# Patient Record
Sex: MALE | Race: WHITE | NOT HISPANIC OR LATINO | Employment: UNEMPLOYED | URBAN - METROPOLITAN AREA
[De-identification: names, ages, dates, MRNs, and addresses within clinical notes are randomized per-mention and may not be internally consistent; named-entity substitution may affect disease eponyms.]

---

## 2017-01-09 RX ORDER — METOPROLOL TARTRATE 50 MG/1
TABLET, FILM COATED ORAL
Qty: 60 TAB | Refills: 11 | Status: SHIPPED | OUTPATIENT
Start: 2017-01-09 | End: 2018-01-10 | Stop reason: SDUPTHER

## 2017-01-09 RX ORDER — LISINOPRIL 20 MG/1
TABLET ORAL
Qty: 30 TAB | Refills: 11 | Status: SHIPPED | OUTPATIENT
Start: 2017-01-09 | End: 2018-01-10 | Stop reason: SDUPTHER

## 2017-01-09 NOTE — TELEPHONE ENCOUNTER
Was the patient seen in the last year in this department? Yes     Does patient have an active prescription for medications requested? No     Received Request Via: Pharmacy   Future Appointments       Provider Department [place]    1/23/2017 8:10 AM SUDH[facility]

## 2017-02-13 RX ORDER — ALLOPURINOL 100 MG/1
TABLET ORAL
Qty: 30 TAB | Refills: 5 | Status: SHIPPED | OUTPATIENT
Start: 2017-02-13 | End: 2017-08-11 | Stop reason: SDUPTHER

## 2017-04-28 ENCOUNTER — APPOINTMENT (OUTPATIENT)
Dept: RADIOLOGY | Facility: MEDICAL CENTER | Age: 67
End: 2017-04-28
Attending: EMERGENCY MEDICINE
Payer: MEDICARE

## 2017-04-28 ENCOUNTER — HOSPITAL ENCOUNTER (OUTPATIENT)
Facility: MEDICAL CENTER | Age: 67
End: 2017-04-28
Attending: EMERGENCY MEDICINE | Admitting: HOSPITALIST
Payer: MEDICARE

## 2017-04-28 VITALS
OXYGEN SATURATION: 96 % | BODY MASS INDEX: 27.4 KG/M2 | WEIGHT: 185 LBS | DIASTOLIC BLOOD PRESSURE: 112 MMHG | TEMPERATURE: 97.5 F | HEIGHT: 69 IN | HEART RATE: 78 BPM | RESPIRATION RATE: 20 BRPM | SYSTOLIC BLOOD PRESSURE: 207 MMHG

## 2017-04-28 DIAGNOSIS — S09.90XA CLOSED HEAD INJURY, INITIAL ENCOUNTER: ICD-10-CM

## 2017-04-28 DIAGNOSIS — S01.01XA SCALP LACERATION, INITIAL ENCOUNTER: ICD-10-CM

## 2017-04-28 DIAGNOSIS — R55 SYNCOPE, UNSPECIFIED SYNCOPE TYPE: ICD-10-CM

## 2017-04-28 LAB
ALBUMIN SERPL BCP-MCNC: 4.1 G/DL (ref 3.2–4.9)
ALBUMIN/GLOB SERPL: 1.6 G/DL
ALP SERPL-CCNC: 63 U/L (ref 30–99)
ALT SERPL-CCNC: 20 U/L (ref 2–50)
ANION GAP SERPL CALC-SCNC: 10 MMOL/L (ref 0–11.9)
APTT PPP: 25.3 SEC (ref 24.7–36)
AST SERPL-CCNC: 21 U/L (ref 12–45)
BASOPHILS # BLD AUTO: 0.6 % (ref 0–1.8)
BASOPHILS # BLD: 0.05 K/UL (ref 0–0.12)
BILIRUB SERPL-MCNC: 0.4 MG/DL (ref 0.1–1.5)
BNP SERPL-MCNC: 119 PG/ML (ref 0–100)
BUN SERPL-MCNC: 14 MG/DL (ref 8–22)
CALCIUM SERPL-MCNC: 9 MG/DL (ref 8.5–10.5)
CHLORIDE SERPL-SCNC: 103 MMOL/L (ref 96–112)
CO2 SERPL-SCNC: 22 MMOL/L (ref 20–33)
CREAT SERPL-MCNC: 0.93 MG/DL (ref 0.5–1.4)
EOSINOPHIL # BLD AUTO: 0.34 K/UL (ref 0–0.51)
EOSINOPHIL NFR BLD: 3.8 % (ref 0–6.9)
ERYTHROCYTE [DISTWIDTH] IN BLOOD BY AUTOMATED COUNT: 39.5 FL (ref 35.9–50)
GFR SERPL CREATININE-BSD FRML MDRD: >60 ML/MIN/1.73 M 2
GLOBULIN SER CALC-MCNC: 2.6 G/DL (ref 1.9–3.5)
GLUCOSE SERPL-MCNC: 126 MG/DL (ref 65–99)
HCT VFR BLD AUTO: 42.9 % (ref 42–52)
HGB BLD-MCNC: 15.3 G/DL (ref 14–18)
IMM GRANULOCYTES # BLD AUTO: 0.06 K/UL (ref 0–0.11)
IMM GRANULOCYTES NFR BLD AUTO: 0.7 % (ref 0–0.9)
INR PPP: 0.9 (ref 0.87–1.13)
LIPASE SERPL-CCNC: 566 U/L (ref 11–82)
LYMPHOCYTES # BLD AUTO: 2.01 K/UL (ref 1–4.8)
LYMPHOCYTES NFR BLD: 22.5 % (ref 22–41)
MCH RBC QN AUTO: 30.9 PG (ref 27–33)
MCHC RBC AUTO-ENTMCNC: 35.7 G/DL (ref 33.7–35.3)
MCV RBC AUTO: 86.7 FL (ref 81.4–97.8)
MONOCYTES # BLD AUTO: 0.67 K/UL (ref 0–0.85)
MONOCYTES NFR BLD AUTO: 7.5 % (ref 0–13.4)
NEUTROPHILS # BLD AUTO: 5.82 K/UL (ref 1.82–7.42)
NEUTROPHILS NFR BLD: 64.9 % (ref 44–72)
NRBC # BLD AUTO: 0 K/UL
NRBC BLD AUTO-RTO: 0 /100 WBC
PLATELET # BLD AUTO: 199 K/UL (ref 164–446)
PMV BLD AUTO: 10.7 FL (ref 9–12.9)
POTASSIUM SERPL-SCNC: 3.7 MMOL/L (ref 3.6–5.5)
PROT SERPL-MCNC: 6.7 G/DL (ref 6–8.2)
PROTHROMBIN TIME: 12.4 SEC (ref 12–14.6)
RBC # BLD AUTO: 4.95 M/UL (ref 4.7–6.1)
SODIUM SERPL-SCNC: 135 MMOL/L (ref 135–145)
TROPONIN I SERPL-MCNC: <0.01 NG/ML (ref 0–0.04)
WBC # BLD AUTO: 9 K/UL (ref 4.8–10.8)

## 2017-04-28 PROCEDURE — 74177 CT ABD & PELVIS W/CONTRAST: CPT

## 2017-04-28 PROCEDURE — G0378 HOSPITAL OBSERVATION PER HR: HCPCS

## 2017-04-28 PROCEDURE — 700105 HCHG RX REV CODE 258: Performed by: EMERGENCY MEDICINE

## 2017-04-28 PROCEDURE — 90715 TDAP VACCINE 7 YRS/> IM: CPT | Performed by: EMERGENCY MEDICINE

## 2017-04-28 PROCEDURE — 85610 PROTHROMBIN TIME: CPT

## 2017-04-28 PROCEDURE — 700117 HCHG RX CONTRAST REV CODE 255: Performed by: EMERGENCY MEDICINE

## 2017-04-28 PROCEDURE — 85730 THROMBOPLASTIN TIME PARTIAL: CPT

## 2017-04-28 PROCEDURE — 304999 HCHG REPAIR-SIMPLE/INTERMED LEVEL 1

## 2017-04-28 PROCEDURE — 71010 DX-CHEST-PORTABLE (1 VIEW): CPT

## 2017-04-28 PROCEDURE — 83690 ASSAY OF LIPASE: CPT

## 2017-04-28 PROCEDURE — 84484 ASSAY OF TROPONIN QUANT: CPT

## 2017-04-28 PROCEDURE — 70450 CT HEAD/BRAIN W/O DYE: CPT

## 2017-04-28 PROCEDURE — 72125 CT NECK SPINE W/O DYE: CPT

## 2017-04-28 PROCEDURE — 90471 IMMUNIZATION ADMIN: CPT

## 2017-04-28 PROCEDURE — 83880 ASSAY OF NATRIURETIC PEPTIDE: CPT

## 2017-04-28 PROCEDURE — 93005 ELECTROCARDIOGRAM TRACING: CPT | Performed by: EMERGENCY MEDICINE

## 2017-04-28 PROCEDURE — 76705 ECHO EXAM OF ABDOMEN: CPT

## 2017-04-28 PROCEDURE — 36415 COLL VENOUS BLD VENIPUNCTURE: CPT

## 2017-04-28 PROCEDURE — 85025 COMPLETE CBC W/AUTO DIFF WBC: CPT

## 2017-04-28 PROCEDURE — 99285 EMERGENCY DEPT VISIT HI MDM: CPT

## 2017-04-28 PROCEDURE — 80053 COMPREHEN METABOLIC PANEL: CPT

## 2017-04-28 PROCEDURE — 305308 HCHG STAPLER,SKIN,DISP.

## 2017-04-28 PROCEDURE — 700111 HCHG RX REV CODE 636 W/ 250 OVERRIDE (IP): Performed by: EMERGENCY MEDICINE

## 2017-04-28 PROCEDURE — 304217 HCHG IRRIGATION SYSTEM

## 2017-04-28 PROCEDURE — 700101 HCHG RX REV CODE 250: Performed by: EMERGENCY MEDICINE

## 2017-04-28 RX ORDER — LIDOCAINE HYDROCHLORIDE AND EPINEPHRINE 10; 10 MG/ML; UG/ML
20 INJECTION, SOLUTION INFILTRATION; PERINEURAL ONCE
Status: COMPLETED | OUTPATIENT
Start: 2017-04-28 | End: 2017-04-28

## 2017-04-28 RX ORDER — SODIUM CHLORIDE 9 MG/ML
INJECTION, SOLUTION INTRAVENOUS CONTINUOUS
Status: DISCONTINUED | OUTPATIENT
Start: 2017-04-28 | End: 2017-04-28 | Stop reason: HOSPADM

## 2017-04-28 RX ORDER — IBUPROFEN 200 MG
400 TABLET ORAL EVERY 6 HOURS PRN
Status: SHIPPED | COMMUNITY
End: 2023-11-11

## 2017-04-28 RX ADMIN — SODIUM CHLORIDE: 9 INJECTION, SOLUTION INTRAVENOUS at 09:43

## 2017-04-28 RX ADMIN — LIDOCAINE HYDROCHLORIDE,EPINEPHRINE BITARTRATE 20 ML: 10; .01 INJECTION, SOLUTION INFILTRATION; PERINEURAL at 10:00

## 2017-04-28 RX ADMIN — IOHEXOL 100 ML: 350 INJECTION, SOLUTION INTRAVENOUS at 13:49

## 2017-04-28 RX ADMIN — CLOSTRIDIUM TETANI TOXOID ANTIGEN (FORMALDEHYDE INACTIVATED), CORYNEBACTERIUM DIPHTHERIAE TOXOID ANTIGEN (FORMALDEHYDE INACTIVATED), BORDETELLA PERTUSSIS TOXOID ANTIGEN (GLUTARALDEHYDE INACTIVATED), BORDETELLA PERTUSSIS FILAMENTOUS HEMAGGLUTININ ANTIGEN (FORMALDEHYDE INACTIVATED), BORDETELLA PERTUSSIS PERTACTIN ANTIGEN, AND BORDETELLA PERTUSSIS FIMBRIAE 2/3 ANTIGEN 0.5 ML: 5; 2; 2.5; 5; 3; 5 INJECTION, SUSPENSION INTRAMUSCULAR at 09:42

## 2017-04-28 ASSESSMENT — LIFESTYLE VARIABLES: DO YOU DRINK ALCOHOL: NO

## 2017-04-28 ASSESSMENT — PAIN SCALES - GENERAL
PAINLEVEL_OUTOF10: 4
PAINLEVEL_OUTOF10: 9

## 2017-04-28 NOTE — ED AVS SNAPSHOT
4/28/2017    Joe Templeton  [address] [address]    Dear Joe:    [facility] wants to ensure your discharge home is safe and you or your loved ones have had all of your questions answered regarding your care after you leave the hospital.    Below is a list of resources and contact information should you have any questions regarding your hospital stay, follow-up instructions, or active medical symptoms.    Questions or Concerns Regarding… Contact   Medical Questions Related to Your Discharge  (7 days a week, 8am-5pm) Contact a Nurse Care Coordinator   [phone]   Medical Questions Not Related to Your Discharge  (24 hours a day / 7 days a week)  Contact the Nurse Health Line   [phone]    Medications or Discharge Instructions Refer to your discharge packet   or contact your [facility] Primary Care Provider   [phone]   Follow-up Appointment(s) Schedule your appointment via [employer]   or contact Scheduling [phone]   Billing Review your statement via [employer]  or contact Billing [phone]   Medical Records Review your records via [employer]   or contact Medical Records [phone]     You may receive a telephone call within two days of discharge. This call is to make certain you understand your discharge instructions and have the opportunity to have any questions answered. You can also easily access your medical information, test results and upcoming appointments via the [employer] free online health management tool. You can learn more and sign up at [employer]. For assistance setting up your [employer] account, please call [phone].    Once again, we want to ensure your discharge home is safe and that you have a clear understanding of any next steps in your care. If you have any questions or concerns, please do not hesitate to contact us, we are here for you. Thank you for choosing [facility] for your healthcare needs.    Sincerely,    Your [facility] Healthcare Team

## 2017-04-28 NOTE — ED NOTES
The Medication Reconciliation process has been completed by interviewing the patient who reports that he quit cholesterol med and clopidogrel about 2 months ago.     Allergies have been reviewed  Antibiotic use in 30 days - NONE    Home Pharmacy:  [facility]

## 2017-04-28 NOTE — ED PROVIDER NOTES
ED Provider Note    CHIEF COMPLAINT  Chief Complaint   Patient presents with   • Syncope       HPI  Joe Templeton is a 66 y.o. male who presents with history of walking into the kitchen this morning to get something to eat. The next E knew he was on the floor with his head bleeding. Evidently his sister-in-law noticed him laying on the floor with his head bleeding. Length of unconsciousness is unknown.. He now complains of a scalp laceration,. He had no warning about this syncopal episode. No chest pain no nausea no vomiting no diarrhea no lightheadedness. He does not know that he didn't slip and fall and hit his head. No similar episodes in the past.    REVIEW OF SYSTEMS  See HPI for further details. History of myocardial infarction, one year ago with stents placed History of arthritis Hypertension Denies other G.I., G.U.. endrocine, cardiovascular, respriatory or neurological problems.  All other systems are negative.     PAST MEDICAL HISTORY  Past Medical History   Diagnosis Date   • Arthritis    • Gout    • Hypertension        FAMILY HISTORY  Family History   Problem Relation Age of Onset   • Cancer Mother    • Heart Disease Father    • Hyperlipidemia Father    • Hyperlipidemia Brother    • Lung Disease Brother        SOCIAL HISTORY  Social History     Social History   • Marital Status: Single     Spouse Name: N/A   • Number of Children: N/A   • Years of Education: N/A     Social History Main Topics   • Smoking status: Former Smoker -- 0.25 packs/day for .5 years     Types: Cigarettes     Quit date: 06/27/2016   • Smokeless tobacco: Never Used   • Alcohol Use: No   • Drug Use: No      Comment: Hx of drug abuse-pot,meth, cocaine (None for 15 yrs) never injected drugs   • Sexual Activity: Not Currently     Other Topics Concern   • None     Social History Narrative       SURGICAL HISTORY  Past Surgical History   Procedure Laterality Date   • Multiple coronary artery bypass endo vein harvest N/A 6/28/2016      "Procedure: MULTIPLE CORONARY ARTERY BYPASS ENDO VEIN HARVEST- With DELMI ;  Surgeon: Deejay Duong M.D.;  Location: SURGERY [facility];  Service:        CURRENT MEDICATIONS  Home Medications     Reviewed by Penny Wong R.N. (Registered Nurse) on 04/28/17 at 0925  Med List Status: Partial    Medication Last Dose Status    allopurinol (ZYLOPRIM) 100 MG Tab  Active    aspirin 81 MG EC tablet 10/18/2016 Active    atorvastatin (LIPITOR) 20 MG Tab 10/18/2016 Active    Cholecalciferol 2000 UNIT Cap  Active    clopidogrel (PLAVIX) 75 MG Tab 10/18/2016 Active    docusate sodium (COLACE) 100 MG Cap  Active    hydrocortisone rectal (PROCTOZONE HC) 2.5 % Cream  Active    lisinopril (PRINIVIL) 20 MG Tab  Active    metoprolol (LOPRESSOR) 50 MG Tab  Active                ALLERGIES  No Known Allergies    PHYSICAL EXAM  VITAL SIGNS: /112 mmHg  Pulse 68  Temp(Src) 36.4 °C (97.5 °F)  Resp 16  Ht 1.753 m (5' 9\")  Wt 83.915 kg (185 lb)  BMI 27.31 kg/m2  Constitutional: Well developed, Well nourished, No acute distress, Non-toxic appearance.   HENT: Normocephalic, scalp laceration, posterior, Bilateral external ears normal, Oropharynx moist, No oral exudates, Nose normal.   Eyes: PERRL, EOMI, Conjunctiva normal, No discharge.   Neck: Normal range of motion, No tenderness, Supple, No stridor.   Lymphatic: No lymphadenopathy noted.   Cardiovascular: Normal heart rate, Normal rhythm, No murmurs, No rubs, No gallops.   Thorax & Lungs: Normal breath sounds, No respiratory distress, No wheezing, No chest tenderness.   Abdomen:  No tenderness, no guarding no rigidity and the abdomen is soft.  No masses, No pulsatile masses.  Skin: Warm, Dry, No erythema, No rash.   Back: No tenderness, No CVA tenderness.   Extremities: Intact distal pulses, No edema, No tenderness, No cyanosis, No clubbing.   Musculoskeletal: Good range of motion in all major joints. No tenderness to palpation or major deformities noted.   Neurologic: " Alert & oriented x 3, Normal motor function, Normal sensory function, No focal deficits noted.   Psychiatric: Affect normal, Judgment normal, Mood normal.   EKG Interpretation    Interpreted by me    Rhythm: normal sinus   Rate: normal  Axis: normal  Ectopy: none  Conduction: normal  ST Segments: no acute change  T Waves: no acute change  Q Waves: Q waves in the inferior lead     Clinical Impression: I do have an old EKG to compare to and I do not see significant change  RADIOLOGY/PROCEDURES  CT-ABDOMEN-PELVIS WITH   Final Result      1.  No evidence of bowel obstruction or inflammatory change.      2.  No evidence of pancreatitis.      3.  Bilateral renal stones which measure up to 6 mm in size.      4.  Colonic diverticulosis.      5.  Fatty liver.      6.  Atherosclerotic vascular calcification.      US-GALLBLADDER   Final Result      1.  Gallstones within the gallbladder. No evidence of biliary ductal dilatation.      2.  Multiple small right renal stones.      3.  The liver is echogenic consistent with fatty change versus hepatocellular dysfunction.      CT-HEAD W/O   Final Result      No acute intracranial abnormality is identified.      Chronic cerebellar infarctions      Patchy white matter hypodensity is present.  This is a nonspecific finding which usually is found to represent chronic microvascular disease in patient's of this demographic.  Demyelination, age indeterminant ischemia and gliosis are also common    possibilities.      CT-CSPINE WITHOUT PLUS RECONS   Final Result      1.  No evidence of cervical spine fracture.      2.  Multilevel degenerative disc disease and facet degeneration.      DX-CHEST-PORTABLE (1 VIEW)   Final Result      New increased reticular opacity is compatible with mild interstitial edema      Resolved costophrenic sulcus blunting indicating resolving atelectasis or pleural fluid            COURSE & MEDICAL DECISION MAKING  Pertinent Labs & Imaging studies reviewed. (See chart  for details)   troponin normal electrolytes normal renal function normal liver function normal lipase elevated at 566. Clotting studies are unremarkable.      . He denies any alcohol use at all. No history of any gallbladder disease. His abdomen is nontender. However he does have a lipase markedly elevated. Also had a syncopal episodes, striking his head, lacerationstapled.. I do not have etiology of the syncopal episode or elevated lipase. We'll talk with the hospitalist about admission  FINAL IMPRESSION  1.   1. Syncope, unspecified syncope type    2. Closed head injury, initial encounter    3. Scalp laceration, initial encounter        2. Elevated lipase 3.     Disposition  Procedure note: There is a 4 cm jagged laceration,. Posterior scalp.This laceration was preped and then anesthetized with 1% Xylocaine. The laceration was thoroughly irrigated with normal saline. I was unable to find ayn foreign bodies int the wound. Any hair around the wound was shaved. The laceration was then able with stainless steel staples. Neosporin was then placed around the wound and a dressing was applied.  I have made arrangements for this patient to be admitted to the hospital however he now insist on leaving. I explained to him the dangers of his leaving before workup. This patient has chosen to sign against medical advise. He understands that there may be a severe medical problem that may cause disability or death. The patient understands that they may return to the emergency room any time if they change their mind. I have strongly recommended that this patient stay in the emergency room for completion of the workup and to be admitted, but the patient refuses to stay and refuses to be admitted. I have given them alternative followup with the [facility] clinic where they will not require an appointment. Prior to leaving I have once again strongly recommended that the patient stay for admission. The patient does understand that the  consequences of these actions may result in disability or death. I have offered to talk with family in addition.Discharge instructions are understood. This patient is to return if fever vomiting or no better in 12 hours. Follow up with the [facility] clinic or private physician. Information sheets on scalp laceration closed head injury secondary to one episode elevated lipase. Electronically signed by: Kyle Cade, 4/28/2017 9:33 AM

## 2017-04-28 NOTE — ED NOTES
Pt BIB EMS for syncopal event. Pt was standing in the kitchen and then had syncopal event. Pt denies any symptoms prior to event. Pt now with lac to back of head, no active bleeding. (+) Right sided neck pain. AAO X 4.

## 2017-04-28 NOTE — ED AVS SNAPSHOT
DecisionView Access Code: [email or web address]  Expires: 5/28/2017  2:36 PM    Your email address is not on file at [facility].  Email Addresses are required for you to sign up for DecisionView, please contact [phone] to verify your personal information and to provide your email address prior to attempting to register for DecisionView.    Joe Boatengreta Templeton  [address] [place] [address]    DecisionView  A secure, online tool to manage your health information     [facility]’s DecisionView® is a secure, online tool that connects you to your personalized health information from the privacy of your home -- day or night - making it very easy for you to manage your healthcare. Once the activation process is completed, you can even access your medical information using the DecisionView emir, which is available for free in the Apple Emir store or Google Play store.     To learn more about DecisionView, visit [email or web address]    There are two levels of access available (as shown below):   My Chart Features  [facility] Primary Care Doctor [facility]  Specialists [facility]  Urgent  Care Non-[facility] Primary Care Doctor   Email your healthcare team securely and privately 24/7 X X X    Manage appointments: schedule your next appointment; view details of past/upcoming appointments X      Request prescription refills. X      View recent personal medical records, including lab and immunizations X X X X   View health record, including health history, allergies, medications X X X X   Read reports about your outpatient visits, procedures, consult and ER notes X X X X   See your discharge summary, which is a recap of your hospital and/or ER visit that includes your diagnosis, lab results, and care plan X X  X     How to register for [employer]:  Once your e-mail address has been verified, follow the following steps to sign up for DecisionView.     1. Go to  [email or web address]  2. Click on the Sign Up Now box, which takes you to the New Member Sign Up page.  You will need to provide the following information:  a. Enter your [employer] Access Code exactly as it appears at the top of this page. (You will not need to use this code after you’ve completed the sign-up process. If you do not sign up before the expiration date, you must request a new code.)   b. Enter your date of birth.   c. Enter your home email address.   d. Click Submit, and follow the next screen’s instructions.  3. Create a dot[employer]t ID. This will be your [employer] login ID and cannot be changed, so think of one that is secure and easy to remember.  4. Create a [employer] password. You can change your password at any time.  5. Enter your Password Reset Question and Answer. This can be used at a later time if you forget your password.   6. Enter your e-mail address. This allows you to receive e-mail notifications when new information is available in [employer].  7. Click Sign Up. You can now view your health information.    For assistance activating your [employer] account, call [phone]

## 2017-04-28 NOTE — ED AVS SNAPSHOT
Home Care Instructions                                                                                                                Joe Templeton   MRN: [id number]    Department:  [facility], Emergency Dept   Date of Visit:  4/28/2017            [facility], Emergency Dept    [address] [facility] [address]    Phone:  [phone]      You were seen by     Kyle Cade M.D.      Your Diagnosis Was     Syncope, unspecified syncope type     R55       These are the medications you received during your hospitalization from 04/28/2017 0920 to 04/28/2017 1436     Date/Time Order Dose Route Action    04/28/2017 0943 NS infusion   Intravenous New Bag    04/28/2017 1000 lidocaine-epinephrine 1 %-1:136315 injection 20 mL 20 mL Other Given    04/28/2017 0942 tetanus-dipth-acell pertussis (ADACEL) inj 0.5 mL 0.5 mL Intramuscular Given    04/28/2017 1349 iohexol (OMNIPAQUE) 350 mg/mL 100 mL Intravenous Given      Follow-up Information     1. Follow up with SUDHIR Humphreys. Schedule an appointment as soon as possible for a visit today.    Specialty:  Family Medicine    Contact information    [address] [address] [phone]        Medication Information     Review all of your home medications and newly ordered medications with your primary doctor and/or pharmacist as soon as possible. Follow medication instructions as directed by your doctor and/or pharmacist.     Please keep your complete medication list with you and share with your physician. Update the information when medications are discontinued, doses are changed, or new medications (including over-the-counter products) are added; and carry medication information at all times in the event of emergency situations.               Medication List      ASK your doctor about these medications        Instructions    Morning Afternoon Evening Bedtime    allopurinol 100 MG Tabs   Commonly known as:   ZYLOPRIM        TAKE ONE TABLET BY MOUTH ONCE DAILY                        aspirin 81 MG EC tablet        Take 1 Tab by mouth every day.   Dose:  81 mg                        ibuprofen 200 MG Tabs   Commonly known as:  MOTRIN        Take 400 mg by mouth every 6 hours as needed.   Dose:  400 mg                        lisinopril 20 MG Tabs   Commonly known as:  PRINIVIL        TAKE ONE TABLET BY MOUTH ONCE DAILY                        metoprolol 50 MG Tabs   Commonly known as:  LOPRESSOR        TAKE ONE TABLET BY MOUTH TWICE DAILY                                Procedures and tests performed during your visit     ADMISSION ORDER (OBSERVATION-OUTPATIENT)    APTT    Btype Natriuretic Peptide (BNP)    CBC with Differential    CONSENT FOR CONTRAST INJECTION    CT-ABDOMEN-PELVIS WITH    CT-CSPINE WITHOUT PLUS RECONS    CT-HEAD W/O    Complete Metabolic Panel (CMP)    DISPOSABLE SUTURE SET    DX-CHEST-PORTABLE (1 VIEW)    EKG (ER)    EKG (NOW)    ESTIMATED GFR    Lipase    Prothrombin Time (PT/INR)    Troponin STAT    US-GALLBLADDER        Discharge Instructions       Staple Wound Closure  Staples are used to help a wound heal faster by holding the edges of the wound together.  HOME CARE  · Keep the area around the staples clean and dry.  · Rest and raise (elevate) the injured part above the level of your heart.  · See your doctor for a follow-up check of the wound.  · See your doctor to have the staples removed.  · Clean the wound daily with water.  · Do not soak the wound in water for long periods of time.  · Let air reach the wound as it heals.  GET HELP RIGHT AWAY IF:   · You have redness or puffiness around the wound.  · You have a red line going away from the wound.  · You have more pain or tenderness.  · You have yellowish-white fluid (pus) coming from the wound.  · Your wound does not stay together after the staples have been taken out.  · You see something coming out of the wound, such as wood or glass.  · You have  problems moving the injured area.  · You have a fever or lasting symptoms for more than 2-3 days.  · You have a fever and your symptoms suddenly get worse.  MAKE SURE YOU:   · Understand these instructions.  · Will watch this condition.  · Will get help right away if you are not doing well or get worse.  Document Released: 09/26/2009 Document Revised: 09/11/2013 Document Reviewed: 06/30/2013  ExitCare® Patient Information ©2014 [employer].    Sutured Wound Care  Sutures are stitches that can be used to close wounds. Taking care of your wound properly can help to prevent pain and infection. It can also help your wound to heal more quickly.  HOW TO CARE FOR YOUR SUTURED WOUND  Wound Care  · Keep the wound clean and dry.  · If you were given a bandage (dressing), you should change it at least once per day or as directed by your health care provider. You should also change it if it becomes wet or dirty.  · Keep the wound completely dry for the first 24 hours or as directed by your health care provider. After that time, you may shower or bathe. However, make sure that the wound is not soaked in water until the sutures have been removed.  · Clean the wound one time each day or as directed by your health care provider.  · Wash the wound with soap and water.  · Rinse the wound with water to remove all soap.  · Pat the wound dry with a clean towel. Do not rub the wound.  · After cleaning the wound, apply a thin layer of antibiotic ointment as directed by your health care provider. This will help to prevent infection and keep the dressing from sticking to the wound.  · Have the sutures removed as directed by your health care provider.  General Instructions  · Take or apply medicines only as directed by your health care provider.  · To help prevent scarring, make sure to cover your wound with sunscreen whenever you are outside after the sutures are removed and the wound is healed. Make sure to wear a sunscreen of at least  30 SPF.  · If you were prescribed an antibiotic medicine or ointment, finish all of it even if you start to feel better.  · Do not scratch or pick at the wound.  · Keep all follow-up visits as directed by your health care provider. This is important.  · Check your wound every day for signs of infection. Watch for:    · Redness, swelling, or pain.  · Fluid, blood, or pus.  · Raise (elevate) the injured area above the level of your heart while you are sitting or lying down, if possible.  · Avoid stretching your wound.  · Drink enough fluids to keep your urine clear or pale yellow.  SEEK MEDICAL CARE IF:  · You received a tetanus shot and you have swelling, severe pain, redness, or bleeding at the injection site.  · You have a fever.  · A wound that was closed breaks open.  · You notice a bad smell coming from the wound.  · You notice something coming out of the wound, such as wood or glass.  · Your pain is not controlled with medicine.  · You have increased redness, swelling, or pain at the site of your wound.  · You have fluid, blood, or pus coming from your wound.  · You notice a change in the color of your skin near your wound.  · You need to change the dressing frequently due to fluid, blood, or pus draining from the wound.  · You develop a new rash.  · You develop numbness around the wound.  SEEK IMMEDIATE MEDICAL CARE IF:  · You develop severe swelling around the injury site.  · Your pain suddenly increases and is severe.  · You develop painful lumps near the wound or on skin that is anywhere on your body.  · You have a red streak going away from your wound.  · The wound is on your hand or foot and you cannot properly move a finger or toe.  · The wound is on your hand or foot and you notice that your fingers or toes look pale or bluish.     This information is not intended to replace advice given to you by your health care provider. Make sure you discuss any questions you have with your health care provider.        Document Released: 01/25/2006 Document Revised: 05/03/2016 Document Reviewed: 12/14/2015  [employer] Interactive Patient Education ©2016 [employer].    Wound Check  If you have a wound, it may take some time to heal. Eventually, a scar will form. The scar will also fade with time. It is important to take care of your wound while it is healing. This helps to protect your wound from infection.   HOW SHOULD I TAKE CARE OF MY WOUND AT HOME?  · Some wounds are allowed to close on their own or are repaired at a later date. There are many different ways to close and cover a wound, including stitches (sutures), skin glue, and adhesive strips. Follow your health care provider's instructions about:  · Wound care.  · Bandage (dressing) changes and removal.  · Wound closure removal.  · Take medicines only as directed by your health care provider.  · Keep all follow-up visits as directed by your health care provider. This is important.  · Do not take baths, swim, or use a hot tub until your health care provider approves. You may shower as directed by your health care provider.  · Keep your wound clean and dry.  WHAT AFFECTS SCAR FORMATION?  Scars affect each person differently. How your body scars depends on:  · The location and size of your wound.  · Traits that you inherited from your parents (genetic predisposition).  · How you take care of your wound. Irritation and inflammation increase the amount of scar formation.  · Sun exposure. This can darken a scar.  WHEN SHOULD I CALL OR SEE MY HEALTH CARE PROVIDER?  Call or see your health care provider if:  · You have redness, swelling, or pain at your wound site.  · You have fluid, blood, or pus coming from your wound.  · You have muscle aches, chills, or a general ill feeling.  · You notice a bad smell coming from the wound.  · Your wound separates after the sutures, staples, or skin adhesive strips have been removed.  · You have persistent nausea or vomiting.  · You have a  fever.  · You are dizzy.  WHEN SHOULD I CALL 911 OR GO TO THE EMERGENCY ROOM?  Call 911 or go to the emergency room if:  · You faint.  · You have difficulty breathing.     This information is not intended to replace advice given to you by your health care provider. Make sure you discuss any questions you have with your health care provider.     Document Released: 09/23/2005 Document Revised: 01/08/2016 Document Reviewed: 09/29/2015  [employer] Interactive Patient Education ©2016 [employer] Inc.    Syncope  Syncope is a medical term for fainting or passing out. This means you lose consciousness and drop to the ground. People are generally unconscious for less than 5 minutes. You may have some muscle twitches for up to 15 seconds before waking up and returning to normal. Syncope occurs more often in older adults, but it can happen to anyone. While most causes of syncope are not dangerous, syncope can be a sign of a serious medical problem. It is important to seek medical care.   CAUSES   Syncope is caused by a sudden drop in blood flow to the brain. The specific cause is often not determined. Factors that can bring on syncope include:  · Taking medicines that lower blood pressure.  · Sudden changes in posture, such as standing up quickly.  · Taking more medicine than prescribed.  · Standing in one place for too long.  · Seizure disorders.  · Dehydration and excessive exposure to heat.  · Low blood sugar (hypoglycemia).  · Straining to have a bowel movement.  · Heart disease, irregular heartbeat, or other circulatory problems.  · Fear, emotional distress, seeing blood, or severe pain.  SYMPTOMS   Right before fainting, you may:  · Feel dizzy or light-headed.  · Feel nauseous.  · See all white or all black in your field of vision.  · Have cold, clammy skin.  DIAGNOSIS   Your health care provider will ask about your symptoms, perform a physical exam, and perform an electrocardiogram (ECG) to record the electrical activity of  your heart. Your health care provider may also perform other heart or blood tests to determine the cause of your syncope which may include:  · Transthoracic echocardiogram (TTE). During echocardiography, sound waves are used to evaluate how blood flows through your heart.  · Transesophageal echocardiogram (DELMI).  · Cardiac monitoring. This allows your health care provider to monitor your heart rate and rhythm in real time.  · Holter monitor. This is a portable device that records your heartbeat and can help diagnose heart arrhythmias. It allows your health care provider to track your heart activity for several days, if needed.  · Stress tests by exercise or by giving medicine that makes the heart beat faster.  TREATMENT   In most cases, no treatment is needed. Depending on the cause of your syncope, your health care provider may recommend changing or stopping some of your medicines.  HOME CARE INSTRUCTIONS  · Have someone stay with you until you feel stable.  · Do not drive, use machinery, or play sports until your health care provider says it is okay.  · Keep all follow-up appointments as directed by your health care provider.  · Lie down right away if you start feeling like you might faint. Breathe deeply and steadily. Wait until all the symptoms have passed.  · Drink enough fluids to keep your urine clear or pale yellow.  · If you are taking blood pressure or heart medicine, get up slowly and take several minutes to sit and then stand. This can reduce dizziness.  SEEK IMMEDIATE MEDICAL CARE IF:   · You have a severe headache.  · You have unusual pain in the chest, abdomen, or back.  · You are bleeding from your mouth or rectum, or you have black or tarry stool.  · You have an irregular or very fast heartbeat.  · You have pain with breathing.  · You have repeated fainting or seizure-like jerking during an episode.  · You faint when sitting or lying down.  · You have confusion.  · You have trouble walking.  · You  have severe weakness.  · You have vision problems.  If you fainted, call your local emergency services (911 in [place] Do not drive yourself to the hospital.      This information is not intended to replace advice given to you by your health care provider. Make sure you discuss any questions you have with your health care provider.     Document Released: 12/18/2006 Document Revised: 05/03/2016 Document Reviewed: 02/15/2013  [employer] Interactive Patient Education ©2016 [employer] Inc.  Head Injury, Adult  You have received a head injury. It does not appear serious at this time. Headaches and vomiting are common following head injury. It should be easy to awaken from sleeping. Sometimes it is necessary for you to stay in the emergency department for a while for observation. Sometimes admission to the hospital may be needed. After injuries such as yours, most problems occur within the first 24 hours, but side effects may occur up to 7-10 days after the injury. It is important for you to carefully monitor your condition and contact your health care provider or seek immediate medical care if there is a change in your condition.  WHAT ARE THE TYPES OF HEAD INJURIES?  Head injuries can be as minor as a bump. Some head injuries can be more severe. More severe head injuries include:  · A jarring injury to the brain (concussion).  · A bruise of the brain (contusion). This mean there is bleeding in the brain that can cause swelling.  · A cracked skull (skull fracture).  · Bleeding in the brain that collects, clots, and forms a bump (hematoma).  WHAT CAUSES A HEAD INJURY?  A serious head injury is most likely to happen to someone who is in a car wreck and is not wearing a seat belt. Other causes of major head injuries include bicycle or motorcycle accidents, sports injuries, and falls.  HOW ARE HEAD INJURIES DIAGNOSED?  A complete history of the event leading to the injury and your current symptoms will be helpful in diagnosing  head injuries. Many times, pictures of the brain, such as CT or MRI are needed to see the extent of the injury. Often, an overnight hospital stay is necessary for observation.   WHEN SHOULD I SEEK IMMEDIATE MEDICAL CARE?   You should get help right away if:  · You have confusion or drowsiness.  · You feel sick to your stomach (nauseous) or have continued, forceful vomiting.  · You have dizziness or unsteadiness that is getting worse.  · You have severe, continued headaches not relieved by medicine. Only take over-the-counter or prescription medicines for pain, fever, or discomfort as directed by your health care provider.  · You do not have normal function of the arms or legs or are unable to walk.  · You notice changes in the black spots in the center of the colored part of your eye (pupil).  · You have a clear or bloody fluid coming from your nose or ears.  · You have a loss of vision.  During the next 24 hours after the injury, you must stay with someone who can watch you for the warning signs. This person should contact local emergency services (1 in the [place] if you have seizures, you become unconscious, or you are unable to wake up.  HOW CAN I PREVENT A HEAD INJURY IN THE FUTURE?  The most important factor for preventing major head injuries is avoiding motor vehicle accidents.  To minimize the potential for damage to your head, it is crucial to wear seat belts while riding in motor vehicles. Wearing helmets while bike riding and playing collision sports (like football) is also helpful. Also, avoiding dangerous activities around the house will further help reduce your risk of head injury.   WHEN CAN I RETURN TO NORMAL ACTIVITIES AND ATHLETICS?  You should be reevaluated by your health care provider before returning to these activities. If you have any of the following symptoms, you should not return to activities or contact sports until 1 week after the symptoms have stopped:  · Persistent  headache.  · Dizziness or vertigo.  · Poor attention and concentration.  · Confusion.  · Memory problems.  · Nausea or vomiting.  · Fatigue or tire easily.  · Irritability.  · Intolerant of bright lights or loud noises.  · Anxiety or depression.  · Disturbed sleep.  MAKE SURE YOU:   · Understand these instructions.  · Will watch your condition.  · Will get help right away if you are not doing well or get worse.     This information is not intended to replace advice given to you by your health care provider. Make sure you discuss any questions you have with your health care provider.     Document Released: 12/18/2006 Document Revised: 01/08/2016 Document Reviewed: 08/25/2014  [employer] Interactive Patient Education ©2016 [employer]            Patient Information     Patient Information    Following emergency treatment: all patient requiring follow-up care must return either to a private physician or a clinic if your condition worsens before you are able to obtain further medical attention, please return to the emergency room.     Billing Information    At [facility], we work to make the billing process streamlined for our patients.  Our Representatives are here to answer any questions you may have regarding your hospital bill.  If you have insurance coverage and have supplied your insurance information to us, we will submit a claim to your insurer on your behalf.  Should you have any questions regarding your bill, we can be reached online or by phone as follows:  Online: You are able pay your bills online or live chat with our representatives about any billing questions you may have. We are here to help Monday - Friday from 8:00am to 7:30pm and 9:00am - 12:00pm on Saturdays.  Please visit [email or web address]  for more information.   Phone:  [phone] or [phone]    Please note that your emergency physician, surgeon, pathologist, radiologist, anesthesiologist, and other  specialists are not employed by [facility] and will therefore bill separately for their services.  Please contact them directly for any questions concerning their bills at the numbers below:     Emergency Physician Services:  [phone]  [facility]:  [phone]  [facility]:  [phone]  [facility]:  [phone]    1. Your final bill may vary from the amount quoted upon discharge if all procedures are not complete at that time, or if your doctor has additional procedures of which we are not aware. You will receive an additional bill if you return to the Emergency Department at [facility] for suture removal regardless of the facility of which the sutures were placed.     2. Please arrange for settlement of this account at the emergency registration.    3. All self-pay accounts are due in full at the time of treatment.  If you are unable to meet this obligation then payment is expected within 4-5 days.     4. If you have had radiology studies (CT, X-ray, Ultrasound, MRI), you have received a preliminary result during your emergency department visit. Please contact the radiology department [phone] to receive a copy of your final result. Please discuss the Final result with your primary physician or with the follow up physician provided.     Crisis Hotline:  [place] Crisis Hotline:  7-913-NXBIEYV or 1-739.503.5138  [place] Crisis Hotline:    1-909.713.9149 or 963-886-0182         ED Discharge Follow Up Questions    1. In order to provide you with very good care, we would like to follow up with a phone call in the next few days.  May we have your permission to contact you?     YES /  NO    2. What is the best phone number to call you? (       )_____-__________    3. What is the best time to call you?      Morning  /  Afternoon  /  Evening                   Patient Signature:  ____________________________________________________________    Date:   ____________________________________________________________

## 2017-04-30 LAB — EKG IMPRESSION: NORMAL

## 2017-04-30 NOTE — DISCHARGE PLANNING
CM received call from pt stating he was informed during his ER visit that there is something concerning with his pancreas.  CM confirmed pt is established with PCP Zoltan Boykin.  CM recommended pt to make an appointment with PCP for follow up on medical conditions; pt reports he will contact his provider to also remove the staples from his head

## 2017-05-08 ENCOUNTER — OFFICE VISIT (OUTPATIENT)
Dept: MEDICAL GROUP | Facility: MEDICAL CENTER | Age: 67
End: 2017-05-08
Attending: NURSE PRACTITIONER
Payer: MEDICARE

## 2017-05-08 VITALS
SYSTOLIC BLOOD PRESSURE: 140 MMHG | HEIGHT: 69 IN | BODY MASS INDEX: 28.73 KG/M2 | OXYGEN SATURATION: 96 % | WEIGHT: 194 LBS | RESPIRATION RATE: 16 BRPM | TEMPERATURE: 97.3 F | DIASTOLIC BLOOD PRESSURE: 90 MMHG | HEART RATE: 80 BPM

## 2017-05-08 DIAGNOSIS — S01.01XA LACERATION OF SCALP WITHOUT FOREIGN BODY, INITIAL ENCOUNTER: ICD-10-CM

## 2017-05-08 DIAGNOSIS — K64.0 FIRST DEGREE HEMORRHOIDS: ICD-10-CM

## 2017-05-08 DIAGNOSIS — R55 SYNCOPE, UNSPECIFIED SYNCOPE TYPE: ICD-10-CM

## 2017-05-08 DIAGNOSIS — I10 ESSENTIAL HYPERTENSION: ICD-10-CM

## 2017-05-08 DIAGNOSIS — M10.00 ACUTE IDIOPATHIC GOUT, UNSPECIFIED SITE: ICD-10-CM

## 2017-05-08 DIAGNOSIS — K59.09 OTHER CONSTIPATION: ICD-10-CM

## 2017-05-08 PROBLEM — S01.00XA SCALP WOUND: Status: ACTIVE | Noted: 2017-05-08

## 2017-05-08 PROCEDURE — 99214 OFFICE O/P EST MOD 30 MIN: CPT | Performed by: NURSE PRACTITIONER

## 2017-05-08 PROCEDURE — G8598 ASA/ANTIPLAT THER USED: HCPCS | Performed by: NURSE PRACTITIONER

## 2017-05-08 PROCEDURE — 1101F PT FALLS ASSESS-DOCD LE1/YR: CPT | Mod: 8P | Performed by: NURSE PRACTITIONER

## 2017-05-08 PROCEDURE — G8419 CALC BMI OUT NRM PARAM NOF/U: HCPCS | Performed by: NURSE PRACTITIONER

## 2017-05-08 PROCEDURE — 1036F TOBACCO NON-USER: CPT | Performed by: NURSE PRACTITIONER

## 2017-05-08 PROCEDURE — 99212 OFFICE O/P EST SF 10 MIN: CPT | Performed by: NURSE PRACTITIONER

## 2017-05-08 PROCEDURE — G8432 DEP SCR NOT DOC, RNG: HCPCS | Performed by: NURSE PRACTITIONER

## 2017-05-08 PROCEDURE — 4040F PNEUMOC VAC/ADMIN/RCVD: CPT | Performed by: NURSE PRACTITIONER

## 2017-05-08 RX ORDER — IBUPROFEN 800 MG/1
400 TABLET ORAL EVERY 12 HOURS PRN
Qty: 30 TAB | Refills: 5 | Status: SHIPPED | OUTPATIENT
Start: 2017-05-08 | End: 2023-11-11

## 2017-05-08 RX ORDER — DOCUSATE SODIUM 100 MG/1
100 CAPSULE, LIQUID FILLED ORAL 2 TIMES DAILY
Qty: 60 CAP | Refills: 5 | Status: SHIPPED | OUTPATIENT
Start: 2017-05-08 | End: 2023-11-11

## 2017-05-08 ASSESSMENT — PAIN SCALES - GENERAL: PAINLEVEL: NO PAIN

## 2017-05-08 NOTE — MR AVS SNAPSHOT
"        Joe Templeton   2017 7:30 AM   Office Visit   MRN: [id number]    [facility]   Dept Phone:  [phone]    Description:  Male : 1950   Provider:  SUDHIR Humphreys           Reason for Visit     Head Injury           Allergies as of 2017     No Known Allergies      You were diagnosed with     Essential hypertension   [4360081]       Laceration of scalp without foreign body, initial encounter   [717762]       Syncope, unspecified syncope type   [3842740]       Acute idiopathic gout, unspecified site   [5931053]       First degree hemorrhoids   [566976]       Other constipation   [564.09.ICD-9-CM]         Vital Signs     Blood Pressure Pulse Temperature Respirations Height Weight    140/90 mmHg 80 36.3 °C (97.3 °F) 16 1.753 m (5' 9.02\") 87.998 kg (194 lb)    Body Mass Index Oxygen Saturation Smoking Status             28.64 kg/m2 96% Former Smoker         Basic Information     Date Of Birth Sex Race Ethnicity Preferred Language    1950 Male White Non- English      Problem List              ICD-10-CM Priority Class Noted - Resolved    Hypertension I10   2014 - Present    Hemorrhoids K64.9   2014 - Present    Gout M10.9   2014 - Present    Vitamin D deficiency E55.9   2015 - Present    Right knee pain M25.561   2015 - Present    Fatigue R53.83   2015 - Present    Light headed R42   2015 - Present    Low back pain M54.5   2015 - Present    Ingrown right big toenail L60.0   2015 - Present    NSTEMI (non-ST elevated myocardial infarction) (CMS-HCC) I21.4   2016 - Present    3-vessel CAD I25.10   2016 - Present    Chest pain R07.9   2016 - Present    Ileus (CMS-[facility]) K56.7   2016 - Present    Hyperlipidemia E78.5   2016 - Present    Syncope R55   2017 - Present    Scalp wound S01.00XA   2017 - Present      Health Maintenance        Date Due Completion Dates    IMM ZOSTER VACCINE " 8/7/2011 ---    COLON CANCER SCREENING ANNUAL FIT 12/1/2015 12/1/2014    IMM PNEUMOCOCCAL 65+ (ADULT) LOW/MEDIUM RISK SERIES (2 of 2 - PPSV23) 10/18/2017 10/18/2016    IMM DTaP/Tdap/Td Vaccine (2 - Td) 4/28/2027 4/28/2017            Current Immunizations     13-VALENT PCV PREVNAR 10/18/2016  8:57 AM    Influenza Vaccine Quad Inj (Preserved) 10/18/2016    Tdap Vaccine 4/28/2017  9:42 AM      Below and/or attached are the medications your provider expects you to take. Review all of your home medications and newly ordered medications with your provider and/or pharmacist. Follow medication instructions as directed by your provider and/or pharmacist. Please keep your medication list with you and share with your provider. Update the information when medications are discontinued, doses are changed, or new medications (including over-the-counter products) are added; and carry medication information at all times in the event of emergency situations     Allergies:  No Known Allergies          Medications  Valid as of: May 08, 2017 -  7:57 AM    Generic Name Brand Name Tablet Size Instructions for use    Allopurinol (Tab) ZYLOPRIM 100 MG TAKE ONE TABLET BY MOUTH ONCE DAILY        Aspirin (Tablet Delayed Response) aspirin 81 MG Take 1 Tab by mouth every day.        Docusate Sodium (Cap) COLACE 100 MG Take 1 Cap by mouth 2 times a day.        Hydrocortisone (Cream) PROCTOZONE HC 2.5 % Substitution permitted. Apply to hemorrhoids 2-3 x per day as needed        Ibuprofen (Tab) MOTRIN 200 MG Take 400 mg by mouth every 6 hours as needed.        Ibuprofen (Tab) MOTRIN 800 MG Take 0.5 Tabs by mouth every 12 hours as needed.        Lisinopril (Tab) PRINIVIL 20 MG TAKE ONE TABLET BY MOUTH ONCE DAILY        Metoprolol Tartrate (Tab) LOPRESSOR 50 MG TAKE ONE TABLET BY MOUTH TWICE DAILY        .                 Medicines prescribed today were sent to:     [facility] [address] [place] [address] [address]    [address]  [facility] [address]    Phone: [phone] Fax: [phone]    Open 24 Hours?: No      Medication refill instructions:       If your prescription bottle indicates you have medication refills left, it is not necessary to call your provider’s office. Please contact your pharmacy and they will refill your medication.    If your prescription bottle indicates you do not have any refills left, you may request refills at any time through one of the following ways: The online [employer] [place] system (except Urgent Care), by calling your provider’s office, or by asking your pharmacy to contact your provider’s office with a refill request. Medication refills are processed only during regular business hours and may not be available until the next business day. Your provider may request additional information or to have a follow-up visit with you prior to refilling your medication.   *Please Note: Medication refills are assigned a new Rx number when refilled electronically. Your pharmacy may indicate that no refills were authorized even though a new prescription for the same medication is available at the pharmacy. Please request the medicine by name with the pharmacy before contacting your provider for a refill.        Other Notes About Your Plan     FALL RISK ASSESSMENT COMPLETED 6/30/15             [employer] [place] Status: Patient Declined

## 2017-05-08 NOTE — PROGRESS NOTES
"  Chief Complaint: ER follow up for Staple Removal, s/p syncope    HPI:  Joe presents to the clinic for ER follow up r/t Syncopal Episode, Scalp Laceration, CHI, and STAPLE REMOVAL.    His PMH includes:    Fatigue  Hypertension  Hyperlipidemia  CAD  CABG x 3 (6/28/16)  Right Knee Pain  GOUT  Vitamin D Deficiency  Low Back Pain  Ingrown Toe Nail  Hemorrhoids  Tobacco use- smoking  Meth Abuse  Marijuana use    Established with   Cardiology-Dr Kandi Strange      Review of Records show  6/30/15 Last Clinic Visit-Last Visit    7/1/15  ER visit for Ingrown Toenail  6/28---> 7/8/16 Hospital for S/P asystole/CPR, CAD, Underwent CABG 3- vessel ,HTN, Hyperlipidemia, Meth abuse  7/18/16 Clinic Visit for S/P CABG f/u.---> Referred to Cardiology, RX refills.    10/11/16 Cardiology Appt with Dr Kandi Strange, continue rx on Plavix, BB and ACE-I, increase Atorvastatin dose, F/u 6 months.  10/18/16 Clinic Visit for Hemorrhoids, Pneumonia Vaccine    4/28/17 ER Visit for Syncope, Scalp Laceration, CHI, CT head negative, C-spine Xray normal except DDD.     [place]  Report shows  7/8/16 [place] 10/325 # 120 by Debora Lopez  ---------------------------------------------------------      Hypertension  /90       in clinic today. Known hx of HTN.  Pt stating that they have been taking their medication as prescribed without adverse effect. Pt denies HA, vision changes, neurologic deficits, CP, SOB.     Scalp wound  Pt reports he was seen in ER for fainting and had scalp wound.  Asking for Staples to be removed.  Denies headache or feeling light headed.  7 staples in place, wound edges approximated and healing well.    Syncope  Pt reports he had passed out and fell at home.  He was seen in ER.   He reports he thinks he \"turned too fast\" and got dizzy.  Denies headache or feeling dizzy today.  I recommended he should see his Cardiologist for f/u.  Denies hx of irregular heart beat.     Gout  Pt is taking Allopurinol and denies any gout " attacks.  Denies joint pains.    Hemorrhoids  Pt reports his insurance won't pay for Suppositories for Hemorrhoids.  Is asking for cream. Denies bleeding from hemorrhoids except on rare ocassions.  Denies dark or bloody stools. Is taking a stool softener otc.      Patient Active Problem List    Diagnosis Date Noted   • Scalp wound 05/08/2017   • Syncope 04/28/2017   • Hyperlipidemia 07/18/2016   • Ileus (CMS-HCC) 07/01/2016   • NSTEMI (non-ST elevated myocardial infarction) (CMS-HCC) 06/28/2016   • 3-vessel CAD 06/28/2016   • Chest pain 06/28/2016   • Low back pain 05/20/2015   • Ingrown right big toenail 05/20/2015   • Light headed 04/22/2015   • Right knee pain 01/26/2015   • Fatigue 01/26/2015   • Vitamin D deficiency 01/06/2015   • Hypertension 11/25/2014   • Hemorrhoids 11/25/2014   • Gout 11/25/2014       Allergies:Review of patient's allergies indicates no known allergies.    Current Outpatient Prescriptions   Medication Sig Dispense Refill   • docusate sodium (COLACE) 100 MG Cap Take 1 Cap by mouth 2 times a day. 60 Cap 5   • ibuprofen (MOTRIN) 800 MG Tab Take 0.5 Tabs by mouth every 12 hours as needed. 30 Tab 5   • hydrocortisone rectal (PROCTOZONE HC) 2.5 % Cream Substitution permitted. Apply to hemorrhoids 2-3 x per day as needed 30 g 5   • ibuprofen (MOTRIN) 200 MG Tab Take 400 mg by mouth every 6 hours as needed.     • allopurinol (ZYLOPRIM) 100 MG Tab TAKE ONE TABLET BY MOUTH ONCE DAILY 30 Tab 5   • lisinopril (PRINIVIL) 20 MG Tab TAKE ONE TABLET BY MOUTH ONCE DAILY 30 Tab 11   • metoprolol (LOPRESSOR) 50 MG Tab TAKE ONE TABLET BY MOUTH TWICE DAILY 60 Tab 11   • aspirin 81 MG EC tablet Take 1 Tab by mouth every day. 30 Tab 5     No current facility-administered medications for this visit.       Social History   Substance Use Topics   • Smoking status: Former Smoker -- 0.25 packs/day for .5 years     Types: Cigarettes     Quit date: 06/27/2016   • Smokeless tobacco: Never Used   • Alcohol Use: No  "      Family History   Problem Relation Age of Onset   • Cancer Mother    • Heart Disease Father    • Hyperlipidemia Father    • Hyperlipidemia Brother    • Lung Disease Brother        ROS:  Review of Systems   See HPI Above        Exam:  Blood pressure 140/90, pulse 80, temperature 36.3 °C (97.3 °F), resp. rate 16, height 1.753 m (5' 9.02\"), weight 87.998 kg (194 lb), SpO2 96 %.  General:  Well nourished, well developed male in NAD  HENT:Head is grossly normal. PERRL. Posterior occiput scalp wound with 7 staples in place, wound edges approximated and healing,  No redness, no drainage, no swelling, no tenderness.  Neck: Supple. Trachea is midline. GOOD ROM  Pulmonary: Clear to ausculation .  Normal effort. No rales, ronchi, or wheezing.   Cardiovascular: Regular rate and rhythm.  Abdomen-Abdomen is soft, No tenderness.  Upper extremities- Strong = . Good ROM  Lower extremities- neg for edema, redness, tenderness.  Neuro- A & O x 4. Speech clear and appropriate.     Current medications, allergies, and problem list reviewed with patient and updated in  [facility] today.    Assessment/Plan:  1. Essential hypertension  Continue medications.    2. Laceration of scalp without foreign body, initial encounter  7 staples removed, patient tolerated well.  Wound care reviewed.   3. Syncope, unspecified syncope type  Pt encouraged to call his Cardiologist for appt.   4. Acute idiopathic gout, unspecified site  ibuprofen (MOTRIN) 800 MG Tab w food   5. First degree hemorrhoids  docusate sodium (COLACE) 100 MG Cap    hydrocortisone rectal (PROCTOZONE HC) 2.5 % Cream   6. Other constipation  docusate sodium (COLACE) 100 MG Cap   Follow up in 3-4 months. Call or return if questions, concerns, or worsening condition.  "

## 2017-05-08 NOTE — ASSESSMENT & PLAN NOTE
"Pt reports he had passed out and fell at home.  He was seen in ER.   He reports he thinks he \"turned too fast\" and got dizzy.  Denies headache or feeling dizzy today.  I recommended he should see his Cardiologist for f/u.  Denies hx of irregular heart beat.   "

## 2017-05-08 NOTE — ASSESSMENT & PLAN NOTE
/90       in clinic today. Known hx of HTN.  Pt stating that they have been taking their medication as prescribed without adverse effect. Pt denies HA, vision changes, neurologic deficits, CP, SOB.

## 2017-05-08 NOTE — ASSESSMENT & PLAN NOTE
Pt reports he was seen in ER for fainting and had scalp wound.  Asking for Staples to be removed.  Denies headache or feeling light headed.

## 2017-05-08 NOTE — ASSESSMENT & PLAN NOTE
Pt reports his insurance won't pay for Suppositories for Hemorrhoids.  Is asking for cream. Denies bleeding from hemorrhoids except on rare ocassions.  Denies dark or bloody stools. Is taking a stool softener otc.

## 2017-05-15 LAB — EKG IMPRESSION: NORMAL

## 2017-08-14 RX ORDER — ALLOPURINOL 100 MG/1
TABLET ORAL
Qty: 30 TAB | Refills: 1 | Status: SHIPPED | OUTPATIENT
Start: 2017-08-14 | End: 2023-11-11

## 2018-01-10 RX ORDER — METOPROLOL TARTRATE 50 MG/1
TABLET, FILM COATED ORAL
Qty: 180 TAB | Refills: 0 | Status: SHIPPED | OUTPATIENT
Start: 2018-01-10 | End: 2023-11-11

## 2018-01-10 RX ORDER — LISINOPRIL 20 MG/1
TABLET ORAL
Qty: 90 TAB | Refills: 0 | Status: SHIPPED | OUTPATIENT
Start: 2018-01-10 | End: 2018-04-14 | Stop reason: SDUPTHER

## 2018-04-16 RX ORDER — LISINOPRIL 20 MG/1
TABLET ORAL
Qty: 90 TAB | Refills: 0 | Status: SHIPPED | OUTPATIENT
Start: 2018-04-16 | End: 2023-11-11

## 2018-04-17 ENCOUNTER — TELEPHONE (OUTPATIENT)
Dept: MEDICAL GROUP | Facility: MEDICAL CENTER | Age: 68
End: 2018-04-17

## 2018-04-17 NOTE — TELEPHONE ENCOUNTER
1. Caller Name: Joe                                          Call Back Number: [phone] (home)         Patient approves a detailed voicemail message: yes    Patient called and asked why we have not yet sent his refills to his pharmacy.

## 2018-04-17 NOTE — TELEPHONE ENCOUNTER
Phone Number Called: [phone] (home) [phone] (work)      Message: Called patient and left message stating that we need to know what medications he is requesting. The last refill was done for Lisinopril on 4/16 and was sent electronically. We have not received any other refill requests.     Left Message for patient to call back: yes

## 2021-03-03 DIAGNOSIS — Z23 NEED FOR VACCINATION: ICD-10-CM

## 2021-04-06 ENCOUNTER — IMMUNIZATION (OUTPATIENT)
Dept: FAMILY PLANNING/WOMEN'S HEALTH CLINIC | Facility: IMMUNIZATION CENTER | Age: 71
End: 2021-04-06
Payer: MEDICARE

## 2021-04-06 DIAGNOSIS — Z23 ENCOUNTER FOR VACCINATION: Primary | ICD-10-CM

## 2021-04-06 PROCEDURE — 0001A PFIZER SARS-COV-2 VACCINE: CPT | Performed by: INTERNAL MEDICINE

## 2021-04-06 PROCEDURE — 91300 PFIZER SARS-COV-2 VACCINE: CPT | Performed by: INTERNAL MEDICINE

## 2021-04-30 ENCOUNTER — IMMUNIZATION (OUTPATIENT)
Dept: FAMILY PLANNING/WOMEN'S HEALTH CLINIC | Facility: IMMUNIZATION CENTER | Age: 71
End: 2021-04-30
Attending: INTERNAL MEDICINE
Payer: MEDICARE

## 2021-04-30 DIAGNOSIS — Z23 ENCOUNTER FOR VACCINATION: Primary | ICD-10-CM

## 2021-04-30 PROCEDURE — 91300 PFIZER SARS-COV-2 VACCINE: CPT

## 2021-04-30 PROCEDURE — 0002A PFIZER SARS-COV-2 VACCINE: CPT

## 2023-11-09 ENCOUNTER — OFFICE VISIT (OUTPATIENT)
Dept: URGENT CARE | Facility: PHYSICIAN GROUP | Age: 73
End: 2023-11-09
Payer: MEDICARE

## 2023-11-09 ENCOUNTER — APPOINTMENT (OUTPATIENT)
Dept: URGENT CARE | Facility: PHYSICIAN GROUP | Age: 73
End: 2023-11-09

## 2023-11-09 VITALS
RESPIRATION RATE: 16 BRPM | SYSTOLIC BLOOD PRESSURE: 128 MMHG | BODY MASS INDEX: 26.22 KG/M2 | TEMPERATURE: 98.6 F | OXYGEN SATURATION: 97 % | HEART RATE: 94 BPM | DIASTOLIC BLOOD PRESSURE: 78 MMHG | WEIGHT: 177 LBS | HEIGHT: 69 IN

## 2023-11-09 DIAGNOSIS — M25.522 LEFT ELBOW PAIN: ICD-10-CM

## 2023-11-09 DIAGNOSIS — Z87.39 HX OF GOUT: ICD-10-CM

## 2023-11-09 DIAGNOSIS — M25.561 ACUTE PAIN OF RIGHT KNEE: ICD-10-CM

## 2023-11-09 PROCEDURE — 99203 OFFICE O/P NEW LOW 30 MIN: CPT | Performed by: PHYSICIAN ASSISTANT

## 2023-11-09 PROCEDURE — 3078F DIAST BP <80 MM HG: CPT | Performed by: PHYSICIAN ASSISTANT

## 2023-11-09 PROCEDURE — 3074F SYST BP LT 130 MM HG: CPT | Performed by: PHYSICIAN ASSISTANT

## 2023-11-09 RX ORDER — PREDNISONE 20 MG/1
TABLET ORAL
Qty: 10 TABLET | Refills: 0 | Status: SHIPPED | OUTPATIENT
Start: 2023-11-09 | End: 2024-01-08

## 2023-11-11 ASSESSMENT — ENCOUNTER SYMPTOMS
FOCAL WEAKNESS: 0
TINGLING: 0
CHILLS: 0
VOMITING: 0
FEVER: 0
NAUSEA: 0
SENSORY CHANGE: 0

## 2024-01-08 ENCOUNTER — OFFICE VISIT (OUTPATIENT)
Dept: INTERNAL MEDICINE | Facility: OTHER | Age: 74
End: 2024-01-08
Payer: MEDICARE

## 2024-01-08 VITALS
WEIGHT: 184 LBS | HEIGHT: 69 IN | OXYGEN SATURATION: 94 % | BODY MASS INDEX: 27.25 KG/M2 | DIASTOLIC BLOOD PRESSURE: 118 MMHG | SYSTOLIC BLOOD PRESSURE: 204 MMHG | TEMPERATURE: 97 F | RESPIRATION RATE: 14 BRPM | HEART RATE: 90 BPM

## 2024-01-08 DIAGNOSIS — M10.00 ACUTE IDIOPATHIC GOUT, UNSPECIFIED SITE: ICD-10-CM

## 2024-01-08 DIAGNOSIS — Z95.1 S/P CABG X 3: ICD-10-CM

## 2024-01-08 DIAGNOSIS — R73.03 PRE-DIABETES: ICD-10-CM

## 2024-01-08 DIAGNOSIS — G89.29 CHRONIC PAIN OF RIGHT KNEE: ICD-10-CM

## 2024-01-08 DIAGNOSIS — I10 ESSENTIAL (PRIMARY) HYPERTENSION: ICD-10-CM

## 2024-01-08 DIAGNOSIS — E78.5 HYPERLIPIDEMIA, UNSPECIFIED HYPERLIPIDEMIA TYPE: ICD-10-CM

## 2024-01-08 DIAGNOSIS — M25.561 CHRONIC PAIN OF RIGHT KNEE: ICD-10-CM

## 2024-01-08 PROBLEM — N40.1 BENIGN PROSTATIC HYPERPLASIA (BPH) WITH STRAINING ON URINATION: Status: ACTIVE | Noted: 2024-01-08

## 2024-01-08 PROBLEM — S01.00XA OPEN WOUND OF SCALP: Status: RESOLVED | Noted: 2017-05-08 | Resolved: 2024-01-08

## 2024-01-08 PROBLEM — R55 SYNCOPE: Status: RESOLVED | Noted: 2017-04-28 | Resolved: 2024-01-08

## 2024-01-08 PROBLEM — R39.16 BENIGN PROSTATIC HYPERPLASIA (BPH) WITH STRAINING ON URINATION: Status: ACTIVE | Noted: 2024-01-08

## 2024-01-08 PROCEDURE — 3077F SYST BP >= 140 MM HG: CPT

## 2024-01-08 PROCEDURE — 99204 OFFICE O/P NEW MOD 45 MIN: CPT | Mod: GC

## 2024-01-08 PROCEDURE — 3080F DIAST BP >= 90 MM HG: CPT

## 2024-01-08 RX ORDER — ASPIRIN 81 MG/1
81 TABLET, CHEWABLE ORAL DAILY
Qty: 100 TABLET | Refills: 1 | Status: SHIPPED | OUTPATIENT
Start: 2024-01-08

## 2024-01-08 RX ORDER — ATORVASTATIN CALCIUM 40 MG/1
20 TABLET, FILM COATED ORAL NIGHTLY
Qty: 30 TABLET | Refills: 2 | Status: SHIPPED | OUTPATIENT
Start: 2024-01-08

## 2024-01-08 RX ORDER — LISINOPRIL 20 MG/1
20 TABLET ORAL DAILY
Qty: 30 TABLET | Refills: 2 | Status: SHIPPED | OUTPATIENT
Start: 2024-01-08

## 2024-01-08 RX ORDER — METOPROLOL SUCCINATE 25 MG/1
25 TABLET, EXTENDED RELEASE ORAL DAILY
Qty: 30 TABLET | Refills: 1 | Status: SHIPPED | OUTPATIENT
Start: 2024-01-08

## 2024-01-08 ASSESSMENT — ENCOUNTER SYMPTOMS
TINGLING: 0
PSYCHIATRIC NEGATIVE: 1
PALPITATIONS: 0
HEADACHES: 0
CHILLS: 0
DIZZINESS: 0
SHORTNESS OF BREATH: 0
FEVER: 0
FOCAL WEAKNESS: 0
DOUBLE VISION: 0

## 2024-01-08 ASSESSMENT — PATIENT HEALTH QUESTIONNAIRE - PHQ9: CLINICAL INTERPRETATION OF PHQ2 SCORE: 0

## 2024-01-08 NOTE — PROGRESS NOTES
"    Chief Complaint   Patient presents with    [facility]     HPI: Joe Templeton is a 73 y.o. male with pertinent past medical history of hypertension, coronary artery disease status post CABG x 3, gout, hyperlipidemia and chronic right knee pain, who presents today complaining of 5-day history of worsening right knee stiffness and to reestablish care.    3 months prior to this visit, patient noticed mild pain in left foot that moved to the left knee in the following days and eventually jumped to right knee.  No history of trauma.  Was seen in urgent care where prednisone was prescribed with no resolution of symptoms. Of note, during that visit, apparently all house medications were stopped.  Blood pressure at that time was within normal limits. 5 days ago, noticed worsening of discomfort, pain had somewhat resolved but felt rigidness and functional limitation.  Denies any additional symptoms.  No predisposing/aggravating factors.    Of note, patient mentions increased urinary frequency at night and difficulty to initiate a stream.  Denies burning sensation while urinating or any signs of retention.  No history of straight cath and/or Silva placement.  No past UTIs.    Lives with brother and sister-in-law.  No wife, no children.  Denies any substance use disorder, but endorses recurrent use of alcohol, cocaine and methamphetamines 25 years ago.  Quit 20 years ago.    Vitals:    01/08/24 1341 01/08/24 1349   BP: (!) 249/116 (!) 204/118   Pulse: 90    Resp: 14    Temp: 36.1 °C (97 °F)    TempSrc: Temporal    SpO2: 94%    Weight: 83.5 kg (184 lb)    Height: 1.753 m (5' 9\")    Body mass index is 27.17 kg/m².    Review of Systems   Constitutional:  Negative for chills, fever and malaise/fatigue.   Eyes:  Negative for double vision.   Respiratory:  Negative for shortness of breath.    Cardiovascular:  Negative for chest pain and palpitations.   Genitourinary:  Positive for frequency. Negative for dysuria. "   Musculoskeletal:  Positive for joint pain.   Neurological:  Negative for dizziness, tingling, focal weakness and headaches.   Psychiatric/Behavioral: Negative.     All other systems reviewed and are negative.     Physical Exam  Vitals reviewed.   HENT:      Right Ear: Decreased hearing noted.      Left Ear: Decreased hearing noted.   Cardiovascular:      Rate and Rhythm: Normal rate and regular rhythm.      Pulses: Normal pulses.      Heart sounds: Normal heart sounds.   Pulmonary:      Effort: Pulmonary effort is normal.   Abdominal:      General: Bowel sounds are normal.      Hernia: A hernia is present.   Musculoskeletal:         General: Swelling present. No tenderness. Normal range of motion.      Cervical back: Normal range of motion.      Comments: Significant swelling in right knee, stiffness, unable to flex.  Nontender.  No redness, no signs of acute infection, bleeding.   Skin:     General: Skin is warm.      Capillary Refill: Capillary refill takes less than 2 seconds.   Neurological:      Mental Status: He is alert. Mental status is at baseline.   Psychiatric:         Mood and Affect: Mood normal.         Behavior: Behavior normal.         Thought Content: Thought content normal.         Judgment: Judgment normal.       MEDICAL DECISION MAKIN-year-old male with pertinent past medical history of multiple cardiovascular conditions including hypertension, hyperlipidemia and coronary artery disease status post CABG x 3, as well as gout and chronic right knee pain, who presents today for persistence of this pain/rigidity, having failed treatment with systemic steroids.  Pain has resolved but rigidity with noticeable swelling causes significant functional limitation.  No redness, nonsymmetric, first toe not affected, MCP not affected.  Unclear etiology, most likely inflammatory cause but gout flare cannot be ruled out.  Will get imaging, autoimmune panel, uric acid levels and manage pain with topical  NSAIDs in the meantime.  During this visit, found elevated blood pressures with no signs of target organ damage or any other symptoms.  Will restart lisinopril as well as other home meds previously discontinue including aspirin and metoprolol.  Also, will start high intensity statins for cardiovascular protection.  Counseled on lifestyle modifications and asked for BP log.  On chart review, no lab work for the past 7 years, will need to update CBC, CMP, lipid profile as well as A1c since had previous value indicating glucose intolerance.    Next visit in a week from now, to review lab work, BP log, response to medication as well as address urinary symptoms to consider initiating alpha 1 antagonist.    ASSESSMENT AND PLAN:   1. Essential (primary) hypertension  - lisinopril (PRINIVIL) 20 MG Tab; Take 1 Tablet by mouth every day.  Dispense: 30 Tablet; Refill: 2    2. Chronic pain of right knee  - CA X-RAY KNEE 1 OR 2 VIEW  - diclofenac sodium (VOLTAREN) 1 % Gel; Apply 2 g topically 4 times a day as needed (Pain right knee) for up to 10 days.  Dispense: 1 g; Refill: 1  - EAGLE W/REFLEX IF POSITIVE  - RHEUMATOID ARTHRITIS FACTOR; Future  - Sed Rate; Future  - CRP QUANTITIVE (NON-CARDIAC); Future    3. Acute idiopathic gout, unspecified site  - URIC ACID, SERUM  - diclofenac sodium (VOLTAREN) 1 % Gel; Apply 2 g topically 4 times a day as needed (Pain right knee) for up to 10 days.  Dispense: 1 g; Refill: 1    4. S/P CABG x 3  - aspirin (ASA) 81 MG Chew Tab chewable tablet; Chew 1 Tablet every day.  Dispense: 100 Tablet; Refill: 1  - metoprolol SR (TOPROL XL) 25 MG TABLET SR 24 HR; Take 1 Tablet by mouth every day.  Dispense: 30 Tablet; Refill: 1  - atorvastatin (LIPITOR) 40 MG Tab; Take 0.5 Tablets by mouth every evening.  Dispense: 30 Tablet; Refill: 2    5. Hyperlipidemia, unspecified hyperlipidemia type  - CBC WITH DIFFERENTIAL; Future  - Comp Metabolic Panel; Future  - atorvastatin (LIPITOR) 40 MG Tab; Take 0.5 Tablets  by mouth every evening.  Dispense: 30 Tablet; Refill: 2  - Lipid Profile; Future    6. Pre-diabetes  - Hemoglobin A1c; Future    Armando R. Reyes Yparraguirre, M.D.  Internal Medicine Resident   [facility]      This note was generated using voice recognition software which has a small chance of producing errors of grammar and possibly content. I have made every reasonable attempt to find and correct any obvious errors but expect that some may not be found prior to finalization of this note.

## 2024-02-12 ENCOUNTER — APPOINTMENT (OUTPATIENT)
Dept: INTERNAL MEDICINE | Facility: OTHER | Age: 74
End: 2024-02-12
Payer: MEDICARE

## 2024-09-13 ENCOUNTER — APPOINTMENT (OUTPATIENT)
Dept: RADIOLOGY | Facility: MEDICAL CENTER | Age: 74
DRG: 853 | End: 2024-09-13
Attending: STUDENT IN AN ORGANIZED HEALTH CARE EDUCATION/TRAINING PROGRAM
Payer: MEDICARE

## 2024-09-13 ENCOUNTER — HOSPITAL ENCOUNTER (INPATIENT)
Facility: MEDICAL CENTER | Age: 74
LOS: 7 days | DRG: 853 | End: 2024-09-20
Attending: STUDENT IN AN ORGANIZED HEALTH CARE EDUCATION/TRAINING PROGRAM | Admitting: HOSPITALIST
Payer: MEDICARE

## 2024-09-13 DIAGNOSIS — M25.472 ANKLE SWELLING, LEFT: ICD-10-CM

## 2024-09-13 DIAGNOSIS — I10 ESSENTIAL (PRIMARY) HYPERTENSION: ICD-10-CM

## 2024-09-13 DIAGNOSIS — R65.10 SIRS (SYSTEMIC INFLAMMATORY RESPONSE SYNDROME) (HCC): ICD-10-CM

## 2024-09-13 DIAGNOSIS — I16.0 HYPERTENSIVE URGENCY: ICD-10-CM

## 2024-09-13 DIAGNOSIS — R29.898 LEFT LEG WEAKNESS: ICD-10-CM

## 2024-09-13 DIAGNOSIS — R09.89 SUSPECTED CEREBROVASCULAR ACCIDENT (CVA): ICD-10-CM

## 2024-09-13 DIAGNOSIS — M00.9 SEPTIC ARTHRITIS OF LEFT ANKLE, DUE TO UNSPECIFIED ORGANISM (HCC): ICD-10-CM

## 2024-09-13 DIAGNOSIS — I63.9 CEREBROVASCULAR ACCIDENT (CVA), UNSPECIFIED MECHANISM (HCC): ICD-10-CM

## 2024-09-13 DIAGNOSIS — R47.1 DYSARTHRIA: ICD-10-CM

## 2024-09-13 PROBLEM — G93.40 ACUTE ENCEPHALOPATHY: Status: ACTIVE | Noted: 2024-09-13

## 2024-09-13 PROBLEM — I50.20 SYSTOLIC HEART FAILURE (HCC): Status: ACTIVE | Noted: 2024-09-13

## 2024-09-13 PROBLEM — Z95.1 HX OF CABG: Status: ACTIVE | Noted: 2024-09-13

## 2024-09-13 PROBLEM — R01.1 MURMUR: Status: ACTIVE | Noted: 2024-09-13

## 2024-09-13 PROBLEM — R53.1 LEFT-SIDED WEAKNESS: Status: ACTIVE | Noted: 2024-09-13

## 2024-09-13 PROBLEM — A41.9 SEPSIS (HCC): Status: ACTIVE | Noted: 2024-09-13

## 2024-09-13 LAB
ABO GROUP BLD: NORMAL
ALBUMIN SERPL BCP-MCNC: 3.9 G/DL (ref 3.2–4.9)
ALBUMIN/GLOB SERPL: 1.2 G/DL
ALP SERPL-CCNC: 69 U/L (ref 30–99)
ALT SERPL-CCNC: 6 U/L (ref 2–50)
AMMONIA PLAS-SCNC: 17 UMOL/L (ref 11–45)
AMPHET UR QL SCN: NEGATIVE
ANION GAP SERPL CALC-SCNC: 14 MMOL/L (ref 7–16)
APPEARANCE UR: CLEAR
APTT PPP: 31.1 SEC (ref 24.7–36)
AST SERPL-CCNC: 14 U/L (ref 12–45)
BACTERIA #/AREA URNS HPF: NEGATIVE /HPF
BARBITURATES UR QL SCN: NEGATIVE
BASOPHILS # BLD AUTO: 0.2 % (ref 0–1.8)
BASOPHILS # BLD: 0.03 K/UL (ref 0–0.12)
BENZODIAZ UR QL SCN: NEGATIVE
BILIRUB SERPL-MCNC: 1.3 MG/DL (ref 0.1–1.5)
BILIRUB UR QL STRIP.AUTO: NEGATIVE
BLD GP AB SCN SERPL QL: NORMAL
BUN SERPL-MCNC: 12 MG/DL (ref 8–22)
BZE UR QL SCN: NEGATIVE
CALCIUM ALBUM COR SERPL-MCNC: 9.1 MG/DL (ref 8.5–10.5)
CALCIUM SERPL-MCNC: 9 MG/DL (ref 8.4–10.2)
CANNABINOIDS UR QL SCN: NEGATIVE
CHLORIDE SERPL-SCNC: 105 MMOL/L (ref 96–112)
CO2 SERPL-SCNC: 20 MMOL/L (ref 20–33)
COLOR UR: YELLOW
CREAT SERPL-MCNC: 0.82 MG/DL (ref 0.5–1.4)
CRP SERPL HS-MCNC: 18 MG/DL (ref 0–0.75)
EKG IMPRESSION: NORMAL
EOSINOPHIL # BLD AUTO: 0.02 K/UL (ref 0–0.51)
EOSINOPHIL NFR BLD: 0.1 % (ref 0–6.9)
EPI CELLS #/AREA URNS HPF: NEGATIVE /HPF
ERYTHROCYTE [DISTWIDTH] IN BLOOD BY AUTOMATED COUNT: 39.6 FL (ref 35.9–50)
ERYTHROCYTE [SEDIMENTATION RATE] IN BLOOD BY WESTERGREN METHOD: 25 MM/HOUR (ref 0–20)
EST. AVERAGE GLUCOSE BLD GHB EST-MCNC: 137 MG/DL
ETHANOL BLD-MCNC: <10.1 MG/DL
FENTANYL UR QL: NEGATIVE
FLUAV RNA SPEC QL NAA+PROBE: NEGATIVE
FLUBV RNA SPEC QL NAA+PROBE: NEGATIVE
GFR SERPLBLD CREATININE-BSD FMLA CKD-EPI: 93 ML/MIN/1.73 M 2
GLOBULIN SER CALC-MCNC: 3.3 G/DL (ref 1.9–3.5)
GLUCOSE SERPL-MCNC: 150 MG/DL (ref 65–99)
GLUCOSE UR STRIP.AUTO-MCNC: NEGATIVE MG/DL
HBA1C MFR BLD: 6.4 % (ref 4–5.6)
HCT VFR BLD AUTO: 46 % (ref 42–52)
HGB BLD-MCNC: 16.1 G/DL (ref 14–18)
HOLDING TUBE BB 8507: NORMAL
HYALINE CASTS #/AREA URNS LPF: ABNORMAL /LPF
IMM GRANULOCYTES # BLD AUTO: 0.08 K/UL (ref 0–0.11)
IMM GRANULOCYTES NFR BLD AUTO: 0.5 % (ref 0–0.9)
INR PPP: 1.14 (ref 0.87–1.13)
KETONES UR STRIP.AUTO-MCNC: ABNORMAL MG/DL
LACTATE SERPL-SCNC: 1.6 MMOL/L (ref 0.5–2)
LEUKOCYTE ESTERASE UR QL STRIP.AUTO: NEGATIVE
LIPASE SERPL-CCNC: 128 U/L (ref 11–82)
LYMPHOCYTES # BLD AUTO: 1.62 K/UL (ref 1–4.8)
LYMPHOCYTES NFR BLD: 10.7 % (ref 22–41)
MCH RBC QN AUTO: 31.4 PG (ref 27–33)
MCHC RBC AUTO-ENTMCNC: 35 G/DL (ref 32.3–36.5)
MCV RBC AUTO: 89.7 FL (ref 81.4–97.8)
METHADONE UR QL SCN: NEGATIVE
MICRO URNS: ABNORMAL
MONOCYTES # BLD AUTO: 1.57 K/UL (ref 0–0.85)
MONOCYTES NFR BLD AUTO: 10.4 % (ref 0–13.4)
NEUTROPHILS # BLD AUTO: 11.78 K/UL (ref 1.82–7.42)
NEUTROPHILS NFR BLD: 78.1 % (ref 44–72)
NITRITE UR QL STRIP.AUTO: NEGATIVE
NRBC # BLD AUTO: 0 K/UL
NRBC BLD-RTO: 0 /100 WBC (ref 0–0.2)
NT-PROBNP SERPL IA-MCNC: 445 PG/ML (ref 0–125)
OPIATES UR QL SCN: POSITIVE
OXYCODONE UR QL SCN: NEGATIVE
PCP UR QL SCN: NEGATIVE
PH UR STRIP.AUTO: 7 [PH] (ref 5–8)
PLATELET # BLD AUTO: 231 K/UL (ref 164–446)
PMV BLD AUTO: 11.6 FL (ref 9–12.9)
POTASSIUM SERPL-SCNC: 4.1 MMOL/L (ref 3.6–5.5)
PROCALCITONIN SERPL-MCNC: 0.06 NG/ML
PROPOXYPH UR QL SCN: NEGATIVE
PROT SERPL-MCNC: 7.2 G/DL (ref 6–8.2)
PROT UR QL STRIP: 30 MG/DL
PROTHROMBIN TIME: 14.8 SEC (ref 12–14.6)
RBC # BLD AUTO: 5.13 M/UL (ref 4.7–6.1)
RBC # URNS HPF: ABNORMAL /HPF
RBC UR QL AUTO: ABNORMAL
RH BLD: NORMAL
RSV RNA SPEC QL NAA+PROBE: NEGATIVE
SARS-COV-2 RNA RESP QL NAA+PROBE: NOTDETECTED
SODIUM SERPL-SCNC: 139 MMOL/L (ref 135–145)
SP GR UR STRIP.AUTO: >=1.045
TROPONIN T SERPL-MCNC: 26 NG/L (ref 6–19)
UROBILINOGEN UR STRIP.AUTO-MCNC: 1 MG/DL
VIT B12 SERPL-MCNC: 341 PG/ML (ref 211–911)
WBC # BLD AUTO: 15.1 K/UL (ref 4.8–10.8)
WBC #/AREA URNS HPF: ABNORMAL /HPF

## 2024-09-13 PROCEDURE — 73600 X-RAY EXAM OF ANKLE: CPT | Mod: LT

## 2024-09-13 PROCEDURE — 700111 HCHG RX REV CODE 636 W/ 250 OVERRIDE (IP): Mod: JZ,UD | Performed by: STUDENT IN AN ORGANIZED HEALTH CARE EDUCATION/TRAINING PROGRAM

## 2024-09-13 PROCEDURE — 83605 ASSAY OF LACTIC ACID: CPT

## 2024-09-13 PROCEDURE — 0042T CT-CEREBRAL PERFUSION ANALYSIS: CPT

## 2024-09-13 PROCEDURE — 0S9G3ZX DRAINAGE OF LEFT ANKLE JOINT, PERCUTANEOUS APPROACH, DIAGNOSTIC: ICD-10-PCS | Performed by: STUDENT IN AN ORGANIZED HEALTH CARE EDUCATION/TRAINING PROGRAM

## 2024-09-13 PROCEDURE — 83690 ASSAY OF LIPASE: CPT

## 2024-09-13 PROCEDURE — 83880 ASSAY OF NATRIURETIC PEPTIDE: CPT

## 2024-09-13 PROCEDURE — 0241U HCHG SARS-COV-2 COVID-19 NFCT DS RESP RNA 4 TRGT ED POC: CPT

## 2024-09-13 PROCEDURE — 86140 C-REACTIVE PROTEIN: CPT

## 2024-09-13 PROCEDURE — 36415 COLL VENOUS BLD VENIPUNCTURE: CPT

## 2024-09-13 PROCEDURE — 87040 BLOOD CULTURE FOR BACTERIA: CPT

## 2024-09-13 PROCEDURE — 86901 BLOOD TYPING SEROLOGIC RH(D): CPT

## 2024-09-13 PROCEDURE — 99223 1ST HOSP IP/OBS HIGH 75: CPT | Performed by: HOSPITALIST

## 2024-09-13 PROCEDURE — 84484 ASSAY OF TROPONIN QUANT: CPT

## 2024-09-13 PROCEDURE — 82607 VITAMIN B-12: CPT

## 2024-09-13 PROCEDURE — 85730 THROMBOPLASTIN TIME PARTIAL: CPT

## 2024-09-13 PROCEDURE — 85610 PROTHROMBIN TIME: CPT

## 2024-09-13 PROCEDURE — 700117 HCHG RX CONTRAST REV CODE 255: Performed by: STUDENT IN AN ORGANIZED HEALTH CARE EDUCATION/TRAINING PROGRAM

## 2024-09-13 PROCEDURE — 81001 URINALYSIS AUTO W/SCOPE: CPT

## 2024-09-13 PROCEDURE — 82140 ASSAY OF AMMONIA: CPT

## 2024-09-13 PROCEDURE — 87070 CULTURE OTHR SPECIMN AEROBIC: CPT

## 2024-09-13 PROCEDURE — 84157 ASSAY OF PROTEIN OTHER: CPT

## 2024-09-13 PROCEDURE — 82945 GLUCOSE OTHER FLUID: CPT

## 2024-09-13 PROCEDURE — 85652 RBC SED RATE AUTOMATED: CPT

## 2024-09-13 PROCEDURE — 85025 COMPLETE CBC W/AUTO DIFF WBC: CPT

## 2024-09-13 PROCEDURE — 80053 COMPREHEN METABOLIC PANEL: CPT

## 2024-09-13 PROCEDURE — 89051 BODY FLUID CELL COUNT: CPT

## 2024-09-13 PROCEDURE — 71045 X-RAY EXAM CHEST 1 VIEW: CPT

## 2024-09-13 PROCEDURE — 96374 THER/PROPH/DIAG INJ IV PUSH: CPT

## 2024-09-13 PROCEDURE — 770020 HCHG ROOM/CARE - TELE (206)

## 2024-09-13 PROCEDURE — 83036 HEMOGLOBIN GLYCOSYLATED A1C: CPT

## 2024-09-13 PROCEDURE — 94760 N-INVAS EAR/PLS OXIMETRY 1: CPT

## 2024-09-13 PROCEDURE — 80307 DRUG TEST PRSMV CHEM ANLYZR: CPT

## 2024-09-13 PROCEDURE — 70498 CT ANGIOGRAPHY NECK: CPT

## 2024-09-13 PROCEDURE — 84145 PROCALCITONIN (PCT): CPT

## 2024-09-13 PROCEDURE — 86850 RBC ANTIBODY SCREEN: CPT

## 2024-09-13 PROCEDURE — 82077 ASSAY SPEC XCP UR&BREATH IA: CPT

## 2024-09-13 PROCEDURE — 86900 BLOOD TYPING SEROLOGIC ABO: CPT

## 2024-09-13 PROCEDURE — 87205 SMEAR GRAM STAIN: CPT

## 2024-09-13 PROCEDURE — 70496 CT ANGIOGRAPHY HEAD: CPT

## 2024-09-13 PROCEDURE — 96375 TX/PRO/DX INJ NEW DRUG ADDON: CPT

## 2024-09-13 PROCEDURE — 93005 ELECTROCARDIOGRAM TRACING: CPT | Performed by: STUDENT IN AN ORGANIZED HEALTH CARE EDUCATION/TRAINING PROGRAM

## 2024-09-13 PROCEDURE — 99285 EMERGENCY DEPT VISIT HI MDM: CPT

## 2024-09-13 PROCEDURE — 87641 MR-STAPH DNA AMP PROBE: CPT

## 2024-09-13 RX ORDER — MORPHINE SULFATE 4 MG/ML
4 INJECTION INTRAVENOUS ONCE
Status: COMPLETED | OUTPATIENT
Start: 2024-09-13 | End: 2024-09-13

## 2024-09-13 RX ORDER — ONDANSETRON 2 MG/ML
4 INJECTION INTRAMUSCULAR; INTRAVENOUS EVERY 4 HOURS PRN
Status: DISCONTINUED | OUTPATIENT
Start: 2024-09-13 | End: 2024-09-20 | Stop reason: HOSPADM

## 2024-09-13 RX ORDER — ONDANSETRON 4 MG/1
4 TABLET, ORALLY DISINTEGRATING ORAL EVERY 4 HOURS PRN
Status: DISCONTINUED | OUTPATIENT
Start: 2024-09-13 | End: 2024-09-20 | Stop reason: HOSPADM

## 2024-09-13 RX ORDER — METOPROLOL SUCCINATE 25 MG/1
25 TABLET, EXTENDED RELEASE ORAL DAILY
Status: DISCONTINUED | OUTPATIENT
Start: 2024-09-14 | End: 2024-09-20 | Stop reason: HOSPADM

## 2024-09-13 RX ORDER — ENOXAPARIN SODIUM 100 MG/ML
40 INJECTION SUBCUTANEOUS DAILY
Status: DISCONTINUED | OUTPATIENT
Start: 2024-09-13 | End: 2024-09-13

## 2024-09-13 RX ORDER — ASPIRIN 300 MG/1
300 SUPPOSITORY RECTAL DAILY
Status: DISCONTINUED | OUTPATIENT
Start: 2024-09-14 | End: 2024-09-20 | Stop reason: HOSPADM

## 2024-09-13 RX ORDER — ACETAMINOPHEN 325 MG/1
650 TABLET ORAL EVERY 6 HOURS PRN
Status: DISCONTINUED | OUTPATIENT
Start: 2024-09-13 | End: 2024-09-20 | Stop reason: HOSPADM

## 2024-09-13 RX ORDER — LABETALOL HYDROCHLORIDE 5 MG/ML
10 INJECTION, SOLUTION INTRAVENOUS ONCE
Status: COMPLETED | OUTPATIENT
Start: 2024-09-13 | End: 2024-09-13

## 2024-09-13 RX ORDER — SODIUM CHLORIDE 9 MG/ML
INJECTION, SOLUTION INTRAVENOUS CONTINUOUS
Status: DISCONTINUED | OUTPATIENT
Start: 2024-09-13 | End: 2024-09-16

## 2024-09-13 RX ORDER — LABETALOL HYDROCHLORIDE 5 MG/ML
10 INJECTION, SOLUTION INTRAVENOUS EVERY 4 HOURS PRN
Status: DISCONTINUED | OUTPATIENT
Start: 2024-09-13 | End: 2024-09-17

## 2024-09-13 RX ORDER — LISINOPRIL 20 MG/1
20 TABLET ORAL DAILY
Status: DISCONTINUED | OUTPATIENT
Start: 2024-09-14 | End: 2024-09-16

## 2024-09-13 RX ORDER — BUPIVACAINE HYDROCHLORIDE 5 MG/ML
10 INJECTION, SOLUTION EPIDURAL; INTRACAUDAL ONCE
Status: COMPLETED | OUTPATIENT
Start: 2024-09-13 | End: 2024-09-13

## 2024-09-13 RX ORDER — ATORVASTATIN CALCIUM 40 MG/1
40 TABLET, FILM COATED ORAL EVERY EVENING
Status: DISCONTINUED | OUTPATIENT
Start: 2024-09-13 | End: 2024-09-15

## 2024-09-13 RX ORDER — ASPIRIN 81 MG/1
81 TABLET, CHEWABLE ORAL DAILY
Status: DISCONTINUED | OUTPATIENT
Start: 2024-09-14 | End: 2024-09-20 | Stop reason: HOSPADM

## 2024-09-13 RX ADMIN — IOHEXOL 80 ML: 350 INJECTION, SOLUTION INTRAVENOUS at 20:02

## 2024-09-13 RX ADMIN — MORPHINE SULFATE 4 MG: 4 INJECTION, SOLUTION INTRAMUSCULAR; INTRAVENOUS at 19:24

## 2024-09-13 RX ADMIN — LABETALOL HYDROCHLORIDE 10 MG: 5 INJECTION, SOLUTION INTRAVENOUS at 19:25

## 2024-09-13 RX ADMIN — BUPIVACAINE HYDROCHLORIDE 10 ML: 5 INJECTION, SOLUTION EPIDURAL; INTRACAUDAL at 22:30

## 2024-09-13 RX ADMIN — IOHEXOL 40 ML: 350 INJECTION, SOLUTION INTRAVENOUS at 19:59

## 2024-09-13 ASSESSMENT — ENCOUNTER SYMPTOMS
PND: 0
DOUBLE VISION: 0
WHEEZING: 0
TREMORS: 0
BRUISES/BLEEDS EASILY: 0
BLOOD IN STOOL: 0
HEMOPTYSIS: 0
SPUTUM PRODUCTION: 0
FLANK PAIN: 0
WEAKNESS: 1
TINGLING: 0
ORTHOPNEA: 0
DIARRHEA: 0
DIZZINESS: 0
SORE THROAT: 0
CHILLS: 1
VOMITING: 0
ABDOMINAL PAIN: 0
NECK PAIN: 0
HEADACHES: 0
HALLUCINATIONS: 0
CLAUDICATION: 0
PHOTOPHOBIA: 0
STRIDOR: 0
FEVER: 1
SHORTNESS OF BREATH: 0
COUGH: 0
FALLS: 0
MYALGIAS: 0
BACK PAIN: 0
NAUSEA: 0
BLURRED VISION: 0
DEPRESSION: 0
CONSTIPATION: 0
HEARTBURN: 0
PALPITATIONS: 0
DIAPHORESIS: 0
SPEECH CHANGE: 1
POLYDIPSIA: 0
SENSORY CHANGE: 1
EYE PAIN: 0
SINUS PAIN: 0

## 2024-09-13 ASSESSMENT — LIFESTYLE VARIABLES: SUBSTANCE_ABUSE: 0

## 2024-09-14 ENCOUNTER — ANESTHESIA (OUTPATIENT)
Dept: SURGERY | Facility: MEDICAL CENTER | Age: 74
DRG: 853 | End: 2024-09-14
Payer: MEDICARE

## 2024-09-14 ENCOUNTER — ANESTHESIA EVENT (OUTPATIENT)
Dept: SURGERY | Facility: MEDICAL CENTER | Age: 74
DRG: 853 | End: 2024-09-14
Payer: MEDICARE

## 2024-09-14 LAB
ALBUMIN SERPL BCP-MCNC: 3.9 G/DL (ref 3.2–4.9)
ALBUMIN/GLOB SERPL: 1.2 G/DL
ALP SERPL-CCNC: 63 U/L (ref 30–99)
ALT SERPL-CCNC: 9 U/L (ref 2–50)
ANION GAP SERPL CALC-SCNC: 17 MMOL/L (ref 7–16)
APPEARANCE FLD: NORMAL
AST SERPL-CCNC: 15 U/L (ref 12–45)
BASOPHILS # BLD AUTO: 0.3 % (ref 0–1.8)
BASOPHILS # BLD: 0.04 K/UL (ref 0–0.12)
BILIRUB SERPL-MCNC: 1.4 MG/DL (ref 0.1–1.5)
BODY FLD TYPE: NORMAL
BODY FLD TYPE: NORMAL
BUN SERPL-MCNC: 13 MG/DL (ref 8–22)
CALCIUM ALBUM COR SERPL-MCNC: 9.3 MG/DL (ref 8.5–10.5)
CALCIUM SERPL-MCNC: 9.2 MG/DL (ref 8.5–10.5)
CHLORIDE SERPL-SCNC: 106 MMOL/L (ref 96–112)
CHOLEST SERPL-MCNC: 169 MG/DL (ref 100–199)
CO2 SERPL-SCNC: 22 MMOL/L (ref 20–33)
COLOR FLD: YELLOW
CREAT SERPL-MCNC: 0.81 MG/DL (ref 0.5–1.4)
EOSINOPHIL # BLD AUTO: 0.03 K/UL (ref 0–0.51)
EOSINOPHIL NFR BLD: 0.2 % (ref 0–6.9)
ERYTHROCYTE [DISTWIDTH] IN BLOOD BY AUTOMATED COUNT: 42 FL (ref 35.9–50)
FUNGUS SPEC FUNGUS STN: NORMAL
GFR SERPLBLD CREATININE-BSD FMLA CKD-EPI: 93 ML/MIN/1.73 M 2
GLOBULIN SER CALC-MCNC: 3.3 G/DL (ref 1.9–3.5)
GLUCOSE FLD-MCNC: 114 MG/DL
GLUCOSE SERPL-MCNC: 156 MG/DL (ref 65–99)
GRAM STN SPEC: NORMAL
GRAM STN SPEC: NORMAL
HCT VFR BLD AUTO: 45.8 % (ref 42–52)
HDLC SERPL-MCNC: 55 MG/DL
HGB BLD-MCNC: 15.9 G/DL (ref 14–18)
IMM GRANULOCYTES # BLD AUTO: 0.08 K/UL (ref 0–0.11)
IMM GRANULOCYTES NFR BLD AUTO: 0.5 % (ref 0–0.9)
LACTATE SERPL-SCNC: 1.2 MMOL/L (ref 0.5–2)
LDLC SERPL CALC-MCNC: 99 MG/DL
LYMPHOCYTES # BLD AUTO: 2.03 K/UL (ref 1–4.8)
LYMPHOCYTES NFR BLD: 13.9 % (ref 22–41)
LYMPHOCYTES NFR FLD: 2 %
MCH RBC QN AUTO: 31.9 PG (ref 27–33)
MCHC RBC AUTO-ENTMCNC: 34.7 G/DL (ref 32.3–36.5)
MCV RBC AUTO: 91.8 FL (ref 81.4–97.8)
MONOCYTES # BLD AUTO: 1.85 K/UL (ref 0–0.85)
MONOCYTES NFR BLD AUTO: 12.7 % (ref 0–13.4)
MONOS+MACROS NFR FLD MANUAL: 5 %
NEUTROPHILS # BLD AUTO: 10.53 K/UL (ref 1.82–7.42)
NEUTROPHILS NFR BLD: 72.4 % (ref 44–72)
NEUTROPHILS NFR FLD: 93 %
NRBC # BLD AUTO: 0 K/UL
NRBC BLD-RTO: 0 /100 WBC (ref 0–0.2)
NUC CELL # FLD: NORMAL CELLS/UL
PLATELET # BLD AUTO: 224 K/UL (ref 164–446)
PMV BLD AUTO: 11.3 FL (ref 9–12.9)
POTASSIUM SERPL-SCNC: 3.9 MMOL/L (ref 3.6–5.5)
PROT FLD-MCNC: 4.7 G/DL
PROT SERPL-MCNC: 7.2 G/DL (ref 6–8.2)
RBC # BLD AUTO: 4.99 M/UL (ref 4.7–6.1)
RBC # FLD: NORMAL CELLS/UL
SCCMEC + MECA PNL NOSE NAA+PROBE: NEGATIVE
SIGNIFICANT IND 70042: NORMAL
SITE SITE: NORMAL
SODIUM SERPL-SCNC: 145 MMOL/L (ref 135–145)
SOURCE SOURCE: NORMAL
TRIGL SERPL-MCNC: 73 MG/DL (ref 0–149)
TROPONIN T SERPL-MCNC: 32 NG/L (ref 6–19)
WBC # BLD AUTO: 14.6 K/UL (ref 4.8–10.8)

## 2024-09-14 PROCEDURE — 160002 HCHG RECOVERY MINUTES (STAT): Performed by: STUDENT IN AN ORGANIZED HEALTH CARE EDUCATION/TRAINING PROGRAM

## 2024-09-14 PROCEDURE — A9270 NON-COVERED ITEM OR SERVICE: HCPCS

## 2024-09-14 PROCEDURE — 87801 DETECT AGNT MULT DNA AMPLI: CPT | Mod: 91

## 2024-09-14 PROCEDURE — 160048 HCHG OR STATISTICAL LEVEL 1-5: Performed by: STUDENT IN AN ORGANIZED HEALTH CARE EDUCATION/TRAINING PROGRAM

## 2024-09-14 PROCEDURE — 27620 EXPLORE/TREAT ANKLE JOINT: CPT | Mod: LT | Performed by: STUDENT IN AN ORGANIZED HEALTH CARE EDUCATION/TRAINING PROGRAM

## 2024-09-14 PROCEDURE — 0S9G0ZZ DRAINAGE OF LEFT ANKLE JOINT, OPEN APPROACH: ICD-10-PCS | Performed by: STUDENT IN AN ORGANIZED HEALTH CARE EDUCATION/TRAINING PROGRAM

## 2024-09-14 PROCEDURE — 80053 COMPREHEN METABOLIC PANEL: CPT

## 2024-09-14 PROCEDURE — 99233 SBSQ HOSP IP/OBS HIGH 50: CPT | Performed by: HOSPITALIST

## 2024-09-14 PROCEDURE — 160027 HCHG SURGERY MINUTES - 1ST 30 MINS LEVEL 2: Performed by: STUDENT IN AN ORGANIZED HEALTH CARE EDUCATION/TRAINING PROGRAM

## 2024-09-14 PROCEDURE — 87070 CULTURE OTHR SPECIMN AEROBIC: CPT

## 2024-09-14 PROCEDURE — 85025 COMPLETE CBC W/AUTO DIFF WBC: CPT

## 2024-09-14 PROCEDURE — 700102 HCHG RX REV CODE 250 W/ 637 OVERRIDE(OP): Performed by: HOSPITALIST

## 2024-09-14 PROCEDURE — 84484 ASSAY OF TROPONIN QUANT: CPT

## 2024-09-14 PROCEDURE — 700105 HCHG RX REV CODE 258: Performed by: STUDENT IN AN ORGANIZED HEALTH CARE EDUCATION/TRAINING PROGRAM

## 2024-09-14 PROCEDURE — 700105 HCHG RX REV CODE 258: Performed by: HOSPITALIST

## 2024-09-14 PROCEDURE — 36415 COLL VENOUS BLD VENIPUNCTURE: CPT

## 2024-09-14 PROCEDURE — 700102 HCHG RX REV CODE 250 W/ 637 OVERRIDE(OP)

## 2024-09-14 PROCEDURE — 160009 HCHG ANES TIME/MIN: Performed by: STUDENT IN AN ORGANIZED HEALTH CARE EDUCATION/TRAINING PROGRAM

## 2024-09-14 PROCEDURE — A9270 NON-COVERED ITEM OR SERVICE: HCPCS | Performed by: HOSPITALIST

## 2024-09-14 PROCEDURE — 80061 LIPID PANEL: CPT

## 2024-09-14 PROCEDURE — 700101 HCHG RX REV CODE 250: Performed by: STUDENT IN AN ORGANIZED HEALTH CARE EDUCATION/TRAINING PROGRAM

## 2024-09-14 PROCEDURE — 770020 HCHG ROOM/CARE - TELE (206)

## 2024-09-14 PROCEDURE — 83605 ASSAY OF LACTIC ACID: CPT

## 2024-09-14 PROCEDURE — 87075 CULTR BACTERIA EXCEPT BLOOD: CPT

## 2024-09-14 PROCEDURE — 160035 HCHG PACU - 1ST 60 MINS PHASE I: Performed by: STUDENT IN AN ORGANIZED HEALTH CARE EDUCATION/TRAINING PROGRAM

## 2024-09-14 PROCEDURE — 87205 SMEAR GRAM STAIN: CPT

## 2024-09-14 PROCEDURE — 87551 MYCOBACTERIA DNA AMP PROBE: CPT

## 2024-09-14 PROCEDURE — 87556 M.TUBERCULO DNA AMP PROBE: CPT

## 2024-09-14 PROCEDURE — 99221 1ST HOSP IP/OBS SF/LOW 40: CPT | Mod: 57 | Performed by: STUDENT IN AN ORGANIZED HEALTH CARE EDUCATION/TRAINING PROGRAM

## 2024-09-14 PROCEDURE — 700111 HCHG RX REV CODE 636 W/ 250 OVERRIDE (IP): Mod: JZ | Performed by: HOSPITALIST

## 2024-09-14 PROCEDURE — 700111 HCHG RX REV CODE 636 W/ 250 OVERRIDE (IP): Performed by: STUDENT IN AN ORGANIZED HEALTH CARE EDUCATION/TRAINING PROGRAM

## 2024-09-14 PROCEDURE — 160036 HCHG PACU - EA ADDL 30 MINS PHASE I: Performed by: STUDENT IN AN ORGANIZED HEALTH CARE EDUCATION/TRAINING PROGRAM

## 2024-09-14 PROCEDURE — 87102 FUNGUS ISOLATION CULTURE: CPT

## 2024-09-14 PROCEDURE — 700111 HCHG RX REV CODE 636 W/ 250 OVERRIDE (IP): Mod: JZ | Performed by: STUDENT IN AN ORGANIZED HEALTH CARE EDUCATION/TRAINING PROGRAM

## 2024-09-14 RX ORDER — HALOPERIDOL 5 MG/ML
1 INJECTION INTRAMUSCULAR
Status: DISCONTINUED | OUTPATIENT
Start: 2024-09-14 | End: 2024-09-14 | Stop reason: HOSPADM

## 2024-09-14 RX ORDER — CEFAZOLIN SODIUM 1 G/3ML
INJECTION, POWDER, FOR SOLUTION INTRAMUSCULAR; INTRAVENOUS PRN
Status: DISCONTINUED | OUTPATIENT
Start: 2024-09-14 | End: 2024-09-14 | Stop reason: SURG

## 2024-09-14 RX ORDER — OXYCODONE HCL 5 MG/5 ML
10 SOLUTION, ORAL ORAL
Status: DISCONTINUED | OUTPATIENT
Start: 2024-09-14 | End: 2024-09-14 | Stop reason: HOSPADM

## 2024-09-14 RX ORDER — LABETALOL HYDROCHLORIDE 5 MG/ML
5 INJECTION, SOLUTION INTRAVENOUS
Status: DISCONTINUED | OUTPATIENT
Start: 2024-09-14 | End: 2024-09-14 | Stop reason: HOSPADM

## 2024-09-14 RX ORDER — ONDANSETRON 2 MG/ML
4 INJECTION INTRAMUSCULAR; INTRAVENOUS
Status: DISCONTINUED | OUTPATIENT
Start: 2024-09-14 | End: 2024-09-14 | Stop reason: HOSPADM

## 2024-09-14 RX ORDER — ONDANSETRON 2 MG/ML
INJECTION INTRAMUSCULAR; INTRAVENOUS PRN
Status: DISCONTINUED | OUTPATIENT
Start: 2024-09-14 | End: 2024-09-14 | Stop reason: SURG

## 2024-09-14 RX ORDER — SODIUM CHLORIDE, SODIUM LACTATE, POTASSIUM CHLORIDE, CALCIUM CHLORIDE 600; 310; 30; 20 MG/100ML; MG/100ML; MG/100ML; MG/100ML
INJECTION, SOLUTION INTRAVENOUS
Status: DISCONTINUED | OUTPATIENT
Start: 2024-09-14 | End: 2024-09-14 | Stop reason: SURG

## 2024-09-14 RX ORDER — LIDOCAINE HYDROCHLORIDE 20 MG/ML
INJECTION, SOLUTION EPIDURAL; INFILTRATION; INTRACAUDAL; PERINEURAL PRN
Status: DISCONTINUED | OUTPATIENT
Start: 2024-09-14 | End: 2024-09-14 | Stop reason: SURG

## 2024-09-14 RX ORDER — IPRATROPIUM BROMIDE AND ALBUTEROL SULFATE 2.5; .5 MG/3ML; MG/3ML
3 SOLUTION RESPIRATORY (INHALATION)
Status: DISCONTINUED | OUTPATIENT
Start: 2024-09-14 | End: 2024-09-14 | Stop reason: HOSPADM

## 2024-09-14 RX ORDER — HYDRALAZINE HYDROCHLORIDE 20 MG/ML
5 INJECTION INTRAMUSCULAR; INTRAVENOUS
Status: DISCONTINUED | OUTPATIENT
Start: 2024-09-14 | End: 2024-09-14 | Stop reason: HOSPADM

## 2024-09-14 RX ORDER — EPHEDRINE SULFATE 50 MG/ML
5 INJECTION, SOLUTION INTRAVENOUS
Status: DISCONTINUED | OUTPATIENT
Start: 2024-09-14 | End: 2024-09-14 | Stop reason: HOSPADM

## 2024-09-14 RX ORDER — OXYCODONE HCL 5 MG/5 ML
5 SOLUTION, ORAL ORAL
Status: DISCONTINUED | OUTPATIENT
Start: 2024-09-14 | End: 2024-09-14 | Stop reason: HOSPADM

## 2024-09-14 RX ORDER — DEXAMETHASONE SODIUM PHOSPHATE 4 MG/ML
INJECTION, SOLUTION INTRA-ARTICULAR; INTRALESIONAL; INTRAMUSCULAR; INTRAVENOUS; SOFT TISSUE PRN
Status: DISCONTINUED | OUTPATIENT
Start: 2024-09-14 | End: 2024-09-14 | Stop reason: SURG

## 2024-09-14 RX ADMIN — LABETALOL HYDROCHLORIDE 5 MG: 5 INJECTION, SOLUTION INTRAVENOUS at 12:44

## 2024-09-14 RX ADMIN — ASPIRIN 81 MG: 81 TABLET, CHEWABLE ORAL at 05:15

## 2024-09-14 RX ADMIN — PIPERACILLIN AND TAZOBACTAM 4.5 G: 4; .5 INJECTION, POWDER, FOR SOLUTION INTRAVENOUS at 02:42

## 2024-09-14 RX ADMIN — ONDANSETRON 4 MG: 2 INJECTION INTRAMUSCULAR; INTRAVENOUS at 10:54

## 2024-09-14 RX ADMIN — LISINOPRIL 20 MG: 20 TABLET ORAL at 05:15

## 2024-09-14 RX ADMIN — SODIUM CHLORIDE: 9 INJECTION, SOLUTION INTRAVENOUS at 00:10

## 2024-09-14 RX ADMIN — ATORVASTATIN CALCIUM 40 MG: 40 TABLET, FILM COATED ORAL at 16:39

## 2024-09-14 RX ADMIN — PROPOFOL 50 MG: 10 INJECTION, EMULSION INTRAVENOUS at 10:53

## 2024-09-14 RX ADMIN — FENTANYL CITRATE 75 MCG: 50 INJECTION, SOLUTION INTRAMUSCULAR; INTRAVENOUS at 10:47

## 2024-09-14 RX ADMIN — HYDRALAZINE HYDROCHLORIDE 5 MG: 20 INJECTION, SOLUTION INTRAMUSCULAR; INTRAVENOUS at 12:25

## 2024-09-14 RX ADMIN — FENTANYL CITRATE 25 MCG: 50 INJECTION, SOLUTION INTRAMUSCULAR; INTRAVENOUS at 10:56

## 2024-09-14 RX ADMIN — LIDOCAINE HYDROCHLORIDE 100 MG: 20 INJECTION, SOLUTION EPIDURAL; INFILTRATION; INTRACAUDAL at 10:47

## 2024-09-14 RX ADMIN — VANCOMYCIN HYDROCHLORIDE 2000 MG: 5 INJECTION, POWDER, LYOPHILIZED, FOR SOLUTION INTRAVENOUS at 01:05

## 2024-09-14 RX ADMIN — PIPERACILLIN AND TAZOBACTAM 4.5 G: 4; .5 INJECTION, POWDER, FOR SOLUTION INTRAVENOUS at 00:57

## 2024-09-14 RX ADMIN — METOPROLOL SUCCINATE 25 MG: 25 TABLET, EXTENDED RELEASE ORAL at 05:15

## 2024-09-14 RX ADMIN — CEFAZOLIN 2 G: 1 INJECTION, POWDER, FOR SOLUTION INTRAMUSCULAR; INTRAVENOUS at 10:54

## 2024-09-14 RX ADMIN — VANCOMYCIN HYDROCHLORIDE 750 MG: 5 INJECTION, POWDER, LYOPHILIZED, FOR SOLUTION INTRAVENOUS at 16:38

## 2024-09-14 RX ADMIN — HYDRALAZINE HYDROCHLORIDE 5 MG: 20 INJECTION, SOLUTION INTRAMUSCULAR; INTRAVENOUS at 12:20

## 2024-09-14 RX ADMIN — DEXAMETHASONE SODIUM PHOSPHATE 4 MG: 4 INJECTION INTRA-ARTICULAR; INTRALESIONAL; INTRAMUSCULAR; INTRAVENOUS; SOFT TISSUE at 10:54

## 2024-09-14 RX ADMIN — PROPOFOL 150 MG: 10 INJECTION, EMULSION INTRAVENOUS at 10:47

## 2024-09-14 RX ADMIN — SODIUM CHLORIDE, POTASSIUM CHLORIDE, SODIUM LACTATE AND CALCIUM CHLORIDE: 600; 310; 30; 20 INJECTION, SOLUTION INTRAVENOUS at 10:43

## 2024-09-14 RX ADMIN — ATORVASTATIN CALCIUM 40 MG: 40 TABLET, FILM COATED ORAL at 00:51

## 2024-09-14 RX ADMIN — Medication 5 MG: at 21:20

## 2024-09-14 RX ADMIN — LABETALOL HYDROCHLORIDE 10 MG: 5 INJECTION, SOLUTION INTRAVENOUS at 00:51

## 2024-09-14 SDOH — ECONOMIC STABILITY: TRANSPORTATION INSECURITY
IN THE PAST 12 MONTHS, HAS THE LACK OF TRANSPORTATION KEPT YOU FROM MEDICAL APPOINTMENTS OR FROM GETTING MEDICATIONS?: NO

## 2024-09-14 SDOH — ECONOMIC STABILITY: TRANSPORTATION INSECURITY
IN THE PAST 12 MONTHS, HAS LACK OF RELIABLE TRANSPORTATION KEPT YOU FROM MEDICAL APPOINTMENTS, MEETINGS, WORK OR FROM GETTING THINGS NEEDED FOR DAILY LIVING?: NO

## 2024-09-14 ASSESSMENT — COGNITIVE AND FUNCTIONAL STATUS - GENERAL
TURNING FROM BACK TO SIDE WHILE IN FLAT BAD: A LITTLE
PERSONAL GROOMING: A LITTLE
MOBILITY SCORE: 15
DRESSING REGULAR LOWER BODY CLOTHING: A LOT
MOVING TO AND FROM BED TO CHAIR: A LITTLE
DRESSING REGULAR UPPER BODY CLOTHING: A LOT
EATING MEALS: A LITTLE
STANDING UP FROM CHAIR USING ARMS: A LOT
SUGGESTED CMS G CODE MODIFIER DAILY ACTIVITY: CK
MOVING FROM LYING ON BACK TO SITTING ON SIDE OF FLAT BED: A LITTLE
SUGGESTED CMS G CODE MODIFIER MOBILITY: CK
TOILETING: A LOT
WALKING IN HOSPITAL ROOM: A LOT
CLIMB 3 TO 5 STEPS WITH RAILING: A LOT
HELP NEEDED FOR BATHING: A LOT
DAILY ACTIVITIY SCORE: 14

## 2024-09-14 ASSESSMENT — FIBROSIS 4 INDEX
FIB4 SCORE: 1.63
FIB4 SCORE: 1.81

## 2024-09-14 ASSESSMENT — LIFESTYLE VARIABLES
CONSUMPTION TOTAL: NEGATIVE
ON A TYPICAL DAY WHEN YOU DRINK ALCOHOL HOW MANY DRINKS DO YOU HAVE: 0
ALCOHOL_USE: NO
DOES PATIENT WANT TO STOP DRINKING: CANNOT ASSESS
HAVE PEOPLE ANNOYED YOU BY CRITICIZING YOUR DRINKING: NO
EVER HAD A DRINK FIRST THING IN THE MORNING TO STEADY YOUR NERVES TO GET RID OF A HANGOVER: NO
HOW MANY TIMES IN THE PAST YEAR HAVE YOU HAD 5 OR MORE DRINKS IN A DAY: 0
HAVE YOU EVER FELT YOU SHOULD CUT DOWN ON YOUR DRINKING: NO
TOTAL SCORE: 0
TOTAL SCORE: 0
EVER FELT BAD OR GUILTY ABOUT YOUR DRINKING: NO
TOTAL SCORE: 0
AVERAGE NUMBER OF DAYS PER WEEK YOU HAVE A DRINK CONTAINING ALCOHOL: 0

## 2024-09-14 ASSESSMENT — SOCIAL DETERMINANTS OF HEALTH (SDOH)
WITHIN THE PAST 12 MONTHS, THE FOOD YOU BOUGHT JUST DIDN'T LAST AND YOU DIDN'T HAVE MONEY TO GET MORE: NEVER TRUE
WITHIN THE LAST YEAR, HAVE YOU BEEN HUMILIATED OR EMOTIONALLY ABUSED IN OTHER WAYS BY YOUR PARTNER OR EX-PARTNER?: NO
WITHIN THE PAST 12 MONTHS, YOU WORRIED THAT YOUR FOOD WOULD RUN OUT BEFORE YOU GOT THE MONEY TO BUY MORE: NEVER TRUE
WITHIN THE LAST YEAR, HAVE YOU BEEN HUMILIATED OR EMOTIONALLY ABUSED IN OTHER WAYS BY YOUR PARTNER OR EX-PARTNER?: NO
WITHIN THE LAST YEAR, HAVE YOU BEEN AFRAID OF YOUR PARTNER OR EX-PARTNER?: NO
WITHIN THE LAST YEAR, HAVE YOU BEEN AFRAID OF YOUR PARTNER OR EX-PARTNER?: NO
IN THE PAST 12 MONTHS, HAS THE ELECTRIC, GAS, OIL, OR WATER COMPANY THREATENED TO SHUT OFF SERVICE IN YOUR HOME?: NO
WITHIN THE LAST YEAR, HAVE YOU BEEN KICKED, HIT, SLAPPED, OR OTHERWISE PHYSICALLY HURT BY YOUR PARTNER OR EX-PARTNER?: NO
WITHIN THE LAST YEAR, HAVE TO BEEN RAPED OR FORCED TO HAVE ANY KIND OF SEXUAL ACTIVITY BY YOUR PARTNER OR EX-PARTNER?: NO
WITHIN THE LAST YEAR, HAVE YOU BEEN KICKED, HIT, SLAPPED, OR OTHERWISE PHYSICALLY HURT BY YOUR PARTNER OR EX-PARTNER?: NO
WITHIN THE LAST YEAR, HAVE TO BEEN RAPED OR FORCED TO HAVE ANY KIND OF SEXUAL ACTIVITY BY YOUR PARTNER OR EX-PARTNER?: NO

## 2024-09-14 ASSESSMENT — PATIENT HEALTH QUESTIONNAIRE - PHQ9
4. FEELING TIRED OR HAVING LITTLE ENERGY: SEVERAL DAYS
5. POOR APPETITE OR OVEREATING: NOT AT ALL
SUM OF ALL RESPONSES TO PHQ QUESTIONS 1-9: 6
9. THOUGHTS THAT YOU WOULD BE BETTER OFF DEAD, OR OF HURTING YOURSELF: NOT AT ALL
6. FEELING BAD ABOUT YOURSELF - OR THAT YOU ARE A FAILURE OR HAVE LET YOURSELF OR YOUR FAMILY DOWN: NOT AL ALL
1. LITTLE INTEREST OR PLEASURE IN DOING THINGS: SEVERAL DAYS
7. TROUBLE CONCENTRATING ON THINGS, SUCH AS READING THE NEWSPAPER OR WATCHING TELEVISION: NOT AT ALL
8. MOVING OR SPEAKING SO SLOWLY THAT OTHER PEOPLE COULD HAVE NOTICED. OR THE OPPOSITE, BEING SO FIGETY OR RESTLESS THAT YOU HAVE BEEN MOVING AROUND A LOT MORE THAN USUAL: SEVERAL DAYS
2. FEELING DOWN, DEPRESSED, IRRITABLE, OR HOPELESS: MORE THAN HALF THE DAYS
3. TROUBLE FALLING OR STAYING ASLEEP OR SLEEPING TOO MUCH: SEVERAL DAYS
SUM OF ALL RESPONSES TO PHQ9 QUESTIONS 1 AND 2: 3

## 2024-09-14 ASSESSMENT — PAIN DESCRIPTION - PAIN TYPE
TYPE: ACUTE PAIN
TYPE: ACUTE PAIN

## 2024-09-14 ASSESSMENT — PAIN SCALES - GENERAL: PAIN_LEVEL: 0

## 2024-09-14 NOTE — ED TRIAGE NOTES
Chief Complaint   Patient presents with    Weakness     Brought in by EMS from home for unilateral LE weakness x3 days.     Per EMS family stated pt has been having unilateral weakness x3 days. Patient states having multiple falls neg head strike. . Patient hypertensive upon EMS arrival. Patient noncompliant with HTN medication. Patient A&Ox3 disoriented to year.

## 2024-09-14 NOTE — ANESTHESIA POSTPROCEDURE EVALUATION
Patient: Joe Templeton    Procedure Summary       Date: 09/14/24 Room / Location: [place] [address] / SURGERY [facility]    Anesthesia Start: 1043 Anesthesia Stop: 1115    Procedure: IRRIGATION AND DEBRIDEMENT, ANKLE (Left: Foot) Diagnosis: (left ankle septic arthritis)    Surgeons: Alex Gautam M.D. Responsible Provider: Paul Disla M.D.    Anesthesia Type: general ASA Status: 3            Final Anesthesia Type: general  Last vitals  BP   Blood Pressure : (!) 150/80    Temp   36.3 °C (97.3 °F)    Pulse   66   Resp   16    SpO2   99 %      Anesthesia Post Evaluation    Patient location during evaluation: PACU  Patient participation: complete - patient participated  Level of consciousness: awake and alert  Pain score: 0    Airway patency: patent  Anesthetic complications: no  Cardiovascular status: hemodynamically stable  Respiratory status: acceptable  Hydration status: euvolemic    PONV: none          No notable events documented.     Nurse Pain Score: 0 (NPRS)

## 2024-09-14 NOTE — PROGRESS NOTES
Monitor summary: SR, AL 0.14, QRS 0.11, QT 0.43, with occasional PVC per strip from monitor room.

## 2024-09-14 NOTE — ASSESSMENT & PLAN NOTE
KIDNEY TRANSPLANT EVALUATION     Evaluation Started 3/15/2022     Name: Sepideh Rodriguez   : 1968   Age: 54 years old    Primary Diagnosis: Diabetes Mellitus Type I-age 13    Primary Nephrologist:  Kenny Haas MD    Dialysis:   Not on dialysis   Modality:    Start Date:   Location:    Vaccine status: up to date      Barriers to listing - BMI requirements, smoking cessation    Once on dialysis or has donor, will need L/R heart cath and peripheral angiogram       ABO (2019 & 3/15/2022 ):  Lab Results   Component Value Date    ABORHDABR O Rh Positive 03/15/2022    AS Negative 03/15/2022    ABR O POSITIVE 2015    ABSCT NEGATIVE 2015       HLA/Immunology (3/15/22):    Lab Results   Component Value Date    PRA1 0% 03/15/2022    PRA2 0% 03/15/2022      cPRA: 0%      Endocrinology: (3/15/22 - 2022):  Hemoglobin A1C (%)   Date Value   2022 7.4 (H)     TSH (mcUnits/mL)   Date Value   2023 3.808       Serologies (2015 - 2022):  Hepatitis A Antibody, IgM (no units)   Date Value   03/15/2022 Negative     Hepatitis B Core Antibody, IgG And IgM (no units)   Date Value   03/15/2022 Negative     Hepatitis B Surface Antigen (no units)   Date Value   03/15/2022 Negative     Hepatitis B Surface Antibody, Quantitative (mUnits/mL)   Date Value   2022 <3.10     HEP B Surface AG (no units)   Date Value   2015 NEGATIVE     HEPATITIS C ANTIBODY (no units)   Date Value   2015 NEGATIVE     Hepatitis C Antibody (no units)   Date Value   03/15/2022 Negative     HIV Antigen/ Antibody Combo Screen (no units)   Date Value   03/15/2022 Nonreactive     Cytomegalovirus Antibody, IgG (ISR)   Date Value   03/15/2022 0.10     Kiesha-Barr Virus, Antibody To Viral Capsid Antigen, IgG (AI)   Date Value   03/15/2022 >8.0 (H)     Toxoplasma Antibody, IgG (Units/mL)   Date Value   03/15/2022 <0.50     Varicella Zoster Antibody, IgG (no units)   Date Value   03/15/2022 Immune     Varicella  Uncontrolled  Continue current home medications  IV as needed medications have been ordered    Patient still requiring IV hydralazine and labetalol to keep her blood pressure under control  Have added scheduled hydralazine    Still requiring IV blood pressure medication, monitoring for side effects include but not limited to hypotension bradycardia.   Zoster, IgM (ISR)   Date Value   03/15/2022 0.65     Glucose 6 Phosphatase Dehydrogenase, Quantitative (U/g Hb)   Date Value   03/15/2022 11.9         Hepatitis B - Vaccinated, labs drawn today      [facility] TB Testing (3/15/2022):  Lab Results   Component Value Date    QUANPI Negative 03/15/2022         +++++++++++++++++++++++++++++++++++++++++++++++  CT ABDOMEN PELVIS WO CONTRAST (3/23/2022)  FINDINGS:  LUNG BASES: Some curvilinear scarring in the lateral left lingula is again  seen.  Lower sternal wires and some pericardial clips from a previous CABG are  again seen.    LIVER: Normal.    PANCREAS: Normal.    GALLBLADDER: Normal.    SPLEEN: Normal.    ADRENAL GLANDS: Normal.    KIDNEYS: No right nor left kidney hydronephrosis is seen. Some bilateral  intrarenal arterial calcifications are seen. No ureteral calculi are seen.  The right kidney has an exophytic inferior medial mass measuring 2.9 x 3.3  cm x 3.0 cm in size with a Hounsfield unit measurement of 25. No left  kidney masses are suggested.    BOWEL/MESENTERY: No bowel obstruction nor intraperitoneal free air is seen.  The pericolonic fat is unremarkable. A normal-sized appendix is seen.  A 2 cm fat-containing umbilical hernia is noted.    ABDOMINAL AORTA: Normal caliber. Prominent abdominal aortic wall and iliac  artery calcifications are seen.    LYMPH NODES: No evidence for adenopathy in the abdomen.    CT PELVIS:    PELVIC ORGANS: The urinary bladder is unremarkable. Surgical absence of the  uterus is noted. No adnexal masses are seen.    FREE FLUID: None.    ADENOPATHY: None.    BONES: No bony destructive lesions are seen. Prominent degenerative changes  are seen at the T8-9 and even greater at the T9-10 disc level. A minimal  lumbar spine levoscoliosis is seen.    Impression  1. Noncontrast CT scan of the abdomen and pelvis showing an indeterminate  right kidney inferomedial exophytic mass up to 3.3 cm in size.  2. Further evaluation of the right  kidney with a renal ultrasound to  evaluate for a cystic or solid nature the right renal mass is suggested    CT Abd/pelvis 3/23/22 reviewed by Dr. Pearson   Abdominal aorta: moderate calcification   BILLIE: moderate calcification   IIA: moderate calcification   EIA: moderate calcification     Comments: small caliber vessles. Vessles calcification is ok for renal transplant.         CT Chest WO Contrast (11/30/2022):  FINDINGS:   Neck Base: No concerning incidental findings.  Mediastinum/Axillae: No enlarged lymph nodes or masses.  Esophagus: Unremarkable.  Vessels: Aorta and pulmonary artery are not significantly dilated. Mild  calcific atherosclerosis.  Heart/Coronaries: Prior coronary bypass.       Lungs/Pleura: Scattered areas of linear bandlike atelectasis and/or  scarring predominantly in the lingula and right middle lobe. No focal  consolidation. 7.5 mm right basilar pulmonary nodule (6/261) is unchanged  from abdominal CT 3/23/2022. 4 mm right intrafissural lymph node (6/159). 3  mm left lower lobe pulmonary nodule (6/231) is unchanged from abdominal CT  3/23/2022.     One or more small pulmonary nodules are present, all less than 4 mm in mean  axial dimension, none suspicious.     Central Airways: Clear.     Upper Abdomen: No concerning incidental findings. Calcification of the abdominal aorta.  Bones/Soft Tissues: Prior median sternotomy. Severe degenerative changes at T8-T9 and T9-T10 with numerous Schmorl's node formation and endplate sclerosis.  : No additional findings.    IMPRESSION:  *  Scattered areas of linear bandlike atelectasis and/or scarring within the lungs.     *  Pulmonary nodules, with the largest nodule at the right base measuring up to 9 x 6 mm. This dominant nodule is not significantly changed compared to 3/23/2022. Another follow-up to establish 18-24 month stability is recommended per consensus guidelines. The other nodules all measure less than or equal to 4 mm, attention on  follow-up. Some of these are outside the field on prior abdominal imaging.     *  Severe degenerative changes at T8-T9 and T9-T10.            CHEST PA AND LATERAL (3/21/22)  FINDINGS:    PA and lateral chest. There is mild cardiomegaly. There has been  prior bypass surgery. There are atelectatic changes present in both lung bases. There is mild prominence of the central and basilar bronchovascular markings. This raises the question of very mild vascular overload perhaps  associated with the patient's known chronic kidney disease.    Impression  Possible mild vascular overload. Cardiomegaly. Prior bypass surgery. Atelectatic and fibrotic changes in both bases.        Pulmonary Function Testing: (3/29/22):   Impression  1. Spirometry is consistent with a mild a restrictive defect. There is very mild increase in FEV1 and significant increase in mid-flow  after inhaled bronchodilator suggesting borderline reversible airflow obstruction predominantly in  small airways. MVV is normal without significant change after inhaled albuterol. Flow/volumr loop is consistent with expiratory airflow in small airways.   2. Lung Volumes by Plethysigmograpy are consistent with borderline restriction. Consider chest wall, pleural, interstitial disease and neuromuscular/diaphragmatic dysfunction.   3. Diffusion is severely decreased but normalizes after correction by alveolar volume. Consider early interstitial and/or vascular disease. Cannot exclude maldistribution of ventilation secondary to obstruction.   4. Normal SpO2 of 100% on room air at rest.     Conclusion:   1. Abnormal results as noted above.     Recommendation:   1. Consider 6 minute walk if clinically indicated for transplant evaluation.        EKG (2/18/2023):    Normal sinus rhythm   Rightward axis   Low voltage QRS   Incomplete right bundle branch block   Cannot rule out anterior infarct (cited on or before 18-MAR-2022)   Abnormal ECG when compared with ECG of  18-MAR-2022 09:49,   Questionable change in QRS axis   QT has shortened         ECHO (3/18/2022):  Final Impressions  Normal left ventricular systolic function.  Left ventricular ejection fraction; 70 %.  Mildly increased left ventricular wall thickness.  LV Global longitudinal strain -24.0 %.  Grade I diastolic dysfunction of the left ventricle   (impaired relaxation pattern).  Normal right ventricular size and systolic function.  Normal left atrial chamber size.  Normal right atrial chamber size.  Trivial mitral valve regurgitation.  Trivial tricuspid valve regurgitation.  RVSP could not be calculated due to incomplete tricuspid regurgitation velocity profile.  Mildly dilated ascending aorta 3.6 cm.        US Lower iliac Duplex Bilateral (1/20/2023):  FINDINGS: The distal abdominal aorta is not visualized due to overlying  bowel gas.     RIGHT: There are monophasic waveforms within the right common iliac artery  with spectral broadening and peak systolic velocity of 273 cm/s. There are  monophasic waveforms with spectral broadening and peak systolic velocity of  191 cm/s within the mid right common femoral artery. Within the distal  right common femoral artery, there are monophasic waveforms with spectral  broadening and peak systolic velocity of 171 cm/s.     Within the proximal right external iliac artery, there are monophasic  waveforms with peak systolic velocity 156 cm/s. Within the mid right  external iliac artery, there are monophasic waveforms with peak systolic  velocity of 194 cm/s. Within the distal right external iliac artery, there  are monophasic waveforms with peak systolic velocity 173 cm/s.     LEFT: There are monophasic waveforms within the proximal left common iliac  artery with spectral broadening and peak systolic velocity of 183 cm/s.  Within the mid left common iliac artery, there are monophasic waveforms  with peak systolic velocity of 179 cm/s. Within the distal left common  iliac artery,  there are monophasic waveforms with peak systolic velocity  173 cm/s.     Within the proximal left external iliac artery, there are monophasic waveforms with peak systolic velocity 184 cm/s. Within the mid left external iliac artery, there are monophasic waveforms with peak systolic velocity of 215 cm/s. Within the distal left external iliac artery, there are monophasic waveforms with peak systolic velocity of 243 cm/s.    IMPRESSION:  Monophasic waveforms throughout the common and external iliac arteries bilaterally is consistent with distal aortic/proximal common iliac occlusive disease. The extent and precise locations of  disease is not well determined on this ultrasound examination in which overlying bowel gas  inhibits visualization. MRA could be useful to further evaluate the abdominal aorta and iliac arteries.        US Lower Extremity Arteries Duplex Bilateral (2/18/2023):  COMPARISON: US LOWER EXTREMITY ARTERIES DUPLEX BILATERAL on 1/20/2023.     FINDINGS: Color Doppler and duplex imaging demonstrate patent common  femoral, deep femoral, superficial femoral, popliteal, dorsalis pedis,  anterior and posterior tibial and peroneal arteries, with no significant  atheromatous plaque seen. Duplex Doppler waveforms are monophasic  throughout the lower extremity arteries, as before.      Right peak systolic velocities (cm/sec):  CFA: 164  DFA: 281  Proximal SFA: 130  Mid SFA: 217  Distal SFA: 160  Popliteal: 143  AT: 84  Peroneal: 27  PT: 72  DP: 34     Left peak systolic velocities (cm/sec):  CFA: 302  DFA: 132  Proximal SFA: 177  Mid SFA: 364  Distal SFA: 150  Popliteal: 92  AT: 71  Peroneal: 69  PT: 81  DP: 55     IMPRESSION:  1.  Monophasic waveforms throughout the lower extremity arteries,  compatible with bilateral inflow stenosis.  2.  Doubling of peak systolic velocity in the mid left superficial femoral  artery, compatible with a greater than 50% stenosis.        Right/Left Heart Catheterization ( ):     Will need before transplant        Nuclear Stress Test (12/8/2021):    IMPRESSION:  1. Normal  myocardial perfusion images at rest and following a regadenoson injection. The TID ratio is abnormal which could indicate balanced ischemia; however, blood flow data did not support that.    2. Left ventricular function is normal at rest and following a regadenoson njection LVEF is 71% at rest and 72% post-regadenoson.  3. Cardiac Risk Assessment:Low, possibly intermediate due to the TID.  4. Compared to previous Rubidium PET study dated 3/29/2018, perfusion images again look normal. A TID ratio was increased on the previous study as well although not as much as on the current study.         US Carotid Duplex Bilateral (3/21/2022)  FINDINGS:  Right: There is no significant intimal medial thickening. Longitudinal color-flow images reveal all vessels to be patent, with a normal flow direction. There is no significant plaquing near the carotid bifurcation, involving the proximal internal carotid artery. The vertebral artery is patent and has a normal flow direction.    Left: There is no significant intimal medial thickening. Longitudinal color-flow images reveal all vessels to be patent, with a normal flow direction. There is no significant plaquing near the carotid bifurcation, involving the proximal internal carotid artery. The vertebral artery is patent and has a normal flow direction.    The following peak systolic and end-diastolic velocities were measured (units given in cm/s):    Right:  Common carotid: 73/0  Internal carotid:   77/20  External carotid:  156  Vertebral artery: 55  Systolic ratio:      1.1    Left:  Common carotid: 89/10  Internal carotid:   117/21  External carotid:  244  Vertebral artery: 65  Systolic ratio:      1.3    Impression  1. No hemodynamically significant right carotid stenosis using NASCET criteria adapted to duplex ultrasound.    2. No hemodynamically significant left carotid stenosis  using NASCET criteria adapted to duplex ultrasound.    3. Vertebral artery flow is antegrade bilaterally.       US Abdomen Complete (5/26/2022)    FINDINGS:   Hepatic parenchyma is homogeneous without evidence for focal mass.     The gallbladder contains sludge but is otherwise normal. There is no wall thickening, pericholecystic fluid, sonographic Yancey's sign or cholelithiasis.     No intrahepatic or extra hepatic biliary ductal dilatation. Common bile duct measures 4 mm in diameter.     Visualized portions of the head and body of the pancreas are unremarkable.     Both kidneys are present without collecting system dilatation. The right measures 10.2 cm in length and left measures 10.5 cm. 3.2 cm simple cyst at the lower pole of the right kidney     The spleen is unremarkable measuring 10.9 cm in length.     The abdominal aorta is normal in caliber throughout. IVC is patent.      No free fluid.        IMPRESSION:   1. Simple cyst at the lower pole of the right kidney measuring 3.2 cm which corresponds to the abnormality on recent CT. This was also present and nonenhancing on prior MRA from 2016 and measured 2.5 cm at that time.     2. Gallbladder sludge but no definite stones.     3. Otherwise normal abdominal ultrasound.        Mammogram (1/4/2022):  [facility] [place] [facility] [place]  FINDINGS:   No suspicious masses, architectural distortion or calcifications identified.  No significant interval change is noted.     IMPRESSION:  No mammographic evidence for malignancy. If there are no clinical findings, then routine screening mammography is recommended.   BIRADS  Category 1: Negative          Pap Smear (5/17/2017):   DIAGNOSIS:    NEGATIVE FOR INTRAEPITHELIAL LESIONS OR MALIGNANCY.   Hysterectomy- Due for a pelvic exam after transplant        Colonoscopy (10/30/2019): Julio Cesar Lott MD  [facility] [place] [facility] [place]    Final Impressions:  No polyps or masses   Tortuous and redundant  colon    Biopsy:   None     Recommendations  Repeat colonoscopy in 5 years          ADDITIONAL STUDIES    Nephrology Consult (8/15/2022): Josef Conte MD  Pre kidney transplant evaluation:  - There are no medical contra indications to continue the evaluation process.  - Will need to loose weight first to BMI below 35 before being listed for transplant , for now will start work up as long as she continues to loose weight , I discussed with her options for Bariatric surgery she is seeing Bariatrics in 10/2022  - She will need a LHC ( once on HD or has a donor), C, discuss with   - Reviewed CT abdomen and pelvis to evaluate for vessel calcification in the setting of long standing diabetes  And surgery cleared her , but she has moderate calcifications   - Also will need to loose weight to BMI of less than 30 to be considered for a kidney and pancreas transplant   - Has severe PAD , follow up with Dr.Al Calderon, she will need an peripheral angiogram once on dialysis or if she has a potential donor   - PFT done as she used to smoke cigarettes for and quit in , showed severely decreased diffusion capacity , will need a 6 minute walk test   - Will get Hypercoag work up due to hx of miscarriage   - received vaccination for hepatitis B so will check levels   - Right kidney simple cyst      Psychosocial evaluation:  - Compliant with appointments and medications  - Has good social support   - Social work had some psychiatric concerns and will send her to see       Living donors:  - Has no potential living donors  - Discussed with patient superiority of living kidney transplants over  donor transplants in regards to longevity of the allograft and the immediate allograft function      Frailty assessment:  - Patient is able to perform activities of daily living without difficulty, does ( not) exercise regularly   - Hand  to be followed by dietician  - ELIF score 3/10          Cardiology  Consult (4/6/2022): Jevon Porras MD  ASSESSMENT/PLAN:  Pre-kidney transplant evaluation: Patient has stage 4 kidney disease secondary to DM type 1, not currently on dialysis. She does have history of CAD, will need to have LHC and RHC prior to transplant. She will also need peripheral angiogram due to her PVD. Once she establishes a living donor or starts dialysis, patient will need to undergo this testing at that time. Our office should be contacted to arrange this testing if either of these should occur prior to her follow up.      Coronary Artery Disease: s/p CABG x4 2015. Patient does note stable BOOTH at this time. She will need to undergo right and left cardiac cath once she starts dialysis or has a living donor available. Her primary cardiologist is Dr. Rodriguez, encouraged patient to continue to follow with him. Will update his team on recommendations during appointment today.      PVD: Aorta Bifemoral angiogram 03/01/2016 showed diffusely small vessels, right common iliac artery stenosis, 60-80%, bilateral superficial femoral artery disease, diffuse and small vessels with three-vessel runoff. US Lower extremity ultrasound 11/11/2021 showed right KATHRIN of 0.73 and left KATHRIN of 0.86 with no significant stenosis. Follows with Dr. Sadler.      DM, type 1: Insulin dependent. Last A1C 03/15/2022 7.6.      I will see the patient back in 1 Year or sooner if problems arise.    Cardiology Addendum (1/25/2023) Jevon Porras MD  Re: Recent Ultrasounds completed 1/20/2023  Discussed with Dr. Porras - she would need peripheral angiogram. However, she is currently not on dialysis and with elevated creatinine would need this once she starts dialysis or a living donor is found        Pulmonary Consult (1/24/2023): Caesar Palma MD  [facility]  SUBJECTIVE / INTERVAL HX:  Mrs. Rodriguez is a 55 y/o female who presents today in interval follow up with respects to her history of mixed sleep apnea maintained on ASV  therapy delivered via [facility]. She returns today after having undergone titration PSG in order to discuss the results after concerns pertaining to residual nocturnal hypoxemia despite the use of her ASV.      During her previous encounter, she did today relay complaints of non-restorative sleep spanning the past 6 months due in part to life stressors as well as her ongoing medical issues. She relayed a history of going to bed around 10 PM and arising around 6 AM feeling poorly rested. On this basis, she would undergo a nocturnal oximetry study per her PCP which did allude to residual low-grade hypoxemia and we therefore did opt to proceed with a in lab titration and she returns today in order to review the results.      DIAGNOSTIC TEST RESULTS:  Imaging, PFTs and sleep data were reviewed by myself and I concur with the formal interpretation:      PSG Titration - 1/14/2023  1) With the noted limitation of minimal/no REM sleep observed during this study, titration PSG demonstrated ASV PS 3-13cwp, EPAP 15cwp to be effective in resolving the patient's sleep disordered breathing and associated oxyhemoglobin desaturation.      Data Download on ASV EPAP 12; PS 3-13cwp - 9/1/2022-11/29/2022  95th percentile leak: 11 LPM  Residual AHI:5  Days used > 4 hours: 84%     PSG - 1/2020  1) This good quality PSG titration study demonstrated ASV with EPAP 12cwp and PS 3-13cwp to be effective in resolving the patient's sleep disordered breathing and associated desaturations.      ASSESSMENT / PLAN:     1) Hx of RANGEL on ASV PS 3-13cwp, EPAP 12cwp --> 15cwp   - in lab PSG undertaken which did not demonstrate much in the way of significant desaturation, though admittedly REM sleep was not observed.   - with that said, will increase in EPAP from 12 to 15cwp to offset additional obstructive events. Change made in [facility] and patient may request it be changed back if need be      Thank you for the opportunity to participate in this patient's  care. Please do not hesitate to contact our office with any questions or clarifications.          Transplant Surgery Consult (4/6/2022) Kari Stevens PA-C  ASSESSMENT AND PLAN:   This is a very pleasant 53 year-old female who has a history of CKD Stage IV with GFR 22 who is seeking renal transplant. I cannot find any GFR < 20 in the past. She has h/o CABG with normal functioning heart, obesity BMI 42, and peripheral vacsular disease with claudication. She has prior 2017 bilteral oophorectomy for which she required conversion to open surgery resulting in RLQ curvilinear incision.      The kidney transplant procedure including its associated risks of disease transmission (including malignancy and infection) from donor to recipient, bleeding, infection, damage to surrounding structures, vascular thrombosis, delayed graft function, rejection, need for reoperation or retransplantation, possibility of urine leak, ureteral stricture or cardiac event/death were all discussed with the patient who reported understanding of the information presented and all questions were answered. The need for compliance and post-operative follow-up were also explained. The various types of organ donors that the patient may be offered a kidney from were also discussed with the patient including living kidney donation, donation after brain death (DBD), donation after cardiac death (DCD), expanded criteria donors (ECD), high KDPI donors, donors with risk criteria as defined by PHS 2020 guidelines, hepatitis B positive and donors with a history of malignancy. The patient stated willingness to receive offers of kidneys from all of the above donor types. The patient stated understanding of their choice to either accept or decline an organ at the time of offer. The patient again stated understanding of all information and all questions were again answered.     Will need to discuss listing status as she does not appear to have GFR < 20 in the  past. Also recommend weight loss, BMI 42. PVD is a concern with claudication; however, CT reveals adequate vasculature for kidney transplant. Note has existing RLQ curvilinear incision.      This is Kari Stevens PA-C personally performing the history & exam and jointly creating medical decision making with Sourav Figueroa MD.      Over 60 minutes were spent face-to-face by the physician with the patient on this clinic visit interviewing and examining the patient, as well as providing education and counseling. Over 45 minutes were spent educating and counseling the patient in regard to kidney transplantation as well as the various types of possible organ donors.          Social Service Consult (3/15/22):  ADELAIDA Liu  SUPPORT WITH AFTERCARE NEEDS:    The confirmed 24-hour care plan for the first few weeks following surgery and the need for assistance with activities of daily life was discussed with the patient. Social work did discuss the need for help with cooking, cleaning, laundry, shopping and medications. The patient identifies her mother, Ale, age 74, who is a retired nurse and drives, as likely her primary support. The patient states her  works full time roughly about 8:00 a.m. to 6:00 p.m., but would be available in the evenings. The patient states she is comfortable asking for help.  The patient will plan to recuperate in [place] with her mom. The patient states her spouse would bring her to the hospital at the time of transplant and her mother would bring her to her followup appointments. The patient understands she will need to stay within 1 hour of [facility] for the first month following transplant. The patient understands she cannot be left alone and cannot drive until cleared by the physician. The patient had no questions or concerns about these requirements.     RECOMMENDATIONS:    The patient overall appears to be a moderate risk and social work would recommend psychological  followup with this patient. Social work ultimately would like to see the patient reestablish with a psychiatrist and counselor. All immediate social work related needs have been addressed. Social work also recommends that she abstain from cigarette smoking and marijuana use. Social work will follow up with the patient in the transplant clinic as additional needs arise. This evaluation will be updated per the transplant policy or as needed.          Pharmacy Consult (3/15/22): Galina Yoon, PharmD  Compliance strategies:  Patient was able to tell me the names and doses of most of her medications by memory. She does take a significant amount of medications and utilizes monthly bubble packing. Admits to missing a dose of her bubble packed medications about once a month. Discussed the inability to utilize bubble packing immediately post-transplant due to the frequent medication changes. She is agreeable to utilize a weekly pill box post-transplant that she says she feels confident independently managing.      Notes regarding medications:  History of rheumatoid arthritis currently taking tofacitinib and hydroxychloroquine. Also has a history of fibromyalgia and follows with a pain specialist. Her psychiatrist recently left the practice and she is looking for a new one. Taking aripiprazole, milnacipran, and escitalopram for anxiety/depression. Also taking citicoline herbal supplement to prevent \"brain fogginess.\" Does complain of constantly feeling tired and states takes caffeine as needed to keep her awake especially before she drives. Diabetes well controlled on an insulin pump. Has been on an insulin pump since 2009. States her blood glucose typically run in low 100s. A1c from today in process.      Concerns:  There are no pharmacologic contraindications for transplantation.          Financial Consult (3/15/2022):  Antonio Santiago   met with the patient in the clinic as a part of her evaluation. She stated that she  has Medicare because she is considered disabled, not because of ESRD. I updated the insurance to reflect [facility] as the primary        Nutrition Consult (3/15/2022):  Lois Moreno RD  Score # yes response: 6 frail   Scorin indicates no frailty    1-3 indicates frailty risk   4 or greater indicates frailty   Hand  Strength Measurement (kg)  Left hand       22       (Mean 26, Alert level 16.2)  Right Hand    23.2    (Mean  29.8, Alert Level 19.2)   WNL (Y/N):  yes     Monitoring and Evaluation:  Knowledge/Beliefs/Attitudes (4):  Food and nutrition knowledge -  Area(s) and level of knowledge (FH-4.1.1):   Patient verbalized a basic level of understanding and willingness to make modifications.  Weight:   Weight loss to 184lbs/83.6 or less for BMI<35 and 157 lbs/71 kg or less for BMI<30. Contact information provided for further questions/concerns.    Nutrition transplant evaluation complete  [x]? Nutrition related barriers to transplant include BMI=42 with limited mobility and frail per frailty index though she is participating in cardiac rehab and willing to lose weight.        Dermatology Consult (2022): Kenneth Beal MD   HISTORY OF PRESENT ILLNESS:  The patient is a new 54 year old female.      The patient presents for a Full Body Skin Exam.  Patient has not noticed any new, changing, symptomatic, or concerning skin lesions anywhere.     The patient has a history of immunosuppression (transplant, HIV, leukemia, lymphoma, immunosuppressant medications): No  The patient has a history of chemotherapy and/or radiation: No    ASSESSMENT AND PLAN:     1. Screening for skin cancer     2. Cherry angiomas: - arms  -  Reassurance that these are a common, benign, vascular tumor that requires no treatment unless symptomatic.       3. Lentigines: - arms  -  Reassurance of the benign nature of these lesions  -  Counseled that these lesions occur in response to excessive sun exposure over time  -  RTC if any lesions have  any new or concerning changes  -  Counseled on daily sun precautions including the use of sunscreen SPF 30+ daily and reapplying at least every 2 hours.  Also discussed sun protective clothing including long sleeves and wide brimmed hats when anticipating sun exposure.  Avoid peak sun hours and seek shade from 10a-4p when possible.     4. Compound Nevus: - back, arms  -  Reassurance of the benign nature of these lesions.  -  Monitor at home for any concerning changes.  -  RTC (return to clinic) if any concerning changes.     5. Folliculitis: - face  -counseled on the nature of this condition.  -begin Clindamycin 1% gel, Apply pea size amount to entire face once daily as needed for flares.     6. Post-Inflammatory Hyperpigmentation:  -  Counseled on this condition.  The pigment left behind after an injury or inflammation to the skin will take time to fade.  It will likely fade significantly over the next several months.  -  Recommend to avoid trauma to the areas affected which may contribute to more inflammation/pigment  -  Stressed the importance of sun precautions in the affected areas  -  If not sufficiently faded with time, will consider prescription topical medications that may improve the appearance of this condition.  -recommend compression stockings daily     RTC (return to clinic) for FBSE once she receives kidney transplant.

## 2024-09-14 NOTE — THERAPY
Physical Therapy Contact Note    Patient Name: Joe Templeton  Age:  74 y.o., Sex:  male  Medical Record #: [id number]  Today's Date: 9/14/2024    PT consult received I and D today of ankle; will follow up post when able for accurate assesmsent of PT needs;     Corrie VU, PT,  [phone]

## 2024-09-14 NOTE — CARE PLAN
The patient is Watcher - Medium risk of patient condition declining or worsening    Shift Goals  Clinical Goals: Safety  Patient Goals: Remain updated on POC      Progress made toward(s) clinical / shift goals:     Problem: Skin Integrity  Goal: Skin integrity is maintained or improved  Outcome: Progressing     Problem: Fall Risk  Goal: Patient will remain free from falls  Outcome: Progressing

## 2024-09-14 NOTE — PROGRESS NOTES
Pt arrived to unit. Oriented to person and time, playful in conversation, difficult to fully assess orientation status. LLE with dressing in place CDI. Cap refill less than 3 seconds, foot is warm to the touch, pink in color, sensation intact. Ice pack resting to L ankle region.

## 2024-09-14 NOTE — PROGRESS NOTES
4 Eyes Skin Assessment Completed by Pearl Oliver, RN and Kristie Clark, PATEL.    Head WDL  Ears WDL  Nose Scab  Mouth Scab on bottom lip  Neck WDL  Breast/Chest WDL  Shoulder Blades WDL  Spine WDL  (R) Arm/Elbow/Hand Red but blanches  (L) Arm/Elbow/Hand Scab; Red but blanches  Abdomen WDL  Groin WDL  Scrotum/Coccyx/Buttocks WDL  (R) Leg WDL  (L) Leg Scab  (R) Heel/Foot/Toe WDL  (L) Heel/Foot/Toe Bruising and Swelling in left ankle           Devices In Places ECG, Tele Box, Blood Pressure Cuff, Pulse Ox, SCD's, Condom Cath, and Nasal Cannula      Interventions In Place Gray Ear Foams and Pillows    Possible Skin Injury No    Pictures Uploaded Into Epic N/A  Wound Consult Placed N/A  RN Wound Prevention Protocol Ordered No

## 2024-09-14 NOTE — ASSESSMENT & PLAN NOTE
"Unclear baseline.  He is oriented x 4 though certainly a bit \"off\"  Way family members to give further updates on his baseline.  Oriented x 4    "

## 2024-09-14 NOTE — ANESTHESIA PREPROCEDURE EVALUATION
Case: [id number] Date/Time: 09/14/24 0950    Procedure: IRRIGATION AND DEBRIDEMENT, ANKLE (Left)    Location: [facility] OR 15 / SURGERY [facility]    Surgeons: Alex Gautam M.D.            Relevant Problems   CARDIAC   (positive) 3-vessel CAD   (positive) Essential (primary) hypertension   (positive) Murmur       Physical Exam    Airway   Mallampati: II  TM distance: >3 FB  Neck ROM: full       Cardiovascular - normal exam  Rhythm: regular  Rate: normal  (-) murmur     Dental       Very poor dentition     Pulmonary - normal exam  Breath sounds clear to auscultation     Abdominal    Neurological - normal exam                   Anesthesia Plan    ASA 3   ASA physical status 3 criteria: CAD/stents (> 3 months)    Plan - general       Airway plan will be LMA          Induction: intravenous    Postoperative Plan: Postoperative administration of opioids is intended.    Pertinent diagnostic labs and testing reviewed    Informed Consent:    Anesthetic plan and risks discussed with patient.    Use of blood products discussed with: patient whom consented to blood products.

## 2024-09-14 NOTE — OP REPORT
DATE OF OPERATION: 9/14/2024     PREOPERATIVE DIAGNOSIS: Left ankle septic arthritis    POSTOPERATIVE DIAGNOSIS: Same    PROCEDURE PERFORMED: Left ankle arthrotomy incision drainage    SURGEON: Alex Gautam M.D.     ASSISTANT: None    ANESTHESIA: General    SPECIMEN: None    ESTIMATED BLOOD LOSS: 5 mL    IMPLANTS: None      INDICATIONS: The patient is a 74 y.o. male who presented with above.  I discussed the risks and benefits of the procedure which include but are not limited to risks of infection, wound healing complication, neurovascular injury, pain, malunion, non-union, malrotation, and the medical risks of anesthesia including MI, stroke, and death.  Alternatives to surgery were also discussed, including non-operative management, which I did not recommend.  The patient was in agreement with the plan to proceed, and the informed consent was signed and documented.  I met with the patient pre-operatively and marked the operative extremity with their agreement.  We proceeded to the operating room.     DESCRIPTION OF PROCEDURE:  Patient was seen in the preoperative holding area on the day of surgery. The operative site was marked with my initials.  he was taken to the operating room and placed supine on the operative table.  Anesthesia was induced.  The operative extremity was prepped and draped in the normal sterile fashion.  Operative pause was conducted and the correct patient, site, side, procedure, and surgeon's initials on extremity were identified.  3 cm anterior lateral incision was performed of the ankle joint.  Blunt dissection carried down to the extensor retinaculum.  This was incised.  Extensor tendons were retracted medially.  Blunt dissection was then used to enter the joint capsule.  Is immediate outflow of purulent cloudy fluid.  This was swabbed and sent for culture.  The joint was then thoroughly irrigated with sterile saline.  It was then closed in layered fashion.  Sterile dressings were  applied.  Patient was awoken taken to PACU stable condition.    POSTOPERATIVE PLAN: Weightbearing as tolerated left lower extremity.  PT/OT.  Antibiotics per ID.  The patient will follow up in clinic in 2 weeks to check wounds and remove sutures/staples.      ____________________________________   Alex Gautam M.D.   DD: 9/14/2024  11:15 AM

## 2024-09-14 NOTE — ED PROVIDER NOTES
"ED Provider Note    CHIEF COMPLAINT  Chief Complaint   Patient presents with    Weakness     Brought in by EMS from home for unilateral LE weakness x3 days.       EXTERNAL RECORDS REVIEWED  Outpatient Notes primary care visit 2024, hypertension history of CABG, gout, dyslipidemia    HPI/ROS  LIMITATION TO HISTORY   Select: Altered mental status / Confusion      Joe Templeton is a 74 y.o. male who presents with reportedly left leg weakness, started 3 days ago at home.  He notes he was hoping for it to get better, but now is concerned he sustained a stroke, he has a history of recurrent gout, as well as history of CAD, CABG many years prior, reports pain in the left leg as well, \"different than my gout pain\".  Denies fever, chills, denies any weakness prior to arrival.  Also notes he had slurred speech.  No fever chills nausea vomiting.  Denies headache.  Denies any back pain.    PAST MEDICAL HISTORY   has a past medical history of Arthritis, Gout, Hemorrhoids (2014), Hypertension, Ingrown right big toenail (2015), Low back pain (2015), NSTEMI (non-ST elevated myocardial infarction) (HCC) (2016), and Vitamin D deficiency (2015).    SURGICAL HISTORY   has a past surgical history that includes multiple coronary artery bypass endo vein harvest (N/A, 2016).    FAMILY HISTORY  Family History   Problem Relation Age of Onset    Cancer Mother     Heart Disease Father     Hyperlipidemia Father     Hyperlipidemia Brother     Lung Disease Brother        SOCIAL HISTORY  Social History     Tobacco Use    Smoking status: Former     Current packs/day: 0.00     Average packs/day: 0.3 packs/day for 0.5 years (0.1 ttl pk-yrs)     Types: Cigarettes     Start date: 2015     Quit date: 2016     Years since quittin.2    Smokeless tobacco: Never   Substance and Sexual Activity    Alcohol use: No    Drug use: No     Comment: Hx of drug abuse-pot,meth, cocaine (None for 15 yrs) never " "injected drugs    Sexual activity: Not Currently       CURRENT MEDICATIONS  Home Medications       Reviewed by Gabriel Davis (Pharmacy Tech) on 09/13/24 at 2213  Med List Status: Complete     Medication Last Dose Status   aspirin (ASA) 81 MG Chew Tab chewable tablet Not Taking Active   atorvastatin (LIPITOR) 40 MG Tab Not Taking Active   lisinopril (PRINIVIL) 20 MG Tab Not Taking Active   metoprolol SR (TOPROL XL) 25 MG TABLET SR 24 HR Not Taking Active                  Audit from Redirected Encounters    **Home medications have not yet been reviewed for this encounter**         ALLERGIES  No Known Allergies    PHYSICAL EXAM  VITAL SIGNS: BP (!) 190/100   Pulse 83   Temp 36.4 °C (97.5 °F) (Temporal)   Resp 20   Ht 1.753 m (5' 9\")   Wt 74.8 kg (164 lb 14.5 oz)   SpO2 95%   BMI 24.35 kg/m²    General: Pleasant male, slightly confused, oriented to location, not to month, year.  Head: Normocephalic atraumatic  Eyes: Extraocular motion intact, midrange.  Reactive.  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  There is warmth and tenderness to the left ankle and less of the left knee.  There is no overlying cellulitis.  1+ DP pulses bilaterally    NEURO: Left facial droop, slurred speech.  Cranial nerves otherwise grossly intact as best assessed given patient's confusion and ability to adhere to exam.  5 out of 5 strength with elbow flexion/extension, wrist flexion/extension,  bilaterally.  Left lower extremity notable for weakness with hip flexion/extension, 4 out of 5, unable to significantly assess ankle dorsiflexion/plantarflexion given significant pain with movement.  Able to move the toes unable to assess strength.    Sensation intact to light touch x4.    2+ DTRs " bilaterally.   No pronator drift.  Integumentary: No wounds or rashes      EKG/LABS  Labs Reviewed   CBC WITH DIFFERENTIAL - Abnormal; Notable for the following components:       Result Value    WBC 15.1 (*)     Neutrophils-Polys 78.10 (*)     Lymphocytes 10.70 (*)     Neutrophils (Absolute) 11.78 (*)     Monos (Absolute) 1.57 (*)     All other components within normal limits   COMP METABOLIC PANEL - Abnormal; Notable for the following components:    Glucose 150 (*)     All other components within normal limits   URINALYSIS - Abnormal; Notable for the following components:    Specific Gravity >=1.045 (*)     Ketones Trace (*)     Protein 30 (*)     Occult Blood Small (*)     All other components within normal limits   URINE DRUG SCREEN - Abnormal; Notable for the following components:    Opiates Positive (*)     All other components within normal limits   PROTHROMBIN TIME - Abnormal; Notable for the following components:    PT 14.8 (*)     INR 1.14 (*)     All other components within normal limits   LIPASE - Abnormal; Notable for the following components:    Lipase 128 (*)     All other components within normal limits   PROBRAIN NATRIURETIC PEPTIDE, NT - Abnormal; Notable for the following components:    NT-proBNP 445 (*)     All other components within normal limits   TROPONIN - Abnormal; Notable for the following components:    Troponin T 26 (*)     All other components within normal limits   CRP QUANTITIVE (NON-CARDIAC) - Abnormal; Notable for the following components:    Stat C-Reactive Protein 18.00 (*)     All other components within normal limits   HEMOGLOBIN A1C - Abnormal; Notable for the following components:    Glycohemoglobin 6.4 (*)     All other components within normal limits   SED RATE - Abnormal; Notable for the following components:    Sed Rate Westergren 25 (*)     All other components within normal limits   URINE MICROSCOPIC (W/UA) - Abnormal; Notable for the following components:    WBC 0-2 (*)      RBC 2-5 (*)     All other components within normal limits   DIAGNOSTIC ALCOHOL   HOLD BLOOD BANK SPECIMEN (NOT TESTED)   APTT   COD (ADULT)   ESTIMATED GFR   AMMONIA   VITAMIN B12   COV-2, FLU A/B, AND RSV BY PCR (CEPHEID)   BLOOD CULTURE   PROCALCITONIN   LACTIC ACID   BLOOD CULTURE   LACTIC ACID   FLUID CELL COUNT   FLUID CULTURE W/GRAM STAIN   FLUID CRYSTALS   MRSA BY PCR (AMP)   TROPONIN   TROPONIN   CBC WITH DIFFERENTIAL   COMP METABOLIC PANEL   LIPID PROFILE   FLUID GLUCOSE   FLUID TOTAL PROTEIN   POCT GLUCOSE   POCT COV-2, FLU A/B, RSV BY PCR   POC COV-2, FLU A/B, RSV BY PCR       I have independently interpreted this EKG    RADIOLOGY/PROCEDURES     Radiologist interpretation:  CT-CTA NECK WITH & W/O-POST PROCESSING   Final Result      1.  Mild to moderate atherosclerotic plaquing of the mid and distal common carotid arteries and especially the common carotid artery bifurcations. Bilateral internal carotid stenoses of less than 50%.      2.  Patent vertebrobasilar system with moderate atherosclerotic plaquing of the V4 segments bilaterally      CT-CTA HEAD WITH & W/O-POST PROCESS   Final Result      CT angiogram of the [place] within normal limits.      CT-CEREBRAL PERFUSION ANALYSIS   Final Result      1. Cerebral blood flow less than 30% possibly representing completed infarct = 0 mL.      2. T Max more than 6 seconds possibly representing combination of completed infarct and ischemia = 0 mL.      3. Mismatched volume possibly representing ischemic brain/penumbra= 0 mL      4.  Please note that this cerebral perfusion study and report is Quantitative and targets supratentorial (cerebral) perfusion for evaluation of large vessel territory acute ischemia/infarction. For example, lacunar infarcts, and brainstem/posterior fossa    ischemia/infarction are not evaluated on this study.  Data acquisition is subject to artifacts which can yield non-anatomically plausible perfusion maps which may be due  "to motion, bolus timing, signal to noise ratio, or other technical factors.    Perfusion map abnormalities which show non-anatomic distributions are likely artifact.   This study is not \"stand-alone\" and should only be utilized for diagnosis, management/treatment in correlation with CT, CTA, and/or MRI and clinical factors.         DX-ANKLE 2- VIEWS LEFT   Final Result      1. Clinical soft tissue swelling.   2. No acute fracture or subluxation.   3. Polyarticular osteoarthritis.   4. Atherosclerosis.      DX-CHEST-PORTABLE (1 VIEW)   Final Result      No acute cardiac or pulmonary abnormalities are identified.      EC-ECHOCARDIOGRAM COMPLETE W/O CONT    (Results Pending)   MR-BRAIN-W/O    (Results Pending)     Point of Care Ultrasound    ED  ARTHROCENTESIS    Indication: Joint pain and joint swelling    Consent: The patient provided verbal consent for this procedure.  Bedside Ultrasound guidance utilized.   Static image obtained with skin marked  Static Image of: ankle effusion  Other Findings: n/a      Procedure: The left ankle was positioned appropriately.  Prepped with chlorhexidine and procedure was performed using sterile technique. local anesthesia was obtained by infiltration using 0.5% Bupivacaine without epinephrine.  The area was then prepped and draped in the usual sterile fashion.  A needle was then introduced via the medial approach just medial to the medial malleolus into the joint space at which point 4 cc of slightly yellow, turbid fluid was aspirated.  A joint injection was not performed.  The aspirated fluid was sent to the lab for appropriate testing.  A sterile dressing was then applied to the site.    The patient tolerated the procedure well.    Complications: None         COURSE & MEDICAL DECISION MAKING    ASSESSMENT, COURSE AND PLAN  Care Narrative: 74-year-old male presenting with reportedly acute weakness of the left lower extremity, outside of any window for potential intervention, " greater than 72 hours.  He also notes a gout flare, with significant pain in the left ankle.  He denies being on any medications for this.  Initially strong suspicion for CVA versus gout flare, and weakness secondary to painful movement, given patient was outside the clinical window for intervention, stroke code was not called however workup was obtained, which was notable for moderate carotid artery s plaque without significant stenosis.  CT head was otherwise reassuring.  His labs were notable for a leukocytosis, minimally elevated troponin less than patient's baseline.  Urinalysis not consistent with infection.    At this point, workup was stopped, I discussed with the hospitalist for admission for potential stroke, and gout flare, we decided further inspection infectious workup was warranted.  Hospitalist ordered antibiotics, and after discussion with Dr. Gautam, who is aware this patient and potential for septic arthritis aware of plan for arthrocentesis in the ED, and will follow-up studies, arthrocentesis was performed of the left ankle without incident.  4 cc were obtained and sent to the lab.    Patient was verbally consented for procedure, he is slightly confused however there are no phone numbers that are functioning on the patient's I did attempt to call the listed contacts, without response.  Given the potential emergence of the procedure, and patient's agreement to it to the best of his understanding, it was decided to proceed.    Arthrocentesis studies are pending at time of admission.  Differential remains broad, includes endocarditis, sepsis without organ dysfunction, gout flare, septic arthritis        STROKE:   Time seen 18:06 (Time)     National Institutes of Health (NIH) Stroke Scale   NIH Stroke Scale    Level of Consciousness: Alert, Keenly Responsive  Ask Month and Age: Both Questions Right  Blink Eyes and Squeeze Hands: Performs Both Tasks  Best Gaze: Normal  Visual: No Visual Loss  Facial  Palsy: Normal Symmetrical Movements  Motor, Left Arm: No Drift  Motor, Right Arm: No Drift  Motor, Left Leg: Some Effort Against [place]  Motor, Right Leg: No Drift  Limb Ataxia: Absent  Sensory Loss: Normal  Best Language: Mild-to-Moderate Aphasia  Dysarthria: Normal  Extinction and Inattention: No Abnormality    NIHSS Score: 3    No, this patient is not a candidate for thrombolytic therapy because symptoms began 72 hours prior      Sepsis: Infection was suspected 10:16 PM (Time). Sepsis pathway was initiated. Fluids not needed (no hypotension or lactate greater than or equal to 4). Antibiotics were given per protocol.      DISPOSITION AND DISCUSSIONS  I have discussed management of the patient with the following physicians and LAXMI's:  Dr. Gautam, orthopedics, Dr. Gautam, hospitalist      Decision tools and prescription drugs considered including, but not limited to: Antibiotics broad-spectrum antibiotics ordered by hospitalist .    FINAL DIAGNOSIS  1. Hypertensive urgency    2. Left leg weakness    3. Ankle swelling, left    4. Dysarthria    5. Suspected cerebrovascular accident (CVA)    6. SIRS (systemic inflammatory response syndrome) (HCC)    7. Arthrocentesis L ankle       Electronically signed by: Rush Grajeda M.D., 9/13/2024 10:19 PM

## 2024-09-14 NOTE — PROGRESS NOTES
Hospital Medicine Daily Progress Note    Date of Service  9/14/2024    Chief Complaint  Joe Templeton is a 74 y.o. male admitted 9/13/2024 with left sided weakness    Hospital Course  Mr. Templeton is a 74 y.o. male who presented 9/13/2024 with past medical history of coronary disease status post CABG, gout who presents to the hospital for left-sided weakness, dysarthria, fevers and left ankle swelling.  Apparently the patient's symptoms started 3 days ago and his family called 911.  The patient's been refusing to come to the hospital.  Overall patient was confused on my examination and unable to answer my questions.  He did not remember that he has family in [place].  He denies any cough, shortness of breath, dysuria, diarrhea, and vomiting.     Chest x-ray interpreted by me found no acute pulmonary process  EKG interpreted by me found normal sinus rhythm, LVH     CTA of the head and neck found mild to moderate atherosclerotic plaquing of the mid to distal common carotid arteries especially the common carotid artery bifurcation.  Moderate atherosclerotic plaquing of V4 segment bilaterally.    In the ER had had left ankle pain and swelling for which 4 mL of fluid was aspirated and he was initiated on IV Vancomycin and Zosyn.    Interval Problem Update  9/14: Mr. Templeton was evaluated on the neuro floor. He remains on broad-spectrum IV antibiotics. MRI brain is pending. He is oriented to month and year. Left facial droop. He is going to the OR this morning with ortho.  IV Zosyn will be stopped and continue IV vancomycin.    I have discussed this patient's plan of care and discharge plan at IDT rounds today with Case Management, Nursing, Nursing leadership, and other members of the IDT team.    Consultants/Specialty  orthopedics    Code Status  Full Code    Disposition  <MEDICALLYCLEARED>  I have placed the appropriate orders for post-discharge needs.    Review of Systems  Review of Systems   Unable to perform ROS:  Mental acuity        Physical Exam  Temp:  [36.4 °C (97.5 °F)-37.9 °C (100.2 °F)] 36.6 °C (97.9 °F)  Pulse:  [] 80  Resp:  [18-30] 20  BP: (174-233)/() 176/100  SpO2:  [89 %-96 %] 95 %    Physical Exam  Vitals and nursing note reviewed.   HENT:      Mouth/Throat:      Comments: Very poor dentition  Cardiovascular:      Rate and Rhythm: Normal rate and regular rhythm.      Heart sounds: Murmur heard.   Abdominal:      General: There is no distension.      Tenderness: There is no abdominal tenderness.   Musculoskeletal:      Right lower leg: No edema.      Left lower leg: No edema.      Comments: Left ankle medial malleolus swollen, red, tender   Neurological:      Mental Status: He is alert.      Comments: Strength approx equal bilat arms and legs  Mild left facial droop   Psychiatric:      Comments: Odd affect   Generally oriented but an overall poor historian.         Fluids  No intake or output data in the 24 hours ending 09/14/24 0722     Laboratory  Recent Labs     09/13/24  1728 09/14/24  0216   WBC 15.1* 14.6*   RBC 5.13 4.99   HEMOGLOBIN 16.1 15.9   HEMATOCRIT 46.0 45.8   MCV 89.7 91.8   MCH 31.4 31.9   MCHC 35.0 34.7   RDW 39.6 42.0   PLATELETCT 231 224   MPV 11.6 11.3     Recent Labs     09/13/24  1728 09/14/24  0216   SODIUM 139 145   POTASSIUM 4.1 3.9   CHLORIDE 105 106   CO2 20 22   GLUCOSE 150* 156*   BUN 12 13   CREATININE 0.82 0.81   CALCIUM 9.0 9.2     Recent Labs     09/13/24  2240   APTT 31.1   INR 1.14*         Recent Labs     09/14/24  0216   TRIGLYCERIDE 73   HDL 55   LDL 99       Imaging  CT-CTA NECK WITH & W/O-POST PROCESSING   Final Result      1.  Mild to moderate atherosclerotic plaquing of the mid and distal common carotid arteries and especially the common carotid artery bifurcations. Bilateral internal carotid stenoses of less than 50%.      2.  Patent vertebrobasilar system with moderate atherosclerotic plaquing of the V4 segments bilaterally      CT-CTA HEAD WITH & W/O-POST  "PROCESS   Final Result      CT angiogram of the [place] within normal limits.      CT-CEREBRAL PERFUSION ANALYSIS   Final Result      1. Cerebral blood flow less than 30% possibly representing completed infarct = 0 mL.      2. T Max more than 6 seconds possibly representing combination of completed infarct and ischemia = 0 mL.      3. Mismatched volume possibly representing ischemic brain/penumbra= 0 mL      4.  Please note that this cerebral perfusion study and report is Quantitative and targets supratentorial (cerebral) perfusion for evaluation of large vessel territory acute ischemia/infarction. For example, lacunar infarcts, and brainstem/posterior fossa    ischemia/infarction are not evaluated on this study.  Data acquisition is subject to artifacts which can yield non-anatomically plausible perfusion maps which may be due to motion, bolus timing, signal to noise ratio, or other technical factors.    Perfusion map abnormalities which show non-anatomic distributions are likely artifact.   This study is not \"stand-alone\" and should only be utilized for diagnosis, management/treatment in correlation with CT, CTA, and/or MRI and clinical factors.         DX-ANKLE 2- VIEWS LEFT   Final Result      1. Clinical soft tissue swelling.   2. No acute fracture or subluxation.   3. Polyarticular osteoarthritis.   4. Atherosclerosis.      DX-CHEST-PORTABLE (1 VIEW)   Final Result      No acute cardiac or pulmonary abnormalities are identified.      EC-ECHOCARDIOGRAM COMPLETE W/O CONT    (Results Pending)   MR-BRAIN-W/O    (Results Pending)        Assessment/Plan  * Left-sided weakness- (present on admission)  Assessment & Plan  Highly concerned that the patient may have endocarditis since he has a new murmur, encephalopathy and left-sided weakness.  Currently he is not weak and left side though does  have slight facial droop  Patient is past the tPA window  Patient will be placed on continuous cardiac monitoring to " "evaluate for any arrhythmias  MRI of the brain is pending  Check 2-D echo  Patient will be started on full dose aspirin and high intensity statin  Check TSH, lipid panel and hemoglobin A1c  PTOT and ST evaluation once he is stabilized      Sepsis (HCC)  Assessment & Plan  This is Sepsis Present on admission  SIRS criteria identified on my evaluation include: Fever, with temperature greater than 100.9 deg F, Tachycardia, with heart rate greater than 90 BPM, and Leukocytosis, with WBC greater than 12,000  Clinical indicators of end organ dysfunction include Lactic Acid greater than 2  Source is left ankle septic arthritis  Sepsis protocol initiated  Crystalloid Fluid Administration: Fluid resuscitation ordered per standard protocol - 30 mL/kg per current or ideal body weight  IV antibiotics as appropriate for source of sepsis  Reassessment: I have reassessed the patient's hemodynamic status    Acute encephalopathy- (present on admission)  Assessment & Plan  Unclear baseline.  He is oriented x 4 though certainly a bit \"off\"  Way family members to give further updates on his baseline.      Murmur  Assessment & Plan  Cardiac echo is pending to rule out endocarditis  Follow-up blood cultures    Hx of CABG  Assessment & Plan  Continue aspirin and statins    Left ankle swelling- (present on admission)  Assessment & Plan  Arthrocentesis will be completed in ER and will follow-up on cell count and cultures  He was started on IV Zosyn and vancomycin.  Vancomycin will be continued Zosyn be held and orthopedics will take him to the OR today.      Essential (primary) hypertension- (present on admission)  Assessment & Plan  Uncontrolled  Continue current home medications  IV as needed medications have been ordered      Systolic heart failure (HCC)  Assessment & Plan  History of  Last EF was 40 to 55%  Without exacerbation           VTE prophylaxis:   SCDs/TEDs      I have performed a physical exam and reviewed and updated ROS and " Plan today (9/14/2024). In review of yesterday's note (9/13/2024), there are no changes except as documented above.

## 2024-09-14 NOTE — ANESTHESIA TIME REPORT
Anesthesia Start and Stop Event Times       Date Time Event    9/14/2024 1040 Ready for Procedure     1043 Anesthesia Start     1115 Anesthesia Stop          Responsible Staff  09/14/24      Name Role Begin End    Paul Disla M.D. Anesth 1043 [id number]          Overtime Reason:  no overtime (within assigned shift)    Comments:

## 2024-09-14 NOTE — H&P
Hospital Medicine History & Physical Note    Date of Service  9/13/2024    Primary Care Physician  Pcp Pt States None    Consultants  Orthopedics    Specialist Names:   Dr. Gautam    Code Status  Full Code    Chief Complaint  Chief Complaint   Patient presents with    Weakness     Brought in by EMS from home for unilateral LE weakness x3 days.       History of Presenting Illness  Joe Templeton is a 74 y.o. male who presented 9/13/2024 with past medical history of coronary disease status post CABG, gout who presents to the hospital for left-sided weakness, dysarthria, fevers and left ankle swelling.  Apparently the patient's symptoms started 3 days ago and his family called 911.  The patient's been refusing to come to the hospital.  Overall patient was confused on my examination and unable to answer my questions.  He did not remember that he has family in [place].  He denies any cough, shortness of breath, dysuria, diarrhea, and vomiting.    Chest x-ray interpreted by me found no acute pulmonary process  EKG interpreted by me found normal sinus rhythm, LVH    CTA of the head and neck found mild to moderate atherosclerotic plaquing of the mid to distal common carotid arteries especially the common carotid artery bifurcation.  Moderate atherosclerotic plaquing of V4 segment bilaterally.    I discussed the plan of care with patient.    Review of Systems  Review of Systems   Constitutional:  Positive for chills, fever and malaise/fatigue. Negative for diaphoresis.   HENT:  Negative for congestion, ear discharge, ear pain, hearing loss, nosebleeds, sinus pain, sore throat and tinnitus.    Eyes:  Negative for blurred vision, double vision, photophobia and pain.   Respiratory:  Negative for cough, hemoptysis, sputum production, shortness of breath, wheezing and stridor.    Cardiovascular:  Negative for chest pain, palpitations, orthopnea, claudication, leg swelling and PND.   Gastrointestinal:  Negative for abdominal  pain, blood in stool, constipation, diarrhea, heartburn, melena, nausea and vomiting.   Genitourinary:  Negative for dysuria, flank pain, frequency, hematuria and urgency.   Musculoskeletal:  Negative for back pain, falls, joint pain, myalgias and neck pain.   Skin:  Negative for itching and rash.   Neurological:  Positive for sensory change, speech change and weakness. Negative for dizziness, tingling, tremors and headaches.   Endo/Heme/Allergies:  Negative for environmental allergies and polydipsia. Does not bruise/bleed easily.   Psychiatric/Behavioral:  Negative for depression, hallucinations, substance abuse and suicidal ideas.        Past Medical History   has a past medical history of Arthritis, Gout, Hemorrhoids (11/25/2014), Hypertension, Ingrown right big toenail (05/20/2015), Low back pain (05/20/2015), NSTEMI (non-ST elevated myocardial infarction) (HCC) (06/28/2016), and Vitamin D deficiency (01/06/2015).    Surgical History   has a past surgical history that includes multiple coronary artery bypass endo vein harvest (N/A, 6/28/2016).     Family History  family history includes Cancer in his mother; Heart Disease in his father; Hyperlipidemia in his brother and father; Lung Disease in his brother.   Family history reviewed with patient. There is no family history that is pertinent to the chief complaint.     Social History   reports that he quit smoking about 8 years ago. His smoking use included cigarettes. He started smoking about 8 years ago. He has a 0.1 pack-year smoking history. He has never used smokeless tobacco. He reports that he does not drink alcohol and does not use drugs.    Allergies  No Known Allergies    Medications  Prior to Admission Medications   Prescriptions Last Dose Informant Patient Reported? Taking?   aspirin (ASA) 81 MG Chew Tab chewable tablet Not Taking  No No   Sig: Chew 1 Tablet every day.   Patient not taking: Reported on 9/13/2024   atorvastatin (LIPITOR) 40 MG Tab Not  Taking  No No   Sig: Take 0.5 Tablets by mouth every evening.   Patient not taking: Reported on 9/13/2024   lisinopril (PRINIVIL) 20 MG Tab Not Taking  No No   Sig: Take 1 Tablet by mouth every day.   Patient not taking: Reported on 9/13/2024   metoprolol SR (TOPROL XL) 25 MG TABLET SR 24 HR Not Taking  No No   Sig: Take 1 Tablet by mouth every day.   Patient not taking: Reported on 9/13/2024      Facility-Administered Medications: None       Physical Exam  Temp:  [37.9 °C (100.2 °F)] 37.9 °C (100.2 °F)  Pulse:  [] 89  Resp:  [18-30] 20  BP: (178-233)/() 214/102  SpO2:  [89 %-96 %] 95 %  Blood Pressure : (!) 224/109   Temperature: 37.9 °C (100.2 °F)   Pulse: 86   Respiration: (!) 30   Pulse Oximetry: 89 %       Physical Exam  Vitals and nursing note reviewed.   Constitutional:       General: He is not in acute distress.     Appearance: Normal appearance. He is ill-appearing and diaphoretic. He is not toxic-appearing.   HENT:      Head: Normocephalic and atraumatic.      Nose: No congestion or rhinorrhea.      Mouth/Throat:      Pharynx: No posterior oropharyngeal erythema.   Eyes:      General: No scleral icterus.        Right eye: No discharge.   Cardiovascular:      Rate and Rhythm: Regular rhythm. Tachycardia present.      Pulses: Normal pulses.      Heart sounds: Murmur heard.      No friction rub. No gallop.   Pulmonary:      Effort: Pulmonary effort is normal. No respiratory distress.      Breath sounds: Normal breath sounds. No stridor. No wheezing, rhonchi or rales.   Abdominal:      General: There is distension.      Tenderness: There is abdominal tenderness.   Musculoskeletal:         General: No swelling, tenderness, deformity or signs of injury.      Cervical back: Normal range of motion.      Right lower leg: No edema.      Left lower leg: No edema.   Skin:     Capillary Refill: Capillary refill takes more than 3 seconds.      Coloration: Skin is not jaundiced or pale.      Findings: No  "bruising, erythema, lesion or rash.   Neurological:      General: No focal deficit present.      Mental Status: He is alert and oriented to person, place, and time.      Motor: Weakness present.      Comments: Left-sided weakness         Laboratory:  Recent Labs     09/13/24  1728   WBC 15.1*   RBC 5.13   HEMOGLOBIN 16.1   HEMATOCRIT 46.0   MCV 89.7   MCH 31.4   MCHC 35.0   RDW 39.6   PLATELETCT 231   MPV 11.6     Recent Labs     09/13/24  1728   SODIUM 139   POTASSIUM 4.1   CHLORIDE 105   CO2 20   GLUCOSE 150*   BUN 12   CREATININE 0.82   CALCIUM 9.0     Recent Labs     09/13/24  1728   ALTSGPT 6   ASTSGOT 14   ALKPHOSPHAT 69   TBILIRUBIN 1.3   GLUCOSE 150*         No results for input(s): \"NTPROBNP\" in the last 72 hours.      No results for input(s): \"TROPONINT\" in the last 72 hours.    Imaging:  CT-CTA NECK WITH & W/O-POST PROCESSING   Final Result      1.  Mild to moderate atherosclerotic plaquing of the mid and distal common carotid arteries and especially the common carotid artery bifurcations. Bilateral internal carotid stenoses of less than 50%.      2.  Patent vertebrobasilar system with moderate atherosclerotic plaquing of the V4 segments bilaterally      CT-CTA HEAD WITH & W/O-POST PROCESS   Final Result      CT angiogram of the [place] within normal limits.      CT-CEREBRAL PERFUSION ANALYSIS   Final Result      1. Cerebral blood flow less than 30% possibly representing completed infarct = 0 mL.      2. T Max more than 6 seconds possibly representing combination of completed infarct and ischemia = 0 mL.      3. Mismatched volume possibly representing ischemic brain/penumbra= 0 mL      4.  Please note that this cerebral perfusion study and report is Quantitative and targets supratentorial (cerebral) perfusion for evaluation of large vessel territory acute ischemia/infarction. For example, lacunar infarcts, and brainstem/posterior fossa    ischemia/infarction are not evaluated on this study.  Data " "acquisition is subject to artifacts which can yield non-anatomically plausible perfusion maps which may be due to motion, bolus timing, signal to noise ratio, or other technical factors.    Perfusion map abnormalities which show non-anatomic distributions are likely artifact.   This study is not \"stand-alone\" and should only be utilized for diagnosis, management/treatment in correlation with CT, CTA, and/or MRI and clinical factors.         DX-ANKLE 2- VIEWS LEFT   Final Result      1. Clinical soft tissue swelling.   2. No acute fracture or subluxation.   3. Polyarticular osteoarthritis.   4. Atherosclerosis.      DX-CHEST-PORTABLE (1 VIEW)   Final Result      No acute cardiac or pulmonary abnormalities are identified.      EC-ECHOCARDIOGRAM COMPLETE W/O CONT    (Results Pending)   MR-BRAIN-W/O    (Results Pending)       X-Ray:  I have personally reviewed the images and compared with prior images.  EKG:  I have personally reviewed the images and compared with prior images.    Assessment/Plan:  Justification for Admission Status  I anticipate this patient will require at least two midnights for appropriate medical management, necessitating inpatient admission because dysarthria    Patient will need a Telemetry bed on NEUROLOGY service .  The need is secondary to dysarthria.    * Left ankle swelling- (present on admission)  Assessment & Plan  Arthrocentesis will be completed in ER and will follow-up on cell count and cultures  Pain control  ESR and CRP are pending  Start IV Zosyn and vancomycin, follow trough levels and adjust levels accordingly      Systolic heart failure (HCC)  Assessment & Plan  Repeat cardiac echo is pending  Monitor volume status  Monitor on telemetry  Last EF was 40 to 55%    Sepsis (HCC)  Assessment & Plan  This is Sepsis Present on admission  SIRS criteria identified on my evaluation include: Fever, with temperature greater than 100.9 deg F, Tachycardia, with heart rate greater than 90 BPM, " and Leukocytosis, with WBC greater than 12,000  Clinical indicators of end organ dysfunction include Lactic Acid greater than 2  Source is left ankle septic arthritis  Sepsis protocol initiated  Crystalloid Fluid Administration: Fluid resuscitation ordered per standard protocol - 30 mL/kg per current or ideal body weight  IV antibiotics as appropriate for source of sepsis  Reassessment: I have reassessed the patient's hemodynamic status    Acute encephalopathy  Assessment & Plan  Secondary to infectious etiology      Murmur  Assessment & Plan  Cardiac echo is pending to rule out endocarditis  Follow-up blood cultures    Left-sided weakness  Assessment & Plan  Highly concerned that the patient may have endocarditis since he has a new murmur, encephalopathy and left-sided weakness.  Patient is past the tPA window  Patient will be placed on continuous cardiac monitoring to evaluate for any arrhythmias  Q4 hours neuro checks  I will order MRI brain  Check 2-D echo  Patient will be started on full dose aspirin and high intensity statin  Check TSH, lipid panel and hemoglobin A1c  PTOT and ST evaluation      Hx of CABG  Assessment & Plan  Continue aspirin and statins    Essential (primary) hypertension- (present on admission)  Assessment & Plan  Uncontrolled  Continue current home medications  IV as needed medications have been ordered          VTE prophylaxis: SCDs/TEDs

## 2024-09-14 NOTE — ASSESSMENT & PLAN NOTE
S/p OR 09/14 - Left ankle arthrotomy I&D  Continue IV vanc  Gram stain and wound culture negative  Vancomycin requiring frequent monitoring of trough levels and renal function for vanc toxicity        Discussed with infectious disease

## 2024-09-14 NOTE — ED NOTES
Med Rec complete per patient  Allergies reviewed with patient  Patient denies taking any RX or OTC meds in the last 30 days    Claritza Acevedo CPhT

## 2024-09-14 NOTE — PROGRESS NOTES
Patient is 74-year-old male history of CAD status post CABG, gout who presented with left-sided weakness fever and left ankle pain and swelling.  After initial workup concern for left ankle septic arthritis led to aspiration results which confirmed the diagnosis.  Plan for irrigation debridement left ankle joint.  Risk and benefits discussed at length.        Alex Gautam MD  Orthopedic Trauma Surgery

## 2024-09-14 NOTE — DISCHARGE PLANNING
[facility] Transitional Care Coordination     Referral from: [facility]  Insurance Provider on Facesheet: JUAN DAVID  Potential Rehab Diagnosis: TBD     Chart review indicates patient may have on going medical management and may have therapy needs to possibly meet inpatient rehab facility criteria with the goal of returning to community.     D/C support: Brother     Physiatry consultation pended per protocol.   W/U and Tx pending  Would appreciate therapy evaluations as appropriate  TCC will follow      Thank you for the referral.

## 2024-09-14 NOTE — ASSESSMENT & PLAN NOTE
Highly concerned that the patient may have endocarditis since he has a new murmur, encephalopathy and left-sided weakness.  Currently he is not weak and left side though does  have slight facial droop  Patient is past the tPA window  Patient will be placed on continuous cardiac monitoring to evaluate for any arrhythmias  MRI of the brain is pending  Check 2-D echo  Patient will be started on full dose aspirin and high intensity statin  Check TSH, lipid panel and hemoglobin A1c  PTOT and ST evaluation once he is stabilized    Follow-up echo still pending

## 2024-09-14 NOTE — PROGRESS NOTES
"Pharmacy Vancomycin Kinetics Note for 9/14/2024     74 y.o. male on Vancomycin day # 1     Vancomycin Indication (AUC Dosing): Skin/skin structure infection    Provider specified end date: 09/20/24    Active Antibiotics (From admission, onward)      Ordered     Ordering Provider       Sat Sep 14, 2024 12:35 AM    09/14/24 0035  vancomycin (Vancocin) 750 mg in  mL IVPB  (vancomycin (VANCOCIN) IV (LD + Maintenance))  EVERY 12 HOURS        Note to Pharmacy: Dose per Pharmacokinetic Protocol    Ced Gautam M.D.       Fri Sep 13, 2024 11:13 PM    09/13/24 2313  vancomycin (Vancocin) 2,000 mg in  mL IVPB  (vancomycin (VANCOCIN) IV (LD + Maintenance))  ONCE        Note to Pharmacy: Dose per Pharmacokinetic Protocol    Ced Gautam M.D.       Fri Sep 13, 2024 10:19 PM    09/13/24 2219  MD Alert...Vancomycin per Pharmacy  (MD Alert...Vancomycin per Pharmacy)  PHARMACY TO DOSE        Question:  Indication(s) for vancomycin?  Answer:  Skin and soft tissue infection    Ced Gautam M.D.    09/13/24 2219  piperacillin-tazobactam (Zosyn) 4.5 g in  mL IVPB  (piperacillin-tazobactam (ZOSYN) IV (Extended-infusion) PANEL )  ONCE        Placed in \"And\" Linked Group    Ced Gautam M.D.    09/13/24 2219  piperacillin-tazobactam (Zosyn) 4.5 g in  mL IVPB  (piperacillin-tazobactam (ZOSYN) IV (Extended-infusion) PANEL )  EVERY 8 HOURS        Placed in \"And\" Linked Group    Ced Gautam M.D.            Dosing Weight: 74.8 kg (164 lb 14.5 oz)      Admission History: Admitted on 9/13/2024 for Left ankle swelling [M25.472]  Pertinent history: Patient presents to ED with fevers and ankle swelling - with concern for infection, empiric antibiotic therapy initiated.    Allergies:     Patient has no known allergies.     Pertinent cultures to date:     Results       Procedure Component Value Units Date/Time    BLOOD CULTURE [[id number]] Collected: 09/13/24 2240    Order Status: Completed Specimen: Blood from Peripheral " "Updated: 24 0008     Significant Indicator NEG     Source BLD     Site PERIPHERAL     Culture Result No Growth  Note: Blood cultures are incubated for 5 days and  are monitored continuously.Positive blood cultures  are called to the RN and reported as soon as  they are identified.      Urinalysis [[id number]]  (Abnormal) Collected: 24    Order Status: Completed Specimen: Urine Updated: 24 2335     Color Yellow     Character Clear     Specific Gravity >=1.045     Ph 7.0     Glucose Negative mg/dL      Ketones Trace mg/dL      Protein 30 mg/dL      Bilirubin Negative     Urobilinogen, Urine 1.0     Nitrite Negative     Leukocyte Esterase Negative     Occult Blood Small     Micro Urine Req Microscopic    BLOOD CULTURE [[id number]] Collected: 24 225    Order Status: Sent Specimen: Blood from Peripheral Updated: 24 231    MRSA By PCR (Amp) [[id number]] Collected: 24    Order Status: Sent Specimen: Respirate from Nares Updated: 24 230    FLUID CULTURE W/GRAM STAIN [[id number]]     Order Status: Sent Specimen: Blood from Other Body Fluid     CoV-2, Flu A/B, And RSV by PCR ([email or web address]) [[id number]]     Order Status: Completed Specimen: Respirate             Labs:     Estimated Creatinine Clearance: 80.2 mL/min (by C-G formula based on SCr of 0.82 mg/dL).  Recent Labs     24  1728   WBC 15.1*   NEUTSPOLYS 78.10*     Recent Labs     24  1728   BUN 12   CREATININE 0.82   ALBUMIN 3.9     No intake or output data in the 24 hours ending 24 0035   BP (!) 190/100   Pulse 83   Temp 36.4 °C (97.5 °F) (Temporal)   Resp 20   Ht 1.753 m (5' 9\")   Wt 74.8 kg (164 lb 14.5 oz)   SpO2 95%  Temp (24hrs), Av.2 °C (98.9 °F), Min:36.4 °C (97.5 °F), Max:37.9 °C (100.2 °F)      List concerns for Vancomycin clearance:     Age    Pharmacokinetics:     AUC kinetics:   Ke (hr ^-1): 0.071 hr^-1  Half life: 9.76 hr  Clearance: 3.452  Estimated TDD: 1726  Estimated Dose: " 849  Estimated interval: 11.8      A/P:     -  Vancomycin dose: Give 2000 mg IV loading dose followed by 750 mg IV q12h scheduled dosing    -  Next vancomycin level(s): TBD    -  Predicted vancomycin AUC from initial AUC test calculator: 435 mg·hr/L    -  Comments: Therapy with vancomycin initiated empirically for SSTI. Anticipate patient will tolerate AUC based dosing with regimen outlined above. Will plan to check levels when patient closer to steady state in order to ensure appropriate clearance. Recommend de-escalation if MRSA can be ruled out. Pharmacy will continue to follow.      Srikanth SmithD, BCPS

## 2024-09-14 NOTE — CARE PLAN
The patient is Watcher - Medium risk of patient condition declining or worsening    Shift Goals  Clinical Goals: neuro status  Patient Goals: comfortability, rest  Family Goals: roshan    Progress made toward(s) clinical / shift goals:      Problem: Neuro Status  Goal: Neuro status will remain stable or improve  Outcome: Progressing   Q4H neuro checks in place. Pt's neurological status (A/Ox4) remains unchanged throughout shift. Bed alarm is on, call light within reach.      Problem: Hemodynamics  Goal: Patient's hemodynamics, fluid balance and neurologic status will be stable or improve  Outcome: Progressing  Pt's blood pressure closely monitored throughout shift. Scheduled blood pressure medications administered to keep SBP within set parameters.         Problem: Fall Risk  Goal: Patient will remain free from falls  Outcome: Progressing   Bed alarm in place. Pt calls for assistance and is provided appropriate assistive devices when needed. Treaded socks used when ambulating.       Patient is not progressing towards the following goals:    N/A

## 2024-09-14 NOTE — THERAPY
Speech Language Therapy Contact Note    Patient Name: Joe Templeton  Age:  74 y.o., Sex:  male  Medical Record #: [id number]  Today's Date: 9/14/2024 09/14/24 1142   Interdisciplinary Plan of Care Collaboration   Collaboration Comments Orders received and verified. Patient out of room in a procedure and not available for clinical swallow evaluation at this  time. SLP following and will complete as medically appropriate.

## 2024-09-14 NOTE — ANESTHESIA PROCEDURE NOTES
Airway    Date/Time: 9/14/2024 10:50 AM    Performed by: Paul Disla M.D.  Authorized by: Paul Disla M.D.    Location:  OR  Urgency:  Elective  Difficult Airway: No    Indications for Airway Management:  Anesthesia      Spontaneous Ventilation: absent    Sedation Level:  Deep  Preoxygenated: Yes    Mask Difficulty Assessment:  0 - not attempted  Final Airway Type:  Supraglottic airway  Final Supraglottic Airway:  Standard LMA    SGA Size:  5  Number of Attempts at Approach:  2   LMA 4 not seating well, 2nd attempt with LMA 5 seated well

## 2024-09-15 ENCOUNTER — APPOINTMENT (OUTPATIENT)
Dept: RADIOLOGY | Facility: MEDICAL CENTER | Age: 74
DRG: 853 | End: 2024-09-15
Attending: HOSPITALIST
Payer: MEDICARE

## 2024-09-15 PROCEDURE — 700105 HCHG RX REV CODE 258: Performed by: HOSPITALIST

## 2024-09-15 PROCEDURE — A9270 NON-COVERED ITEM OR SERVICE: HCPCS | Performed by: HOSPITALIST

## 2024-09-15 PROCEDURE — 97535 SELF CARE MNGMENT TRAINING: CPT

## 2024-09-15 PROCEDURE — 97162 PT EVAL MOD COMPLEX 30 MIN: CPT

## 2024-09-15 PROCEDURE — 700111 HCHG RX REV CODE 636 W/ 250 OVERRIDE (IP): Performed by: HOSPITALIST

## 2024-09-15 PROCEDURE — 700102 HCHG RX REV CODE 250 W/ 637 OVERRIDE(OP): Performed by: HOSPITALIST

## 2024-09-15 PROCEDURE — 700111 HCHG RX REV CODE 636 W/ 250 OVERRIDE (IP): Mod: JZ

## 2024-09-15 PROCEDURE — A9270 NON-COVERED ITEM OR SERVICE: HCPCS | Performed by: STUDENT IN AN ORGANIZED HEALTH CARE EDUCATION/TRAINING PROGRAM

## 2024-09-15 PROCEDURE — 700102 HCHG RX REV CODE 250 W/ 637 OVERRIDE(OP): Performed by: STUDENT IN AN ORGANIZED HEALTH CARE EDUCATION/TRAINING PROGRAM

## 2024-09-15 PROCEDURE — 70551 MRI BRAIN STEM W/O DYE: CPT

## 2024-09-15 PROCEDURE — 770020 HCHG ROOM/CARE - TELE (206)

## 2024-09-15 PROCEDURE — 99233 SBSQ HOSP IP/OBS HIGH 50: CPT | Performed by: STUDENT IN AN ORGANIZED HEALTH CARE EDUCATION/TRAINING PROGRAM

## 2024-09-15 PROCEDURE — 99222 1ST HOSP IP/OBS MODERATE 55: CPT

## 2024-09-15 RX ORDER — HALOPERIDOL 5 MG/ML
2.5 INJECTION INTRAMUSCULAR ONCE
Status: COMPLETED | OUTPATIENT
Start: 2024-09-15 | End: 2024-09-15

## 2024-09-15 RX ORDER — ATORVASTATIN CALCIUM 80 MG/1
80 TABLET, FILM COATED ORAL EVERY EVENING
Status: DISCONTINUED | OUTPATIENT
Start: 2024-09-15 | End: 2024-09-20 | Stop reason: HOSPADM

## 2024-09-15 RX ADMIN — VANCOMYCIN HYDROCHLORIDE 750 MG: 5 INJECTION, POWDER, LYOPHILIZED, FOR SOLUTION INTRAVENOUS at 16:03

## 2024-09-15 RX ADMIN — ATORVASTATIN CALCIUM 80 MG: 80 TABLET, FILM COATED ORAL at 17:18

## 2024-09-15 RX ADMIN — LISINOPRIL 20 MG: 20 TABLET ORAL at 04:27

## 2024-09-15 RX ADMIN — HALOPERIDOL LACTATE 2.5 MG: 5 INJECTION, SOLUTION INTRAMUSCULAR at 06:26

## 2024-09-15 RX ADMIN — ACETAMINOPHEN 650 MG: 325 TABLET ORAL at 17:18

## 2024-09-15 RX ADMIN — SODIUM CHLORIDE: 9 INJECTION, SOLUTION INTRAVENOUS at 13:29

## 2024-09-15 RX ADMIN — METOPROLOL SUCCINATE 25 MG: 25 TABLET, EXTENDED RELEASE ORAL at 04:27

## 2024-09-15 RX ADMIN — VANCOMYCIN HYDROCHLORIDE 750 MG: 5 INJECTION, POWDER, LYOPHILIZED, FOR SOLUTION INTRAVENOUS at 03:37

## 2024-09-15 RX ADMIN — ASPIRIN 81 MG: 81 TABLET, CHEWABLE ORAL at 04:26

## 2024-09-15 ASSESSMENT — COGNITIVE AND FUNCTIONAL STATUS - GENERAL
WALKING IN HOSPITAL ROOM: A LITTLE
MOBILITY SCORE: 19
STANDING UP FROM CHAIR USING ARMS: A LITTLE
MOVING TO AND FROM BED TO CHAIR: A LITTLE
CLIMB 3 TO 5 STEPS WITH RAILING: A LOT
SUGGESTED CMS G CODE MODIFIER MOBILITY: CK

## 2024-09-15 ASSESSMENT — GAIT ASSESSMENTS
ASSISTIVE DEVICE: FRONT WHEEL WALKER
DEVIATION: ANTALGIC;STEP TO;BRADYKINETIC
GAIT LEVEL OF ASSIST: CONTACT GUARD ASSIST
DISTANCE (FEET): 40

## 2024-09-15 ASSESSMENT — PAIN DESCRIPTION - PAIN TYPE
TYPE: ACUTE PAIN;SURGICAL PAIN
TYPE: ACUTE PAIN
TYPE: ACUTE PAIN
TYPE: ACUTE PAIN;SURGICAL PAIN
TYPE: ACUTE PAIN;SURGICAL PAIN
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
## 2024-09-15 NOTE — CONSULTS
Vascular Neurology Initial Consult H&P  Neurovascular Service, [facility]    Referring Physician: Deejay Rausch M.D.    Chief Complaint   Patient presents with    Weakness     Brought in by EMS from home for unilateral LE weakness x3 days.       HPI: Joe Templeton is a 74 y.o. male  with a PMHX CAD s/p CABG, gout, presented to hospital 9/13 with L sided weakness, dysarthria, fevers and ankle swelling x 3 days. Found to have L ankle septic arthritis s/p I&D 9/14. CT stroke protocol completed on arrival which showed diffuse atherosclerosis without flow limitation.  Since admission patient has been confused and agitated at times. BCX negative since admission, ESR 25. MRI brain wo with multifocal embolic appearing infarcts in multiple vascular territories. Neurology consulted for MRI findings.         Review of systems: In addition to what is detailed in the HPI above, all other systems reviewed and are negative.    Past Medical History:    has a past medical history of Arthritis, Gout, Hemorrhoids (11/25/2014), Hypertension, Ingrown right big toenail (05/20/2015), Low back pain (05/20/2015), NSTEMI (non-ST elevated myocardial infarction) (HCC) (06/28/2016), and Vitamin D deficiency (01/06/2015).    FHx:  family history includes Cancer in his mother; Heart Disease in his father; Hyperlipidemia in his brother and father; Lung Disease in his brother.    SHx:   reports that he quit smoking about 8 years ago. His smoking use included cigarettes. He started smoking about 8 years ago. He has a 0.1 pack-year smoking history. He has never used smokeless tobacco. He reports that he does not drink alcohol and does not use drugs.    Allergies:  No Known Allergies    Medications:    Current Facility-Administered Medications:     atorvastatin (Lipitor) tablet 80 mg, 80 mg, Oral, Q EVENING, Deejay Rausch M.D.    vancomycin (Vancocin) 750 mg in  mL IVPB, 750 mg, Intravenous, Q12HR, Ced  "AUGUST Gautam, Stopped at 09/15/24 0537    melatonin tablet 5 mg, 5 mg, Oral, HS PRN, ROCIO Workman, 5 mg at 09/14/24 2120    NS infusion, , Intravenous, Continuous, Ced Gautam M.D., Last Rate: 83 mL/hr at 09/15/24 1329, New Bag at 09/15/24 1329    acetaminophen (Tylenol) tablet 650 mg, 650 mg, Oral, Q6HRS PRN, Ced Gautam M.D.    labetalol (Normodyne/Trandate) injection 10 mg, 10 mg, Intravenous, Q4HRS PRN, Ced Gautam M.D., 10 mg at 09/14/24 0051    ondansetron (Zofran) syringe/vial injection 4 mg, 4 mg, Intravenous, Q4HRS PRN, Ced Gautam M.D.    ondansetron (Zofran ODT) dispertab 4 mg, 4 mg, Oral, Q4HRS PRN, Ced Gautam M.D.    aspirin (Asa) chewable tab 81 mg, 81 mg, Oral, DAILY, 81 mg at 09/15/24 0426 **OR** aspirin (Asa) suppository 300 mg, 300 mg, Rectal, DAILY, Ced Gautam M.D.    lisinopril (Prinivil) tablet 20 mg, 20 mg, Oral, DAILY, Ced Gautam M.D., 20 mg at 09/15/24 0427    metoprolol SR (Toprol XL) tablet 25 mg, 25 mg, Oral, DAILY, Ced Gautam M.D., 25 mg at 09/15/24 0427    MD Alert...Vancomycin per Pharmacy, , Other, PHARMACY TO DOSE, Ced Gautam M.D.    Physical Examination:    BP (!) 163/90   Pulse 77   Temp 36.1 °C (97 °F) (Temporal)   Resp 18   Ht 1.753 m (5' 9\")   Wt 76.3 kg (168 lb 3.4 oz)   SpO2 95%   BMI 24.84 kg/m²       General: Patient is awake, sitting at EOB with telesitter   Eye: Examination of optic disks not indicated at this time given acuity of consult  Neck: There is normal range of motion  Extremities:  ACE wrap intact L ankle     NEUROLOGICAL EXAM:   Mental status: Awake, alert and fully oriented, impaired attention, impulsive   Speech and language: Naming and repetition intact, fluent speech, follows simple, two-step commands   CRANIAL NERVES:  II: Pupils equal and reactive, no VF deficits  III, IV, VI: EOM intact, no gaze preference or deviation, no nystagmus.  V: normal sensation in V1, V2, and V3 segments bilaterally  VII: no " asymmetry, no nasolabial fold flattening  VIII: normal hearing to conversation  IX, X: normal palatal elevation, no uvular deviation  XI: 5/5 head turn and 5/5 shoulder shrug bilaterally  XII: midline tongue protrusion      Motor exam: There is sustained antigravity with no downward drift in bilateral arms and legs.  Equal strength bilat hip flexors, ankle extension limited by pain.  There is no pronator drift. Tone is normal. No abnormal movements were seen on exam.  Sensory exam: Reacts to tactile in all 4 extremities, no neglect to double stim   Coordination: No ataxia on elicited movements   Gait: Deferred         NIHSS: National Institutes of Health Stroke Scale    [0] 1a:Level of Consciousness    0-alert 1-drowsy   2-stupor   3-coma  [0] 1b:LOC Questions                  0-both  1-one      2-neither  [0] 1c:LOC Commands                   0-both  1-one      2-neither  [0] 2: Best Gaze                     0-nl    1-partial  2-forced  [0] 3: Visual Fields                   0-nl    1-partial  2-complete 3-bilat  [0] 4: Facial Paresis                0-nl    1-minor    2-partial  3-full  MOTOR                       0-nl  [0] 5: Right Arm           1-drift  [0] 6: Left Arm             2-some effort vs gravity  [0] 7: Right Leg           3-no effort vs gravity  [0] 8: Left Leg             4-no movement                             x-untestable  [0] 9: Limb Ataxia                    0-abs   1-1_limb   2-2+_limbs       x-untestable  [0] 10:Sensory                        0-nl    1-partial  2-dense  [0] 11:Best Language/Aphasia         0-nl    1-mild/mod 2-severe   3-mute  [0] 12:Dysarthria                     0-nl    1-mild/mod 2-severe       x-untestable  [0] 13:Neglect/Inattention            0-none  1-partial  2-complete  [0] TOTAL        Baseline Modified Swain Scale (MRS): 0 = No symptoms    Objective Data:    Labs:  Estimated Creatinine Clearance: 81.2 mL/min (by C-G formula based on SCr of 0.81 mg/dL).  Recent Labs      09/13/24  1728 09/14/24  0216   SODIUM 139 145   POTASSIUM 4.1 3.9   CHLORIDE 105 106   CO2 20 22   GLUCOSE 150* 156*   BUN 12 13   CREATININE 0.82 0.81     Recent Labs     09/13/24  1728 09/14/24  0216   GLUCOSE 150* 156*     Recent Labs     09/13/24  1728 09/14/24  0216   ASTSGOT 14 15   ALTSGPT 6 9   ALKPHOSPHAT 69 63     Recent Labs     09/13/24  1728 09/14/24  0216   WBC 15.1* 14.6*   HEMOGLOBIN 16.1 15.9   PLATELETCT 231 224     Lab Results   Component Value Date/Time    PROTHROMBTM 14.8 (H) 09/13/2024 10:40 PM    INR 1.14 (H) 09/13/2024 10:40 PM      Lab Results   Component Value Date/Time    TROPONINT 32 (H) 09/14/2024 02:16 AM    TROPONINT 26 (H) 09/13/2024 05:28 PM    NTPROBNP 445 (H) 09/13/2024 05:28 PM     Lab Results   Component Value Date/Time    HBA1C 6.4 (H) 09/13/2024 05:28 PM    HBA1C 6.2 (H) 06/28/2016 01:30 PM     Lab Results   Component Value Date/Time    LDL 99 09/14/2024 02:16 AM    HDL 55 09/14/2024 02:16 AM    TRIGLYCERIDE 73 09/14/2024 02:16 AM    CHOLSTRLTOT 169 09/14/2024 02:16 AM       Imaging/Testing:    I interpreted and/or reviewed the patient's neuroimaging    MR-BRAIN-W/O   Final Result      1.  Moderate cerebral atrophy.   2.  Advanced supratentorial white matter disease most consistent with microvascular ischemic change.   3.  Old microhemorrhage left posterior sylvian frontal lobe and left occipital lobe most consistent with old hypertensive microhemorrhage versus amyloid angiopathy.   4.  Old lacunar infarcts x2 in the right thalamus old lacunar infarcts x2 in the left thalamus.   5.  Old cortical infarct left parietal lobe at the postcentral gyrus.   6.  Subcentimeter focus of acute infarction right insula.   7.  Subcentimeter focus acute infarction left posterior parietal lobe.   8.  Subcentimeter focus of acute or recent subacute infarction right parietal periventricular white matter/splenium corpus callosum.   9.  Acute brainstem infarct in the left side of the hao.  "  10.  Multiple old lacunar size infarcts left cerebellar hemisphere.   11.  Moderate-sized old infarct right cerebellar hemisphere.   12.  No acute infarcts show hemorrhagic transformation.   13.  Multiple vascular territories are involved. Possible embolic mechanism.      CT-CTA NECK WITH & W/O-POST PROCESSING   Final Result      1.  Mild to moderate atherosclerotic plaquing of the mid and distal common carotid arteries and especially the common carotid artery bifurcations. Bilateral internal carotid stenoses of less than 50%.      2.  Patent vertebrobasilar system with moderate atherosclerotic plaquing of the V4 segments bilaterally      CT-CTA HEAD WITH & W/O-POST PROCESS   Final Result      CT angiogram of the [place] within normal limits.      CT-CEREBRAL PERFUSION ANALYSIS   Final Result      1. Cerebral blood flow less than 30% possibly representing completed infarct = 0 mL.      2. T Max more than 6 seconds possibly representing combination of completed infarct and ischemia = 0 mL.      3. Mismatched volume possibly representing ischemic brain/penumbra= 0 mL      4.  Please note that this cerebral perfusion study and report is Quantitative and targets supratentorial (cerebral) perfusion for evaluation of large vessel territory acute ischemia/infarction. For example, lacunar infarcts, and brainstem/posterior fossa    ischemia/infarction are not evaluated on this study.  Data acquisition is subject to artifacts which can yield non-anatomically plausible perfusion maps which may be due to motion, bolus timing, signal to noise ratio, or other technical factors.    Perfusion map abnormalities which show non-anatomic distributions are likely artifact.   This study is not \"stand-alone\" and should only be utilized for diagnosis, management/treatment in correlation with CT, CTA, and/or MRI and clinical factors.         DX-ANKLE 2- VIEWS LEFT   Final Result      1. Clinical soft tissue swelling.   2. No " acute fracture or subluxation.   3. Polyarticular osteoarthritis.   4. Atherosclerosis.      DX-CHEST-PORTABLE (1 VIEW)   Final Result      No acute cardiac or pulmonary abnormalities are identified.      EC-ECHOCARDIOGRAM COMPLETE W/ CONT    (Results Pending)       Assessment and Plan:    Joe Templeton is a 74 y.o. male presented 9/13 with 3 days of LLE weakness, L ankle swelling and dysarthria. Found to have L ankle septic arthritis s/p I&D. CT stroke protocol on admission with diffuse atheroscleroses, otherwise grossly unremarkable. MRI brain with multifocal embolic appearing infarcts in multiple vascular territories, no hemorrhage. Significant microvascular ischemia and infarcts of varying chronicity. Given multiple negative blood cx and only mildly elevated ESR, overall suspicion for infective endocarditis and septic emboli is low. TTE is pending. Patient was started on ASA on admission, ok with continuing until TTE completed given overall low suspicion for septic emboli.       Problem list:  - Multifocal infarcts   - L ankle septic arthritis   - HTN      Plan:  - Neurochecks Q4H  - Target normotension 100-130/60-80  - MRI brain wo-  punctate infarcts to R insula, L parietal lobe and R parietal lobe. Multiple old lacunar and cerebellar infarcts, advanced microvascular ischemia   - Continue ASA 81 mg daily indefinitely   - Does require full cardioembolic workup, TTE pending  - LDL 99, goal <70, ok with continuing Atorvastatin 80 mg daily  - A1C 6.4, goal <7  - Target normoglycemia  while admitted  - PT/OT/SLP  - DVT prophylaxis: Chem ppx ok with Lovenox   - Follow-up with stroke clinic outpatient        KHUSHI Elias  Vascular Neurology, Acute Care Services       The evaluation of the patient, and recommended management, was discussed with Dr. Meehan, attending Vascular Neurologist.          Please note that this dictation was created using voice recognition software.  I have made every  reasonable attempt to correct obvious errors, but I expect that there are errors of grammar and possibly content that I did not discover before finalizing the note.

## 2024-09-15 NOTE — THERAPY
Physical Therapy   Initial Evaluation     Patient Name: Joe Templeton  Age:  74 y.o., Sex:  male  Medical Record #: [id number]  Today's Date: 9/15/2024     Precautions  Precautions: Fall Risk;Weight Bearing As Tolerated Left Lower Extremity    Assessment  Patient is 74 y.o. male presenting for L ankle septic arthritis now POD1 I&D w/ a PMH of CAD s/p CABG and gout.  He appears to be an unreliable historian at this time, however reports living alone in a SS home w/ his brother nearby (see flowsheet for home set-up and PLOF).     Currently, the pt is pleasantly confused, and displays gross L>R LE weakness, poor balance/gait stability, and poor functional endurance limiting his independence w/ functional mobility tasks.  The pt demo'd supine<>sit EOB SPV w/ HOB flat and no bed rails, along w/ STS EOB CGA using FWW.  He completed gait 40' using FWW CGA requiring consistent cues for proper use of AD via antalgic, step-to pattern, no LOB observed and distance limited by UE fatigue.  Recommend placement at this time given pt's current inability to care for himself and high risk for future falls.  Will follow for acute PT needs.       Plan    Physical Therapy Initial Treatment Plan   Treatment Plan : Bed Mobility, Equipment, Gait Training, Neuro Re-Education / Balance, Orthotics Training , Self Care / Home Evaluation, Stair Training, Therapeutic Activities, Therapeutic Exercise  Treatment Frequency: 4 Times per Week  Duration: Until Therapy Goals Met    DC Equipment Recommendations: Unable to determine at this time  Discharge Recommendations: Recommend post-acute placement for additional physical therapy services prior to discharge home       Subjective/Objective       09/15/24 6567   Prior Living Situation   Prior Services Unable To Determine At This Time   Housing / Facility [address]   Steps Into Home 0   Steps In Home 0   Equipment Owned None   Lives with - Patient's Self Care Capacity Alone and Unable to Care For  Self   Comments pt appears to be an unreliable historian, however reports his brother lives near him, and he lives alone in a  home   Prior Level of Functional Mobility   Bed Mobility Independent   Transfer Status Independent   Ambulation Independent   Ambulation Distance Community distances   Assistive Devices Used None   Stairs Independent   Comments Per pt reports, however appears to be an unreliable historian   History of Falls   History of Falls Yes   Date of Last Fall   (Pt reports falling prior to admission, unable to clarify)   Cognition    Cognition / Consciousness X   Level of Consciousness Alert   Comments Pt is pleasantly confused, poor historian, otherwise cooperative   Passive ROM Lower Body   Passive ROM Lower Body X   Comments L ankle ROM limited by bandage/dressing   Active ROM Lower Body    Active ROM Lower Body  X   Comments as above   Strength Lower Body   Lower Body Strength  X   Comments Gross L>R LE weakness   Sensation Lower Body   Lower Extremity Sensation   Not Tested   Comments Unable to accurately assess at this time d/t pt's cognition   Other Treatments   Other Treatments Provided Edu re: proper use of FWW   Balance Assessment   Sitting Balance (Static) Fair +   Sitting Balance (Dynamic) Fair   Standing Balance (Static) Fair -   Standing Balance (Dynamic) Fair -   Weight Shift Sitting Good   Weight Shift Standing Poor   Comments stand w/ FWW   Bed Mobility    Supine to Sit Standby Assist   Sit to Supine Standby Assist   Scooting Standby Assist   Comments HOB flat, no bed rails   Gait Analysis   Gait Level Of Assist Contact Guard Assist   Assistive Device Front Wheel Walker   Distance (Feet) 40   # of Times Distance was Traveled 1   Deviation Antalgic;Step To;Bradykinetic   Weight Bearing Status WBAT LLE   Vision Deficits Impacting Mobility pt denies   Comments distance limited by UE fatigue   Functional Mobility   Sit to Stand Contact Guard Assist   Bed, Chair, Wheelchair Transfer  Contact Guard Assist   Transfer Method Stand Step   Mobility STS EOB w/ FWW; EOB<>hallway w/ FWW   Activity Tolerance   Sitting Edge of Bed 15min   Standing 8min   Edema / Skin Assessment   Edema / Skin  Not Assessed   Short Term Goals    Short Term Goal # 1 Pt will demo STS EOB w/ FWW SPV in 6 visits to prepare for OOB mobility tasks.   Short Term Goal # 2 Pt will demo gait >150' using FWW SBA in a moving environment in 6 visits for household ambulation.   Short Term Goal # 3 Pt will demo ability to ascend/descend 2 steps w/ UE support and SBA in 6 visits for access to his home and the community.   Education Group   Education Provided Role of Physical Therapist;Use of Assistive Device   Role of Physical Therapist Patient Response Patient;Acceptance;Explanation;Demonstration;Action Demonstration   Use of Assistive Device Patient Response Patient;Acceptance;Explanation;Demonstration;Action Demonstration   Additional Comments pt w/ limited carryover of edu provided 2/2 cognition   Problem List    Problems Pain;Impaired Transfers;Impaired Ambulation;Functional ROM Deficit;Functional Strength Deficit;Impaired Balance;Decreased Activity Tolerance;Safety Awareness Deficits / Cognition   Interdisciplinary Plan of Care Collaboration   IDT Collaboration with  Nursing   Patient Position at End of Therapy In Bed;Bed Alarm On;Call Light within Reach;Tray Table within Reach;Phone within Reach   Collaboration Comments regarding outcome of tx session   Session Information   Date / Session Number  9/15- 1 (1/4, 9/21)

## 2024-09-15 NOTE — CARE PLAN
The patient is Watcher - Medium risk of patient condition declining or worsening    Shift Goals  Clinical Goals: MRI, Safety  Patient Goals: Go Home  Family Goals: Not present    Progress made toward(s) clinical / shift goals:    Problem: Neuro Status  Goal: Neuro status will remain stable or improve  Description: Target End Date:  Prior to discharge or change in level of care    Document on Neuro assessment in the Assessment flowsheet    1.  Assess and monitor neurologic status per provider order/protocol/unit policy  2.  Assess level of consciousness and orientation  3.  Assess for speech, dysarthria, dysphagia, facial symmetry  4.  Assess visual field, eye movements, gaze preference, pupil reaction and size  5.  Assess muscle strength and motor response in all four extremities  6.  Assess for sensation (numbness and tingling)  7.  Assess basic neuro reflexes (cough, gag, corneal)  8.  Identify changes in neuro status and report to provider for testing/treatment orders  Outcome: Progressing  Note: Able to answer some orientation correctly this morning but remains confused.      Problem: Bowel Elimination  Goal: Establish and maintain regular bowel function  Description: Target End Date:  Prior to discharge or change in level of care    1.   Note date of last BM  2.   Educate about diet, fluid intake, medication and activity to promote bowel function  3.   Educate signs and symptoms of constipation and interventions to implement  4.   Pharmacologic bowel management per provider order  5.   Regular toileting schedule  6.   Upright position for toileting  7.   High fiber diet  8.   Encourage hydration  9.   Collaborate with Clinical Dietician  10. Care and maintenance of ostomy if applicable  Outcome: Progressing  Note: BM today.        Patient is not progressing towards the following goals:

## 2024-09-15 NOTE — PROGRESS NOTES
Assumed care of pt. Pt resting in bed, respirations even & unlabored. Pt is intermittently confused. POC discussed with patient. Patient educated to call for assistance. Telesitter in place for safety, Fall precautions in place including bed alarm, treaded socks, bed locked & in low position.

## 2024-09-15 NOTE — CARE PLAN
Problem: Optimal Care of the Stroke Patient  Goal: Optimal acute care for the stroke patient  Outcome: Progressing     Problem: Knowledge Deficit - Stroke Education  Goal: Patient's knowledge of stroke and risk factors will improve  Outcome: Progressing     Problem: Psychosocial - Patient Condition  Goal: Patient's ability to verbalize feelings about condition will improve  Outcome: Progressing  Goal: Patient's ability to re-evaluate and adapt role responsibilities will improve  Outcome: Progressing     Problem: Neuro Status  Goal: Neuro status will remain stable or improve  Outcome: Progressing     Problem: Dysphagia  Goal: Dysphagia will improve  Outcome: Progressing     Problem: Risk for Aspiration  Goal: Patient's risk for aspiration will be absent or decrease  Outcome: Progressing     Problem: Urinary Elimination  Goal: Establish and maintain regular urinary output  Outcome: Progressing     Problem: Self Care  Goal: Patient will have the ability to perform ADLs independently or with assistance (bathe, groom, dress, toilet and feed)  Outcome: Progressing     Problem: Knowledge Deficit - Standard  Goal: Patient and family/care givers will demonstrate understanding of plan of care, disease process/condition, diagnostic tests and medications  Outcome: Progressing     Problem: Hemodynamics  Goal: Patient's hemodynamics, fluid balance and neurologic status will be stable or improve  Outcome: Progressing     Problem: Fluid Volume  Goal: Fluid volume balance will be maintained  Outcome: Progressing     Problem: Skin Integrity  Goal: Skin integrity is maintained or improved  Outcome: Progressing     Problem: Fall Risk  Goal: Patient will remain free from falls  Outcome: Progressing    The patient is Stable - Low risk of patient condition declining or worsening    Shift Goals  Clinical Goals: Safety, Telemonitor management, Telesitter monitoring management  Patient Goals: Comfort, collaborated with Telemonitor and  Telesitter  Family Goals: Not present    Progress made toward(s) clinical / shift goals: q4 Neuro checks. Encouraged verbalization of feelings. Reoriented to the surrounding s and to the situation. Elevated HOB before taking oral medication. Collaborated for Telemonitor and Telesitter as needed. Maintained IV fluids. Assisted patient to use the urinal. No new skin tear/ breakdown of the skin found. Placed bed in a lowest possible position, placed bed side table and call bell within reach, side rails up x2, kept safe and comfortably.

## 2024-09-15 NOTE — PROGRESS NOTES
Monitor summary: SR 79-92, OR -0.14, QRS -0.15, QT -0.45, with a BBB and (F) PVCs per strip from the monitor room.

## 2024-09-15 NOTE — PROGRESS NOTES
Monitor summary: SR, HR 79-94, HI .16, QRS .12, QT .41 with rare pvc, rare pac per strip from monitor room'  Patient in surgery from 800-1200

## 2024-09-16 PROBLEM — I63.9 STROKE (CEREBRUM) (HCC): Status: ACTIVE | Noted: 2024-09-16

## 2024-09-16 PROBLEM — M00.9 SEPTIC ARTHRITIS OF LEFT ANKLE (HCC): Status: ACTIVE | Noted: 2024-09-13

## 2024-09-16 LAB
ALBUMIN SERPL BCP-MCNC: 3.3 G/DL (ref 3.2–4.9)
BASOPHILS # BLD AUTO: 0.4 % (ref 0–1.8)
BASOPHILS # BLD: 0.04 K/UL (ref 0–0.12)
BUN SERPL-MCNC: 21 MG/DL (ref 8–22)
CALCIUM ALBUM COR SERPL-MCNC: 9.1 MG/DL (ref 8.5–10.5)
CALCIUM SERPL-MCNC: 8.5 MG/DL (ref 8.5–10.5)
CHLORIDE SERPL-SCNC: 111 MMOL/L (ref 96–112)
CO2 SERPL-SCNC: 19 MMOL/L (ref 20–33)
CREAT SERPL-MCNC: 0.82 MG/DL (ref 0.5–1.4)
EOSINOPHIL # BLD AUTO: 0.16 K/UL (ref 0–0.51)
EOSINOPHIL NFR BLD: 1.4 % (ref 0–6.9)
ERYTHROCYTE [DISTWIDTH] IN BLOOD BY AUTOMATED COUNT: 42.8 FL (ref 35.9–50)
GFR SERPLBLD CREATININE-BSD FMLA CKD-EPI: 93 ML/MIN/1.73 M 2
GLUCOSE SERPL-MCNC: 120 MG/DL (ref 65–99)
HCT VFR BLD AUTO: 41.3 % (ref 42–52)
HGB BLD-MCNC: 13.9 G/DL (ref 14–18)
IMM GRANULOCYTES # BLD AUTO: 0.06 K/UL (ref 0–0.11)
IMM GRANULOCYTES NFR BLD AUTO: 0.5 % (ref 0–0.9)
LYMPHOCYTES # BLD AUTO: 1.72 K/UL (ref 1–4.8)
LYMPHOCYTES NFR BLD: 15.1 % (ref 22–41)
MAGNESIUM SERPL-MCNC: 2 MG/DL (ref 1.5–2.5)
MCH RBC QN AUTO: 31.3 PG (ref 27–33)
MCHC RBC AUTO-ENTMCNC: 33.7 G/DL (ref 32.3–36.5)
MCV RBC AUTO: 93 FL (ref 81.4–97.8)
MONOCYTES # BLD AUTO: 0.99 K/UL (ref 0–0.85)
MONOCYTES NFR BLD AUTO: 8.7 % (ref 0–13.4)
NEUTROPHILS # BLD AUTO: 8.43 K/UL (ref 1.82–7.42)
NEUTROPHILS NFR BLD: 73.9 % (ref 44–72)
NRBC # BLD AUTO: 0 K/UL
NRBC BLD-RTO: 0 /100 WBC (ref 0–0.2)
PHOSPHATE SERPL-MCNC: 3.2 MG/DL (ref 2.5–4.5)
PLATELET # BLD AUTO: 242 K/UL (ref 164–446)
PMV BLD AUTO: 11.6 FL (ref 9–12.9)
POTASSIUM SERPL-SCNC: 3.8 MMOL/L (ref 3.6–5.5)
RBC # BLD AUTO: 4.44 M/UL (ref 4.7–6.1)
SODIUM SERPL-SCNC: 142 MMOL/L (ref 135–145)
VANCOMYCIN PEAK SERPL-MCNC: 8.1 UG/ML (ref 20–40)
VANCOMYCIN TROUGH SERPL-MCNC: 4.9 UG/ML (ref 10–20)
WBC # BLD AUTO: 11.4 K/UL (ref 4.8–10.8)

## 2024-09-16 PROCEDURE — 97166 OT EVAL MOD COMPLEX 45 MIN: CPT

## 2024-09-16 PROCEDURE — A9270 NON-COVERED ITEM OR SERVICE: HCPCS | Performed by: INTERNAL MEDICINE

## 2024-09-16 PROCEDURE — 80202 ASSAY OF VANCOMYCIN: CPT

## 2024-09-16 PROCEDURE — 700102 HCHG RX REV CODE 250 W/ 637 OVERRIDE(OP): Performed by: STUDENT IN AN ORGANIZED HEALTH CARE EDUCATION/TRAINING PROGRAM

## 2024-09-16 PROCEDURE — 700111 HCHG RX REV CODE 636 W/ 250 OVERRIDE (IP): Mod: JZ | Performed by: NURSE PRACTITIONER

## 2024-09-16 PROCEDURE — 85025 COMPLETE CBC W/AUTO DIFF WBC: CPT

## 2024-09-16 PROCEDURE — 700102 HCHG RX REV CODE 250 W/ 637 OVERRIDE(OP): Performed by: HOSPITALIST

## 2024-09-16 PROCEDURE — 700102 HCHG RX REV CODE 250 W/ 637 OVERRIDE(OP): Performed by: INTERNAL MEDICINE

## 2024-09-16 PROCEDURE — 99233 SBSQ HOSP IP/OBS HIGH 50: CPT | Performed by: INTERNAL MEDICINE

## 2024-09-16 PROCEDURE — 700102 HCHG RX REV CODE 250 W/ 637 OVERRIDE(OP)

## 2024-09-16 PROCEDURE — A9270 NON-COVERED ITEM OR SERVICE: HCPCS

## 2024-09-16 PROCEDURE — 700111 HCHG RX REV CODE 636 W/ 250 OVERRIDE (IP): Mod: JZ

## 2024-09-16 PROCEDURE — 80069 RENAL FUNCTION PANEL: CPT

## 2024-09-16 PROCEDURE — 36415 COLL VENOUS BLD VENIPUNCTURE: CPT

## 2024-09-16 PROCEDURE — 700111 HCHG RX REV CODE 636 W/ 250 OVERRIDE (IP): Mod: JZ | Performed by: INTERNAL MEDICINE

## 2024-09-16 PROCEDURE — A9270 NON-COVERED ITEM OR SERVICE: HCPCS | Performed by: STUDENT IN AN ORGANIZED HEALTH CARE EDUCATION/TRAINING PROGRAM

## 2024-09-16 PROCEDURE — 92610 EVALUATE SWALLOWING FUNCTION: CPT

## 2024-09-16 PROCEDURE — 99223 1ST HOSP IP/OBS HIGH 75: CPT | Performed by: PHYSICAL MEDICINE & REHABILITATION

## 2024-09-16 PROCEDURE — 83735 ASSAY OF MAGNESIUM: CPT

## 2024-09-16 PROCEDURE — A9270 NON-COVERED ITEM OR SERVICE: HCPCS | Performed by: HOSPITALIST

## 2024-09-16 PROCEDURE — 97535 SELF CARE MNGMENT TRAINING: CPT

## 2024-09-16 PROCEDURE — 770020 HCHG ROOM/CARE - TELE (206)

## 2024-09-16 PROCEDURE — 700111 HCHG RX REV CODE 636 W/ 250 OVERRIDE (IP): Mod: JG | Performed by: HOSPITALIST

## 2024-09-16 PROCEDURE — 700105 HCHG RX REV CODE 258: Performed by: HOSPITALIST

## 2024-09-16 PROCEDURE — 92523 SPEECH SOUND LANG COMPREHEN: CPT

## 2024-09-16 RX ORDER — AMLODIPINE BESYLATE 5 MG/1
5 TABLET ORAL
Status: DISCONTINUED | OUTPATIENT
Start: 2024-09-16 | End: 2024-09-17

## 2024-09-16 RX ORDER — LISINOPRIL 20 MG/1
40 TABLET ORAL DAILY
Status: DISCONTINUED | OUTPATIENT
Start: 2024-09-17 | End: 2024-09-20 | Stop reason: HOSPADM

## 2024-09-16 RX ORDER — KETOROLAC TROMETHAMINE 15 MG/ML
15 INJECTION, SOLUTION INTRAMUSCULAR; INTRAVENOUS ONCE
Status: COMPLETED | OUTPATIENT
Start: 2024-09-16 | End: 2024-09-16

## 2024-09-16 RX ORDER — HALOPERIDOL 5 MG/ML
2 INJECTION INTRAMUSCULAR ONCE
Status: COMPLETED | OUTPATIENT
Start: 2024-09-16 | End: 2024-09-16

## 2024-09-16 RX ORDER — ENOXAPARIN SODIUM 100 MG/ML
40 INJECTION SUBCUTANEOUS DAILY
Status: DISCONTINUED | OUTPATIENT
Start: 2024-09-16 | End: 2024-09-20 | Stop reason: HOSPADM

## 2024-09-16 RX ORDER — HYDRALAZINE HYDROCHLORIDE 20 MG/ML
20 INJECTION INTRAMUSCULAR; INTRAVENOUS ONCE
Status: COMPLETED | OUTPATIENT
Start: 2024-09-16 | End: 2024-09-16

## 2024-09-16 RX ORDER — LISINOPRIL 20 MG/1
20 TABLET ORAL ONCE
Status: COMPLETED | OUTPATIENT
Start: 2024-09-16 | End: 2024-09-16

## 2024-09-16 RX ORDER — TRAMADOL HYDROCHLORIDE 50 MG/1
50 TABLET ORAL ONCE
Status: COMPLETED | OUTPATIENT
Start: 2024-09-16 | End: 2024-09-16

## 2024-09-16 RX ADMIN — ASPIRIN 81 MG: 81 TABLET, CHEWABLE ORAL at 05:30

## 2024-09-16 RX ADMIN — ATORVASTATIN CALCIUM 80 MG: 80 TABLET, FILM COATED ORAL at 17:05

## 2024-09-16 RX ADMIN — LABETALOL HYDROCHLORIDE 10 MG: 5 INJECTION, SOLUTION INTRAVENOUS at 19:55

## 2024-09-16 RX ADMIN — ACETAMINOPHEN 650 MG: 325 TABLET ORAL at 17:05

## 2024-09-16 RX ADMIN — HYDRALAZINE HYDROCHLORIDE 20 MG: 20 INJECTION INTRAMUSCULAR; INTRAVENOUS at 13:06

## 2024-09-16 RX ADMIN — METOPROLOL SUCCINATE 25 MG: 25 TABLET, EXTENDED RELEASE ORAL at 05:30

## 2024-09-16 RX ADMIN — VANCOMYCIN HYDROCHLORIDE 750 MG: 5 INJECTION, POWDER, LYOPHILIZED, FOR SOLUTION INTRAVENOUS at 17:09

## 2024-09-16 RX ADMIN — KETOROLAC TROMETHAMINE 15 MG: 15 INJECTION, SOLUTION INTRAMUSCULAR; INTRAVENOUS at 21:22

## 2024-09-16 RX ADMIN — HALOPERIDOL LACTATE 2 MG: 5 INJECTION, SOLUTION INTRAMUSCULAR at 22:34

## 2024-09-16 RX ADMIN — VANCOMYCIN HYDROCHLORIDE 750 MG: 5 INJECTION, POWDER, LYOPHILIZED, FOR SOLUTION INTRAVENOUS at 03:23

## 2024-09-16 RX ADMIN — Medication 5 MG: at 21:22

## 2024-09-16 RX ADMIN — AMLODIPINE BESYLATE 5 MG: 5 TABLET ORAL at 17:05

## 2024-09-16 RX ADMIN — LISINOPRIL 20 MG: 20 TABLET ORAL at 05:30

## 2024-09-16 RX ADMIN — ENOXAPARIN SODIUM 40 MG: 100 INJECTION SUBCUTANEOUS at 17:11

## 2024-09-16 RX ADMIN — TRAMADOL HYDROCHLORIDE 50 MG: 50 TABLET ORAL at 06:14

## 2024-09-16 RX ADMIN — ACETAMINOPHEN 650 MG: 325 TABLET ORAL at 03:19

## 2024-09-16 RX ADMIN — LABETALOL HYDROCHLORIDE 10 MG: 5 INJECTION, SOLUTION INTRAVENOUS at 12:06

## 2024-09-16 RX ADMIN — LISINOPRIL 20 MG: 20 TABLET ORAL at 08:14

## 2024-09-16 ASSESSMENT — PAIN DESCRIPTION - PAIN TYPE
TYPE: ACUTE PAIN
TYPE: ACUTE PAIN;SURGICAL PAIN

## 2024-09-16 ASSESSMENT — COGNITIVE AND FUNCTIONAL STATUS - GENERAL
DRESSING REGULAR UPPER BODY CLOTHING: A LITTLE
DAILY ACTIVITIY SCORE: 18
SUGGESTED CMS G CODE MODIFIER DAILY ACTIVITY: CK
HELP NEEDED FOR BATHING: A LOT
DRESSING REGULAR LOWER BODY CLOTHING: A LOT
TOILETING: A LITTLE

## 2024-09-16 ASSESSMENT — ACTIVITIES OF DAILY LIVING (ADL): TOILETING: INDEPENDENT

## 2024-09-16 ASSESSMENT — FIBROSIS 4 INDEX: FIB4 SCORE: 1.63

## 2024-09-16 NOTE — PROGRESS NOTES
Monitor summary: SR 73-98, MS -0.12, QRS -0.09, QT -0.41, with (O) PACs, and (F) PVCs per strip from the monitor room.

## 2024-09-16 NOTE — CARE PLAN
The patient is Stable - Low risk of patient condition declining or worsening    Shift Goals  Clinical Goals: Q4 neuro checks, prevent patient harm  Patient Goals: go home  Family Goals: MARQUES    Problem: Neuro Status  Goal: Neuro status will remain stable or improve  Description: Target End Date:  Prior to discharge or change in level of care    Document on Neuro assessment in the Assessment flowsheet    1.  Assess and monitor neurologic status per provider order/protocol/unit policy  2.  Assess level of consciousness and orientation  3.  Assess for speech, dysarthria, dysphagia, facial symmetry  4.  Assess visual field, eye movements, gaze preference, pupil reaction and size  5.  Assess muscle strength and motor response in all four extremities  6.  Assess for sensation (numbness and tingling)  7.  Assess basic neuro reflexes (cough, gag, corneal)  8.  Identify changes in neuro status and report to provider for testing/treatment orders  Outcome: Progressing     Problem: Dysphagia  Goal: Dysphagia will improve  Description: Target End Date:  Prior to discharge or change in level of care    1.  Assess and monitor ability to swallow  2.  Collaborate with Speech Therapy to determine appropriate adaptation for safe administration of medications and oral nutrition  3.  Elevate head of bed to 90 degrees during feedings and for 30 minutes after each feeding  4.  Encourage proper swallowing techniques  5.  Screening on admission or as soon as possible  Outcome: Progressing

## 2024-09-16 NOTE — THERAPY
"Speech Language Pathology   Clinical Swallow Evaluation     Patient Name: Joe Templeton  AGE:  74 y.o., SEX:  male  Medical Record #: [id number]  Date of Service: 9/16/2024    History of Present Illness  Joe Templeton is a 74 y.o. male who presented 9/13/2024  with unilateral weakness x3 days. Patient states having multiple falls neg head strike. Medical work up thus far concerning for endocarditis since he has a new murmur, encephalopathy and left-sided weakness per H&P.    PMHX: past medical history of coronary disease status post CABG, gout.     MRI 9/15: \"1.  Moderate cerebral atrophy.  2.  Advanced supratentorial white matter disease most consistent with microvascular ischemic change.  3.  Old microhemorrhage left posterior sylvian frontal lobe and left occipital lobe most consistent with old hypertensive microhemorrhage versus amyloid angiopathy.  4.  Old lacunar infarcts x2 in the right thalamus old lacunar infarcts x2 in the left thalamus.  5.  Old cortical infarct left parietal lobe at the postcentral gyrus.  6.  Subcentimeter focus of acute infarction right insula.  7.  Subcentimeter focus acute infarction left posterior parietal lobe.  8.  Subcentimeter focus of acute or recent subacute infarction right parietal periventricular white matter/splenium corpus callosum.  9.  Acute brainstem infarct in the left side of the hao.  10.  Multiple old lacunar size infarcts left cerebellar hemisphere.  11.  Moderate-sized old infarct right cerebellar hemisphere.  12.  No acute infarcts show hemorrhagic transformation.  13.  Multiple vascular territories are involved. Possible embolic mechanism\"    General Information:  Vitals  O2 Delivery Device: None - Room Air  Level of Consciousness: Alert, Awake  Patient Behaviors: Confused  Orientation: Self, General place  Follows Directives: Yes - simple commands only    Prior Living Situation & Level of Function:  Prior Services: Unable To Determine At This " "Time  Housing / Facility: [address] [facility]  Comments: The pt reports that he lives with two of his brothers and a female. One of his brothers is unemployed and at home for most of the day. He does not drive or work. He recieves assistance w/ grocery shopping, cooking and bills/finances (his sister does his finances). Of note- he is an unreliable historian so consider verifying this information.     Communication: Pt reports baseline cognitive decline with descriptors such as \"forgetting my words\", \"forget how to do things like use my debit card\"  Swallowing: Pt reported a history of dysphagia over the past few months but remained vague with symptoms that he was experiencing. He reported he eats softer food d/t dentition issues     Oral Mechanism Evaluation:  Dentition: Scattered dentition, Multiple carries   Facial Symmetry: Equal  Facial Sensation: Equal     Labial Observations: Left sided weakness (slight; still able to achieve a tight labial seal)   Lingual Observations: Midline  Motor Speech: WNL       Laryngeal Function:  Secretion Management: Adequate  Voice Quality: WFL  Cough: Perceptually WNL    Subjective  Pt seen A&Ox2 saturating on room air, cooperative for the eval    Assessment  Current Method of Nutrition: Oral diet (Regular Solids, Thin/all Liquids)  Positioning: Andres's (60-90 degrees)  Bolus Administration: Patient    O2 Delivery Device: None - Room Air  Factor(s) Affecting Performance: None     Swallowing Trials:  Swallowing Trials  Thin Liquid (TN0): WFL  Pureed (PU4): WFL  Soft & Bite Sized (SB6): WFL  Regular (RG7):  (Pt politely declind solids bolus, citing \"that will hurt for me to chew\")    Comments: Pt self-fed w/ appropriate rate & volume control. Oral stage was functional for soft & bite-sized solids despite missing dentition, though pt declined to take any regular solids citing \"It will hurt to chew that\". Only trace left sided labial droop was noted, however labial seal and bolus " "containment appeared WNL. He denied globus sensation across all trials. No upper airway wetness, throat clearing or coughing was noted.     Clinical Impressions  Signs of oropharyngeal swallowing functional for a soft solids diet- the pt's oral stage appears impacted by poor dentition (pt does not have partials or dentures).   The pt reports vague symptoms of dysphagia, however he was unable to elaborate further in the context of cognitive-linguistic deficits. No gross signs of dysphagia were noted to be bedside exam. ST to f/u 1-2x and order instrumentation as indicated vs sign off. Pt agreeable to POC.     Recommendations  Diet Consistency: Thin/all Liquids, Regular Solids  Instrumentation: Instrumental swallow study pending clinical progress  Medication: As tolerated  Supervision: Assist with meal tray set up  Positioning: Fully upright and midline during oral intake  Risk Management : Slow rate of intake  Oral Care: BID  Consult Referral(s): Neuropsychologist    SLP Treatment Plan  Treatment Plan: Dysphagia Treatment  SLP Frequency: 3x Per Week  Estimated Duration: Until Therapy Goals Met    Anticipated Discharge Needs  Discharge Recommendations: Recommend post-acute placement for additional speech therapy services prior to discharge home   Therapy Recommendations Upon DC: Dysphagia Training      Patient / Family Goals  Patient / Family Goal #1: \"I have problems with my teeth\"  Short Term Goals  Short Term Goal # 1: Pt will tolerate the safest, least restrictive diet w/ no signs of aspiration related pulmonary complications    Mary Barakat, TONY   "

## 2024-09-16 NOTE — CONSULTS
Physical Medicine and Rehabilitation Consultation              Date of initial consultation: 9/16/2024  Requesting provider: ordered by Ced Gautam M.D. at 09/16/24 1123    Consulting provider: Kyung Gutierrez D.O.  Reason for consultation: assess for acute inpatient rehab appropriateness  LOS: 3 Day(s)    Chief complaint: LE weakness and ankle swelling.     HPI: The patient is a 74 y.o.  male with a past medical history of CAD s/p CABG and gout;  who presented on 9/13/2024  5:10 PM with LE weakness and ankle swelling. Per documentation, patient had worsening symptoms of LLE and ankle swelling x 3 days. Upon eval in the ED, patient was also confused. CTA head and neck obtained for weakness and confusion showed no LVO on CTA head and mid to moderate atherosclerotic plaquing of the common carotid arteries and bilateral ICA stenosis less than 50%. Ankle xray was negative for acute fracture. 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 Echo pending, prior echo from 2016 showed EF of 40-50%.  Neuro consulted, patient is now on ASA and statin. In regards to patient's ankle, patient had an arthrocentesis done in the ED, ortho was consulted, and patient was taken the OR on 9/14 for left ankle I and D for left ankle septic arthritis performed by Dr. Gautam. Patient is WBAT LLE.  Patient remains on vancomycin, stop date is 9/21. Patient has been able to mobilize CGA level.     Patient seen and examined at bedside, patient reports he feels ok. Reports his ankle feels better. Pain controlled. Does not report HA, lightheadedness, SOB, CP, abdominal pain, or changes in numbness/tingling/weakness.   Reports he has had an issue with his memory in the past, but was doing everything on his own at home.     Social Hx:  Per documentation - Patient lives alone in a 1 story house with no DORCAS, brother lives    Nearby  Per patient - lives with brother and sister in law in a 1 story house     0 DORCAS  At prior level of function patient was Independent with mobility and ADLs.     Tobacco: Former smoker, quit 8 years ago   Alcohol: Denied  Drugs: Denied     THERAPY:  Restrictions: Fall Risk, WBAT LLE   PT: Functional mobility   9/15  CGA with FWW x 40 ft, CGA sit to stand     OT: ADLs  Pending     SLP:   9/16 requires direct assistance with IADLs, SLP recommending referral  to neuropsychologist     IMAGING:  MR-BRAIN-W/O  Narrative: 9/15/2024 8:39 AM    HISTORY/REASON FOR EXAM:  Memory loss for one year.  Left-sided weakness, dysarthria. Clinical suspicion of endocarditis. Encephalopathy.    TECHNIQUE/EXAM DESCRIPTION:  MRI of the brain without contrast.    T1 sagittal, T2 fast spin-echo axial, T1 coronal, FLAIR axial with propeller motion correction technique, Diffusion Weighted and Apparent Diffusion Coefficient (ADC map) axial images were obtained of the whole brain.    The study was performed on a [employer] Signa 1.5 Christie MRI scanner.    COMPARISON:  Head CT and CTA of the head 9/13/2024    FINDINGS:  The calvaria are unremarkable. Incidentally noted small lipoma over the right paramedian forehead.    There are no extra-axial fluid collections.    There is a pattern of moderate cerebral atrophy manifest as enlargement of sulcal markings over the convexities and vertex along with ventriculomegaly.    There is a pattern of advanced supratentorial white matter disease with extensive patchy, confluent, and focal areas of bright T2 and FLAIR signal in the subcortical and deep white matter of both hemispheres consistent with small vessel ischemic change   versus demyelination or gliosis.    Infarct left frontal deep white matter along the corona radiata.    The gradient echo axial images show punctate foci of old microhemorrhage at the left posterior sylvian frontal lobe and left occipital lobe (gradient echo axial images 14, 12,  series 6). These are most consistent with old hypertensive microhemorrhage   versus amyloid angiopathy.    There is localized gyral encephalomalacia involving the left parietal lobe at the postcentral gyrus consistent with old cerebral infarction (T2 axial image 32, series 5, FLAIR coronal image 8, series 8, FLAIR axial image 25, series 7).    There are old lacunar infarcts in the thalami x2 bilaterally (T2 axial images 19, 20, series 5).    The diffusion weighted axial images show a punctate focus of acute infarction involving the right insula (diffusion-weighted axial image 17, series 4). There is also a subcentimeter focus of acute infarction in the left far posterior frontal lobe   (diffusion-weighted axial image 21, series 4).    There is a subcentimeter focus of mildly bright diffusion signal at the posterior medial periventricular white matter at the posterior body right lateral ventricle/splinting corpus callosum which may represent an acute or recent subacute infarct   (diffusion-weighted axial image 18, series 4).    The brainstem and posterior fossa structures show bright diffusion signal in the left side of the hao consistent with acute brainstem infarction (T2 axial image 13, series 5, diffusion weighted axial images 10, 11, series 4). No hemorrhagic   transformation.    There are several linear and focal areas of encephalomalacia in the left cerebellar hemisphere consistent with old infarcts.    There is a moderate-sized area of encephalomalacia in the right cerebellar hemisphere with old infarction.    The major vessels including the dural venous sinuses appear intact.    Paranasal sinuses show complete opacification of the frontal sinus with T2 hypointensity consistent with chronic sinusitis.  Impression: 1.  Moderate cerebral atrophy.  2.  Advanced supratentorial white matter disease most consistent with microvascular ischemic change.  3.  Old microhemorrhage left posterior sylvian frontal lobe and  left occipital lobe most consistent with old hypertensive microhemorrhage versus amyloid angiopathy.  4.  Old lacunar infarcts x2 in the right thalamus old lacunar infarcts x2 in the left thalamus.  5.  Old cortical infarct left parietal lobe at the postcentral gyrus.  6.  Subcentimeter focus of acute infarction right insula.  7.  Subcentimeter focus acute infarction left posterior parietal lobe.  8.  Subcentimeter focus of acute or recent subacute infarction right parietal periventricular white matter/splenium corpus callosum.  9.  Acute brainstem infarct in the left side of the hao.  10.  Multiple old lacunar size infarcts left cerebellar hemisphere.  11.  Moderate-sized old infarct right cerebellar hemisphere.  12.  No acute infarcts show hemorrhagic transformation.  13.  Multiple vascular territories are involved. Possible embolic mechanism.        PROCEDURES:  9/14 Left ankle arthrotomy incision drainage  by Dr. Gautam     PMH:  Past Medical History:   Diagnosis Date    Arthritis     Gout     Hemorrhoids 11/25/2014    Reportedly Pt has had hemorrhoids for some time. He denies any bloody stools or bleeding hemorrhoids.     Hypertension     Ingrown right big toenail 05/20/2015    Low back pain 05/20/2015    NSTEMI (non-ST elevated myocardial infarction) (HCC) 06/28/2016    Vitamin D deficiency 01/06/2015 12/1/14 lab result show slightly low Vitamin D level= 26. He states he walks outside a lot in the sun until the past month.       PSH:  Past Surgical History:   Procedure Laterality Date    IRRIGATION & DEBRIDEMENT GENERAL Left 9/14/2024    Procedure: IRRIGATION AND DEBRIDEMENT, ANKLE;  Surgeon: Alex Gautam M.D.;  Location: SURGERY [facility];  Service: Orthopedics    MULTIPLE CORONARY ARTERY BYPASS ENDO VEIN HARVEST N/A 6/28/2016    Procedure: MULTIPLE CORONARY ARTERY BYPASS ENDO VEIN HARVEST- With DELMI ;  Surgeon: Deejay Duong M.D.;  Location: SURGERY [facility];  Service:   "      FHX:  Family History   Problem Relation Age of Onset    Cancer Mother     Heart Disease Father     Hyperlipidemia Father     Hyperlipidemia Brother     Lung Disease Brother        Medications:  Current Facility-Administered Medications   Medication Dose    [START ON 9/17/2024] lisinopril (Prinivil) tablet 40 mg  40 mg    atorvastatin (Lipitor) tablet 80 mg  80 mg    vancomycin (Vancocin) 750 mg in  mL IVPB  750 mg    melatonin tablet 5 mg  5 mg    NS infusion      acetaminophen (Tylenol) tablet 650 mg  650 mg    labetalol (Normodyne/Trandate) injection 10 mg  10 mg    ondansetron (Zofran) syringe/vial injection 4 mg  4 mg    ondansetron (Zofran ODT) dispertab 4 mg  4 mg    aspirin (Asa) chewable tab 81 mg  81 mg    Or    aspirin (Asa) suppository 300 mg  300 mg    metoprolol SR (Toprol XL) tablet 25 mg  25 mg    MD Alert...Vancomycin per Pharmacy         Allergies:  No Known Allergies      Physical Exam:  Vitals: BP (!) 181/101   Pulse 73   Temp 36.1 °C (97 °F) (Temporal)   Resp 18   Ht 1.753 m (5' 9\")   Wt 78 kg (171 lb 15.3 oz)   SpO2 94%   Gen: NAD, laying comfortably in bed   Head:  NC/AT  Eyes/ Nose/ Mouth: PERRLA, moist mucous membranes  Cardio: RRR, good distal perfusion, warm extremities  Pulm: normal respiratory effort, no cyanosis, on RA   Abd: Soft NTND  Ext: No peripheral edema. No calf tenderness. No clubbing.Ace wrap in place on Left ankle     Mental status:  A&Ox4 (person, place, date, situation) answers questions appropriately follows commands  Speech: fluent, no aphasia or dysarthria    CRANIAL NERVES:  2,3: visual acuity grossly intact, PERRL  3,4,6: EOMI bilaterally, no nystagmus or diplopia  5: sensation intact to light touch bilaterally and symmetric  7: no facial asymmetry  8: hearing grossly intact    Motor:      Upper Extremity  Myotome R L   Shoulder flexion C5 5/5 5/5   Elbow flexion C5 5/5 5/5   Wrist extension C6 5/5 5/5   Elbow extension C7 5/5 5/5   Finger flexion C8 " 5/5 5/5   Finger abduction T1 5/5 5/5     Lower Extremity Myotome R L   Hip flexion L2 5/5 5/5   Knee extension L3 5/5 4/5   Ankle dorsiflexion L4 5/5 4/5   Toe extension L5 5/5 4/5   Ankle plantarflexion S1 5/5 4/5       Negative Pronator drift bilaterally    Sensory:   intact to light touch through out    DTRs: 2+ in bilateral  biceps  Negative Ro b/l     Tone: no spasticity noted    Coordination:   intact fine motor with fingers bilaterally      Labs: Reviewed and significant for   Recent Labs     09/13/24 1728 09/13/24 2240 09/14/24 0216 09/16/24  0759   RBC 5.13  --  4.99 4.44*   HEMOGLOBIN 16.1  --  15.9 13.9*   HEMATOCRIT 46.0  --  45.8 41.3*   PLATELETCT 231  --  224 242   PROTHROMBTM  --  14.8*  --   --    APTT  --  31.1  --   --    INR  --  1.14*  --   --      Recent Labs     09/13/24 1728 09/14/24 0216 09/16/24  0759   SODIUM 139 145 142   POTASSIUM 4.1 3.9 3.8   CHLORIDE 105 106 111   CO2 20 22 19*   GLUCOSE 150* 156* 120*   BUN 12 13 21   CREATININE 0.82 0.81 0.82   CALCIUM 9.0 9.2 8.5     Recent Results (from the past 24 hour(s))   VANCOMYCIN PEAK    Collection Time: 09/16/24  7:37 AM   Result Value Ref Range    Vancomycin Peak 8.1 (L) 20.0 - 40.0 ug/mL   CBC WITH DIFFERENTIAL    Collection Time: 09/16/24  7:59 AM   Result Value Ref Range    WBC 11.4 (H) 4.8 - 10.8 K/uL    RBC 4.44 (L) 4.70 - 6.10 M/uL    Hemoglobin 13.9 (L) 14.0 - 18.0 g/dL    Hematocrit 41.3 (L) 42.0 - 52.0 %    MCV 93.0 81.4 - 97.8 fL    MCH 31.3 27.0 - 33.0 pg    MCHC 33.7 32.3 - 36.5 g/dL    RDW 42.8 35.9 - 50.0 fL    Platelet Count 242 164 - 446 K/uL    MPV 11.6 9.0 - 12.9 fL    Neutrophils-Polys 73.90 (H) 44.00 - 72.00 %    Lymphocytes 15.10 (L) 22.00 - 41.00 %    Monocytes 8.70 0.00 - 13.40 %    Eosinophils 1.40 0.00 - 6.90 %    Basophils 0.40 0.00 - 1.80 %    Immature Granulocytes 0.50 0.00 - 0.90 %    Nucleated RBC 0.00 0.00 - 0.20 /100 WBC    Neutrophils (Absolute) 8.43 (H) 1.82 - 7.42 K/uL    Lymphs (Absolute)  1.72 1.00 - 4.80 K/uL    Monos (Absolute) 0.99 (H) 0.00 - 0.85 K/uL    Eos (Absolute) 0.16 0.00 - 0.51 K/uL    Baso (Absolute) 0.04 0.00 - 0.12 K/uL    Immature Granulocytes (abs) 0.06 0.00 - 0.11 K/uL    NRBC (Absolute) 0.00 K/uL   Renal Function Panel    Collection Time: 09/16/24  7:59 AM   Result Value Ref Range    Sodium 142 135 - 145 mmol/L    Potassium 3.8 3.6 - 5.5 mmol/L    Chloride 111 96 - 112 mmol/L    Co2 19 (L) 20 - 33 mmol/L    Glucose 120 (H) 65 - 99 mg/dL    Creatinine 0.82 0.50 - 1.40 mg/dL    Bun 21 8 - 22 mg/dL    Calcium 8.5 8.5 - 10.5 mg/dL    Correct Calcium 9.1 8.5 - 10.5 mg/dL    Phosphorus 3.2 2.5 - 4.5 mg/dL    Albumin 3.3 3.2 - 4.9 g/dL   MAGNESIUM    Collection Time: 09/16/24  7:59 AM   Result Value Ref Range    Magnesium 2.0 1.5 - 2.5 mg/dL   ESTIMATED GFR    Collection Time: 09/16/24  7:59 AM   Result Value Ref Range    GFR (CKD-EPI) 93 >60 mL/min/1.73 m 2         ASSESSMENT:  Patient is a 74 y.o. male admitted with LLE weakness     [facility] Code / Diagnosis to Support: 0001.4 - Stroke: No Paresis  See DISPO details below for recommendations on appropriate level of rehab for this diagnosis.    Barriers to transfer include: Insurance authorization, TCCs to verify disposition, medical clearance and bed availability     Assessment and Plan:  Left pontine stroke   Multifocal infarcts   - admitted with LLE weakness ( weakness may be due to septic arthritis rather than stroke) and AMS   - CTA head negative for LVO   - CTA neck showed bilateral ICA stenosis of less than 50%   - MRI brain showed acute brain stem infarct in the left hao and multifocal infarcts, old lacunar infarct in b/l thalamus   - neuro consulted   - on ASA and statin for secondary stroke ppx   - Echo pending, will need to be evaluated for vegetations   - continue with PT/OT/SLP     Left ankle septic arthritis   - admitted with LLE weakness and ankle pain   - left ankle cxr negative for acute fracture   - ortho consulted   - 9/14  left rose arthrotomy I and D for septic arthritis by Dr. Gautam   - on Vancomycin, stop date 9/21   - WBAT LLE     Hx of CABG   - on  ASA and statin   - prior echo from 2016 showed EF of 40-50 %   - updated echo pending     HTN   - on home dose metoprolol and lisinopril       DISPO:  - patient is currently functioning below their level of baseline, recommend post acute rehab  - anticipated to be appropriate for IRF level therapy with 3hr of therapy 5 days per week PENDING echo   - prior to acceptance to IRF, will need Echo completed, IV abx course confirmed, and confirmation of caregiver support for mobility, ADL, and IDLS (intermittment help would not be adequate)   - TCC to assist with insurance auth and DC support from brother        Medical Complexity:  Left pontine stroke   Multifocal infarcts   Left ankle septic arthritis   Hx of CABG   HTN   Impaired mobility and ADLs       DVT PPX: SCDs       Thank you for allowing us to participate in the care of this patient.     Patient was seen for >80 minutes on unit/floor of which > 50% of time was spent on counseling and coordination of care regarding the above, including prognosis, risk reduction, benefits of treatment, and options for next stage of care.    Kyung Gutierrez D.O.   Physical Medicine and Rehabilitation     Please note that this dictation was created using voice recognition software. I have made every reasonable attempt to correct obvious errors, but there may be errors of grammar and possibly content that I did not discover before finalizing the note.

## 2024-09-16 NOTE — DISCHARGE PLANNING
Physiatry consult remain pending  Would appreciate therapy evaluations as appropriate  TCC following

## 2024-09-16 NOTE — PROGRESS NOTES
Paged KHUSHI Souza regarding significant pain in left foot, order for one time dose of tramadol received.

## 2024-09-16 NOTE — THERAPY
"Speech Language Pathology   Cognitive Evaluation     Patient Name: Joe Templeton  AGE:  74 y.o., SEX:  male  Medical Record #: [id number]  Date of Service: 9/16/2024    General Information  Vitals  O2 Delivery Device: None - Room Air  Level of Consciousness: Alert, Awake  Patient Behaviors: Confused  Orientation: Self, General place  Follows Directives: Yes    Prior Living Situation & Level of Function  Prior Services: Unable To Determine At This Time  Housing / Facility: 1 Story House  Lives with - Patient's Self Care Capacity: Alone and Unable to Care For Self  Comments: The pt reports that he lives with two of his brothers and a female. One of his brothers is unemployed and at home for most of the day. He does not drive or work. He recieves assistance w/ grocery shopping, cooking and bills/finances (his sister does his finances). Of note- he is an unreliable historian so consider verifying this information.     Communication: Pt reports baseline cognitive decline with descriptors such as \"forgetting my words\", \"forget how to do things like use my debit card\"  Swallowing: Pt reported a history of dysphagia over the past few months but remained vague with symptoms that he was experiencing. He reported he eats softer food d/t dentition issues     Oral Mechanism Evaluation  Dentition: Scattered dentition  Facial Symmetry: Equal  Facial Sensation: Equal  Labial Observations: Left sided weakness (slight; still able to achieve a tight labial seal)  Lingual Observations: Midline  Motor Speech: WNL    Laryngeal Function Exam  Secretion Management: Adequate  Voice Quality: WFL  Cough: Perceptually WNL    Subjective  Pt seen A&Ox2 saturating on room air, cooperative for the eval    Communication Domain(s)  Expressive Language: WFL  Receptive Language: WFL  Cognitive-Linguistic:  (Moderate-Severe)  Reading: WFL  Social/Pragmatic: WFL    Assessment  The patient was seen this date for a Speech-Language/Cognitive " "evaluation.    Cognistat  Orientation: Average   - Pt was completely oriented to the date- scoring WNL. However at first he appeared disoriented to being in the hospital  Attention: Moderate  Comprehension: Average  Repetition: Average  Naming: Average  Memory: Severe   - Despite 3+ repetitions, pt incompletely encoded the 4 words- causing difficulty with retrieval later  Calculations: Average  Similarities: Average  Judgement: Moderate- Severe  - Pt demo's reduced ability to problem solve through simple safety scenarios. On 1x question, he asked \"I'm not sure what to do- what is the problem in this situation\". On another, he remarked \"I would call my mother and father for help\"- however upon further questioning/prompting he elaborated, \"That won't work because they have both passed away\"    Medication Management  Medication Management: +0/3 mod management (Pt reports he does not take any medications)    Clock Drawing  Clock Drawing: Poor Planning, Disorganization, Impaired Hand Placement       Clinical Impressions    - Signs of moderate-severe cognitive-linguistic deficits, suspect acute on chronic as the pt reports noting a cognitive decline prior to this hospitalization (\"Started 5 months ago\" and \"My sister had to take over my debit card because I forgot how to use it\"). The pt evidenced difficulty w/ recalling simple personal information, completing functional safety/problem solving and encoding/recall of new information.    - It is difficult to ascertain how much the pt's cognition is worsened from baseline in the context of multiple new and chronic strokes (MRI brain with multifocal embolic appearing infarcts in multiple vascular territories, no hemorrhage. Significant microvascular ischemia and infarcts of varying chronicity, advanced microvascular ischemia). ST to initiate trials cog tx over 2-3 sessions and assess response to tx aimed at optimizing independence post d/c     - Currently, the pt would " "require direct assistance w/ all IADLs post d/c. He repots he lives with two brothers and a female who can assist with this if needed.     NOTE: It is not within the scope of practice of Speech-Language Pathologists to determine patient capacity. Please defer to the physician or psych to complete this assessment.     Recommendations  Supervision Needs Upons Discharge: Direct assistance with IADLs (see below)  IADLs: Medication management, Financial management, Appointment management, Household chores, Cooking  Consult Referral(s): Neuropsychologist    SLP Treatment Plan  Treatment Plan: Dysphagia Treatment, Cognitive Treatment  SLP Frequency: 3x Per Week  Estimated Duration: Until Therapy Goals Met    Anticipated Discharge Needs  Discharge Recommendations: Recommend post-acute placement for additional speech therapy services prior to discharge home  Therapy Recommendations Upon DC: Dysphagia Training, Cognitive-Linguistic Training    Patient / Family Goals  Patient / Family Goal #1: \"I have problems with my teeth\"  Short Term Goal # 1: Pt will tolerate the safest, least restrictive diet w/ no signs of aspiration related pulmonary complications  Short Term Goal # 2: Pt will use compensatory strategies w/ min cueing to recall 80% of simple functional information after a 3-5 minute delay  Short Term Goal # 3: Pt will complete functional problem solving scenarios with min cueing and 80% accuracy    Mary Barakat, TONY  "

## 2024-09-16 NOTE — PROGRESS NOTES
"      Orthopaedic Progress Note    Interval changes:  Patient doing well   L ankle dressings are CDI  Cleared for DC to home by ortho pending medicine clearance    ROS - Patient denies any new issues.  Pain well controlled.    BP (!) 188/90   Pulse 77   Temp 36.1 °C (97 °F) (Temporal)   Resp 18   Ht 1.753 m (5' 9\")   Wt 78 kg (171 lb 15.3 oz)   SpO2 97%     Patient seen and examined  No acute distress  Breathing non labored  RRR  L ankle dressings CDI, DNVI, moves all toes, cap refill <2 sec.     Recent Labs     09/13/24  1728 09/14/24  0216 09/16/24  0759   WBC 15.1* 14.6* 11.4*   RBC 5.13 4.99 4.44*   HEMOGLOBIN 16.1 15.9 13.9*   HEMATOCRIT 46.0 45.8 41.3*   MCV 89.7 91.8 93.0   MCH 31.4 31.9 31.3   MCHC 35.0 34.7 33.7   RDW 39.6 42.0 42.8   PLATELETCT 231 224 242   MPV 11.6 11.3 11.6       Active Hospital Problems    Diagnosis     Stroke (HCC) [I63.9]     Septic arthritis of left ankle (HCC) [M00.9]     Hx of CABG [Z95.1]     Left-sided weakness [R53.1]     Murmur [R01.1]     Acute encephalopathy [G93.40]     Sepsis (HCC) [A41.9]     Systolic heart failure (HCC) [I50.20]     Essential (primary) hypertension [I10]      10/31/14 IN ER dx with /152. Tx with IV labetalol and d/c on Metoprolol.          Assessment/Plan:  Patient doing well   L ankle dressings are CDI  Cleared for DC to home by ortho pending medicine clearance  POD#2 S/P Left ankle arthrotomy incision drainage  Wt bearing status - WBAT LLE  Wound care/Drains - Dressings to be changed every other day by nursing. Or PRN for saturation starting POD#2  Future Procedures - none planned   Lovenox: Start 9/16, Duration-until ambulatory > 150'  Sutures/Staples out- 14-21 days post operatively. Removal will completed by ortho mid levels only.  PT/OT-initiated  Antibiotics: vanco 750mg IV Q12  DVT Prophylaxis- TEDS/SCDs/Foot pumps  Silva-not needed per ortho  Case Coordination for Discharge Planning - Disposition per therapy recs.    "

## 2024-09-16 NOTE — THERAPY
"Occupational Therapy   Initial Evaluation     Patient Name: Joe Templeton  Age:  74 y.o., Sex:  male  Medical Record #: [id number]  Today's Date: 9/16/2024     Precautions  Precautions: Fall Risk, Swallow Precautions    Assessment  Patient is 74 y.o. male  presenting for L ankle septic arthritis now POD1 I&D w/ a PMH of CAD s/p CABG and gout. 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   He appears to be an unreliable historian at this time, reports he lives with his brother and MAURICE however per notes pt lives alone, has also stated he lives alone to other providers.     Pt seen for OT eval. Pt presents with impairments in cognition, unreliable historian and insight. Pt limited due to pain in LLE, impaired balance, strength, activity tolerance requiring assist for all ADLs and functional mobility. Pt [place] for functional transfers, assist for majority of ADLs, mild impairments in LUE. Pt does endorse prior memory issues, unclear baseline cognition at this time. Will continue to benefit from inpt OT and recommend post acute placement upon dc.     Plan    Occupational Therapy Initial Treatment Plan   Treatment Interventions: (P) Self Care / Activities of Daily Living, Neuro Re-Education / Balance, Therapeutic Exercises, Therapeutic Activity, Cognitive Skill Development  Treatment Frequency: (P) 4 Times per Week  Duration: (P) Until Therapy Goals Met    DC Equipment Recommendations: (P) Unable to determine at this time  Discharge Recommendations: (P) Recommend post-acute placement for additional occupational therapy services prior to discharge home     Subjective    \"I can't move well with this foot\"      Objective       09/16/24 0902   Prior Living Situation   Prior Services Home-Independent   Housing / Facility 1 Story House   Steps Into Home 0   Steps In Home 0   Equipment Owned 4-Wheel Walker   Lives " with - Patient's Self Care Capacity Alone and Unable to Care For Self   Comments Pt reports he lives with his brother and MAURICE in a single story home. States he has a 4ww that he was using. Pt unreliable historian, per notes pt was living alone and was indep, brother and MAURICE live close by.   Prior Level of ADL Function   Self Feeding Independent   Grooming / Hygiene Independent   Bathing Independent   Dressing Independent   Toileting Independent   Comments pt states he was indep for all ADLs   Prior Level of IADL Function   Medication Management Independent   Laundry Independent   Kitchen Mobility Independent   Finances Independent   Home Management Independent   Shopping Independent   Prior Level Of Mobility Independent With Device in Community   Driving / Transportation Driving Independent   Comments pt reports he was indep for IADLs and was driving. per SLP pt told her he does not drive   History of Falls   History of Falls Yes   Date of Last Fall   (reports fall prior to admission)   Precautions   Precautions Fall Risk;Swallow Precautions   Vitals   Patient BP Position Sitting   Blood Pressure  (!) 197/116   Pulse Oximetry 98 %   O2 Delivery Device None - Room Air   Pain   Intervention Medication (see MAR)   Pain 0 - 10 Group   Location Foot   Location Orientation Left   Therapist Pain Assessment During Activity;Post Activity Pain Same as Prior to Activity;Nurse Notified  (unrated pain in L foot)   Non Verbal Descriptors   Non Verbal Scale  Calm   Cognition    Cognition / Consciousness X   Orientation Level Not Oriented to Day;Not Oriented to Reason   Level of Consciousness Alert   Ability To Follow Commands 1 Step   Safety Awareness Impaired   New Learning Impaired   Attention Impaired   Sequencing Impaired   Comments pt is pleasant and cooperative however confused and disoriented to reason for admit, exact date. Pt is a poor historian additionally, unable to recall exact home situation. following 1 step  commands consistently.   Passive ROM Upper Body   Passive ROM Upper Body WDL   Active ROM Upper Body   Active ROM Upper Body  WDL   Dominant Hand Right   Strength Upper Body   Upper Body Strength  X   Comments mild weakness in LUE compared to RUE however functional for self care skills   Sensation Upper Body   Upper Extremity Sensation  X   Comments intact to light touch however diminished sensation in L forearm   Upper Body Muscle Tone   Upper Body Muscle Tone  WDL   Neurological Concerns   Neurological Concerns Yes   Lt Upper Extremity Fine Motor Control Impaired   Coordination Upper Body   Coordination X   Fine Motor Coordination mild impairments on L side, difficulty opposing digits to thumb   Balance Assessment   Sitting Balance (Static) Fair +   Sitting Balance (Dynamic) Fair   Standing Balance (Static) Fair -   Standing Balance (Dynamic) Fair -   Weight Shift Sitting Fair   Weight Shift Standing Poor   Comments w/FWW   Bed Mobility    Supine to Sit Standby Assist   Scooting Standby Assist   Comments HOB slightly elevated   ADL Assessment   Eating Independent   Grooming Contact Guard Assist;Standing  (hand hygiene)   Upper Body Dressing Minimal Assist   Lower Body Dressing Moderate Assist  (don socks)   Toileting Minimal Assist  (support in standing when urinating)   mRS Prior to admission   Prior to admission mRS 0   Modified [place] (mRS)   [place] Score 4   Functional Mobility   Sit to Stand Contact Guard Assist   Bed, Chair, Wheelchair Transfer Minimal Assist   Toilet Transfers Minimal Assist   Transfer Method Stand Step   Mobility supine>sit EOB> in rm> bathroom> sink> chair   Comments w/FWW   Visual Perception   Visual Perception  X   Visual Acuity Impaired Bilaterally   Visual Scanning Impaired   Comments pt reporting blurriness, R eye > L eye. baseline wears reading glasses   Edema / Skin Assessment   Edema / Skin  WDL   Activity Tolerance   Sitting in Chair post session   Sitting Edge of Bed 4  min   Standing total 7 min   Patient / Family Goals   Patient / Family Goal #1 to get better   Short Term Goals   Short Term Goal # 1 Pt will complete functional mobility SBA   Short Term Goal # 2 Pt will complete toileting SBA   Short Term Goal # 3 Pt will complete standing grooming tasks SBA   Short Term Goal # 4 Pt will complete LB dressing SBA   Education Group   Education Provided Role of Occupational Therapist;Activities of Daily Living;Stroke   Role of Occupational Therapist Patient Response Patient;Acceptance;Explanation;Demonstration;Verbal Demonstration;Action Demonstration   Stroke Patient Response Patient;Acceptance;Explanation;Demonstration;Reinforcement Needed   ADL Patient Response Patient;Acceptance;Explanation;Demonstration;Reinforcement Needed   Occupational Therapy Initial Treatment Plan    Treatment Interventions Self Care / Activities of Daily Living;Neuro Re-Education / Balance;Therapeutic Exercises;Therapeutic Activity;Cognitive Skill Development   Treatment Frequency 4 Times per Week   Duration Until Therapy Goals Met   Problem List   Problem List Decreased Active Daily Living Skills;Decreased Upper Extremity Strength Left;Decreased Functional Mobility;Decreased Activity Tolerance;Safety Awareness Deficits / Cognition;Impaired Coordination Left Upper Extremity;Impaired Cognitive Function   Anticipated Discharge Equipment and Recommendations   DC Equipment Recommendations Unable to determine at this time   Discharge Recommendations Recommend post-acute placement for additional occupational therapy services prior to discharge home   Interdisciplinary Plan of Care Collaboration   IDT Collaboration with  Nursing;Physical Therapist;Speech Therapist   Patient Position at End of Therapy Seated;Chair Alarm On;Tray Table within Reach;Call Light within Reach   Collaboration Comments RN, SLP updated   Session Information   Date / Session Number  9/16, #1 (1/4, 9/22)

## 2024-09-16 NOTE — PROGRESS NOTES
Hospital Medicine Daily Progress Note    Date of Service  9/15/2024    Chief Complaint  Joe Templeton is a 74 y.o. male admitted 9/13/2024 with left sided weakness    Hospital Course  Mr. Templeton is a 74 y.o. male who presented 9/13/2024 with past medical history of coronary disease status post CABG, gout who presents to the hospital for left-sided weakness, dysarthria, fevers and left ankle swelling.  Apparently the patient's symptoms started 3 days ago and his family called 911.  The patient's been refusing to come to the hospital.  Overall patient was confused on my examination and unable to answer my questions.  He did not remember that he has family in [place].  He denies any cough, shortness of breath, dysuria, diarrhea, and vomiting.     Chest x-ray interpreted by me found no acute pulmonary process  EKG interpreted by me found normal sinus rhythm, LVH     CTA of the head and neck found mild to moderate atherosclerotic plaquing of the mid to distal common carotid arteries especially the common carotid artery bifurcation.  Moderate atherosclerotic plaquing of V4 segment bilaterally.    In the ER had had left ankle pain and swelling for which 4 mL of fluid was aspirated and he was initiated on IV Vancomycin and Zosyn.    Interval Problem Update  9/14: Mr. Templeton was evaluated on the neuro floor. He remains on broad-spectrum IV antibiotics. MRI brain is pending. He is oriented to month and year. Left facial droop. He is going to the OR this morning with ortho.  IV Zosyn will be stopped and continue IV vancomycin.    9/15  Afebrile normal pulse and respiratory rate systolic blood pressure 160s pulse ox 94 to 95% on room air.  I reviewed MRI results, contacted neurology for consult for acute CVA.  Went to bedside discussed with patient.  Started on aspirin and statin.  Initially there were concerns of endocarditis however blood cultures negative, TTE pending.     I have discussed this patient's plan of care and  discharge plan at IDT rounds today with Case Management, Nursing, Nursing leadership, and other members of the IDT team.    Consultants/Specialty  orthopedics    Code Status  Full Code    Disposition  The patient is not medically cleared for discharge to home or a post-acute facility.      I have placed the appropriate orders for post-discharge needs.    Review of Systems  Review of Systems   Unable to perform ROS: Mental acuity        Physical Exam  Temp:  [36.1 °C (97 °F)-36.3 °C (97.3 °F)] 36.3 °C (97.3 °F)  Pulse:  [70-80] 80  Resp:  [18] 18  BP: (162-186)/(84-98) 186/98  SpO2:  [94 %-95 %] 94 %    Physical Exam  Vitals and nursing note reviewed.   Constitutional:       General: He is not in acute distress.     Appearance: He is not ill-appearing.   HENT:      Nose: Nose normal.      Mouth/Throat:      Mouth: Mucous membranes are moist.      Pharynx: Oropharynx is clear.      Comments: Very poor dentition  Eyes:      Extraocular Movements: Extraocular movements intact.      Conjunctiva/sclera: Conjunctivae normal.   Cardiovascular:      Rate and Rhythm: Normal rate and regular rhythm.      Heart sounds: Murmur heard.   Pulmonary:      Effort: No respiratory distress.      Breath sounds: No wheezing, rhonchi or rales.   Abdominal:      General: Abdomen is flat. There is no distension.      Tenderness: There is no abdominal tenderness.   Musculoskeletal:      Cervical back: No rigidity.      Right lower leg: No edema.      Left lower leg: No edema.      Comments: Left ankle medial malleolus swollen, red, tender   Skin:     General: Skin is warm and dry.      Coloration: Skin is not jaundiced.   Neurological:      Mental Status: He is alert.      Cranial Nerves: No cranial nerve deficit.      Sensory: No sensory deficit.      Motor: No weakness.      Comments: Strength approx equal bilat arms and legs  Mild left facial droop   Psychiatric:      Comments: Odd affect   Generally oriented but an overall poor  historian.         Fluids    Intake/Output Summary (Last 24 hours) at 9/16/2024 0715  Last data filed at 9/15/2024 2000  Gross per 24 hour   Intake 2417.53 ml   Output 640 ml   Net 1777.53 ml        Laboratory  Recent Labs     09/13/24  1728 09/14/24  0216   WBC 15.1* 14.6*   RBC 5.13 4.99   HEMOGLOBIN 16.1 15.9   HEMATOCRIT 46.0 45.8   MCV 89.7 91.8   MCH 31.4 31.9   MCHC 35.0 34.7   RDW 39.6 42.0   PLATELETCT 231 224   MPV 11.6 11.3     Recent Labs     09/13/24  1728 09/14/24  0216   SODIUM 139 145   POTASSIUM 4.1 3.9   CHLORIDE 105 106   CO2 20 22   GLUCOSE 150* 156*   BUN 12 13   CREATININE 0.82 0.81   CALCIUM 9.0 9.2     Recent Labs     09/13/24  2240   APTT 31.1   INR 1.14*         Recent Labs     09/14/24  0216   TRIGLYCERIDE 73   HDL 55   LDL 99       Imaging  MR-BRAIN-W/O   Final Result      1.  Moderate cerebral atrophy.   2.  Advanced supratentorial white matter disease most consistent with microvascular ischemic change.   3.  Old microhemorrhage left posterior sylvian frontal lobe and left occipital lobe most consistent with old hypertensive microhemorrhage versus amyloid angiopathy.   4.  Old lacunar infarcts x2 in the right thalamus old lacunar infarcts x2 in the left thalamus.   5.  Old cortical infarct left parietal lobe at the postcentral gyrus.   6.  Subcentimeter focus of acute infarction right insula.   7.  Subcentimeter focus acute infarction left posterior parietal lobe.   8.  Subcentimeter focus of acute or recent subacute infarction right parietal periventricular white matter/splenium corpus callosum.   9.  Acute brainstem infarct in the left side of the hao.   10.  Multiple old lacunar size infarcts left cerebellar hemisphere.   11.  Moderate-sized old infarct right cerebellar hemisphere.   12.  No acute infarcts show hemorrhagic transformation.   13.  Multiple vascular territories are involved. Possible embolic mechanism.      CT-CTA NECK WITH & W/O-POST PROCESSING   Final Result      1.   "Mild to moderate atherosclerotic plaquing of the mid and distal common carotid arteries and especially the common carotid artery bifurcations. Bilateral internal carotid stenoses of less than 50%.      2.  Patent vertebrobasilar system with moderate atherosclerotic plaquing of the V4 segments bilaterally      CT-CTA HEAD WITH & W/O-POST PROCESS   Final Result      CT angiogram of the [place] within normal limits.      CT-CEREBRAL PERFUSION ANALYSIS   Final Result      1. Cerebral blood flow less than 30% possibly representing completed infarct = 0 mL.      2. T Max more than 6 seconds possibly representing combination of completed infarct and ischemia = 0 mL.      3. Mismatched volume possibly representing ischemic brain/penumbra= 0 mL      4.  Please note that this cerebral perfusion study and report is Quantitative and targets supratentorial (cerebral) perfusion for evaluation of large vessel territory acute ischemia/infarction. For example, lacunar infarcts, and brainstem/posterior fossa    ischemia/infarction are not evaluated on this study.  Data acquisition is subject to artifacts which can yield non-anatomically plausible perfusion maps which may be due to motion, bolus timing, signal to noise ratio, or other technical factors.    Perfusion map abnormalities which show non-anatomic distributions are likely artifact.   This study is not \"stand-alone\" and should only be utilized for diagnosis, management/treatment in correlation with CT, CTA, and/or MRI and clinical factors.         DX-ANKLE 2- VIEWS LEFT   Final Result      1. Clinical soft tissue swelling.   2. No acute fracture or subluxation.   3. Polyarticular osteoarthritis.   4. Atherosclerosis.      DX-CHEST-PORTABLE (1 VIEW)   Final Result      No acute cardiac or pulmonary abnormalities are identified.      EC-ECHOCARDIOGRAM COMPLETE W/ CONT    (Results Pending)        Assessment/Plan  * Left-sided weakness- (present on admission)  Assessment " "& Plan  Highly concerned that the patient may have endocarditis since he has a new murmur, encephalopathy and left-sided weakness.  Currently he is not weak and left side though does  have slight facial droop  Patient is past the tPA window  Patient will be placed on continuous cardiac monitoring to evaluate for any arrhythmias  MRI of the brain is pending  Check 2-D echo  Patient will be started on full dose aspirin and high intensity statin  Check TSH, lipid panel and hemoglobin A1c  PTOT and ST evaluation once he is stabilized      Systolic heart failure (HCC)  Assessment & Plan  History of  Last EF was 40 to 55%  Without exacerbation      Sepsis (HCC)  Assessment & Plan  This is Sepsis Present on admission  SIRS criteria identified on my evaluation include: Fever, with temperature greater than 100.9 deg F, Tachycardia, with heart rate greater than 90 BPM, and Leukocytosis, with WBC greater than 12,000  Clinical indicators of end organ dysfunction include Lactic Acid greater than 2  Source is left ankle septic arthritis  Sepsis protocol initiated  Crystalloid Fluid Administration: Fluid resuscitation ordered per standard protocol - 30 mL/kg per current or ideal body weight  IV antibiotics as appropriate for source of sepsis  Reassessment: I have reassessed the patient's hemodynamic status    Acute encephalopathy- (present on admission)  Assessment & Plan  Unclear baseline.  He is oriented x 4 though certainly a bit \"off\"  Way family members to give further updates on his baseline.      Murmur  Assessment & Plan  Cardiac echo is pending to rule out endocarditis  Follow-up blood cultures    Hx of CABG  Assessment & Plan  Continue aspirin and statins    Left ankle swelling- (present on admission)  Assessment & Plan  Arthrocentesis will be completed in ER and will follow-up on cell count and cultures  He was started on IV Zosyn and vancomycin.  Vancomycin will be continued Zosyn be held and orthopedics will take him " to the OR today.      Essential (primary) hypertension- (present on admission)  Assessment & Plan  Uncontrolled  Continue current home medications  IV as needed medications have been ordered           VTE prophylaxis:   SCDs/TEDs      I have performed a physical exam and reviewed and updated ROS and Plan today (9/15/2024). In review of yesterday's note (9/14/2024), there are no changes except as documented above.  Total time spent 55 minutes. I spent greater than 50% of the time for patient care, counseling, and coordination on this date, including unit/floor time, and face-to-face time with the patient as per interval events, my own review of patient's imaging and lab analysis and developing my assessment and plan above.

## 2024-09-16 NOTE — PROGRESS NOTES
Hospital Medicine Daily Progress Note    Date of Service  9/15/2024    Chief Complaint  Joe Templeton is a 74 y.o. male admitted 9/13/2024 with left sided weakness    Hospital Course  Mr. Templeton is a 74 y.o. male who presented 9/13/2024 with past medical history of coronary disease status post CABG, gout who presents to the hospital for left-sided weakness, dysarthria, fevers and left ankle swelling.  Apparently the patient's symptoms started 3 days ago and his family called 911.  The patient's been refusing to come to the hospital.  Overall patient was confused on my examination and unable to answer my questions.  He did not remember that he has family in [place].  He denies any cough, shortness of breath, dysuria, diarrhea, and vomiting.     Chest x-ray interpreted by me found no acute pulmonary process  EKG interpreted by me found normal sinus rhythm, LVH     CTA of the head and neck found mild to moderate atherosclerotic plaquing of the mid to distal common carotid arteries especially the common carotid artery bifurcation.  Moderate atherosclerotic plaquing of V4 segment bilaterally.    In the ER had had left ankle pain and swelling for which 4 mL of fluid was aspirated and he was initiated on IV Vancomycin and Zosyn.    Interval Problem Update  Patient was seen and examined at bedside.  No acute events overnight. Patient is resting comfortably in bed and in no acute distress.     MRI brain with evidence of stroke - neurology consulted  Await 2d echo  S/p OR 09/14 - Left ankle arthrotomy I&D      I have discussed this patient's plan of care and discharge plan at IDT rounds today with Case Management, Nursing, Nursing leadership, and other members of the IDT team.    Consultants/Specialty  orthopedics    Code Status  Full Code    Disposition  Medically Cleared  I have placed the appropriate orders for post-discharge needs.    Review of Systems  Review of Systems   Unable to perform ROS: Mental acuity         Physical Exam  Temp:  [36.1 °C (97 °F)-36.3 °C (97.3 °F)] 36.1 °C (97 °F)  Pulse:  [70-80] 77  Resp:  [18] 18  BP: (169-188)/() 188/87  SpO2:  [94 %-97 %] 97 %    Physical Exam  Vitals and nursing note reviewed.   Constitutional:       General: He is not in acute distress.     Appearance: He is not ill-appearing.   HENT:      Nose: Nose normal.      Mouth/Throat:      Mouth: Mucous membranes are moist.      Pharynx: Oropharynx is clear.      Comments: Very poor dentition  Eyes:      Extraocular Movements: Extraocular movements intact.      Conjunctiva/sclera: Conjunctivae normal.   Cardiovascular:      Rate and Rhythm: Normal rate and regular rhythm.      Heart sounds: Murmur heard.   Pulmonary:      Effort: No respiratory distress.      Breath sounds: No wheezing, rhonchi or rales.   Abdominal:      General: Abdomen is flat. There is no distension.      Tenderness: There is no abdominal tenderness.   Musculoskeletal:      Cervical back: No rigidity.      Right lower leg: No edema.      Left lower leg: No edema.      Comments: Left ankle medial malleolus swollen, red, tender   Skin:     General: Skin is warm and dry.      Coloration: Skin is not jaundiced.   Neurological:      Mental Status: He is alert.      Cranial Nerves: No cranial nerve deficit.      Sensory: No sensory deficit.      Motor: No weakness.      Comments: Strength approx equal bilat arms and legs  Mild left facial droop   Psychiatric:      Comments: Odd affect   Generally oriented but an overall poor historian.         Fluids    Intake/Output Summary (Last 24 hours) at 9/16/2024 1252  Last data filed at 9/15/2024 2000  Gross per 24 hour   Intake 2177.53 ml   Output 0 ml   Net 2177.53 ml        Laboratory  Recent Labs     09/13/24  1728 09/14/24  0216 09/16/24  0759   WBC 15.1* 14.6* 11.4*   RBC 5.13 4.99 4.44*   HEMOGLOBIN 16.1 15.9 13.9*   HEMATOCRIT 46.0 45.8 41.3*   MCV 89.7 91.8 93.0   MCH 31.4 31.9 31.3   MCHC 35.0 34.7 33.7   RDW  39.6 42.0 42.8   PLATELETCT 231 224 242   MPV 11.6 11.3 11.6     Recent Labs     09/13/24  1728 09/14/24  0216 09/16/24  0759   SODIUM 139 145 142   POTASSIUM 4.1 3.9 3.8   CHLORIDE 105 106 111   CO2 20 22 19*   GLUCOSE 150* 156* 120*   BUN 12 13 21   CREATININE 0.82 0.81 0.82   CALCIUM 9.0 9.2 8.5     Recent Labs     09/13/24  2240   APTT 31.1   INR 1.14*         Recent Labs     09/14/24  0216   TRIGLYCERIDE 73   HDL 55   LDL 99       Imaging  MR-BRAIN-W/O   Final Result      1.  Moderate cerebral atrophy.   2.  Advanced supratentorial white matter disease most consistent with microvascular ischemic change.   3.  Old microhemorrhage left posterior sylvian frontal lobe and left occipital lobe most consistent with old hypertensive microhemorrhage versus amyloid angiopathy.   4.  Old lacunar infarcts x2 in the right thalamus old lacunar infarcts x2 in the left thalamus.   5.  Old cortical infarct left parietal lobe at the postcentral gyrus.   6.  Subcentimeter focus of acute infarction right insula.   7.  Subcentimeter focus acute infarction left posterior parietal lobe.   8.  Subcentimeter focus of acute or recent subacute infarction right parietal periventricular white matter/splenium corpus callosum.   9.  Acute brainstem infarct in the left side of the hao.   10.  Multiple old lacunar size infarcts left cerebellar hemisphere.   11.  Moderate-sized old infarct right cerebellar hemisphere.   12.  No acute infarcts show hemorrhagic transformation.   13.  Multiple vascular territories are involved. Possible embolic mechanism.      CT-CTA NECK WITH & W/O-POST PROCESSING   Final Result      1.  Mild to moderate atherosclerotic plaquing of the mid and distal common carotid arteries and especially the common carotid artery bifurcations. Bilateral internal carotid stenoses of less than 50%.      2.  Patent vertebrobasilar system with moderate atherosclerotic plaquing of the V4 segments bilaterally      CT-CTA HEAD WITH &  "W/O-POST PROCESS   Final Result      CT angiogram of the [place] within normal limits.      CT-CEREBRAL PERFUSION ANALYSIS   Final Result      1. Cerebral blood flow less than 30% possibly representing completed infarct = 0 mL.      2. T Max more than 6 seconds possibly representing combination of completed infarct and ischemia = 0 mL.      3. Mismatched volume possibly representing ischemic brain/penumbra= 0 mL      4.  Please note that this cerebral perfusion study and report is Quantitative and targets supratentorial (cerebral) perfusion for evaluation of large vessel territory acute ischemia/infarction. For example, lacunar infarcts, and brainstem/posterior fossa    ischemia/infarction are not evaluated on this study.  Data acquisition is subject to artifacts which can yield non-anatomically plausible perfusion maps which may be due to motion, bolus timing, signal to noise ratio, or other technical factors.    Perfusion map abnormalities which show non-anatomic distributions are likely artifact.   This study is not \"stand-alone\" and should only be utilized for diagnosis, management/treatment in correlation with CT, CTA, and/or MRI and clinical factors.         DX-ANKLE 2- VIEWS LEFT   Final Result      1. Clinical soft tissue swelling.   2. No acute fracture or subluxation.   3. Polyarticular osteoarthritis.   4. Atherosclerosis.      DX-CHEST-PORTABLE (1 VIEW)   Final Result      No acute cardiac or pulmonary abnormalities are identified.      EC-ECHOCARDIOGRAM COMPLETE W/ CONT    (Results Pending)        Assessment/Plan  * Stroke (HCC)  Assessment & Plan  MRI brain with multifocal embolic appearing infarcts in multiple vascular territories, no hemorrhage   Consult to Neurology   Cardioembolic workup  Await echo    Septic arthritis of left ankle (HCC)- (present on admission)  Assessment & Plan  S/p OR 09/14 - Left ankle arthrotomy I&D  Continue IV vanc  Gram stain and wound culture negative  Vancomycin " "requiring frequent monitoring of trough levels and renal function for vanc toxicity      Systolic heart failure (HCC)  Assessment & Plan  History of  Last EF was 40 to 55%  Without exacerbation      Left-sided weakness- (present on admission)  Assessment & Plan  Highly concerned that the patient may have endocarditis since he has a new murmur, encephalopathy and left-sided weakness.  Currently he is not weak and left side though does  have slight facial droop  Patient is past the tPA window  Patient will be placed on continuous cardiac monitoring to evaluate for any arrhythmias  MRI of the brain is pending  Check 2-D echo  Patient will be started on full dose aspirin and high intensity statin  Check TSH, lipid panel and hemoglobin A1c  PTOT and ST evaluation once he is stabilized      Acute encephalopathy- (present on admission)  Assessment & Plan  Unclear baseline.  He is oriented x 4 though certainly a bit \"off\"  Way family members to give further updates on his baseline.      Murmur  Assessment & Plan  Cardiac echo is pending to rule out endocarditis  Follow-up blood cultures    Hx of CABG  Assessment & Plan  Continue aspirin and statins    Essential (primary) hypertension- (present on admission)  Assessment & Plan  Uncontrolled  Continue current home medications  IV as needed medications have been ordered      Sepsis (HCC)  Assessment & Plan  This is Sepsis Present on admission         VTE prophylaxis:   SCDs/TEDs   enoxaparin ppx      Greater than 51 minutes spent prepping to see patient (e.g. review of tests) obtaining and/or reviewing separately obtained history. Performing a medically appropriate examination and/ evaluation.  Counseling and educating the patient/family/caregiver.  Ordering medications, tests, or procedures.  Referring and communicating with other health care professionals.  Documenting clinical information in EPIC.  Independently interpreting results and communicating results to " patient/family/caregiver.  Care coordination.

## 2024-09-16 NOTE — PROGRESS NOTES
Monitor summary: SR 71-99, [place] 0.16, QRS 0.09, QT 0.41, with rare PVCs per strip from monitor room.

## 2024-09-16 NOTE — ASSESSMENT & PLAN NOTE
MRI brain with multifocal embolic appearing infarcts in multiple vascular territories, no hemorrhage   Consult to Neurology   Cardioembolic workup  Await echo

## 2024-09-17 ENCOUNTER — APPOINTMENT (OUTPATIENT)
Dept: CARDIOLOGY | Facility: MEDICAL CENTER | Age: 74
DRG: 853 | End: 2024-09-17
Attending: HOSPITALIST
Payer: MEDICARE

## 2024-09-17 LAB
ALBUMIN SERPL BCP-MCNC: 3.6 G/DL (ref 3.2–4.9)
BACTERIA FLD AEROBE CULT: NORMAL
BACTERIA WND AEROBE CULT: NORMAL
BASOPHILS # BLD AUTO: 0.3 % (ref 0–1.8)
BASOPHILS # BLD: 0.03 K/UL (ref 0–0.12)
BUN SERPL-MCNC: 17 MG/DL (ref 8–22)
CALCIUM ALBUM COR SERPL-MCNC: 9.3 MG/DL (ref 8.5–10.5)
CALCIUM SERPL-MCNC: 9 MG/DL (ref 8.5–10.5)
CHLORIDE SERPL-SCNC: 108 MMOL/L (ref 96–112)
CO2 SERPL-SCNC: 21 MMOL/L (ref 20–33)
CREAT SERPL-MCNC: 0.72 MG/DL (ref 0.5–1.4)
EOSINOPHIL # BLD AUTO: 0.2 K/UL (ref 0–0.51)
EOSINOPHIL NFR BLD: 1.9 % (ref 0–6.9)
ERYTHROCYTE [DISTWIDTH] IN BLOOD BY AUTOMATED COUNT: 42 FL (ref 35.9–50)
GFR SERPLBLD CREATININE-BSD FMLA CKD-EPI: 96 ML/MIN/1.73 M 2
GLUCOSE SERPL-MCNC: 121 MG/DL (ref 65–99)
GRAM STN SPEC: NORMAL
GRAM STN SPEC: NORMAL
HCT VFR BLD AUTO: 44.9 % (ref 42–52)
HGB BLD-MCNC: 15 G/DL (ref 14–18)
IMM GRANULOCYTES # BLD AUTO: 0.03 K/UL (ref 0–0.11)
IMM GRANULOCYTES NFR BLD AUTO: 0.3 % (ref 0–0.9)
LV EJECT FRACT  99904: 60
LV EJECT FRACT MOD 2C 99903: 54.72
LV EJECT FRACT MOD 4C 99902: 58.14
LV EJECT FRACT MOD BP 99901: 57.61
LYMPHOCYTES # BLD AUTO: 1.83 K/UL (ref 1–4.8)
LYMPHOCYTES NFR BLD: 17.8 % (ref 22–41)
MAGNESIUM SERPL-MCNC: 2 MG/DL (ref 1.5–2.5)
MCH RBC QN AUTO: 30.8 PG (ref 27–33)
MCHC RBC AUTO-ENTMCNC: 33.4 G/DL (ref 32.3–36.5)
MCV RBC AUTO: 92.2 FL (ref 81.4–97.8)
MONOCYTES # BLD AUTO: 1.02 K/UL (ref 0–0.85)
MONOCYTES NFR BLD AUTO: 9.9 % (ref 0–13.4)
NEUTROPHILS # BLD AUTO: 7.15 K/UL (ref 1.82–7.42)
NEUTROPHILS NFR BLD: 69.8 % (ref 44–72)
NRBC # BLD AUTO: 0 K/UL
NRBC BLD-RTO: 0 /100 WBC (ref 0–0.2)
PHOSPHATE SERPL-MCNC: 3.7 MG/DL (ref 2.5–4.5)
PLATELET # BLD AUTO: 245 K/UL (ref 164–446)
PMV BLD AUTO: 11.5 FL (ref 9–12.9)
POTASSIUM SERPL-SCNC: 3.3 MMOL/L (ref 3.6–5.5)
RBC # BLD AUTO: 4.87 M/UL (ref 4.7–6.1)
SIGNIFICANT IND 70042: NORMAL
SIGNIFICANT IND 70042: NORMAL
SITE SITE: NORMAL
SITE SITE: NORMAL
SODIUM SERPL-SCNC: 143 MMOL/L (ref 135–145)
SOURCE SOURCE: NORMAL
SOURCE SOURCE: NORMAL
WBC # BLD AUTO: 10.3 K/UL (ref 4.8–10.8)

## 2024-09-17 PROCEDURE — A9270 NON-COVERED ITEM OR SERVICE: HCPCS | Performed by: HOSPITALIST

## 2024-09-17 PROCEDURE — 700102 HCHG RX REV CODE 250 W/ 637 OVERRIDE(OP): Performed by: STUDENT IN AN ORGANIZED HEALTH CARE EDUCATION/TRAINING PROGRAM

## 2024-09-17 PROCEDURE — 97130 THER IVNTJ EA ADDL 15 MIN: CPT

## 2024-09-17 PROCEDURE — 700102 HCHG RX REV CODE 250 W/ 637 OVERRIDE(OP): Performed by: HOSPITALIST

## 2024-09-17 PROCEDURE — 92526 ORAL FUNCTION THERAPY: CPT

## 2024-09-17 PROCEDURE — 83735 ASSAY OF MAGNESIUM: CPT

## 2024-09-17 PROCEDURE — A9270 NON-COVERED ITEM OR SERVICE: HCPCS | Mod: JZ

## 2024-09-17 PROCEDURE — 700111 HCHG RX REV CODE 636 W/ 250 OVERRIDE (IP): Performed by: HOSPITALIST

## 2024-09-17 PROCEDURE — 700102 HCHG RX REV CODE 250 W/ 637 OVERRIDE(OP): Performed by: INTERNAL MEDICINE

## 2024-09-17 PROCEDURE — 85025 COMPLETE CBC W/AUTO DIFF WBC: CPT

## 2024-09-17 PROCEDURE — A9270 NON-COVERED ITEM OR SERVICE: HCPCS

## 2024-09-17 PROCEDURE — 93306 TTE W/DOPPLER COMPLETE: CPT | Mod: 26 | Performed by: INTERNAL MEDICINE

## 2024-09-17 PROCEDURE — 97110 THERAPEUTIC EXERCISES: CPT

## 2024-09-17 PROCEDURE — 97129 THER IVNTJ 1ST 15 MIN: CPT

## 2024-09-17 PROCEDURE — 97116 GAIT TRAINING THERAPY: CPT

## 2024-09-17 PROCEDURE — 700111 HCHG RX REV CODE 636 W/ 250 OVERRIDE (IP): Mod: JZ | Performed by: INTERNAL MEDICINE

## 2024-09-17 PROCEDURE — 80069 RENAL FUNCTION PANEL: CPT

## 2024-09-17 PROCEDURE — 93306 TTE W/DOPPLER COMPLETE: CPT

## 2024-09-17 PROCEDURE — 36415 COLL VENOUS BLD VENIPUNCTURE: CPT

## 2024-09-17 PROCEDURE — 700102 HCHG RX REV CODE 250 W/ 637 OVERRIDE(OP): Mod: JZ

## 2024-09-17 PROCEDURE — 700111 HCHG RX REV CODE 636 W/ 250 OVERRIDE (IP): Mod: JZ

## 2024-09-17 PROCEDURE — 770020 HCHG ROOM/CARE - TELE (206)

## 2024-09-17 PROCEDURE — 700102 HCHG RX REV CODE 250 W/ 637 OVERRIDE(OP)

## 2024-09-17 PROCEDURE — 700111 HCHG RX REV CODE 636 W/ 250 OVERRIDE (IP): Mod: JZ | Performed by: HOSPITALIST

## 2024-09-17 PROCEDURE — A9270 NON-COVERED ITEM OR SERVICE: HCPCS | Performed by: STUDENT IN AN ORGANIZED HEALTH CARE EDUCATION/TRAINING PROGRAM

## 2024-09-17 PROCEDURE — 99233 SBSQ HOSP IP/OBS HIGH 50: CPT | Performed by: HOSPITALIST

## 2024-09-17 PROCEDURE — 700105 HCHG RX REV CODE 258: Performed by: HOSPITALIST

## 2024-09-17 PROCEDURE — A9270 NON-COVERED ITEM OR SERVICE: HCPCS | Performed by: INTERNAL MEDICINE

## 2024-09-17 RX ORDER — POTASSIUM CHLORIDE 1500 MG/1
40 TABLET, EXTENDED RELEASE ORAL ONCE
Status: COMPLETED | OUTPATIENT
Start: 2024-09-17 | End: 2024-09-17

## 2024-09-17 RX ORDER — AMLODIPINE BESYLATE 2.5 MG/1
2.5 TABLET ORAL ONCE
Status: COMPLETED | OUTPATIENT
Start: 2024-09-17 | End: 2024-09-17

## 2024-09-17 RX ORDER — HALOPERIDOL 5 MG/ML
5 INJECTION INTRAMUSCULAR EVERY 4 HOURS PRN
Status: DISCONTINUED | OUTPATIENT
Start: 2024-09-17 | End: 2024-09-18

## 2024-09-17 RX ORDER — AMLODIPINE BESYLATE 5 MG/1
10 TABLET ORAL
Status: DISCONTINUED | OUTPATIENT
Start: 2024-09-18 | End: 2024-09-20 | Stop reason: HOSPADM

## 2024-09-17 RX ORDER — HYDRALAZINE HYDROCHLORIDE 20 MG/ML
10 INJECTION INTRAMUSCULAR; INTRAVENOUS EVERY 6 HOURS PRN
Status: DISCONTINUED | OUTPATIENT
Start: 2024-09-17 | End: 2024-09-20 | Stop reason: HOSPADM

## 2024-09-17 RX ORDER — HYDRALAZINE HYDROCHLORIDE 10 MG/1
10 TABLET, FILM COATED ORAL EVERY 8 HOURS
Status: DISCONTINUED | OUTPATIENT
Start: 2024-09-17 | End: 2024-09-17

## 2024-09-17 RX ORDER — HYDRALAZINE HYDROCHLORIDE 25 MG/1
25 TABLET, FILM COATED ORAL EVERY 8 HOURS
Status: DISCONTINUED | OUTPATIENT
Start: 2024-09-17 | End: 2024-09-18

## 2024-09-17 RX ADMIN — VANCOMYCIN HYDROCHLORIDE 750 MG: 5 INJECTION, POWDER, LYOPHILIZED, FOR SOLUTION INTRAVENOUS at 16:16

## 2024-09-17 RX ADMIN — HYDRALAZINE HYDROCHLORIDE 25 MG: 25 TABLET ORAL at 21:30

## 2024-09-17 RX ADMIN — Medication 5 MG: at 21:30

## 2024-09-17 RX ADMIN — POTASSIUM CHLORIDE 40 MEQ: 1500 TABLET, EXTENDED RELEASE ORAL at 15:52

## 2024-09-17 RX ADMIN — ASPIRIN 81 MG: 81 TABLET, CHEWABLE ORAL at 05:34

## 2024-09-17 RX ADMIN — ENOXAPARIN SODIUM 40 MG: 100 INJECTION SUBCUTANEOUS at 16:55

## 2024-09-17 RX ADMIN — METOPROLOL SUCCINATE 25 MG: 25 TABLET, EXTENDED RELEASE ORAL at 05:34

## 2024-09-17 RX ADMIN — LISINOPRIL 40 MG: 20 TABLET ORAL at 05:33

## 2024-09-17 RX ADMIN — HALOPERIDOL LACTATE 5 MG: 5 INJECTION, SOLUTION INTRAMUSCULAR at 21:30

## 2024-09-17 RX ADMIN — HYDRALAZINE HYDROCHLORIDE 10 MG: 20 INJECTION INTRAMUSCULAR; INTRAVENOUS at 16:00

## 2024-09-17 RX ADMIN — AMLODIPINE BESYLATE 2.5 MG: 2.5 TABLET ORAL at 09:19

## 2024-09-17 RX ADMIN — LABETALOL HYDROCHLORIDE 10 MG: 5 INJECTION, SOLUTION INTRAVENOUS at 05:49

## 2024-09-17 RX ADMIN — ATORVASTATIN CALCIUM 80 MG: 80 TABLET, FILM COATED ORAL at 16:55

## 2024-09-17 RX ADMIN — HYDRALAZINE HYDROCHLORIDE 25 MG: 25 TABLET ORAL at 16:43

## 2024-09-17 RX ADMIN — VANCOMYCIN HYDROCHLORIDE 750 MG: 5 INJECTION, POWDER, LYOPHILIZED, FOR SOLUTION INTRAVENOUS at 05:48

## 2024-09-17 RX ADMIN — POTASSIUM CHLORIDE 40 MEQ: 1500 TABLET, EXTENDED RELEASE ORAL at 05:33

## 2024-09-17 RX ADMIN — AMLODIPINE BESYLATE 5 MG: 5 TABLET ORAL at 05:34

## 2024-09-17 ASSESSMENT — COGNITIVE AND FUNCTIONAL STATUS - GENERAL
CLIMB 3 TO 5 STEPS WITH RAILING: A LOT
STANDING UP FROM CHAIR USING ARMS: A LITTLE
MOBILITY SCORE: 17
SUGGESTED CMS G CODE MODIFIER MOBILITY: CK
TURNING FROM BACK TO SIDE WHILE IN FLAT BAD: A LITTLE
WALKING IN HOSPITAL ROOM: A LITTLE
MOVING FROM LYING ON BACK TO SITTING ON SIDE OF FLAT BED: A LITTLE
MOVING TO AND FROM BED TO CHAIR: A LITTLE

## 2024-09-17 ASSESSMENT — ENCOUNTER SYMPTOMS
HEMOPTYSIS: 0
DIARRHEA: 0
PALPITATIONS: 0
MYALGIAS: 1
HEARTBURN: 0
DEPRESSION: 0
WEAKNESS: 1
COUGH: 0
DOUBLE VISION: 0
WHEEZING: 0
VOMITING: 0
BLURRED VISION: 0
BACK PAIN: 0
PND: 0
ABDOMINAL PAIN: 0
BRUISES/BLEEDS EASILY: 0
CHILLS: 0
HEADACHES: 0
CONSTIPATION: 0
FEVER: 0
MYALGIAS: 0
NERVOUS/ANXIOUS: 0
NAUSEA: 0
CLAUDICATION: 0
DIZZINESS: 0

## 2024-09-17 ASSESSMENT — GAIT ASSESSMENTS
DEVIATION: ANTALGIC;STEP TO;DECREASED BASE OF SUPPORT;DECREASED HEEL STRIKE;DECREASED TOE OFF
GAIT LEVEL OF ASSIST: CONTACT GUARD ASSIST
ASSISTIVE DEVICE: FRONT WHEEL WALKER
DISTANCE (FEET): 60

## 2024-09-17 ASSESSMENT — FIBROSIS 4 INDEX: FIB4 SCORE: 1.49

## 2024-09-17 ASSESSMENT — PAIN DESCRIPTION - PAIN TYPE: TYPE: ACUTE PAIN

## 2024-09-17 NOTE — PROGRESS NOTES
Assessment complete. PRN labetalol given due to high BP. BP rechecked and still elevated. Hospitalist notified. Pain medication ordered and given. Will continue to monitor.     At 0030 BP was at desired goal. Patient is confused and removing his tele box and Ivs. Education was given and not effective. Hospitalist was notified.    0545 I took patient's BP multiple times and systolic was above 200s multiple times. Patient was medicated per MAR. Difficult to tell if BP was accurate due to patient tensing his arm. There was one reading with systolic 110.

## 2024-09-17 NOTE — PROGRESS NOTES
Received NOC shift report, patient required x1 Haldol overnight. Patient refused telemetry overnight. Tele sitter in place.

## 2024-09-17 NOTE — PROGRESS NOTES
Monitor Summary: SR 73-93, [place] 0.17, QRS 0.10, QT 0.44, with occasional/rare PVCs and rare trigeminal couplets per strip from monitor room.

## 2024-09-17 NOTE — PROGRESS NOTES
Hospital Medicine Daily Progress Note    Date of Service  9/17/2024    Chief Complaint  Joe Templeton is a 74 y.o. male admitted 9/13/2024 with left sided weakness    Hospital Course  Mr. Templeton is a 74 y.o. male who presented 9/13/2024 with past medical history of coronary disease status post CABG, gout who presents to the hospital for left-sided weakness, dysarthria, fevers and left ankle swelling.  Apparently the patient's symptoms started 3 days ago and his family called 911.  The patient's been refusing to come to the hospital.  Overall patient was confused on my examination and unable to answer my questions.  He did not remember that he has family in [place].  He denies any cough, shortness of breath, dysuria, diarrhea, and vomiting.     Chest x-ray interpreted by me found no acute pulmonary process  EKG interpreted by me found normal sinus rhythm, LVH     CTA of the head and neck found mild to moderate atherosclerotic plaquing of the mid to distal common carotid arteries especially the common carotid artery bifurcation.  Moderate atherosclerotic plaquing of V4 segment bilaterally.    In the ER had had left ankle pain and swelling for which 4 mL of fluid was aspirated and he was initiated on IV Vancomycin and Zosyn.    Interval Problem Update  Patient was seen and examined at bedside.  No acute events overnight. Patient is resting comfortably in bed and in no acute distress.     MRI brain with evidence of stroke - neurology consulted  Await 2d echo  S/p OR 09/14 - Left ankle arthrotomy I&D      9/17 patient is new to me today he is in bed, he is in good spirit, left ankle pain, no fever no chills, I have discussed with infectious disease, continue IV vancomycin for now, blood cultures negative so far, echocardiogram is still pending, reviewed note from orthopedic surgery, I have added scheduled hydralazine for blood pressure control, patient still requiring IV hydralazine to keep his blood pressure under  control.      I have discussed this patient's plan of care and discharge plan at IDT rounds today with Case Management, Nursing, Nursing leadership, and other members of the IDT team.    Consultants/Specialty  orthopedics    Code Status  Full Code    Disposition  The patient is not medically cleared for discharge to home or a post-acute facility.      I have placed the appropriate orders for post-discharge needs.    Review of Systems  Review of Systems   Constitutional:  Negative for chills and fever.   Eyes:  Negative for blurred vision and double vision.   Respiratory:  Negative for cough, hemoptysis and wheezing.    Cardiovascular:  Negative for chest pain, palpitations, claudication, leg swelling and PND.   Gastrointestinal:  Negative for heartburn, nausea and vomiting.   Genitourinary:  Negative for hematuria and urgency.   Musculoskeletal:  Positive for joint pain. Negative for back pain and myalgias.   Skin:  Negative for rash.   Neurological:  Negative for dizziness and headaches.   Endo/Heme/Allergies:  Does not bruise/bleed easily.   Psychiatric/Behavioral:  Negative for depression.         Physical Exam  Temp:  [36.4 °C (97.6 °F)-36.8 °C (98.2 °F)] 36.8 °C (98.2 °F)  Pulse:  [80-91] 89  Resp:  [16-20] 16  BP: (147-195)/() 175/94  SpO2:  [94 %-95 %] 95 %    Physical Exam  Vitals and nursing note reviewed.   Constitutional:       General: He is not in acute distress.     Appearance: He is not ill-appearing.   HENT:      Nose: Nose normal.      Mouth/Throat:      Comments: Very poor dentition  Eyes:      General: No scleral icterus.  Cardiovascular:      Rate and Rhythm: Normal rate and regular rhythm.      Heart sounds: Murmur heard.   Pulmonary:      Effort: No respiratory distress.      Breath sounds: No wheezing, rhonchi or rales.   Abdominal:      General: Abdomen is flat. There is no distension.      Tenderness: There is no abdominal tenderness.   Musculoskeletal:      Cervical back: No rigidity.       Right lower leg: No edema.      Left lower leg: No edema.      Comments: Left ankle medial malleolus swollen, red, tender   Skin:     General: Skin is warm and dry.      Coloration: Skin is not jaundiced.   Neurological:      Mental Status: He is alert.      Cranial Nerves: No cranial nerve deficit.      Sensory: No sensory deficit.      Motor: No weakness.      Comments: Strength approx equal bilat arms and legs  Mild left facial droop   Psychiatric:      Comments: Odd affect   Generally oriented but an overall poor historian.         Fluids    Intake/Output Summary (Last 24 hours) at 9/17/2024 1627  Last data filed at 9/17/2024 1050  Gross per 24 hour   Intake 240 ml   Output --   Net 240 ml        Laboratory  Recent Labs     09/16/24  0759 09/17/24  0119   WBC 11.4* 10.3   RBC 4.44* 4.87   HEMOGLOBIN 13.9* 15.0   HEMATOCRIT 41.3* 44.9   MCV 93.0 92.2   MCH 31.3 30.8   MCHC 33.7 33.4   RDW 42.8 42.0   PLATELETCT 242 245   MPV 11.6 11.5     Recent Labs     09/16/24  0759 09/17/24  0119   SODIUM 142 143   POTASSIUM 3.8 3.3*   CHLORIDE 111 108   CO2 19* 21   GLUCOSE 120* 121*   BUN 21 17   CREATININE 0.82 0.72   CALCIUM 8.5 9.0                       Imaging  MR-BRAIN-W/O   Final Result      1.  Moderate cerebral atrophy.   2.  Advanced supratentorial white matter disease most consistent with microvascular ischemic change.   3.  Old microhemorrhage left posterior sylvian frontal lobe and left occipital lobe most consistent with old hypertensive microhemorrhage versus amyloid angiopathy.   4.  Old lacunar infarcts x2 in the right thalamus old lacunar infarcts x2 in the left thalamus.   5.  Old cortical infarct left parietal lobe at the postcentral gyrus.   6.  Subcentimeter focus of acute infarction right insula.   7.  Subcentimeter focus acute infarction left posterior parietal lobe.   8.  Subcentimeter focus of acute or recent subacute infarction right parietal periventricular white matter/splenium corpus  "callosum.   9.  Acute brainstem infarct in the left side of the hao.   10.  Multiple old lacunar size infarcts left cerebellar hemisphere.   11.  Moderate-sized old infarct right cerebellar hemisphere.   12.  No acute infarcts show hemorrhagic transformation.   13.  Multiple vascular territories are involved. Possible embolic mechanism.      CT-CTA NECK WITH & W/O-POST PROCESSING   Final Result      1.  Mild to moderate atherosclerotic plaquing of the mid and distal common carotid arteries and especially the common carotid artery bifurcations. Bilateral internal carotid stenoses of less than 50%.      2.  Patent vertebrobasilar system with moderate atherosclerotic plaquing of the V4 segments bilaterally      CT-CTA HEAD WITH & W/O-POST PROCESS   Final Result      CT angiogram of the [place] within normal limits.      CT-CEREBRAL PERFUSION ANALYSIS   Final Result      1. Cerebral blood flow less than 30% possibly representing completed infarct = 0 mL.      2. T Max more than 6 seconds possibly representing combination of completed infarct and ischemia = 0 mL.      3. Mismatched volume possibly representing ischemic brain/penumbra= 0 mL      4.  Please note that this cerebral perfusion study and report is Quantitative and targets supratentorial (cerebral) perfusion for evaluation of large vessel territory acute ischemia/infarction. For example, lacunar infarcts, and brainstem/posterior fossa    ischemia/infarction are not evaluated on this study.  Data acquisition is subject to artifacts which can yield non-anatomically plausible perfusion maps which may be due to motion, bolus timing, signal to noise ratio, or other technical factors.    Perfusion map abnormalities which show non-anatomic distributions are likely artifact.   This study is not \"stand-alone\" and should only be utilized for diagnosis, management/treatment in correlation with CT, CTA, and/or MRI and clinical factors.         DX-ANKLE 2- VIEWS " "LEFT   Final Result      1. Clinical soft tissue swelling.   2. No acute fracture or subluxation.   3. Polyarticular osteoarthritis.   4. Atherosclerosis.      DX-CHEST-PORTABLE (1 VIEW)   Final Result      No acute cardiac or pulmonary abnormalities are identified.      EC-ECHOCARDIOGRAM COMPLETE W/O CONT    (Results Pending)        Assessment/Plan  * Stroke ([facility])  Assessment & Plan  MRI brain with multifocal embolic appearing infarcts in multiple vascular territories, no hemorrhage   Consult to Neurology   Cardioembolic workup  Await echo    Systolic heart failure (HCC)  Assessment & Plan  History of  Last EF was 40 to 55%  Without exacerbation      Sepsis ([facility])  Assessment & Plan  This is Sepsis Present on admission    Acute encephalopathy- (present on admission)  Assessment & Plan  Unclear baseline.  He is oriented x 4 though certainly a bit \"off\"  Way family members to give further updates on his baseline.  Oriented x 4      Murmur  Assessment & Plan  Cardiac echo is pending to rule out endocarditis  Follow-up blood cultures    Left-sided weakness- (present on admission)  Assessment & Plan  Highly concerned that the patient may have endocarditis since he has a new murmur, encephalopathy and left-sided weakness.  Currently he is not weak and left side though does  have slight facial droop  Patient is past the tPA window  Patient will be placed on continuous cardiac monitoring to evaluate for any arrhythmias  MRI of the brain is pending  Check 2-D echo  Patient will be started on full dose aspirin and high intensity statin  Check TSH, lipid panel and hemoglobin A1c  PTOT and ST evaluation once he is stabilized    Follow-up echo      Hx of CABG  Assessment & Plan  Continue aspirin and statins    Septic arthritis of left ankle (HCC)- (present on admission)  Assessment & Plan  S/p OR 09/14 - Left ankle arthrotomy I&D  Continue IV vanc  Gram stain and wound culture negative  Vancomycin requiring frequent monitoring " of trough levels and renal function for vanc toxicity    Discussed with infectious disease Dr. Bae      Essential (primary) hypertension- (present on admission)  Assessment & Plan  Uncontrolled  Continue current home medications  IV as needed medications have been ordered    Patient still requiring IV hydralazine and labetalol to keep her blood pressure under control  Have added scheduled hydralazine         VTE prophylaxis: Lovenox      I have reviewed CBC  I have reviewed BMP  I have reviewed blood cultures and OR cultures  I have reviewed orthopedic surgery notes  I discussed with infectious disease  Patient requiring IV vancomycin monitoring for side effects include but not limited to acute kidney injury, monitoring BMP daily and vancomycin levels  Patient requiring IV hydralazine for blood pressure control, monitor for side effects include but not limited to hypotension bradycardia  Have ordered blood work for in a.m.

## 2024-09-17 NOTE — PROGRESS NOTES
"      Orthopaedic Progress Note    Interval changes:  Patient doing well   L ankle dressings are CDI  Cleared for DC to home by ortho pending medicine clearance    ROS - Patient denies any new issues.  Pain well controlled.    BP (!) 175/94   Pulse 89   Temp 36.8 °C (98.2 °F) (Temporal)   Resp 16   Ht 1.753 m (5' 9\")   Wt 78.6 kg (173 lb 4.5 oz)   SpO2 95%     Patient seen and examined  No acute distress  Breathing non labored  RRR  L ankle dressings CDI, DNVI, moves all toes, cap refill <2 sec.     Recent Labs     09/16/24  0759 09/17/24  0119   WBC 11.4* 10.3   RBC 4.44* 4.87   HEMOGLOBIN 13.9* 15.0   HEMATOCRIT 41.3* 44.9   MCV 93.0 92.2   MCH 31.3 30.8   MCHC 33.7 33.4   RDW 42.8 42.0   PLATELETCT 242 245   MPV 11.6 11.5       Active Hospital Problems    Diagnosis     Stroke (HCC) [I63.9]     Septic arthritis of left ankle (HCC) [M00.9]     Hx of CABG [Z95.1]     Left-sided weakness [R53.1]     Murmur [R01.1]     Acute encephalopathy [G93.40]     Sepsis (HCC) [A41.9]     Systolic heart failure (HCC) [I50.20]     Essential (primary) hypertension [I10]      10/31/14 IN ER dx with /152. Tx with IV labetalol and d/c on Metoprolol.          Assessment/Plan:  Patient doing well   L ankle dressings are CDI  Cleared for DC to home by ortho pending medicine clearance  POD#3 S/P Left ankle arthrotomy incision drainage  Wt bearing status - WBAT LLE  Wound care/Drains - Dressings to be changed every other day by nursing. Or PRN for saturation starting POD#2  Future Procedures - none planned   Lovenox: Start 9/16, Duration-until ambulatory > 150'  Sutures/Staples out- 14-21 days post operatively. Removal will completed by ortho mid levels only.  PT/OT-initiated  Antibiotics: vanco 750mg IV Q12  DVT Prophylaxis- TEDS/SCDs/Foot pumps  Silva-not needed per ortho  Case Coordination for Discharge Planning - Disposition per therapy recs.    "

## 2024-09-17 NOTE — PROGRESS NOTES
Monitor summary: SR, HR 75-84, MA .12, QRS .09, QT .39 with occasional pvc, rare coup, rare big per strip from monitor room  \

## 2024-09-17 NOTE — CONSULTS
"Consults  INFECTIOUS DISEASES INPATIENT CONSULT NOTE     Date of Service: 9/17/2024    Consult Requested By: Herbert Anderson M.D.    Reason for Consultation: Left ankle septic arthritis    History of Present Illness:   Joe Templeton is a 74 y.o.  admitted 9/13/2024. Pt has a past medical history of CAD status post CABG and gout who presented complaining of lower extremity weakness with left ankle swelling and pain.  Per chart the patient was encephalopathic on arrival and imaging of the head with MRI brain with old and acute infarcts.  Including acute infarction left posterior parietal lobe. Subcentimeter focus of acute or recent subacute infarction right parietal periventricular white matter/splenium corpus callosum. Acute brainstem infarct in the left side of the hao.cute brainstem infarct and left-sided hao and multiple vascular territories involvement suggesting a possible embolic mechanism.\"  Low-grade temperature and leukocytosis on arrival.  This is now improved.  He went to the OR with orthopedic surgery on 9/14 as below.  Patient has been on vancomycin.    Review Of Systems:  Review of Systems   Constitutional:  Negative for fever.   Respiratory:  Negative for cough.    Cardiovascular:  Negative for chest pain.   Gastrointestinal:  Negative for abdominal pain, constipation, diarrhea, nausea and vomiting.   Genitourinary:  Negative for dysuria.   Musculoskeletal:  Positive for joint pain and myalgias.   Neurological:  Positive for weakness.   Psychiatric/Behavioral:  The patient is not nervous/anxious.        PMH:   Past Medical History:   Diagnosis Date    Arthritis     Gout     Hemorrhoids 11/25/2014    Reportedly Pt has had hemorrhoids for some time. He denies any bloody stools or bleeding hemorrhoids.     Hypertension     Ingrown right big toenail 05/20/2015    Low back pain 05/20/2015    NSTEMI (non-ST elevated myocardial infarction) (HCC) 06/28/2016    Vitamin D deficiency 01/06/2015    " 14 lab result show slightly low Vitamin D level= 26. He states he walks outside a lot in the sun until the past month.       PSH:  Past Surgical History:   Procedure Laterality Date    IRRIGATION & DEBRIDEMENT GENERAL Left 2024    Procedure: IRRIGATION AND DEBRIDEMENT, ANKLE;  Surgeon: Alex Gautam M.D.;  Location: SURGERY [facility];  Service: Orthopedics    MULTIPLE CORONARY ARTERY BYPASS ENDO VEIN HARVEST N/A 2016    Procedure: MULTIPLE CORONARY ARTERY BYPASS ENDO VEIN HARVEST- With DELMI ;  Surgeon: Deejay Duong M.D.;  Location: SURGERY [facility];  Service:        FAMILY HX:  Family History   Problem Relation Age of Onset    Cancer Mother     Heart Disease Father     Hyperlipidemia Father     Hyperlipidemia Brother     Lung Disease Brother      Reviewed family history. No pertinent family history.     SOCIAL HX:  Social History     Socioeconomic History    Marital status: Single     Spouse name: Not on file    Number of children: 0    Years of education: Not on file    Highest education level: Not on file   Occupational History    Not on file   Tobacco Use    Smoking status: Former     Current packs/day: 0.00     Average packs/day: 0.3 packs/day for 0.5 years (0.1 ttl pk-yrs)     Types: Cigarettes     Start date: 2015     Quit date: 2016     Years since quittin.2    Smokeless tobacco: Never   Substance and Sexual Activity    Alcohol use: No    Drug use: No     Comment: Hx of drug abuse-pot,meth, cocaine (None for 15 yrs) never injected drugs    Sexual activity: Not Currently   Other Topics Concern    Not on file   Social History Narrative    Not on file     Social Determinants of Health     Financial Resource Strain: Not on file   Food Insecurity: No Food Insecurity (2024)    Hunger Vital Sign     Worried About Running Out of Food in the Last Year: Never true     Ran Out of Food in the Last Year: Never true   Transportation Needs: No Transportation Needs  (2024)    PRAPARE - Transportation     Lack of Transportation (Medical): No     Lack of Transportation (Non-Medical): No   Physical Activity: Not on file   Stress: Not on file   Social Connections: Not on file   Intimate Partner Violence: Not At Risk (2024)    Humiliation, Afraid, Rape, and Kick questionnaire     Fear of Current or Ex-Partner: No     Emotionally Abused: No     Physically Abused: No     Sexually Abused: No   Housing Stability: Low Risk  (2024)    Housing Stability Vital Sign     Unable to Pay for Housing in the Last Year: No     Number of Times Moved in the Last Year: 0     Homeless in the Last Year: No     Social History     Tobacco Use   Smoking Status Former    Current packs/day: 0.00    Average packs/day: 0.3 packs/day for 0.5 years (0.1 ttl pk-yrs)    Types: Cigarettes    Start date: 2015    Quit date: 2016    Years since quittin.2   Smokeless Tobacco Never     Social History     Substance and Sexual Activity   Alcohol Use No       Allergies/Intolerances:  No Known Allergies    History reviewed with the patient and /or family member, chart & primary care team    Other Current Medications:    Current Facility-Administered Medications:     hydrALAZINE (Apresoline) injection 10 mg, 10 mg, Intravenous, Q6HRS PRN, Herbert Anderson M.D.    [START ON 2024] amLODIPine (Norvasc) tablet 10 mg, 10 mg, Oral, Q DAY, Herbert Anderson M.D.    potassium chloride SA (Kdur) tablet 40 mEq, 40 mEq, Oral, Once, Herbert Anderson M.D.    lisinopril (Prinivil) tablet 40 mg, 40 mg, Oral, DAILY, Deejay Rausch M.D., 40 mg at 24 0533    enoxaparin (Lovenox) inj 40 mg, 40 mg, Subcutaneous, DAILY AT 1800, Ravinder Garces D.O., 40 mg at 24 1711    atorvastatin (Lipitor) tablet 80 mg, 80 mg, Oral, Q EVENING, Deejay Rausch M.D., 80 mg at 24 1705    vancomycin (Vancocin) 750 mg in  mL IVPB, 750 mg, Intravenous, Q12HR, Ced Gautam M.D., Stopped at  "24 0748    melatonin tablet 5 mg, 5 mg, Oral, HS PRN, ROCIO Workman, 5 mg at 24 2122    acetaminophen (Tylenol) tablet 650 mg, 650 mg, Oral, Q6HRS PRN, Ced Gautam M.D., 650 mg at 24 1705    ondansetron (Zofran) syringe/vial injection 4 mg, 4 mg, Intravenous, Q4HRS PRN, Ced Gautam M.D.    ondansetron (Zofran ODT) dispertab 4 mg, 4 mg, Oral, Q4HRS PRN, Ced Gautam M.D.    aspirin (Asa) chewable tab 81 mg, 81 mg, Oral, DAILY, 81 mg at 24 0534 **OR** aspirin (Asa) suppository 300 mg, 300 mg, Rectal, DAILY, Ced Gautam M.D.    metoprolol SR (Toprol XL) tablet 25 mg, 25 mg, Oral, DAILY, Ced Gautam M.D., 25 mg at 24 0534    MD Alert...Vancomycin per Pharmacy, , Other, PHARMACY TO DOSE, Ced Gautam M.D.  [unfilled]    Most Recent Vital Signs:  BP (!) 175/94   Pulse 89   Temp 36.8 °C (98.2 °F) (Temporal)   Resp 16   Ht 1.753 m (5' 9\")   Wt 78.6 kg (173 lb 4.5 oz)   SpO2 95%   BMI 25.59 kg/m²   Temp  Av.3 °C (97.4 °F)  Min: 36.1 °C (97 °F)  Max: 37.9 °C (100.2 °F)    Physical Exam:  Physical Exam  Constitutional:       Appearance: Normal appearance.   HENT:      Head: Normocephalic and atraumatic.      Right Ear: External ear normal.      Left Ear: External ear normal.      Nose: Nose normal.      Mouth/Throat:      Mouth: Mucous membranes are moist.      Pharynx: Oropharynx is clear.   Eyes:      Extraocular Movements: Extraocular movements intact.      Conjunctiva/sclera: Conjunctivae normal.      Pupils: Pupils are equal, round, and reactive to light.   Cardiovascular:      Rate and Rhythm: Normal rate and regular rhythm.      Heart sounds: Murmur heard.   Pulmonary:      Effort: Pulmonary effort is normal.      Breath sounds: Normal breath sounds.   Abdominal:      General: Abdomen is flat. Bowel sounds are normal.      Palpations: Abdomen is soft.   Musculoskeletal:         General: Normal range of motion.      Cervical back: Normal range of " motion and neck supple.      Comments: Left ankle and foot with edema, no significant tenderness, dressings in place   Skin:     General: Skin is warm and dry.   Neurological:      Mental Status: He is alert.      Comments: Some mild confusion   Psychiatric:         Mood and Affect: Mood normal.         Behavior: Behavior normal.           Pertinent Lab Results:  Recent Labs     09/16/24 0759 09/17/24  0119   WBC 11.4* 10.3      Recent Labs     09/16/24 0759 09/17/24  0119   HEMOGLOBIN 13.9* 15.0   HEMATOCRIT 41.3* 44.9   MCV 93.0 92.2   MCH 31.3 30.8   PLATELETCT 242 245         Recent Labs     09/16/24 0759 09/17/24  0119   SODIUM 142 143   POTASSIUM 3.8 3.3*   CHLORIDE 111 108   CO2 19* 21   CREATININE 0.82 0.72        Recent Labs     09/16/24 0759 09/17/24  0119   ALBUMIN 3.3 3.6        Pertinent Micro:  Results       Procedure Component Value Units Date/Time    CULTURE WOUND W/ GRAM STAIN [[id number]] Collected: 09/14/24 1057    Order Status: Completed Specimen: Wound Updated: 09/17/24 0907     Significant Indicator NEG     Source WND     Site left ankle     Culture Result No growth at 72 hours.     Gram Stain Result Moderate WBCs.  No organisms seen.      Anaerobic Culture [[id number]] Collected: 09/14/24 1057    Order Status: Completed Specimen: Wound Updated: 09/17/24 0907     Significant Indicator NEG     Source WND     Site left ankle     Culture Result Culture in progress.    Fungal Culture [[id number]] Collected: 09/14/24 1057    Order Status: Completed Specimen: Wound Updated: 09/17/24 0907     Significant Indicator NEG     Source WND     Site left ankle     Culture Result Culture in progress.     Fungal Smear Results No fungal elements seen.    FLUID CULTURE W/GRAM STAIN [[id number]] Collected: 09/13/24 2315    Order Status: Completed Specimen: Other Body Fluid Updated: 09/17/24 0905     Significant Indicator NEG     Source BF     Site Synovial Fluid     Culture Result No growth at 72 hours.     Gram  Stain Result Many WBCs.  No organisms seen.      Fungal Smear [[id number]] Collected: 09/14/24 1057    Order Status: Completed Specimen: Wound Updated: 09/14/24 2014     Significant Indicator NEG     Source WND     Site left ankle     Fungal Smear Results No fungal elements seen.    GRAM STAIN [[id number]] Collected: 09/14/24 1057    Order Status: Completed Specimen: Wound Updated: 09/14/24 2014     Significant Indicator .     Source WND     Site left ankle     Gram Stain Result Moderate WBCs.  No organisms seen.      MRSA By PCR (Amp) [[id number]] Collected: 09/13/24 2143    Order Status: Completed Specimen: Respirate from Nares Updated: 09/14/24 0627     MRSA by PCR Negative    BLOOD CULTURE [[id number]] Collected: 09/13/24 2256    Order Status: Completed Specimen: Blood from Peripheral Updated: 09/14/24 0208     Significant Indicator NEG     Source BLD     Site PERIPHERAL     Culture Result No Growth  Note: Blood cultures are incubated for 5 days and  are monitored continuously.Positive blood cultures  are called to the RN and reported as soon as  they are identified.      GRAM STAIN [[id number]] Collected: 09/13/24 2315    Order Status: Completed Specimen: Body Fluid Updated: 09/14/24 0115     Significant Indicator .     Source BF     Site Synovial Fluid     Gram Stain Result Many WBCs.  No organisms seen.      BLOOD CULTURE [[id number]] Collected: 09/13/24 2240    Order Status: Completed Specimen: Blood from Peripheral Updated: 09/14/24 0008     Significant Indicator NEG     Source BLD     Site PERIPHERAL     Culture Result No Growth  Note: Blood cultures are incubated for 5 days and  are monitored continuously.Positive blood cultures  are called to the RN and reported as soon as  they are identified.      Urinalysis [[id number]]  (Abnormal) Collected: 09/13/24 2228    Order Status: Completed Specimen: Urine Updated: 09/13/24 2335     Color Yellow     Character Clear     Specific Gravity >=1.045     Ph 7.0     Glucose  Negative mg/dL      Ketones Trace mg/dL      Protein 30 mg/dL      Bilirubin Negative     Urobilinogen, Urine 1.0     Nitrite Negative     Leukocyte Esterase Negative     Occult Blood Small     Micro Urine Req Microscopic    CoV-2, Flu A/B, And RSV by PCR ([employer] [[id number]] Collected: 09/13/24 0000    Order Status: Canceled Specimen: Respirate           Blood Culture   Date Value Ref Range Status   07/07/2016 No growth after 5 days of incubation.  Final        Studies:  MR-BRAIN-W/O    Result Date: 9/15/2024  9/15/2024 8:39 AM HISTORY/REASON FOR EXAM:  Memory loss for one year. Left-sided weakness, dysarthria. Clinical suspicion of endocarditis. Encephalopathy. TECHNIQUE/EXAM DESCRIPTION: MRI of the brain without contrast. T1 sagittal, T2 fast spin-echo axial, T1 coronal, FLAIR axial with propeller motion correction technique, Diffusion Weighted and Apparent Diffusion Coefficient (ADC map) axial images were obtained of the whole brain. The study was performed on a [employer] Signa 1.5 Christie MRI scanner. COMPARISON:  Head CT and CTA of the head 9/13/2024 FINDINGS: The calvaria are unremarkable. Incidentally noted small lipoma over the right paramedian forehead. There are no extra-axial fluid collections. There is a pattern of moderate cerebral atrophy manifest as enlargement of sulcal markings over the convexities and vertex along with ventriculomegaly. There is a pattern of advanced supratentorial white matter disease with extensive patchy, confluent, and focal areas of bright T2 and FLAIR signal in the subcortical and deep white matter of both hemispheres consistent with small vessel ischemic change versus demyelination or gliosis. Infarct left frontal deep white matter along the corona radiata. The gradient echo axial images show punctate foci of old microhemorrhage at the left posterior sylvian frontal lobe and left occipital lobe (gradient echo axial images 14, 12, series 6). These are most consistent with old  hypertensive microhemorrhage versus amyloid angiopathy. There is localized gyral encephalomalacia involving the left parietal lobe at the postcentral gyrus consistent with old cerebral infarction (T2 axial image 32, series 5, FLAIR coronal image 8, series 8, FLAIR axial image 25, series 7). There are old lacunar infarcts in the thalami x2 bilaterally (T2 axial images 19, 20, series 5). The diffusion weighted axial images show a punctate focus of acute infarction involving the right insula (diffusion-weighted axial image 17, series 4). There is also a subcentimeter focus of acute infarction in the left far posterior frontal lobe (diffusion-weighted axial image 21, series 4). There is a subcentimeter focus of mildly bright diffusion signal at the posterior medial periventricular white matter at the posterior body right lateral ventricle/splinting corpus callosum which may represent an acute or recent subacute infarct (diffusion-weighted axial image 18, series 4). The brainstem and posterior fossa structures show bright diffusion signal in the left side of the hao consistent with acute brainstem infarction (T2 axial image 13, series 5, diffusion weighted axial images 10, 11, series 4). No hemorrhagic transformation. There are several linear and focal areas of encephalomalacia in the left cerebellar hemisphere consistent with old infarcts. There is a moderate-sized area of encephalomalacia in the right cerebellar hemisphere with old infarction. The major vessels including the dural venous sinuses appear intact. Paranasal sinuses show complete opacification of the frontal sinus with T2 hypointensity consistent with chronic sinusitis.     1.  Moderate cerebral atrophy. 2.  Advanced supratentorial white matter disease most consistent with microvascular ischemic change. 3.  Old microhemorrhage left posterior sylvian frontal lobe and left occipital lobe most consistent with old hypertensive microhemorrhage versus amyloid  angiopathy. 4.  Old lacunar infarcts x2 in the right thalamus old lacunar infarcts x2 in the left thalamus. 5.  Old cortical infarct left parietal lobe at the postcentral gyrus. 6.  Subcentimeter focus of acute infarction right insula. 7.  Subcentimeter focus acute infarction left posterior parietal lobe. 8.  Subcentimeter focus of acute or recent subacute infarction right parietal periventricular white matter/splenium corpus callosum. 9.  Acute brainstem infarct in the left side of the hao. 10.  Multiple old lacunar size infarcts left cerebellar hemisphere. 11.  Moderate-sized old infarct right cerebellar hemisphere. 12.  No acute infarcts show hemorrhagic transformation. 13.  Multiple vascular territories are involved. Possible embolic mechanism.    CT-CTA NECK WITH & W/O-POST PROCESSING    Result Date: 9/13/2024 9/13/2024 7:32 PM HISTORY/REASON FOR EXAM:  Emergency Medical Condition ? Stroke TECHNIQUE/EXAM DESCRIPTION: CT angiogram of the neck with contrast. Postcontrast images were obtained of the neck from the great vessels through the skull base following the power injection of nonionic contrast at 5.0 mL/sec. Thin-section helical images were obtained with overlapping reconstruction interval. Coronal and oblique multiplanar volume reformats were generated. Cervical internal carotid artery percent stenosis is calculated using the standard method according to the NASCET criteria wherein a segment of uniform caliber mid or distal cervical internal carotid is used as the reference denominator. 3D angiographic images were reviewed on PACS.  Maximum intensity projection (MIP) images were generated and reviewed 80 mL of Omnipaque 350 nonionic contrast was injected intravenously. Low dose optimization technique was utilized for this CT exam including automated exposure control and adjustment of the mA and/or kV according to patient size. COMPARISON:  None. FINDINGS: Aortic arch: conventional branching pattern. There  is atherosclerotic plaque of the aorta. Right common carotid artery: Patent Right internal carotid artery: Atherosclerotic plaque without significant stenosis (less than 50%). Left common carotid artery is patent. Left internal carotid artery: Atherosclerotic plaque without significant stenosis (less than 50%). The right vertebral artery is patent without dissection or stenosis. Moderate atherosclerotic plaquing of the V4 segment The left vertebral artery is patent without dissection or stenosis. Moderate atherosclerotic plaquing of the V4 segment. Vertebrobasilar confluence: The vertebrobasilar confluence appears normal. Lung apices are clear The soft tissues of the neck are within normal limits. 3D angiographic/MIP images of the vasculature confirm the vascular findings as described above.     1.  Mild to moderate atherosclerotic plaquing of the mid and distal common carotid arteries and especially the common carotid artery bifurcations. Bilateral internal carotid stenoses of less than 50%. 2.  Patent vertebrobasilar system with moderate atherosclerotic plaquing of the V4 segments bilaterally    CT-CTA HEAD WITH & W/O-POST PROCESS    Result Date: 9/13/2024 9/13/2024 7:32 PM HISTORY/REASON FOR EXAM:  Emergency Medical Condition ? Stroke TECHNIQUE/EXAM DESCRIPTION: CT angiogram of the [place] without and with contrast.  Initial precontrast images were obtained of the head from the skull base through the vertex.  Postcontrast images were obtained of the [place] following the power injection of nonionic contrast at 5.0 mL/sec. Thin-section helical images were obtained with overlapping reconstruction interval. Coronal and sagittal multiplanar volume reformats were generated.  3D angiographic images were reviewed on PACS.  Maximum intensity projection (MIP) images were generated and reviewed. 80 mL of Omnipaque 350 nonionic contrast was injected intravenously. Low dose optimization technique was  utilized for this CT exam including automated exposure control and adjustment of the mA and/or kV according to patient size. COMPARISON:  None. FINDINGS: The posterior circulation shows the distal vertebral arteries to be patent. The vertebrobasilar confluence is intact. The basilar artery is patent. No aneurysm or occlusive lesion is evident. Is moderate atherosclerotic plaquing of the distal vertebral arteries bilaterally. The anterior circulation shows no stenotic or occlusive lesion. No aneurysm is evident about the [place]. The brain Strates age-related cerebral atrophy and mild periventricular white matter changes. Old area of lacunar infarction in the left frontal periventricular white matter. Additionally, there is chronic frontal sinusitis. 3D angiographic/MIP images of the vasculature confirm the vascular findings as described above.     CT angiogram of the [place] within normal limits.    CT-CEREBRAL PERFUSION ANALYSIS    Result Date: 9/13/2024 9/13/2024 7:32 PM HISTORY/REASON FOR EXAM:  Emergency Medical Condition ? Stroke. TECHNIQUE/EXAM DESCRIPTION AND NUMBER OF VIEWS: CT Cerebral Perfusion Analysis. The study was performed on a 128 slice [employer] Lights[employer]ul[employer] Multidetector CT scanner. Perfusion data and corresponding time-activity curves are processed and displayed as color-coded maps in the axial plane for Cerebral Blood Flow (CBF), Cerebral Blood Volume  (CBV),T Max and Mean Transit Time (MTT) and are post processed on the Ischemia view-RAPID virtual . 40 mL of Omnipaque 350 nonionic contrast was injected intravenously. Low dose optimization technique was utilized for this CT exam including automated exposure control and adjustment of the mA and/or kV according to patient size. COMPARISON:  CT head 4/28/2017 FINDINGS: Cerebral blood flow less than 30% = 0 mL T Max greater than 6 seconds = 0 mL Mismatch volume= 0 mL Mismatch ratio= n/a     1. Cerebral blood flow less than 30%  "possibly representing completed infarct = 0 mL. 2. T Max more than 6 seconds possibly representing combination of completed infarct and ischemia = 0 mL. 3. Mismatched volume possibly representing ischemic brain/penumbra= 0 mL 4.  Please note that this cerebral perfusion study and report is Quantitative and targets supratentorial (cerebral) perfusion for evaluation of large vessel territory acute ischemia/infarction. For example, lacunar infarcts, and brainstem/posterior fossa  ischemia/infarction are not evaluated on this study.  Data acquisition is subject to artifacts which can yield non-anatomically plausible perfusion maps which may be due to motion, bolus timing, signal to noise ratio, or other technical factors. Perfusion map abnormalities which show non-anatomic distributions are likely artifact.   This study is not \"stand-alone\" and should only be utilized for diagnosis, management/treatment in correlation with CT, CTA, and/or MRI and clinical factors.     DX-ANKLE 2- VIEWS LEFT    Result Date: 9/13/2024 9/13/2024 6:31 PM HISTORY/REASON FOR EXAM:  Atraumatic Pain/Swelling/Deformity. TECHNIQUE/EXAM DESCRIPTION AND NUMBER OF VIEWS:  2 views of the LEFT ankle. COMPARISON: None. FINDINGS: Bones: Decreased bone mineralization. No fracture. No focal bone lesion. Joints: Joint space narrowing with marginal osteophytes involving the ankle, hindfoot and midfoot joints, compatible with osteoarthritis. Ankle mortise is preserved. No joint effusion. Soft tissues: Left ankle soft tissue swelling. Atherosclerosis.     1. Clinical soft tissue swelling. 2. No acute fracture or subluxation. 3. Polyarticular osteoarthritis. 4. Atherosclerosis.    DX-CHEST-PORTABLE (1 VIEW)    Result Date: 9/13/2024 9/13/2024 5:53 PM HISTORY/REASON FOR EXAM:  Altered consciousness. TECHNIQUE/EXAM DESCRIPTION AND NUMBER OF VIEWS: Single portable view of the chest. COMPARISON: Chest radiography, 4/28/2017. FINDINGS: Cardiomediastinal " "silhouette size is within normal limits. Stable postsurgical changes in the sternum and mediastinum. No pulmonary infiltrates or consolidations are noted. No pleural abnormalities are noted.     No acute cardiac or pulmonary abnormalities are identified.      ASSESSMENT/PLAN:     74 y.o.  admitted 9/13/2024. Pt has a past medical history of CAD status post CABG and gout who presented complaining of lower extremity weakness with left ankle swelling and pain. Ephalopathic on arrival and imaging of the head with MRI brain with old and acute infarcts.  Including acute infarction left posterior parietal lobe. Subcentimeter focus of acute or recent subacute infarction right parietal periventricular white matter/splenium corpus callosum. Acute brainstem infarct in the left side of the hao.cute brainstem infarct and left-sided hao and multiple vascular territories involvement suggesting a possible embolic mechanism.\"  Low-grade temperature and leukocytosis on arrival.  This is now improved.  He went to the OR with orthopedic surgery on 9/14 as below.  Patient has been on vancomycin.    Problem List    Left ankle septic arthritis   -Left ankle synovial fluid studies on 9/13 consistent with septic arthritis, 85,630 white cells, 93% polys, fluid specimen was not sent for PCR panel. Cultures are no growth.   - OR on 9/14 for left ankle arthrotomy with I&D.  Per op note once the ankle capsule was entered there was immediate outflow of purulent cloudy fluid.  This was swabbed and sent for culture.    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   -Reviewed labs to date, microbiology for current admit and prior  -Imaging was independently reviewed and interpreted    Plan:    --- Continue vancomycin for now  --- Unfortunately, no PCR joint panel was " ordered for the synovial fluid.  Micro lab has discarded the fluid so will be unable to run at this point.   --- Continue follow-up OR cultures, these will be held for 14 days, will send out [facility] for PCR testing  --- TTE pending  --- F/up blood cultures - NGTD   --- Left ankle synovial fluid cultures no growth and final  --- Monitor labs       Dispo: Awaiting culture results and work-up as above.  Patient is at risk for infectious complications including death.    PICC: TBD      Plan of care discussed with ADRIEL Anderson M.D.. Will continue to follow    Joyce Bae M.D.

## 2024-09-17 NOTE — THERAPY
Speech Language Pathology   Daily Treatment     Patient Name: Joe Templeton  AGE:  74 y.o., SEX:  male  Medical Record #: [id number]  Date of Service: 9/17/2024    Precautions:  Precautions: Fall Risk, Swallow Precautions     Subjective  Pt seen A&Ox3 saturating on room air. He denied any difficulties w/ PO intake on his current soft & bite-sized diet.     Assessment  See description below for cognitive-linguistic tx activities  Swallow tx: Pt self-fed w/ appropriate rate & volume control. Oral stage was functional and timely for soft & bite-sized solids. Pt denied globus sensation. No upper airway wetness, throat clearing or coughing noted.     Clinical Impressions    - Continued signs of oropharyngeal swallowing functional for a soft solids diet (current is Soft & Bite-sized)- the pt's oral stage appears impacted by poor dentition (pt does not have partials or dentures). No further ST services addressing dysphagia appear indicated.     - Continued signs of moderate-severe cognitive-linguistic deficits, suspect acute on chronic as the pt reports noting a cognitive decline prior to this hospitalization. Pt w/ difficulty recalling simple personal information, completing functional safety/problem solving and encoding/recall of new information. Currently, the pt would require direct assistance w/ all IADLs post d/c.     - Limited progress in cognitive-linguistic therapies noted- query how much of deficits ar chronic- continue cog tx trials 1-2x sessions    Recommendations  Treatment Completed: Dysphagia Treatment, Cognitive-Linguistic Treatment  Cognitive Treatment Completed: Functional Sequencing +2/10 min cueing,  +7/10 mod cueing, 100% max ; delayed recall of functional information (appointments): +03/ mod cueing ; writing notes to aid in memory: +1/4 mod cueing- pt w/ difficulty w/ writing at the letter formation level on this date- note-taking does not appear functional for the pt  Dysphagia Treatment  Diet  "Consistency: Thin/all Liquids, Regular Solids  Instrumentation: None indicated at this time  Medication: As tolerated  Supervision: Assist with meal tray set up  Positioning: Fully upright and midline during oral intake  Risk Management : Slow rate of intake  Oral Care: BID    Cognitive Treatment  Supervision Needs Upons Discharge: Direct assistance with IADLs (see below)  IADLs: Medication management, Financial management, Appointment management, Household chores, Cooking    SLP Treatment Plan  Treatment Plan: Cognitive Treatment  SLP Frequency: 3x Per Week  Estimated Duration: Until Therapy Goals Met    Anticipated Discharge Needs  Discharge Recommendations: Recommend post-acute placement for additional speech therapy services prior to discharge home  Therapy Recommendations Upon DC: Dysphagia Training    Patient / Family Goals  Patient / Family Goal #1: \"I have problems with my teeth\"  Goal #1 Outcome: Goal not met  Short Term Goals  Short Term Goal # 1: Pt will tolerate the safest, least restrictive diet w/ no signs of aspiration related pulmonary complications  Goal Outcome # 1: Goal met  Short Term Goal # 2: Pt will use compensatory strategies w/ min cueing to recall 80% of simple functional information after a 3-5 minute delay  Goal Outcome # 2 : Progressing slower than expected  Short Term Goal # 3: Pt will complete functional problem solving scenarios with min cueing and 80% accuracy  Goal Outcome  # 3: Progressing slower than expected    Mary Barakat, TONY  "

## 2024-09-17 NOTE — PROGRESS NOTES
Monitor summary: SR, HR 66-78, WI .16, QRS .08, QT .42 with freq pvc per strip from monitor room

## 2024-09-17 NOTE — CARE PLAN
The patient is Stable - Low risk of patient condition declining or worsening    Shift Goals  Clinical Goals: pain control, BP management  Patient Goals: go home, pain control  Family Goals: roshan    Progress made toward(s) clinical / shift goals:    Problem: Knowledge Deficit - Stroke Education  Goal: Patient's knowledge of stroke and risk factors will improve  Outcome: Progressing  Note: Patient educated on reason for hospital admission and for care of his stroke.      Problem: Mobility - Stroke  Goal: Patient's capacity to carry out activities will improve  Outcome: Progressing  Note: Patient ambulating to bathroom and mobilizing throughout shift.        Patient is not progressing towards the following goals:

## 2024-09-17 NOTE — THERAPY
Physical Therapy   Daily Treatment     Patient Name: Joe Templeton  Age:  74 y.o., Sex:  male  Medical Record #: [id number]  Today's Date: 9/17/2024     Precautions  Precautions: Fall Risk;Swallow Precautions;Weight Bearing As Tolerated Left Lower Extremity    Assessment    Pt agreeable to PT tx session, continues to progress with functional mobility.  Pt able to increase ambulation distance this session as detailed below, ambulating with antalgic, step-to gait pattern, slow pace, and short step length.  Educated pt on seated LE exercises as documented below, pt demo'd appropriate pacing & isometric contractions before switching Les.  Pt would benefit from daily mobility with nursing staff including mobilizing to toilet/BSC as needed, sitting up in chair 3x/day for meals, and ambulating daily.  Will continue to follow.     Plan    Treatment Plan Status: Continue Current Treatment Plan  Type of Treatment: Bed Mobility, Equipment, Gait Training, Neuro Re-Education / Balance, Orthotics Training , Self Care / Home Evaluation, Stair Training, Therapeutic Activities, Therapeutic Exercise  Treatment Frequency: 4 Times per Week  Treatment Duration: Until Therapy Goals Met    DC Equipment Recommendations: Unable to determine at this time  Discharge Recommendations: Recommend post-acute placement for additional physical therapy services prior to discharge home     Objective     09/17/24 1024   Precautions   Precautions Fall Risk;Swallow Precautions;Weight Bearing As Tolerated Left Lower Extremity   Pain 0 - 10 Group   Therapist Pain Assessment Post Activity Pain Same as Prior to Activity;Nurse Notified;0   Cognition    Cognition / Consciousness X   Level of Consciousness Alert   Ability To Follow Commands 1 Step   Safety Awareness Impaired   New Learning Impaired   Attention Impaired   Comments Pleasant & cooperative   Sitting Lower Body Exercises   Sitting Lower Body Exercises Yes   Hip Abduction 1 set of 10;Bilateral   Hip  Adduction 1 set of 10;Bilateral   Long Arc Quad 1 set of 10;Bilateral   Marching Reciprocal;1 set of 10   Balance   Sitting Balance (Static) Fair +   Sitting Balance (Dynamic) Fair   Standing Balance (Static) Fair -   Standing Balance (Dynamic) Fair -   Weight Shift Sitting Fair   Weight Shift Standing Fair   Skilled Intervention Verbal Cuing;Tactile Cuing;Facilitation   Bed Mobility    Comments NT, up in chair pre & post session   Gait Analysis   Gait Level Of Assist Contact Guard Assist   Assistive Device Front Wheel Walker   Distance (Feet) 60   # of Times Distance was Traveled 2   Deviation Antalgic;Step To;Decreased Base Of Support;Decreased Heel Strike;Decreased Toe Off   Weight Bearing Status WBAT LLE   Skilled Intervention Verbal Cuing;Tactile Cuing;Compensatory Strategies   Functional Mobility   Sit to Stand Contact Guard Assist   Bed, Chair, Wheelchair Transfer Contact Guard Assist   Transfer Method Stand Step   Mobility ambulation, up to chair   Skilled Intervention Verbal Cuing;Tactile Cuing;Facilitation   Activity Tolerance   Sitting in Chair Pre & post session   Standing 10 min   Short Term Goals    Short Term Goal # 1 Pt will demo STS EOB w/ FWW SPV in 6 visits to prepare for OOB mobility tasks.   Goal Outcome # 1 Progressing as expected   Short Term Goal # 2 Pt will demo gait >150' using FWW SBA in a moving environment in 6 visits for household ambulation.   Goal Outcome # 2 Progressing as expected   Short Term Goal # 3 Pt will demo ability to ascend/descend 2 steps w/ UE support and SBA in 6 visits for access to his home and the community.   Goal Outcome # 3 Progressing as expected   Physical Therapy Treatment Plan   Physical Therapy Treatment Plan Continue Current Treatment Plan

## 2024-09-17 NOTE — DISCHARGE PLANNING
Spoke with brother Trevino & MASHA CoronadoIAIDEE regarding [facility] & D/C resources/support.  They are agreeable with an admission as Belinda was @ [facility] in the past and had a good outcome.  Joe lives with Belinda & Ivonne in a 1LV home with 1ST to enter.  They will provide 24/7 physical assistance and will also attend family training.  ECHO pending.

## 2024-09-18 LAB
ANION GAP SERPL CALC-SCNC: 14 MMOL/L (ref 7–16)
BUN SERPL-MCNC: 17 MG/DL (ref 8–22)
CALCIUM SERPL-MCNC: 9 MG/DL (ref 8.5–10.5)
CHLORIDE SERPL-SCNC: 108 MMOL/L (ref 96–112)
CO2 SERPL-SCNC: 19 MMOL/L (ref 20–33)
CREAT SERPL-MCNC: 0.72 MG/DL (ref 0.5–1.4)
EKG IMPRESSION: NORMAL
ERYTHROCYTE [DISTWIDTH] IN BLOOD BY AUTOMATED COUNT: 39.7 FL (ref 35.9–50)
GFR SERPLBLD CREATININE-BSD FMLA CKD-EPI: 96 ML/MIN/1.73 M 2
GLUCOSE SERPL-MCNC: 144 MG/DL (ref 65–99)
HCT VFR BLD AUTO: 45.7 % (ref 42–52)
HGB BLD-MCNC: 15.9 G/DL (ref 14–18)
MCH RBC QN AUTO: 31.2 PG (ref 27–33)
MCHC RBC AUTO-ENTMCNC: 34.8 G/DL (ref 32.3–36.5)
MCV RBC AUTO: 89.6 FL (ref 81.4–97.8)
PLATELET # BLD AUTO: 293 K/UL (ref 164–446)
PMV BLD AUTO: 11.2 FL (ref 9–12.9)
POTASSIUM SERPL-SCNC: 3.8 MMOL/L (ref 3.6–5.5)
PROCALCITONIN SERPL-MCNC: <0.05 NG/ML
RBC # BLD AUTO: 5.1 M/UL (ref 4.7–6.1)
SODIUM SERPL-SCNC: 141 MMOL/L (ref 135–145)
VANCOMYCIN PEAK SERPL-MCNC: 11.8 UG/ML (ref 20–40)
VANCOMYCIN PEAK SERPL-MCNC: 15.6 UG/ML (ref 20–40)
VANCOMYCIN TROUGH SERPL-MCNC: 17.6 UG/ML (ref 10–20)
WBC # BLD AUTO: 11.1 K/UL (ref 4.8–10.8)

## 2024-09-18 PROCEDURE — 700105 HCHG RX REV CODE 258: Performed by: HOSPITALIST

## 2024-09-18 PROCEDURE — 700102 HCHG RX REV CODE 250 W/ 637 OVERRIDE(OP): Performed by: HOSPITALIST

## 2024-09-18 PROCEDURE — 99233 SBSQ HOSP IP/OBS HIGH 50: CPT | Performed by: PHYSICAL MEDICINE & REHABILITATION

## 2024-09-18 PROCEDURE — 700111 HCHG RX REV CODE 636 W/ 250 OVERRIDE (IP): Mod: JZ | Performed by: INTERNAL MEDICINE

## 2024-09-18 PROCEDURE — 84145 PROCALCITONIN (PCT): CPT

## 2024-09-18 PROCEDURE — 700102 HCHG RX REV CODE 250 W/ 637 OVERRIDE(OP): Performed by: PHYSICAL MEDICINE & REHABILITATION

## 2024-09-18 PROCEDURE — A9270 NON-COVERED ITEM OR SERVICE: HCPCS | Performed by: PHYSICAL MEDICINE & REHABILITATION

## 2024-09-18 PROCEDURE — 93005 ELECTROCARDIOGRAM TRACING: CPT | Performed by: PHYSICAL MEDICINE & REHABILITATION

## 2024-09-18 PROCEDURE — 770020 HCHG ROOM/CARE - TELE (206)

## 2024-09-18 PROCEDURE — 99233 SBSQ HOSP IP/OBS HIGH 50: CPT | Performed by: HOSPITALIST

## 2024-09-18 PROCEDURE — A9270 NON-COVERED ITEM OR SERVICE: HCPCS | Performed by: STUDENT IN AN ORGANIZED HEALTH CARE EDUCATION/TRAINING PROGRAM

## 2024-09-18 PROCEDURE — 80202 ASSAY OF VANCOMYCIN: CPT | Mod: 91

## 2024-09-18 PROCEDURE — 700111 HCHG RX REV CODE 636 W/ 250 OVERRIDE (IP): Performed by: HOSPITALIST

## 2024-09-18 PROCEDURE — A9270 NON-COVERED ITEM OR SERVICE: HCPCS | Performed by: HOSPITALIST

## 2024-09-18 PROCEDURE — 700111 HCHG RX REV CODE 636 W/ 250 OVERRIDE (IP): Mod: JZ | Performed by: HOSPITALIST

## 2024-09-18 PROCEDURE — 80048 BASIC METABOLIC PNL TOTAL CA: CPT

## 2024-09-18 PROCEDURE — A9270 NON-COVERED ITEM OR SERVICE: HCPCS

## 2024-09-18 PROCEDURE — 700102 HCHG RX REV CODE 250 W/ 637 OVERRIDE(OP)

## 2024-09-18 PROCEDURE — 700102 HCHG RX REV CODE 250 W/ 637 OVERRIDE(OP): Performed by: STUDENT IN AN ORGANIZED HEALTH CARE EDUCATION/TRAINING PROGRAM

## 2024-09-18 PROCEDURE — 85027 COMPLETE CBC AUTOMATED: CPT

## 2024-09-18 PROCEDURE — 36415 COLL VENOUS BLD VENIPUNCTURE: CPT

## 2024-09-18 RX ORDER — HYDRALAZINE HYDROCHLORIDE 25 MG/1
50 TABLET, FILM COATED ORAL EVERY 8 HOURS
Status: DISCONTINUED | OUTPATIENT
Start: 2024-09-18 | End: 2024-09-20

## 2024-09-18 RX ORDER — ZIPRASIDONE MESYLATE 20 MG/ML
10 INJECTION, POWDER, LYOPHILIZED, FOR SOLUTION INTRAMUSCULAR
Status: DISCONTINUED | OUTPATIENT
Start: 2024-09-18 | End: 2024-09-20 | Stop reason: HOSPADM

## 2024-09-18 RX ORDER — QUETIAPINE FUMARATE 25 MG/1
50 TABLET, FILM COATED ORAL NIGHTLY
Status: DISCONTINUED | OUTPATIENT
Start: 2024-09-18 | End: 2024-09-20 | Stop reason: HOSPADM

## 2024-09-18 RX ORDER — LABETALOL HYDROCHLORIDE 5 MG/ML
10 INJECTION, SOLUTION INTRAVENOUS EVERY 6 HOURS PRN
Status: DISCONTINUED | OUTPATIENT
Start: 2024-09-18 | End: 2024-09-20 | Stop reason: HOSPADM

## 2024-09-18 RX ORDER — QUETIAPINE FUMARATE 25 MG/1
25 TABLET, FILM COATED ORAL 3 TIMES DAILY PRN
Status: DISCONTINUED | OUTPATIENT
Start: 2024-09-18 | End: 2024-09-20 | Stop reason: HOSPADM

## 2024-09-18 RX ADMIN — HYDRALAZINE HYDROCHLORIDE 25 MG: 25 TABLET ORAL at 04:19

## 2024-09-18 RX ADMIN — VANCOMYCIN HYDROCHLORIDE 750 MG: 5 INJECTION, POWDER, LYOPHILIZED, FOR SOLUTION INTRAVENOUS at 16:06

## 2024-09-18 RX ADMIN — Medication 5 MG: at 20:49

## 2024-09-18 RX ADMIN — HYDRALAZINE HYDROCHLORIDE 50 MG: 25 TABLET ORAL at 14:59

## 2024-09-18 RX ADMIN — QUETIAPINE FUMARATE 25 MG: 25 TABLET ORAL at 16:41

## 2024-09-18 RX ADMIN — ASPIRIN 81 MG: 81 TABLET, CHEWABLE ORAL at 04:09

## 2024-09-18 RX ADMIN — LISINOPRIL 40 MG: 20 TABLET ORAL at 04:09

## 2024-09-18 RX ADMIN — AMLODIPINE BESYLATE 10 MG: 5 TABLET ORAL at 05:47

## 2024-09-18 RX ADMIN — HYDRALAZINE HYDROCHLORIDE 50 MG: 25 TABLET ORAL at 20:49

## 2024-09-18 RX ADMIN — ATORVASTATIN CALCIUM 80 MG: 80 TABLET, FILM COATED ORAL at 16:41

## 2024-09-18 RX ADMIN — VANCOMYCIN HYDROCHLORIDE 750 MG: 5 INJECTION, POWDER, LYOPHILIZED, FOR SOLUTION INTRAVENOUS at 04:24

## 2024-09-18 RX ADMIN — HYDRALAZINE HYDROCHLORIDE 10 MG: 20 INJECTION INTRAMUSCULAR; INTRAVENOUS at 08:15

## 2024-09-18 RX ADMIN — METOPROLOL SUCCINATE 25 MG: 25 TABLET, EXTENDED RELEASE ORAL at 04:09

## 2024-09-18 RX ADMIN — QUETIAPINE FUMARATE 50 MG: 25 TABLET ORAL at 20:49

## 2024-09-18 RX ADMIN — ENOXAPARIN SODIUM 40 MG: 100 INJECTION SUBCUTANEOUS at 16:41

## 2024-09-18 ASSESSMENT — ENCOUNTER SYMPTOMS
PND: 0
BACK PAIN: 0
BLURRED VISION: 0
PALPITATIONS: 0
HEADACHES: 0
DIARRHEA: 0
VOMITING: 0
HEMOPTYSIS: 0
NERVOUS/ANXIOUS: 0
NAUSEA: 0
CLAUDICATION: 0
HEARTBURN: 0
COUGH: 0
DOUBLE VISION: 0
CONSTIPATION: 0
WHEEZING: 0
FEVER: 0
ABDOMINAL PAIN: 0
MYALGIAS: 0
DEPRESSION: 0
BRUISES/BLEEDS EASILY: 0
DIZZINESS: 0
CHILLS: 0

## 2024-09-18 ASSESSMENT — PAIN DESCRIPTION - PAIN TYPE
TYPE: ACUTE PAIN

## 2024-09-18 ASSESSMENT — FIBROSIS 4 INDEX: FIB4 SCORE: 1.49

## 2024-09-18 NOTE — PROGRESS NOTES
IM Haldol given at 2130 due to patient being impulsive. De-escalation techniques used.   2300 patient became verbally and physically aggressive. Security called. Hospitalist notified and restraint order placed for bilateral soft wrist restraints.   Patient removed dressing on L ankle/foot. New dressing applied.

## 2024-09-18 NOTE — CARE PLAN
The patient is Stable - Low risk of patient condition declining or worsening    Shift Goals  Clinical Goals: BP management, sleep  Patient Goals: go home  Family Goals: roshan    Progress made toward(s) clinical / shift goals:    Problem: Mobility - Stroke  Goal: Patient's capacity to carry out activities will improve  Outcome: Progressing  Note: Patient ambulated and tolerates well. Mobility improving.      Problem: Hemodynamics  Goal: Patient's hemodynamics, fluid balance and neurologic status will be stable or improve  Outcome: Progressing  Note: SBP maintained at goal of less than 180.        Patient is not progressing towards the following goals:

## 2024-09-18 NOTE — PROGRESS NOTES
Patient BP elevated during the day, increased dose in po hydralazine ordered with positive effect on patient's BP. Patient has been agitated at times and requires frequent redirection. Patient is forgetful and needs frequent reorientation. Patient pulled PIV, new IV placed. IV Vanco given per MAR.

## 2024-09-18 NOTE — DOCUMENTATION QUERY
"                                                                         [facility]                                                                       Query Response Note      PATIENT:               LISA LÓPEZ  ACCT #:                  [id number]  MRN:                     [id number]  :                      1950  ADMIT DATE:       2024 5:10 PM  DISCH DATE:          RESPONDING  PROVIDER #:        [id number]           QUERY TEXT:    Can the acuity of the heart failure be further specified based on the clinical indicators and required treatments?    The patient's Clinical Indicators include:  75yo with sepsis, metabolic encephalopathy, pyogenic arthritis, HTN, systolic heart failure     PN \"Systolic heart failure without exacerbation\"       Risk factors: advanced age, sepsis, HTN, systolic heart failure, pyogenic arthritis  Treatments: labwork, imaging, monitoring    If you agree with the above dx, please document in the medical record.     Contact me with questions.     Thank you,  LIDA Swan, CDI  [email or web address]  Options provided:   -- Chronic   -- Other explanation, (please specify other explanation)   -- Unable to determine      Query created by: Екатерина Evans on 2024 7:39 AM    RESPONSE TEXT:    Chronic          Electronically signed by:  ALLI FROST MD 2024 4:31 PM              "

## 2024-09-18 NOTE — CARE PLAN
The patient is Stable - Low risk of patient condition declining or worsening    Shift Goals  Clinical Goals: BP management, safety  Patient Goals: Take a nap  Family Goals: MARQUES    Progress made toward(s) clinical / shift goals:    Problem: Neuro Status  Goal: Neuro status will remain stable or improve  Description: Target End Date:  Prior to discharge or change in level of care    Document on Neuro assessment in the Assessment flowsheet    1.  Assess and monitor neurologic status per provider order/protocol/unit policy  2.  Assess level of consciousness and orientation  3.  Assess for speech, dysarthria, dysphagia, facial symmetry  4.  Assess visual field, eye movements, gaze preference, pupil reaction and size  5.  Assess muscle strength and motor response in all four extremities  6.  Assess for sensation (numbness and tingling)  7.  Assess basic neuro reflexes (cough, gag, corneal)  8.  Identify changes in neuro status and report to provider for testing/treatment orders  Outcome: Progressing     Problem: Mobility - Stroke  Goal: Patient's capacity to carry out activities will improve  Description: Target End Date:  Prior to discharge or change in level of care    1.  Assess for barriers to mobility/activity  2.  Implement activity per interdisciplinary team recommendations  3.  Target activity level identified and patient/family/caregiver aware of goal  4.  Provide assistive devices  5.  Instruct patient/caregiver on proper use of assistive/adaptive devices  6.  Schedule activities and rest periods to decrease effects of fatigue  7.  Encourage mobilization to extent of ability  8.  Maintain proper body alignment  9.  Provide adequate pain management to allow progressive mobilization  10. Implement pace maker precautions as needed  Outcome: Progressing       Patient is not progressing towards the following goals:

## 2024-09-18 NOTE — PROGRESS NOTES
Infectious Disease Progress Note    Author: Joyce Bae M.D. Date & Time of service: 2024  11:33 AM    Chief Complaint:  Left ankle septic arthritis     Interval History:      Review of Systems:  Review of Systems   Gastrointestinal:  Negative for abdominal pain, constipation, diarrhea, nausea and vomiting.   Musculoskeletal:  Negative for joint pain and myalgias.   Psychiatric/Behavioral:  The patient is not nervous/anxious.        Hemodynamics:  Temp (24hrs), Av.4 °C (97.6 °F), Min:36.3 °C (97.3 °F), Max:36.7 °C (98 °F)  Temperature: 36.3 °C (97.3 °F), Monitored Temp: 36.2 °C (97.2 °F)  Pulse  Av.9  Min: 65  Max: 106   Blood Pressure : 138/83       Physical Exam:  Physical Exam  Cardiovascular:      Rate and Rhythm: Normal rate.   Pulmonary:      Effort: Pulmonary effort is normal.   Abdominal:      General: Abdomen is flat.   Musculoskeletal:      Comments: Left ankle with bandaging in place, clean, dry and intact, no significant edema, erythema or tenderness   Neurological:      Comments: Answers questions, some ongoing confusion   Psychiatric:         Mood and Affect: Mood normal.         Behavior: Behavior normal.         Meds:    Current Facility-Administered Medications:     hydrALAZINE    QUEtiapine    QUEtiapine    ziprasidone    labetalol    hydrALAZINE    amLODIPine    lisinopril    enoxaparin (LOVENOX) injection    atorvastatin    vancomycin    melatonin    acetaminophen    ondansetron    ondansetron    aspirin **OR** aspirin    metoprolol SR    MD Alert...Vancomycin per Pharmacy    Labs:  Recent Labs     24  0759 24  0119 24  0836   WBC 11.4* 10.3 11.1*   RBC 4.44* 4.87 5.10   HEMOGLOBIN 13.9* 15.0 15.9   HEMATOCRIT 41.3* 44.9 45.7   MCV 93.0 92.2 89.6   MCH 31.3 30.8 31.2   RDW 42.8 42.0 39.7   PLATELETCT 242 245 293   MPV 11.6 11.5 11.2   NEUTSPOLYS 73.90* 69.80  --    LYMPHOCYTES 15.10* 17.80*  --    MONOCYTES 8.70 9.90  --    EOSINOPHILS 1.40 1.90  --     BASOPHILS 0.40 0.30  --      Recent Labs     24  0759 24  0119 24  0836   SODIUM 142 143 141   POTASSIUM 3.8 3.3* 3.8   CHLORIDE 111 108 108   CO2 19* 21 19*   GLUCOSE 120* 121* 144*   BUN  17     Recent Labs     24  0759 24  0119 24  0836   ALBUMIN 3.3 3.6  --    CREATININE 0.82 0.72 0.72       Imaging:  EC-ECHOCARDIOGRAM COMPLETE W/O CONT    Result Date: 2024  Transthoracic Echo Report Echocardiography Laboratory CONCLUSIONS No prior study is available for comparison. Severe concentric left ventricular hypertrophy. Normal left ventricular systolic function. Mild aortic valve stenosis. LISA LÓPEZ Exam Date:         2024                    14:59 Exam Location:     Inpatient Priority:          Routine Ordering Physician:        ALLI FROST Referring Physician:       ALLI FROST Sonographer:               Alanis ZUNIGA Age:    74     Gender:    M MRN:    [id number] :    1950 BSA:    1.94   Ht (in):    69     Wt (lb):    173 Exam Type:     Complete Indications:     Cardiac murmur, unspecified ICD Codes:       R01.1 CPT Codes:       73727 BP:   179    /   150    HR:   77 Technical Quality:       Fair MEASUREMENTS  (Male / Female) Normal Values 2D ECHO LV Diastolic Diameter PLAX        4.1 cm                4.2 - 5.9 / 3.9 - 5.3 cm LV Systolic Diameter PLAX         2.7 cm                2.1 - 4.0 cm IVS Diastolic Thickness           1.6 cm                LVPW Diastolic Thickness          1.5 cm                LVOT Diameter                     2.2 cm                Estimated LV Ejection Fraction    60 %                  LV Ejection Fraction MOD BP       57.6 %                >= 55  % LV Ejection Fraction MOD 4C       58.1 %                LV Ejection Fraction MOD 2C       54.7 %                LV Ejection Fraction 4C AL        61.1 %                LV Ejection Fraction 2C AL         59.9 %                LA Volume Index                   33.5 cm3/m2           16 - 28 cm3/m2 DOPPLER AV Peak Velocity                  2.4 m/s               AV Peak Gradient                  22.4 mmHg             AV Mean Gradient                  11.7 mmHg             LVOT Peak Velocity                1 m/s                 AV Area Cont Eq vti               1.8 cm2               Mitral E Point Velocity           0.9 m/s               Mitral E to A Ratio               0.66                  MV Pressure Half Time             92 ms                 MV Area PHT                       2.4 cm2               MV Deceleration Time              315 ms                PV Peak Velocity                  0.98 m/s              PV Peak Gradient                  3.9 mmHg              RVOT Peak Velocity                0.87 m/s              LV E' Lateral Velocity            7.4 cm/s              Mitral E to LV E' Lateral Ratio   12.2                  LV E' Septal Velocity             5.8 cm/s              Mitral E to LV E' Septal Ratio    15.6                  * Indicates values subject to auto-interpretation LV EF:  60    % FINDINGS Left Ventricle Normal left ventricular chamber size. Severe concentric left ventricular hypertrophy. Normal left ventricular systolic function. The left ventricular ejection fraction is visually estimated to be 60%. Normal regional wall motion. Right Ventricle Normal right ventricular size and systolic function. Right Atrium Mildly enlarged right atrium. Normal inferior vena cava size and inspiratory collapse. Left Atrium Normal left atrial size. Intact atrial septum without Doppler evidence of interatrial shunt (e.g. PFO or ASD). Left atrial volume index is 32 mL/sq m. Mitral Valve Mild mitral annular calcification. No mitral stenosis. Trace mitral regurgitation. Aortic Valve Calcified aortic valve leaflets. Tricuspid aortic valve. Mild aortic valve stenosis. Transvalvular gradients are - Peak: 26 mmHg,  Mean: 13 mmHg.  No aortic insufficiency. Tricuspid Valve Structurally normal tricuspid valve without significant stenosis. Trace tricuspid regurgitation. Unable to estimate right ventricular systolic pressure due to an inadequate tricuspid regurgitant jet. Pulmonic Valve Structurally normal pulmonic valve without significant stenosis. Trace pulmonic insufficiency. Pericardium Normal pericardium without effusion. Aorta Normal aortic root for body surface area. The ascending aorta diameter is 3.5 cm. [place] DUVAL To (Electronically Signed) Final Date:     17 September 2024                 17:34    MR-BRAIN-W/O    Result Date: 9/15/2024  9/15/2024 8:39 AM HISTORY/REASON FOR EXAM:  Memory loss for one year. Left-sided weakness, dysarthria. Clinical suspicion of endocarditis. Encephalopathy. TECHNIQUE/EXAM DESCRIPTION: MRI of the brain without contrast. T1 sagittal, T2 fast spin-echo axial, T1 coronal, FLAIR axial with propeller motion correction technique, Diffusion Weighted and Apparent Diffusion Coefficient (ADC map) axial images were obtained of the whole brain. The study was performed on a [employer] Signa 1.5 Christie MRI scanner. COMPARISON:  Head CT and CTA of the head 9/13/2024 FINDINGS: The calvaria are unremarkable. Incidentally noted small lipoma over the right paramedian forehead. There are no extra-axial fluid collections. There is a pattern of moderate cerebral atrophy manifest as enlargement of sulcal markings over the convexities and vertex along with ventriculomegaly. There is a pattern of advanced supratentorial white matter disease with extensive patchy, confluent, and focal areas of bright T2 and FLAIR signal in the subcortical and deep white matter of both hemispheres consistent with small vessel ischemic change versus demyelination or gliosis. Infarct left frontal deep white matter along the corona radiata. The gradient echo axial images show punctate foci of old microhemorrhage at the left posterior  sylvian frontal lobe and left occipital lobe (gradient echo axial images 14, 12, series 6). These are most consistent with old hypertensive microhemorrhage versus amyloid angiopathy. There is localized gyral encephalomalacia involving the left parietal lobe at the postcentral gyrus consistent with old cerebral infarction (T2 axial image 32, series 5, FLAIR coronal image 8, series 8, FLAIR axial image 25, series 7). There are old lacunar infarcts in the thalami x2 bilaterally (T2 axial images 19, 20, series 5). The diffusion weighted axial images show a punctate focus of acute infarction involving the right insula (diffusion-weighted axial image 17, series 4). There is also a subcentimeter focus of acute infarction in the left far posterior frontal lobe (diffusion-weighted axial image 21, series 4). There is a subcentimeter focus of mildly bright diffusion signal at the posterior medial periventricular white matter at the posterior body right lateral ventricle/splinting corpus callosum which may represent an acute or recent subacute infarct (diffusion-weighted axial image 18, series 4). The brainstem and posterior fossa structures show bright diffusion signal in the left side of the hao consistent with acute brainstem infarction (T2 axial image 13, series 5, diffusion weighted axial images 10, 11, series 4). No hemorrhagic transformation. There are several linear and focal areas of encephalomalacia in the left cerebellar hemisphere consistent with old infarcts. There is a moderate-sized area of encephalomalacia in the right cerebellar hemisphere with old infarction. The major vessels including the dural venous sinuses appear intact. Paranasal sinuses show complete opacification of the frontal sinus with T2 hypointensity consistent with chronic sinusitis.     1.  Moderate cerebral atrophy. 2.  Advanced supratentorial white matter disease most consistent with microvascular ischemic change. 3.  Old microhemorrhage left  posterior sylvian frontal lobe and left occipital lobe most consistent with old hypertensive microhemorrhage versus amyloid angiopathy. 4.  Old lacunar infarcts x2 in the right thalamus old lacunar infarcts x2 in the left thalamus. 5.  Old cortical infarct left parietal lobe at the postcentral gyrus. 6.  Subcentimeter focus of acute infarction right insula. 7.  Subcentimeter focus acute infarction left posterior parietal lobe. 8.  Subcentimeter focus of acute or recent subacute infarction right parietal periventricular white matter/splenium corpus callosum. 9.  Acute brainstem infarct in the left side of the hao. 10.  Multiple old lacunar size infarcts left cerebellar hemisphere. 11.  Moderate-sized old infarct right cerebellar hemisphere. 12.  No acute infarcts show hemorrhagic transformation. 13.  Multiple vascular territories are involved. Possible embolic mechanism.    CT-CTA NECK WITH & W/O-POST PROCESSING    Result Date: 9/13/2024 9/13/2024 7:32 PM HISTORY/REASON FOR EXAM:  Emergency Medical Condition ? Stroke TECHNIQUE/EXAM DESCRIPTION: CT angiogram of the neck with contrast. Postcontrast images were obtained of the neck from the great vessels through the skull base following the power injection of nonionic contrast at 5.0 mL/sec. Thin-section helical images were obtained with overlapping reconstruction interval. Coronal and oblique multiplanar volume reformats were generated. Cervical internal carotid artery percent stenosis is calculated using the standard method according to the NASCET criteria wherein a segment of uniform caliber mid or distal cervical internal carotid is used as the reference denominator. 3D angiographic images were reviewed on PACS.  Maximum intensity projection (MIP) images were generated and reviewed 80 mL of Omnipaque 350 nonionic contrast was injected intravenously. Low dose optimization technique was utilized for this CT exam including automated exposure control and adjustment of  the mA and/or kV according to patient size. COMPARISON:  None. FINDINGS: Aortic arch: conventional branching pattern. There is atherosclerotic plaque of the aorta. Right common carotid artery: Patent Right internal carotid artery: Atherosclerotic plaque without significant stenosis (less than 50%). Left common carotid artery is patent. Left internal carotid artery: Atherosclerotic plaque without significant stenosis (less than 50%). The right vertebral artery is patent without dissection or stenosis. Moderate atherosclerotic plaquing of the V4 segment The left vertebral artery is patent without dissection or stenosis. Moderate atherosclerotic plaquing of the V4 segment. Vertebrobasilar confluence: The vertebrobasilar confluence appears normal. Lung apices are clear The soft tissues of the neck are within normal limits. 3D angiographic/MIP images of the vasculature confirm the vascular findings as described above.     1.  Mild to moderate atherosclerotic plaquing of the mid and distal common carotid arteries and especially the common carotid artery bifurcations. Bilateral internal carotid stenoses of less than 50%. 2.  Patent vertebrobasilar system with moderate atherosclerotic plaquing of the V4 segments bilaterally    CT-CTA HEAD WITH & W/O-POST PROCESS    Result Date: 9/13/2024 9/13/2024 7:32 PM HISTORY/REASON FOR EXAM:  Emergency Medical Condition ? Stroke TECHNIQUE/EXAM DESCRIPTION: CT angiogram of the [place] without and with contrast.  Initial precontrast images were obtained of the head from the skull base through the vertex.  Postcontrast images were obtained of the [place] following the power injection of nonionic contrast at 5.0 mL/sec. Thin-section helical images were obtained with overlapping reconstruction interval. Coronal and sagittal multiplanar volume reformats were generated.  3D angiographic images were reviewed on PACS.  Maximum intensity projection (MIP) images were generated  and reviewed. 80 mL of Omnipaque 350 nonionic contrast was injected intravenously. Low dose optimization technique was utilized for this CT exam including automated exposure control and adjustment of the mA and/or kV according to patient size. COMPARISON:  None. FINDINGS: The posterior circulation shows the distal vertebral arteries to be patent. The vertebrobasilar confluence is intact. The basilar artery is patent. No aneurysm or occlusive lesion is evident. Is moderate atherosclerotic plaquing of the distal vertebral arteries bilaterally. The anterior circulation shows no stenotic or occlusive lesion. No aneurysm is evident about the [place]. The brain Strates age-related cerebral atrophy and mild periventricular white matter changes. Old area of lacunar infarction in the left frontal periventricular white matter. Additionally, there is chronic frontal sinusitis. 3D angiographic/MIP images of the vasculature confirm the vascular findings as described above.     CT angiogram of the [place] within normal limits.    CT-CEREBRAL PERFUSION ANALYSIS    Result Date: 9/13/2024 9/13/2024 7:32 PM HISTORY/REASON FOR EXAM:  Emergency Medical Condition ? Stroke. TECHNIQUE/EXAM DESCRIPTION AND NUMBER OF VIEWS: CT Cerebral Perfusion Analysis. The study was performed on a 128 slice [employer].E. Lightspeed Multidetector CT scanner. Perfusion data and corresponding time-activity curves are processed and displayed as color-coded maps in the axial plane for Cerebral Blood Flow (CBF), Cerebral Blood Volume  (CBV),T Max and Mean Transit Time (MTT) and are post processed on the Ischemia view-RAPID virtual . 40 mL of Omnipaque 350 nonionic contrast was injected intravenously. Low dose optimization technique was utilized for this CT exam including automated exposure control and adjustment of the mA and/or kV according to patient size. COMPARISON:  CT head 4/28/2017 FINDINGS: Cerebral blood flow less than 30% = 0 mL T Max  "greater than 6 seconds = 0 mL Mismatch volume= 0 mL Mismatch ratio= n/a     1. Cerebral blood flow less than 30% possibly representing completed infarct = 0 mL. 2. T Max more than 6 seconds possibly representing combination of completed infarct and ischemia = 0 mL. 3. Mismatched volume possibly representing ischemic brain/penumbra= 0 mL 4.  Please note that this cerebral perfusion study and report is Quantitative and targets supratentorial (cerebral) perfusion for evaluation of large vessel territory acute ischemia/infarction. For example, lacunar infarcts, and brainstem/posterior fossa  ischemia/infarction are not evaluated on this study.  Data acquisition is subject to artifacts which can yield non-anatomically plausible perfusion maps which may be due to motion, bolus timing, signal to noise ratio, or other technical factors. Perfusion map abnormalities which show non-anatomic distributions are likely artifact.   This study is not \"stand-alone\" and should only be utilized for diagnosis, management/treatment in correlation with CT, CTA, and/or MRI and clinical factors.     DX-ANKLE 2- VIEWS LEFT    Result Date: 9/13/2024 9/13/2024 6:31 PM HISTORY/REASON FOR EXAM:  Atraumatic Pain/Swelling/Deformity. TECHNIQUE/EXAM DESCRIPTION AND NUMBER OF VIEWS:  2 views of the LEFT ankle. COMPARISON: None. FINDINGS: Bones: Decreased bone mineralization. No fracture. No focal bone lesion. Joints: Joint space narrowing with marginal osteophytes involving the ankle, hindfoot and midfoot joints, compatible with osteoarthritis. Ankle mortise is preserved. No joint effusion. Soft tissues: Left ankle soft tissue swelling. Atherosclerosis.     1. Clinical soft tissue swelling. 2. No acute fracture or subluxation. 3. Polyarticular osteoarthritis. 4. Atherosclerosis.    DX-CHEST-PORTABLE (1 VIEW)    Result Date: 9/13/2024 9/13/2024 5:53 PM HISTORY/REASON FOR EXAM:  Altered consciousness. TECHNIQUE/EXAM DESCRIPTION AND NUMBER OF VIEWS: " Single portable view of the chest. COMPARISON: Chest radiography, 4/28/2017. FINDINGS: Cardiomediastinal silhouette size is within normal limits. Stable postsurgical changes in the sternum and mediastinum. No pulmonary infiltrates or consolidations are noted. No pleural abnormalities are noted.     No acute cardiac or pulmonary abnormalities are identified.      Micro:  Results       Procedure Component Value Units Date/Time    CULTURE WOUND W/ GRAM STAIN [[id number]] Collected: 09/14/24 1057    Order Status: Completed Specimen: Wound Updated: 09/17/24 0907     Significant Indicator NEG     Source WND     Site left ankle     Culture Result No growth at 72 hours.     Gram Stain Result Moderate WBCs.  No organisms seen.      Anaerobic Culture [[id number]] Collected: 09/14/24 1057    Order Status: Completed Specimen: Wound Updated: 09/17/24 0907     Significant Indicator NEG     Source WND     Site left ankle     Culture Result Culture in progress.    Fungal Culture [[id number]] Collected: 09/14/24 1057    Order Status: Completed Specimen: Wound Updated: 09/17/24 0907     Significant Indicator NEG     Source WND     Site left ankle     Culture Result Culture in progress.     Fungal Smear Results No fungal elements seen.    FLUID CULTURE W/GRAM STAIN [[id number]] Collected: 09/13/24 2315    Order Status: Completed Specimen: Other Body Fluid Updated: 09/17/24 0905     Significant Indicator NEG     Source BF     Site Synovial Fluid     Culture Result No growth at 72 hours.     Gram Stain Result Many WBCs.  No organisms seen.      Fungal Smear [[id number]] Collected: 09/14/24 1057    Order Status: Completed Specimen: Wound Updated: 09/14/24 2014     Significant Indicator NEG     Source WND     Site left ankle     Fungal Smear Results No fungal elements seen.    GRAM STAIN [[id number]] Collected: 09/14/24 1057    Order Status: Completed Specimen: Wound Updated: 09/14/24 2014     Significant Indicator .     Source WND     Site  left ankle     Gram Stain Result Moderate WBCs.  No organisms seen.      MRSA By PCR (Amp) [[id number]] Collected: 09/13/24 2143    Order Status: Completed Specimen: Respirate from Nares Updated: 09/14/24 0627     MRSA by PCR Negative    BLOOD CULTURE [[id number]] Collected: 09/13/24 2256    Order Status: Completed Specimen: Blood from Peripheral Updated: 09/14/24 0208     Significant Indicator NEG     Source BLD     Site PERIPHERAL     Culture Result No Growth  Note: Blood cultures are incubated for 5 days and  are monitored continuously.Positive blood cultures  are called to the RN and reported as soon as  they are identified.      GRAM STAIN [[id number]] Collected: 09/13/24 2315    Order Status: Completed Specimen: Body Fluid Updated: 09/14/24 0115     Significant Indicator .     Source BF     Site Synovial Fluid     Gram Stain Result Many WBCs.  No organisms seen.      BLOOD CULTURE [[id number]] Collected: 09/13/24 2240    Order Status: Completed Specimen: Blood from Peripheral Updated: 09/14/24 0008     Significant Indicator NEG     Source BLD     Site PERIPHERAL     Culture Result No Growth  Note: Blood cultures are incubated for 5 days and  are monitored continuously.Positive blood cultures  are called to the RN and reported as soon as  they are identified.      Urinalysis [[id number]]  (Abnormal) Collected: 09/13/24 2228    Order Status: Completed Specimen: Urine Updated: 09/13/24 2335     Color Yellow     Character Clear     Specific Gravity >=1.045     Ph 7.0     Glucose Negative mg/dL      Ketones Trace mg/dL      Protein 30 mg/dL      Bilirubin Negative     Urobilinogen, Urine 1.0     Nitrite Negative     Leukocyte Esterase Negative     Occult Blood Small     Micro Urine Req Microscopic    CoV-2, Flu A/B, And RSV by PCR (Cepheid) [[id number]] Collected: 09/13/24 0000    Order Status: Canceled Specimen: Respirate             Assessment:  Active Hospital Problems    Diagnosis     *Stroke (HCC) [I63.9]     Septic  "arthritis of left ankle (HCC) [M00.9]     Hx of CABG [Z95.1]     Left-sided weakness [R53.1]     Murmur [R01.1]     Acute encephalopathy [G93.40]     Sepsis (HCC) [A41.9]     Systolic heart failure (HCC) [I50.20]     Essential (primary) hypertension [I10]      Interval 24 hours:      AF, O2 RA  Labs reviewed to assess for clinical status, drug toxicity and organ function  Imaging personally reviewed both images and report.   Micro reviewed    Patient overall doing well, no complaints.  Ankle appears to be healing well.  Antibiotics as below    ASSESSMENT/PLAN:      74 y.o.  admitted 9/13/2024. Pt has a past medical history of CAD status post CABG and gout who presented complaining of lower extremity weakness with left ankle swelling and pain. Ephalopathic on arrival and imaging of the head with MRI brain with old and acute infarcts.  Including acute infarction left posterior parietal lobe. Subcentimeter focus of acute or recent subacute infarction right parietal periventricular white matter/splenium corpus callosum. Acute brainstem infarct in the left side of the hao.cute brainstem infarct and left-sided hao and multiple vascular territories involvement suggesting a possible embolic mechanism.\"  Low-grade temperature and leukocytosis on arrival.  This is now improved.  He went to the OR with orthopedic surgery on 9/14 as below.  Patient has been on vancomycin.     Problem List     Left ankle septic arthritis   -Left ankle synovial fluid studies on 9/13 consistent with septic arthritis, 85,630 white cells, 93% polys, fluid specimen was not sent for PCR panel. Cultures are no growth.   - OR on 9/14 for left ankle arthrotomy with I&D.  Per op note once the ankle capsule was entered there was immediate outflow of purulent cloudy fluid.  This was swabbed and sent for culture.    CVA, multifocal, acute and old, concern for septic emboli   -Potential discharge to acute rehab  - TTE with no vegetations     Plan:     --- " Continue vancomycin for now, no cultures or PCR testing in which to base antibiotics but appears to have improved on vancomycin so we will continue.  Recommending a 4-week total antibiotic course, end 10/12/24.  Continue vancomycin if he is at Rehab a skilled facility that has dedicated pharmacy assistance to manage.  Could potentially complete the course with linezolid if it is less than 2 weeks or with close monitoring to ensure no bone marrow suppression.  --- Unfortunately, no PCR joint panel was ordered for the synovial fluid.  Micro lab has discarded the fluid so will be unable to run at this point.   --- Will send tissue from the OR on 9/14 to [facility] for PCR testing, order placed  --- Continue follow-up OR cultures, these will be held for 14 days, notify ID if no growth  --- Blood cultures are no growth and final  --- Left ankle synovial fluid cultures no growth and final  --- Monitor labs      Follow-up in ID clinic in 1 to 2 weeks, okay to be seen by ALY Mills.     Dispo: Anticipate discharge to rehab or skilled facility.  Patient is at risk for infectious complications including death.     PICC: Okay to place midline        Plan of care discussed with IM Herbert Anderson M.D..ID will sign off.      Joyce Bae M.D.

## 2024-09-18 NOTE — PROGRESS NOTES
Monitor Summary: -112, VA 0.13, QRS 0.09, QT 0.34, with frequent PVCs per strip from monitor room.

## 2024-09-18 NOTE — DISCHARGE PLANNING
Case Management Discharge Planning    Admission Date: 9/13/2024  GMLOS: 5.1  ALOS: 5    6-Clicks ADL Score: 18  6-Clicks Mobility Score: 17  PT and/or OT Eval ordered: Yes  Post-acute Referrals Ordered: Yes  Post-acute Choice Obtained: Yes  Has referral(s) been sent to post-acute provider:  Yes      Anticipated Discharge Dispo: Discharge Disposition: Disch to IP rehab facility or distinct part unit (62)    DME Needed: No    Action(s) Taken: DC Assessment Complete (See below)    Escalations Completed: Provider    Medically Clear: No    Next Steps: f/u with pt and medical team to discuss dc needs and barriers.      Barriers to Discharge: Medical clearance    Is the patient up for discharge tomorrow: No      Care Transition Team Assessment      RN Taniya called and spoke with PT's sister serafin to obtain assessment. Pt is A&Ox2. Pt lives with his brother and sister in law in a one story house in [place]. On Baseline, Pt was independent  in all his ADL's and IADL's. Pt has not had previous HH nor does he use DME or home O2. Pt has no PCP listed. Pt has no history of ETOH/Drug/Tobacco abuse.    Pt's brother and sister in law is agreeable with post acute placement, preferably [facility].        Information Source  Orientation Level: Oriented to time, Oriented to person, Disoriented to place, Disoriented to situation  Information Given By: Other (Comments) (Sister in Law Daisha)  Who is responsible for making decisions for patient? : Patient         Elopement Risk  Legal Hold: No  Ambulatory or Self Mobile in Wheelchair: Yes  Disoriented: Time-At Risk for Elopement, Place-At Risk for Elopement, Situation-At Risk for Elopement  Psychiatric Symptoms: None  History of Wandering: No  Elopement this Admit: No  Vocalizing Wanting to Leave: No  Displays Behaviors, Body Language Wanting to Leave: No-Not at Risk for Elopement  Elopement Risk: Not at Risk for Elopement    Interdisciplinary Discharge Planning  Lives with - Patient's  Self Care Capacity: Alone and Unable to Care For Self  Patient or legal guardian wants to designate a caregiver: No  Support Systems: Family Member(s), Friends / Neighbors  Housing / Facility: [address]  Prior Services: Unable To Determine At This Time    Discharge Preparedness  What is your plan after discharge?: Other (comment) (IPR)  What are your discharge supports?: Sibling (sister in Law)  Prior Functional Level: Ambulatory, Drives Self, Independent with Activities of Daily Living  Difficulity with ADLs: None  Difficulity with IADLs: None    Functional Assesment  Prior Functional Level: Ambulatory, Drives Self, Independent with Activities of Daily Living    Finances  Financial Barriers to Discharge: No  Prescription Coverage: Yes    Vision / Hearing Impairment  Vision Impairment : Yes  Right Eye Vision: Wears Glasses  Left Eye Vision: Wears Glasses  Hearing Impairment : No         Advance Directive  Advance Directive?: None    Domestic Abuse  Have you ever been the victim of abuse or violence?: No  Possible Abuse/Neglect Reported to:: Not Applicable    Psychological Assessment  History of Substance Abuse: None    Discharge Risks or Barriers  Discharge risks or barriers?: Complex medical needs  Patient risk factors: Complex medical needs    Anticipated Discharge Information  Discharge Disposition: Disch to  rehab facility or distinct part unit (62)

## 2024-09-18 NOTE — PROGRESS NOTES
"      Orthopaedic Progress Note    Interval changes:  Patient doing well   L ankle dressings changed incision without issues   Cleared for DC to home by ortho pending medicine clearance    ROS - Patient denies any new issues.  Pain well controlled.    /83   Pulse (!) 105   Temp 36.3 °C (97.3 °F) (Temporal)   Resp 18   Ht 1.753 m (5' 9\")   Wt 77.9 kg (171 lb 11.8 oz)   SpO2 96%     Patient seen and examined  No acute distress  Breathing non labored  RRR  L ankle dressings changed incision without issues, DNVI, moves all toes, cap refill <2 sec.     Recent Labs     09/16/24  0759 09/17/24  0119 09/18/24  0836   WBC 11.4* 10.3 11.1*   RBC 4.44* 4.87 5.10   HEMOGLOBIN 13.9* 15.0 15.9   HEMATOCRIT 41.3* 44.9 45.7   MCV 93.0 92.2 89.6   MCH 31.3 30.8 31.2   MCHC 33.7 33.4 34.8   RDW 42.8 42.0 39.7   PLATELETCT 242 245 293   MPV 11.6 11.5 11.2       Active Hospital Problems    Diagnosis     Stroke (HCC) [I63.9]     Septic arthritis of left ankle (HCC) [M00.9]     Hx of CABG [Z95.1]     Left-sided weakness [R53.1]     Murmur [R01.1]     Acute encephalopathy [G93.40]     Sepsis (HCC) [A41.9]     Systolic heart failure (HCC) [I50.20]     Essential (primary) hypertension [I10]      10/31/14 IN ER dx with /152. Tx with IV labetalol and d/c on Metoprolol.          Assessment/Plan:  Patient doing well   L ankle dressings changed incision without issues  Cleared for DC to home by ortho pending medicine clearance  POD#3 S/P Left ankle arthrotomy incision drainage  Wt bearing status - WBAT LLE  Wound care/Drains - Dressings to be changed every other day by nursing. Or PRN for saturation starting POD#2  Future Procedures - none planned   Lovenox: Start 9/16, Duration-until ambulatory > 150'  Sutures/Staples out- 14-21 days post operatively. Removal will completed by ortho mid levels only.  PT/OT-initiated  Antibiotics: vanco 750mg IV Q12  DVT Prophylaxis- TEDS/SCDs/Foot pumps  Silva-not needed per ortho  Case " Coordination for Discharge Planning - Disposition per therapy recs.

## 2024-09-18 NOTE — PROGRESS NOTES
Physical Medicine and Rehabilitation     Note is adapted from my colleague Dr. Gutierrez's prior note on   9/16/2024  LOS: 5 Day(s)    Chief complaint: LE weakness and ankle swelling.     HPI: The patient is a 74 y.o.  male with a past medical history of CAD s/p CABG and gout;  who presented on 9/13/2024  5:10 PM with LE weakness and ankle swelling. Per documentation, patient had worsening symptoms of LLE and ankle swelling x 3 days. Upon eval in the ED, patient was also confused. CTA head and neck obtained for weakness and confusion showed no LVO on CTA head and mid to moderate atherosclerotic plaquing of the common carotid arteries and bilateral ICA stenosis less than 50%. Ankle xray was negative for acute fracture. 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 Echo pending, prior echo from 2016 showed EF of 40-50%.  Neuro consulted, patient is now on ASA and statin. In regards to patient's ankle, patient had an arthrocentesis done in the ED, ortho was consulted, and patient was taken the OR on 9/14 for left ankle I and D for left ankle septic arthritis performed by Dr. Gautam. Patient is WBAT LLE.  Patient remains on vancomycin, stop date is 9/21. Patient has been able to mobilize CGA level.     Patient seen and examined at bedside, patient reports he feels ok. Reports his ankle feels better. Pain controlled. Does not report HA, lightheadedness, SOB, CP, abdominal pain, or changes in numbness/tingling/weakness.   Reports he has had an issue with his memory in the past, but was doing everything on his own at home.     9/18/2024  Patient hypertensive, agitated, physically and verbally aggressive last night.  Required Haldol, security, restraints.  BP up to 200/95 this morning.  Started on amlodipine, received as needed hydralazine.  Labs reflect slight hypokalemia with potassium 3.3, a low vancomycin  trough at 4.9.  Transthoracic echo yesterday showed severe left ventricular hypertrophy and mild aortic valve stenosis.     Patient is currently oriented to self only, and September. Unable to state year, location or situation. Per BSN, patients orientation fluctuates between A&O 1-3. He is in bilateral soft wrist restraints and belt. He is calm and cooperative.     Social Hx:  Per documentation - Patient lives alone in a 1 story house with no DORCAS, brother lives   Nearby  Per patient - lives with brother and sister in law in a 1 story house     0 DORCAS  At prior level of function patient was Independent with mobility and ADLs.     Tobacco: Former smoker, quit 8 years ago   Alcohol: Denied  Drugs: Denied     THERAPY:  Restrictions: Fall Risk, WBAT LLE   PT: Functional mobility   9/15  CGA with FWW x 40 ft, CGA sit to stand   9/17: Walking 60 feet x 2 with front wheel walker at contact-guard assist    OT: ADLs  9/16: Mod assist lower body dressing, min assist upper body dressing and toileting    SLP:   9/16 requires direct assistance with IADLs, SLP recommending referral  to neuropsychologist   9/17: Moderate to severe cognitive-linguistic deficits, soft and bite-size diet    IMAGING:  MR brain 9/15/24    1.  Moderate cerebral atrophy.  2.  Advanced supratentorial white matter disease most consistent with microvascular ischemic change.  3.  Old microhemorrhage left posterior sylvian frontal lobe and left occipital lobe most consistent with old hypertensive microhemorrhage versus amyloid angiopathy.  4.  Old lacunar infarcts x2 in the right thalamus old lacunar infarcts x2 in the left thalamus.  5.  Old cortical infarct left parietal lobe at the postcentral gyrus.  6.  Subcentimeter focus of acute infarction right insula.  7.  Subcentimeter focus acute infarction left posterior parietal lobe.  8.  Subcentimeter focus of acute or recent subacute infarction right parietal periventricular white matter/splenium corpus  callosum.  9.  Acute brainstem infarct in the left side of the hao.  10.  Multiple old lacunar size infarcts left cerebellar hemisphere.  11.  Moderate-sized old infarct right cerebellar hemisphere.  12.  No acute infarcts show hemorrhagic transformation.  13.  Multiple vascular territories are involved. Possible embolic mechanism.      PROCEDURES:  9/14 Left ankle arthrotomy incision drainage  by Dr. Gautam     PMH:  Past Medical History:   Diagnosis Date    Arthritis     Gout     Hemorrhoids 11/25/2014    Reportedly Pt has had hemorrhoids for some time. He denies any bloody stools or bleeding hemorrhoids.     Hypertension     Ingrown right big toenail 05/20/2015    Low back pain 05/20/2015    NSTEMI (non-ST elevated myocardial infarction) (HCC) 06/28/2016    Vitamin D deficiency 01/06/2015 12/1/14 lab result show slightly low Vitamin D level= 26. He states he walks outside a lot in the sun until the past month.       PSH:  Past Surgical History:   Procedure Laterality Date    IRRIGATION & DEBRIDEMENT GENERAL Left 9/14/2024    Procedure: IRRIGATION AND DEBRIDEMENT, ANKLE;  Surgeon: Alex Gautam M.D.;  Location: SURGERY [facility];  Service: Orthopedics    MULTIPLE CORONARY ARTERY BYPASS ENDO VEIN HARVEST N/A 6/28/2016    Procedure: MULTIPLE CORONARY ARTERY BYPASS ENDO VEIN HARVEST- With DELMI ;  Surgeon: Deejay Duong M.D.;  Location: [facility];  Service:        FHX:  Family History   Problem Relation Age of Onset    Cancer Mother     Heart Disease Father     Hyperlipidemia Father     Hyperlipidemia Brother     Lung Disease Brother        Medications:  Current Facility-Administered Medications   Medication Dose    hydrALAZINE (Apresoline) tablet 50 mg  50 mg    hydrALAZINE (Apresoline) injection 10 mg  10 mg    amLODIPine (Norvasc) tablet 10 mg  10 mg    haloperidol lactate (Haldol) injection 5 mg  5 mg    lisinopril (Prinivil) tablet 40 mg  40 mg    enoxaparin (Lovenox) inj 40 mg  40 mg  "   atorvastatin (Lipitor) tablet 80 mg  80 mg    vancomycin (Vancocin) 750 mg in  mL IVPB  750 mg    melatonin tablet 5 mg  5 mg    acetaminophen (Tylenol) tablet 650 mg  650 mg    ondansetron (Zofran) syringe/vial injection 4 mg  4 mg    ondansetron (Zofran ODT) dispertab 4 mg  4 mg    aspirin (Asa) chewable tab 81 mg  81 mg    Or    aspirin (Asa) suppository 300 mg  300 mg    metoprolol SR (Toprol XL) tablet 25 mg  25 mg    MD Alert...Vancomycin per Pharmacy         Allergies:  No Known Allergies      Physical Exam:  Vitals: BP (!) 180/102   Pulse (!) 105   Temp 36.7 °C (98 °F) (Temporal)   Resp 18   Ht 1.753 m (5' 9\")   Wt 77.9 kg (171 lb 11.8 oz)   SpO2 96%   Gen: NAD, laying comfortably in bed   Head:  NC/AT  Eyes/ Nose/ Mouth: PERRLA, moist mucous membranes  Cardio: RRR, good distal perfusion, warm extremities  Pulm: normal respiratory effort, no cyanosis, on RA   Abd: Soft NTND  Ext: No peripheral edema. No calf tenderness. No clubbing.Ace wrap in place on Left ankle       Labs: Reviewed and significant for   Recent Labs     09/16/24  0759 09/17/24  0119   RBC 4.44* 4.87   HEMOGLOBIN 13.9* 15.0   HEMATOCRIT 41.3* 44.9   PLATELETCT 242 245     Recent Labs     09/16/24  0759 09/17/24  0119   SODIUM 142 143   POTASSIUM 3.8 3.3*   CHLORIDE 111 108   CO2 19* 21   GLUCOSE 120* 121*   BUN 21 17   CREATININE 0.82 0.72   CALCIUM 8.5 9.0     Recent Results (from the past 24 hour(s))   EC-ECHOCARDIOGRAM COMPLETE W/O CONT    Collection Time: 09/17/24  4:35 PM   Result Value Ref Range    Eject.Frac. MOD BP 57.61     Eject.Frac. MOD 4C 58.14     Eject.Frac. MOD 2C 54.72     Left Ventrical Ejection Fraction 60          ASSESSMENT:  Patient is a 74 y.o. male admitted with LLE weakness     [facility] Code / Diagnosis to Support: 0001.4 - Stroke: No Paresis  See DISPO details below for recommendations on appropriate level of rehab for this diagnosis.    Barriers to transfer include: Insurance authorization, TCCs to verify " disposition, medical clearance and bed availability     Assessment and Plan:  Left pontine stroke   Multifocal infarcts   - admitted with LLE weakness ( weakness may be due to septic arthritis rather than stroke) and AMS   - CTA head negative for LVO   - CTA neck showed bilateral ICA stenosis of less than 50%   - MRI brain showed acute brain stem infarct in the left hao and multifocal infarcts, old lacunar infarct in b/l thalamus   - neuro consulted   - on ASA and statin for secondary stroke ppx   - Echo completed  - continue with PT/OT/SLP     Agitation   - Schedule seroquel 50mg QHS   - PRN seroquel 25mg TID for mild agitation  - Geodon 10mg IM for aggressive agitation  - Avoid haldol and benzos   - If AMS continues, get repeat CT head to rule out new lesion, Patient was extremely hypertensive this morning     Left ankle septic arthritis   - admitted with LLE weakness and ankle pain   - left ankle cxr negative for acute fracture   - ortho consulted   - 9/14 left rose arthrotomy I and D for septic arthritis by Dr. Gautam   - on Vancomycin, stop date 9/21   - WBAT LLE     Hx of CABG   - on  ASA and statin   - prior echo from 2016 showed EF of 40-50 %   - updated echo pending     Uncontrolled HTN   - target BP <130/80, recently as high as 200/95  - Metoprolol 25mg daily   - Lisinopril 40mg daily   - Amlodipine 10mg daily   - PRN hydralyzine       DISPO:  - patient is currently functioning below their level of baseline, recommend post acute rehab  - anticipated to be appropriate for IRF level therapy with 3hr of therapy 5 days per week   - TCC to assist with insurance auth and DC support from brother        Medical Complexity:  Left pontine stroke   Multifocal infarcts   Left ankle septic arthritis   Hx of CABG   HTN   Impaired mobility and ADLs       DVT PPX: SCDs       Thank you for allowing us to participate in the care of this patient.     Patient was seen for >50 minutes on unit/floor of which > 50% of time was  spent on counseling and coordination of care regarding the above, including prognosis, risk reduction, benefits of treatment, and options for next stage of care.    Ralph Simpson D.O.   Physical Medicine and Rehabilitation     Please note that this dictation was created using voice recognition software. I have made every reasonable attempt to correct obvious errors, but there may be errors of grammar and possibly content that I did not discover before finalizing the note.

## 2024-09-18 NOTE — PROGRESS NOTES
Hospital Medicine Daily Progress Note    Date of Service  9/18/2024    Chief Complaint  Joe Templeton is a 74 y.o. male admitted 9/13/2024 with left sided weakness    Hospital Course  Mr. Templeton is a 74 y.o. male who presented 9/13/2024 with past medical history of coronary disease status post CABG, gout who presents to the hospital for left-sided weakness, dysarthria, fevers and left ankle swelling.  Apparently the patient's symptoms started 3 days ago and his family called 911.  The patient's been refusing to come to the hospital.  Overall patient was confused on my examination and unable to answer my questions.  He did not remember that he has family in [place].  He denies any cough, shortness of breath, dysuria, diarrhea, and vomiting.     Chest x-ray interpreted by me found no acute pulmonary process  EKG interpreted by me found normal sinus rhythm, LVH     CTA of the head and neck found mild to moderate atherosclerotic plaquing of the mid to distal common carotid arteries especially the common carotid artery bifurcation.  Moderate atherosclerotic plaquing of V4 segment bilaterally.    In the ER had had left ankle pain and swelling for which 4 mL of fluid was aspirated and he was initiated on IV Vancomycin and Zosyn.    Interval Problem Update  Patient was seen and examined at bedside.  No acute events overnight. Patient is resting comfortably in bed and in no acute distress.     MRI brain with evidence of stroke - neurology consulted  Await 2d echo  S/p OR 09/14 - Left ankle arthrotomy I&D      9/17 patient is new to me today he is in bed, he is in good spirit, left ankle pain, no fever no chills, I have discussed with infectious disease, continue IV vancomycin for now, blood cultures negative so far, echocardiogram is still pending, reviewed note from orthopedic surgery, I have added scheduled hydralazine for blood pressure control, patient still requiring IV hydralazine to keep his blood pressure under  control.  9/18 patient is resting in bed, still on IV vancomycin, still requiring IV blood pressure medications, patient is able to follow commands denies any new complaints no nausea no vomiting, per infectious disease patient will require at least 4 weeks of IV antibiotics, patient will need midline, follow-up echo results, discussed with bedside nurse charge nurse  pharmacist.      I have discussed this patient's plan of care and discharge plan at IDT rounds today with Case Management, Nursing, Nursing leadership, and other members of the IDT team.    Consultants/Specialty  orthopedics    Code Status  Full Code    Disposition  The patient is not medically cleared for discharge to home or a post-acute facility.      I have placed the appropriate orders for post-discharge needs.    Review of Systems  Review of Systems   Constitutional:  Negative for chills and fever.   Eyes:  Negative for blurred vision and double vision.   Respiratory:  Negative for cough, hemoptysis and wheezing.    Cardiovascular:  Negative for chest pain, palpitations, claudication, leg swelling and PND.   Gastrointestinal:  Negative for heartburn, nausea and vomiting.   Genitourinary:  Negative for hematuria and urgency.   Musculoskeletal:  Positive for joint pain. Negative for back pain and myalgias.   Skin:  Negative for rash.   Neurological:  Negative for dizziness and headaches.   Endo/Heme/Allergies:  Does not bruise/bleed easily.   Psychiatric/Behavioral:  Negative for depression.         Physical Exam  Temp:  [36.3 °C (97.3 °F)-36.7 °C (98 °F)] 36.3 °C (97.3 °F)  Pulse:  [] 97  Resp:  [16-18] 17  BP: (138-200)/() 151/79  SpO2:  [95 %-98 %] 96 %    Physical Exam  Vitals and nursing note reviewed.   Constitutional:       General: He is not in acute distress.     Appearance: He is not ill-appearing.   HENT:      Nose: Nose normal.      Mouth/Throat:      Comments: Very poor dentition  Eyes:      General: No scleral  icterus.  Cardiovascular:      Rate and Rhythm: Normal rate and regular rhythm.      Heart sounds: Murmur heard.   Pulmonary:      Effort: No respiratory distress.      Breath sounds: No wheezing, rhonchi or rales.   Abdominal:      General: Abdomen is flat. There is no distension.      Tenderness: There is no abdominal tenderness.   Musculoskeletal:      Cervical back: No rigidity.      Right lower leg: No edema.      Left lower leg: No edema.      Comments: Left ankle medial malleolus swollen, red, tender   Skin:     General: Skin is warm and dry.      Coloration: Skin is not jaundiced.   Neurological:      Mental Status: He is alert.      Cranial Nerves: No cranial nerve deficit.      Sensory: No sensory deficit.      Motor: No weakness.      Comments: Strength approx equal bilat arms and legs  Mild left facial droop   Psychiatric:      Comments: Odd affect   Generally oriented but an overall poor historian.         Fluids    Intake/Output Summary (Last 24 hours) at 9/18/2024 1620  Last data filed at 9/18/2024 1500  Gross per 24 hour   Intake 420 ml   Output --   Net 420 ml        Laboratory  Recent Labs     09/16/24  0759 09/17/24  0119 09/18/24  0836   WBC 11.4* 10.3 11.1*   RBC 4.44* 4.87 5.10   HEMOGLOBIN 13.9* 15.0 15.9   HEMATOCRIT 41.3* 44.9 45.7   MCV 93.0 92.2 89.6   MCH 31.3 30.8 31.2   MCHC 33.7 33.4 34.8   RDW 42.8 42.0 39.7   PLATELETCT 242 245 293   MPV 11.6 11.5 11.2     Recent Labs     09/16/24  0759 09/17/24  0119 09/18/24  0836   SODIUM 142 143 141   POTASSIUM 3.8 3.3* 3.8   CHLORIDE 111 108 108   CO2 19* 21 19*   GLUCOSE 120* 121* 144*   BUN 21 17 17   CREATININE 0.82 0.72 0.72   CALCIUM 8.5 9.0 9.0                       Imaging  EC-ECHOCARDIOGRAM COMPLETE W/O CONT   Final Result      MR-BRAIN-W/O   Final Result      1.  Moderate cerebral atrophy.   2.  Advanced supratentorial white matter disease most consistent with microvascular ischemic change.   3.  Old microhemorrhage left posterior  sylvian frontal lobe and left occipital lobe most consistent with old hypertensive microhemorrhage versus amyloid angiopathy.   4.  Old lacunar infarcts x2 in the right thalamus old lacunar infarcts x2 in the left thalamus.   5.  Old cortical infarct left parietal lobe at the postcentral gyrus.   6.  Subcentimeter focus of acute infarction right insula.   7.  Subcentimeter focus acute infarction left posterior parietal lobe.   8.  Subcentimeter focus of acute or recent subacute infarction right parietal periventricular white matter/splenium corpus callosum.   9.  Acute brainstem infarct in the left side of the hao.   10.  Multiple old lacunar size infarcts left cerebellar hemisphere.   11.  Moderate-sized old infarct right cerebellar hemisphere.   12.  No acute infarcts show hemorrhagic transformation.   13.  Multiple vascular territories are involved. Possible embolic mechanism.      CT-CTA NECK WITH & W/O-POST PROCESSING   Final Result      1.  Mild to moderate atherosclerotic plaquing of the mid and distal common carotid arteries and especially the common carotid artery bifurcations. Bilateral internal carotid stenoses of less than 50%.      2.  Patent vertebrobasilar system with moderate atherosclerotic plaquing of the V4 segments bilaterally      CT-CTA HEAD WITH & W/O-POST PROCESS   Final Result      CT angiogram of the [place] within normal limits.      CT-CEREBRAL PERFUSION ANALYSIS   Final Result      1. Cerebral blood flow less than 30% possibly representing completed infarct = 0 mL.      2. T Max more than 6 seconds possibly representing combination of completed infarct and ischemia = 0 mL.      3. Mismatched volume possibly representing ischemic brain/penumbra= 0 mL      4.  Please note that this cerebral perfusion study and report is Quantitative and targets supratentorial (cerebral) perfusion for evaluation of large vessel territory acute ischemia/infarction. For example, lacunar infarcts,  "and brainstem/posterior fossa    ischemia/infarction are not evaluated on this study.  Data acquisition is subject to artifacts which can yield non-anatomically plausible perfusion maps which may be due to motion, bolus timing, signal to noise ratio, or other technical factors.    Perfusion map abnormalities which show non-anatomic distributions are likely artifact.   This study is not \"stand-alone\" and should only be utilized for diagnosis, management/treatment in correlation with CT, CTA, and/or MRI and clinical factors.         DX-ANKLE 2- VIEWS LEFT   Final Result      1. Clinical soft tissue swelling.   2. No acute fracture or subluxation.   3. Polyarticular osteoarthritis.   4. Atherosclerosis.      DX-CHEST-PORTABLE (1 VIEW)   Final Result      No acute cardiac or pulmonary abnormalities are identified.           Assessment/Plan  * Stroke (HCC)  Assessment & Plan  MRI brain with multifocal embolic appearing infarcts in multiple vascular territories, no hemorrhage   Consult to Neurology   Cardioembolic workup  Await echo    Systolic heart failure (HCC)  Assessment & Plan  History of  Last EF was 40 to 55%  Without exacerbation      Sepsis ([facility])  Assessment & Plan  This is Sepsis Present on admission    Acute encephalopathy- (present on admission)  Assessment & Plan  Unclear baseline.  He is oriented x 4 though certainly a bit \"off\"  Way family members to give further updates on his baseline.  Oriented x 4      Murmur  Assessment & Plan  Cardiac echo is pending to rule out endocarditis  Follow-up blood cultures    Left-sided weakness- (present on admission)  Assessment & Plan  Highly concerned that the patient may have endocarditis since he has a new murmur, encephalopathy and left-sided weakness.  Currently he is not weak and left side though does  have slight facial droop  Patient is past the tPA window  Patient will be placed on continuous cardiac monitoring to evaluate for any arrhythmias  MRI of the brain " is pending  Check 2-D echo  Patient will be started on full dose aspirin and high intensity statin  Check TSH, lipid panel and hemoglobin A1c  PTOT and ST evaluation once he is stabilized    Follow-up echo still pending      Hx of CABG  Assessment & Plan  Continue aspirin and statins    Septic arthritis of left ankle (HCC)- (present on admission)  Assessment & Plan  S/p OR 09/14 - Left ankle arthrotomy I&D  Continue IV vanc  Gram stain and wound culture negative  Vancomycin requiring frequent monitoring of trough levels and renal function for vanc toxicity        Discussed with infectious disease    Essential (primary) hypertension- (present on admission)  Assessment & Plan  Uncontrolled  Continue current home medications  IV as needed medications have been ordered    Patient still requiring IV hydralazine and labetalol to keep her blood pressure under control  Have added scheduled hydralazine    Still requiring IV blood pressure medication, monitoring for side effects include but not limited to hypotension bradycardia.         VTE prophylaxis: Lovenox            I have reviewed CBC  I have reviewed BMP  Vancomycin levels reviewed  I have reviewed note from infectious disease  I have reviewed notes from orthopedic surgery  Patient is on IV vancomycin I am monitoring for side effects include but not limited to acute kidney injury, monitoring  Patient requiring IV hydralazine for blood pressure control monitor for side effects include but not limited to hypotension bradycardia.

## 2024-09-18 NOTE — PROGRESS NOTES
Brief Vascular Neurology Note    TTE completed, reveals LV EF 60% with normal RV, mildly enlarged right atrium with normal left atrium. Rhythm strips reviewed since initial presentation and no evidence of atrial fibrillation/flutter to date. Recommend continuing ASA 81mg daily and atorvastatin 80mg qhs. Patient will need long term cardiac monitoring with Zio patch/loop recorder on discharge. Neurovascular clinic follow up as outpatient. Neurology will sign off at this time.    Case reviewed and plan created with Dr. Joseph Meehan, Vascular Neurology. Please call with any questions.    KHUSHI Brown  Vascular Neurology, Inpatient Neurology  [phone]

## 2024-09-19 ENCOUNTER — APPOINTMENT (OUTPATIENT)
Dept: RADIOLOGY | Facility: MEDICAL CENTER | Age: 74
DRG: 853 | End: 2024-09-19
Attending: HOSPITALIST
Payer: MEDICARE

## 2024-09-19 LAB
ANION GAP SERPL CALC-SCNC: 14 MMOL/L (ref 7–16)
BACTERIA BLD CULT: NORMAL
BACTERIA BLD CULT: NORMAL
BUN SERPL-MCNC: 23 MG/DL (ref 8–22)
CALCIUM SERPL-MCNC: 9.2 MG/DL (ref 8.5–10.5)
CHLORIDE SERPL-SCNC: 110 MMOL/L (ref 96–112)
CO2 SERPL-SCNC: 19 MMOL/L (ref 20–33)
CREAT SERPL-MCNC: 0.96 MG/DL (ref 0.5–1.4)
GFR SERPLBLD CREATININE-BSD FMLA CKD-EPI: 83 ML/MIN/1.73 M 2
GLUCOSE SERPL-MCNC: 154 MG/DL (ref 65–99)
POTASSIUM SERPL-SCNC: 3.7 MMOL/L (ref 3.6–5.5)
SIGNIFICANT IND 70042: NORMAL
SIGNIFICANT IND 70042: NORMAL
SITE SITE: NORMAL
SITE SITE: NORMAL
SODIUM SERPL-SCNC: 143 MMOL/L (ref 135–145)
SOURCE SOURCE: NORMAL
SOURCE SOURCE: NORMAL
VANCOMYCIN TROUGH SERPL-MCNC: 6.2 UG/ML (ref 10–20)

## 2024-09-19 PROCEDURE — 700105 HCHG RX REV CODE 258: Performed by: HOSPITALIST

## 2024-09-19 PROCEDURE — 700102 HCHG RX REV CODE 250 W/ 637 OVERRIDE(OP)

## 2024-09-19 PROCEDURE — 80202 ASSAY OF VANCOMYCIN: CPT

## 2024-09-19 PROCEDURE — 80048 BASIC METABOLIC PNL TOTAL CA: CPT

## 2024-09-19 PROCEDURE — 51798 US URINE CAPACITY MEASURE: CPT

## 2024-09-19 PROCEDURE — 700102 HCHG RX REV CODE 250 W/ 637 OVERRIDE(OP): Performed by: HOSPITALIST

## 2024-09-19 PROCEDURE — 700111 HCHG RX REV CODE 636 W/ 250 OVERRIDE (IP): Performed by: INTERNAL MEDICINE

## 2024-09-19 PROCEDURE — 99232 SBSQ HOSP IP/OBS MODERATE 35: CPT | Performed by: HOSPITALIST

## 2024-09-19 PROCEDURE — 97535 SELF CARE MNGMENT TRAINING: CPT

## 2024-09-19 PROCEDURE — 99232 SBSQ HOSP IP/OBS MODERATE 35: CPT | Performed by: PHYSICAL MEDICINE & REHABILITATION

## 2024-09-19 PROCEDURE — A9270 NON-COVERED ITEM OR SERVICE: HCPCS | Performed by: HOSPITALIST

## 2024-09-19 PROCEDURE — 700102 HCHG RX REV CODE 250 W/ 637 OVERRIDE(OP): Performed by: PHYSICAL MEDICINE & REHABILITATION

## 2024-09-19 PROCEDURE — C1751 CATH, INF, PER/CENT/MIDLINE: HCPCS

## 2024-09-19 PROCEDURE — 97116 GAIT TRAINING THERAPY: CPT | Mod: CQ

## 2024-09-19 PROCEDURE — A9270 NON-COVERED ITEM OR SERVICE: HCPCS

## 2024-09-19 PROCEDURE — 770020 HCHG ROOM/CARE - TELE (206)

## 2024-09-19 PROCEDURE — 36415 COLL VENOUS BLD VENIPUNCTURE: CPT

## 2024-09-19 PROCEDURE — A9270 NON-COVERED ITEM OR SERVICE: HCPCS | Performed by: STUDENT IN AN ORGANIZED HEALTH CARE EDUCATION/TRAINING PROGRAM

## 2024-09-19 PROCEDURE — 700111 HCHG RX REV CODE 636 W/ 250 OVERRIDE (IP): Mod: JZ | Performed by: INTERNAL MEDICINE

## 2024-09-19 PROCEDURE — A9270 NON-COVERED ITEM OR SERVICE: HCPCS | Performed by: PHYSICAL MEDICINE & REHABILITATION

## 2024-09-19 PROCEDURE — 700105 HCHG RX REV CODE 258: Performed by: INTERNAL MEDICINE

## 2024-09-19 PROCEDURE — 700111 HCHG RX REV CODE 636 W/ 250 OVERRIDE (IP): Performed by: HOSPITALIST

## 2024-09-19 PROCEDURE — 700102 HCHG RX REV CODE 250 W/ 637 OVERRIDE(OP): Performed by: STUDENT IN AN ORGANIZED HEALTH CARE EDUCATION/TRAINING PROGRAM

## 2024-09-19 PROCEDURE — 97110 THERAPEUTIC EXERCISES: CPT | Mod: CQ

## 2024-09-19 RX ADMIN — ASPIRIN 81 MG: 81 TABLET, CHEWABLE ORAL at 05:02

## 2024-09-19 RX ADMIN — QUETIAPINE FUMARATE 25 MG: 25 TABLET ORAL at 17:55

## 2024-09-19 RX ADMIN — HYDRALAZINE HYDROCHLORIDE 50 MG: 25 TABLET ORAL at 20:51

## 2024-09-19 RX ADMIN — ENOXAPARIN SODIUM 40 MG: 100 INJECTION SUBCUTANEOUS at 17:55

## 2024-09-19 RX ADMIN — VANCOMYCIN HYDROCHLORIDE 750 MG: 5 INJECTION, POWDER, LYOPHILIZED, FOR SOLUTION INTRAVENOUS at 04:58

## 2024-09-19 RX ADMIN — QUETIAPINE FUMARATE 50 MG: 25 TABLET ORAL at 20:50

## 2024-09-19 RX ADMIN — Medication 5 MG: at 20:51

## 2024-09-19 RX ADMIN — ATORVASTATIN CALCIUM 80 MG: 80 TABLET, FILM COATED ORAL at 17:55

## 2024-09-19 RX ADMIN — HYDRALAZINE HYDROCHLORIDE 50 MG: 25 TABLET ORAL at 15:06

## 2024-09-19 RX ADMIN — HYDRALAZINE HYDROCHLORIDE 50 MG: 25 TABLET ORAL at 05:02

## 2024-09-19 RX ADMIN — LISINOPRIL 40 MG: 20 TABLET ORAL at 05:02

## 2024-09-19 RX ADMIN — AMLODIPINE BESYLATE 10 MG: 5 TABLET ORAL at 05:02

## 2024-09-19 RX ADMIN — METOPROLOL SUCCINATE 25 MG: 25 TABLET, EXTENDED RELEASE ORAL at 05:02

## 2024-09-19 RX ADMIN — VANCOMYCIN HYDROCHLORIDE 1250 MG: 5 INJECTION, POWDER, LYOPHILIZED, FOR SOLUTION INTRAVENOUS at 15:08

## 2024-09-19 ASSESSMENT — ENCOUNTER SYMPTOMS
FEVER: 0
WHEEZING: 0
PND: 0
MYALGIAS: 0
VOMITING: 0
HEARTBURN: 0
DEPRESSION: 0
CLAUDICATION: 0
HEMOPTYSIS: 0
CHILLS: 0
PALPITATIONS: 0
COUGH: 0
NAUSEA: 0
BLURRED VISION: 0
HEADACHES: 0
BRUISES/BLEEDS EASILY: 0
BACK PAIN: 0
DOUBLE VISION: 0
DIZZINESS: 0

## 2024-09-19 ASSESSMENT — COGNITIVE AND FUNCTIONAL STATUS - GENERAL
SUGGESTED CMS G CODE MODIFIER DAILY ACTIVITY: CK
MOBILITY SCORE: 17
WALKING IN HOSPITAL ROOM: A LITTLE
CLIMB 3 TO 5 STEPS WITH RAILING: A LOT
DRESSING REGULAR LOWER BODY CLOTHING: A LOT
DRESSING REGULAR UPPER BODY CLOTHING: A LITTLE
MOVING TO AND FROM BED TO CHAIR: A LITTLE
SUGGESTED CMS G CODE MODIFIER MOBILITY: CK
HELP NEEDED FOR BATHING: A LITTLE
MOVING FROM LYING ON BACK TO SITTING ON SIDE OF FLAT BED: A LITTLE
DAILY ACTIVITIY SCORE: 19
TOILETING: A LITTLE
STANDING UP FROM CHAIR USING ARMS: A LITTLE
TURNING FROM BACK TO SIDE WHILE IN FLAT BAD: A LITTLE

## 2024-09-19 ASSESSMENT — GAIT ASSESSMENTS
DEVIATION: ANTALGIC
DISTANCE (FEET): 75
ASSISTIVE DEVICE: FRONT WHEEL WALKER
GAIT LEVEL OF ASSIST: CONTACT GUARD ASSIST

## 2024-09-19 ASSESSMENT — PAIN DESCRIPTION - PAIN TYPE
TYPE: ACUTE PAIN

## 2024-09-19 ASSESSMENT — FIBROSIS 4 INDEX: FIB4 SCORE: 1.25

## 2024-09-19 NOTE — PROGRESS NOTES
Physical Medicine and Rehabilitation     Note is adapted from my colleague Dr. Gutierrez's prior note on   9/16/2024  LOS: 6 Day(s)    Chief complaint: LE weakness and ankle swelling.     HPI: The patient is a 74 y.o.  male with a past medical history of CAD s/p CABG and gout;  who presented on 9/13/2024  5:10 PM with LE weakness and ankle swelling. Per documentation, patient had worsening symptoms of LLE and ankle swelling x 3 days. Upon eval in the ED, patient was also confused. CTA head and neck obtained for weakness and confusion showed no LVO on CTA head and mid to moderate atherosclerotic plaquing of the common carotid arteries and bilateral ICA stenosis less than 50%. Ankle xray was negative for acute fracture. 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 Echo pending, prior echo from 2016 showed EF of 40-50%.  Neuro consulted, patient is now on ASA and statin. In regards to patient's ankle, patient had an arthrocentesis done in the ED, ortho was consulted, and patient was taken the OR on 9/14 for left ankle I and D for left ankle septic arthritis performed by Dr. Gautam. Patient is WBAT LLE.  Patient remains on vancomycin, stop date is 9/21. Patient has been able to mobilize CGA level.     Patient seen and examined at bedside, patient reports he feels ok. Reports his ankle feels better. Pain controlled. Does not report HA, lightheadedness, SOB, CP, abdominal pain, or changes in numbness/tingling/weakness.   Reports he has had an issue with his memory in the past, but was doing everything on his own at home.     9/18/2024  Patient hypertensive, agitated, physically and verbally aggressive last night.  Required Haldol, security, restraints.  BP up to 200/95 this morning.  Started on amlodipine, received as needed hydralazine.  Labs reflect slight hypokalemia with potassium 3.3, a low vancomycin  trough at 4.9.  Transthoracic echo yesterday showed severe left ventricular hypertrophy and mild aortic valve stenosis.     Patient is currently oriented to self only, and September. Unable to state year, location or situation. Per BSN, patients orientation fluctuates between A&O 1-3. He is in bilateral soft wrist restraints and belt. He is calm and cooperative.     9/19/2024  Patient is lethargic today. He received one dose of 25mg PRN seroquel yesterday afternoon and his nightly 50mg dose. He needs to be monitored today to see how he is in the afternoon. BP again up to 196/103 yesterday evening.     Social Hx:  Per documentation - Patient lives alone in a 1 story house with no DORCAS, brother lives   Nearby  Per patient - lives with brother and sister in law in a 1 story house     0 DORCAS  At prior level of function patient was Independent with mobility and ADLs.     Tobacco: Former smoker, quit 8 years ago   Alcohol: Denied  Drugs: Denied     THERAPY:  Restrictions: Fall Risk, WBAT LLE   PT: Functional mobility   9/15  CGA with FWW x 40 ft, CGA sit to stand   9/17: Walking 60 feet x 2 with front wheel walker at contact-guard assist    OT: ADLs  9/16: Mod assist lower body dressing, min assist upper body dressing and toileting    SLP:   9/16 requires direct assistance with IADLs, SLP recommending referral  to neuropsychologist   9/17: Moderate to severe cognitive-linguistic deficits, soft and bite-size diet    IMAGING:  MR brain 9/15/24    1.  Moderate cerebral atrophy.  2.  Advanced supratentorial white matter disease most consistent with microvascular ischemic change.  3.  Old microhemorrhage left posterior sylvian frontal lobe and left occipital lobe most consistent with old hypertensive microhemorrhage versus amyloid angiopathy.  4.  Old lacunar infarcts x2 in the right thalamus old lacunar infarcts x2 in the left thalamus.  5.  Old cortical infarct left parietal lobe at the postcentral gyrus.  6.  Subcentimeter  focus of acute infarction right insula.  7.  Subcentimeter focus acute infarction left posterior parietal lobe.  8.  Subcentimeter focus of acute or recent subacute infarction right parietal periventricular white matter/splenium corpus callosum.  9.  Acute brainstem infarct in the left side of the hao.  10.  Multiple old lacunar size infarcts left cerebellar hemisphere.  11.  Moderate-sized old infarct right cerebellar hemisphere.  12.  No acute infarcts show hemorrhagic transformation.  13.  Multiple vascular territories are involved. Possible embolic mechanism.      PROCEDURES:  9/14 Left ankle arthrotomy incision drainage  by Dr. Gautam     PMH:  Past Medical History:   Diagnosis Date    Arthritis     Gout     Hemorrhoids 11/25/2014    Reportedly Pt has had hemorrhoids for some time. He denies any bloody stools or bleeding hemorrhoids.     Hypertension     Ingrown right big toenail 05/20/2015    Low back pain 05/20/2015    NSTEMI (non-ST elevated myocardial infarction) (HCC) 06/28/2016    Vitamin D deficiency 01/06/2015 12/1/14 lab result show slightly low Vitamin D level= 26. He states he walks outside a lot in the sun until the past month.       PSH:  Past Surgical History:   Procedure Laterality Date    IRRIGATION & DEBRIDEMENT GENERAL Left 9/14/2024    Procedure: IRRIGATION AND DEBRIDEMENT, ANKLE;  Surgeon: Alex Gautam M.D.;  Location: SURGERY [facility];  Service: Orthopedics    MULTIPLE CORONARY ARTERY BYPASS ENDO VEIN HARVEST N/A 6/28/2016    Procedure: MULTIPLE CORONARY ARTERY BYPASS ENDO VEIN HARVEST- With DELMI ;  Surgeon: Deejay uDong M.D.;  Location: SURGERY [facility];  Service:        FHX:  Family History   Problem Relation Age of Onset    Cancer Mother     Heart Disease Father     Hyperlipidemia Father     Hyperlipidemia Brother     Lung Disease Brother        Medications:  Current Facility-Administered Medications   Medication Dose    vancomycin (Vancocin) 1,250 mg in  mL  "IVPB  1,250 mg    hydrALAZINE (Apresoline) tablet 50 mg  50 mg    QUEtiapine (SEROquel) tablet 50 mg  50 mg    QUEtiapine (SEROquel) tablet 25 mg  25 mg    ziprasidone (Geodon) injection 10 mg  10 mg    labetalol (Normodyne/Trandate) injection 10 mg  10 mg    hydrALAZINE (Apresoline) injection 10 mg  10 mg    amLODIPine (Norvasc) tablet 10 mg  10 mg    lisinopril (Prinivil) tablet 40 mg  40 mg    enoxaparin (Lovenox) inj 40 mg  40 mg    atorvastatin (Lipitor) tablet 80 mg  80 mg    melatonin tablet 5 mg  5 mg    acetaminophen (Tylenol) tablet 650 mg  650 mg    ondansetron (Zofran) syringe/vial injection 4 mg  4 mg    ondansetron (Zofran ODT) dispertab 4 mg  4 mg    aspirin (Asa) chewable tab 81 mg  81 mg    Or    aspirin (Asa) suppository 300 mg  300 mg    metoprolol SR (Toprol XL) tablet 25 mg  25 mg    MD Alert...Vancomycin per Pharmacy         Allergies:  No Known Allergies      Physical Exam:  Vitals: BP (!) 146/84   Pulse 89   Temp 36.3 °C (97.3 °F) (Temporal)   Resp 18   Ht 1.753 m (5' 9\")   Wt 80 kg (176 lb 5.9 oz)   SpO2 93%   Gen: NAD, laying comfortably in bed   Head:  NC/AT  Eyes/ Nose/ Mouth: PERRLA, moist mucous membranes  Cardio: RRR, good distal perfusion, warm extremities  Pulm: normal respiratory effort, no cyanosis, on RA   Abd: Soft NTND  Ext: No peripheral edema. No calf tenderness. No clubbing.Ace wrap in place on Left ankle       Labs: Reviewed and significant for   Recent Labs     09/17/24  0119 09/18/24  0836   RBC 4.87 5.10   HEMOGLOBIN 15.0 15.9   HEMATOCRIT 44.9 45.7   PLATELETCT 245 293     Recent Labs     09/17/24  0119 09/18/24  0836 09/19/24  0334   SODIUM 143 141 143   POTASSIUM 3.3* 3.8 3.7   CHLORIDE 108 108 110   CO2 21 19* 19*   GLUCOSE 121* 144* 154*   BUN 17 17 23*   CREATININE 0.72 0.72 0.96   CALCIUM 9.0 9.0 9.2     Recent Results (from the past 24 hour(s))   EKG    Collection Time: 09/18/24 10:01 AM   Result Value Ref Range    Report       [facility]    Test " Date:  2024  Pt Name:    LISA LÓPEZ                 Department: [facility]  MRN:        [id number]                      Room:       S193  Gender:     Male                         Technician: [facility]  :        [id number]                   Requested By:QING AGUAYO  Order #:    [id number]                    Reading MD: Asaf Rush MD    Measurements  Intervals                                Axis  Rate:       107                          P:          12  NC:         146                          QRS:        25  QRSD:       141                          T:          -4  QT:         378  QTc:        505    Interpretive Statements  Sinus tachycardia  Probable left atrial enlargement  Right bundle branch block  Left ventricular hypertrophy  Inferior infarct, age indeterminate  Prolonged QT interval  Compared to ECG 2024 16:42:26  NO SIGNIFICANT CHANGES  Electronically Signed On 2024 11:09:09 PDT by Asaf Rush MD     VANCOMYCIN TROUGH    Collection Time: 24  5:01 PM   Result Value Ref Range    Vancomycin Trough 17.6 10.0 - 20.0 ug/mL   VANCOMYCIN PEAK    Collection Time: 24  7:13 PM   Result Value Ref Range    Vancomycin Peak 15.6 (L) 20.0 - 40.0 ug/mL   Basic Metabolic Panel    Collection Time: 24  3:34 AM   Result Value Ref Range    Sodium 143 135 - 145 mmol/L    Potassium 3.7 3.6 - 5.5 mmol/L    Chloride 110 96 - 112 mmol/L    Co2 19 (L) 20 - 33 mmol/L    Glucose 154 (H) 65 - 99 mg/dL    Bun 23 (H) 8 - 22 mg/dL    Creatinine 0.96 0.50 - 1.40 mg/dL    Calcium 9.2 8.5 - 10.5 mg/dL    Anion Gap 14.0 7.0 - 16.0   VANCOMYCIN TROUGH    Collection Time: 24  3:34 AM   Result Value Ref Range    Vancomycin Trough 6.2 (L) 10.0 - 20.0 ug/mL   ESTIMATED GFR    Collection Time: 24  3:34 AM   Result Value Ref Range    GFR (CKD-EPI) 83 >60 mL/min/1.73 m 2         ASSESSMENT:  Patient is a 74 y.o. male admitted with LLE weakness     [facility] Code / Diagnosis to Support: 0001.4 - Stroke: No  Paresis  See DISPO details below for recommendations on appropriate level of rehab for this diagnosis.    Barriers to transfer include: Insurance authorization, TCCs to verify disposition, medical clearance and bed availability     Assessment and Plan:  Left pontine stroke   Multifocal infarcts   - admitted with LLE weakness ( weakness may be due to septic arthritis rather than stroke) and AMS   - CTA head negative for LVO   - CTA neck showed bilateral ICA stenosis of less than 50%   - MRI brain showed acute brain stem infarct in the left hao and multifocal infarcts, old lacunar infarct in b/l thalamus   - neuro consulted   - on ASA and statin for secondary stroke ppx   - Echo completed  - continue with PT/OT/SLP     Agitation   - Schedule seroquel 50mg QHS   - PRN seroquel 25mg TID for mild agitation  - Geodon 10mg IM for aggressive agitation  - Avoid haldol and benzos   - If AMS persists today, will order CT head to rule out new lesion    Left ankle septic arthritis   - admitted with LLE weakness and ankle pain   - left ankle cxr negative for acute fracture   - ortho consulted   - 9/14 left rose arthrotomy I and D for septic arthritis by Dr. Gautam   - on Vancomycin, stop date 9/21   - WBAT LLE     Hx of CABG   - on  ASA and statin   - prior echo from 2016 showed EF of 40-50 %   - updated echo pending     Uncontrolled HTN   - target BP <130/80, recently as high as 200/95, 196/103  - Metoprolol 25mg daily   - Lisinopril 40mg daily   - Amlodipine 10mg daily   - PRN hydralyzine       DISPO:  - patient is currently functioning below their level of baseline, recommend post acute rehab  - anticipated to be appropriate for IRF level therapy with 3hr of therapy 5 days per week   - TCC to assist with insurance auth and DC support from brother        Medical Complexity:  Left pontine stroke   Multifocal infarcts   Left ankle septic arthritis   Hx of CABG   HTN   Impaired mobility and ADLs       DVT PPX: SCDs       Thank  you for allowing us to participate in the care of this patient.     Patient was seen for >35 minutes on unit/floor of which > 50% of time was spent on counseling and coordination of care regarding the above, including prognosis, risk reduction, benefits of treatment, and options for next stage of care.    Ralph Simpson D.O.   Physical Medicine and Rehabilitation     Please note that this dictation was created using voice recognition software. I have made every reasonable attempt to correct obvious errors, but there may be errors of grammar and possibly content that I did not discover before finalizing the note.

## 2024-09-19 NOTE — CARE PLAN
The patient is Stable - Low risk of patient condition declining or worsening    Shift Goals  Clinical Goals: BP management, safety, stable neuro status  Patient Goals: Sleep  Family Goals: MARQUES    Progress made toward(s) clinical / shift goals:    Problem: Urinary Elimination  Goal: Establish and maintain regular urinary output  Description: Target End Date:  Prior to discharge or change in level of care    Document on I/O and Assessment flowsheets    1.  Evaluate need to continue indwelling catheter every shift  2.  Assess signs and symptoms of urinary retention  3.  Assess post-void residual volumes  4.  Implement bladder training program  5.  Encourage scheduled voidings  6.  Assist patient to sit on bedside commode or toilet for voiding  7.  Educate patient and family/caregiver on use and purpose of urine collection devices (document in Patient Education)  Outcome: Progressing     Problem: Mobility - Stroke  Goal: Patient's capacity to carry out activities will improve  Description: Target End Date:  Prior to discharge or change in level of care    1.  Assess for barriers to mobility/activity  2.  Implement activity per interdisciplinary team recommendations  3.  Target activity level identified and patient/family/caregiver aware of goal  4.  Provide assistive devices  5.  Instruct patient/caregiver on proper use of assistive/adaptive devices  6.  Schedule activities and rest periods to decrease effects of fatigue  7.  Encourage mobilization to extent of ability  8.  Maintain proper body alignment  9.  Provide adequate pain management to allow progressive mobilization  10. Implement pace maker precautions as needed  Outcome: Progressing     Problem: Self Care  Goal: Patient will have the ability to perform ADLs independently or with assistance (bathe, groom, dress, toilet and feed)  Description: Target End Date:  Prior to discharge or change in level of care    Document on ADL flowsheet    1.  Assess the  capability and level of deficiency to perform ADLs  2.  Encourage family/care giver involvement  3.  Provide assistive devices  4.  Consider PT/OT evaluations  5.  Maintain support, give positive feedback, encourage self-care allowing extra time and verbal cuing as needed  6.  Avoid doing something for patients they can do themselves, but provide assistance as needed  7.  Assist in anticipating/planning individual needs  8.  Collaborate with Case Management and  to meet discharge needs  Outcome: Progressing       Patient is not progressing towards the following goals:

## 2024-09-19 NOTE — PROGRESS NOTES
Monitor summary: SR/ST , MS 0.13, QRS 0.10, QT 0.36 with frequent PVC, per strip from monitor room.

## 2024-09-19 NOTE — THERAPY
"Occupational Therapy  Daily Treatment     Patient Name: Joe Templeton  Age:  74 y.o., Sex:  male  Medical Record #: [id number]  Today's Date: 9/19/2024     Precautions  Precautions: (P) Fall Risk, Swallow Precautions, Weight Bearing As Tolerated Left Lower Extremity    Assessment    Pt seen for follow up OT tx session, pt making steady progress with functional tasks requiring less overall assistance and cooperative/pleasant throughout session. Will continue to see for skilled therapy while admitted as well as recommend post-acute placement.    Plan    Treatment Plan Status: (P) Continue Current Treatment Plan  Type of Treatment: Self Care / Activities of Daily Living, Neuro Re-Education / Balance, Therapeutic Exercises, Therapeutic Activity, Cognitive Skill Development  Treatment Frequency: 4 Times per Week  Treatment Duration: Until Therapy Goals Met    DC Equipment Recommendations: (P) Unable to determine at this time  Discharge Recommendations: (P) Recommend post-acute placement for additional occupational therapy services prior to discharge home    Subjective    \"Wow these are nice\" to putting on scrub pants     Objective       09/19/24 1354   Precautions   Precautions Fall Risk;Swallow Precautions;Weight Bearing As Tolerated Left Lower Extremity   Pain 0 - 10 Group   Therapist Pain Assessment Post Activity Pain Same as Prior to Activity;Nurse Notified   Cognition    Cognition / Consciousness X   Orientation Level Not Oriented to Day;Not Oriented to Reason   Level of Consciousness Alert   Ability To Follow Commands 1 Step   Safety Awareness Impaired   New Learning Impaired   Attention Impaired   Sequencing Impaired   Comments Pleasant, cooperative, receptive to therapy   Active ROM Upper Body   Active ROM Upper Body  WDL   Balance   Sitting Balance (Static) Fair +   Sitting Balance (Dynamic) Fair   Standing Balance (Static) Fair -   Standing Balance (Dynamic) Fair -   Weight Shift Sitting Fair   Weight Shift " Standing Fair   Skilled Intervention Verbal Cuing;Facilitation   Comments w/ FWW   Bed Mobility    Supine to Sit Supervised   Scooting Supervised   Rolling Supervised   Activities of Daily Living   Grooming Supervision;Standing   Upper Body Dressing Supervision   Lower Body Dressing Moderate Assist   Skilled Intervention Verbal Cuing;Facilitation   How much help from another person does the patient currently need...   Putting on and taking off regular lower body clothing? 2   Bathing (including washing, rinsing, and drying)? 3   Toileting, which includes using a toilet, bedpan, or urinal? 3   Putting on and taking off regular upper body clothing? 3   Taking care of personal grooming such as brushing teeth? 4   Eating meals? 4   6 Clicks Daily Activity Score 19   Functional Mobility   Sit to Stand Contact Guard Assist   Bed, Chair, Wheelchair Transfer Contact Guard Assist   Transfer Method Stand Step   Mobility bed mobility, up to sink, left in chair   Skilled Intervention Verbal Cuing   Comments w/ FWW   Activity Tolerance   Sitting in Chair left seated in chair   Sitting Edge of Bed 6 min   Standing 12 min   Patient / Family Goals   Patient / Family Goal #1 to get better   Goal #1 Outcome Progressing as expected   Short Term Goals   Short Term Goal # 1 Pt will complete functional mobility SBA   Goal Outcome # 1 Progressing as expected   Short Term Goal # 2 Pt will complete toileting SBA   Goal Outcome # 2 Goal not met   Short Term Goal # 3 Pt will complete standing grooming tasks SBA   Goal Outcome # 3 Progressing as expected   Short Term Goal # 4 Pt will complete LB dressing SBA   Goal Outcome # 4 Progressing as expected   Education Group   Education Provided Role of Occupational Therapist   Role of Occupational Therapist Patient Response Patient;Acceptance;Explanation;Demonstration;Verbal Demonstration;Action Demonstration   Occupational Therapy Treatment Plan    O.T. Treatment Plan Continue Current Treatment  Plan   Anticipated Discharge Equipment and Recommendations   DC Equipment Recommendations Unable to determine at this time   Discharge Recommendations Recommend post-acute placement for additional occupational therapy services prior to discharge home   Interdisciplinary Plan of Care Collaboration   IDT Collaboration with  Nursing   Patient Position at End of Therapy Seated;Chair Alarm On;Self Releasing Lap Belt Applied;Call Light within Reach;Tray Table within Reach;Phone within Reach   Collaboration Comments RN updated

## 2024-09-19 NOTE — PROGRESS NOTES
Pharmacy Vancomycin Kinetics Note for 9/19/2024     74 y.o. male on Vancomycin day # 6     Vancomycin Indication (Two level): Skin/skin structure infection (goal trough 10-15)    Provider specified end date: 10/12/24    Active Antibiotics (From admission, onward)      Ordered     Ordering Provider       Thu Sep 19, 2024  8:27 AM    09/19/24 0827  vancomycin (Vancocin) 1,250 mg in  mL IVPB  (vancomycin (VANCOCIN) IV (LD + Maintenance))  EVERY 12 HOURS        Note to Pharmacy: Dose per Pharmacokinetic Protocol    Herbert Anderson M.D.       Fri Sep 13, 2024 10:19 PM    09/13/24 2219  MD Alert...Vancomycin per Pharmacy  (MD Alert...Vancomycin per Pharmacy)  PHARMACY TO DOSE        Question:  Indication(s) for vancomycin?  Answer:  Skin and soft tissue infection    Joyce Bae M.D.            Dosing Weight: 80 kg (176 lb 5.9 oz)      Admission History: Admitted on 9/13/2024 for Left ankle swelling [M25.472]  Pertinent history: Patient presents with fevers and ankle swelling - with concern for infection, empiric antibiotic therapy initiated for septic arthritis s/p I&D on 9/14. ID consulted and plan for 4 weeks of vancomycin until 10/12.    Allergies:     Patient has no known allergies.     Pertinent cultures to date:     Results       Procedure Component Value Units Date/Time    BLOOD CULTURE [[id number]] Collected: 09/13/24 2240    Order Status: Completed Specimen: Blood from Peripheral Updated: 09/19/24 0101     Significant Indicator NEG     Source BLD     Site PERIPHERAL     Culture Result No growth after 5 days of incubation.    BLOOD CULTURE [[id number]] Collected: 09/13/24 2256    Order Status: Completed Specimen: Blood from Peripheral Updated: 09/19/24 0101     Significant Indicator NEG     Source BLD     Site PERIPHERAL     Culture Result No growth after 5 days of incubation.    CULTURE WOUND W/ GRAM STAIN [[id number]] Collected: 09/14/24 1057    Order Status: Completed Specimen: Wound Updated:  09/17/24 0907     Significant Indicator NEG     Source WND     Site left ankle     Culture Result No growth at 72 hours.     Gram Stain Result Moderate WBCs.  No organisms seen.      Anaerobic Culture [[id number]] Collected: 09/14/24 1057    Order Status: Completed Specimen: Wound Updated: 09/17/24 0907     Significant Indicator NEG     Source WND     Site left ankle     Culture Result Culture in progress.    Fungal Culture [[id number]] Collected: 09/14/24 1057    Order Status: Completed Specimen: Wound Updated: 09/17/24 0907     Significant Indicator NEG     Source WND     Site left ankle     Culture Result Culture in progress.     Fungal Smear Results No fungal elements seen.    FLUID CULTURE W/GRAM STAIN [[id number]] Collected: 09/13/24 2315    Order Status: Completed Specimen: Other Body Fluid Updated: 09/17/24 0905     Significant Indicator NEG     Source BF     Site Synovial Fluid     Culture Result No growth at 72 hours.     Gram Stain Result Many WBCs.  No organisms seen.      Fungal Smear [[id number]] Collected: 09/14/24 1057    Order Status: Completed Specimen: Wound Updated: 09/14/24 2014     Significant Indicator NEG     Source WND     Site left ankle     Fungal Smear Results No fungal elements seen.    GRAM STAIN [[id number]] Collected: 09/14/24 1057    Order Status: Completed Specimen: Wound Updated: 09/14/24 2014     Significant Indicator .     Source WND     Site left ankle     Gram Stain Result Moderate WBCs.  No organisms seen.      MRSA By PCR ([employer]) [[id number]] Collected: 09/13/24 2143    Order Status: Completed Specimen: Respirate from Nares Updated: 09/14/24 0627     MRSA by PCR Negative    GRAM STAIN [[id number]] Collected: 09/13/24 2315    Order Status: Completed Specimen: Body Fluid Updated: 09/14/24 0115     Significant Indicator .     Source BF     Site Synovial Fluid     Gram Stain Result Many WBCs.  No organisms seen.      Urinalysis [[id number]]  (Abnormal) Collected: 09/13/24 2228    Order  "Status: Completed Specimen: Urine Updated: 24 3865     Color Yellow     Character Clear     Specific Gravity >=1.045     Ph 7.0     Glucose Negative mg/dL      Ketones Trace mg/dL      Protein 30 mg/dL      Bilirubin Negative     Urobilinogen, Urine 1.0     Nitrite Negative     Leukocyte Esterase Negative     Occult Blood Small     Micro Urine Req Microscopic    CoV-2, Flu A/B, And RSV by PCR ([employer]) [[id number]] Collected: 24 0000    Order Status: Canceled Specimen: Respirate             Labs:     Estimated Creatinine Clearance: 68.5 mL/min (by C-G formula based on SCr of 0.96 mg/dL).  Recent Labs     24   WBC 10.3 11.1*   NEUTSPOLYS 69.80  --      Recent Labs     2436 24   BUN 17  23*   CREATININE 0.72 0.72 0.96   ALBUMIN 3.6  --   --        Intake/Output Summary (Last 24 hours) at 2024 08  Last data filed at 2024 1500  Gross per 24 hour   Intake 420 ml   Output --   Net 420 ml      /84   Pulse (!) 102   Temp 36.3 °C (97.3 °F) (Temporal)   Resp 18   Ht 1.753 m (5' 9\")   Wt 80 kg (176 lb 5.9 oz)   SpO2 92%  Temp (24hrs), Av.3 °C (97.3 °F), Min:36.3 °C (97.3 °F), Max:36.3 °C (97.3 °F)      List concerns for Vancomycin clearance:     Age;BUN/Scr ratio greater than 20:1    Pharmacokinetics:     Two level kinetics:   Ke (hr ^-1): 0.1105  Half life: 6.3  Vd: Steady state Vd : 51.909  Calculated AUC: 261 mg·hr/L    Trough kinetics:   Recent Labs     24  033   VANCOTROUGH  --  6.2*   VANCOPEAK 15.6*  --        A/P:     -  Vancomycin dose: 1250 mg IV q12h    -  Next vancomycin level(s):    ~ one week or sooner if clinically indicated    -  Predicted vancomycin AUC from two level test calculator: 435 mg·hr/L    -  Comments: Vancomycin peak and trough collected appropriately overnight. AUC low at 261. Increased dose to 1250 mg IV q12h starting this afternoon. Renal function stable with minimal " concerns for accumulation at this time. Will closely monitor and obtain a repeat trough within the next few days.    Thank you!    Leslie Mills, PharmD, BCPS

## 2024-09-19 NOTE — CARE PLAN
The patient is Stable - Low risk of patient condition declining or worsening    Shift Goals  Clinical Goals: stable neuro, safety  Patient Goals: sleep  Family Goals: roshan    Progress made toward(s) clinical / shift goals:    Problem: Neuro Status  Goal: Neuro status will remain stable or improve  Outcome: Progressing  Note: Q4 neuro checks. No neuro changes overnight.      Problem: Safety - Medical Restraint  Goal: Free from restraint(s) (Restraint for Interference with Medical Device)  9/19/2024 0327 by Yaritza Mason R.N.  Outcome: Progressing  Note: Restraints were able to be d/c. Patient resting all night and remained calm. No need for restraints at this time.       Patient is not progressing towards the following goals:       no

## 2024-09-19 NOTE — CARE PLAN
The patient is Stable - Low risk of patient condition declining or worsening    Shift Goals  Clinical Goals: BP management, safety  Patient Goals: sleep  Family Goals: roshan    Progress made toward(s) clinical / shift goals:    Problem: Neuro Status  Goal: Neuro status will remain stable or improve  Outcome: Progressing  Note: Q4 neuro checks. No neuro changes overnight.        Patient is not progressing towards the following goals:

## 2024-09-19 NOTE — PROGRESS NOTES
"      Orthopaedic Progress Note    Interval changes:  Patient doing well   L ankle dressings CDI  Cleared for DC to home by ortho pending medicine clearancee    ROS - Patient denies any new issues.  Pain well controlled.    BP (!) 144/82   Pulse 80   Temp 36.3 °C (97.3 °F) (Temporal)   Resp 18   Ht 1.753 m (5' 9\")   Wt 80 kg (176 lb 5.9 oz)   SpO2 93%     Patient seen and examined  No acute distress  Breathing non labored  RRR  L ankle dressings CDI, DNVI, moves all toes, cap refill <2 sec.     Recent Labs     09/17/24  0119 09/18/24  0836   WBC 10.3 11.1*   RBC 4.87 5.10   HEMOGLOBIN 15.0 15.9   HEMATOCRIT 44.9 45.7   MCV 92.2 89.6   MCH 30.8 31.2   MCHC 33.4 34.8   RDW 42.0 39.7   PLATELETCT 245 293   MPV 11.5 11.2       Active Hospital Problems    Diagnosis     Stroke (HCC) [I63.9]     Septic arthritis of left ankle (HCC) [M00.9]     Hx of CABG [Z95.1]     Left-sided weakness [R53.1]     Murmur [R01.1]     Acute encephalopathy [G93.40]     Sepsis (HCC) [A41.9]     Systolic heart failure (HCC) [I50.20]     Essential (primary) hypertension [I10]      10/31/14 IN ER dx with /152. Tx with IV labetalol and d/c on Metoprolol.          Assessment/Plan:  Patient doing well   L ankle dressings CDI  Cleared for DC to home by ortho pending medicine clearance  POD#4 S/P Left ankle arthrotomy incision drainage  Wt bearing status - WBAT LLE  Wound care/Drains - Dressings to be changed every other day by nursing. Or PRN for saturation starting POD#2  Future Procedures - none planned   Lovenox: Start 9/16, Duration-until ambulatory > 150'  Sutures/Staples out- 14-21 days post operatively. Removal will completed by ortho mid levels only.  PT/OT-initiated  Antibiotics: vanco 750mg IV Q12  DVT Prophylaxis- TEDS/SCDs/Foot pumps  Silva-not needed per ortho  Case Coordination for Discharge Planning - Disposition per therapy recs.    "

## 2024-09-19 NOTE — PROGRESS NOTES
Monitor summary: SR/ST , VA 0.13, QRS 0.10, QT 0.36, with rare PVCs, PACs, frequent couplets per strip from monitor room.

## 2024-09-20 ENCOUNTER — APPOINTMENT (OUTPATIENT)
Dept: RADIOLOGY | Facility: REHABILITATION | Age: 74
DRG: 057 | End: 2024-09-20
Attending: PHYSICAL MEDICINE & REHABILITATION
Payer: MEDICARE

## 2024-09-20 ENCOUNTER — HOSPITAL ENCOUNTER (INPATIENT)
Facility: REHABILITATION | Age: 74
DRG: 057 | End: 2024-09-20
Attending: PHYSICAL MEDICINE & REHABILITATION | Admitting: PHYSICAL MEDICINE & REHABILITATION
Payer: MEDICARE

## 2024-09-20 ENCOUNTER — APPOINTMENT (OUTPATIENT)
Dept: RADIOLOGY | Facility: MEDICAL CENTER | Age: 74
DRG: 853 | End: 2024-09-20
Attending: HOSPITALIST
Payer: MEDICARE

## 2024-09-20 VITALS
WEIGHT: 171.74 LBS | SYSTOLIC BLOOD PRESSURE: 143 MMHG | TEMPERATURE: 97.2 F | BODY MASS INDEX: 25.44 KG/M2 | RESPIRATION RATE: 18 BRPM | HEIGHT: 69 IN | OXYGEN SATURATION: 93 % | DIASTOLIC BLOOD PRESSURE: 71 MMHG | HEART RATE: 75 BPM

## 2024-09-20 DIAGNOSIS — I63.9 CEREBROVASCULAR ACCIDENT (CVA), UNSPECIFIED MECHANISM (HCC): ICD-10-CM

## 2024-09-20 DIAGNOSIS — M00.9: ICD-10-CM

## 2024-09-20 DIAGNOSIS — I50.20 SYSTOLIC HEART FAILURE, UNSPECIFIED HF CHRONICITY (HCC): ICD-10-CM

## 2024-09-20 DIAGNOSIS — R53.1 LEFT-SIDED WEAKNESS: ICD-10-CM

## 2024-09-20 DIAGNOSIS — I10 ESSENTIAL (PRIMARY) HYPERTENSION: ICD-10-CM

## 2024-09-20 PROBLEM — A41.9 SEPSIS (HCC): Status: RESOLVED | Noted: 2024-09-13 | Resolved: 2024-09-20

## 2024-09-20 PROBLEM — Z86.73 H/O: STROKE: Status: ACTIVE | Noted: 2024-09-20

## 2024-09-20 PROBLEM — G93.40 ACUTE ENCEPHALOPATHY: Status: RESOLVED | Noted: 2024-09-13 | Resolved: 2024-09-20

## 2024-09-20 LAB
ANION GAP SERPL CALC-SCNC: 11 MMOL/L (ref 7–16)
BUN SERPL-MCNC: 24 MG/DL (ref 8–22)
CALCIUM SERPL-MCNC: 9 MG/DL (ref 8.5–10.5)
CHLORIDE SERPL-SCNC: 110 MMOL/L (ref 96–112)
CO2 SERPL-SCNC: 21 MMOL/L (ref 20–33)
CREAT SERPL-MCNC: 0.93 MG/DL (ref 0.5–1.4)
GFR SERPLBLD CREATININE-BSD FMLA CKD-EPI: 87 ML/MIN/1.73 M 2
GLUCOSE SERPL-MCNC: 151 MG/DL (ref 65–99)
POTASSIUM SERPL-SCNC: 4.1 MMOL/L (ref 3.6–5.5)
PROCALCITONIN SERPL-MCNC: 0.05 NG/ML
SODIUM SERPL-SCNC: 142 MMOL/L (ref 135–145)

## 2024-09-20 PROCEDURE — 700102 HCHG RX REV CODE 250 W/ 637 OVERRIDE(OP): Performed by: HOSPITALIST

## 2024-09-20 PROCEDURE — 770005 HCHG ROOM/CARE - REHAB PRIVATE (11*

## 2024-09-20 PROCEDURE — 99232 SBSQ HOSP IP/OBS MODERATE 35: CPT | Performed by: PHYSICAL MEDICINE & REHABILITATION

## 2024-09-20 PROCEDURE — 700105 HCHG RX REV CODE 258: Performed by: PHYSICAL MEDICINE & REHABILITATION

## 2024-09-20 PROCEDURE — A9270 NON-COVERED ITEM OR SERVICE: HCPCS | Performed by: HOSPITALIST

## 2024-09-20 PROCEDURE — A9270 NON-COVERED ITEM OR SERVICE: HCPCS | Performed by: PHYSICAL MEDICINE & REHABILITATION

## 2024-09-20 PROCEDURE — 700102 HCHG RX REV CODE 250 W/ 637 OVERRIDE(OP): Performed by: STUDENT IN AN ORGANIZED HEALTH CARE EDUCATION/TRAINING PROGRAM

## 2024-09-20 PROCEDURE — A9270 NON-COVERED ITEM OR SERVICE: HCPCS | Performed by: STUDENT IN AN ORGANIZED HEALTH CARE EDUCATION/TRAINING PROGRAM

## 2024-09-20 PROCEDURE — 700105 HCHG RX REV CODE 258: Performed by: HOSPITALIST

## 2024-09-20 PROCEDURE — 74018 RADEX ABDOMEN 1 VIEW: CPT

## 2024-09-20 PROCEDURE — 700102 HCHG RX REV CODE 250 W/ 637 OVERRIDE(OP): Performed by: PHYSICAL MEDICINE & REHABILITATION

## 2024-09-20 PROCEDURE — 84145 PROCALCITONIN (PCT): CPT

## 2024-09-20 PROCEDURE — 99239 HOSP IP/OBS DSCHRG MGMT >30: CPT | Performed by: HOSPITALIST

## 2024-09-20 PROCEDURE — 73560 X-RAY EXAM OF KNEE 1 OR 2: CPT | Mod: LT

## 2024-09-20 PROCEDURE — 80048 BASIC METABOLIC PNL TOTAL CA: CPT

## 2024-09-20 PROCEDURE — 99223 1ST HOSP IP/OBS HIGH 75: CPT | Performed by: PHYSICAL MEDICINE & REHABILITATION

## 2024-09-20 PROCEDURE — 700111 HCHG RX REV CODE 636 W/ 250 OVERRIDE (IP): Performed by: HOSPITALIST

## 2024-09-20 PROCEDURE — 700111 HCHG RX REV CODE 636 W/ 250 OVERRIDE (IP): Performed by: PHYSICAL MEDICINE & REHABILITATION

## 2024-09-20 RX ORDER — QUETIAPINE FUMARATE 100 MG/1
100 TABLET, FILM COATED ORAL NIGHTLY
Status: CANCELLED | OUTPATIENT
Start: 2024-09-20

## 2024-09-20 RX ORDER — HYDRALAZINE HYDROCHLORIDE 25 MG/1
75 TABLET, FILM COATED ORAL EVERY 8 HOURS
Status: ON HOLD
Start: 2024-09-20

## 2024-09-20 RX ORDER — ZIPRASIDONE MESYLATE 20 MG/ML
10 INJECTION, POWDER, LYOPHILIZED, FOR SOLUTION INTRAMUSCULAR
Status: DISCONTINUED | OUTPATIENT
Start: 2024-09-20 | End: 2024-10-07 | Stop reason: HOSPADM

## 2024-09-20 RX ORDER — HYDRALAZINE HYDROCHLORIDE 25 MG/1
25 TABLET, FILM COATED ORAL EVERY 8 HOURS PRN
Status: DISCONTINUED | OUTPATIENT
Start: 2024-09-20 | End: 2024-10-07 | Stop reason: HOSPADM

## 2024-09-20 RX ORDER — AMLODIPINE BESYLATE 10 MG/1
10 TABLET ORAL DAILY
Status: ON HOLD
Start: 2024-09-21

## 2024-09-20 RX ORDER — ZIPRASIDONE MESYLATE 20 MG/ML
10 INJECTION, POWDER, LYOPHILIZED, FOR SOLUTION INTRAMUSCULAR
Status: CANCELLED | OUTPATIENT
Start: 2024-09-20

## 2024-09-20 RX ORDER — AMOXICILLIN 250 MG
2 CAPSULE ORAL EVERY EVENING
Status: DISCONTINUED | OUTPATIENT
Start: 2024-09-20 | End: 2024-09-21

## 2024-09-20 RX ORDER — METOPROLOL SUCCINATE 25 MG/1
25 TABLET, EXTENDED RELEASE ORAL DAILY
Status: CANCELLED | OUTPATIENT
Start: 2024-09-21

## 2024-09-20 RX ORDER — ALUMINA, MAGNESIA, AND SIMETHICONE 2400; 2400; 240 MG/30ML; MG/30ML; MG/30ML
20 SUSPENSION ORAL
Status: DISCONTINUED | OUTPATIENT
Start: 2024-09-20 | End: 2024-10-07 | Stop reason: HOSPADM

## 2024-09-20 RX ORDER — ECHINACEA PURPUREA EXTRACT 125 MG
2 TABLET ORAL PRN
Status: DISCONTINUED | OUTPATIENT
Start: 2024-09-20 | End: 2024-10-07 | Stop reason: HOSPADM

## 2024-09-20 RX ORDER — ENOXAPARIN SODIUM 100 MG/ML
40 INJECTION SUBCUTANEOUS DAILY
Status: CANCELLED | OUTPATIENT
Start: 2024-09-20

## 2024-09-20 RX ORDER — ENOXAPARIN SODIUM 100 MG/ML
40 INJECTION SUBCUTANEOUS DAILY
Status: DISCONTINUED | OUTPATIENT
Start: 2024-09-20 | End: 2024-10-07 | Stop reason: HOSPADM

## 2024-09-20 RX ORDER — ONDANSETRON 2 MG/ML
4 INJECTION INTRAMUSCULAR; INTRAVENOUS 4 TIMES DAILY PRN
Status: DISCONTINUED | OUTPATIENT
Start: 2024-09-20 | End: 2024-10-07 | Stop reason: HOSPADM

## 2024-09-20 RX ORDER — ATORVASTATIN CALCIUM 40 MG/1
80 TABLET, FILM COATED ORAL EVERY EVENING
Status: DISCONTINUED | OUTPATIENT
Start: 2024-09-20 | End: 2024-10-07 | Stop reason: HOSPADM

## 2024-09-20 RX ORDER — ASPIRIN 300 MG/1
300 SUPPOSITORY RECTAL DAILY
Status: CANCELLED | OUTPATIENT
Start: 2024-09-21

## 2024-09-20 RX ORDER — LISINOPRIL 20 MG/1
40 TABLET ORAL DAILY
Status: DISCONTINUED | OUTPATIENT
Start: 2024-09-21 | End: 2024-10-07 | Stop reason: HOSPADM

## 2024-09-20 RX ORDER — CARBOXYMETHYLCELLULOSE SODIUM 5 MG/ML
1 SOLUTION/ DROPS OPHTHALMIC PRN
Status: DISCONTINUED | OUTPATIENT
Start: 2024-09-20 | End: 2024-10-07 | Stop reason: HOSPADM

## 2024-09-20 RX ORDER — LISINOPRIL 40 MG/1
40 TABLET ORAL DAILY
Status: ON HOLD
Start: 2024-09-21

## 2024-09-20 RX ORDER — LISINOPRIL 20 MG/1
40 TABLET ORAL DAILY
Status: CANCELLED | OUTPATIENT
Start: 2024-09-21

## 2024-09-20 RX ORDER — BISACODYL 5 MG
5 TABLET, DELAYED RELEASE (ENTERIC COATED) ORAL
Status: DISCONTINUED | OUTPATIENT
Start: 2024-09-20 | End: 2024-10-07 | Stop reason: HOSPADM

## 2024-09-20 RX ORDER — TRAZODONE HYDROCHLORIDE 50 MG/1
50 TABLET, FILM COATED ORAL
Status: DISCONTINUED | OUTPATIENT
Start: 2024-09-20 | End: 2024-10-07 | Stop reason: HOSPADM

## 2024-09-20 RX ORDER — QUETIAPINE FUMARATE 100 MG/1
100 TABLET, FILM COATED ORAL NIGHTLY
Status: DISCONTINUED | OUTPATIENT
Start: 2024-09-20 | End: 2024-09-20

## 2024-09-20 RX ORDER — SODIUM PHOSPHATE,MONO-DIBASIC 19G-7G/118
1 ENEMA (ML) RECTAL
Status: DISCONTINUED | OUTPATIENT
Start: 2024-09-20 | End: 2024-10-07 | Stop reason: HOSPADM

## 2024-09-20 RX ORDER — METOPROLOL SUCCINATE 25 MG/1
25 TABLET, EXTENDED RELEASE ORAL DAILY
Status: ON HOLD
Start: 2024-09-21

## 2024-09-20 RX ORDER — TRAMADOL HYDROCHLORIDE 50 MG/1
25 TABLET ORAL EVERY 6 HOURS PRN
Status: DISCONTINUED | OUTPATIENT
Start: 2024-09-20 | End: 2024-10-07 | Stop reason: HOSPADM

## 2024-09-20 RX ORDER — HYDRALAZINE HYDROCHLORIDE 25 MG/1
75 TABLET, FILM COATED ORAL EVERY 8 HOURS
Status: CANCELLED | OUTPATIENT
Start: 2024-09-20

## 2024-09-20 RX ORDER — POLYETHYLENE GLYCOL 3350 17 G/17G
1 POWDER, FOR SOLUTION ORAL
Status: DISCONTINUED | OUTPATIENT
Start: 2024-09-20 | End: 2024-09-21

## 2024-09-20 RX ORDER — LACTULOSE 10 G/15ML
30 SOLUTION ORAL
Status: DISCONTINUED | OUTPATIENT
Start: 2024-09-20 | End: 2024-10-07 | Stop reason: HOSPADM

## 2024-09-20 RX ORDER — ACETAMINOPHEN 325 MG/1
650 TABLET ORAL EVERY 6 HOURS PRN
Status: DISCONTINUED | OUTPATIENT
Start: 2024-09-20 | End: 2024-10-07 | Stop reason: HOSPADM

## 2024-09-20 RX ORDER — LIDOCAINE 4 G/G
1-2 PATCH TOPICAL
Status: DISCONTINUED | OUTPATIENT
Start: 2024-09-21 | End: 2024-10-07 | Stop reason: HOSPADM

## 2024-09-20 RX ORDER — AMLODIPINE BESYLATE 5 MG/1
10 TABLET ORAL
Status: DISCONTINUED | OUTPATIENT
Start: 2024-09-21 | End: 2024-10-07 | Stop reason: HOSPADM

## 2024-09-20 RX ORDER — ATORVASTATIN CALCIUM 80 MG/1
80 TABLET, FILM COATED ORAL EVERY EVENING
Status: CANCELLED | OUTPATIENT
Start: 2024-09-20

## 2024-09-20 RX ORDER — QUETIAPINE FUMARATE 50 MG/1
50 TABLET, FILM COATED ORAL NIGHTLY
Status: ON HOLD
Start: 2024-09-20

## 2024-09-20 RX ORDER — ACETAMINOPHEN 325 MG/1
650 TABLET ORAL EVERY 6 HOURS PRN
Status: CANCELLED | OUTPATIENT
Start: 2024-09-20

## 2024-09-20 RX ORDER — METOPROLOL SUCCINATE 25 MG/1
25 TABLET, EXTENDED RELEASE ORAL DAILY
Status: DISCONTINUED | OUTPATIENT
Start: 2024-09-21 | End: 2024-09-23

## 2024-09-20 RX ORDER — QUETIAPINE FUMARATE 25 MG/1
25 TABLET, FILM COATED ORAL 3 TIMES DAILY PRN
Status: ON HOLD
Start: 2024-09-20

## 2024-09-20 RX ORDER — ASPIRIN 81 MG/1
81 TABLET, CHEWABLE ORAL DAILY
Status: CANCELLED | OUTPATIENT
Start: 2024-09-21

## 2024-09-20 RX ORDER — ASPIRIN 300 MG/1
300 SUPPOSITORY RECTAL DAILY
Status: DISCONTINUED | OUTPATIENT
Start: 2024-09-21 | End: 2024-09-20

## 2024-09-20 RX ORDER — ONDANSETRON 4 MG/1
4 TABLET, ORALLY DISINTEGRATING ORAL 4 TIMES DAILY PRN
Status: DISCONTINUED | OUTPATIENT
Start: 2024-09-20 | End: 2024-10-07 | Stop reason: HOSPADM

## 2024-09-20 RX ORDER — QUETIAPINE FUMARATE 25 MG/1
50 TABLET, FILM COATED ORAL NIGHTLY
Status: DISCONTINUED | OUTPATIENT
Start: 2024-09-20 | End: 2024-10-02

## 2024-09-20 RX ORDER — ATORVASTATIN CALCIUM 80 MG/1
80 TABLET, FILM COATED ORAL EVERY EVENING
Status: ON HOLD
Start: 2024-09-20

## 2024-09-20 RX ORDER — AMLODIPINE BESYLATE 5 MG/1
10 TABLET ORAL
Status: CANCELLED | OUTPATIENT
Start: 2024-09-21

## 2024-09-20 RX ORDER — HYDRALAZINE HYDROCHLORIDE 25 MG/1
75 TABLET, FILM COATED ORAL EVERY 8 HOURS
Status: DISCONTINUED | OUTPATIENT
Start: 2024-09-20 | End: 2024-09-20 | Stop reason: HOSPADM

## 2024-09-20 RX ORDER — ASPIRIN 81 MG/1
81 TABLET, CHEWABLE ORAL DAILY
Status: DISCONTINUED | OUTPATIENT
Start: 2024-09-21 | End: 2024-10-07 | Stop reason: HOSPADM

## 2024-09-20 RX ADMIN — METOPROLOL SUCCINATE 25 MG: 25 TABLET, EXTENDED RELEASE ORAL at 04:55

## 2024-09-20 RX ADMIN — VANCOMYCIN HYDROCHLORIDE 1250 MG: 5 INJECTION, POWDER, LYOPHILIZED, FOR SOLUTION INTRAVENOUS at 17:53

## 2024-09-20 RX ADMIN — LISINOPRIL 40 MG: 20 TABLET ORAL at 04:54

## 2024-09-20 RX ADMIN — SENNOSIDES AND DOCUSATE SODIUM 2 TABLET: 50; 8.6 TABLET ORAL at 21:13

## 2024-09-20 RX ADMIN — HYDRALAZINE HYDROCHLORIDE 75 MG: 50 TABLET ORAL at 21:17

## 2024-09-20 RX ADMIN — MAGNESIUM HYDROXIDE 30 ML: 1200 LIQUID ORAL at 18:24

## 2024-09-20 RX ADMIN — ATORVASTATIN CALCIUM 80 MG: 40 TABLET, FILM COATED ORAL at 21:13

## 2024-09-20 RX ADMIN — HYDRALAZINE HYDROCHLORIDE 50 MG: 25 TABLET ORAL at 04:54

## 2024-09-20 RX ADMIN — QUETIAPINE FUMARATE 50 MG: 25 TABLET ORAL at 19:58

## 2024-09-20 RX ADMIN — ASPIRIN 81 MG: 81 TABLET, CHEWABLE ORAL at 04:54

## 2024-09-20 RX ADMIN — AMLODIPINE BESYLATE 10 MG: 5 TABLET ORAL at 04:54

## 2024-09-20 RX ADMIN — VANCOMYCIN HYDROCHLORIDE 1250 MG: 5 INJECTION, POWDER, LYOPHILIZED, FOR SOLUTION INTRAVENOUS at 05:01

## 2024-09-20 RX ADMIN — ENOXAPARIN SODIUM 40 MG: 100 INJECTION SUBCUTANEOUS at 17:54

## 2024-09-20 ASSESSMENT — PAIN DESCRIPTION - PAIN TYPE
TYPE: ACUTE PAIN

## 2024-09-20 ASSESSMENT — LIFESTYLE VARIABLES
HAVE PEOPLE ANNOYED YOU BY CRITICIZING YOUR DRINKING: NO
AVERAGE NUMBER OF DAYS PER WEEK YOU HAVE A DRINK CONTAINING ALCOHOL: 0
CONSUMPTION TOTAL: NEGATIVE
EVER HAD A DRINK FIRST THING IN THE MORNING TO STEADY YOUR NERVES TO GET RID OF A HANGOVER: NO
TOTAL SCORE: 0
HAVE YOU EVER FELT YOU SHOULD CUT DOWN ON YOUR DRINKING: NO
ON A TYPICAL DAY WHEN YOU DRINK ALCOHOL HOW MANY DRINKS DO YOU HAVE: 0
DOES PATIENT WANT TO STOP DRINKING: NO
TOTAL SCORE: 0
ALCOHOL_USE: NO
TOTAL SCORE: 0
HOW MANY TIMES IN THE PAST YEAR HAVE YOU HAD 5 OR MORE DRINKS IN A DAY: 0
EVER FELT BAD OR GUILTY ABOUT YOUR DRINKING: NO

## 2024-09-20 ASSESSMENT — PATIENT HEALTH QUESTIONNAIRE - PHQ9
5. POOR APPETITE OR OVEREATING: NOT AT ALL
9. THOUGHTS THAT YOU WOULD BE BETTER OFF DEAD, OR OF HURTING YOURSELF: NOT AT ALL
SUM OF ALL RESPONSES TO PHQ QUESTIONS 1-9: 5
7. TROUBLE CONCENTRATING ON THINGS, SUCH AS READING THE NEWSPAPER OR WATCHING TELEVISION: NOT AT ALL
4. FEELING TIRED OR HAVING LITTLE ENERGY: SEVERAL DAYS
1. LITTLE INTEREST OR PLEASURE IN DOING THINGS: SEVERAL DAYS
6. FEELING BAD ABOUT YOURSELF - OR THAT YOU ARE A FAILURE OR HAVE LET YOURSELF OR YOUR FAMILY DOWN: NOT AL ALL
7. TROUBLE CONCENTRATING ON THINGS, SUCH AS READING THE NEWSPAPER OR WATCHING TELEVISION: SEVERAL DAYS
SUM OF ALL RESPONSES TO PHQ9 QUESTIONS 1 AND 2: 2
SUM OF ALL RESPONSES TO PHQ9 QUESTIONS 1 AND 2: 2
4. FEELING TIRED OR HAVING LITTLE ENERGY: SEVERAL DAYS
2. FEELING DOWN, DEPRESSED, IRRITABLE, OR HOPELESS: SEVERAL DAYS
3. TROUBLE FALLING OR STAYING ASLEEP OR SLEEPING TOO MUCH: NOT AT ALL
6. FEELING BAD ABOUT YOURSELF - OR THAT YOU ARE A FAILURE OR HAVE LET YOURSELF OR YOUR FAMILY DOWN: NOT AL ALL
8. MOVING OR SPEAKING SO SLOWLY THAT OTHER PEOPLE COULD HAVE NOTICED. OR THE OPPOSITE, BEING SO FIGETY OR RESTLESS THAT YOU HAVE BEEN MOVING AROUND A LOT MORE THAN USUAL: SEVERAL DAYS
1. LITTLE INTEREST OR PLEASURE IN DOING THINGS: SEVERAL DAYS
8. MOVING OR SPEAKING SO SLOWLY THAT OTHER PEOPLE COULD HAVE NOTICED. OR THE OPPOSITE, BEING SO FIGETY OR RESTLESS THAT YOU HAVE BEEN MOVING AROUND A LOT MORE THAN USUAL: NOT AT ALL
SUM OF ALL RESPONSES TO PHQ QUESTIONS 1-9: 4
9. THOUGHTS THAT YOU WOULD BE BETTER OFF DEAD, OR OF HURTING YOURSELF: NOT AT ALL
5. POOR APPETITE OR OVEREATING: NOT AT ALL
3. TROUBLE FALLING OR STAYING ASLEEP OR SLEEPING TOO MUCH: SEVERAL DAYS
2. FEELING DOWN, DEPRESSED, IRRITABLE, OR HOPELESS: SEVERAL DAYS

## 2024-09-20 ASSESSMENT — FIBROSIS 4 INDEX: FIB4 SCORE: 1.25

## 2024-09-20 NOTE — CARE PLAN
The patient is Stable - Low risk of patient condition declining or worsening    Shift Goals  Clinical Goals: BP management, monitor neuro status, safety  Patient Goals: rest  Family Goals: roshan    Progress made toward(s) clinical / shift goals:      Problem: Knowledge Deficit - Stroke Education  Goal: Patient's knowledge of stroke and risk factors will improve  Outcome: Progressing     Pt provided stroke education booklet. Emphasized pt's risk factors for a stroke (ex: hypertension, hyperlipidemia, and CAD). Reinforced the warning signs of a stroke (B.E.F.A.S.T).      Problem: Hemodynamic Monitoring  Goal: Patient's hemodynamics, fluid balance and neurologic status will be stable or improve  Outcome: Progressing    Pt's blood pressure closely monitored throughout shift. PRN blood pressure medications available to keep SBP within set parameters.        Problem: Neuro Status  Goal: Neuro status will remain stable or improve  Outcome: Not Progressing       Q4H neuro checks in place. Pt's neurological status (A/Ox2), patient becomes more confused at nightime. Bed alarm is on, call light within reach.      Patient is not progressing towards the following goals:    N/A

## 2024-09-20 NOTE — PROGRESS NOTES
Monitor Summary: SR 73-97, HI 0.16, QRS 0.10, QT 0.38, with occasional PVC's, 1 rare coup, in and out BBB per strip from monitor room.

## 2024-09-20 NOTE — PROGRESS NOTES
Monitor summary: SR/ST , AZ 0.14, QRS 0.10, QT 0.41 with rare and occasional PVCs, rare PACs, and rare couplets, per strip from monitor room.

## 2024-09-20 NOTE — PROCEDURES
Vascular Access Team    Date of Insertion: 9/19/2024  Arm Circumference: 28  Internal length: 12  External Length: 0  Vein Occupancy %: 45  Reason for Midline: IV ABX  Labs: WBC 11.1 , , BUN 23, Cr 0.96, GFR 83 , INR 1.14    Orders confirmed, vessel patency confirmed with ultrasound. Risks and benefits of procedure explained to patient and education regarding line associated bloodstream infections provided. Questions answered.     Power Midline placed in RUE per licensed provider order with ultrasound guidance. 4  Fr, 1 lumen Power Midline placed in Basilic vein after 1 attempt(s). 2 mL of 1% lidocaine injected intradermally, 21 gauge microintroducer needle was visualized entering the vein and modified Seldinger technique used. 12 cm catheter inserted with good blood return. Secured at 0 cm marker. Internal positioning stylet removed and verified to be intact. Each lumen flushed without resistance with 10 mL 0.9% normal saline. Midline secured with Biopatch and Tegaderm.     Midline placement is confirmed by nurse using ultrasound and ability to flush and draw blood. Midline is appropriate for use at this time.  Patient tolerated procedure well, without complications.  No X-ray is needed for placement confirmation.  Patient condition relayed to unit RN or ordering physician via this post procedure note in the EMR.     Ultrasound images uploaded to PACS and viewable in the EMR - YES  Ultrasound imaged printed and placed in paper chart - NO     BARD Power Midline ref # [id number], Lot # [id number], Expiration Date 10/31/2025

## 2024-09-20 NOTE — PROGRESS NOTES
Hospital Medicine Daily Progress Note    Date of Service  9/19/2024    Chief Complaint  Joe Templeton is a 74 y.o. male admitted 9/13/2024 with left sided weakness    Hospital Course  Mr. Templeton is a 74 y.o. male who presented 9/13/2024 with past medical history of coronary disease status post CABG, gout who presents to the hospital for left-sided weakness, dysarthria, fevers and left ankle swelling.  Apparently the patient's symptoms started 3 days ago and his family called 911.  The patient's been refusing to come to the hospital.  Overall patient was confused on my examination and unable to answer my questions.  He did not remember that he has family in [place].  He denies any cough, shortness of breath, dysuria, diarrhea, and vomiting.     Chest x-ray interpreted by me found no acute pulmonary process  EKG interpreted by me found normal sinus rhythm, LVH     CTA of the head and neck found mild to moderate atherosclerotic plaquing of the mid to distal common carotid arteries especially the common carotid artery bifurcation.  Moderate atherosclerotic plaquing of V4 segment bilaterally.    In the ER had had left ankle pain and swelling for which 4 mL of fluid was aspirated and he was initiated on IV Vancomycin and Zosyn.    Interval Problem Update  Patient was seen and examined at bedside.  No acute events overnight. Patient is resting comfortably in bed and in no acute distress.     MRI brain with evidence of stroke - neurology consulted  Await 2d echo  S/p OR 09/14 - Left ankle arthrotomy I&D      9/17 patient is new to me today he is in bed, he is in good spirit, left ankle pain, no fever no chills, I have discussed with infectious disease, continue IV vancomycin for now, blood cultures negative so far, echocardiogram is still pending, reviewed note from orthopedic surgery, I have added scheduled hydralazine for blood pressure control, patient still requiring IV hydralazine to keep his blood pressure under  control.  9/18 patient is resting in bed, still on IV vancomycin, still requiring IV blood pressure medications, patient is able to follow commands denies any new complaints no nausea no vomiting, per infectious disease patient will require at least 4 weeks of IV antibiotics, patient will need midline, follow-up echo results, discussed with bedside nurse charge nurse  pharmacist.  9/19 patient is resting in bed, pain is well-controlled, I have ordered midline, patient will require 4 weeks of IV antibiotics, note from orthopedic surgery reviewed, discussed with bedside nurse charge nurse  pharmacist, rehab team following patient for possible inpatient rehab admission, at this time patient appears to be stable to transition to rehab once midline is in place, all question have been answered.      I have discussed this patient's plan of care and discharge plan at IDT rounds today with Case Management, Nursing, Nursing leadership, and other members of the IDT team.    Consultants/Specialty  orthopedics  ID    Code Status  Full Code    Disposition  The patient is not medically cleared for discharge to home or a post-acute facility.      I have placed the appropriate orders for post-discharge needs.    Review of Systems  Review of Systems   Constitutional:  Negative for chills and fever.   Eyes:  Negative for blurred vision and double vision.   Respiratory:  Negative for cough, hemoptysis and wheezing.    Cardiovascular:  Negative for chest pain, palpitations, claudication, leg swelling and PND.   Gastrointestinal:  Negative for heartburn, nausea and vomiting.   Genitourinary:  Negative for hematuria and urgency.   Musculoskeletal:  Positive for joint pain. Negative for back pain and myalgias.   Skin:  Negative for rash.   Neurological:  Negative for dizziness and headaches.   Endo/Heme/Allergies:  Does not bruise/bleed easily.   Psychiatric/Behavioral:  Negative for depression.         Physical  Exam  Temp:  [36.3 °C (97.3 °F)] 36.3 °C (97.3 °F)  Pulse:  [] 81  Resp:  [18] 18  BP: (108-196)/() 145/81  SpO2:  [92 %-94 %] 94 %    Physical Exam  Vitals and nursing note reviewed.   Constitutional:       General: He is not in acute distress.     Appearance: He is not ill-appearing.   HENT:      Nose: Nose normal.      Mouth/Throat:      Comments: Very poor dentition  Eyes:      General: No scleral icterus.  Cardiovascular:      Rate and Rhythm: Normal rate and regular rhythm.      Heart sounds: Murmur heard.   Pulmonary:      Effort: No respiratory distress.      Breath sounds: No wheezing, rhonchi or rales.   Abdominal:      General: Abdomen is flat. There is no distension.      Tenderness: There is no abdominal tenderness.   Musculoskeletal:      Cervical back: No rigidity.      Right lower leg: No edema.      Left lower leg: No edema.      Comments: Left ankle medial malleolus swollen, red, tender   Skin:     General: Skin is warm and dry.      Coloration: Skin is not jaundiced.   Neurological:      Mental Status: He is alert.      Cranial Nerves: No cranial nerve deficit.      Sensory: No sensory deficit.      Motor: No weakness.      Comments: Strength approx equal bilat arms and legs  Mild left facial droop   Psychiatric:      Comments: Odd affect   Generally oriented but an overall poor historian.         Fluids    Intake/Output Summary (Last 24 hours) at 9/19/2024 1705  Last data filed at 9/19/2024 1430  Gross per 24 hour   Intake 358 ml   Output --   Net 358 ml        Laboratory  Recent Labs     09/17/24  0119 09/18/24  0836   WBC 10.3 11.1*   RBC 4.87 5.10   HEMOGLOBIN 15.0 15.9   HEMATOCRIT 44.9 45.7   MCV 92.2 89.6   MCH 30.8 31.2   MCHC 33.4 34.8   RDW 42.0 39.7   PLATELETCT 245 293   MPV 11.5 11.2     Recent Labs     09/17/24  0119 09/18/24  0836 09/19/24  0334   SODIUM 143 141 143   POTASSIUM 3.3* 3.8 3.7   CHLORIDE 108 108 110   CO2 21 19* 19*   GLUCOSE 121* 144* 154*   BUN 17 17 23*    CREATININE 0.72 0.72 0.96   CALCIUM 9.0 9.0 9.2                       Imaging  EC-ECHOCARDIOGRAM COMPLETE W/O CONT   Final Result      MR-BRAIN-W/O   Final Result      1.  Moderate cerebral atrophy.   2.  Advanced supratentorial white matter disease most consistent with microvascular ischemic change.   3.  Old microhemorrhage left posterior sylvian frontal lobe and left occipital lobe most consistent with old hypertensive microhemorrhage versus amyloid angiopathy.   4.  Old lacunar infarcts x2 in the right thalamus old lacunar infarcts x2 in the left thalamus.   5.  Old cortical infarct left parietal lobe at the postcentral gyrus.   6.  Subcentimeter focus of acute infarction right insula.   7.  Subcentimeter focus acute infarction left posterior parietal lobe.   8.  Subcentimeter focus of acute or recent subacute infarction right parietal periventricular white matter/splenium corpus callosum.   9.  Acute brainstem infarct in the left side of the hao.   10.  Multiple old lacunar size infarcts left cerebellar hemisphere.   11.  Moderate-sized old infarct right cerebellar hemisphere.   12.  No acute infarcts show hemorrhagic transformation.   13.  Multiple vascular territories are involved. Possible embolic mechanism.      CT-CTA NECK WITH & W/O-POST PROCESSING   Final Result      1.  Mild to moderate atherosclerotic plaquing of the mid and distal common carotid arteries and especially the common carotid artery bifurcations. Bilateral internal carotid stenoses of less than 50%.      2.  Patent vertebrobasilar system with moderate atherosclerotic plaquing of the V4 segments bilaterally      CT-CTA HEAD WITH & W/O-POST PROCESS   Final Result      CT angiogram of the [place] within normal limits.      CT-CEREBRAL PERFUSION ANALYSIS   Final Result      1. Cerebral blood flow less than 30% possibly representing completed infarct = 0 mL.      2. T Max more than 6 seconds possibly representing combination of  "completed infarct and ischemia = 0 mL.      3. Mismatched volume possibly representing ischemic brain/penumbra= 0 mL      4.  Please note that this cerebral perfusion study and report is Quantitative and targets supratentorial (cerebral) perfusion for evaluation of large vessel territory acute ischemia/infarction. For example, lacunar infarcts, and brainstem/posterior fossa    ischemia/infarction are not evaluated on this study.  Data acquisition is subject to artifacts which can yield non-anatomically plausible perfusion maps which may be due to motion, bolus timing, signal to noise ratio, or other technical factors.    Perfusion map abnormalities which show non-anatomic distributions are likely artifact.   This study is not \"stand-alone\" and should only be utilized for diagnosis, management/treatment in correlation with CT, CTA, and/or MRI and clinical factors.         DX-ANKLE 2- VIEWS LEFT   Final Result      1. Clinical soft tissue swelling.   2. No acute fracture or subluxation.   3. Polyarticular osteoarthritis.   4. Atherosclerosis.      DX-CHEST-PORTABLE (1 VIEW)   Final Result      No acute cardiac or pulmonary abnormalities are identified.      IR-MIDLINE CATHETER INSERTION WO GUIDANCE > AGE 3    (Results Pending)        Assessment/Plan  * Stroke (HCC)  Assessment & Plan  MRI brain with multifocal embolic appearing infarcts in multiple vascular territories, no hemorrhage   Consult to Neurology   Cardioembolic workup  Await echo    Systolic heart failure (HCC)  Assessment & Plan  History of  Last EF was 40 to 55%  Without exacerbation      Sepsis (HCC)  Assessment & Plan  This is Sepsis Present on admission    Acute encephalopathy- (present on admission)  Assessment & Plan  Unclear baseline.  He is oriented x 4 though certainly a bit \"off\"  Way family members to give further updates on his baseline.  Oriented x 4      Murmur  Assessment & Plan  Cardiac echo is pending to rule out endocarditis  Follow-up " blood cultures    Left-sided weakness- (present on admission)  Assessment & Plan  Highly concerned that the patient may have endocarditis since he has a new murmur, encephalopathy and left-sided weakness.  Currently he is not weak and left side though does  have slight facial droop  Patient is past the tPA window  Patient will be placed on continuous cardiac monitoring to evaluate for any arrhythmias  MRI of the brain is pending  Check 2-D echo  Patient will be started on full dose aspirin and high intensity statin  Check TSH, lipid panel and hemoglobin A1c  PTOT and ST evaluation once he is stabilized    Follow-up echo still pending      Hx of CABG  Assessment & Plan  Continue aspirin and statins    Septic arthritis of left ankle (HCC)- (present on admission)  Assessment & Plan  S/p OR 09/14 - Left ankle arthrotomy I&D  Continue IV vanc  Gram stain and wound culture negative  Vancomycin requiring frequent monitoring of trough levels and renal function for vanc toxicity        Discussed with infectious disease    Essential (primary) hypertension- (present on admission)  Assessment & Plan  Uncontrolled  Continue current home medications  IV as needed medications have been ordered    Patient still requiring IV hydralazine and labetalol to keep her blood pressure under control  Have added scheduled hydralazine    Still requiring IV blood pressure medication, monitoring for side effects include but not limited to hypotension bradycardia.         VTE prophylaxis: Lovenox                  I have reviewed BMP  I have reviewed calcium levels  I have reviewed vancomycin levels  I have reviewed note from orthopedic surgery  Patient is on IV vancomycin monitoring for side effects include but not limited to acute kidney injury, monitoring BMP daily.  I have ordered blood work for in a.m.

## 2024-09-20 NOTE — DISCHARGE INSTRUCTIONS
"Ischemic Stroke  Discharge Instructions    You experienced an Ischemic Stroke.  Ischemic stroke is the most common type of stroke and happens when an artery in the brain becomes blocked by a plaque fragment or blood clot. Typically, these blockages travel from the heart or larger arteries that supply the brain.  The brain needs a constant supply of blood, which carries oxygen and nutrients it needs to function.  A stroke occurs when one of these arteries to the brain is either blocked or bursts. As a result, part of the brain does not get the blood it needs, so it starts to die.       Stroke Risk Factors    You are at increased risk of having another stroke event.  See your Patient Stroke Guide to help reduce your stroke risk. These are your specific risk factors:  Age - Over 55  Artery Disease / Peripheral Vascular Disease   Cardiovascular disease  Gender - Men are at a higher risk than women  Heart disease  High blood pressure  High Cholesterol and lipids  Inactivity or Overweight / High BMI     Get help right away if you have any signs of a stroke.  \"BE FAST\" is an easy way to remember the main warning signs of a stroke:  B - Balance. Dizziness, sudden trouble walking, or loss of balance.  E - Eyes. Trouble seeing or a change in how you see.  F - Face. Sudden weakness or loss of feeling in the face. The face or eyelid may droop on one side.  A - Arms. Weakness or loss of feeling in an arm. This happens all of a sudden and most often on one side of the body.  S - Speech. Sudden trouble speaking, slurred speech, or trouble understanding what people say.  T - Time. Time to call emergency services. Write down what time symptoms started.                "

## 2024-09-20 NOTE — PROGRESS NOTES
Physical Medicine and Rehabilitation     Note is adapted from my colleague Dr. Gutierrez's prior note on   9/16/2024  LOS: 7 Day(s)    Chief complaint: LE weakness and ankle swelling.     HPI: The patient is a 74 y.o.  male with a past medical history of CAD s/p CABG and gout;  who presented on 9/13/2024  5:10 PM with LE weakness and ankle swelling. Per documentation, patient had worsening symptoms of LLE and ankle swelling x 3 days. Upon eval in the ED, patient was also confused. CTA head and neck obtained for weakness and confusion showed no LVO on CTA head and mid to moderate atherosclerotic plaquing of the common carotid arteries and bilateral ICA stenosis less than 50%. Ankle xray was negative for acute fracture. 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 Echo pending, prior echo from 2016 showed EF of 40-50%.  Neuro consulted, patient is now on ASA and statin. In regards to patient's ankle, patient had an arthrocentesis done in the ED, ortho was consulted, and patient was taken the OR on 9/14 for left ankle I and D for left ankle septic arthritis performed by Dr. Gautam. Patient is WBAT LLE.  Patient remains on vancomycin, stop date is 9/21. Patient has been able to mobilize CGA level.     Patient seen and examined at bedside, patient reports he feels ok. Reports his ankle feels better. Pain controlled. Does not report HA, lightheadedness, SOB, CP, abdominal pain, or changes in numbness/tingling/weakness.   Reports he has had an issue with his memory in the past, but was doing everything on his own at home.     9/18/2024  Patient hypertensive, agitated, physically and verbally aggressive last night.  Required Haldol, security, restraints.  BP up to 200/95 this morning.  Started on amlodipine, received as needed hydralazine.  Labs reflect slight hypokalemia with potassium 3.3, a low vancomycin  trough at 4.9.  Transthoracic echo yesterday showed severe left ventricular hypertrophy and mild aortic valve stenosis.     Patient is currently oriented to self only, and September. Unable to state year, location or situation. Per BSN, patients orientation fluctuates between A&O 1-3. He is in bilateral soft wrist restraints and belt. He is calm and cooperative.     9/19/2024  Patient is lethargic today. He received one dose of 25mg PRN seroquel yesterday afternoon and his nightly 50mg dose. He needs to be monitored today to see how he is in the afternoon. BP again up to 196/103 yesterday evening.     9/20/2024  Continues to be altered, not oriented to place, time or situation.  Apparently he is best in the afternoons.  Continues to be spiking blood pressure in the evenings.    Social Hx:  Per documentation - Patient lives alone in a 1 story house with no DORCAS, brother lives   Nearby  Per patient - lives with brother and sister in law in a 1 story house     0 DORCAS  At prior level of function patient was Independent with mobility and ADLs.     Tobacco: Former smoker, quit 8 years ago   Alcohol: Denied  Drugs: Denied     THERAPY:  Restrictions: Fall Risk, WBAT LLE   PT: Functional mobility   9/15  CGA with FWW x 40 ft, CGA sit to stand   9/17: Walking 60 feet x 2 with front wheel walker at contact-guard assist  9/19: Walking 75 feet with front wheel walker at contact-guard assist    OT: ADLs  9/16: Mod assist lower body dressing, min assist upper body dressing and toileting  9/19: Mod assist lower body dressing    SLP:   9/16 requires direct assistance with IADLs, SLP recommending referral  to neuropsychologist   9/17: Moderate to severe cognitive-linguistic deficits, soft and bite-size diet    IMAGING:  MR brain 9/15/24    1.  Moderate cerebral atrophy.  2.  Advanced supratentorial white matter disease most consistent with microvascular ischemic change.  3.  Old microhemorrhage left posterior sylvian frontal lobe and  left occipital lobe most consistent with old hypertensive microhemorrhage versus amyloid angiopathy.  4.  Old lacunar infarcts x2 in the right thalamus old lacunar infarcts x2 in the left thalamus.  5.  Old cortical infarct left parietal lobe at the postcentral gyrus.  6.  Subcentimeter focus of acute infarction right insula.  7.  Subcentimeter focus acute infarction left posterior parietal lobe.  8.  Subcentimeter focus of acute or recent subacute infarction right parietal periventricular white matter/splenium corpus callosum.  9.  Acute brainstem infarct in the left side of the hao.  10.  Multiple old lacunar size infarcts left cerebellar hemisphere.  11.  Moderate-sized old infarct right cerebellar hemisphere.  12.  No acute infarcts show hemorrhagic transformation.  13.  Multiple vascular territories are involved. Possible embolic mechanism.      PROCEDURES:  9/14 Left ankle arthrotomy incision drainage  by Dr. Gautam     PMH:  Past Medical History:   Diagnosis Date    Arthritis     Gout     Hemorrhoids 11/25/2014    Reportedly Pt has had hemorrhoids for some time. He denies any bloody stools or bleeding hemorrhoids.     Hypertension     Ingrown right big toenail 05/20/2015    Low back pain 05/20/2015    NSTEMI (non-ST elevated myocardial infarction) (HCC) 06/28/2016    Vitamin D deficiency 01/06/2015 12/1/14 lab result show slightly low Vitamin D level= 26. He states he walks outside a lot in the sun until the past month.       PSH:  Past Surgical History:   Procedure Laterality Date    IRRIGATION & DEBRIDEMENT GENERAL Left 9/14/2024    Procedure: IRRIGATION AND DEBRIDEMENT, ANKLE;  Surgeon: Alex Gautam M.D.;  [facility];  Service: Orthopedics    MULTIPLE CORONARY ARTERY BYPASS ENDO VEIN HARVEST N/A 6/28/2016    Procedure: MULTIPLE CORONARY ARTERY BYPASS ENDO VEIN HARVEST- With DELMI ;  Surgeon: Deejay Duong M.D.;  Location: SURGERY [facility];  Service:        FHX:  Family  "History   Problem Relation Age of Onset    Cancer Mother     Heart Disease Father     Hyperlipidemia Father     Hyperlipidemia Brother     Lung Disease Brother        Medications:  Current Facility-Administered Medications   Medication Dose    vancomycin (Vancocin) 1,250 mg in  mL IVPB  1,250 mg    hydrALAZINE (Apresoline) tablet 50 mg  50 mg    QUEtiapine (SEROquel) tablet 50 mg  50 mg    QUEtiapine (SEROquel) tablet 25 mg  25 mg    ziprasidone (Geodon) injection 10 mg  10 mg    labetalol (Normodyne/Trandate) injection 10 mg  10 mg    hydrALAZINE (Apresoline) injection 10 mg  10 mg    amLODIPine (Norvasc) tablet 10 mg  10 mg    lisinopril (Prinivil) tablet 40 mg  40 mg    enoxaparin (Lovenox) inj 40 mg  40 mg    atorvastatin (Lipitor) tablet 80 mg  80 mg    melatonin tablet 5 mg  5 mg    acetaminophen (Tylenol) tablet 650 mg  650 mg    ondansetron (Zofran) syringe/vial injection 4 mg  4 mg    ondansetron (Zofran ODT) dispertab 4 mg  4 mg    aspirin (Asa) chewable tab 81 mg  81 mg    Or    aspirin (Asa) suppository 300 mg  300 mg    metoprolol SR (Toprol XL) tablet 25 mg  25 mg    MD Alert...Vancomycin per Pharmacy         Allergies:  No Known Allergies      Physical Exam:  Vitals: BP (!) 132/93   Pulse 79   Temp 36 °C (96.8 °F) (Temporal)   Resp 18   Ht 1.753 m (5' 9\")   Wt 77.9 kg (171 lb 11.8 oz)   SpO2 94%   Gen: NAD, laying comfortably in bed   Head:  NC/AT  Eyes/ Nose/ Mouth: PERRLA, moist mucous membranes  Cardio: RRR, good distal perfusion, warm extremities  Pulm: normal respiratory effort, no cyanosis, on RA   Abd: Soft NTND  Ext: No peripheral edema. No calf tenderness. No clubbing.Ace wrap in place on Left ankle       Labs: Reviewed and significant for   Recent Labs     09/18/24  0836   RBC 5.10   HEMOGLOBIN 15.9   HEMATOCRIT 45.7   PLATELETCT 293     Recent Labs     09/18/24  0836 09/19/24  0334 09/20/24  0450   SODIUM 141 143 142   POTASSIUM 3.8 3.7 4.1   CHLORIDE 108 110 110   CO2 19* 19* " 21   GLUCOSE 144* 154* 151*   BUN 17 23* 24*   CREATININE 0.72 0.96 0.93   CALCIUM 9.0 9.2 9.0     Recent Results (from the past 24 hour(s))   PROCALCITONIN    Collection Time: 09/20/24  4:50 AM   Result Value Ref Range    Procalcitonin 0.05 <0.25 ng/mL   Basic Metabolic Panel    Collection Time: 09/20/24  4:50 AM   Result Value Ref Range    Sodium 142 135 - 145 mmol/L    Potassium 4.1 3.6 - 5.5 mmol/L    Chloride 110 96 - 112 mmol/L    Co2 21 20 - 33 mmol/L    Glucose 151 (H) 65 - 99 mg/dL    Bun 24 (H) 8 - 22 mg/dL    Creatinine 0.93 0.50 - 1.40 mg/dL    Calcium 9.0 8.5 - 10.5 mg/dL    Anion Gap 11.0 7.0 - 16.0   ESTIMATED GFR    Collection Time: 09/20/24  4:50 AM   Result Value Ref Range    GFR (CKD-EPI) 87 >60 mL/min/1.73 m 2         ASSESSMENT:  Patient is a 74 y.o. male admitted with LLE weakness     [facility] Code / Diagnosis to Support: 0001.4 - Stroke: No Paresis  See DISPO details below for recommendations on appropriate level of rehab for this diagnosis.    Barriers to transfer include: Insurance authorization, TCCs to verify disposition, medical clearance and bed availability     Assessment and Plan:  Left pontine stroke   Multifocal infarcts   - admitted with LLE weakness ( weakness may be due to septic arthritis rather than stroke) and AMS   - CTA head negative for LVO   - CTA neck showed bilateral ICA stenosis of less than 50%   - MRI brain showed acute brain stem infarct in the left hao and multifocal infarcts, old lacunar infarct in b/l thalamus   - neuro consulted   - on ASA and statin for secondary stroke ppx   - Echo completed  - continue with PT/OT/SLP     Agitation   - Continue seroquel 50mg QHS   - PRN seroquel 25mg TID for mild agitation  - Geodon 10mg IM for aggressive agitation  - Avoid haldol and benzos     Left ankle septic arthritis   - admitted with LLE weakness and ankle pain   - left ankle cxr negative for acute fracture   - ortho consulted   - 9/14 left rose arthrotomy I and D for septic  arthritis by Dr. Gautam   - on Vancomycin, stop date 9/21   - WBAT LLE     Hx of CABG   - on  ASA and statin   - prior echo from 2016 showed EF of 40-50 %   - updated echo pending     Uncontrolled HTN   - target BP <130/80, Seems to spike in the evenings... 200/95, 196/103, 166/91  - Metoprolol 25mg daily   - Lisinopril 40mg daily   - Amlodipine 10mg daily   - PRN hydralyzine     DISPO:  - patient is currently functioning below their level of baseline, recommend post acute rehab  - anticipated to be appropriate for IRF level therapy with 3hr of therapy 5 days per week   - TCC to assist with insurance auth and DC support from brother        Medical Complexity:  Left pontine stroke   Multifocal infarcts   Left ankle septic arthritis   Hx of CABG   HTN   Impaired mobility and ADLs       DVT PPX: SCDs       Thank you for allowing us to participate in the care of this patient.     Patient was seen for >36 minutes on unit/floor of which > 50% of time was spent on counseling and coordination of care regarding the above, including prognosis, risk reduction, benefits of treatment, and options for next stage of care.    Ralph Simpson D.O.   Physical Medicine and Rehabilitation     Please note that this dictation was created using voice recognition software. I have made every reasonable attempt to correct obvious errors, but there may be errors of grammar and possibly content that I did not discover before finalizing the note.

## 2024-09-20 NOTE — DISCHARGE PLANNING
Spoke with ARMANDO Coronado to provide an update and confirm the D/C.  She is good with utilizing a posey bed if needed @ Rehab.  We spoke about the need to continue his IV ABT after Rehab and she is agreeable with being trained on how to administer.  Spoke with Zachery @ [facility] and they will train the family in their home and will finish off the IV ABT course.  Will discuss this case with Administration.     0943-Case is under review by Dr. Meza.     1129-Dr. Meza has accepted.  Transport has been arranged via T between [id number]. Dr. Anderson & ARMANDO Coronado are aware.  Msg placed to Lori REINOSO & Kelly HADLEYN.

## 2024-09-20 NOTE — THERAPY
Physical Therapy   Daily Treatment     Patient Name: Joe Templeton  Age:  74 y.o., Sex:  male  Medical Record #: [id number]  Today's Date: 9/19/2024     Precautions  Precautions: (P) Fall Risk;Swallow Precautions    Assessment    The pt pleasantly confused, willing to participate w/PT. The pt requiring assistance w/sup<->sit transitions. Once seated, no balance deficits noted. The pt managed 75' x 1 w/FWW, cueing for knee flexion during swing. No c/o pain.  PT will cont to follow.     Plan    Treatment Plan Status: (P) Continue Current Treatment Plan  Type of Treatment: Bed Mobility, Equipment, Gait Training, Neuro Re-Education / Balance, Orthotics Training , Self Care / Home Evaluation, Stair Training, Therapeutic Activities, Therapeutic Exercise  Treatment Frequency: 4 Times per Week  Treatment Duration: Until Therapy Goals Met    DC Equipment Recommendations: (P) Unable to determine at this time  Discharge Recommendations: (P) Recommend post-acute placement for additional physical therapy services prior to discharge home    Objective       09/19/24 1534   Time In/Time Out   Therapy Start Time 1505   Therapy End Time 1534   Total Therapy Time 29   Charge Group   PT Gait Training (Units) 1   PT Therapeutic Exercise (Units) 1   Total Time Spent   PT Gait Training Time Spent (Mins) 10   PT Therapeutic Exercise Time Spent (Mins) 19   PT Total Time Spent (Calculated) 29   Precautions   Precautions Fall Risk;Swallow Precautions   Pain 0 - 10 Group   Pain Rating Scale (NPRS) 0   Therapist Pain Assessment During Activity   Balance   Sitting Balance (Static) Fair +   Sitting Balance (Dynamic) Fair   Standing Balance (Static) Fair -   Standing Balance (Dynamic) Fair -   Weight Shift Sitting Fair   Weight Shift Standing Fair   Skilled Intervention Verbal Cuing   Comments w/FWW   Bed Mobility    Supine to Sit Contact Guard Assist   Sit to Supine Contact Guard Assist   Scooting Supervised   Rolling Supervised   Comments HOB  flat and no railing   Gait Analysis   Gait Level Of Assist Contact Guard Assist   Assistive Device Front Wheel Walker   Distance (Feet) 75   # of Times Distance was Traveled 1   Deviation Antalgic   Weight Bearing Status WBAT Lft LE   Skilled Intervention Verbal Cuing;Postural Facilitation;Compensatory Strategies   Comments Improvement from previous PT session. The pt amb 75' x 1, cueing to flex knee during swing. The pt stated his Lft LE feels weak.   Functional Mobility   Sit to Stand Contact Guard Assist   Bed, Chair, Wheelchair Transfer Contact Guard Assist   Skilled Intervention Verbal Cuing   Comments w/FWW   6 Clicks Assessment - How much HELP from from another person do you currently need... (If the patient hasn't done an activity recently, how much help from another person do you think he/she would need if he/she tried?)   Turning from your back to your side while in a flat bed without using bedrails? 3   Moving from lying on your back to sitting on the side of a flat bed without using bedrails? 3   Moving to and from a bed to a chair (including a wheelchair)? 3   Standing up from a chair using your arms (e.g., wheelchair, or bedside chair)? 3   Walking in hospital room? 3   Climbing 3-5 steps with a railing? 2   6 clicks Mobility Score 17   Short Term Goals    Short Term Goal # 1 Pt will demo STS EOB w/ FWW SPV in 6 visits to prepare for OOB mobility tasks.   Goal Outcome # 1 goal not met   Short Term Goal # 2 Pt will demo gait >150' using FWW SBA in a moving environment in 6 visits for household ambulation.   Goal Outcome # 2 Goal not met   Short Term Goal # 3 Pt will demo ability to ascend/descend 2 steps w/ UE support and SBA in 6 visits for access to his home and the community.   Goal Outcome # 3 Goal not met   Education Group   Role of Physical Therapist Patient Response Patient;Acceptance;Explanation;Action Demonstration   Physical Therapy Treatment Plan   Physical Therapy Treatment Plan Continue  Current Treatment Plan   Anticipated Discharge Equipment and Recommendations   DC Equipment Recommendations Unable to determine at this time   Discharge Recommendations Recommend post-acute placement for additional physical therapy services prior to discharge home   Interdisciplinary Plan of Care Collaboration   IDT Collaboration with  Nursing   Patient Position at End of Therapy In Bed;Bed Alarm On;Call Light within Reach;Tray Table within Reach;Phone within Reach   Collaboration Comments Nrsg notified of pts tx efforts   Session Information   Date / Session Number  9/19--3 (3/4, 9/21) PTA/1   Supervising Physical Therapist (PTA Treatments Only)   Supervising Physical Therapist Corrie Mena

## 2024-09-20 NOTE — PROGRESS NOTES
Sudden mood change noted. Patient confused, angry and unable to follow directions. Gregory Bed in place. Water offered. Patient refused.

## 2024-09-20 NOTE — FLOWSHEET NOTE
09/20/24 1652   Events/Summary/Plan   Events/Summary/Plan Respiratory assess.   Vital Signs   Pulse   (unable to get due to safety reasons.)   Respiratory Assessment   Level of Consciousness Alert   Chest Exam   Work Of Breathing / Effort Within Normal Limits   Breath Sounds   RUL Breath Sounds   (unable to get due to safety reasons.)   Oxygen   O2 Delivery Device None - Room Air     Pt is in posey bed and extremely agitated, did not feel it was safe for myself or the pateint to open the bed to oscitate lungs and get vital signs.

## 2024-09-20 NOTE — PROGRESS NOTES
Pharmacy Vancomycin Kinetics Note for 2024     74 y.o. male on Vancomycin day # 7     Vancomycin Indication (Two level): Skin/skin structure infection (goal trough 10-15) (L ankle septic arthritis)    Provider specified end date: 10/12/24    Active Antibiotics (From admission, onward)      Ordered     Ordering Provider       Fri Sep 20, 2024  2:36 PM    24 1436  MD Alert...Vancomycin per Pharmacy  (MD Alert...Vancomycin per Pharmacy)  PHARMACY TO DOSE        Question:  Indication(s) for vancomycin?  Answer:  Skin and soft tissue infection    Khushbu Meza D.O.    24 1436  vancomycin (Vancocin) 1,250 mg in  mL IVPB  (vancomycin (VANCOCIN) IV (LD + Maintenance))  EVERY 12 HOURS        Note to Pharmacy: Dose per Pharmacokinetic Protocol    Khushbu Meza D.O.            Dosing Weight: 78 kg (171 lb 15.3 oz)      Admission History: Admitted on 2024 for H/O: stroke [Z86.73]  Pertinent history: Pt is admitted to Rehab, continuing vancomycin for L ankle septic arthritis. He is being seen by [facility]. Cultures are still pending from [facility], but since the pt was improving on vancomycin, he will be continued on this through 10/12. Per the last ID note, it may be possible to switch pt to Zyvox when he has less than 2 wks of therapy.    Allergies:     Patient has no known allergies.     Pertinent cultures to date:     Results       ** No results found for the last 336 hours. **            Labs:     Estimated Creatinine Clearance: 70.7 mL/min (by C-G formula based on SCr of 0.93 mg/dL).  Recent Labs     24  0836   WBC 11.1*     Recent Labs     24  0836 24  0334 24  0450   BUN 17 23* 24*   CREATININE 0.72 0.96 0.93     No intake or output data in the 24 hours ending 24 1602   BP (!) 151/74   Pulse 80   Temp 36.1 °C (96.9 °F) (Temporal)   Resp 18   SpO2 94%  Temp (24hrs), Av.1 °C (96.9 °F), Min:36.1 °C (96.9 °F), Max:36.1 °C (96.9 °F)      List concerns for  Vancomycin clearance:     Age    Pharmacokinetics:       Trough kinetics:   Recent Labs     09/18/24  1913 09/19/24  0334   VANCOTROUGH  --  6.2*   VANCOPEAK 15.6*  --        A/P:     -  Vancomycin dose: Continue 1250 mg IV q12h (@0500 & 1700), as pt was therapeutic on this dose    -  Next vancomycin level(s): Will need a vancomycin trough ~9/26    -  Predicted vancomycin AUC from initial AUC test calculator:      -  Predicted vancomycin AUC from two level test calculator:  435 at [facility] on 9/19    -  Comments: Awaiting further cx results, but as pt improving, continue current vancomycin dosing. Will need to confirm pt still in therapeutic range in ~1 wk    Lilia Fitzpatrick, SarahD, BCPS

## 2024-09-20 NOTE — PROGRESS NOTES
1418 Pt arrived at [facility] from [facility] via transport. Dr. Meza to follow. Initial assessment initiated. Pt oriented to room and facility routine and safety measures; pt education binder provided and discussed. Pt A/O x 1 oriented to self, continent of bowel and continent/incontinent bladder. Min assist for transfers. All wounds photographed and documented; photos uploaded to Media Manager. Pt denies pain or discomfort at this time. Pt positioned for comfort in bed. Call light within reach, safety measures in place.

## 2024-09-20 NOTE — CARE PLAN
".Nikole Cesar Fall risk Assessment Score: 21    High fall risk Interventions   - Enclosure bed   - Yellow sign by the door   - Yellow wrist band \"Fall risk\"  - Room near to the nurse station  - Do not leave patient unattended in the bathroom  - Fall risk education provided    Patient is very confused and agitated.   "

## 2024-09-20 NOTE — DISCHARGE SUMMARY
Discharge Summary    CHIEF COMPLAINT ON ADMISSION  Chief Complaint   Patient presents with    Weakness     Brought in by EMS from home for unilateral LE weakness x3 days.       Reason for Admission  EMS    Admission Date  9/13/2024     CODE STATUS  Full Code    HPI & HOSPITAL COURSE  Please see original H&P and consult for specific information  Mr. Templeton is a 74 y.o. male who presented 9/13/2024 with past medical history of coronary disease status post CABG, gout who presents to the hospital for left-sided weakness, dysarthria, fevers and left ankle swelling.  Apparently the patient's symptoms started 3 days ago and his family called 911.  The patient's been refusing to come to the hospital.  Overall patient was confused on my examination and unable to answer my questions.  He did not remember that he has family in [place].  He denies any cough, shortness of breath, dysuria, diarrhea, and vomiting.     Chest x-ray interpreted by me found no acute pulmonary process  EKG interpreted by me found normal sinus rhythm, LVH     CTA of the head and neck found mild to moderate atherosclerotic plaquing of the mid to distal common carotid arteries especially the common carotid artery bifurcation.  Moderate atherosclerotic plaquing of V4 segment bilaterally.     In the ER had had left ankle pain and swelling for which 4 mL of fluid was aspirated and he was initiated on IV Vancomycin and Zosyn.    9/14: Mr. Templeton was evaluated on the neuro floor. He remains on broad-spectrum IV antibiotics. MRI brain is pending. He is oriented to month and year. Left facial droop. He is going to the OR this morning with ortho.  IV Zosyn will be stopped and continue IV vancomycin.   Patient had I&D left ankle septic arthritis    9/15  Afebrile normal pulse and respiratory rate systolic blood pressure 160s pulse ox 94 to 95% on room air.  I reviewed MRI results, contacted neurology for consult for acute CVA.  Went to bedside discussed with patient.   Started on aspirin and statin.  Initially there were concerns of endocarditis however blood cultures negative, TTE pending.     Neurology consulted, continue daily aspirin    9/16:  Afebrile normal pulse and respiratory rate systolic blood pressure 160s pulse ox 94 to 95% on room air.  I reviewed MRI results, contacted neurology for consult for acute CVA.  Went to bedside discussed with patient.  Started on aspirin and statin.  Initially there were concerns of endocarditis however blood cultures negative, TTE pending.         9/17 patient is new to me today he is in bed, he is in good spirit, left ankle pain, no fever no chills, I have discussed with infectious disease, continue IV vancomycin for now, blood cultures negative so far, echocardiogram is still pending, reviewed note from orthopedic surgery, I have added scheduled hydralazine for blood pressure control, patient still requiring IV hydralazine to keep his blood pressure under control.  9/18 patient is resting in bed, still on IV vancomycin, still requiring IV blood pressure medications, patient is able to follow commands denies any new complaints no nausea no vomiting, per infectious disease patient will require at least 4 weeks of IV antibiotics, patient will need midline, follow-up echo results, discussed with bedside nurse charge nurse  pharmacist.  9/19 patient is resting in bed, pain is well-controlled, I have ordered midline, patient will require 4 weeks of IV antibiotics, note from orthopedic surgery reviewed, discussed with bedside nurse charge nurse  pharmacist, rehab team following patient for possible inpatient rehab admission, at this time patient appears to be stable to transition to rehab once midline is in place, all question have been answered.    Patient is doing much better blood pressure is better controlled, hydralazine was adjusted today, discussed with physiatry patient is cleared to transfer to rehab, patient  is in no distress orthopedic surgery has cleared patient for discharge, infectious disease recommended to continue on IV vancomycin until 10/12, patient is being discharged to rehab facility in a stable condition, I have discussed with bedside nurse charge nurse  pharmacist all questions been answered.              Therefore, he is discharged in good and stable condition to an inpatient rehabilitation hospital.    The patient met 2-midnight criteria for an inpatient stay at the time of discharge.      FOLLOW UP ITEMS POST DISCHARGE  Primary care physician  Neurology    DISCHARGE DIAGNOSES  Principal Problem:    Stroke (HCC) (POA: Unknown)  Active Problems:    Essential (primary) hypertension (POA: Yes)      Overview: 10/31/14 IN ER dx with /152. Tx with IV labetalol and d/c on       Metoprolol.     Hx of CABG (POA: Unknown)    Left-sided weakness (POA: Yes)    Murmur (POA: Unknown)    Systolic heart failure (HCC) (POA: Unknown)  Resolved Problems:    Septic arthritis of left ankle (HCC) (POA: Yes)    Acute encephalopathy (POA: Yes)    Sepsis (HCC) (POA: Unknown)      FOLLOW UP  Future Appointments   Date Time Provider Department Center   9/27/2024 11:00 AM [facility] [address]     No follow-up provider specified.    MEDICATIONS ON DISCHARGE     Medication List        START taking these medications        Instructions   amLODIPine 10 MG Tabs  Start taking on: September 21, 2024  Commonly known as: Norvasc   Take 1 Tablet by mouth every day.  Dose: 10 mg     aspirin 81 MG Chew chewable tablet  Commonly known as: Asa   Chew 1 Tablet every day.  Dose: 81 mg     hydrALAZINE 25 MG Tabs  Commonly known as: Apresoline   Take 3 Tablets by mouth every 8 hours.  Dose: 75 mg     melatonin 5 mg Tabs   Take 1 Tablet by mouth at bedtime as needed (insomnia).  Dose: 5 mg     * QUEtiapine 50 MG tablet  Commonly known as: SEROquel   Take 1 Tablet by mouth every evening.  Dose: 50 mg     * QUEtiapine  25 MG Tabs  Commonly known as: SEROquel   Take 1 Tablet by mouth 3 times a day as needed (mild agitation).  Dose: 25 mg           * This list has 2 medication(s) that are the same as other medications prescribed for you. Read the directions carefully, and ask your doctor or other care provider to review them with you.                CHANGE how you take these medications        Instructions   atorvastatin 80 MG tablet  What changed:   medication strength  how much to take  when to take this  Commonly known as: Lipitor   Take 1 Tablet by mouth every evening.  Dose: 80 mg     lisinopril 40 MG tablet  Start taking on: September 21, 2024  What changed:   medication strength  how much to take  Commonly known as: Prinivil   Take 1 Tablet by mouth every day.  Dose: 40 mg            CONTINUE taking these medications        Instructions   metoprolol SR 25 MG Tb24  Start taking on: September 21, 2024  Commonly known as: Toprol XL   Take 1 Tablet by mouth every day.  Dose: 25 mg              Allergies  No Known Allergies    DIET  Orders Placed This Encounter   Procedures    Diet Order Diet: Level 6 - Soft and Bite Sized (Set pt up w/ meal- he can then self-feed independently. Pills whole.); Liquid level: Level 0 - Thin; Tray Modifications (optional): SLP - Deliver to Nursing Station     Standing Status:   Standing     Number of Occurrences:   1     Order Specific Question:   Diet:     Answer:   Level 6 - Soft and Bite Sized [23]     Comments:   Set pt up w/ meal- he can then self-feed independently. Pills whole.     Order Specific Question:   Liquid level     Answer:   Level 0 - Thin     Order Specific Question:   Tray Modifications (optional)     Answer:   SLP - Deliver to Nursing Station       ACTIVITY  As tolerated.  Weight bearing as tolerated    LINES, DRAINS, AND WOUNDS  This is an automated list. Peripheral IVs will be removed prior to discharge.  Midline IV 09/19/24 Anterior;Right Upper arm (Active)   Site Assessment  Clean;Dry;Intact 09/20/24 0800   Dressing Type Biopatch;Securing device;Skin barrier;Transparent 09/20/24 0800   Line Status Scrubbed the hub prior to access;Blood return noted;Flushed;Saline locked 09/20/24 0800   Dressing Status Clean;Dry;Intact 09/20/24 0800   Dressing Intervention N/A 09/20/24 0800   Dressing Change Due 09/21/24 09/20/24 0800   Infiltration Grading ([facility], [facility]) 0 09/19/24 2100   Phlebitis Scale ([facility] Only) 0 09/19/24 2100       Wound 09/14/24 Incision Foot Left xeroform, 4x4, soft roll, ace wrap (Active)   Site Assessment MARQUES 09/20/24 0800   Periwound Assessment Clean;Dry;Intact 09/20/24 0800   Margins MARQUES 09/20/24 0800   Closure Adhesive bandage 09/20/24 0800   Drainage Amount None 09/20/24 0800   Treatments Offloading 09/20/24 0800   Wound Cleansing Not Applicable 09/15/24 2000   Periwound Protectant Not Applicable 09/15/24 2000   Dressing Status Dry;Clean;Intact 09/20/24 0800   Dressing Changed Observed 09/20/24 0800   Dressing Options Transparent Film 09/20/24 0800                  MENTAL STATUS ON TRANSFER             CONSULTATIONS  ID  Neurology  Orthopedic surgery    PROCEDURES  Left ankle I&D for septic arthritis    LABORATORY  Lab Results   Component Value Date    SODIUM 142 09/20/2024    POTASSIUM 4.1 09/20/2024    CHLORIDE 110 09/20/2024    CO2 21 09/20/2024    GLUCOSE 151 (H) 09/20/2024    BUN 24 (H) 09/20/2024    CREATININE 0.93 09/20/2024        Lab Results   Component Value Date    WBC 11.1 (H) 09/18/2024    HEMOGLOBIN 15.9 09/18/2024    HEMATOCRIT 45.7 09/18/2024    PLATELETCT 293 09/18/2024        Total time of the discharge process exceeds 33 minutes.

## 2024-09-20 NOTE — PREADMISSION SCREENING NOTE
Pre-Admission Screening Form    Patient Information:   Name: Joe Templeton     MRN: [id number]       : 1950      Age: 74 y.o.   Gender: male      Race: White [7]       Marital Status: Single [1]  Family Contact: Belinda Templeton & Ivonne  JarethChaunceyantAnsley        Relationship: Brother [1]    Other [10]  Home Phone:   [phone]             Cell Phone: [phone]  Advanced Directives: None  Code Status:  FULL  Current Attending Provider: JEFF Humphrey*  Referring Physician: Dr. Gautam  Physiatrist Consult: Dr. Simpson   Referral Date: 24  Primary Payor Source:  MEDICARE  Secondary Payor Source:  MEDICAID FFS    Medical Information:   Date of Admission to Acute Care [place]20[place]  Room Number: S193/02  Rehabilitation Diagnosis: 0001.4 - Stroke: No Paresis  Immunization History   Administered Date(s) Administered    Influenza Vaccine Quad Inj (Preserved) 10/18/2016    PFIZER PURPLE CAP SARS-COV-2 VACCINATION (12+) 2021, 2021    Pneumococcal Conjugate Vaccine (Prevnar/PCV-13) 10/18/2016    Tdap Vaccine 2017     No Known Allergies  Past Medical History:   Diagnosis Date    Arthritis     Gout     Hemorrhoids 2014    Reportedly Pt has had hemorrhoids for some time. He denies any bloody stools or bleeding hemorrhoids.     Hypertension     Ingrown right big toenail 2015    Low back pain 2015    NSTEMI (non-ST elevated myocardial infarction) (HCC) 2016    Vitamin D deficiency 2015 lab result show slightly low Vitamin D level= 26. He states he walks outside a lot in the sun until the past month.     Past Surgical History:   Procedure Laterality Date    IRRIGATION & DEBRIDEMENT GENERAL Left 2024    Procedure: IRRIGATION AND DEBRIDEMENT, ANKLE;  Surgeon: Alex Gautam M.D.;  Location: SURGERY [facility];  Service: Orthopedics    MULTIPLE CORONARY ARTERY BYPASS ENDO VEIN HARVEST N/A 2016    Procedure:  MULTIPLE CORONARY ARTERY BYPASS ENDO VEIN HARVEST- With DELMI ;  Surgeon: Deejay Duong M.D.;  Location: SURGERY [facility];  Service:        History Leading to Admission, Conditions that Caused the Need for Rehab (CMS):     Dr. Gautam H&P:  Chief Complaint  Patient presents with   Weakness      Brought in by EMS from home for unilateral LE weakness x3 days.     History of Presenting Illness  Joe Templeton is a 74 y.o. male who presented 9/13/2024 with past medical history of coronary disease status post CABG, gout who presents to the hospital for left-sided weakness, dysarthria, fevers and left ankle swelling.  Apparently the patient's symptoms started 3 days ago and his family called 911.  The patient's been refusing to come to the hospital.  Overall patient was confused on my examination and unable to answer my questions.  He did not remember that he has family in [place].  He denies any cough, shortness of breath, dysuria, diarrhea, and vomiting.     Chest x-ray interpreted by me found no acute pulmonary process  EKG interpreted by me found normal sinus rhythm, LVH     CTA of the head and neck found mild to moderate atherosclerotic plaquing of the mid to distal common carotid arteries especially the common carotid artery bifurcation.  Moderate atherosclerotic plaquing of V4 segment bilaterally.     Assessment/Plan:  Justification for Admission Status  I anticipate this patient will require at least two midnights for appropriate medical management, necessitating inpatient admission because dysarthria     Patient will need a Telemetry bed on NEUROLOGY service .  The need is secondary to dysarthria.     * Left ankle swelling- (present on admission)  Assessment & Plan  Arthrocentesis will be completed in ER and will follow-up on cell count and cultures  Pain control  ESR and CRP are pending  Start IV Zosyn and vancomycin, follow trough levels and adjust levels accordingly     Systolic heart failure  ([facility])  Assessment & Plan  Repeat cardiac echo is pending  Monitor volume status  Monitor on telemetry  Last EF was 40 to 55%     Sepsis ([facility])  Assessment & Plan  This is Sepsis Present on admission  SIRS criteria identified on my evaluation include: Fever, with temperature greater than 100.9 deg F, Tachycardia, with heart rate greater than 90 BPM, and Leukocytosis, with WBC greater than 12,000  Clinical indicators of end organ dysfunction include Lactic Acid greater than 2  Source is left ankle septic arthritis  Sepsis protocol initiated  Crystalloid Fluid Administration: Fluid resuscitation ordered per standard protocol - 30 mL/kg per current or ideal body weight  IV antibiotics as appropriate for source of sepsis  Reassessment: I have reassessed the patient's hemodynamic status     Acute encephalopathy  Assessment & Plan  Secondary to infectious etiology     Murmur  Assessment & Plan  Cardiac echo is pending to rule out endocarditis  Follow-up blood cultures     Left-sided weakness  Assessment & Plan  Highly concerned that the patient may have endocarditis since he has a new murmur, encephalopathy and left-sided weakness.  Patient is past the tPA window  Patient will be placed on continuous cardiac monitoring to evaluate for any arrhythmias  Q4 hours neuro checks  I will order MRI brain  Check 2-D echo  Patient will be started on full dose aspirin and high intensity statin  Check TSH, lipid panel and hemoglobin A1c  PTOT and ST evaluation     Hx of CABG  Assessment & Plan  Continue aspirin and statins     Essential (primary) hypertension- (present on admission)  Assessment & Plan  Uncontrolled  Continue current home medications  IV as needed medications have been ordered     VTE prophylaxis: SCDs/TEDs    KHUSHI Sadler (Neurology) recommendations:  Assessment and Plan:     Joe Templeton is a 74 y.o. male presented 9/13 with 3 days of LLE weakness, L ankle swelling and dysarthria. Found to have L ankle septic  "arthritis s/p I&D. CT stroke protocol on admission with diffuse atheroscleroses, otherwise grossly unremarkable. MRI brain with multifocal embolic appearing infarcts in multiple vascular territories, no hemorrhage. Significant microvascular ischemia and infarcts of varying chronicity. Given multiple negative blood cx and only mildly elevated ESR, overall suspicion for infective endocarditis and septic emboli is low. TTE is pending. Patient was started on ASA on admission, ok with continuing until TTE completed given overall low suspicion for septic emboli.         Problem list:  - Multifocal infarcts   - L ankle septic arthritis   - HTN     Plan:  - Neurochecks Q4H  - Target normotension 100-130/60-80  - MRI brain wo-  punctate infarcts to R insula, L parietal lobe and R parietal lobe. Multiple old lacunar and cerebellar infarcts, advanced microvascular ischemia   - Continue ASA 81 mg daily indefinitely   - Does require full cardioembolic workup, TTE pending  - LDL 99, goal <70, ok with continuing Atorvastatin 80 mg daily  - A1C 6.4, goal <7  - Target normoglycemia  while admitted  - PT/OT/SLP  - DVT prophylaxis: Chem ppx ok with Lovenox   - Follow-up with stroke clinic outpatient    Dr. Bae (Infectious Disease) recommendations:  ASSESSMENT/PLAN:     74 y.o.  admitted 9/13/2024. Pt has a past medical history of CAD status post CABG and gout who presented complaining of lower extremity weakness with left ankle swelling and pain. Ephalopathic on arrival and imaging of the head with MRI brain with old and acute infarcts.  Including acute infarction left posterior parietal lobe. Subcentimeter focus of acute or recent subacute infarction right parietal periventricular white matter/splenium corpus callosum. Acute brainstem infarct in the left side of the hao.cute brainstem infarct and left-sided hao and multiple vascular territories involvement suggesting a possible embolic mechanism.\"  Low-grade temperature " and leukocytosis on arrival.  This is now improved.  He went to the OR with orthopedic surgery on 9/14 as below.  Patient has been on vancomycin.     Problem List     Left ankle septic arthritis   -Left ankle synovial fluid studies on 9/13 consistent with septic arthritis, 85,630 white cells, 93% polys, fluid specimen was not sent for PCR panel. Cultures are no growth.   - OR on 9/14 for left ankle arthrotomy with I&D.  Per op note once the ankle capsule was entered there was immediate outflow of purulent cloudy fluid.  This was swabbed and sent for culture.    CVA, multifocal, acute and old, concern for septic emboli   - plan is for potential discharge to acute rehab     Assessment:       -Notes were extensively reviewed from primary team, radiology, ED, surgery, specialists, etc.   -Reviewed labs to date, microbiology for current admit and prior  -Imaging was independently reviewed and interpreted     Plan:     --- Continue vancomycin for now  --- Unfortunately, no PCR joint panel was ordered for the synovial fluid.  Micro lab has discarded the fluid so will be unable to run at this point.   --- Continue follow-up OR cultures, these will be held for 14 days, will send out [facility] for PCR testing  --- TTE pending  --- F/up blood cultures - NGTD   --- Left ankle synovial fluid cultures no growth and final  --- Monitor labs         Dispo: Awaiting culture results and work-up as above.  Patient is at risk for infectious complications including death.     PICC: TBD    Dr. Simpson (Physiatry) recommendations:  ASSESSMENT:  Patient is a 74 y.o. male admitted with LLE weakness      [facility] Code / Diagnosis to Support: 0001.4 - Stroke: No Paresis  See DISPO details below for recommendations on appropriate level of rehab for this diagnosis.     Barriers to transfer include: Insurance authorization, TCCs to verify disposition, medical clearance and bed availability      Assessment and Plan:  Left pontine stroke    Multifocal infarcts   - admitted with LLE weakness ( weakness may be due to septic arthritis rather than stroke) and AMS   - CTA head negative for LVO   - CTA neck showed bilateral ICA stenosis of less than 50%   - MRI brain showed acute brain stem infarct in the left hao and multifocal infarcts, old lacunar infarct in b/l thalamus   - neuro consulted   - on ASA and statin for secondary stroke ppx   - Echo completed  - continue with PT/OT/SLP      Agitation   - Continue seroquel 50mg QHS   - PRN seroquel 25mg TID for mild agitation  - Geodon 10mg IM for aggressive agitation  - Avoid haldol and benzos      Left ankle septic arthritis   - admitted with LLE weakness and ankle pain   - left ankle cxr negative for acute fracture   - ortho consulted   - 9/14 left rose arthrotomy I and D for septic arthritis by Dr. Gautam   - on Vancomycin, stop date 9/21   - WBAT LLE      Hx of CABG   - on  ASA and statin   - prior echo from 2016 showed EF of 40-50 %   - updated echo pending      Uncontrolled HTN   - target BP <130/80, Seems to spike in the evenings... 200/95, 196/103, 166/91  - Metoprolol 25mg daily   - Lisinopril 40mg daily   - Amlodipine 10mg daily   - PRN hydralyzine      DISPO:  - patient is currently functioning below their level of baseline, recommend post acute rehab  - anticipated to be appropriate for IRF level therapy with 3hr of therapy 5 days per week   - TCC to assist with insurance auth and DC support from brother      Medical Complexity:  Left pontine stroke   Multifocal infarcts   Left ankle septic arthritis   Hx of CABG   HTN   Impaired mobility and ADLs      DVT PPX: SCDs     DATE OF OPERATION: 9/14/2024     PREOPERATIVE DIAGNOSIS: Left ankle septic arthritis     POSTOPERATIVE DIAGNOSIS: Same     PROCEDURE PERFORMED: Left ankle arthrotomy incision drainage     SURGEON: Alex Gautam M.D.      ASSISTANT: None     ANESTHESIA: General     SPECIMEN: None     ESTIMATED BLOOD LOSS: 5 mL     IMPLANTS:  None     INDICATIONS: The patient is a 74 y.o. male who presented with above.  I discussed the risks and benefits of the procedure which include but are not limited to risks of infection, wound healing complication, neurovascular injury, pain, malunion, non-union, malrotation, and the medical risks of anesthesia including MI, stroke, and death.  Alternatives to surgery were also discussed, including non-operative management, which I did not recommend.  The patient was in agreement with the plan to proceed, and the informed consent was signed and documented.  I met with the patient pre-operatively and marked the operative extremity with their agreement.  We proceeded to the operating room.      9/15/2024 8:39 AM     HISTORY/REASON FOR EXAM:  Memory loss for one year.  Left-sided weakness, dysarthria. Clinical suspicion of endocarditis. Encephalopathy.     TECHNIQUE/EXAM DESCRIPTION:  MRI of the brain without contrast.     T1 sagittal, T2 fast spin-echo axial, T1 coronal, FLAIR axial with propeller motion correction technique, Diffusion Weighted and Apparent Diffusion Coefficient (ADC map) axial images were obtained of the whole brain.     The study was performed on a [employer] Sign[employer] 1.5 Christie MRI scanner.     COMPARISON:  Head CT and CTA of the head 9/13/2024     FINDINGS:  The calvaria are unremarkable. Incidentally noted small lipoma over the right paramedian forehead.     There are no extra-axial fluid collections.     There is a pattern of moderate cerebral atrophy manifest as enlargement of sulcal markings over the convexities and vertex along with ventriculomegaly.     There is a pattern of advanced supratentorial white matter disease with extensive patchy, confluent, and focal areas of bright T2 and FLAIR signal in the subcortical and deep white matter of both hemispheres consistent with small vessel ischemic change   versus demyelination or gliosis.     Infarct left frontal deep white matter along the corona  radiata.     The gradient echo axial images show punctate foci of old microhemorrhage at the left posterior sylvian frontal lobe and left occipital lobe (gradient echo axial images 14, 12, series 6). These are most consistent with old hypertensive microhemorrhage   versus amyloid angiopathy.     There is localized gyral encephalomalacia involving the left parietal lobe at the postcentral gyrus consistent with old cerebral infarction (T2 axial image 32, series 5, FLAIR coronal image 8, series 8, FLAIR axial image 25, series 7).     There are old lacunar infarcts in the thalami x2 bilaterally (T2 axial images 19, 20, series 5).     The diffusion weighted axial images show a punctate focus of acute infarction involving the right insula (diffusion-weighted axial image 17, series 4). There is also a subcentimeter focus of acute infarction in the left far posterior frontal lobe   (diffusion-weighted axial image 21, series 4).     There is a subcentimeter focus of mildly bright diffusion signal at the posterior medial periventricular white matter at the posterior body right lateral ventricle/splinting corpus callosum which may represent an acute or recent subacute infarct   (diffusion-weighted axial image 18, series 4).     The brainstem and posterior fossa structures show bright diffusion signal in the left side of the hao consistent with acute brainstem infarction (T2 axial image 13, series 5, diffusion weighted axial images 10, 11, series 4). No hemorrhagic   transformation.     There are several linear and focal areas of encephalomalacia in the left cerebellar hemisphere consistent with old infarcts.     There is a moderate-sized area of encephalomalacia in the right cerebellar hemisphere with old infarction.     The major vessels including the dural venous sinuses appear intact.     Paranasal sinuses show complete opacification of the frontal sinus with T2 hypointensity consistent with chronic sinusitis.      IMPRESSION:     1.  Moderate cerebral atrophy.  2.  Advanced supratentorial white matter disease most consistent with microvascular ischemic change.  3.  Old microhemorrhage left posterior sylvian frontal lobe and left occipital lobe most consistent with old hypertensive microhemorrhage versus amyloid angiopathy.  4.  Old lacunar infarcts x2 in the right thalamus old lacunar infarcts x2 in the left thalamus.  5.  Old cortical infarct left parietal lobe at the postcentral gyrus.  6.  Subcentimeter focus of acute infarction right insula.  7.  Subcentimeter focus acute infarction left posterior parietal lobe.  8.  Subcentimeter focus of acute or recent subacute infarction right parietal periventricular white matter/splenium corpus callosum.  9.  Acute brainstem infarct in the left side of the hao.  10.  Multiple old lacunar size infarcts left cerebellar hemisphere.  11.  Moderate-sized old infarct right cerebellar hemisphere.  12.  No acute infarcts show hemorrhagic transformation.  13.  Multiple vascular territories are involved. Possible embolic mechanism.    Co-morbidities:  See PMH  Potential Risk - Complications: Aphasia, Cognitive Impairment, Contractures, Deep Vein Thrombosis, Dysphagia, Incontinence, Malnutrition, Pain, Perceptual Impairment, Pneumonia, Pressure Ulcer, Seizures, and Urinary Tract Infection  Level of Risk: High    Ongoing Medical Management Needed (Medical/Nursing Needs):   Patient Active Problem List    Diagnosis Date Noted    Stroke (HCC) 09/16/2024    Septic arthritis of left ankle (HCC) 09/13/2024    Hx of CABG 09/13/2024    Left-sided weakness 09/13/2024    Murmur 09/13/2024    Acute encephalopathy 09/13/2024    Sepsis (HCC) 09/13/2024    Systolic heart failure (HCC) 09/13/2024    Benign prostatic hyperplasia (BPH) with straining on urination 01/08/2024    Hyperlipidemia 07/18/2016    3-vessel CAD 06/28/2016    Right knee pain 01/26/2015    Essential (primary) hypertension 11/25/2014     "Gout 2014     Alert with cognitive impairment.    Current Vital Signs:   Temperature: 36 °C (96.8 °F) Pulse: 79 Respiration: 18 Blood Pressure : (!) 132/93  Weight: 77.9 kg (171 lb 11.8 oz) Height: 175.3 cm (5' 9\")  Pulse Oximetry: 94 % O2 (LPM): 0      Completed Laboratory Reports:  Recent Labs     24  0836 24  0334 24  0450   WBC 11.1*  --   --    HEMOGLOBIN 15.9  --   --    HEMATOCRIT 45.7  --   --    PLATELETCT 293  --   --    SODIUM 141 143 142   POTASSIUM 3.8 3.7 4.1   BUN 17 23* 24*   CREATININE 0.72 0.96 0.93   GLUCOSE 144* 154* 151*     Additional Labs: Not Applicable    Prior Living Situation:   Housing / Facility: 1 Story House  Steps Into Home: 0  Steps In Home: 0  Lives with - Patient's Self Care Capacity: Alone and Unable to Care For Self  Equipment Owned: 4-Wheel Walker    Prior Level of Function / Living Situation:   Physical Therapy: Prior Services: Unable To Determine At This Time  Housing / Facility: 1 Story House  Steps Into Home: 0  Steps In Home: 0  Equipment Owned: 4-Wheel Walker  Lives with - Patient's Self Care Capacity: Alone and Unable to Care For Self  Bed Mobility: Independent  Transfer Status: Independent  Ambulation: Independent  Assistive Devices Used: None  Stairs: Independent  Current Level of Function:   Gait Level Of Assist: Contact Guard Assist  Assistive Device: Front Wheel Walker  Distance (Feet): 75  Deviation: Antalgic  Weight Bearing Status: WBAT Lft LE  Skilled Intervention: Verbal Cuing, Postural Facilitation, Compensatory Strategies  Supine to Sit: Contact Guard Assist  Sit to Supine: Contact Guard Assist  Scooting: Supervised  Rolling: Supervised  Comments: HOB flat and no railing  Sit to Stand: Contact Guard Assist  Bed, Chair, Wheelchair Transfer: Contact Guard Assist  Toilet Transfers: Minimal Assist  Transfer Method: Stand Step  Skilled Intervention: Verbal Cuing  Sitting in Chair: left seated in chair  Sitting Edge of Bed: 6 min  Standin " min  Occupational Therapy:   Self Feeding: Independent  Grooming / Hygiene: Independent  Bathing: Independent  Dressing: Independent  Toileting: Independent  Medication Management: Independent  Laundry: Independent  Kitchen Mobility: Independent  Finances: Independent  Home Management: Independent  Shopping: Independent  Prior Level Of Mobility: Independent With Device in Community  Driving / Transportation: Driving Independent  Prior Services: Unable To Determine At This Time  Housing / Facility: [address] [place] [address]  Current Level of Function:   Eating: Independent  Upper Body Dressing: Supervision  Lower Body Dressing: Moderate Assist  Toileting: Minimal Assist (support in standing when urinating)  Skilled Intervention: Verbal Cuing, Facilitation  Speech Language Pathology:      Rehabilitation Prognosis/Potential: Good  Estimated Length of Stay: 10-12 days    Nursing:      Incontinent    Scope/Intensity of Services Recommended:  Physical Therapy: 1 hr / day  5 days / week. Therapeutic Interventions Required: Maximize Endurance, Mobility, Strength, and Safety  Occupational Therapy: 1 hr / day 5 days / week. Therapeutic Interventions Required: Maximize Self Care, ADLs, IADLs, and Energy Conservation  Speech & Language Pathology: 1 hr / day 5 days / week. Therapeutic Interventions Required: Maximize Cognition, Swallowing, and Safety  Rehabilitation Nursin/7. Therapeutic Interventions Required: Monitor Pain, Skin, Wound(s), Vital Signs, Intake and Output, Labs, Safety, Aspiration Risk, and Family Training  Rehabilitation Physician: 3 - 5 days / week. Therapeutic Interventions Required: Medical Management    He requires 24-hour rehabilitation nursing to manage bowel and bladder function, skin care, surgical incision, nutrition and fluid intake, pain control, safety, medication management, and patient/family goals. In addition, rehabilitation nursing will reiterate and reinforce therapy skills and equipment use,  including ADLs, as well as provide education to the patient and family. Joe Templeton is willing to participate in and is able to tolerate the proposed plan of care.    Rehabilitation Goals and Plan (Expected frequency & duration of treatment in the IRF):   Return to the Community, Moderate Assist Level of Care, and Family Able to Provide 24/7 Assistance  Anticipated Date of Rehabilitation Admission: 09-20-24  Patient/Family oriented IRF level of care/facility/plan: Yes  Patient/Family willing to participate in IRF care/facility/plan: Yes  Patient able to tolerate IRF level of care proposed: Yes  Patient has potential to benefit IRF level of care proposed: Yes  Comments: Not Applicable    Special Needs or Precautions - Medical Necessity:  Safety Concerns/Precautions:  Fall Risk / High Risk for Falls, Balance, Cognition, Bed / Chair Alarm, and Safety Enclosure Bed  Complex Wound Care: Surgical  Precautions: (P) Fall Risk, Swallow Precautions, Weight Bearing As Tolerated Left Lower Extremity   Pain Management  IV Site:  Midline  Current Medications:    Current Facility-Administered Medications Ordered in Epic   Medication Dose Route Frequency Provider Last Rate Last Admin    hydrALAZINE (Apresoline) tablet 75 mg  75 mg Oral Q8HRS Herbert Anderson M.D.        vancomycin (Vancocin) 1,250 mg in  mL IVPB  1,250 mg Intravenous Q12HR Herbert Anderson M.D.   Stopped at 09/20/24 0701    QUEtiapine (SEROquel) tablet 50 mg  50 mg Oral Nightly Ralph Worchel, D.O.   50 mg at 09/19/24 2050    QUEtiapine (SEROquel) tablet 25 mg  25 mg Oral TID PRN Ralph Worchel, D.O.   25 mg at 09/19/24 1755    ziprasidone (Geodon) injection 10 mg  10 mg Intramuscular Q2HRS PRN Ralph Hatfieldchel, D.OMckenzie        labetalol (Normodyne/Trandate) injection 10 mg  10 mg Intravenous Q6HRS PRN Herbert Anderson M.D.        hydrALAZINE (Apresoline) injection 10 mg  10 mg Intravenous Q6HRS PRN Herbert Anderson M.D.   10 mg at 09/18/24  0815    amLODIPine (Norvasc) tablet 10 mg  10 mg Oral Q DAY Herbert Anderson M.D.   10 mg at 09/20/24 0454    lisinopril (Prinivil) tablet 40 mg  40 mg Oral DAILY Deejay Rausch M.D.   40 mg at 09/20/24 0454    enoxaparin (Lovenox) inj 40 mg  40 mg Subcutaneous DAILY AT 1800 Ravinder Garces DCARLOS   40 mg at 09/19/24 1755    atorvastatin (Lipitor) tablet 80 mg  80 mg Oral Q EVENING Deejay Rausch M.D.   80 mg at 09/19/24 1755    melatonin tablet 5 mg  5 mg Oral HS PRN ED WorkmanPMckenzieRMckenzieNMckenzie   5 mg at 09/19/24 2051    acetaminophen (Tylenol) tablet 650 mg  650 mg Oral Q6HRS PRN Ced Gautam M.D.   650 mg at 09/16/24 1705    ondansetron (Zofran) syringe/vial injection 4 mg  4 mg Intravenous Q4HRS PRN Ced Gautam M.D.        ondansetron (Zofran ODT) dispertab 4 mg  4 mg Oral Q4HRS PRN Ced Gautam M.D.        aspirin (Asa) chewable tab 81 mg  81 mg Oral DAILY Ced Gautam M.D.   81 mg at 09/20/24 0454    Or    aspirin (Asa) suppository 300 mg  300 mg Rectal DAILY Ced Gautam M.D.        metoprolol SR (Toprol XL) tablet 25 mg  25 mg Oral DAILY Ced Gautam M.D.   25 mg at 09/20/24 0455    MD Alert...Vancomycin per Pharmacy   Other PHARMACY TO DOSE Joyce Bae M.D.         No current [facility]-ordered outpatient medications on file.     Diet:   DIET ORDERS (From admission to next 24h)       Start     Ordered    09/16/24 1006  Diet Order Diet: Level 6 - Soft and Bite Sized (Set pt up w/ meal- he can then self-feed independently. Pills whole.); Liquid level: Level 0 - Thin; Tray Modifications (optional): SLP - Deliver to Nursing Station  ALL MEALS        Question Answer Comment   Diet: Level 6 - Soft and Bite Sized Set pt up w/ meal- he can then self-feed independently. Pills whole.   Liquid level Level 0 - Thin    Tray Modifications (optional) SLP - Deliver to Nursing Station        09/16/24 1006                    Anticipated Discharge Destination / Patient/Family Goal:  Destination: Home with  Assistance Support System: Family   Anticipated home health services: OT and PT  Previously used HH service/ provider: Not Applicable  Anticipated DME Needs: [facility] [employer]  Outpatient Services: OT and PT  Alternative resources to address additional identified needs:   Future Appointments   Date Time Provider Department Center   9/27/2024 11:00 AM ROCIO Carpenter [facility]      Pre-Screen Completed: 9/20/2024 10:48 AM Dionisio Contreras L.P.N.

## 2024-09-20 NOTE — H&P
"  Physical Medicine & Rehabilitation  History and Physical (H&P)  &     Post Admission Physician Evaluation (AIDE)       Date of Admission: 9/20/2024  Date of Service: 9/20/2024   Joe Templeton  [id number]    [facility] Code to Support Admission: 0001.4 - Stroke: No Paresis  Etiologic Diagnosis: H/O: stroke    _______________________________________________    Chief Complaint: Confusion    History of Present Illness:  Adapted from the PM&R Consult by Dr. Gutierrez:   \"HPI: The patient is a 74 y.o. male with a past medical history of CAD s/p CABG and gout; who presented on 9/13/2024 5:10 PM with LE weakness and ankle swelling. Per documentation, patient had worsening symptoms of LLE and ankle swelling x 3 days. Upon eval in the ED, patient was also confused. CTA head and neck obtained for weakness and confusion showed no LVO on CTA head and mid to moderate atherosclerotic plaquing of the common carotid arteries and bilateral ICA stenosis less than 50%. Ankle xray was negative for acute fracture. 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 Echo pending, prior echo from 2016 showed EF of 40-50%. Neuro consulted, patient is now on ASA and statin. In regards to patient's ankle, patient had an arthrocentesis done in the ED, ortho was consulted, and patient was taken the OR on 9/14 for left ankle I and D for left ankle septic arthritis performed by Dr. Gautam. Patient is WBAT LLE. Patient remains on vancomycin, stop date is 9/21. Patient has been able to mobilize CGA level.\"    On admission patient reports knee pain and ankle pain. Doesn't want to straighten out his leg because his knee hurts. Doesn't think he has had a bowel movement lately. He is disoriented. Doesn't think there are years. Didn't remember he had surgery on his ankle. Pleasantly confused, however.     Review of Systems:     14 point ROS reviewed " and negative except as stated above.      Past Medical History:  Past Medical History:   Diagnosis Date    Arthritis     Gout     Hemorrhoids 11/25/2014    Reportedly Pt has had hemorrhoids for some time. He denies any bloody stools or bleeding hemorrhoids.     Hypertension     Ingrown right big toenail 05/20/2015    Low back pain 05/20/2015    NSTEMI (non-ST elevated myocardial infarction) (HCC) 06/28/2016    Vitamin D deficiency 01/06/2015 12/1/14 lab result show slightly low Vitamin D level= 26. He states he walks outside a lot in the sun until the past month.       Past Surgical History:  Past Surgical History:   Procedure Laterality Date    IRRIGATION & DEBRIDEMENT GENERAL Left 9/14/2024    Procedure: IRRIGATION AND DEBRIDEMENT, ANKLE;  Surgeon: Alex Gautam M.D.;  Location: SURGERY [facility];  Service: Orthopedics    MULTIPLE CORONARY ARTERY BYPASS ENDO VEIN HARVEST N/A 6/28/2016    Procedure: MULTIPLE CORONARY ARTERY BYPASS ENDO VEIN HARVEST- With DELMI ;  Surgeon: Deejay Duong M.D.;  Location: SURGERY [facility];  Service:        Family History:  Family History   Problem Relation Age of Onset    Cancer Mother     Heart Disease Father     Hyperlipidemia Father     Hyperlipidemia Brother     Lung Disease Brother        Medications:  Current Facility-Administered Medications   Medication Dose    Pharmacy Consult Request ...Pain Management Review 1 Each  1 Each    [START ON 9/21/2024] lidocaine (Asperflex) 4 % patch 1-2 Patch  1-2 Patch    hydrALAZINE (Apresoline) tablet 25 mg  25 mg    senna-docusate (Pericolace Or Senokot S) 8.6-50 MG per tablet 2 Tablet  2 Tablet    And    polyethylene glycol/lytes (Miralax) Packet 1 Packet  1 Packet    lactulose 20 GM/30ML solution 30 mL  30 mL    docusate sodium (Enemeez) enema 283 mg  283 mg    bisacodyl EC (Dulcolax) tablet 5 mg  5 mg    magnesium hydroxide (Milk Of Magnesia) suspension 30 mL  30 mL    sodium phosphate enema 1 Enema  1 Enema    [START ON  9/21/2024] omeprazole (PriLOSEC) capsule 20 mg  20 mg    carboxymethylcellulose (Refresh Tears) 0.5 % ophthalmic drops 1 Drop  1 Drop    benzocaine-menthol (Cepacol) lozenge 1 Lozenge  1 Lozenge    mag hydrox-al hydrox-simeth (Maalox Plus Es Or Mylanta Ds) suspension 20 mL  20 mL    ondansetron (Zofran ODT) dispertab 4 mg  4 mg    Or    ondansetron (Zofran) syringe/vial injection 4 mg  4 mg    traZODone (Desyrel) tablet 50 mg  50 mg    sodium chloride (Ocean) 0.65 % nasal spray 2 Spray  2 Spray    [START ON 9/21/2024] amLODIPine (Norvasc) tablet 10 mg  10 mg    enoxaparin (Lovenox) inj 40 mg  40 mg    hydrALAZINE (Apresoline) tablet 75 mg  75 mg    [START ON 9/21/2024] lisinopril (Prinivil) tablet 40 mg  40 mg    MD Alert...Vancomycin per Pharmacy      [START ON 9/21/2024] metoprolol SR (Toprol XL) tablet 25 mg  25 mg    QUEtiapine (SEROquel) tablet 100 mg  100 mg    vancomycin (Vancocin) 1,250 mg in  mL IVPB  1,250 mg    acetaminophen (Tylenol) tablet 650 mg  650 mg    ziprasidone (Geodon) injection 10 mg  10 mg    [START ON 9/21/2024] aspirin (Asa) chewable tab 81 mg  81 mg    atorvastatin (Lipitor) tablet 80 mg  80 mg       Allergies:  Patient has no known allergies.    Psychosocial History:  Housing / Facility: 1 Story House  Steps Into Home: 0  Steps In Home: 0  Lives with - Patient's Self Care Capacity: Alone and Unable to Care For Self  Equipment Owned: 4-Wheel Walker    Level of Function Prior to Disability:  Housing / Facility: 1 Story House  Steps Into Home: 0  Steps In Home: 0  Equipment Owned: 4-Wheel Walker  Lives with - Patient's Self Care Capacity: Alone and Unable to Care For Self  Bed Mobility: Independent  Transfer Status: Independent  Ambulation: Independent  Assistive Devices Used: None  Stairs: Independent    Self Feeding: Independent  Grooming / Hygiene: Independent  Bathing: Independent  Dressing: Independent  Toileting: Independent  Medication Management: Independent  Laundry:  Independent  Kitchen Mobility: Independent  Finances: Independent  Home Management: Independent  Shopping: Independent  Prior Level Of Mobility: Independent With Device in Community  Driving / Transportation: Driving Independent  Prior Services: Unable To Determine At This Time  Housing / Facility: 1 Story House    Level of Function Prior to Admission to [facility]:  Gait Level Of Assist: Contact Guard Assist  Assistive Device: Front Wheel Walker  Distance (Feet): 75  Deviation: Antalgic  Weight Bearing Status: WBAT Lft LE  Skilled Intervention: Verbal Cuing, Postural Facilitation, Compensatory Strategies  Supine to Sit: Contact Guard Assist  Sit to Supine: Contact Guard Assist  Scooting: Supervised  Rolling: Supervised  Comments: HOB flat and no railing  Sit to Stand: Contact Guard Assist  Bed, Chair, Wheelchair Transfer: Contact Guard Assist  Toilet Transfers: Minimal Assist  Transfer Method: Stand Step  Skilled Intervention: Verbal Cuing  Sitting in Chair: left seated in chair  Sitting Edge of Bed: 6 min  Standin min    Eating: Independent  Upper Body Dressing: Supervision  Lower Body Dressing: Moderate Assist  Toileting: Minimal Assist (support in standing when urinating)  Skilled Intervention: Verbal Cuing, Facilitation    CURRENT LEVEL OF FUNCTION:   Same as level of function prior to admission to [facility]    Physical Examination:     VITAL SIGNS:   temporal temperature is 36.1 °C (96.9 °F). His blood pressure is 151/74 (abnormal) and his pulse is 80. His respiration is 18 and oxygen saturation is 94%.   GENERAL: No apparent distress  HEENT: Normocephalic/atraumatic  CARDIAC: Regular rate and rhythm.  Cardiac murmur appreciated.  LUNGS: Clear to auscultation   ABDOMINAL: soft, nontender, and nondistended    EXTREMITIES: no spasticity or no edema  NEURO:  Mental status: Not oriented to self, year, what happened to him. Is oriented to month, place, city, state.    Speech: Aphasia +    Motor Exam Upper Extremities   ? Myotome R L   Shoulder Abduction C5 5 5   Elbow flexion C5 5 5   Wrist extension C6 5 5   Elbow extension C7 5 5   Finger flexion C8 4 4   Finger abduction T1 4 4     Motor Exam Lower Extremities  ? Myotome R L   Hip flexion L2 4 1   Knee extension L3 5 NT   Ankle dorsiflexion L4 5 MARQUES   Toe extension L5 5 5   Ankle plantarflexion S1 5 5   Not tested due to patient declining given knee pain    Negative Ro b/l   Tone: no spasticity noted    Radiology:  EC-ECHOCARDIOGRAM COMPLETE W/O CONT   Final Result       MR-BRAIN-W/O   Final Result       1.  Moderate cerebral atrophy.   2.  Advanced supratentorial white matter disease most consistent with microvascular ischemic change.   3.  Old microhemorrhage left posterior sylvian frontal lobe and left occipital lobe most consistent with old hypertensive microhemorrhage versus amyloid angiopathy.   4.  Old lacunar infarcts x2 in the right thalamus old lacunar infarcts x2 in the left thalamus.   5.  Old cortical infarct left parietal lobe at the postcentral gyrus.   6.  Subcentimeter focus of acute infarction right insula.   7.  Subcentimeter focus acute infarction left posterior parietal lobe.   8.  Subcentimeter focus of acute or recent subacute infarction right parietal periventricular white matter/splenium corpus callosum.   9.  Acute brainstem infarct in the left side of the hao.   10.  Multiple old lacunar size infarcts left cerebellar hemisphere.   11.  Moderate-sized old infarct right cerebellar hemisphere.   12.  No acute infarcts show hemorrhagic transformation.   13.  Multiple vascular territories are involved. Possible embolic mechanism.       CT-CTA NECK WITH & W/O-POST PROCESSING   Final Result       1.  Mild to moderate atherosclerotic plaquing of the mid and distal common carotid arteries and especially the common carotid artery bifurcations. Bilateral internal carotid stenoses of less than 50%.      "  2.  Patent vertebrobasilar system with moderate atherosclerotic plaquing of the V4 segments bilaterally       CT-CTA HEAD WITH & W/O-POST PROCESS   Final Result       CT angiogram of the [place] within normal limits.       CT-CEREBRAL PERFUSION ANALYSIS   Final Result       1. Cerebral blood flow less than 30% possibly representing completed infarct = 0 mL.       2. T Max more than 6 seconds possibly representing combination of completed infarct and ischemia = 0 mL.       3. Mismatched volume possibly representing ischemic brain/penumbra= 0 mL       4.  Please note that this cerebral perfusion study and report is Quantitative and targets supratentorial (cerebral) perfusion for evaluation of large vessel territory acute ischemia/infarction. For example, lacunar infarcts, and brainstem/posterior fossa    ischemia/infarction are not evaluated on this study.  Data acquisition is subject to artifacts which can yield non-anatomically plausible perfusion maps which may be due to motion, bolus timing, signal to noise ratio, or other technical factors.    Perfusion map abnormalities which show non-anatomic distributions are likely artifact.   This study is not \"stand-alone\" and should only be utilized for diagnosis, management/treatment in correlation with CT, CTA, and/or MRI and clinical factors.           DX-ANKLE 2- VIEWS LEFT   Final Result       1. Clinical soft tissue swelling.   2. No acute fracture or subluxation.   3. Polyarticular osteoarthritis.   4. Atherosclerosis.       DX-CHEST-PORTABLE (1 VIEW)   Final Result       No acute cardiac or pulmonary abnormalities are identified.       IR-MIDLINE CATHETER INSERTION WO GUIDANCE > AGE 3    (Results Pending)        Laboratory Values:  Recent Labs     09/18/24  0836 09/19/24  0334 09/20/24  0450   SODIUM 141 143 142   POTASSIUM 3.8 3.7 4.1   CHLORIDE 108 110 110   CO2 19* 19* 21   GLUCOSE 144* 154* 151*   BUN 17 23* 24*   CREATININE 0.72 0.96 0.93   CALCIUM " 9.0 9.2 9.0     Recent Labs     09/18/24  0836   WBC 11.1*   RBC 5.10   HEMOGLOBIN 15.9   HEMATOCRIT 45.7   MCV 89.6   MCH 31.2   MCHC 34.8   RDW 39.7   PLATELETCT 293   MPV 11.2           Primary Rehabilitation Diagnosis:    This patient is a 74 y.o. male admitted for acute inpatient rehabilitation with H/O: stroke.    Impairments:   Cognitive  ADLs/IADLs  Mobility  Speech  Swallow    Secondary Diagnosis/Medical Co-morbidities Affecting Function  Hypertension, stroke, agitation, impaired ADLs, impaired mobility, CAD, septic arthritis, ankle fracture     Relevant Changes Since Preadmission Evaluation:    Status unchanged    The patient's rehabilitation potential is Excellent  The patient's medical prognosis is excellent    Rehabilitation Plan:   Discussion and Recommendations:   1. The patient requires an acute inpatient rehabilitation program with a coordinated program of care at an intensity and frequency not available at a lower level of care. This recommendation is substantiated by the patient's medical physicians who recommend that the patient's intervention and assessment of medical issues needs to be done at an acute level of care for patient's safety and maximum outcome.   2. A coordinated program of care will be supplied by an interdisciplinary team of physical therapy, occupational therapy, rehab physician, rehab nursing, and, if needed, speech therapy and rehab psychology. Rehab team presents a patient-specific rehabilitation and education program concentrating on prevention of future problems related to accessibility, mobility, skin, bowel, bladder, sexuality, and psychosocial and medical/surgical problems.   3. Need for Rehabilitation Physician: The rehab physician will be evaluating the patient on a multi-weekly basis to help coordinate the program of care. The rehab physician communicates between medical physicians, therapists, and nurses to maximize the patient's potential outcome. Specific areas in  which the rehab physician will be providing daily assessment include the following:   A. Assessing the patient's heart rate and blood pressure response (vitals monitoring) to activity and making adjustments in medications or conservative measures as needed.   B. The rehab physician will be assessing the frequency at which the program can be increased to allow the patient to reach optimal functional outcome.   C. The rehab physician will also provide assessments in daily skin care, especially in light of patient's impairments in mobility.   D. The rehab physician will provide special expertise in understanding how to work with functional impairment and recommend appropriate interventions, compensatory techniques, and education that will facilitate the patient's outcome.   4. Rehab R.N.   The rehab RN will be working with patient to carry over in room mobility and activities of daily living when the patient is not in 3 hours of skilled therapy. Rehab nursing will be working in conjunction with rehab physician to address all the medical issues above and continue to assess laboratory work and discuss abnormalities with the treating physicians, assess vitals, and response to activity, and discuss and report abnormalities with the rehab physician. Rehab RN will also continue daily skin care, supervise bladder/bowel program, instruct in medication administration, and ensure patient safety.   5. Rehab Therapy: Therapies to treat at intensity and frequency of (may change after completion of evaluation by all therapeutic disciplines):       PT:  Physical therapy to address mobility, transfer, gait training and evaluation for adaptive equipment needs 1 hour/day at least 5 days/week for the duration of the ELOS (see below)       OT:  Occupational therapy to address ADLs, self-care, home management training, functional mobility/transfers and assistive device evaluation, and community re-integration 1  hour/day at least 5  days/week for the duration of the ELOS (see below).        ST/Dysphagia:  Speech therapy to address speech, language, and cognitive deficits as well as swallowing difficulties with retraining/dysphagia management and community re-integration with comprehension, expression, cognitive training 1  hour/day at least 5 days/week for the duration of the ELOS (see below).     Medical management / Rehabilitation Issues/ Adverse Potential as part of rehabilitation plan     Rehabilitation Issues/Adverse Potential  1.  CVA (Cerebrovascular Accident): Cont ASA + Statin for secondary prophylaxis as well as lipid and blood pressure management. Patient demonstrates functional deficits in strength, balance, coordination, and ADL's. Patient is admitted to [facility] for comprehensive rehabilitation therapy as described below.   Rehabilitation nursing monitors bowel and bladder control, educates on medication administration, co-morbidities and monitors patient safety.    2.  Neurostimulants: None at this time but continue to assess daily for need to initiate should status change.    3.  DVT prophylaxis:  Patient is on Lovenox for anticoagulation upon transfer. Encourage OOB. Monitor daily for signs and symptoms of DVT including but not limited to swelling and pain to prevent the development of DVT that may interfere with therapies.    4.  GI prophylaxis:  On prilosec to prevent gastritis/dyspepsia which may interfere with therapies.    5.  Pain: Controlled with Tylenol & Oxycodone    6.  Nutrition/Dysphagia: Dietician monitors nutrient intake, recommend supplements prn and provide nutrition education to pt/family to promote optimal nutrition for wound healing/recovery.     7.  Bladder/bowel:  Start bowel and bladder program as described below, to prevent constipation, urinary retention (which may lead to UTI), and urinary incontinence (which will impact upon pt's functional independence).   - TV Q3h while awake  with post void bladder scans, I&O cath for PVRs >400  - up to commode after meal     8.  Skin/dermal ulcer prophylaxis: Monitor for new skin conditions with q.2 h. turns as required to prevent the development of skin breakdown.     9.  Cognition/Behavior: As needed psychologist provides adjustment counseling to illness and psychosocial barriers that may be potential barriers to rehabilitation.     10. Respiratory therapy: RT performs O2 management prn, breathing retraining, pulmonary hygiene and bronchospasm management prn to optimize participation in therapies.     Medical Co-Morbidities/Adverse Potential Affecting Function:    Multifocal embolic appearing infarcts: Right insula, left parietal lobe, right parietal lobe, left hao  MRI with multiple old infarcts  Dysphagia  Encephalopathy  PT and OT for mobility and ADLs. Per guidelines, 15 hours per week between PT, OT and/or SLP.  Follow-up stroke Bridge clinic  Secondary stroke prophylaxis: Aspirin and statin   Continue Seroquel at night    I certify that the patient currently requires non-pharmacologic restraints. Non-pharmacologic restraints are required to reduce the potential for the patient to harm themselves or others, to limit violent behaviors, and to allow proper assessment and care. I have completed a face to face assessment of this patient on 9/20/2024. Alternative methods including but not limited to de-escalation techniques, redirection, and pharmacologic treatment have been attempted but patient currently remains at risk without restraints. Our staff has been trained on restraints and patient is currently placed in video monitored room.  The need for these restraints is assessed by a rehabilitation physician every 24 hours and use of restraints is minimized when appropriate.  Current diagnosis which requires restraints: Encephalopathy. Current restraints required: [place] bed.       Septic arthritis left ankle s/p left ankle arthrotomy and I&D  9/14/2024 Dr. Gautam -weightbearing as tolerated left lower extremity.  ID followed.  Continue vancomycin twice daily through 10/12.  Potentially can switch to Zyvox.    Left knee pain -check knee x-ray    Chronic systolic heart failure, not in acute exacerbation - preserved ejection fraction-LVEF 60%    CAD s/p CABG -continue aspirin and statin    Hypertension -amlodipine, hydralazine, lisinopril, metoprolol    Azotemia -improving, monitor    Leukocytosis -a.m. labs    Pain -Tylenol and oxycodone    Bowel -patient unsure when last BM was.  Check KUB    Upcoming Labs/imaging: Admission Labs     DVT PROPHYLAXIS: Lovenox 40mg SQ nightly     HOSPITALIST FOLLOWING: No     CODE STATUS: FULL CODE    DISPO: Home with family     HEBER: TBD    ADDITIONAL MEDICAL NEEDS ON D/C (IV abx, O2, etc): IV antibiotics     MEDS SENT TO: TBD    DISCHARGE SPECIALIST FOLLOW UP: ID, Ortho, Stroke Bridge    Patient to scheduled follow up with their PCP within 2 weeks from discharge from the [facility].     I personally performed a complete drug regimen review and no potential clinically significant medication issues were identified.     Goals/Expected Level of Function Based on Current Medical and Functional Status:  (may change based on patient's medical status and rate of impairment recovery)  Transfers:   Modified Independent  Mobility/Gait:   Modified Independent  ADL's:   Modified Independent  Cognition:  Modified Independent   Swallowing:  Modified Independent     DISPOSITION: Discharge to pre-morbid independent living setting with the supportive care of patient's family.    ELOS: 14-21 days  ____________________________________    Dr. Khushbu Meza DO, MS  [facility] - Physical Medicine & Rehabilitation   ____________________________________    Pt was seen today for 75 min, and entire time spent in face-to-face contact was >50% in counseling and coordination of care as detailed in A/P above.

## 2024-09-20 NOTE — PROGRESS NOTES
4 Eyes Skin Assessment Completed by Georgie SWEENEY and PATEL Willis.    Head WDL  Ears L ear scab  Nose WDL  Mouth WDL  Neck WDL  Breast/Chest WDL  Shoulder Blades WDL  Spine WDL  (R) Arm/Elbow/Hand Redness, R elbow  (L) Arm/Elbow/Hand WDL  Abdomen WDL  Groin Redness  Scrotum/Coccyx/Buttocks Redness and Blanching  (R) Leg WDL  (L) Leg Incision( Ankle)  (R) Heel/Foot/Toe Redness and Blanching  (L) Heel/Foot/Toe Redness and Blanching, Toe redness           Devices In Places RUE Midline      Interventions In Place Waffle Mattress, float heels    Possible Skin Injury No    Pictures Uploaded Into Epic Yes  Wound Consult Placed N/A  RN Wound Prevention Protocol Ordered Yes

## 2024-09-20 NOTE — DISCHARGE PLANNING
Care Transition Team Final Discharge Disposition    Actual Discharge Information  Discharge Disposition: Disch to IP rehab facility or distinct part unit (62)    Pt to discharge to [facility] today. Transport was set up at [phone] via [facility]. Per note, Pt's family is aware. COBRA form completed, given to bedside nurse.

## 2024-09-21 ENCOUNTER — APPOINTMENT (OUTPATIENT)
Dept: OCCUPATIONAL THERAPY | Facility: REHABILITATION | Age: 74
DRG: 057 | End: 2024-09-21
Attending: PHYSICAL MEDICINE & REHABILITATION
Payer: MEDICARE

## 2024-09-21 ENCOUNTER — APPOINTMENT (OUTPATIENT)
Dept: PHYSICAL THERAPY | Facility: REHABILITATION | Age: 74
DRG: 057 | End: 2024-09-21
Attending: PHYSICAL MEDICINE & REHABILITATION
Payer: MEDICARE

## 2024-09-21 ENCOUNTER — APPOINTMENT (OUTPATIENT)
Dept: SPEECH THERAPY | Facility: REHABILITATION | Age: 74
DRG: 057 | End: 2024-09-21
Attending: PHYSICAL MEDICINE & REHABILITATION
Payer: MEDICARE

## 2024-09-21 PROBLEM — I25.10 CAD (CORONARY ARTERY DISEASE): Status: ACTIVE | Noted: 2024-09-13

## 2024-09-21 PROBLEM — R79.89 AZOTEMIA: Status: ACTIVE | Noted: 2024-09-21

## 2024-09-21 LAB
25(OH)D3 SERPL-MCNC: 18 NG/ML (ref 30–100)
ALBUMIN SERPL BCP-MCNC: 3.4 G/DL (ref 3.2–4.9)
ALBUMIN/GLOB SERPL: 1 G/DL
ALP SERPL-CCNC: 61 U/L (ref 30–99)
ALT SERPL-CCNC: 22 U/L (ref 2–50)
ANION GAP SERPL CALC-SCNC: 20 MMOL/L (ref 7–16)
AST SERPL-CCNC: 29 U/L (ref 12–45)
BASOPHILS # BLD AUTO: 0.4 % (ref 0–1.8)
BASOPHILS # BLD: 0.05 K/UL (ref 0–0.12)
BILIRUB SERPL-MCNC: 0.6 MG/DL (ref 0.1–1.5)
BUN SERPL-MCNC: 26 MG/DL (ref 8–22)
CALCIUM ALBUM COR SERPL-MCNC: 9.8 MG/DL (ref 8.5–10.5)
CALCIUM SERPL-MCNC: 9.3 MG/DL (ref 8.5–10.5)
CHLORIDE SERPL-SCNC: 112 MMOL/L (ref 96–112)
CO2 SERPL-SCNC: 13 MMOL/L (ref 20–33)
CREAT SERPL-MCNC: 0.83 MG/DL (ref 0.5–1.4)
EOSINOPHIL # BLD AUTO: 0.1 K/UL (ref 0–0.51)
EOSINOPHIL NFR BLD: 0.8 % (ref 0–6.9)
ERYTHROCYTE [DISTWIDTH] IN BLOOD BY AUTOMATED COUNT: 42.4 FL (ref 35.9–50)
GFR SERPLBLD CREATININE-BSD FMLA CKD-EPI: 92 ML/MIN/1.73 M 2
GLOBULIN SER CALC-MCNC: 3.5 G/DL (ref 1.9–3.5)
GLUCOSE SERPL-MCNC: 140 MG/DL (ref 65–99)
HCT VFR BLD AUTO: 44.6 % (ref 42–52)
HGB BLD-MCNC: 14.6 G/DL (ref 14–18)
IMM GRANULOCYTES # BLD AUTO: 0.05 K/UL (ref 0–0.11)
IMM GRANULOCYTES NFR BLD AUTO: 0.4 % (ref 0–0.9)
LYMPHOCYTES # BLD AUTO: 1.4 K/UL (ref 1–4.8)
LYMPHOCYTES NFR BLD: 11.8 % (ref 22–41)
MAGNESIUM SERPL-MCNC: 2.3 MG/DL (ref 1.5–2.5)
MCH RBC QN AUTO: 30.5 PG (ref 27–33)
MCHC RBC AUTO-ENTMCNC: 32.7 G/DL (ref 32.3–36.5)
MCV RBC AUTO: 93.3 FL (ref 81.4–97.8)
MONOCYTES # BLD AUTO: 1.33 K/UL (ref 0–0.85)
MONOCYTES NFR BLD AUTO: 11.2 % (ref 0–13.4)
NEUTROPHILS # BLD AUTO: 8.95 K/UL (ref 1.82–7.42)
NEUTROPHILS NFR BLD: 75.4 % (ref 44–72)
NRBC # BLD AUTO: 0 K/UL
NRBC BLD-RTO: 0 /100 WBC (ref 0–0.2)
PHOSPHATE SERPL-MCNC: 4 MG/DL (ref 2.5–4.5)
PLATELET # BLD AUTO: 289 K/UL (ref 164–446)
PMV BLD AUTO: 11.3 FL (ref 9–12.9)
POTASSIUM SERPL-SCNC: 4.2 MMOL/L (ref 3.6–5.5)
PROT SERPL-MCNC: 6.9 G/DL (ref 6–8.2)
RBC # BLD AUTO: 4.78 M/UL (ref 4.7–6.1)
SODIUM SERPL-SCNC: 145 MMOL/L (ref 135–145)
TSH SERPL DL<=0.005 MIU/L-ACNC: 1.67 UIU/ML (ref 0.38–5.33)
WBC # BLD AUTO: 11.9 K/UL (ref 4.8–10.8)

## 2024-09-21 PROCEDURE — 84100 ASSAY OF PHOSPHORUS: CPT

## 2024-09-21 PROCEDURE — 97535 SELF CARE MNGMENT TRAINING: CPT

## 2024-09-21 PROCEDURE — 770005 HCHG ROOM/CARE - REHAB PRIVATE (11*

## 2024-09-21 PROCEDURE — 83735 ASSAY OF MAGNESIUM: CPT

## 2024-09-21 PROCEDURE — 700102 HCHG RX REV CODE 250 W/ 637 OVERRIDE(OP): Performed by: HOSPITALIST

## 2024-09-21 PROCEDURE — 97166 OT EVAL MOD COMPLEX 45 MIN: CPT

## 2024-09-21 PROCEDURE — 99222 1ST HOSP IP/OBS MODERATE 55: CPT | Performed by: HOSPITALIST

## 2024-09-21 PROCEDURE — 92610 EVALUATE SWALLOWING FUNCTION: CPT | Performed by: SPEECH-LANGUAGE PATHOLOGIST

## 2024-09-21 PROCEDURE — A9270 NON-COVERED ITEM OR SERVICE: HCPCS | Performed by: HOSPITALIST

## 2024-09-21 PROCEDURE — 92523 SPEECH SOUND LANG COMPREHEN: CPT | Performed by: SPEECH-LANGUAGE PATHOLOGIST

## 2024-09-21 PROCEDURE — 80053 COMPREHEN METABOLIC PANEL: CPT

## 2024-09-21 PROCEDURE — A9270 NON-COVERED ITEM OR SERVICE: HCPCS | Performed by: STUDENT IN AN ORGANIZED HEALTH CARE EDUCATION/TRAINING PROGRAM

## 2024-09-21 PROCEDURE — 85025 COMPLETE CBC W/AUTO DIFF WBC: CPT

## 2024-09-21 PROCEDURE — 700111 HCHG RX REV CODE 636 W/ 250 OVERRIDE (IP): Mod: JZ | Performed by: PHYSICAL MEDICINE & REHABILITATION

## 2024-09-21 PROCEDURE — A9270 NON-COVERED ITEM OR SERVICE: HCPCS | Performed by: PHYSICAL MEDICINE & REHABILITATION

## 2024-09-21 PROCEDURE — 700102 HCHG RX REV CODE 250 W/ 637 OVERRIDE(OP): Performed by: STUDENT IN AN ORGANIZED HEALTH CARE EDUCATION/TRAINING PROGRAM

## 2024-09-21 PROCEDURE — 36415 COLL VENOUS BLD VENIPUNCTURE: CPT

## 2024-09-21 PROCEDURE — 97530 THERAPEUTIC ACTIVITIES: CPT

## 2024-09-21 PROCEDURE — 97162 PT EVAL MOD COMPLEX 30 MIN: CPT

## 2024-09-21 PROCEDURE — 99232 SBSQ HOSP IP/OBS MODERATE 35: CPT | Performed by: STUDENT IN AN ORGANIZED HEALTH CARE EDUCATION/TRAINING PROGRAM

## 2024-09-21 PROCEDURE — 700102 HCHG RX REV CODE 250 W/ 637 OVERRIDE(OP): Performed by: PHYSICAL MEDICINE & REHABILITATION

## 2024-09-21 PROCEDURE — 84443 ASSAY THYROID STIM HORMONE: CPT

## 2024-09-21 PROCEDURE — 700105 HCHG RX REV CODE 258: Performed by: PHYSICAL MEDICINE & REHABILITATION

## 2024-09-21 PROCEDURE — 82306 VITAMIN D 25 HYDROXY: CPT

## 2024-09-21 RX ORDER — AMOXICILLIN 250 MG
2 CAPSULE ORAL EVERY EVENING
Status: DISCONTINUED | OUTPATIENT
Start: 2024-09-21 | End: 2024-09-23

## 2024-09-21 RX ORDER — POLYETHYLENE GLYCOL 3350 17 G/17G
1 POWDER, FOR SOLUTION ORAL 2 TIMES DAILY
Status: DISCONTINUED | OUTPATIENT
Start: 2024-09-21 | End: 2024-09-23

## 2024-09-21 RX ADMIN — LACTULOSE 30 ML: 20 SOLUTION ORAL at 05:26

## 2024-09-21 RX ADMIN — HYDRALAZINE HYDROCHLORIDE 75 MG: 50 TABLET ORAL at 14:22

## 2024-09-21 RX ADMIN — HYDRALAZINE HYDROCHLORIDE 75 MG: 50 TABLET ORAL at 05:19

## 2024-09-21 RX ADMIN — ASPIRIN 81 MG: 81 TABLET, CHEWABLE ORAL at 09:19

## 2024-09-21 RX ADMIN — QUETIAPINE FUMARATE 50 MG: 25 TABLET ORAL at 22:10

## 2024-09-21 RX ADMIN — HYDRALAZINE HYDROCHLORIDE 75 MG: 50 TABLET ORAL at 22:13

## 2024-09-21 RX ADMIN — SENNOSIDES AND DOCUSATE SODIUM 2 TABLET: 50; 8.6 TABLET ORAL at 22:10

## 2024-09-21 RX ADMIN — POLYETHYLENE GLYCOL 3350 1 PACKET: 17 POWDER, FOR SOLUTION ORAL at 22:08

## 2024-09-21 RX ADMIN — AMLODIPINE BESYLATE 10 MG: 5 TABLET ORAL at 05:20

## 2024-09-21 RX ADMIN — POLYETHYLENE GLYCOL 3350 1 PACKET: 17 POWDER, FOR SOLUTION ORAL at 14:30

## 2024-09-21 RX ADMIN — ENOXAPARIN SODIUM 40 MG: 100 INJECTION SUBCUTANEOUS at 17:54

## 2024-09-21 RX ADMIN — METOPROLOL SUCCINATE 25 MG: 25 TABLET, EXTENDED RELEASE ORAL at 05:20

## 2024-09-21 RX ADMIN — VANCOMYCIN HYDROCHLORIDE 1250 MG: 5 INJECTION, POWDER, LYOPHILIZED, FOR SOLUTION INTRAVENOUS at 18:04

## 2024-09-21 RX ADMIN — VANCOMYCIN HYDROCHLORIDE 1250 MG: 5 INJECTION, POWDER, LYOPHILIZED, FOR SOLUTION INTRAVENOUS at 04:36

## 2024-09-21 RX ADMIN — LISINOPRIL 40 MG: 20 TABLET ORAL at 09:19

## 2024-09-21 RX ADMIN — ATORVASTATIN CALCIUM 80 MG: 40 TABLET, FILM COATED ORAL at 22:10

## 2024-09-21 RX ADMIN — OMEPRAZOLE 20 MG: 20 CAPSULE, DELAYED RELEASE ORAL at 09:19

## 2024-09-21 ASSESSMENT — ACTIVITIES OF DAILY LIVING (ADL)
BED_CHAIR_WHEELCHAIR_TRANSFER_DESCRIPTION: SET-UP OF EQUIPMENT;SUPERVISION FOR SAFETY;VERBAL CUEING
TOILETING: INDEPENDENT
TOILET_TRANSFER_DESCRIPTION: GRAB BAR;INITIAL PREPARATION FOR TASK;SUPERVISION FOR SAFETY;VERBAL CUEING
TOILET_TRANSFER_DESCRIPTION: INCREASED TIME;INITIAL PREPARATION FOR TASK;SET-UP OF EQUIPMENT;SUPERVISION FOR SAFETY;VERBAL CUEING;GRAB BAR
TOILETING_LEVEL_OF_ASSIST_DESCRIPTION: ASSIST FOR HYGIENE;ASSIST TO PULL PANTS UP;GRAB BAR;INCREASED TIME;INITIAL PREPARATION FOR TASK;SET-UP OF EQUIPMENT;SUPERVISION FOR SAFETY;VERBAL CUEING

## 2024-09-21 ASSESSMENT — BRIEF INTERVIEW FOR MENTAL STATUS (BIMS)
WHAT DAY OF THE WEEK IS IT: NO ANSWER
ASKED TO RECALL BLUE: NO, COULD NOT RECALL
GENERAL MEMORY AND RECALL ABILITY: CURRENT SEASON
WHAT DAY OF THE WEEK IS IT: INCORRECT
INITIAL REPETITION OF BED BLUE SOCK - FIRST ATTEMPT: 2
BIMS SUMMARY SCORE: 2
ASKED TO RECALL BLUE: NO, COULD NOT RECALL
ASKED TO RECALL SOCK: NO, COULD NOT RECALL
INITIAL REPETITION OF BED BLUE SOCK - FIRST ATTEMPT: 2
ASKED TO RECALL SOCK: NO, COULD NOT RECALL
WHAT YEAR IS IT: MISSED BY MORE THAN 5 YEARS
ASKED TO RECALL BED: NO, COULD NOT RECALL
WHAT MONTH IS IT: MISSED BY 6 DAYS TO 1 MONTH
WHAT MONTH IS IT: NO ANSWER
WHAT YEAR IS IT: CORRECT
ASKED TO RECALL BED: NO, COULD NOT RECALL
BIMS SUMMARY SCORE: 6

## 2024-09-21 ASSESSMENT — GAIT ASSESSMENTS
ASSISTIVE DEVICE: FRONT WHEEL WALKER
DISTANCE (FEET): 30
DEVIATION: ANTALGIC;INCREASED BASE OF SUPPORT
GAIT LEVEL OF ASSIST: MINIMAL ASSIST

## 2024-09-21 NOTE — CARE PLAN
The patient is Watcher - Medium risk of patient condition declining or worsening    Shift Goals  Clinical Goals: safety,rest  Patient Goals: rest    Progress made toward(s) clinical / shift goals:  progressing  Problem: Knowledge Deficit - Standard  Goal: Patient and family/care givers will demonstrate understanding of plan of care, disease process/condition, diagnostic tests and medications  Outcome: Progressing  Note: Patient given education emphasis on hydration, safety and pain control and how to call for help, patient verbalizes understanding and will continue to monitor.     Problem: Safety - Medical Restraint  Goal: Remains free of injury from restraints (Restraint for Interference with Medical Device)  Outcome: Progressing  Note: Patient checked q 2 hours, will monitor and encourage and offered nutrition and elimination.       Patient is not progressing towards the following goals:

## 2024-09-21 NOTE — PROGRESS NOTES
NURSING DAILY NOTE    Name: Joe Templeton   Date of Admission: 9/20/2024   Admitting Diagnosis: H/O: stroke  Attending Physician: Khushbu Meza D.o.  Allergies: Patient has no known allergies.    Safety  Patient Assist     Patient Precautions     Precaution Comments     Bed Transfer Status     Toilet Transfer Status      Assistive Devices  Wheelchair  Oxygen  None - Room Air  Diet/Therapeutic Dining  Current Diet Order   Procedures    Diet Order Diet: Level 6 - Soft and Bite Sized (Set pt up w/ meal- he can then self-feed independently. Pills whole.); Liquid level: Level 0 - Thin; Tray Modifications (optional): SLP - Deliver to Nursing Station     Pill Administration  whole  Agitated Behavioral Scale  25  ABS Level of Severity  Mild Agitation    Fall Risk  Has the patient had a fall this admission?      Nikole Cesar Fall Risk Scoring  22, HIGH RISK  Fall Risk Safety Measures  bed alarm, chair alarm, seatbelt alarm, and poor balance    Vitals  Temperature: 36.6 °C (97.8 °F)  Temp src: Temporal  Pulse: 92  Respiration: 18  Blood Pressure : (!) 147/78  Blood Pressure MAP (Calculated): 101 MM HG  BP Location: Left, Upper Arm  Patient BP Position: Supine     Oxygen  Pulse Oximetry: 94 %  O2 (LPM): 0  O2 Delivery Device: None - Room Air    Bowel and Bladder  Last Bowel Movement  09/15/24 (per report)  Stool Type     Bowel Device     Continent  Bladder: Did not void   Bowel: No movement  Bladder Function  Urine Color: Yellow  Straight Catheter: 550 ml  Genitourinary Assessment   Bladder Assessment (WDL):  WDL Except  Silva Catheter: Not Applicable  Urine Color: Yellow  Bladder Device: Bathroom  Time Void: Yes  $ Bladder Scan Results (mL): 491    Skin  Colt Score   17  Sensory Interventions   Bed Types: Other (Comments) ([facility] bed)  Skin Preventative Measures: Pillows in Use for Support / Positioning  Moisture Interventions  Moisturizers/Barriers:  Moisturizer       Pain  Pain Rating Scale  0 - No Pain  Pain Location  Foot  Pain Location Orientation  Left  Pain Interventions   Relaxation Technique, Repositioned    ADLs    Bathing      Linen Change      Personal Hygiene     Chlorhexidine Bath      Oral Care     Teeth/Dentures     Shave     Nutrition Percentage Eaten     Environmental Precautions     Patient Turns/Positioning  Patient Turns Self from Side to Side  Patient Turns Assistance/Tolerance     Bed Positions  Bed Controls On, Bed Locked  Head of Bed Elevated  Self regulated      Psychosocial/Neurologic Assessment  Psychosocial Assessment  Psychosocial (WDL):  WDL Except  Patient Behaviors: Forgetful  Neurologic Assessment  Neuro (WDL): Exceptions to WDL  Level of Consciousness: Alert  Orientation Level: Oriented to person  Cognition: Poor judgement, Memory Loss, Poor safety awareness  Speech: Clear  Muscle Strength Right Arm: Good Strength Against Gravity and Moderate Resistance  Muscle Strength Left Arm: Good Strength Against Gravity and Moderate Resistance  Muscle Strength Right Leg: Fair Strength against Gravity but No Resistance  Muscle Strength Left Leg: Weak Movement but Not Against Gravity or Resistance  EENT (WDL):  WDL Except    Cardio/Pulmonary Assessment  Edema   LLE Edema: 1+ (foot/ankle)  Respiratory Breath Sounds  RUL Breath Sounds:  (unable to get due to safety reasons.)  Cardiac Assessment   Cardiac (WDL):  Within Defined Limits

## 2024-09-21 NOTE — PROGRESS NOTES
NURSING DAILY NOTE    Name: Joe Templeton   Date of Admission: 9/20/2024   Admitting Diagnosis: H/O: stroke  Attending Physician: ELIZABETH HOLM D.O.  Allergies: Patient has no known allergies.    Safety  Patient Assist     Patient Precautions     Precaution Comments     Bed Transfer Status     Toilet Transfer Status      Assistive Devices     Oxygen  None - Room Air  Diet/Therapeutic Dining  Current Diet Order   Procedures    Diet Order Diet: Level 6 - Soft and Bite Sized (Set pt up w/ meal- he can then self-feed independently. Pills whole.); Liquid level: Level 0 - Thin; Tray Modifications (optional): SLP - Deliver to Nursing Station     Pill Administration  whole  Agitated Behavioral Scale  25  ABS Level of Severity  Mild Agitation    Fall Risk  Has the patient had a fall this admission?      Nikole Cesar Fall Risk Scoring  21, HIGH RISK  Fall Risk Safety Measures  posey bed     Vitals  Temperature: 36.1 °C (96.9 °F)  Temp src: Temporal  Pulse:  (unable to get due to safety reasons.)  Respiration: 18  Blood Pressure : (!) 151/74  Blood Pressure MAP (Calculated): 100 MM HG  BP Location: Left, Upper Arm  Patient BP Position: Andres's Position     Oxygen  Pulse Oximetry: 94 %  O2 (LPM): 0  O2 Delivery Device: None - Room Air    Bowel and Bladder  Last Bowel Movement  09/15/24 (per report)  Stool Type     Bowel Device     Continent  Bladder: Did not void   Bowel: No movement  Bladder Function     Genitourinary Assessment   Bladder Assessment (WDL):  WDL Except    Skin  Colt Score   17  Sensory Interventions      Moisture Interventions  Moisturizers/Barriers: Moisturizer       Pain  Pain Rating Scale     Pain Location  Foot  Pain Location Orientation  Left  Pain Interventions   Distraction    ADLs    Bathing      Linen Change      Personal Hygiene     Chlorhexidine Bath      Oral Care     Teeth/Dentures     Shave     Nutrition Percentage Eaten      Environmental Precautions     Patient Turns/Positioning  Patient Turns Self from Side to Side  Patient Turns Assistance/Tolerance     Bed Positions     Head of Bed Elevated         Psychosocial/Neurologic Assessment  Psychosocial Assessment  Psychosocial (WDL):  WDL Except  Patient Behaviors: Confused, Forgetful  Neurologic Assessment  Neuro (WDL): Exceptions to WDL  Level of Consciousness: Alert  Orientation Level: Disoriented X4  Cognition: Poor judgement, Memory Loss, Poor safety awareness  Speech: Clear  Muscle Strength Right Arm: Good Strength Against Gravity and Moderate Resistance  Muscle Strength Left Arm: Good Strength Against Gravity and Moderate Resistance  Muscle Strength Right Leg: Weak Movement but Not Against Gravity or Resistance  Muscle Strength Left Leg: Fair Strength against Gravity but No Resistance  EENT (WDL):  WDL Except    Cardio/Pulmonary Assessment  Edema   LLE Edema: 2+ (foot)  Respiratory Breath Sounds  RUL Breath Sounds:  (unable to get due to safety reasons.)  Cardiac Assessment   Cardiac (WDL):  WDL Except

## 2024-09-21 NOTE — ASSESSMENT & PLAN NOTE
On Norvasc, Lisinopril, Toprol, and Hydralazine  Observe blood pressure trends on increased Toprol

## 2024-09-21 NOTE — THERAPY
Physical Therapy   Initial Evaluation     Patient Name: Joe Templeton  Age:  74 y.o., Sex:  male  Medical Record #: [id number]  Today's Date: 9/21/2024     Subjective    Pt in room supine in bed asleep upon arrival. Pt woken up and agreeable to therapy session.     Objective       09/21/24 1301   PT Charge Group   Charges Yes   PT Therapeutic Activities (Units) 1   PT Evaluation PT Evaluation Mod   PT Total Time Spent   PT Individual Total Time Spent (Mins) 60   Prior Living Situation   Prior Services Home-Independent   Housing / Facility 1 Story House   Steps Into Home 0   Steps In Home 0   Prior Level of Functional Mobility   Bed Mobility Independent   Transfer Status Independent   Ambulation Independent   Assistive Devices Used None   Vitals   Pulse 72   Blood Pressure  138/67   O2 Delivery Device None - Room Air   Pain   Pain Scales Non Verbal Scale   Cognition    Level of Consciousness Alert   ABS (Agitated Behavior Scale)   Agitated Behavior Scale Performed No   Short Attention Span, Easy Distractibility, Inability to Concentrate 2   Impulsive, Impatient, Low Tolerance for Pain or Frustration 3   Uncooperative, Resistant to Care, Demanding 1   Violent and/or Threatening Violence Toward People or Property 1   Explosive and/or Unpredictable Anger 1   Rocking, Rubbing, Moaning, Other Self-Stimulating Behavior 1   Pulling at Tubes, Restraints, etc. 1   Wandering from Treatment Area 1   Restlessness, Pacing, Excessive Movement 1   Repetitive Behaviors, Motor and/or Verbal 1   Rapid, Loud or Excessive Talking 1   Sudden Changes of Mood 1   Easily Initiated - Excessive Crying and/or Laughter 1   Self-Abusiveness, Physical and/or Verbal 1   Agitated Behavior Scale Total Score 17   Level of Severity No Agitation   Active ROM Lower Body    Active ROM Lower Body  WDL   Strength Lower Body   Lower Body Strength  X   Lt Hip Flexion Strength 3+ (F+)   Lt Knee Flexion Strength 3+ (F+)   Lt Knee Extension Strength 3+ (F+)    Sensation Lower Body   Lower Extremity Sensation   WDL   Balance Assessment   Sitting Balance (Static) Fair   Sitting Balance (Dynamic) Fair   Standing Balance (Static) Fair   Standing Balance (Dynamic) Fair -   Bed Mobility    Supine to Sit Minimal Assist   Sit to Supine Minimal Assist   Sit to Stand Moderate Assist   Scooting Contact Guard Assist   Rolling Supervised   Roll Left and Right   Assistance Needed Incidental touching;Verbal cues   CARE Score - Roll Left and Right 4   Roll Left and Right Discharge Goal   Discharge Goal 6   Sit to Lying   Assistance Needed Incidental touching;Verbal cues   CARE Score - Sit to Lying 4   Sit to Lying Discharge Goal   Discharge Goal 6   Lying to Sitting on Side of Bed   Assistance Needed Incidental touching;Verbal cues   CARE Score - Lying to Sitting on Side of Bed 4   Lying to Sitting on Side of Bed Discharge Goal   Discharge Goal 6   Sit to Stand   Assistance Needed Physical assistance;Verbal cues   Physical Assistance Level 26%-50%   CARE Score - Sit to Stand 3   Sit to Stand Discharge Goal   Discharge Goal 6   Chair/Bed-to-Chair Transfer   Assistance Needed Physical assistance;Verbal cues   Physical Assistance Level 26%-50%   CARE Score - Chair/Bed-to-Chair Transfer 3   Chair/Bed-to-Chair Transfer Discharge Goal   Discharge Goal 6   Toilet Transfer   Assistance Needed Physical assistance;Verbal cues   Physical Assistance Level 26%-50%   CARE Score - Toilet Transfer 3   Toilet Transfer Discharge Goal   Discharge Goal 6   Car Transfer   Assistance Needed Physical assistance;Verbal cues   Physical Assistance Level 26%-50%   CARE Score - Car Transfer 3   Car Transfer Discharge Goal   Discharge Goal 6   Walk 10 Feet   Assistance Needed Physical assistance;Verbal cues   Physical Assistance Level 26%-50%   CARE Score - Walk 10 Feet 3   Walk 10 Feet Discharge Goal   Discharge Goal 6   Walk 50 Feet with Two Turns   Reason if not Attempted Safety concerns   CARE Score - Walk 50  Feet with Two Turns 88   Walk 50 Feet with Two Turns Discharge Goal   Discharge Goal 6   Walk 150 Feet   Reason if not Attempted Safety concerns   CARE Score - Walk 150 Feet 88   Walk 150 Feet Discharge Goal   Discharge Goal 5   Walking 10 Feet on Uneven Surfaces   Reason if not Attempted Safety concerns   CARE Score - Walking 10 Feet on Uneven Surfaces 88   Walking 10 Feet on Uneven Surfaces Discharge Goal   Discharge Goal 5   1 Step (Curb)   Assistance Needed Physical assistance   Physical Assistance Level 51%-75%   CARE Score - 1 Step (Curb) 2   1 Step (Curb) Discharge Goal   Discharge Goal 4   4 Steps   Assistance Needed Physical assistance   Physical Assistance Level 51%-75%   CARE Score - 4 Steps 2   4 Steps Discharge Goal   Discharge Goal 4   12 Steps   Reason if not Attempted Safety concerns   CARE Score - 12 Steps 88   12 Steps Discharge Goal   Discharge Goal 4   Picking Up Object   Assistance Needed Physical assistance;Verbal cues   Physical Assistance Level 26%-50%   CARE Score - Picking Up Object 3   Picking Up Object Discharge Goal   Discharge Goal 5   Wheel 50 Feet with Two Turns   Assistance Needed Physical assistance;Verbal cues   Physical Assistance Level 26%-50%   CARE Score - Wheel 50 Feet with Two Turns 3   Type of Wheelchair/Scooter Manual   Wheel 50 Feet with Two Turns Discharge Goal   Discharge Goal 6   Wheel 150 Feet   Assistance Needed Physical assistance   Physical Assistance Level Total assistance   CARE Score - Wheel 150 Feet 1   Type of Wheelchair/Scooter Manual   Wheel 150 Feet Discharge Goal   Discharge Goal 4   Gait Functional Level of Assist    Gait Level Of Assist Minimal Assist   Assistive Device Front Wheel Walker   Distance (Feet) 30   # of Times Distance was Traveled 1   Deviation Antalgic;Increased Base Of Support   Wheelchair Functional Level of Assist   Wheelchair Assist Moderate Assist   Distance Wheelchair (Feet or Distance) 35   Wheelchair Description Assistance with  steering;Extra time;Limited by fatigue;Safety concerns;Supervision for safety;Verbal cueing   Stairs Functional Level of Assist   Level of Assist with Stairs Moderate Assist   # of Stairs Climbed 4   Stairs Description Extra time;Hand rails;Verbal cueing;Supervision for safety   Transfer Functional Level of Assist   Bed, Chair, Wheelchair Transfer Minimal Assist   Bed Chair Wheelchair Transfer Description Increased time;Initial preparation for task;Adaptive equipment;Supervision for safety;Verbal cueing   Toilet Transfers Minimal Assist   Toilet Transfer Description Grab bar;Initial preparation for task;Supervision for safety;Verbal cueing   Problem List    Problems Impaired Transfers;Impaired Bed Mobility;Impaired Ambulation;Functional Strength Deficit;Impaired Balance;Decreased Activity Tolerance;Safety Awareness Deficits / Cognition;Motor Planning / Sequencing   Precautions   Precautions Fall Risk   Interdisciplinary Plan of Care Collaboration   IDT Collaboration with  Nursing;Occupational Therapist   Patient Position at End of Therapy In Bed;Bed Alarm On;Call Light within Reach;Tray Table within Reach  (Doctor present in room after therapy session.)   Collaboration Comments Collaborated with nursing and OT on update associatied with patient cognitive and behavior status.   PT DME Recommendations   Assistive Device Front Wheeled Walker   Benefit   Therapy Benefit Patient Would Benefit from Inpatient Rehabilitation Physical Therapy to Maximize Functional [place] with ADLs, IADLs and Mobility.   Strengths & Barriers   Strengths Independent prior level of function;Pleasant and cooperative;Willingly participates in therapeutic activities   Barriers Decreased endurance;Difficulty following instructions;Generalized weakness;Impaired activity tolerance;Impaired balance;Impaired carryover of learning;Impaired insight/denial of deficits;Impaired functional cognition;Impulsive       Assessment  The patient is a 74  "y.o. male with a past medical history of CAD s/p CABG and gout; who presented on 9/13/2024 5:10 PM with LE weakness and ankle swelling. Per documentation, patient had worsening symptoms of LLE and ankle swelling x 3 days. Upon eval in the ED, patient was also confused. CTA head and neck obtained for weakness and confusion showed no LVO on CTA head and mid to moderate atherosclerotic plaquing of the common carotid arteries and bilateral ICA stenosis less than 50%. Ankle xray was negative for acute fracture. 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 Echo pending, prior echo from 2016 showed EF of 40-50%. Neuro consulted, patient is now on ASA and statin. In regards to patient's ankle, patient had an arthrocentesis done in the ED, ortho was consulted, and patient was taken the OR on 9/14 for left ankle I and D for left ankle septic arthritis performed by Dr. Gautam. Patient is WBAT LLE. Patient remains on vancomycin, stop date is 9/21. Patient has been able to mobilize CGA level.\"     Pt demonstrated decreased safety awareness with performing activity and needing verbal cues for proper technique with all transfers, decreased carryover during therapy session. Pt only able to ambulate 30ft with RW before requiring a seated rest break due to decreased strength and endurance in LEs. Patient is cooperative throughout therapy session.     Plan  Recommend Physical Therapy 30-60 minutes per day 5-6 days per week for 4 weeks for the following treatments:  PT Group Therapy, PT Gait Training, PT Therapeutic Exercises, PT Neuro Re-Ed/Balance, and PT Therapeutic Activity.    Passport items to be completed:  Get in/out of bed safely, in/out of a vehicle, safely use mobility device, walk or wheel around home/community, navigate up and down stairs, show how to get up/down from the ground, ensure home is accessible, " demonstrate HEP, complete caregiver training    Goals:  Long term and short term goals have been discussed with patient and they are in agreement.    Physical Therapy Problems (Active)       Problem: Balance       Dates: Start:  09/21/24            Problem: Mobility       Dates: Start:  09/21/24         Goal: STG-Within one week, patient will ambulate 150ft with use of LRAD with CGA        Dates: Start:  09/21/24               Problem: Mobility Transfers       Dates: Start:  09/21/24         Goal: STG-Within one week, patient will transfer bed to chair with CGA and min VC with use of LRAD        Dates: Start:  09/21/24            Goal: STG-Within two weeks, patient will transfer in/out of car requiring min VC with proper technique and use of LRAD safely       Dates: Start:  09/21/24               Problem: PT-Long Term Goals       Dates: Start:  09/21/24         Goal: LTG-By discharge, patient will improve dynamic balance from fair+ to good to improve ability to perform ADLs safely with use of LRAD       Dates: Start:  09/21/24            Goal: LTG-By discharge, patient will ambulate 300ft with use of LRAD and supervision assistance.       Dates: Start:  09/21/24            Goal: LTG-By discharge, patient will ambulate up/down 12 stairs with use of single HR and CGA.       Dates: Start:  09/21/24

## 2024-09-21 NOTE — ASSESSMENT & PLAN NOTE
S/P arthrotomy I+D on 9/14/24 by Dr. Gautam  Wound care and pain control per Physiary  Was to be on IV Vanco through 10/12/24 per ID  Now on Zyvox as pt chewing on IV tubing

## 2024-09-21 NOTE — PROGRESS NOTES
Physical Medicine & Rehabilitation Progress Note    Encounter Date: 9/21/2024    Interval Events (Subjective):  VS show elevated BP 140s-150s.   Last BM 9/15 per documentation    Patient seen at bedside today. No new complaints. He says his appetite is okay. He agrees that he hasn't had a BM in a while. No chest pain, SOB, abdominal pain.     Objective:  Vital signs: BP (!) 147/78   Pulse 92   Temp 36.6 °C (97.8 °F) (Temporal)   Resp 18   SpO2 94%   General: Elderly male, not in distress.  HEENT: PERRL. EOMI.  Pulmonary: Breathing comfortably on room air  Cardiovascular: Rate as above, warm extremities  Gastrointestinal: Abdomen non-distended  Extremities/MSK: No peripheral edema.  Integumentary: Visible skin intact.  Psychiatric: Normal affect.  Neurologic: Awake and alert. Speech somewhat delayed. Sometimes inappropriate answers.        Laboratory Values:  Recent Results (from the past 72 hour(s))   VANCOMYCIN TROUGH    Collection Time: 09/18/24  5:01 PM   Result Value Ref Range    Vancomycin Trough 17.6 10.0 - 20.0 ug/mL   VANCOMYCIN PEAK    Collection Time: 09/18/24  7:13 PM   Result Value Ref Range    Vancomycin Peak 15.6 (L) 20.0 - 40.0 ug/mL   Basic Metabolic Panel    Collection Time: 09/19/24  3:34 AM   Result Value Ref Range    Sodium 143 135 - 145 mmol/L    Potassium 3.7 3.6 - 5.5 mmol/L    Chloride 110 96 - 112 mmol/L    Co2 19 (L) 20 - 33 mmol/L    Glucose 154 (H) 65 - 99 mg/dL    Bun 23 (H) 8 - 22 mg/dL    Creatinine 0.96 0.50 - 1.40 mg/dL    Calcium 9.2 8.5 - 10.5 mg/dL    Anion Gap 14.0 7.0 - 16.0   VANCOMYCIN TROUGH    Collection Time: 09/19/24  3:34 AM   Result Value Ref Range    Vancomycin Trough 6.2 (L) 10.0 - 20.0 ug/mL   ESTIMATED GFR    Collection Time: 09/19/24  3:34 AM   Result Value Ref Range    GFR (CKD-EPI) 83 >60 mL/min/1.73 m 2   PROCALCITONIN    Collection Time: 09/20/24  4:50 AM   Result Value Ref Range    Procalcitonin 0.05 <0.25 ng/mL   Basic Metabolic Panel    Collection Time:  09/20/24  4:50 AM   Result Value Ref Range    Sodium 142 135 - 145 mmol/L    Potassium 4.1 3.6 - 5.5 mmol/L    Chloride 110 96 - 112 mmol/L    Co2 21 20 - 33 mmol/L    Glucose 151 (H) 65 - 99 mg/dL    Bun 24 (H) 8 - 22 mg/dL    Creatinine 0.93 0.50 - 1.40 mg/dL    Calcium 9.0 8.5 - 10.5 mg/dL    Anion Gap 11.0 7.0 - 16.0   ESTIMATED GFR    Collection Time: 09/20/24  4:50 AM   Result Value Ref Range    GFR (CKD-EPI) 87 >60 mL/min/1.73 m 2   Comp Metabolic Panel (CMP)    Collection Time: 09/21/24  6:33 AM   Result Value Ref Range    Sodium 145 135 - 145 mmol/L    Potassium 4.2 3.6 - 5.5 mmol/L    Chloride 112 96 - 112 mmol/L    Co2 13 (L) 20 - 33 mmol/L    Anion Gap 20.0 (H) 7.0 - 16.0    Glucose 140 (H) 65 - 99 mg/dL    Bun 26 (H) 8 - 22 mg/dL    Creatinine 0.83 0.50 - 1.40 mg/dL    Calcium 9.3 8.5 - 10.5 mg/dL    Correct Calcium 9.8 8.5 - 10.5 mg/dL    AST(SGOT) 29 12 - 45 U/L    ALT(SGPT) 22 2 - 50 U/L    Alkaline Phosphatase 61 30 - 99 U/L    Total Bilirubin 0.6 0.1 - 1.5 mg/dL    Albumin 3.4 3.2 - 4.9 g/dL    Total Protein 6.9 6.0 - 8.2 g/dL    Globulin 3.5 1.9 - 3.5 g/dL    A-G Ratio 1.0 g/dL   Magnesium    Collection Time: 09/21/24  6:33 AM   Result Value Ref Range    Magnesium 2.3 1.5 - 2.5 mg/dL   Phosphorus    Collection Time: 09/21/24  6:33 AM   Result Value Ref Range    Phosphorus 4.0 2.5 - 4.5 mg/dL   TSH with Reflex to FT4    Collection Time: 09/21/24  6:33 AM   Result Value Ref Range    TSH 1.670 0.380 - 5.330 uIU/mL   Vitamin D, 25-hydroxy (blood)    Collection Time: 09/21/24  6:33 AM   Result Value Ref Range    25-Hydroxy   Vitamin D 25 18 (L) 30 - 100 ng/mL   ESTIMATED GFR    Collection Time: 09/21/24  6:33 AM   Result Value Ref Range    GFR (CKD-EPI) 92 >60 mL/min/1.73 m 2   CBC WITH DIFFERENTIAL    Collection Time: 09/21/24  9:15 AM   Result Value Ref Range    WBC 11.9 (H) 4.8 - 10.8 K/uL    RBC 4.78 4.70 - 6.10 M/uL    Hemoglobin 14.6 14.0 - 18.0 g/dL    Hematocrit 44.6 42.0 - 52.0 %    MCV 93.3  81.4 - 97.8 fL    MCH 30.5 27.0 - 33.0 pg    MCHC 32.7 32.3 - 36.5 g/dL    RDW 42.4 35.9 - 50.0 fL    Platelet Count 289 164 - 446 K/uL    MPV 11.3 9.0 - 12.9 fL    Neutrophils-Polys 75.40 (H) 44.00 - 72.00 %    Lymphocytes 11.80 (L) 22.00 - 41.00 %    Monocytes 11.20 0.00 - 13.40 %    Eosinophils 0.80 0.00 - 6.90 %    Basophils 0.40 0.00 - 1.80 %    Immature Granulocytes 0.40 0.00 - 0.90 %    Nucleated RBC 0.00 0.00 - 0.20 /100 WBC    Neutrophils (Absolute) 8.95 (H) 1.82 - 7.42 K/uL    Lymphs (Absolute) 1.40 1.00 - 4.80 K/uL    Monos (Absolute) 1.33 (H) 0.00 - 0.85 K/uL    Eos (Absolute) 0.10 0.00 - 0.51 K/uL    Baso (Absolute) 0.05 0.00 - 0.12 K/uL    Immature Granulocytes (abs) 0.05 0.00 - 0.11 K/uL    NRBC (Absolute) 0.00 K/uL       Medications:  Scheduled Medications   Medication Dose Frequency    Pharmacy Consult Request  1 Each PHARMACY TO DOSE    lidocaine  1-2 Patch Daily-0800    senna-docusate  2 Tablet Q EVENING    omeprazole  20 mg DAILY    amLODIPine  10 mg Q DAY    enoxaparin (LOVENOX) injection  40 mg DAILY AT 1800    hydrALAZINE  75 mg Q8HRS    lisinopril  40 mg DAILY    MD Alert...Vancomycin per Pharmacy   PHARMACY TO DOSE    metoprolol SR  25 mg DAILY    vancomycin  1,250 mg Q12HR    aspirin  81 mg DAILY    atorvastatin  80 mg Q EVENING    QUEtiapine  50 mg Nightly     PRN medications: hydrALAZINE, senna-docusate **AND** polyethylene glycol/lytes, lactulose, docusate sodium, bisacodyl EC, magnesium hydroxide, sodium phosphate, carboxymethylcellulose, benzocaine-menthol, mag hydrox-al hydrox-simeth, ondansetron **OR** ondansetron, traZODone, sodium chloride, acetaminophen, ziprasidone, traMADol, formulation r    Diet:  Current Diet Order   Procedures    Diet Order Diet: Level 6 - Soft and Bite Sized (Set pt up w/ meal- he can then self-feed independently. Pills whole.); Liquid level: Level 0 - Thin; Tray Modifications (optional): SLP - Deliver to Nursing Station       Medical Decision Making and  Plan:  Multifocal embolic appearing infarcts: Right insula, left parietal lobe, right parietal lobe, left hao  MRI with multiple old infarcts  Dysphagia  Encephalopathy  PT and OT for mobility and ADLs. Per guidelines, 15 hours per week between PT, OT and/or SLP.  Follow-up stroke Bridge clinic  Secondary stroke prophylaxis: Aspirin and statin   Continue Seroquel at night     I certify that the patient currently requires non-pharmacologic restraints. Non-pharmacologic restraints are required to reduce the potential for the patient to harm themselves or others, to limit violent behaviors, and to allow proper assessment and care. I have completed a face to face assessment of this patient on 9/20/2024. Alternative methods including but not limited to de-escalation techniques, redirection, and pharmacologic treatment have been attempted but patient currently remains at risk without restraints. Our staff has been trained on restraints and patient is currently placed in video monitored room.  The need for these restraints is assessed by a rehabilitation physician every 24 hours and use of restraints is minimized when appropriate.  Current diagnosis which requires restraints: Encephalopathy. Current restraints required: [place] bed.         Septic arthritis left ankle s/p left ankle arthrotomy and I&D 9/14/2024 Dr. Gautam -weightbearing as tolerated left lower extremity.  ID followed.  Continue vancomycin twice daily through 10/12.  Potentially can switch to Zyvox.     Left knee pain -check knee x-ray     Chronic systolic heart failure, not in acute exacerbation - preserved ejection fraction-LVEF 60%     CAD s/p CABG -continue aspirin and statin     Hypertension -amlodipine, hydralazine, lisinopril, metoprolol  -9/21: BP still elevated. Will consult IM to help manage blood pressure.      Azotemia -improving, monitor     Leukocytosis -a.m. labs     Pain -Tylenol and oxycodone     Bowel -patient unsure when last BM was.   Check KUB  -9/21: add miralax BID. If no BM, can try enema tomorrow.      Upcoming Labs/imaging: Admission Labs      DVT PROPHYLAXIS: Lovenox 40mg SQ nightly      HOSPITALIST FOLLOWING: No      CODE STATUS: FULL CODE     DISPO: Home with family      HEBER: TBD     ADDITIONAL MEDICAL NEEDS ON D/C (IV abx, O2, etc): IV antibiotics      MEDS SENT TO: TBD     DISCHARGE SPECIALIST FOLLOW UP: ID, Ortho, Stroke Bridge     Patient to scheduled follow up with their PCP within 2 weeks from discharge from the [facility].       ____________________________________    Tre Diehl D.O.  Physical Medicine & Rehabilitation   ____________________________________    Total time:  36 minutes. Time spent included pre-rounding review of vitals and tests, unit/floor time, face-to-face time with the patient including physical examination, care coordination, counseling of patient and/or family, ordering medications/procedures/tests, discussion with CM, PT, OT, SLP and/or other healthcare providers, and documentation in the electronic medical record.

## 2024-09-21 NOTE — THERAPY
"Speech Language Pathology   Initial Assessment     Patient Name: Joe Templeton  AGE:  74 y.o., SEX:  male  Medical Record #: [id number]  Today's Date: 9/21/2024     Subjective    Pt agreeable to speech language and oral pharyngeal evaluations this session. Pt confused and benefiting from consistent verbal cuing. Per chart review:  \"Adapted from the PM&R Consult by Dr. Gutierrez:   \"HPI: The patient is a 74 y.o. male with a past medical history of CAD s/p CABG and gout; who presented on 9/13/2024 5:10 PM with LE weakness and ankle swelling. Per documentation, patient had worsening symptoms of LLE and ankle swelling x 3 days. Upon eval in the ED, patient was also confused. CTA head and neck obtained for weakness and confusion showed no LVO on CTA head and mid to moderate atherosclerotic plaquing of the common carotid arteries and bilateral ICA stenosis less than 50%. Ankle xray was negative for acute fracture. 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 Echo pending, prior echo from 2016 showed EF of 40-50%. Neuro consulted, patient is now on ASA and statin. In regards to patient's ankle, patient had an arthrocentesis done in the ED, ortho was consulted, and patient was taken the OR on 9/14 for left ankle I and D for left ankle septic arthritis performed by Dr. Gautam. Patient is WBAT LLE. Patient remains on vancomycin, stop date is 9/21. Patient has been able to mobilize CGA level.\"     On admission patient reports knee pain and ankle pain. Doesn't want to straighten out his leg because his knee hurts. Doesn't think he has had a bowel movement lately. He is disoriented. Doesn't think there are years. Didn't remember he had surgery on his ankle. Pleasantly confused, however. \"     Objective       09/21/24 1031   Evaluation Charges   Charges Yes   SLP Speech Language Evaluation Speech Sound Language " Comprehension   SLP Oral Pharyngeal Evaluation Oral Pharyngeal Evaluation   SLP Total Time Spent   SLP Individual Total Time Spent (Mins) 90   Prior Living Situation   Prior Services Home-Independent   Housing / Facility 1 Story House   Lives with - Patient's Self Care Capacity Sibling;Unrelated Adult  (Pt reported he lives with brother and MAURICE)   Prior Level Of Function   Communication Within Functional Limits   Swallow Within Functional Limits   Dentition Poor Quality   (Several missing and broken teeth)   Dentures None   Hearing Within Functional Limits for Evaluation   Hearing Aid None   Vision Reading    Patient's Primary Language English   Occupation (Pre-Hospital Vocational) Retired Due To Age   Receptive Language / Auditory Comprehension   Receptive Language / Auditory Comprehension X   Answers Yes / No Personal Questions Supervision (5)   Answers Yes / No Simple / Contextual Questions Minimal (4)   Follows One Unit Commands Minimal (4)   Follows Two Unit Commands Severe (2)   Expressive Language   Expressive Language (WDL) X   Naming Within Functional Limits (6-7)   Repeats Speech Accurately Within Functional Limits (6-7)   Verbalizes Wants / Needs Supervision (5)   Sustain Dialogue Within Given Topic Moderate (3)   Perseverations Moderate (3)   Word Finding Deficits Minimal (4)   Written Language Expression   Dominant Hand Right   Cognition    Level of Consciousness Alert   Cognition - Detailed   Cognitive-Linguistic (WDL) X   Attention to Task Moderate (3)   Simple Attention Moderate (3)   Orientation  Severe (2)   Verbal Short Term Memory 5 Minutes   Safety Awareness Severe (2)   Insight into Deficits Severe (2)   Clock Drawing Poor Planning;Disorganization;Numeric Errors;Omissions   ABS (Agitated Behavior Scale)   Short Attention Span, Easy Distractibility, Inability to Concentrate 2   Impulsive, Impatient, Low Tolerance for Pain or Frustration 2   Uncooperative, Resistant to Care, Demanding 1   Violent  "and/or Threatening Violence Toward People or Property 1   Explosive and/or Unpredictable Anger 1   Rocking, Rubbing, Moaning, Other Self-Stimulating Behavior 1   Pulling at Tubes, Restraints, etc. 1   Wandering from Treatment Area 1   Restlessness, Pacing, Excessive Movement 1   Repetitive Behaviors, Motor and/or Verbal 1   Rapid, Loud or Excessive Talking 1   Sudden Changes of Mood 1   Easily Initiated - Excessive Crying and/or Laughter 1   Self-Abusiveness, Physical and/or Verbal 1   Agitated Behavior Scale Total Score 16   Level of Severity No Agitation   Cognitive Pattern Assessment   Cognitive Pattern Assessment Used BIMS   Brief Interview for Mental Status (BIMS)   Repetition of Three Words (First Attempt) 2   Temporal Orientation: Year Correct   Temporal Orientation: Month Missed by 6 days to 1 month   Temporal Orientation: Day Incorrect   Recall: \"Sock\" No, could not recall   Recall: \"Blue\" No, could not recall   Recall: \"Bed\" No, could not recall   BIMS Summary Score 6   Staff Assessment for Mental Status   Memory/Recall Ability Current season   Confusion Assessment Method (CAM)   Inattention Behavior continuously present, does not fluctuate   Disorganized thinking Behavior continuously present, does not fluctuate   Altered level of consciousness Behavior not present   Tracheostomy   Tracheostomy No   Speech Mechanisms / Voice Production   Speech Mechanisms / Voice Production (WDL) WDL   Labial Function   Labial Function (WDL) WDL   Lingual Function   Lingual Function (WDL) WDL   Jaw Function   Jaw Function (WDL) WDL   Velar Function   Velar Function (WDL) WDL   Laryngeal Function   Laryngeal Function (WDL) WDL   Swallowing   Swallowing (WDL) X   Thin Liquid Within Functional Limits   Masticated Foods Within Functional Limits   Dysphagia Strategies / Recommendations   Strategies / Interventions Recommended (Yes / No) Yes   Diet / Liquid Recommendation Thin (0);Soft & Bite-Sized (6) - (Dysphagia III) "   Medication Administration  Whole with Liquid Wash   Swallowing/Nutritional Status   Swallowing/Nutritional Status Modified food consistency  (d/t missing/broken teeth)   Functional Level of Assist   Eating Modified Independent   Eating Description Modified diet   Comprehension Moderate Assist   Comprehension Description Glasses;Verbal cues   Expression Minimal Assist   Expression Description Verbal cueing   Social Interaction Supervision   Social Interaction Description Increased time;Schedule;Verbal cues   Problem Solving Maximal Assist   Problem Solving Description Bed/chair alarm;Enclosure bed;Increased time;Seat belt;Supervision;Therapy schedule;Verbal cueing   Memory Maximal Assist   Memory Description Bed/chair alarm;Increased time;Enclosure bed;Seat belt;Supervision;Therapy schedule;Verbal cueing   Outcome Measures   Outcome Measures Utilized MASA;SCCAN   MASA ([place]ena Assessment of Swallowing Ability)   Alertness 10   Cooperation 10   Auditory Comprehension 6   Respiration 10   Respiratory Rate for Swallow 5   Dysphasia 4   Dyspraxia 5   Dysarthria 5   Saliva 5   Lip Seal 5   Tongue Movement 10   Tongue Strength 10   Tongue Coordination 10   Oral Preparation 10   Gag 1   Palate 10   Bolus Clearance 10   Oral Transit 10   Cough Reflex 5   Voluntary Cough 10   Voice 8   Trache 10   Pharygneal Phase 10   Pharyngeal Response 10   Diet Recommendations Mechanical soft with chopped meat   Fluid Recommendations Regular   Swallow Integrity Unlikely dysphagia/aspiration   Total Score 189   Dysphagia Severity No abnormality detected   Aspiration Severity No abnormality detected   SCCAN (Scales of Cognitive and Communicative Ability for Neurorehabilitation)   Oral Expression - Raw Score 10   Oral Expression - Scale Performance Score 53   Orientation - Raw Score 6   Orientation - Scale Performance Score 50   Memory - Raw Score 3   Memory - Scale Performance Score 16   Speech Comprehension - Raw Score 7   Speech  Comprehension - Scale Performance Score 54   Problem List   Problem List Cognitive-Linguistic Deficits;Attention Deficit;Memory Deficit;Verbal Problem Solving Deficits;Impaired Safety;Impaired Judgement   Current Discharge Plan   Current Discharge Plan Return to Prior Living Situation   Benefit   Therapy Benefit Patient would benefit from Inpatient Rehab Speech-Language Pathology to address above identified deficits.   Interdisciplinary Plan of Care Collaboration   IDT Collaboration with  Nursing;Therapy Tech   Patient Position at End of Therapy Seated;Chair Alarm On;Self Releasing Lap Belt Applied   Collaboration Comments POC, Transfer of care to tech to complete lunch meal   Strengths & Barriers   Strengths Willingly participates in therapeutic activities   Barriers Confused;Impaired functional cognition;Impulsive;Difficulty following instructions   Speech Language Pathologist Assigned   Assigned SLP / Treatment Time / Comments EA 60 cog only       Assessment    Patient is 74 y.o. male with a diagnosis of H/O stroke, LLE weakness, swelling, confusion.  Additional factors influencing patient status/progress (ie: cognitive factors, co-morbidities, social support, etc): impaired functional cognition, supportive brother and sis in law.      Oral Pharyngeal Evaluation:  Oral mech examination remarkable for several missing and broken teeth, otherwise structure and function unremarkable. Pt tolerated single and serial sips of liquid and masticated 6- Soft & Bite Sized textures w/ no overt clinical observation of aspiration.   REC: No dysphagia tx warranted at this time.  Speech-language pathologists (SLPs) use the clinical swallow evaluation (CSE) to determine overall clinical status, cognition, oral structure function, and oral mastication skills as they relate to swallowing. Results are used to determine the presence or absence of dysphagia and readiness to begin an oral diet and take medication orally. The CSE cannot  determine the presence or absence of aspiration, but may warrant further instrumental evaluation utilizing flexible endoscopic evaluation of swallowing (FEES) and/or video-fluoroscopic evaluation of swallowing (VFSS).    The Scales of Cognitive and Communicative Ability for Neurorehabilitation (SCCAN) assesses cognitive-communicative deficits and functional ability in patients in rehabilitation hospitals, clinics, and skilled nursing facilities. The SCCAN is appropriate for a broad range of neurological patients, provides a measure of both impairment and functional ability.     SCCAN (Scales of Cognitive and Communicative Ability for Neurorehabilitation)  Oral Expression - Raw Score: 10  Oral Expression - Scale Performance Score: 53  Orientation - Raw Score: 6  Orientation - Scale Performance Score: 50  Memory - Raw Score: 3  Memory - Scale Performance Score: 16  Speech Comprehension - Raw Score: 7  Speech Comprehension - Scale Performance Score: 54  Pt currently presenting with significant deficits in orientation, memory, comprehension and oral expression. Pt often presenting with verbal perseverations, impacting oral expression. Pt does express wants/need appropriately.  REC: Cognitive Linguistic ST    Plan  Recommend Speech Therapy 30-60 minutes per day 5-7 days per week for 2 weeks for the following treatments:  SLP Self Care / ADL Training , SLP Cognitive Skill Development, and SLP Group Treatment.    Passport items to be completed:  Express basic needs, understand food/liquid recommendations, consistently follow swallow precautions, manage finances, manage medications, arrive to therapy appointments on time, complete daily memory log entries, solve problems related to safety situations, review education related to hospitalization, complete caregiver training     Goals:  Long term and short term goals have been discussed with patient and they are in agreement.    Speech Therapy Problems (Active)       Problem:  Memory STGs       Dates: Start:  09/21/24         Goal: STG-Within one week, patient will       Dates: Start:  09/21/24       Description: 1) Individualized goal:  be oriented to day, month, year, and situation w/ 90% acc and MIN A.   2) Interventions:  SLP Self Care / ADL Training , SLP Cognitive Skill Development, and SLP Group Treatment                Problem: Problem Solving STGs       Dates: Start:  09/21/24         Goal: STG-Within one week, patient will       Dates: Start:  09/21/24       Description: 1) Individualized goal:  complete SCCAN with further goals developed as appropriate.   2) Interventions:  SLP Self Care / ADL Training  and SLP Cognitive Skill Development                Problem: Speech/Swallowing LTGs       Dates: Start:  09/21/24         Goal: LTG-By discharge, patient will solve basic problems       Dates: Start:  09/21/24       Description: 1) Individualized goal:  related to health and safety with 80% acc and MOD I for a safe D/C to PLOF.   2) Interventions:  SLP Self Care / ADL Training , SLP Cognitive Skill Development, and SLP Group Treatment

## 2024-09-21 NOTE — THERAPY
Occupational Therapy   Initial Evaluation     Patient Name: Joe Templeton  Age:  74 y.o., Sex:  male  Medical Record #: [id number]  Today's Date: 9/21/2024     Subjective    Pt needed encouragement to participate in 7:00 a.m evaluation.     Objective       09/21/24 0701   OT Charge Group   OT Self Care / ADL (Units) 1   OT Evaluation OT Evaluation Mod   OT Total Time Spent   OT Individual Total Time Spent (Mins) 60   Prior Living Situation   Prior Services Home-Independent   Housing / Facility 1 Story House   Steps Into Home 0   Steps In Home 0   Bathroom Set up   (Unknown at this time.)   Equipment Owned 4-Wheel Walker   Lives with - Patient's Self Care Capacity Alone and Able to Care For Self   Prior Level of ADL Function   Self Feeding Independent   Grooming / Hygiene Independent   Bathing Independent   Dressing Independent   Toileting Independent   Comments Information obtained from chart review.   Prior Level of IADL Function   Medication Management Independent   Laundry Independent   Kitchen Mobility Independent   Finances Independent   Home Management Independent   Shopping Independent   Prior Level Of Mobility Independent With Device in Community;Independent Without Device in Home   Driving / Transportation Driving Independent   Occupation (Pre-Hospital Vocational) Unable To Determine At This Time   Leisure Interests Unable To Determine At This Time   Comments Information obtained from chart review.   Prior Functioning: Everyday Activities   Self Care Independent   Indoor Mobility (Ambulation) Independent   Stairs Unknown   Functional Cognition Independent   Prior Device Use   (Unable to determine at this time.)   Pain   Pain Scales Non Verbal Scale   Intervention Emotional Support;Shower   Pain 0 - 10 Group   Location Ankle   Location Orientation Left   Therapist Pain Assessment Post Activity Pain Same as Prior to Activity   Non Verbal Descriptors   Non Verbal Scale  Calm   Cognitive Pattern Assessment  "  Cognitive Pattern Assessment Used BIMS   Brief Interview for Mental Status (BIMS)   Repetition of Three Words (First Attempt) 2   Temporal Orientation: Year Missed by more than 5 years   Temporal Orientation: Month No answer   Temporal Orientation: Day No answer   Recall: \"Sock\" No, could not recall   Recall: \"Blue\" No, could not recall   Recall: \"Bed\" No, could not recall   BIMS Summary Score 2   Confusion Assessment Method (CAM)   Is there evidence of an acute change in mental status from the patient's baseline? Yes   Inattention Behavior present, fluctuates (comes and goes, changes in severity)   Disorganized thinking Behavior present, fluctuates (comes and goes, changes in severity)   Altered level of consciousness Behavior not present   Vision Screen   Vision Not tested   Passive ROM Upper Body   Passive ROM Upper Body   (However, vision appears WDL's during functional t/f's and ADL tasks.)   Active ROM Upper Body   Active ROM Upper Body  WDL   Dominant Hand Right   Strength Upper Body   Upper Body Strength  WDL   Comments Appears WDL's during ADL's and sit to  bathroom.   Sensation Upper Body   Upper Extremity Sensation  WDL   Upper Body Muscle Tone   Upper Body Muscle Tone  WDL   Balance Assessment   Sitting Balance (Static) Fair -   Standing Balance (Static) Fair -   Bed Mobility    Supine to Sit Minimal Assist   Sit to Stand   (Pt did not perform sit to stand. Pt performed slide t/f from side of bed to w/c with armrest removed.)   Scooting Minimal Assist   Coordination Upper Body   Coordination WDL   Functional Level of Assist   Eating Modified Independent   Eating Description Increased time;Modified diet   Grooming Standby Assist   Bathing Moderate Assist   Bathing Description Tub bench;Hand held shower;Grab bar;Assit wtih lower extremities;Assit with perineal;Increased time;Initial preparation for task;Set-up of equipment;Set up for wound protection;Supervision for safety;Verbal cueing   Upper " Body Dressing Minimal Assist   Upper Body Dressing Description Assit with threading arms through sleeves;Increased time;Initial preparation for task;Set-up of equipment;Supervision for safety   Lower Body Dressing Maximal Assist   Lower Body Dressing Description Grab bar;Assist with threading into pant leg;Increased time;Initial preparation for task;Set-up of equipment;Supervision for safety;Verbal cueing  (Assist with pulling up briefs and pants over hips and Assist with L sock.)   Toileting Maximal Assist   Toileting Description Assist for hygiene;Assist to pull pants up;Grab bar;Increased time;Initial preparation for task;Set-up of equipment;Supervision for safety;Verbal cueing   Bed, Chair, Wheelchair Transfer Minimal Assist   Bed Chair Wheelchair Transfer Description Set-up of equipment;Supervision for safety;Verbal cueing  (Slide t/f from bed to w/c with armrest raised. Pt declined to stand/pivot.)   Toilet Transfers Minimal Assist   Toilet Transfer Description Increased time;Initial preparation for task;Set-up of equipment;Supervision for safety;Verbal cueing;Grab bar  (Slide t/f from w/c to toilet.)   Tub / Shower Transfers Moderate Assist   Tub Shower Transfer Description Initial preparation for task;Set-up of equipment;Supervision for safety;Verbal cueing;Shower bench;Grab bar   Problem Solving Maximal Assist   Problem Solving Description Increased time;Supervision;Verbal cueing;Other (comment);Bed/chair alarm;Seat belt   Memory Maximal Assist   Memory Description Increased time;Seat belt;Bed/chair alarm   Eating   Assistance Needed Independent   Physical Assistance Level No physical assistance   CARE Score - Eating 6   Eating Discharge Goal   Discharge Goal 6   Oral Hygiene   Assistance Needed Set-up / clean-up;Supervision   Physical Assistance Level No physical assistance   CARE Score - Oral Hygiene 4   Oral Hygiene Discharge Goal   Discharge Goal 6   Shower/Bathe Self   Assistance Needed Physical  assistance   Physical Assistance Level 26%-50%   CARE Score - Shower/Bathe Self 3   Shower/Bathe Self Discharge Goal   Discharge Goal 4   Upper Body Dressing   Assistance Needed Physical assistance   Physical Assistance Level 25% or less   CARE Score - Upper Body Dressing 3   Upper Body Dressing Discharge Goal   Discharge Goal 6   Lower Body Dressing   Assistance Needed Physical assistance   Physical Assistance Level 76% or more   CARE Score - Lower Body Dressing 2   Lower Body Dressing Discharge Goal   Discharge Goal 4   Putting On/Taking Off Footwear   Assistance Needed Physical assistance   Physical Assistance Level 51%-75%   CARE Score - Putting On/Taking Off Footwear 2   Putting On/Taking Off Footwear Discharge Goal   Discharge Goal 4   Toileting Hygiene   Assistance Needed Physical assistance   Physical Assistance Level 76% or more   CARE Score - Toileting Hygiene 2   Toileting Hygiene Discharge Goal   Discharge Goal 4   Toilet Transfer   Assistance Needed Physical assistance   Physical Assistance Level 25% or less   CARE Score - Toilet Transfer 3   Toilet Transfer Discharge Goal   Discharge Goal 4   Problem List   Problem List Decreased Active Daily Living Skills;Decreased Functional Mobility;Decreased Activity Tolerance;Safety Awareness Deficits / Cognition;Impaired Postural Control / Balance;Impaired Cognitive Function   Precautions   Precautions Fall Risk;Weight Bearing As Tolerated Left Lower Extremity;Other (See Comments)  (Cognitive impairments.)   Current Discharge Plan   Current Discharge Plan Other (See Comments)  (TBD)   Benefit    Therapy Benefit Patient Would Benefit from Inpatient Rehab Occupational Therapy to Maximize [place] with ADLs, IADLs and Functional Mobility.   Interdisciplinary Plan of Care Collaboration   IDT Collaboration with  Nursing;Physical Therapist;Therapy Tech   Patient Position at End of Therapy Seated;Chair Alarm On;Self Releasing Lap Belt Applied  (Pt taken to dining  room for breakfast.)   OT DME Recommendations   Bathroom Equipment   (TBD)   Strengths & Barriers   Strengths Independent prior level of function   Barriers Decreased endurance;Confused;Impaired activity tolerance;Impulsive;Limited mobility;Impaired insight/denial of deficits;Impaired functional cognition  (Encouragement for pt to participate in ADL's 7:00 a.m.)   Occupational Therapist Assigned   Assigned OT / Treatment Time / Comments Please schedule pt after 7:00 a.m.       Assessment  Patient is 74 y.o. male with a past medical history of CAD s/p CABG and gout; who presented on 9/13/2024 5:10 PM with LE weakness and ankle swelling. Per documentation, patient had worsening symptoms of LLE and ankle swelling x 3 days. Upon eval in the ED, patient was also confused.  Ankle xray was negative for acute fracture. 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  In regards to patient's ankle, patient had an arthrocentesis done in the ED, ortho was consulted, and patient was taken the OR on 9/14 for left ankle I and D for left ankle septic arthritis. Patient is WBAT LLE. .  Additional factors influencing patient status / progress (ie: cognitive factors, co-morbidities, social support, etc).      Plan  Recommend Occupational Therapy  minutes per day 5-7 days per week for 14-21days for the following treatments:  OT Self Care/ADL, OT Cognitive Skill Dev, OT Therapeutic Activity, and OT Therapeutic Exercise.    Passport items to be completed:  Perform bathroom transfers, complete dressing, complete feeding, get ready for the day, prepare a simple meal, participate in household tasks, adapt home for safety needs, demonstrate home exercise program, complete caregiver training     Goals:  Long term and short term goals have been discussed with patient and they are in agreement.    Occupational Therapy Goals  (Active)       Problem: Bathing       Dates: Start:  09/21/24         Goal: STG-Within one week, patient will bathe with Min A using Adaptive equipment as needed.       Dates: Start:  09/21/24               Problem: Dressing       Dates: Start:  09/21/24         Goal: STG-Within one week, patient will dress UB with Standby assistance.       Dates: Start:  09/21/24            Goal: STG-Within one week, patient will dress LB with Min A using Adaptive equipment as needed.       Dates: Start:  09/21/24               Problem: Functional Cognition       Dates: Start:  09/21/24         Goal: STG-Within one week, patient will be oriented to year,month, and place.       Dates: Start:  09/21/24               Problem: Functional Transfers       Dates: Start:  09/21/24         Goal: STG-Within one week, patient will transfer to toilet with Contact Guard Assistance using DME as needed.       Dates: Start:  09/21/24            Goal: STG-Within one week, patient will transfer to step in shower with Min A using DME as needed.       Dates: Start:  09/21/24               Problem: OT Long Term Goals       Dates: Start:  09/21/24         Goal: LTG-By discharge, patient will complete basic self care tasks at Mod Independent with UB ADL's and Supervision with LB ADL's.       Dates: Start:  09/21/24            Goal: LTG-By discharge, patient will perform bathroom transfers with Supervision.       Dates: Start:  09/21/24               Problem: Toileting       Dates: Start:  09/21/24         Goal: STG-Within one week, patient will complete toileting tasks with Min A.       Dates: Start:  09/21/24

## 2024-09-21 NOTE — CONSULTS
DATE OF SERVICE:  9/21/24    REQUESTING PHYSICIAN:  Tre Diehl DO    CHIEF COMPLAINT / REASON FOR CONSULTATION:   Hypertension  Azotemia    HISTORY OF PRESENT ILLNESS:  This is a  73 y/o male with a PMH significant for hypertension, hx of CAD s/p CABG, hx of prior strokes, and gout who presented on 9/13/2024 with LE weakness and ankle swelling.  Per documentation, patient had worsening symptoms of LLE and ankle swelling x 3 days. Upon eval in the ED, patient was also confused.  CTA head and neck obtained for weakness and confusion showed no LVO on CTA head and mid to moderate atherosclerotic plaquing of the common carotid arteries and bilateral ICA stenosis less than 50%.  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  Echo showed an EF of 60% (prior echo from 2016 showed EF of 40-50%).  Neuro consulted and patient is now on ASA and statin.  In regards to patient's ankle, xray was negative for acute fracture.  Patient had an arthrocentesis done in the ED, ortho was consulted, and patient was taken the OR on 9/14 for left ankle I and D for left ankle septic arthritis performed by Dr. Gautam.  Patient is WBAT LLE.  Patient remains on vancomycin, stop date is 10/12.     Because of the patient's weakness and debility, Rehab was consulted, evaluated the patient, and was deemed a good Rehab candidate.  The patient was transferred over to the Rehab facility on 9/20/24.      The patient denies fever, chills, nausea, vomiting, headaches, blurry vision, or chest pain.    REVIEW OF SYSTEMS: All review of systems are negative pre AMA and CMS criteria except for that stated in the HPI.    PAST MEDICAL HISTORY:  Past Medical History:   Diagnosis Date    Arthritis     Gout     Hemorrhoids 11/25/2014    Reportedly Pt has had hemorrhoids for some time. He denies any bloody stools or bleeding hemorrhoids.      Hypertension     Ingrown right big toenail 2015    Low back pain 2015    NSTEMI (non-ST elevated myocardial infarction) (HCC) 2016    Vitamin D deficiency 2015 lab result show slightly low Vitamin D level= 26. He states he walks outside a lot in the sun until the past month.       PAST SURGICAL HISTORY:  Past Surgical History:   Procedure Laterality Date    IRRIGATION & DEBRIDEMENT GENERAL Left 2024    Procedure: IRRIGATION AND DEBRIDEMENT, ANKLE;  Surgeon: Alex Gautam M.D.;  Location: SURGERY [facility];  Service: Orthopedics    MULTIPLE CORONARY ARTERY BYPASS ENDO VEIN HARVEST N/A 2016    Procedure: MULTIPLE CORONARY ARTERY BYPASS ENDO VEIN HARVEST- With DELMI ;  Surgeon: Deejay Duong M.D.;  Location: SURGERY [facility];  Service:        No Known Allergies    CURRENT MEDICATIONS:    Current Facility-Administered Medications:     senna-docusate **AND** polyethylene glycol/lytes    hydrALAZINE    Pharmacy Consult Request    lidocaine    hydrALAZINE    lactulose    docusate sodium    bisacodyl EC    magnesium hydroxide    sodium phosphate    omeprazole    carboxymethylcellulose    benzocaine-menthol    mag hydrox-al hydrox-simeth    ondansetron **OR** ondansetron    traZODone    sodium chloride    amLODIPine    enoxaparin (LOVENOX) injection    lisinopril    MD Alert...Vancomycin per Pharmacy    metoprolol SR    vancomycin    acetaminophen    ziprasidone    aspirin **OR** [DISCONTINUED] aspirin    atorvastatin    traMADol    QUEtiapine    formulation r    Social History     Socioeconomic History    Marital status: Single    Number of children: 0   Tobacco Use    Smoking status: Former     Current packs/day: 0.00     Average packs/day: 0.3 packs/day for 0.5 years (0.1 ttl pk-yrs)     Types: Cigarettes     Start date: 2015     Quit date: 2016     Years since quittin.2    Smokeless tobacco: Never   Substance and Sexual Activity    Alcohol use: No     Drug use: No     Comment: Hx of drug abuse-pot,meth, cocaine (None for 15 yrs) never injected drugs    Sexual activity: Not Currently     Social Determinants of Health     Food Insecurity: No Food Insecurity (9/20/2024)    Hunger Vital Sign     Worried About Running Out of Food in the Last Year: Never true     Ran Out of Food in the Last Year: Never true   Transportation Needs: No Transportation Needs (9/14/2024)    PRAPARE - Transportation     Lack of Transportation (Medical): No     Lack of Transportation (Non-Medical): No   Intimate Partner Violence: Not At Risk (9/20/2024)    Humiliation, Afraid, Rape, and Kick questionnaire     Fear of Current or Ex-Partner: No     Emotionally Abused: No     Physically Abused: No     Sexually Abused: No   Housing Stability: Low Risk  (9/20/2024)    Housing Stability Vital Sign     Unable to Pay for Housing in the Last Year: No     Number of Times Moved in the Last Year: 0     Homeless in the Last Year: No       FAMILY HISTORY:  was reviewed and is not pertinent to this consultation.    PHYSICAL EXAMINATION:  VITAL SIGNS:  Temp is 97.8, blood pressure is 147/78 --> 136/68, heart rate is 92, respiratory rate is 19.  GENERAL:  Patient was lying in bed in no distress.  HEENT:  Pupils were equal, round and reactive to light and accomodation.  Oral mucosa was pink and moist.  NECK:  Soft.  Supple.  No JVD.  HEART:  Regular rate and rhythm.  Normal S1 and S2.  There was noted a murmur.  LUNGS:  Are clear to auscultation bilaterally.  ABDOMEN:  Soft, non tender, non distended.  Bowels sound were positive in all four quadrants.  EXTREMITIES:  No clubbing, cyanosis.  There was no lower extremity edema.  NEUROLOGIC:  Cranial nerves two through twelve were grossly intact.    LABS:  Lab Results   Component Value Date/Time    SODIUM 145 09/21/2024 06:33 AM    POTASSIUM 4.2 09/21/2024 06:33 AM    CHLORIDE 112 09/21/2024 06:33 AM    CO2 13 (L) 09/21/2024 06:33 AM    GLUCOSE 140 (H)  09/21/2024 06:33 AM    BUN 26 (H) 09/21/2024 06:33 AM    CREATININE 0.83 09/21/2024 06:33 AM      Lab Results   Component Value Date/Time    WBC 11.9 (H) 09/21/2024 09:15 AM    RBC 4.78 09/21/2024 09:15 AM    HEMOGLOBIN 14.6 09/21/2024 09:15 AM    HEMATOCRIT 44.6 09/21/2024 09:15 AM    MCV 93.3 09/21/2024 09:15 AM    MCH 30.5 09/21/2024 09:15 AM    MCHC 32.7 09/21/2024 09:15 AM    MPV 11.3 09/21/2024 09:15 AM    NEUTSPOLYS 75.40 (H) 09/21/2024 09:15 AM    LYMPHOCYTES 11.80 (L) 09/21/2024 09:15 AM    MONOCYTES 11.20 09/21/2024 09:15 AM    EOSINOPHILS 0.80 09/21/2024 09:15 AM    BASOPHILS 0.40 09/21/2024 09:15 AM      Lab Results   Component Value Date/Time    PROTHROMBTM 14.8 (H) 09/13/2024 10:40 PM    INR 1.14 (H) 09/13/2024 10:40 PM        Essential (primary) hypertension  BP a little elevated but was wnl this afternoon (9/21)  Cont Norvasc: 10 mg daily  Cont Lisinopril: 40 mg daily  Cont Hydralazine: 75 mg tid   Cont Toprol XL: 25 mg daily  Will monitor another day since meds were recently adjusted    CAD (coronary artery disease)  Has hx of CABG  Cont Toprol XL  Cont Lisinopril  Cont ASA, Lipitor    Azotemia  Bun: 23 --> 24 --> 26  Encouraging fluid intake  Moniotr    Septic arthritis of ankle (HCC)  [place] [facility] [place]centesis as done  S/P left ankle I and D for left ankle septic arthritis (9/14)  Has some mild on & off leukocytosis  Has been afebrile  Cont IV Vanco (thru 10/12 per ID)    Stroke (HCC)  Has hx of old CVA's  S/P recent CVA  MRI showed multiple old infarcts and multiple acute infarcts (including the hao)  Cont ASA, Lipitor      This case has been discussed with the attending Physiatrist.    Thank you for the consultation.  Will follow the patient with you.

## 2024-09-22 PROBLEM — E87.6 HYPOKALEMIA: Status: ACTIVE | Noted: 2024-09-22

## 2024-09-22 PROBLEM — E55.9 VITAMIN D DEFICIENCY: Status: ACTIVE | Noted: 2024-09-22

## 2024-09-22 LAB
ANION GAP SERPL CALC-SCNC: 12 MMOL/L (ref 7–16)
BUN SERPL-MCNC: 23 MG/DL (ref 8–22)
CALCIUM SERPL-MCNC: 8.4 MG/DL (ref 8.5–10.5)
CHLORIDE SERPL-SCNC: 110 MMOL/L (ref 96–112)
CO2 SERPL-SCNC: 20 MMOL/L (ref 20–33)
CREAT SERPL-MCNC: 0.8 MG/DL (ref 0.5–1.4)
ERYTHROCYTE [DISTWIDTH] IN BLOOD BY AUTOMATED COUNT: 42.9 FL (ref 35.9–50)
GFR SERPLBLD CREATININE-BSD FMLA CKD-EPI: 93 ML/MIN/1.73 M 2
GLUCOSE SERPL-MCNC: 136 MG/DL (ref 65–99)
HCT VFR BLD AUTO: 42.6 % (ref 42–52)
HGB BLD-MCNC: 14.4 G/DL (ref 14–18)
MCH RBC QN AUTO: 31.6 PG (ref 27–33)
MCHC RBC AUTO-ENTMCNC: 33.8 G/DL (ref 32.3–36.5)
MCV RBC AUTO: 93.4 FL (ref 81.4–97.8)
PLATELET # BLD AUTO: 266 K/UL (ref 164–446)
PMV BLD AUTO: 11.1 FL (ref 9–12.9)
POTASSIUM SERPL-SCNC: 3.5 MMOL/L (ref 3.6–5.5)
RBC # BLD AUTO: 4.56 M/UL (ref 4.7–6.1)
SODIUM SERPL-SCNC: 142 MMOL/L (ref 135–145)
WBC # BLD AUTO: 11.2 K/UL (ref 4.8–10.8)

## 2024-09-22 PROCEDURE — A9270 NON-COVERED ITEM OR SERVICE: HCPCS | Performed by: HOSPITALIST

## 2024-09-22 PROCEDURE — 36415 COLL VENOUS BLD VENIPUNCTURE: CPT

## 2024-09-22 PROCEDURE — 770005 HCHG ROOM/CARE - REHAB PRIVATE (11*

## 2024-09-22 PROCEDURE — 99232 SBSQ HOSP IP/OBS MODERATE 35: CPT | Performed by: HOSPITALIST

## 2024-09-22 PROCEDURE — 80048 BASIC METABOLIC PNL TOTAL CA: CPT

## 2024-09-22 PROCEDURE — 94760 N-INVAS EAR/PLS OXIMETRY 1: CPT

## 2024-09-22 PROCEDURE — 700102 HCHG RX REV CODE 250 W/ 637 OVERRIDE(OP): Performed by: HOSPITALIST

## 2024-09-22 PROCEDURE — 700111 HCHG RX REV CODE 636 W/ 250 OVERRIDE (IP): Performed by: PHYSICAL MEDICINE & REHABILITATION

## 2024-09-22 PROCEDURE — A9270 NON-COVERED ITEM OR SERVICE: HCPCS | Performed by: PHYSICAL MEDICINE & REHABILITATION

## 2024-09-22 PROCEDURE — A9270 NON-COVERED ITEM OR SERVICE: HCPCS | Performed by: STUDENT IN AN ORGANIZED HEALTH CARE EDUCATION/TRAINING PROGRAM

## 2024-09-22 PROCEDURE — 700102 HCHG RX REV CODE 250 W/ 637 OVERRIDE(OP): Performed by: STUDENT IN AN ORGANIZED HEALTH CARE EDUCATION/TRAINING PROGRAM

## 2024-09-22 PROCEDURE — 700102 HCHG RX REV CODE 250 W/ 637 OVERRIDE(OP): Performed by: PHYSICAL MEDICINE & REHABILITATION

## 2024-09-22 PROCEDURE — 700105 HCHG RX REV CODE 258: Performed by: PHYSICAL MEDICINE & REHABILITATION

## 2024-09-22 PROCEDURE — 85027 COMPLETE CBC AUTOMATED: CPT

## 2024-09-22 RX ORDER — VITAMIN B COMPLEX
1000 TABLET ORAL DAILY
Status: DISCONTINUED | OUTPATIENT
Start: 2024-09-22 | End: 2024-10-07 | Stop reason: HOSPADM

## 2024-09-22 RX ORDER — HYDRALAZINE HYDROCHLORIDE 50 MG/1
100 TABLET, FILM COATED ORAL EVERY 8 HOURS
Status: DISCONTINUED | OUTPATIENT
Start: 2024-09-22 | End: 2024-10-07 | Stop reason: HOSPADM

## 2024-09-22 RX ORDER — POTASSIUM CHLORIDE 1500 MG/1
20 TABLET, EXTENDED RELEASE ORAL DAILY
Status: COMPLETED | OUTPATIENT
Start: 2024-09-23 | End: 2024-09-25

## 2024-09-22 RX ORDER — POTASSIUM CHLORIDE 1500 MG/1
40 TABLET, EXTENDED RELEASE ORAL ONCE
Status: COMPLETED | OUTPATIENT
Start: 2024-09-22 | End: 2024-09-22

## 2024-09-22 RX ADMIN — POLYETHYLENE GLYCOL 3350 1 PACKET: 17 POWDER, FOR SOLUTION ORAL at 22:51

## 2024-09-22 RX ADMIN — Medication 1000 UNITS: at 13:34

## 2024-09-22 RX ADMIN — HYDRALAZINE HYDROCHLORIDE 100 MG: 50 TABLET ORAL at 22:53

## 2024-09-22 RX ADMIN — AMLODIPINE BESYLATE 10 MG: 5 TABLET ORAL at 05:28

## 2024-09-22 RX ADMIN — METOPROLOL SUCCINATE 25 MG: 25 TABLET, EXTENDED RELEASE ORAL at 05:28

## 2024-09-22 RX ADMIN — POTASSIUM CHLORIDE 40 MEQ: 1500 TABLET, EXTENDED RELEASE ORAL at 13:34

## 2024-09-22 RX ADMIN — VANCOMYCIN HYDROCHLORIDE 1250 MG: 5 INJECTION, POWDER, LYOPHILIZED, FOR SOLUTION INTRAVENOUS at 05:26

## 2024-09-22 RX ADMIN — ENOXAPARIN SODIUM 40 MG: 100 INJECTION SUBCUTANEOUS at 18:28

## 2024-09-22 RX ADMIN — QUETIAPINE FUMARATE 50 MG: 25 TABLET ORAL at 22:51

## 2024-09-22 RX ADMIN — BISACODYL 5 MG: 5 TABLET, COATED ORAL at 02:38

## 2024-09-22 RX ADMIN — LISINOPRIL 40 MG: 20 TABLET ORAL at 08:35

## 2024-09-22 RX ADMIN — VANCOMYCIN HYDROCHLORIDE 1250 MG: 5 INJECTION, POWDER, LYOPHILIZED, FOR SOLUTION INTRAVENOUS at 18:27

## 2024-09-22 RX ADMIN — ASPIRIN 81 MG: 81 TABLET, CHEWABLE ORAL at 08:36

## 2024-09-22 RX ADMIN — HYDRALAZINE HYDROCHLORIDE 100 MG: 50 TABLET ORAL at 13:34

## 2024-09-22 RX ADMIN — OMEPRAZOLE 20 MG: 20 CAPSULE, DELAYED RELEASE ORAL at 08:36

## 2024-09-22 RX ADMIN — HYDRALAZINE HYDROCHLORIDE 75 MG: 50 TABLET ORAL at 05:28

## 2024-09-22 RX ADMIN — SENNOSIDES AND DOCUSATE SODIUM 2 TABLET: 50; 8.6 TABLET ORAL at 22:51

## 2024-09-22 RX ADMIN — ATORVASTATIN CALCIUM 80 MG: 40 TABLET, FILM COATED ORAL at 22:51

## 2024-09-22 ASSESSMENT — ENCOUNTER SYMPTOMS
COUGH: 0
DIARRHEA: 0
NERVOUS/ANXIOUS: 0
BLURRED VISION: 0
DIZZINESS: 0
FEVER: 0

## 2024-09-22 ASSESSMENT — PAIN DESCRIPTION - PAIN TYPE: TYPE: ACUTE PAIN

## 2024-09-22 ASSESSMENT — FIBROSIS 4 INDEX: FIB4 SCORE: 1.7

## 2024-09-22 NOTE — CARE PLAN
Problem: Fall Risk - Rehab  Goal: Patient will remain free from falls  Outcome: Progressing  Patient verbalized understanding to utilize call light for needs, requests, ambulation, and toileting.  Patient remains impulsive and tries to get out of the soma bed.     The patient is Watcher - Medium risk of patient condition declining or worsening    Shift Goals  Clinical Goals: Safety  Patient Goals: Rest

## 2024-09-22 NOTE — PROGRESS NOTES
NURSING DAILY NOTE    Name: Joe Templeton   Date of Admission: 9/20/2024   Admitting Diagnosis: No Principal Problem: There is no principal problem currently on the Problem List. Please update the Problem List and refresh.  Attending Physician: ELIZABETH HOLM D.O.  Allergies: Patient has no known allergies.    Safety  Patient Assist  mod assist  Patient Precautions  Fall Risk  Precaution Comments     Bed Transfer Status  Minimal Assist  Toilet Transfer Status   Minimal Assist  Assistive Devices  Walker - front wheel, Wheelchair  Oxygen  None - Room Air  Diet/Therapeutic Dining  Current Diet Order   Procedures    Diet Order Diet: Level 6 - Soft and Bite Sized (Set pt up w/ meal- he can then self-feed independently. Pills whole.); Liquid level: Level 0 - Thin; Tray Modifications (optional): SLP - Deliver to Nursing Station     Pill Administration  whole  Agitated Behavioral Scale  18  ABS Level of Severity  No Agitation    Fall Risk  Has the patient had a fall this admission?   No  Nikole Cesar Fall Risk Scoring  22, HIGH RISK  Fall Risk Safety Measures  bed alarm, posey bed , poor balance, and low vision/ hearing    Vitals  Temperature: 36.8 °C (98.2 °F)  Temp src: Temporal  Pulse: 79  Respiration: 18  Blood Pressure : (!) 157/84  Blood Pressure MAP (Calculated): 108 MM HG  BP Location: Left, Upper Arm  Patient BP Position: Andres's Position     Oxygen  Pulse Oximetry: 96 %  O2 (LPM): 0  O2 Delivery Device: None - Room Air    Bowel and Bladder  Last Bowel Movement  09/15/24 (per report)  Stool Type     Bowel Device  Bathroom, Other (Comment) (bowel)  Continent  Bladder: Did not void   Bowel: No movement  Bladder Function  Urine Void (mL):  (moderate)  Urine Color: Yellow  Straight Catheter: 550 ml  Genitourinary Assessment   Bladder Assessment (WDL):  WDL Except  Silva Catheter: Not Applicable  Urine Color: Yellow  Bladder Device: Bathroom, Urinal  Time  Void: Yes  $ Bladder Scan Results (mL): 21    Skin  Colt Score   16  Sensory Interventions   Bed Types: Other (Comments) (Soma)  Skin Preventative Measures: Pillows in Use for Support / Positioning  Moisture Interventions  Moisturizers/Barriers: Moisturizer       Pain  Pain Rating Scale  0 - No Pain  Pain Location  Ankle  Pain Location Orientation  Left  Pain Interventions   Declines    ADLs    Bathing      Linen Change      Personal Hygiene     Chlorhexidine Bath      Oral Care     Teeth/Dentures     Shave     Nutrition Percentage Eaten  *  * Meal *  *, Dinner, Between % Consumed  Environmental Precautions  Treaded Slipper Socks on Patient, Personal Belongings, Wastebasket, Call Bell etc. in Easy Reach, Report Given to Other Health Care Providers Regarding Fall Risk, Bed in Low Position  Patient Turns/Positioning  Patient Turns Self from Side to Side  Patient Turns Assistance/Tolerance  Assistance of One  Bed Positions  Bed Controls On  Head of Bed Elevated  Self regulated      Psychosocial/Neurologic Assessment  Psychosocial Assessment  Psychosocial (WDL):  WDL Except  Patient Behaviors: Confused, Forgetful  Neurologic Assessment  Neuro (WDL): Exceptions to WDL  Level of Consciousness: Alert  Orientation Level: Disoriented to time, Oriented to situation  Cognition: Poor judgement, Memory Loss, Poor safety awareness  Speech: Clear  Muscle Strength Right Arm: Good Strength Against Gravity and Moderate Resistance  Muscle Strength Left Arm: Good Strength Against Gravity and Moderate Resistance  Muscle Strength Right Leg: Fair Strength against Gravity but No Resistance  Muscle Strength Left Leg: Weak Movement but Not Against Gravity or Resistance  EENT (WDL):  WDL Except    Cardio/Pulmonary Assessment  Edema   LLE Edema: 2+ (Foot)  Respiratory Breath Sounds  RUL Breath Sounds: Clear  RML Breath Sounds: Clear  RLL Breath Sounds: Diminished  YONI Breath Sounds: Clear  LLL Breath Sounds: Diminished  Cardiac  Assessment   Cardiac (WDL):  WDL Except (Hx of CAD, CABG, HTN)

## 2024-09-22 NOTE — DISCHARGE PLANNING
CASE MANAGEMENT INITIAL ASSESSMENT    Admit Date:  2024     Attempted to meet with pt, at this time pt is confused and sleeping in a POSEY bed. TC with MAURICE Gonzalezn to discuss role of case management / discharge planning / team conference.   Patient is a  74 y.o. male transferred from [facility].    Diagnosis: H/O: stroke [Z86.73]    Co-morbidities:   Patient Active Problem List    Diagnosis Date Noted    Vitamin D deficiency 2024    Hypokalemia 2024    Azotemia 2024    H/O: stroke 2024    Stroke (HCC) 2024    Septic arthritis of ankle (HCC) 2024    CAD (coronary artery disease) 2024    Left-sided weakness 2024    Murmur 2024    Systolic heart failure (HCC) 2024    Benign prostatic hyperplasia (BPH) with straining on urination 2024    Hyperlipidemia 2016    3-vessel CAD 2016    Right knee pain 2015    Essential (primary) hypertension 2014    Gout 2014     Prior Living Situation:  [facility]  Lives with: Brother Belinda and MAURICE Cool    Prior Level of Function:  Medication Management: Independent  Finances: Independent  Home Management: Independent  Shopping: Independent  Prior Level Of Mobility: Independent With Device in Community, Independent Without Device in Home  Driving: Driving Independent    Support Systems:  Primary : Belinda montano Silvina Jareth Black and MAURICE [phone]    Previous Services Utilized:   Equipment Owned: 4-Wheel Walker, Single Point Cane  Prior Services: Home-Independent    Other Information:  Occupation: Retired Due To Age    Primary Payor Source: Medicare A&B  Secondary Payor Source: Medicaid QMB  Primary Care Practitioner: No PCP per MAURICE    Patient / Family Goal: Return to [facility] 1STE with MAURICE Silvina and brother Belinda. Resume 4WW and SPC. Per MAURICE she and pt's brother Belinda are retired and they all help each other at home.     Plan:  1. Continue to follow patient through  hospitalization and provide discharge planning in collaboration with patient, family, physicians and ancillary services.     2. Utilize community resources to ensure a safe discharge.

## 2024-09-22 NOTE — PROGRESS NOTES
NURSING DAILY NOTE    Name: Joe Templeton   Date of Admission: 9/20/2024   Admitting Diagnosis: No Principal Problem: There is no principal problem currently on the Problem List. Please update the Problem List and refresh.  Attending Physician: ELIZABETH HOLM D.O.  Allergies: Patient has no known allergies.    Safety  Patient Assist     Patient Precautions  Fall Risk  Precaution Comments     Bed Transfer Status  Minimal Assist  Toilet Transfer Status   Minimal Assist  Assistive Devices  Rails, Wheelchair  Oxygen  None - Room Air  Diet/Therapeutic Dining  Current Diet Order   Procedures    Diet Order Diet: Level 6 - Soft and Bite Sized (Set pt up w/ meal- he can then self-feed independently. Pills whole.); Liquid level: Level 0 - Thin; Tray Modifications (optional): SLP - Deliver to Nursing Station     Pill Administration  whole  Agitated Behavioral Scale  17  ABS Level of Severity  No Agitation    Fall Risk  Has the patient had a fall this admission?   No  Nikole Cesar Fall Risk Scoring  22, HIGH RISK  Fall Risk Safety Measures  bed alarm, chair alarm, seatbelt alarm, posey bed , poor balance, and low vision/ hearing    Vitals  Temperature: 36.4 °C (97.5 °F)  Temp src: Oral  Pulse: 81  Respiration: 18  Blood Pressure : 127/52  Blood Pressure MAP (Calculated): 77 MM HG  BP Location: Right, Upper Arm  Patient BP Position: Andres's Position     Oxygen  Pulse Oximetry: 95 %  O2 (LPM): 0  O2 Delivery Device: None - Room Air    Bowel and Bladder  Last Bowel Movement  09/15/24 (per report)  Stool Type     Bowel Device  Bathroom, Other (Comment) (bowel)  Continent  Bladder: Did not void   Bowel: No movement  Bladder Function  Urine Color: Yellow  Straight Catheter: 550 ml  Genitourinary Assessment   Bladder Assessment (WDL):  WDL Except  Silva Catheter: Not Applicable  Urine Color: Yellow  Bladder Device: Bathroom, Urinal  Time Void: Yes  $ Bladder Scan  Results (mL): 491    Skin  Colt Score   16  Sensory Interventions   Bed Types: Other (Comments) (Soma)  Skin Preventative Measures: Seat Cushion in Use on Chair when Out of Bed  Moisture Interventions  Moisturizers/Barriers: Moisturizer       Pain  Pain Rating Scale  0 - No Pain  Pain Location  Ankle  Pain Location Orientation  Left  Pain Interventions   Declines    ADLs    Bathing      Linen Change      Personal Hygiene     Chlorhexidine Bath      Oral Care     Teeth/Dentures     Shave     Nutrition Percentage Eaten  *  * Meal *  *, Dinner, Between % Consumed  Environmental Precautions     Patient Turns/Positioning  Patient Turns Self from Side to Side  Patient Turns Assistance/Tolerance  Assistance of One, Tolerates Well, General Weakness  Bed Positions  Bed Controls On, Bed Locked  Head of Bed Elevated  Self regulated      Psychosocial/Neurologic Assessment  Psychosocial Assessment  Psychosocial (WDL):  WDL Except  Patient Behaviors: Confused, Forgetful  Neurologic Assessment  Neuro (WDL): Exceptions to WDL  Level of Consciousness: Alert  Orientation Level: Disoriented to time, Oriented to situation  Cognition: Poor judgement, Memory Loss, Poor safety awareness  Speech: Clear  Muscle Strength Right Arm: Good Strength Against Gravity and Moderate Resistance  Muscle Strength Left Arm: Good Strength Against Gravity and Moderate Resistance  Muscle Strength Right Leg: Fair Strength against Gravity but No Resistance  Muscle Strength Left Leg: Weak Movement but Not Against Gravity or Resistance  EENT (WDL):  WDL Except    Cardio/Pulmonary Assessment  Edema   LLE Edema: 2+ (foot)  Respiratory Breath Sounds  RUL Breath Sounds: Clear  RML Breath Sounds: Clear  RLL Breath Sounds: Diminished  YOIN Breath Sounds: Clear  LLL Breath Sounds: Diminished  Cardiac Assessment   Cardiac (WDL):  WDL Except

## 2024-09-22 NOTE — CARE PLAN
Problem: Self Care  Goal: Patient will have the ability to perform ADLs independently or with assistance  Outcome: Progressing  Note: Patient able to perform regular activities this shift.  Pain controlled this shift.  Pain management includes PRN pain meds as well as non-pharmacological measures such as emotional support, rest, and repositioning.  Will continue to monitor.   The patient is Stable - Low risk of patient condition declining or worsening    Shift Goals  Clinical Goals: Safety  Patient Goals: Rest    Progress made toward(s) clinical / shift goals:  pt participates in therapy and is cooperative with nursing    Patient is not progressing towards the following goals:

## 2024-09-22 NOTE — PROGRESS NOTES
[facility] Medicine Daily Progress Note    Date of Service  9/22/2024    Chief Complaint:  Hypertension  Azotemia    Interval History:  BP elevated recently and have increased his Hydralazine dose.    Review of Systems  Review of Systems   Constitutional:  Negative for fever.   Eyes:  Negative for blurred vision.   Respiratory:  Negative for cough.    Cardiovascular:  Negative for chest pain.   Gastrointestinal:  Negative for diarrhea.   Musculoskeletal:  Negative for joint pain.   Neurological:  Negative for dizziness.   Psychiatric/Behavioral:  The patient is not nervous/anxious.         Physical Exam  Temp:  [36.4 °C (97.5 °F)-36.8 °C (98.2 °F)] 36.5 °C (97.7 °F)  Pulse:  [72-83] 79  Resp:  [16-18] 17  BP: (127-165)/(52-84) 156/78  SpO2:  [94 %-97 %] 94 %    Physical Exam  Vitals and nursing note reviewed.   Constitutional:       Appearance: He is not diaphoretic.   HENT:      Mouth/Throat:      Pharynx: No oropharyngeal exudate or posterior oropharyngeal erythema.   Eyes:      Extraocular Movements: Extraocular movements intact.   Neck:      Vascular: No carotid bruit.   Cardiovascular:      Rate and Rhythm: Normal rate and regular rhythm.   Pulmonary:      Effort: Pulmonary effort is normal.      Breath sounds: No wheezing or rales.   Abdominal:      General: There is no distension.      Palpations: Abdomen is soft.      Tenderness: There is no abdominal tenderness.   Musculoskeletal:      Right lower leg: No edema.      Left lower leg: No edema.   Skin:     General: Skin is warm and dry.   Neurological:      Mental Status: He is alert and oriented to person, place, and time.   Psychiatric:         Mood and Affect: Mood normal.         Behavior: Behavior normal.         Fluids    Intake/Output Summary (Last 24 hours) at 9/22/2024 1132  Last data filed at 9/21/2024 1840  Gross per 24 hour   Intake 720 ml   Output --   Net 720 ml        Laboratory  Recent Labs     09/21/24  0915 09/22/24  0553   WBC 11.9* 11.2*    RBC 4.78 4.56*   HEMOGLOBIN 14.6 14.4   HEMATOCRIT 44.6 42.6   MCV 93.3 93.4   MCH 30.5 31.6   MCHC 32.7 33.8   RDW 42.4 42.9   PLATELETCT 289 266   MPV 11.3 11.1     Recent Labs     09/20/24  0450 09/21/24  0633 09/22/24  0553   SODIUM 142 145 142   POTASSIUM 4.1 4.2 3.5*   CHLORIDE 110 112 110   CO2 21 13* 20   GLUCOSE 151* 140* 136*   BUN 24* 26* 23*   CREATININE 0.93 0.83 0.80   CALCIUM 9.0 9.3 8.4*                   Imaging    Assessment/Plan  Hypokalemia  Assessment & Plan  K+: 4.2 --> 3.5  Likely 2nd to diminished oral intake  Will give KCL: 40 meq x 1  Will start KCL: 20 meq daily x 3 days  Cont to monitor    Vitamin D deficiency  Assessment & Plan  Vit D: 18  Will start supplements    Azotemia  Assessment & Plan  Bun: 23 --> 24 --> 26 --> 23  Encouraging fluid intake  Moniotr    Stroke (HCC)- (present on admission)  Assessment & Plan  Has hx of old CVA's  S/P recent CVA  MRI showed multiple old infarcts and multiple acute infarcts (including the hao)  Cont ASA, Lipitor    CAD (coronary artery disease)- (present on admission)  Assessment & Plan  Has hx of CABG  Cont Toprol XL  Cont Lisinopril  Cont ASA, Lipitor    Septic arthritis of ankle (HCC)- (present on admission)  Assessment & Plan  Dx at [facility]  Arthrocentesis as done  S/P left ankle I and D for left ankle septic arthritis (9/14)  Has some mild on & off leukocytosis  Has been afebrile  Cont IV Vanco (thru 10/12 per ID)    Essential (primary) hypertension- (present on admission)  Assessment & Plan  BP elevated recently again  Cont Norvasc: 10 mg daily  Cont Lisinopril: 40 mg daily  Cont Hydralazine: 75 mg tid --> will increase to 100 mg tid   Cont Toprol XL: 25 mg daily  Cont to monitor

## 2024-09-22 NOTE — ASSESSMENT & PLAN NOTE
K+: 4.2 --> 3.5  Likely 2nd to diminished oral intake  S/P KCL: 40 meq x 1  Cont KCL: 20 meq daily x 3 days  Cont to monitor

## 2024-09-23 ENCOUNTER — APPOINTMENT (OUTPATIENT)
Dept: OCCUPATIONAL THERAPY | Facility: REHABILITATION | Age: 74
DRG: 057 | End: 2024-09-23
Attending: PHYSICAL MEDICINE & REHABILITATION
Payer: MEDICARE

## 2024-09-23 ENCOUNTER — APPOINTMENT (OUTPATIENT)
Dept: PHYSICAL THERAPY | Facility: REHABILITATION | Age: 74
DRG: 057 | End: 2024-09-23
Attending: PHYSICAL MEDICINE & REHABILITATION
Payer: MEDICARE

## 2024-09-23 ENCOUNTER — HOSPITAL ENCOUNTER (OUTPATIENT)
Dept: RADIOLOGY | Facility: MEDICAL CENTER | Age: 74
End: 2024-09-23
Attending: PHYSICAL MEDICINE & REHABILITATION

## 2024-09-23 ENCOUNTER — APPOINTMENT (OUTPATIENT)
Dept: SPEECH THERAPY | Facility: REHABILITATION | Age: 74
DRG: 057 | End: 2024-09-23
Attending: PHYSICAL MEDICINE & REHABILITATION
Payer: MEDICARE

## 2024-09-23 LAB
FUNGUS SPEC CULT: NORMAL
FUNGUS SPEC FUNGUS STN: NORMAL
SIGNIFICANT IND 70042: NORMAL
SITE SITE: NORMAL
SOURCE SOURCE: NORMAL

## 2024-09-23 PROCEDURE — 99232 SBSQ HOSP IP/OBS MODERATE 35: CPT | Performed by: PHYSICAL MEDICINE & REHABILITATION

## 2024-09-23 PROCEDURE — 99232 SBSQ HOSP IP/OBS MODERATE 35: CPT | Performed by: HOSPITALIST

## 2024-09-23 PROCEDURE — 97535 SELF CARE MNGMENT TRAINING: CPT

## 2024-09-23 PROCEDURE — 700111 HCHG RX REV CODE 636 W/ 250 OVERRIDE (IP): Mod: JZ | Performed by: PHYSICAL MEDICINE & REHABILITATION

## 2024-09-23 PROCEDURE — 97129 THER IVNTJ 1ST 15 MIN: CPT

## 2024-09-23 PROCEDURE — 97530 THERAPEUTIC ACTIVITIES: CPT

## 2024-09-23 PROCEDURE — A9270 NON-COVERED ITEM OR SERVICE: HCPCS | Mod: JZ | Performed by: HOSPITALIST

## 2024-09-23 PROCEDURE — 770005 HCHG ROOM/CARE - REHAB PRIVATE (11*

## 2024-09-23 PROCEDURE — A9270 NON-COVERED ITEM OR SERVICE: HCPCS | Performed by: STUDENT IN AN ORGANIZED HEALTH CARE EDUCATION/TRAINING PROGRAM

## 2024-09-23 PROCEDURE — C1751 CATH, INF, PER/CENT/MIDLINE: HCPCS

## 2024-09-23 PROCEDURE — 700102 HCHG RX REV CODE 250 W/ 637 OVERRIDE(OP): Performed by: PHYSICAL MEDICINE & REHABILITATION

## 2024-09-23 PROCEDURE — A9270 NON-COVERED ITEM OR SERVICE: HCPCS | Performed by: PHYSICAL MEDICINE & REHABILITATION

## 2024-09-23 PROCEDURE — 700102 HCHG RX REV CODE 250 W/ 637 OVERRIDE(OP): Performed by: STUDENT IN AN ORGANIZED HEALTH CARE EDUCATION/TRAINING PROGRAM

## 2024-09-23 PROCEDURE — A9270 NON-COVERED ITEM OR SERVICE: HCPCS | Performed by: HOSPITALIST

## 2024-09-23 PROCEDURE — 700105 HCHG RX REV CODE 258: Performed by: PHYSICAL MEDICINE & REHABILITATION

## 2024-09-23 PROCEDURE — 97116 GAIT TRAINING THERAPY: CPT

## 2024-09-23 PROCEDURE — 97110 THERAPEUTIC EXERCISES: CPT

## 2024-09-23 PROCEDURE — 05HC33Z INSERTION OF INFUSION DEVICE INTO LEFT BASILIC VEIN, PERCUTANEOUS APPROACH: ICD-10-PCS | Performed by: PHYSICAL MEDICINE & REHABILITATION

## 2024-09-23 PROCEDURE — 97130 THER IVNTJ EA ADDL 15 MIN: CPT

## 2024-09-23 PROCEDURE — 700102 HCHG RX REV CODE 250 W/ 637 OVERRIDE(OP): Mod: JZ | Performed by: HOSPITALIST

## 2024-09-23 PROCEDURE — 700102 HCHG RX REV CODE 250 W/ 637 OVERRIDE(OP): Performed by: HOSPITALIST

## 2024-09-23 RX ORDER — POLYETHYLENE GLYCOL 3350 17 G/17G
1 POWDER, FOR SOLUTION ORAL 2 TIMES DAILY
Status: DISCONTINUED | OUTPATIENT
Start: 2024-09-23 | End: 2024-10-07 | Stop reason: HOSPADM

## 2024-09-23 RX ORDER — METOPROLOL SUCCINATE 25 MG/1
50 TABLET, EXTENDED RELEASE ORAL DAILY
Status: DISCONTINUED | OUTPATIENT
Start: 2024-09-24 | End: 2024-09-26

## 2024-09-23 RX ORDER — AMOXICILLIN 250 MG
2 CAPSULE ORAL 2 TIMES DAILY
Status: DISCONTINUED | OUTPATIENT
Start: 2024-09-23 | End: 2024-10-07 | Stop reason: HOSPADM

## 2024-09-23 RX ORDER — METOPROLOL SUCCINATE 25 MG/1
25 TABLET, EXTENDED RELEASE ORAL ONCE
Status: COMPLETED | OUTPATIENT
Start: 2024-09-23 | End: 2024-09-23

## 2024-09-23 RX ADMIN — HYDRALAZINE HYDROCHLORIDE 100 MG: 50 TABLET ORAL at 14:20

## 2024-09-23 RX ADMIN — POTASSIUM CHLORIDE 20 MEQ: 1500 TABLET, EXTENDED RELEASE ORAL at 10:17

## 2024-09-23 RX ADMIN — SENNOSIDES AND DOCUSATE SODIUM 2 TABLET: 50; 8.6 TABLET ORAL at 20:39

## 2024-09-23 RX ADMIN — AMLODIPINE BESYLATE 10 MG: 5 TABLET ORAL at 05:23

## 2024-09-23 RX ADMIN — HYDRALAZINE HYDROCHLORIDE 100 MG: 50 TABLET ORAL at 20:38

## 2024-09-23 RX ADMIN — HYDRALAZINE HYDROCHLORIDE 100 MG: 50 TABLET ORAL at 05:23

## 2024-09-23 RX ADMIN — METOPROLOL SUCCINATE 25 MG: 25 TABLET, EXTENDED RELEASE ORAL at 14:20

## 2024-09-23 RX ADMIN — OMEPRAZOLE 20 MG: 20 CAPSULE, DELAYED RELEASE ORAL at 10:13

## 2024-09-23 RX ADMIN — TRAZODONE HYDROCHLORIDE 50 MG: 50 TABLET ORAL at 20:39

## 2024-09-23 RX ADMIN — POLYETHYLENE GLYCOL 3350 1 PACKET: 17 POWDER, FOR SOLUTION ORAL at 20:40

## 2024-09-23 RX ADMIN — Medication 1000 UNITS: at 10:14

## 2024-09-23 RX ADMIN — POLYETHYLENE GLYCOL 3350 1 PACKET: 17 POWDER, FOR SOLUTION ORAL at 10:14

## 2024-09-23 RX ADMIN — VANCOMYCIN HYDROCHLORIDE 1250 MG: 5 INJECTION, POWDER, LYOPHILIZED, FOR SOLUTION INTRAVENOUS at 05:19

## 2024-09-23 RX ADMIN — QUETIAPINE FUMARATE 50 MG: 25 TABLET ORAL at 20:39

## 2024-09-23 RX ADMIN — VANCOMYCIN HYDROCHLORIDE 1250 MG: 5 INJECTION, POWDER, LYOPHILIZED, FOR SOLUTION INTRAVENOUS at 18:13

## 2024-09-23 RX ADMIN — ENOXAPARIN SODIUM 40 MG: 100 INJECTION SUBCUTANEOUS at 18:13

## 2024-09-23 RX ADMIN — ASPIRIN 81 MG: 81 TABLET, CHEWABLE ORAL at 10:14

## 2024-09-23 RX ADMIN — METOPROLOL SUCCINATE 25 MG: 25 TABLET, EXTENDED RELEASE ORAL at 05:23

## 2024-09-23 RX ADMIN — LISINOPRIL 40 MG: 20 TABLET ORAL at 10:13

## 2024-09-23 RX ADMIN — ATORVASTATIN CALCIUM 80 MG: 40 TABLET, FILM COATED ORAL at 20:40

## 2024-09-23 ASSESSMENT — GAIT ASSESSMENTS
ASSISTIVE DEVICE: FRONT WHEEL WALKER;PARALLEL BARS
DEVIATION: ANTALGIC
GAIT LEVEL OF ASSIST: CONTACT GUARD ASSIST

## 2024-09-23 ASSESSMENT — ENCOUNTER SYMPTOMS
DIARRHEA: 0
NERVOUS/ANXIOUS: 0
FEVER: 0
VOMITING: 0
SHORTNESS OF BREATH: 0
NAUSEA: 0
ABDOMINAL PAIN: 0
CHILLS: 0

## 2024-09-23 ASSESSMENT — ACTIVITIES OF DAILY LIVING (ADL)
BED_CHAIR_WHEELCHAIR_TRANSFER_DESCRIPTION: INCREASED TIME;INITIAL PREPARATION FOR TASK;SET-UP OF EQUIPMENT;SQUAT PIVOT TRANSFER TO WHEELCHAIR;SUPERVISION FOR SAFETY

## 2024-09-23 ASSESSMENT — PAIN DESCRIPTION - PAIN TYPE
TYPE: ACUTE PAIN
TYPE: ACUTE PAIN

## 2024-09-23 NOTE — THERAPY
Physical Therapy   Daily Treatment     Patient Name: Joe Templeton  Age:  74 y.o., Sex:  male  Medical Record #: [id number]  Today's Date: 9/23/2024     Precautions  Precautions: Fall Risk    Subjective    Patient has fluctuating alertness throughout session; responds well to demonstration; unable to report pain as number on VAS scale, continues to gesture that legs are hurting     Objective       09/23/24 0901   PT Charge Group   PT Gait Training (Units) 1   PT Therapeutic Exercise (Units) 2   PT Therapeutic Activities (Units) 1   PT Total Time Spent   PT Individual Total Time Spent (Mins) 60   Precautions   Precautions Fall Risk   Gait Functional Level of Assist    Gait Level Of Assist Contact Guard Assist   Assistive Device Front Wheel Walker;Parallel Bars   Distance (Feet)   (parallel bars 20ft CGA constant cues for motor planning, 75ft FWW CGA, 100ft FWW CGA with w/c follow, verbal cues to stay closer to FWW)   Deviation Antalgic  (self-limits ambulation distance based on bilateral LE pain/fatigue)   Sitting Lower Body Exercises   Ankle Pumps   (1min each x 3;bilateral)   Long Arc Quad   (1min each x 3;bilateral)   Marching   (1min each x 3;bilateral)   Comments Motomed LE 3min forward, 3min backward, all passive mode   Interdisciplinary Plan of Care Collaboration   IDT Collaboration with  Therapy Tech   Collaboration Comments w/c follow         Assessment    Patient able to ambulate 75ft/100ft with FWW before limits distance based on LE fatigue/pain    Strengths: Independent prior level of function, Pleasant and cooperative, Willingly participates in therapeutic activities  Barriers: Decreased endurance, Difficulty following instructions, Generalized weakness, Impaired activity tolerance, Impaired balance, Impaired carryover of learning, Impaired insight/denial of deficits, Impaired functional cognition, Impulsive    Plan    Transfer sequencing  Ambulation with FWW  LE strengthening    DME  PT DME  Recommendations  Assistive Device: Front Wheeled Walker    Passport items to be completed:  Get in/out of bed safely, in/out of a vehicle, safely use mobility device, walk or wheel around home/community, navigate up and down stairs, show how to get up/down from the ground, ensure home is accessible, demonstrate HEP, complete caregiver training    Physical Therapy Problems (Active)       Problem: Balance       Dates: Start:  09/21/24            Problem: Mobility       Dates: Start:  09/21/24         Goal: STG-Within one week, patient will ambulate 150ft with use of LRAD with CGA        Dates: Start:  09/21/24               Problem: Mobility Transfers       Dates: Start:  09/21/24         Goal: STG-Within one week, patient will transfer bed to chair with CGA and min VC with use of LRAD        Dates: Start:  09/21/24            Goal: STG-Within two weeks, patient will transfer in/out of car requiring min VC with proper technique and use of LRAD safely       Dates: Start:  09/21/24               Problem: PT-Long Term Goals       Dates: Start:  09/21/24         Goal: LTG-By discharge, patient will improve dynamic balance from fair+ to good to improve ability to perform ADLs safely with use of LRAD       Dates: Start:  09/21/24            Goal: LTG-By discharge, patient will ambulate 300ft with use of LRAD and supervision assistance.       Dates: Start:  09/21/24            Goal: LTG-By discharge, patient will ambulate up/down 12 stairs with use of single HR and CGA.       Dates: Start:  09/21/24

## 2024-09-23 NOTE — PROGRESS NOTES
Received bedside shift report from Cristy TOPETE RN regarding patient and assumed care. Patient awake, calm and stable, currently positioned in bed for comfort and safety; call light within reach. Denies pain or discomfort at this time. Will continue to monitor.

## 2024-09-23 NOTE — PROGRESS NOTES
NURSING DAILY NOTE    Name: Joe Templeton   Date of Admission: 9/20/2024   Admitting Diagnosis: No Principal Problem: There is no principal problem currently on the Problem List. Please update the Problem List and refresh.  Attending Physician: ELIZABETH HOLM D.O.  Allergies: Patient has no known allergies.    Safety  Patient Assist  mod  Patient Precautions  Fall Risk  Precaution Comments     Bed Transfer Status  Minimal Assist  Toilet Transfer Status   Minimal Assist  Assistive Devices  Gait Belt, Rails, Wheelchair  Oxygen  None - Room Air  Diet/Therapeutic Dining  Current Diet Order   Procedures    Diet Order Diet: Level 6 - Soft and Bite Sized (Set pt up w/ meal- he can then self-feed independently. Pills whole.); Liquid level: Level 0 - Thin; Tray Modifications (optional): SLP - Deliver to Nursing Station     Pill Administration  whole  Agitated Behavioral Scale  18  ABS Level of Severity  No Agitation    Fall Risk  Has the patient had a fall this admission?   No  Nikole Cesar Fall Risk Scoring  27, HIGH RISK  Fall Risk Safety Measures  posey bed  and poor balance    Vitals  Temperature: 36.6 °C (97.8 °F)  Temp src: Oral  Pulse: 78  Respiration: 18  Blood Pressure : (!) 151/67 (RN notified.)  Blood Pressure MAP (Calculated): 95 MM HG  BP Location: Left, Upper Arm  Patient BP Position: Supine     Oxygen  Pulse Oximetry: 94 %  O2 (LPM): 0  O2 Delivery Device: None - Room Air    Bowel and Bladder  Last Bowel Movement  09/22/24  Stool Type  Type 5: Soft blob with clear cut edges (passed easily)  Bowel Device  Bathroom  Continent  Bladder: Did not void   Bowel: No movement  Bladder Function  Urine Void (mL):  (moderate)  Urine Color: Yellow  Straight Catheter: 550 ml  Genitourinary Assessment   Bladder Assessment (WDL):  WDL Except  Silva Catheter: Not Applicable  Urine Color: Yellow  Bladder Device: Bathroom, Urinal  Time Void: Yes  $ Bladder Scan  Results (mL): 21    Skin  Colt Score   16  Sensory Interventions   Bed Types: Other (Comments) (soma)  Skin Preventative Measures: Pillows in Use for Support / Positioning  Moisture Interventions  Moisturizers/Barriers: Barrier Wipes      Pain  Pain Rating Scale  0 - No Pain  Pain Location  Ankle  Pain Location Orientation  Left  Pain Interventions   Declines    ADLs    Bathing      Linen Change      Personal Hygiene     Chlorhexidine Bath      Oral Care     Teeth/Dentures     Shave     Nutrition Percentage Eaten  Lunch, Between % Consumed  Environmental Precautions  Treaded Slipper Socks on Patient, Personal Belongings, Wastebasket, Call Bell etc. in Easy Reach, Transferred to Stronger Side, Report Given to Other Health Care Providers Regarding Fall Risk, Bed in Low Position  Patient Turns/Positioning  Patient Turns Self from Side to Side  Patient Turns Assistance/Tolerance  Assistance of One  Bed Positions  Bed Controls On  Head of Bed Elevated  Self regulated      Psychosocial/Neurologic Assessment  Psychosocial Assessment  Psychosocial (WDL):  WDL Except  Patient Behaviors: Confused, Forgetful  Neurologic Assessment  Neuro (WDL): Exceptions to WDL  Level of Consciousness: Alert  Orientation Level: Disoriented to time, Disoriented to situation, Oriented to person, Disoriented to place  Cognition: Poor judgement, Memory Loss, Poor safety awareness  Speech: Clear  Pupil Assesment: No  Muscle Strength Right Arm: Good Strength Against Gravity and Moderate Resistance  Muscle Strength Left Arm: Good Strength Against Gravity and Moderate Resistance  Muscle Strength Right Leg: Fair Strength against Gravity but No Resistance  LLE Motor Response: Responds to commands  Muscle Strength Left Leg: Weak Movement but Not Against Gravity or Resistance  EENT (WDL):  WDL Except    Cardio/Pulmonary Assessment  Edema   LLE Edema: 2+ (Foot)  Respiratory Breath Sounds  RUL Breath Sounds: Clear  RML Breath Sounds: Clear  RLL  Breath Sounds: Diminished  YONI Breath Sounds: Clear  LLL Breath Sounds: Diminished  Cardiac Assessment   Cardiac (WDL):  WDL Except (hx cad, cabg, htn)

## 2024-09-23 NOTE — THERAPY
Recreational Therapy   Initial Evaluation     Patient Name: Joe Templeton  Age:  74 y.o., Sex:  male  Medical Record #: [id number]  Today's Date: 9/23/2024     Subjective    Unable to report on activities he enjoyed. When prompted, he would say yes to each question asked  for leisure interests.     Objective       09/23/24 0831   Procedural Tracking   Procedural Tracking Community Re-Integration;Leisure Skills Awareness   Treatment Time   Total Time Spent (mins) 30   Leisure History   Leisure Interests Unable To Determine At This Time   Leisure Comments unable to independently share on interests. Would agree with leisure interests proposed however nothing self motivated.   Prior Living Arrangements   Lives with - Patient's Self Care Capacity Sibling  (MAURICE)   Steps Into Home 0   Steps In Home 0   Ambulation Independent   Assistive Devices Used None   Driving / Transportation Driving Independent   Functional Ability Status - Cognitive   Attention Span Remains on Task with Cueing   Comprehension Requires Cueing;Follows One Step Commands   Judgment Needs Assistance;Impaired   Cognitive Comments Max cues for task completion   Functional Ability Status - Emotional    Affect Appropriate   Mood Appropriate;Labile   Behavior Appropriate   Emotional Comments At times showed frusteration when he was unable to answer questions from this writer.   Clinical Impression   Clinical Impression Impaired Gross Motor Leisure Functioning;Impaired Endurance;Impaired Cognitive Leisure Functioning;Impaired Communication Skills;Impaired Attention Span;Impaired Short Term Memory;Impaired Long Term Memory;Impaired Community Skills;Impaired Relaxation and Coping Skills;Impaired Leisure Skills   Current Discharge Plan   Current Discharge Plan Return to Prior Living Situation   Benefit    Benefit   (Pt will not be picked up for skilled recreation therapy care)   Interdisciplinary Plan of Care Collaboration   IDT Collaboration with  Physical  Therapist   Patient Position at End of Therapy Seated   Collaboration Comments PT informed of location in gym over phone and in person.   Strengths & Barriers   Barriers Decreased endurance;Confused;Impaired carryover of learning;Impaired insight/denial of deficits;Impaired functional cognition         Assessment  Patient is 74 y.o. male with a diagnosis of Stroke.  Additional factors influencing patient status / progress (ie: cognitive factors, co-morbidities, social support, etc): 74 y.o. male with a past medical history of CAD s/p CABG and gout; who presented on 9/13/2024 5:10 PM with LE weakness and ankle swelling. Per documentation, patient had worsening symptoms of LLE and ankle swelling x 3 days. Upon eval in the ED, patient was also confused. CTA head and neck obtained for weakness and confusion showed no LVO on CTA head and mid to moderate atherosclerotic plaquing of the common carotid arteries and bilateral ICA stenosis less than 50%. Ankle xray was negative for acute fracture. 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 Echo pending, prior echo from 2016 showed EF of 40-50%. Neuro consulted, patient is now on ASA and statin. In regards to patient's ankle, patient had an arthrocentesis done in the ED, ortho was consulted, and patient was taken the OR on 9/14 for left ankle I and D for left ankle septic arthritis performed by Dr. Gautam. Patient is WBAT LLE. Patient remains on vancomycin, stop date is 9/21. Patient has been able to mobilize CGA level .      Pt was asked what he enjoyed doing for fun and was unable to report on items. When prompted with options he agreed with every option given indicating being a poor historian.     He was given a bingo game and was unable to match cards accurately until he was given  1/5 chance of being correct by limiting his options. MAX cues for task  completion. Required instructions to be repeated almost 100% of the time.       Plan  Certified Therapeutic Recreation Specialist has screened this patient and determined that no skilled Recreation Therapy services are indicated at this time due to level of function.  Thank you for your referral. If a change in patient's function should occur, Recreation Therapy can reevaluate for appropriateness at that time.       Goals:  N/A

## 2024-09-23 NOTE — PROGRESS NOTES
"  Physical Medicine & Rehabilitation Progress Note    Encounter Date: 9/23/2024    Chief Complaint: Doing okay    Interval Events (Subjective):  Vital signs: Blood pressure elevated  Small bowel movement 9/22  Voiding    Patient seen and examined in his room.  States that he is doing okay.  Does not have too much pain.  He is going to rest from therapy.  States that it is difficult for him to move his left leg.    ROS: 14 point ROS negative unless otherwise specified in the HPI    Objective:  VITAL SIGNS: BP (!) 148/76   Pulse 81   Temp 36.6 °C (97.8 °F) (Oral)   Resp 18   Ht 1.753 m (5' 9\")   Wt 77.1 kg (170 lb)   SpO2 95%   BMI 25.10 kg/m²     GEN: No apparent distress  HEENT: Head normocephalic, atraumatic.  Sclera nonicteric bilaterally, no ocular discharge appreciated bilaterally.  CV: Extremities warm and well-perfused, no peripheral edema appreciated bilaterally.  PULMONARY: Breathing nonlabored on room air, no respiratory accessory muscle use.  Not requiring supplemental oxygen.  SKIN: Left lateral ankle incision with some erythema around the sutures.  Monitor.  No drainage.  PSYCH: Mood and affect within normal limits.  NEURO: Awake alert.  Pleasantly confused.      Laboratory Values:  Recent Results (from the past 72 hour(s))   Comp Metabolic Panel (CMP)    Collection Time: 09/21/24  6:33 AM   Result Value Ref Range    Sodium 145 135 - 145 mmol/L    Potassium 4.2 3.6 - 5.5 mmol/L    Chloride 112 96 - 112 mmol/L    Co2 13 (L) 20 - 33 mmol/L    Anion Gap 20.0 (H) 7.0 - 16.0    Glucose 140 (H) 65 - 99 mg/dL    Bun 26 (H) 8 - 22 mg/dL    Creatinine 0.83 0.50 - 1.40 mg/dL    Calcium 9.3 8.5 - 10.5 mg/dL    Correct Calcium 9.8 8.5 - 10.5 mg/dL    AST(SGOT) 29 12 - 45 U/L    ALT(SGPT) 22 2 - 50 U/L    Alkaline Phosphatase 61 30 - 99 U/L    Total Bilirubin 0.6 0.1 - 1.5 mg/dL    Albumin 3.4 3.2 - 4.9 g/dL    Total Protein 6.9 6.0 - 8.2 g/dL    Globulin 3.5 1.9 - 3.5 g/dL    A-G Ratio 1.0 g/dL   Magnesium    " Collection Time: 09/21/24  6:33 AM   Result Value Ref Range    Magnesium 2.3 1.5 - 2.5 mg/dL   Phosphorus    Collection Time: 09/21/24  6:33 AM   Result Value Ref Range    Phosphorus 4.0 2.5 - 4.5 mg/dL   TSH with Reflex to FT4    Collection Time: 09/21/24  6:33 AM   Result Value Ref Range    TSH 1.670 0.380 - 5.330 uIU/mL   Vitamin D, 25-hydroxy (blood)    Collection Time: 09/21/24  6:33 AM   Result Value Ref Range    25-Hydroxy   Vitamin D 25 18 (L) 30 - 100 ng/mL   ESTIMATED GFR    Collection Time: 09/21/24  6:33 AM   Result Value Ref Range    GFR (CKD-EPI) 92 >60 mL/min/1.73 m 2   CBC WITH DIFFERENTIAL    Collection Time: 09/21/24  9:15 AM   Result Value Ref Range    WBC 11.9 (H) 4.8 - 10.8 K/uL    RBC 4.78 4.70 - 6.10 M/uL    Hemoglobin 14.6 14.0 - 18.0 g/dL    Hematocrit 44.6 42.0 - 52.0 %    MCV 93.3 81.4 - 97.8 fL    MCH 30.5 27.0 - 33.0 pg    MCHC 32.7 32.3 - 36.5 g/dL    RDW 42.4 35.9 - 50.0 fL    Platelet Count 289 164 - 446 K/uL    MPV 11.3 9.0 - 12.9 fL    Neutrophils-Polys 75.40 (H) 44.00 - 72.00 %    Lymphocytes 11.80 (L) 22.00 - 41.00 %    Monocytes 11.20 0.00 - 13.40 %    Eosinophils 0.80 0.00 - 6.90 %    Basophils 0.40 0.00 - 1.80 %    Immature Granulocytes 0.40 0.00 - 0.90 %    Nucleated RBC 0.00 0.00 - 0.20 /100 WBC    Neutrophils (Absolute) 8.95 (H) 1.82 - 7.42 K/uL    Lymphs (Absolute) 1.40 1.00 - 4.80 K/uL    Monos (Absolute) 1.33 (H) 0.00 - 0.85 K/uL    Eos (Absolute) 0.10 0.00 - 0.51 K/uL    Baso (Absolute) 0.05 0.00 - 0.12 K/uL    Immature Granulocytes (abs) 0.05 0.00 - 0.11 K/uL    NRBC (Absolute) 0.00 K/uL   BASIC METABOLIC PANEL    Collection Time: 09/22/24  5:53 AM   Result Value Ref Range    Sodium 142 135 - 145 mmol/L    Potassium 3.5 (L) 3.6 - 5.5 mmol/L    Chloride 110 96 - 112 mmol/L    Co2 20 20 - 33 mmol/L    Glucose 136 (H) 65 - 99 mg/dL    Bun 23 (H) 8 - 22 mg/dL    Creatinine 0.80 0.50 - 1.40 mg/dL    Calcium 8.4 (L) 8.5 - 10.5 mg/dL    Anion Gap 12.0 7.0 - 16.0   CBC WITHOUT  DIFFERENTIAL    Collection Time: 09/22/24  5:53 AM   Result Value Ref Range    WBC 11.2 (H) 4.8 - 10.8 K/uL    RBC 4.56 (L) 4.70 - 6.10 M/uL    Hemoglobin 14.4 14.0 - 18.0 g/dL    Hematocrit 42.6 42.0 - 52.0 %    MCV 93.4 81.4 - 97.8 fL    MCH 31.6 27.0 - 33.0 pg    MCHC 33.8 32.3 - 36.5 g/dL    RDW 42.9 35.9 - 50.0 fL    Platelet Count 266 164 - 446 K/uL    MPV 11.1 9.0 - 12.9 fL   ESTIMATED GFR    Collection Time: 09/22/24  5:53 AM   Result Value Ref Range    GFR (CKD-EPI) 93 >60 mL/min/1.73 m 2       Medications:  Scheduled Medications   Medication Dose Frequency    hydrALAZINE  100 mg Q8HRS    vitamin D3  1,000 Units DAILY    potassium chloride SA  20 mEq DAILY    senna-docusate  2 Tablet Q EVENING    And    polyethylene glycol/lytes  1 Packet BID    Pharmacy Consult Request  1 Each PHARMACY TO DOSE    lidocaine  1-2 Patch Daily-0800    omeprazole  20 mg DAILY    amLODIPine  10 mg Q DAY    enoxaparin (LOVENOX) injection  40 mg DAILY AT 1800    lisinopril  40 mg DAILY    MD Alert...Vancomycin per Pharmacy   PHARMACY TO DOSE    metoprolol SR  25 mg DAILY    vancomycin  1,250 mg Q12HR    aspirin  81 mg DAILY    atorvastatin  80 mg Q EVENING    QUEtiapine  50 mg Nightly     PRN medications: hydrALAZINE, lactulose, docusate sodium, bisacodyl EC, magnesium hydroxide, sodium phosphate, carboxymethylcellulose, benzocaine-menthol, mag hydrox-al hydrox-simeth, ondansetron **OR** ondansetron, traZODone, sodium chloride, acetaminophen, ziprasidone, traMADol, formulation r    Diet:  Current Diet Order   Procedures    Diet Order Diet: Level 6 - Soft and Bite Sized (Set pt up w/ meal- he can then self-feed independently. Pills whole.); Liquid level: Level 0 - Thin; Tray Modifications (optional): SLP - Deliver to Nursing Station       Medical Decision Making and Plan:  Multifocal embolic appearing infarcts: Right insula, left parietal lobe, right parietal lobe, left hao  MRI with multiple old  infarcts  Dysphagia  Encephalopathy  PT and OT for mobility and ADLs. Per guidelines, 15 hours per week between PT, OT and/or SLP.  Follow-up stroke Bridge clinic  Secondary stroke prophylaxis: Aspirin and statin   Continue Seroquel at night     I certify that the patient currently requires non-pharmacologic restraints. Non-pharmacologic restraints are required to reduce the potential for the patient to harm themselves or others, to limit violent behaviors, and to allow proper assessment and care. I have completed a face to face assessment of this patient on 9/23/2024. Alternative methods including but not limited to de-escalation techniques, redirection, and pharmacologic treatment have been attempted but patient currently remains at risk without restraints. Our staff has been trained on restraints and patient is currently placed in video monitored room.  The need for these restraints is assessed by a rehabilitation physician every 24 hours and use of restraints is minimized when appropriate.  Current diagnosis which requires restraints: Encephalopathy. Current restraints required: Enclosure bed.       Septic arthritis left ankle s/p left ankle arthrotomy and I&D 9/14/2024 Dr. Gautam -weightbearing as tolerated left lower extremity.  ID followed.  Continue vancomycin twice daily through 10/12.  Potentially can switch to Zyvox.  9/23 patient pulled out midline will need new access.     Left knee pain -check knee x-ray.  9/23 negative.     Chronic systolic heart failure, not in acute exacerbation - preserved ejection fraction-LVEF 60%     CAD s/p CABG -continue aspirin and statin     Hypertension -amlodipine, hydralazine, lisinopril, metoprolol.  9/23 uncontrolled.  Hospitalist managing.     Azotemia -9/22 improving, monitor     Hypokalemia -9/22 mildly low at 3.5.  Hospitalist following.     Leukocytosis -9/23 mild at 11.2.  Monitor     Pain -Tylenol and oxycodone     Bowel -patient unsure when last BM was.  Check  KUB.  Moderate stool burden.  Had small bowel movement .  Increase bowel meds .       Upcoming Labs/imagin/24     DVT PROPHYLAXIS: Lovenox 40mg SQ nightly      HOSPITALIST FOLLOWING: Yes - d/w hospitalist      CODE STATUS: FULL CODE     DISPO: Home with family      HEBER: TBD     ADDITIONAL MEDICAL NEEDS ON D/C (IV abx, O2, etc): IV antibiotics      MEDS SENT TO: TBD     DISCHARGE SPECIALIST FOLLOW UP: ID, Ortho, Stroke Bridge     Patient to scheduled follow up with their PCP within 2 weeks from discharge from the [facility].   ____________________________________    Dr. Khushbu Meza DO, MS  [facility] - Physical Medicine & Rehabilitation   ____________________________________

## 2024-09-23 NOTE — PROGRESS NOTES
[facility] Medicine Daily Progress Note    Date of Service  9/23/2024    Chief Complaint:  Hypertension  Azotemia    Interval History:  Discussed about his BP being elevated and have increased his Toprol dose.    Review of Systems  Review of Systems   Constitutional:  Negative for chills and fever.   Respiratory:  Negative for shortness of breath.    Cardiovascular:  Negative for chest pain.   Gastrointestinal:  Negative for abdominal pain, diarrhea, nausea and vomiting.   Psychiatric/Behavioral:  The patient is not nervous/anxious.         Physical Exam  Temp:  [36.6 °C (97.8 °F)-36.7 °C (98 °F)] 36.6 °C (97.8 °F)  Pulse:  [78-81] 81  Resp:  [18] 18  BP: (148-158)/(67-84) 148/76  SpO2:  [93 %-95 %] 95 %    Physical Exam  Vitals and nursing note reviewed.   Constitutional:       Appearance: Normal appearance.   HENT:      Head: Atraumatic.   Eyes:      Conjunctiva/sclera: Conjunctivae normal.      Pupils: Pupils are equal, round, and reactive to light.   Cardiovascular:      Rate and Rhythm: Normal rate and regular rhythm.      Heart sounds: No murmur heard.  Pulmonary:      Effort: Pulmonary effort is normal.      Breath sounds: No stridor. No wheezing or rales.   Abdominal:      General: There is no distension.      Palpations: Abdomen is soft.      Tenderness: There is no abdominal tenderness.   Musculoskeletal:      Cervical back: Normal range of motion and neck supple.      Right lower leg: No edema.      Left lower leg: No edema.   Skin:     General: Skin is warm and dry.      Findings: No rash.   Neurological:      Mental Status: He is alert and oriented to person, place, and time.   Psychiatric:         Mood and Affect: Mood normal.         Behavior: Behavior normal.         Fluids    Intake/Output Summary (Last 24 hours) at 9/23/2024 1243  Last data filed at 9/23/2024 1155  Gross per 24 hour   Intake 648 ml   Output --   Net 648 ml        Laboratory  Recent Labs     09/21/24  0915 09/22/24  0553   WBC 11.9*  11.2*   RBC 4.78 4.56*   HEMOGLOBIN 14.6 14.4   HEMATOCRIT 44.6 42.6   MCV 93.3 93.4   MCH 30.5 31.6   MCHC 32.7 33.8   RDW 42.4 42.9   PLATELETCT 289 266   MPV 11.3 11.1     Recent Labs     09/21/24  0633 09/22/24  0553   SODIUM 145 142   POTASSIUM 4.2 3.5*   CHLORIDE 112 110   CO2 13* 20   GLUCOSE 140* 136*   BUN 26* 23*   CREATININE 0.83 0.80   CALCIUM 9.3 8.4*                   Imaging    Assessment/Plan  Hypokalemia  Assessment & Plan  K+: 4.2 --> 3.5  Likely 2nd to diminished oral intake  S/P KCL: 40 meq x 1  Cont KCL: 20 meq daily x 3 days  Cont to monitor    Vitamin D deficiency  Assessment & Plan  Vit D: 18  Cont supplements    Azotemia  Assessment & Plan  Bun: 23 --> 24 --> 26 --> 23  Encouraging fluid intake  Monitor    Stroke (HCC)- (present on admission)  Assessment & Plan  Has hx of old CVA's  S/P recent CVA  MRI showed multiple old infarcts and multiple acute infarcts (including the hao)  Cont ASA, Lipitor    CAD (coronary artery disease)- (present on admission)  Assessment & Plan  Has hx of CABG  Cont Toprol XL  Cont Lisinopril  Cont ASA, Lipitor    Septic arthritis of ankle (HCC)- (present on admission)  Assessment & Plan  Dx at [facility]  Arthrocentesis as done  S/P left ankle I and D for left ankle septic arthritis (9/14)  Has some mild on & off leukocytosis  Has been afebrile  Cont IV Vanco (thru 10/12 per ID)    Essential (primary) hypertension- (present on admission)  Assessment & Plan  BP elevated recently again  Cont Norvasc: 10 mg daily  Cont Lisinopril: 40 mg daily  Cont Hydralazine: 75 mg tid --> 100 mg tid   Cont Toprol XL: 25 mg daily --> will increase to 50 mg daily  Cont to monitor

## 2024-09-23 NOTE — PROGRESS NOTES
NURSING DAILY NOTE    Name: Joe Templeton   Date of Admission: 9/20/2024   Admitting Diagnosis: No Principal Problem: There is no principal problem currently on the Problem List. Please update the Problem List and refresh.  Attending Physician: ELIZABETH HOLM D.O.  Allergies: Patient has no known allergies.    Safety  Patient Assist  Mod Assist  Patient Precautions  Fall Risk  Precaution Comments     Bed Transfer Status  Minimal Assist  Toilet Transfer Status   Minimal Assist  Assistive Devices  Rails, Wheelchair  Oxygen  None - Room Air  Diet/Therapeutic Dining  Current Diet Order   Procedures    Diet Order Diet: Level 6 - Soft and Bite Sized (Set pt up w/ meal- he can then self-feed independently. Pills whole.); Liquid level: Level 0 - Thin; Tray Modifications (optional): SLP - Deliver to Nursing Station     Pill Administration  whole  Agitated Behavioral Scale  18  ABS Level of Severity  No Agitation    Fall Risk  Has the patient had a fall this admission?   No  Nikole Cesar Fall Risk Scoring  27, HIGH RISK  Fall Risk Safety Measures  posey bed     Vitals  Temperature: 36.7 °C (98 °F)  Temp src: Oral  Pulse: 80  Respiration: 18  Blood Pressure : (!) 156/84  Blood Pressure MAP (Calculated): 108 MM HG  BP Location: Left, Upper Arm  Patient BP Position: Supine     Oxygen  Pulse Oximetry: 93 %  O2 (LPM): 0  O2 Delivery Device: None - Room Air    Bowel and Bladder  Last Bowel Movement  09/22/24  Stool Type  Type 5: Soft blob with clear cut edges (passed easily)  Bowel Device  Bathroom, Other (Comment) (Bowel Meds)  Continent  Bladder: Did not void   Bowel: No movement  Bladder Function  Urine Void (mL):  (Large)  Number of Times Voided: 1  Urinary Options: Yes  Urine Color: Yellow  Straight Catheter: 550 ml  Genitourinary Assessment   Bladder Assessment (WDL):  Within Defined Limits  Silva Catheter: Not Applicable  Urine Color: Yellow  Bladder Device:  Bathroom  Time Void: Yes  $ Bladder Scan Results (mL): 21    Skin  Colt Score   16  Sensory Interventions   Bed Types: Other (Comments) ([facility] Bed)  Skin Preventative Measures: Pillows in Use for Support / Positioning  Moisture Interventions  Moisturizers/Barriers: Barrier Wipes      Pain  Pain Rating Scale  0 - No Pain  Pain Location  Ankle  Pain Location Orientation  Left  Pain Interventions   Declines    ADLs    Bathing      Linen Change      Personal Hygiene     Chlorhexidine Bath      Oral Care     Teeth/Dentures     Shave     Nutrition Percentage Eaten  *  * Meal *  *, Dinner, Between % Consumed  Environmental Precautions  Treaded Slipper Socks on Patient, Bed in Low Position  Patient Turns/Positioning  Patient Turns Self from Side to Side  Patient Turns Assistance/Tolerance  Assistance of One, General Weakness  Bed Positions  Bed Controls On, Bed Locked  Head of Bed Elevated  Self regulated      Psychosocial/Neurologic Assessment  Psychosocial Assessment  Psychosocial (WDL):  WDL Except  Patient Behaviors: Confused, Forgetful  Neurologic Assessment  Neuro (WDL): Exceptions to WDL  Level of Consciousness: Alert  Orientation Level: Disoriented to time, Disoriented to situation, Disoriented to person  Cognition: Poor judgement, Memory Loss, Poor safety awareness  Speech: Clear  Pupil Assesment: No  Muscle Strength Right Arm: Good Strength Against Gravity and Moderate Resistance  Muscle Strength Left Arm: Good Strength Against Gravity and Moderate Resistance  Muscle Strength Right Leg: Fair Strength against Gravity but No Resistance  LLE Motor Response: Responds to commands  Muscle Strength Left Leg: Weak Movement but Not Against Gravity or Resistance  EENT (WDL):  WDL Except    Cardio/Pulmonary Assessment  Edema   LLE Edema: 2+  Respiratory Breath Sounds  RUL Breath Sounds: Clear  RML Breath Sounds: Clear  RLL Breath Sounds: Clear, Diminished  YONI Breath Sounds: Clear  LLL Breath Sounds: Clear,  Diminished  Cardiac Assessment   Cardiac (WDL):  WDL Except (H/O HTN, CAD S/P CABG, NSTEMI.)

## 2024-09-23 NOTE — CARE PLAN
Problem: VTE Prevention  Goal: Patient will remain free from venous thromboembolism (VTE)  Outcome: Progressing  Note: Pt is up and walking, participates in therapies, and up to dining room for all meals.   The patient is Stable - Low risk of patient condition declining or worsening    Shift Goals  Clinical Goals: Safety  Patient Goals: Sleep well    Progress made toward(s) clinical / shift goals:  no s/s dvt    Patient is not progressing towards the following goals:

## 2024-09-23 NOTE — PROCEDURES
Vascular Access Team    Date of Insertion: 9/23/2024  Arm Circumference: 29  Internal length: 12  External Length: 1  Vein Occupancy %: unable to measure  Reason for Midline: abx  Labs: WBC 11.2 , , BUN 23, Cr 0.80, GFR 93 , INR n/a    Orders confirmed, vessel patency confirmed with ultrasound. Risks and benefits of procedure explained to patient and education regarding line associated bloodstream infections provided. Questions answered.     Power Midline placed in LUE per licensed provider order with ultrasound guidance. 4  Fr, 1 lumen Power Midline placed in basilic vein after 1 attempt(s). 2 mL of 1% lidocaine injected intradermally, 21 gauge microintroducer needle was visualized entering the vein and modified Seldinger technique used. 12 cm catheter inserted with good blood return. Secured at 1 cm marker. Internal positioning stylet removed and verified to be intact. Each lumen flushed without resistance with 10 mL 0.9% normal saline. Midline secured with Biopatch and Tegaderm.     Midline placement is confirmed by nurse using ultrasound and ability to flush and draw blood. Midline is appropriate for use at this time.  Patient tolerated procedure well, without complications.  No X-ray is needed for placement confirmation.  Patient condition relayed to unit RN or ordering physician via this post procedure note in the EMR.     Ultrasound images uploaded to PACS and viewable in the EMR - no  Ultrasound imaged printed and placed in paper chart - no     BARD Power Midline ref # [id number], Lot # [id number], Expiration Date 09/30/2025
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
## 2024-09-23 NOTE — THERAPY
Speech Language Pathology  Daily Treatment     Patient Name: Joe Templeton  Age:  74 y.o., Sex:  male  Medical Record #: [id number]  Today's Date: 9/23/2024     Precautions  Precautions: Fall Risk    Subjective    Assumed care of patient in room, up in w/c with alarming seatbelt.     Objective       09/23/24 1034   Treatment Charges   Charges Yes   SLP Cognitive Skill Development First 15 Minutes 1   SLP Cognitive Skill Development Additional 15 Minutes 3   SLP Total Time Spent   SLP Individual Total Time Spent (Mins) 60   Outcome Measures   Outcome Measures Utilized BNT   SCCAN (Scales of Cognitive and Communicative Ability for Neurorehabilitation)   Oral Expression - Raw Score 10   Oral Expression - Scale Performance Score 53   Orientation - Raw Score 6   Orientation - Scale Performance Score 50   Memory - Raw Score 3   Memory - Scale Performance Score 16   Speech Comprehension - Raw Score 7   Speech Comprehension - Scale Performance Score 54   Reading Comprehension - Raw Score 5   Reading Comprehension - Scale Performance Score 42   Writing - Raw Score 0   Writing - Scale Performance Score 0   Attention - Raw Score 3   Attention - Scale Performance Score 19   Problem Solving - Raw Score 9   Problem Solving - Scale Performance Score 39   SCCAN Total Raw Score 37   SCCAN Degree of Severity Severe Impairment   Interdisciplinary Plan of Care Collaboration   Patient Position at End of Therapy Seated;Chair Alarm On;Self Releasing Lap Belt Applied  (in main dining room)         Assessment    Completion of standardized assessment using The Scales of Cognitive and Communicative Ability for Neurorehabilitation (SCCAN). A full reporting of scores is as follows:   SCCAN (Scales of Cognitive and Communicative Ability for Neurorehabilitation)  Oral Expression - Raw Score: 10  Oral Expression - Scale Performance Score: 53  Orientation - Raw Score: 6  Orientation - Scale Performance Score: 50  Memory - Raw Score: 3  Memory -  Scale Performance Score: 16  Speech Comprehension - Raw Score: 7  Speech Comprehension - Scale Performance Score: 54  Reading Comprehension - Raw Score: 5  Reading Comprehension - Scale Performance Score: 42  Writing - Raw Score: 0  Writing - Scale Performance Score: 0  Attention - Raw Score: 3  Attention - Scale Performance Score: 19  Problem Solving - Raw Score: 9  Problem Solving - Scale Performance Score: 39  SCCAN Total Raw Score: 37  SCCAN Degree of Severity: Severe Impairment    Pt presents with severe deficits overall, pt unable to write name despite multiple revisions and attempts. Only able to copy first name on one occasion. Pt was able to copy 3-letter words x2. Pt with verbal phonemic paraphasias. Pt is aware of errors, however, unable to correct at this time.     The [place] Naming Test is a neuropsychological assessment tool to measure confrontational word retrieval in individuals with aphasia or other language disturbance caused by stroke, Alzheimer's disease, or other dementing disorder.  Administration of 50 trials. High frequency words pt performed as follows:   Spontaneously correct: 20/32  Semantic cue: 4/32  Phonemic cue: 7/32  Model: 1/32    Low Frequency words (of those administered)  Spontaneously correct: 1/18  Semantic cue: 0/18  Phonemic cue: 4/18  Model: 11/18    Strengths: Willingly participates in therapeutic activities  Barriers: Confused, Impaired functional cognition, Impulsive, Difficulty following instructions    Plan    Continue to target receptive and expressive language, simple attention    Passport items to be completed:  Express basic needs, understand food/liquid recommendations, consistently follow swallow precautions, manage finances, manage medications, arrive to therapy appointments on time, complete daily memory log entries, solve problems related to safety situations, review education related to hospitalization, complete caregiver training     Speech Therapy Problems  (Active)       Problem: Memory STGs       Dates: Start:  09/21/24         Goal: STG-Within one week, patient will       Dates: Start:  09/21/24       Description: 1) Individualized goal:  be oriented to day, month, year, and situation w/ 90% acc and MIN A.   2) Interventions:  SLP Self Care / ADL Training , SLP Cognitive Skill Development, and SLP Group Treatment                Problem: Problem Solving STGs       Dates: Start:  09/21/24         Goal: STG-Within one week, patient will       Dates: Start:  09/21/24       Description: 1) Individualized goal:  complete SCCAN with further goals developed as appropriate.   2) Interventions:  SLP Self Care / ADL Training  and SLP Cognitive Skill Development                Problem: Speech/Swallowing LTGs       Dates: Start:  09/21/24         Goal: LTG-By discharge, patient will solve basic problems       Dates: Start:  09/21/24       Description: 1) Individualized goal:  related to health and safety with 80% acc and MOD I for a safe D/C to PLOF.   2) Interventions:  SLP Self Care / ADL Training , SLP Cognitive Skill Development, and SLP Group Treatment

## 2024-09-24 ENCOUNTER — APPOINTMENT (OUTPATIENT)
Dept: PHYSICAL THERAPY | Facility: REHABILITATION | Age: 74
DRG: 057 | End: 2024-09-24
Attending: PHYSICAL MEDICINE & REHABILITATION
Payer: MEDICARE

## 2024-09-24 ENCOUNTER — APPOINTMENT (OUTPATIENT)
Dept: SPEECH THERAPY | Facility: REHABILITATION | Age: 74
DRG: 057 | End: 2024-09-24
Attending: PHYSICAL MEDICINE & REHABILITATION
Payer: MEDICARE

## 2024-09-24 ENCOUNTER — APPOINTMENT (OUTPATIENT)
Dept: OCCUPATIONAL THERAPY | Facility: REHABILITATION | Age: 74
DRG: 057 | End: 2024-09-24
Attending: PHYSICAL MEDICINE & REHABILITATION
Payer: MEDICARE

## 2024-09-24 ENCOUNTER — HOSPITAL ENCOUNTER (OUTPATIENT)
Dept: RADIOLOGY | Facility: MEDICAL CENTER | Age: 74
End: 2024-09-24
Attending: PHYSICAL MEDICINE & REHABILITATION

## 2024-09-24 LAB
ANION GAP SERPL CALC-SCNC: 13 MMOL/L (ref 7–16)
BASOPHILS # BLD AUTO: 0.4 % (ref 0–1.8)
BASOPHILS # BLD: 0.04 K/UL (ref 0–0.12)
BUN SERPL-MCNC: 21 MG/DL (ref 8–22)
CALCIUM SERPL-MCNC: 8.9 MG/DL (ref 8.5–10.5)
CHLORIDE SERPL-SCNC: 108 MMOL/L (ref 96–112)
CO2 SERPL-SCNC: 21 MMOL/L (ref 20–33)
CREAT SERPL-MCNC: 0.89 MG/DL (ref 0.5–1.4)
EOSINOPHIL # BLD AUTO: 0.12 K/UL (ref 0–0.51)
EOSINOPHIL NFR BLD: 1.1 % (ref 0–6.9)
ERYTHROCYTE [DISTWIDTH] IN BLOOD BY AUTOMATED COUNT: 41 FL (ref 35.9–50)
GFR SERPLBLD CREATININE-BSD FMLA CKD-EPI: 90 ML/MIN/1.73 M 2
GLUCOSE SERPL-MCNC: 132 MG/DL (ref 65–99)
HCT VFR BLD AUTO: 42 % (ref 42–52)
HGB BLD-MCNC: 14.4 G/DL (ref 14–18)
IMM GRANULOCYTES # BLD AUTO: 0.04 K/UL (ref 0–0.11)
IMM GRANULOCYTES NFR BLD AUTO: 0.4 % (ref 0–0.9)
LYMPHOCYTES # BLD AUTO: 1.29 K/UL (ref 1–4.8)
LYMPHOCYTES NFR BLD: 11.8 % (ref 22–41)
MCH RBC QN AUTO: 31.9 PG (ref 27–33)
MCHC RBC AUTO-ENTMCNC: 34.3 G/DL (ref 32.3–36.5)
MCV RBC AUTO: 92.9 FL (ref 81.4–97.8)
MONOCYTES # BLD AUTO: 1.19 K/UL (ref 0–0.85)
MONOCYTES NFR BLD AUTO: 10.9 % (ref 0–13.4)
NEUTROPHILS # BLD AUTO: 8.26 K/UL (ref 1.82–7.42)
NEUTROPHILS NFR BLD: 75.4 % (ref 44–72)
NRBC # BLD AUTO: 0 K/UL
NRBC BLD-RTO: 0 /100 WBC (ref 0–0.2)
PLATELET # BLD AUTO: 269 K/UL (ref 164–446)
PMV BLD AUTO: 11.4 FL (ref 9–12.9)
POTASSIUM SERPL-SCNC: 3.8 MMOL/L (ref 3.6–5.5)
RBC # BLD AUTO: 4.52 M/UL (ref 4.7–6.1)
SODIUM SERPL-SCNC: 142 MMOL/L (ref 135–145)
WBC # BLD AUTO: 10.9 K/UL (ref 4.8–10.8)

## 2024-09-24 PROCEDURE — 97110 THERAPEUTIC EXERCISES: CPT

## 2024-09-24 PROCEDURE — 700105 HCHG RX REV CODE 258: Performed by: PHYSICAL MEDICINE & REHABILITATION

## 2024-09-24 PROCEDURE — C1751 CATH, INF, PER/CENT/MIDLINE: HCPCS

## 2024-09-24 PROCEDURE — 700102 HCHG RX REV CODE 250 W/ 637 OVERRIDE(OP): Performed by: PHYSICAL MEDICINE & REHABILITATION

## 2024-09-24 PROCEDURE — 700102 HCHG RX REV CODE 250 W/ 637 OVERRIDE(OP): Mod: JZ | Performed by: HOSPITALIST

## 2024-09-24 PROCEDURE — 36415 COLL VENOUS BLD VENIPUNCTURE: CPT

## 2024-09-24 PROCEDURE — 92507 TX SP LANG VOICE COMM INDIV: CPT

## 2024-09-24 PROCEDURE — 770005 HCHG ROOM/CARE - REHAB PRIVATE (11*

## 2024-09-24 PROCEDURE — 700111 HCHG RX REV CODE 636 W/ 250 OVERRIDE (IP): Performed by: PHYSICAL MEDICINE & REHABILITATION

## 2024-09-24 PROCEDURE — 99232 SBSQ HOSP IP/OBS MODERATE 35: CPT | Performed by: HOSPITALIST

## 2024-09-24 PROCEDURE — A9270 NON-COVERED ITEM OR SERVICE: HCPCS | Mod: JZ | Performed by: HOSPITALIST

## 2024-09-24 PROCEDURE — 85025 COMPLETE CBC W/AUTO DIFF WBC: CPT

## 2024-09-24 PROCEDURE — 97116 GAIT TRAINING THERAPY: CPT

## 2024-09-24 PROCEDURE — 99232 SBSQ HOSP IP/OBS MODERATE 35: CPT | Performed by: PHYSICAL MEDICINE & REHABILITATION

## 2024-09-24 PROCEDURE — 97530 THERAPEUTIC ACTIVITIES: CPT

## 2024-09-24 PROCEDURE — A9270 NON-COVERED ITEM OR SERVICE: HCPCS | Performed by: PHYSICAL MEDICINE & REHABILITATION

## 2024-09-24 PROCEDURE — 05HC33Z INSERTION OF INFUSION DEVICE INTO LEFT BASILIC VEIN, PERCUTANEOUS APPROACH: ICD-10-PCS | Performed by: PHYSICAL MEDICINE & REHABILITATION

## 2024-09-24 PROCEDURE — 97535 SELF CARE MNGMENT TRAINING: CPT

## 2024-09-24 PROCEDURE — 80048 BASIC METABOLIC PNL TOTAL CA: CPT

## 2024-09-24 PROCEDURE — A9270 NON-COVERED ITEM OR SERVICE: HCPCS | Performed by: HOSPITALIST

## 2024-09-24 PROCEDURE — 700102 HCHG RX REV CODE 250 W/ 637 OVERRIDE(OP): Performed by: HOSPITALIST

## 2024-09-24 PROCEDURE — 97129 THER IVNTJ 1ST 15 MIN: CPT

## 2024-09-24 RX ADMIN — QUETIAPINE FUMARATE 50 MG: 25 TABLET ORAL at 21:56

## 2024-09-24 RX ADMIN — HYDRALAZINE HYDROCHLORIDE 100 MG: 50 TABLET ORAL at 22:02

## 2024-09-24 RX ADMIN — VANCOMYCIN HYDROCHLORIDE 1250 MG: 5 INJECTION, POWDER, LYOPHILIZED, FOR SOLUTION INTRAVENOUS at 17:51

## 2024-09-24 RX ADMIN — AMLODIPINE BESYLATE 10 MG: 5 TABLET ORAL at 06:05

## 2024-09-24 RX ADMIN — ENOXAPARIN SODIUM 40 MG: 100 INJECTION SUBCUTANEOUS at 17:46

## 2024-09-24 RX ADMIN — SENNOSIDES AND DOCUSATE SODIUM 2 TABLET: 50; 8.6 TABLET ORAL at 21:56

## 2024-09-24 RX ADMIN — SENNOSIDES AND DOCUSATE SODIUM 2 TABLET: 50; 8.6 TABLET ORAL at 09:59

## 2024-09-24 RX ADMIN — TRAZODONE HYDROCHLORIDE 50 MG: 50 TABLET ORAL at 21:56

## 2024-09-24 RX ADMIN — POTASSIUM CHLORIDE 20 MEQ: 1500 TABLET, EXTENDED RELEASE ORAL at 09:58

## 2024-09-24 RX ADMIN — HYDRALAZINE HYDROCHLORIDE 100 MG: 50 TABLET ORAL at 06:05

## 2024-09-24 RX ADMIN — ASPIRIN 81 MG: 81 TABLET, CHEWABLE ORAL at 09:59

## 2024-09-24 RX ADMIN — Medication 1000 UNITS: at 09:59

## 2024-09-24 RX ADMIN — POLYETHYLENE GLYCOL 3350 1 PACKET: 17 POWDER, FOR SOLUTION ORAL at 21:56

## 2024-09-24 RX ADMIN — ATORVASTATIN CALCIUM 80 MG: 40 TABLET, FILM COATED ORAL at 21:56

## 2024-09-24 RX ADMIN — OMEPRAZOLE 20 MG: 20 CAPSULE, DELAYED RELEASE ORAL at 09:59

## 2024-09-24 RX ADMIN — POLYETHYLENE GLYCOL 3350 1 PACKET: 17 POWDER, FOR SOLUTION ORAL at 10:00

## 2024-09-24 RX ADMIN — VANCOMYCIN HYDROCHLORIDE 1250 MG: 5 INJECTION, POWDER, LYOPHILIZED, FOR SOLUTION INTRAVENOUS at 06:01

## 2024-09-24 RX ADMIN — HYDRALAZINE HYDROCHLORIDE 100 MG: 50 TABLET ORAL at 14:32

## 2024-09-24 RX ADMIN — METOPROLOL SUCCINATE 50 MG: 25 TABLET, EXTENDED RELEASE ORAL at 06:06

## 2024-09-24 RX ADMIN — LISINOPRIL 40 MG: 20 TABLET ORAL at 09:58

## 2024-09-24 ASSESSMENT — ENCOUNTER SYMPTOMS
ABDOMINAL PAIN: 0
CHILLS: 0
NAUSEA: 0
FEVER: 0
VOMITING: 0
SHORTNESS OF BREATH: 0

## 2024-09-24 ASSESSMENT — PAIN DESCRIPTION - PAIN TYPE: TYPE: ACUTE PAIN

## 2024-09-24 ASSESSMENT — GAIT ASSESSMENTS
ASSISTIVE DEVICE: FRONT WHEEL WALKER
DISTANCE (FEET): 75
DEVIATION: ANTALGIC
GAIT LEVEL OF ASSIST: CONTACT GUARD ASSIST

## 2024-09-24 NOTE — DISCHARGE PLANNING
CM spoke with patient's MAURICE, his IDT will be tomorrow, informed her that a  will be calling her to give her an update. Patient lives with her and his brother and they will be able to assist with IV ABX if needed. Will continue to assist with the discharge planning.

## 2024-09-24 NOTE — THERAPY
Physical Therapy   Daily Treatment     Patient Name: Joe Templeton  Age:  74 y.o., Sex:  male  Medical Record #: [id number]  Today's Date: 9/24/2024     Precautions  Precautions: Fall Risk  Comments: c/o left ankle pain    Subjective    Patient remains disoriented- requires reorientation each session to place and situation; reports he is scared to be in room alone, asking therapist to stay with him at end of session     Objective       09/24/24 0831 09/24/24 1531   PT Charge Group   PT Gait Training (Units)  --  1   PT Therapeutic Exercise (Units) 1 1   PT Therapeutic Activities (Units) 1  --    PT Total Time Spent   PT Individual Total Time Spent (Mins) 30 30   Precautions   Precautions Fall Risk Fall Risk   Comments c/o left ankle pain c/o left ankle pain   Gait Functional Level of Assist    Gait Level Of Assist  --  Contact Guard Assist   Assistive Device  --  Front Wheel Walker   Distance (Feet)  --  75   # of Times Distance was Traveled  --  2   Deviation  --  Antalgic  (left ankle pain, short right step)   Wheelchair Functional Level of Assist   Wheelchair Assist Minimal Assist  (50ft bilateral LE straight path) Minimal Assist   Distance Wheelchair (Feet or Distance) 50 150   Wheelchair Description  --  Assistance with steering;Extra time;Limited by fatigue  (bilateral UE and LE propulsion)   Transfer Functional Level of Assist   Bed, Chair, Wheelchair Transfer  --  Minimal Assist   Sitting Lower Body Exercises   Sitting Lower Body Exercises   (used as warm-up)   (used consistently as warm-up and cool-down)   Ankle Pumps   (1min each)   (1min each)   Long Arc Quad   (1min each)   (1min each)   Marching   (1min each)   (1min each)   Comments Motomed LE 4min forward, 4min backward all passive  --    Bed Mobility    Sit to Stand   (2x in 30sec to grab bar with CGA, constant cues for sequencing)  --    Interdisciplinary Plan of Care Collaboration   IDT Collaboration with  Occupational Therapist;Certified Nursing  Assistant Nursing   Collaboration Comments treatment planning- requires frequent reorientation; prefers male CNA assist in BR asking to transfer back to bed for procedure         Assessment    Patient remains confused and reliant on therapist for constant cues for safe sequencing for transfers, w/c mobility and STS work, ambulation distance remains limited by left ankle pain    **patient would benefit from consistent therapist and scheduling to minimize confusion    Strengths: Independent prior level of function, Pleasant and cooperative, Willingly participates in therapeutic activities  Barriers: Decreased endurance, Difficulty following instructions, Generalized weakness, Impaired activity tolerance, Impaired balance, Impaired carryover of learning, Impaired insight/denial of deficits, Impaired functional cognition, Impulsive    Plan    Transfer sequencing  Ambulation with FWW  LE strengthening     DME  PT DME Recommendations  Assistive Device: Front Wheeled Walker     Passport items to be completed:  Get in/out of bed safely, in/out of a vehicle, safely use mobility device, walk or wheel around home/community, navigate up and down stairs, show how to get up/down from the ground, ensure home is accessible, demonstrate HEP, complete caregiver training    Physical Therapy Problems (Active)       Problem: Balance       Dates: Start:  09/21/24            Problem: Mobility       Dates: Start:  09/21/24         Goal: STG-Within one week, patient will ambulate 150ft with use of LRAD with CGA        Dates: Start:  09/21/24               Problem: Mobility Transfers       Dates: Start:  09/21/24         Goal: STG-Within one week, patient will transfer bed to chair with CGA and min VC with use of LRAD        Dates: Start:  09/21/24            Goal: STG-Within two weeks, patient will transfer in/out of car requiring min VC with proper technique and use of LRAD safely       Dates: Start:  09/21/24               Problem:  PT-Long Term Goals       Dates: Start:  09/21/24         Goal: LTG-By discharge, patient will improve dynamic balance from fair+ to good to improve ability to perform ADLs safely with use of LRAD       Dates: Start:  09/21/24            Goal: LTG-By discharge, patient will ambulate 300ft with use of LRAD and supervision assistance.       Dates: Start:  09/21/24            Goal: LTG-By discharge, patient will ambulate up/down 12 stairs with use of single HR and CGA.       Dates: Start:  09/21/24

## 2024-09-24 NOTE — PROGRESS NOTES
NURSING DAILY NOTE    Name: Joe Templeton   Date of Admission: 9/20/2024   Admitting Diagnosis: No Principal Problem: There is no principal problem currently on the Problem List. Please update the Problem List and refresh.  Attending Physician: ELIZABTEH HOLM D.O.  Allergies: Patient has no known allergies.    Safety  Patient Assist  Min to Mod Assist  Patient Precautions  Fall Risk  Precaution Comments     Bed Transfer Status  Minimal Assist  Toilet Transfer Status   Minimal Assist  Assistive Devices  Rails, Wheelchair, Walker - front wheel  Oxygen  None - Room Air  Diet/Therapeutic Dining  Current Diet Order   Procedures    Diet Order Diet: Level 6 - Soft and Bite Sized (Set pt up w/ meal- he can then self-feed independently. Pills whole.); Liquid level: Level 0 - Thin; Tray Modifications (optional): SLP - Deliver to Nursing Station     Pill Administration  whole  Agitated Behavioral Scale  18  ABS Level of Severity  No Agitation    Fall Risk  Has the patient had a fall this admission?   No  Agustin Arsenio Fall Risk Scoring  24, HIGH RISK  Fall Risk Safety Measures  Bed strip alarm    Vitals  Temperature: 36.7 °C (98.1 °F)  Temp src: Temporal  Pulse: 90  Respiration: 18 (Simultaneous filing. User may not have seen previous data.)  Blood Pressure : (!) 174/78  Blood Pressure MAP (Calculated): 110 MM HG  BP Location: Left, Upper Arm (Simultaneous filing. User may not have seen previous data.)  Patient BP Position: Supine (Simultaneous filing. User may not have seen previous data.)     Oxygen  Pulse Oximetry: 94 %  O2 (LPM): 0  O2 Delivery Device: None - Room Air    Bowel and Bladder  Last Bowel Movement  09/22/24  Stool Type  Type 5: Soft blob with clear cut edges (passed easily)  Bowel Device  Bathroom, Other (Comment) (Bowel Meds)  Continent  Bladder: Did not void   Bowel: No movement  Bladder Function  Urine Void (mL):  (Large)  Number of Times  Voided: 1  Urinary Options: Yes  Urine Color: Yellow  Straight Catheter: 550 ml  Genitourinary Assessment   Bladder Assessment (WDL):  Within Defined Limits  Silva Catheter: Not Applicable  Urine Color: Yellow  Bladder Device: Bathroom  Time Void: Yes  Bladder Scan: Post Void  $ Bladder Scan Results (mL): 81    Skin  Colt Score   16  Sensory Interventions   Bed Types: Other (Comments) ([facility])  Skin Preventative Measures: Pillows in Use for Support / Positioning  Moisture Interventions  Moisturizers/Barriers: Barrier Wipes      Pain  Pain Rating Scale  0 - No Pain  Pain Location  Ankle  Pain Location Orientation  Left  Pain Interventions   Declines    ADLs    Bathing      Linen Change      Personal Hygiene  Change Dariana Pads  Chlorhexidine Bath      Oral Care     Teeth/Dentures     Shave     Nutrition Percentage Eaten  Dinner, Between % Consumed  Environmental Precautions  Treaded Slipper Socks on Patient, Bed in Low Position  Patient Turns/Positioning  Patient Turns Self from Side to Side  Patient Turns Assistance/Tolerance  Assistance of One, General Weakness  Bed Positions  Bed Controls On, Bed Locked  Head of Bed Elevated  Self regulated      Psychosocial/Neurologic Assessment  Psychosocial Assessment  Psychosocial (WDL):  WDL Except  Patient Behaviors: Confused, Forgetful  Neurologic Assessment  Neuro (WDL): Exceptions to WDL  Level of Consciousness: Alert  Orientation Level: Disoriented to time, Disoriented to situation, Disoriented to person  Cognition: Poor judgement, Memory Loss, Poor safety awareness  Speech: Clear  Pupil Assesment: No  Muscle Strength Right Arm: Good Strength Against Gravity and Moderate Resistance  Muscle Strength Left Arm: Good Strength Against Gravity and Moderate Resistance  Muscle Strength Right Leg: Fair Strength against Gravity but No Resistance  LLE Motor Response: Responds to commands  Muscle Strength Left Leg: Weak Movement but Not Against Gravity or Resistance  EENT  (WDL):  WDL Except    Cardio/Pulmonary Assessment  Edema   LLE Edema: 2+  Respiratory Breath Sounds  RUL Breath Sounds: Clear  RML Breath Sounds: Clear  RLL Breath Sounds: Clear, Diminished  YONI Breath Sounds: Clear  LLL Breath Sounds: Clear, Diminished  Cardiac Assessment   Cardiac (WDL):  WDL Except (H/O HTN, CAD S/P CABG, NSTEMI.)

## 2024-09-24 NOTE — THERAPY
Occupational Therapy  Daily Treatment     Patient Name: Joe Templeton  Age:  74 y.o., Sex:  male  Medical Record #: [id number]  Today's Date: 9/24/2024     Precautions  Precautions: Fall Risk  Comments: c/o left ankle pain         Subjective    Pt was with CNA in room at beginning of session. Pt was agreeable to therapy.     Objective       09/24/24 0901   OT Charge Group   OT Self Care / ADL (Units) 2   OT Cognitive Skill Development First 15 Minutes (Units) 1   OT Therapeutic Exercise (Units) 1   OT Total Time Spent   OT Individual Total Time Spent (Mins) 60   Precautions   Precautions Fall Risk   Cognition    Level of Consciousness Alert   Functional Level of Assist   Grooming Standby Assist;Seated   Grooming Description Increased time;Seated in wheelchair at sink;Set-up of equipment;Supervision for safety;Verbal cueing  (Brushed teeth, washed hands and face with cues for sequencing)   Sitting Upper Body Exercises   Shoulder Press 1 set of 10;Bilateral  (4#, min A for coordination)   Bicep Curls 3 sets of 10;Bilateral  (4#)     Pt completed sequencing task seated at table. Pt able to complete 3/3 tasks with minimal verbal cues to complete.   Pt completed graded multi-step instruction activity (from 1 to 3 step simple instructions) seated at table. Pt able to complete all task with 1 error, however, unable to recall colors of cones during activity.    Assessment    Pt tolerated therapy but was confused throughout the session. Pt was unable to recall birthday, month, year or location throughout the sessions and still presents with memory impairments. Pt completed grooming task with mod-max verbal cues for sequencing. Pt was able to improve on multi-step instruction activity (from 1- 3 steps with minor errors). Pt continued to have noted coordination movements during BUE weighted activity and reported it was better when he went slower. Pt handed off to CNA at end of session with chair alarm and seatbelt alarm on.    Strengths: Independent prior level of function  Barriers: Decreased endurance, Confused, Impaired activity tolerance, Impulsive, Limited mobility, Impaired insight/denial of deficits, Impaired functional cognition (Encouragement for pt to participate in ADL's 7:00 a.m.)    Plan    Pt would benefit from continued skilled OT services to work on ADLs, safety, functional mobility and cognition.    DME  OT DME Recommendations  Bathroom Equipment:  (TBD)    Passport items to be completed:  Perform bathroom transfers, complete dressing, complete feeding, get ready for the day, prepare a simple meal, participate in household tasks, adapt home for safety needs, demonstrate home exercise program, complete caregiver training     Occupational Therapy Goals (Active)       Problem: Bathing       Dates: Start:  09/21/24         Goal: STG-Within one week, patient will bathe with Min A using Adaptive equipment as needed.       Dates: Start:  09/21/24               Problem: Dressing       Dates: Start:  09/21/24         Goal: STG-Within one week, patient will dress UB with Standby assistance.       Dates: Start:  09/21/24            Goal: STG-Within one week, patient will dress LB with Min A using Adaptive equipment as needed.       Dates: Start:  09/21/24               Problem: Functional Cognition       Dates: Start:  09/21/24         Goal: STG-Within one week, patient will be oriented to year,month, and place.       Dates: Start:  09/21/24               Problem: Functional Transfers       Dates: Start:  09/21/24         Goal: STG-Within one week, patient will transfer to toilet with Contact Guard Assistance using DME as needed.       Dates: Start:  09/21/24            Goal: STG-Within one week, patient will transfer to step in shower with Min A using DME as needed.       Dates: Start:  09/21/24               Problem: OT Long Term Goals       Dates: Start:  09/21/24         Goal: LTG-By discharge, patient will complete basic self  care tasks at Mod Independent with UB ADL's and Supervision with LB ADL's.       Dates: Start:  09/21/24            Goal: LTG-By discharge, patient will perform bathroom transfers with Supervision.       Dates: Start:  09/21/24               Problem: Toileting       Dates: Start:  09/21/24         Goal: STG-Within one week, patient will complete toileting tasks with Min A.       Dates: Start:  09/21/24

## 2024-09-24 NOTE — CARE PLAN
"  Problem: Knowledge Deficit - Standard  Goal: Patient and family/care givers will demonstrate understanding of plan of care, disease process/condition, diagnostic tests and medications  Outcome: Not Met  Note: Pt agrees with plan of care tonight regarding medications and safety.  Pt is confused and impulsive.  Will continue to monitor patient.     Problem: Fall Risk - Rehab  Goal: Patient will remain free from falls  Outcome: Not Met  Note: Nikole Cesar Fall risk Assessment Score: 24    High fall risk Interventions   - Alarming seatbelt  - Wander guard  - Bed and strip alarm   - Yellow sign by the door   - Yellow wrist band \"Fall risk\"  - Room near to the nurse station  - Do not leave patient unattended in the bathroom  - Fall risk education provided            The patient is Stable - Low risk of patient condition declining or worsening    Shift Goals  Clinical Goals: Safety  Patient Goals: Sleep well        Patient is not progressing towards the following goals:      Problem: Knowledge Deficit - Standard  Goal: Patient and family/care givers will demonstrate understanding of plan of care, disease process/condition, diagnostic tests and medications  Outcome: Not Met  Note: Pt agrees with plan of care tonight regarding medications and safety.  Pt is confused and impulsive.  Will continue to monitor patient.     Problem: Fall Risk - Rehab  Goal: Patient will remain free from falls  Outcome: Not Met  Note: Nikole Cesar Fall risk Assessment Score: 24    High fall risk Interventions   - Alarming seatbelt  - Wander guard  - Bed and strip alarm   - Yellow sign by the door   - Yellow wrist band \"Fall risk\"  - Room near to the nurse station  - Do not leave patient unattended in the bathroom  - Fall risk education provided          "

## 2024-09-24 NOTE — PROCEDURES
Vascular Access Team    Date of Insertion: 09/24/24  Arm Circumference: 30.5  Internal length: 11  External Length: 0  Vein Occupancy %: unknown  Reason for Midline: abx  Labs: WBC 10.9 , , BUN 21, Cr 0.89, GFR 90 , INR n/a    Orders confirmed, vessel patency confirmed with ultrasound. Risks and benefits of procedure explained to patient and education regarding line associated bloodstream infections provided. Questions answered.     Power Midline placed in LUE per licensed provider order with ultrasound guidance. 4  Fr, 1 lumen Power Midline placed in Basilic vein after 1 attempt(s). 2 mL of 1% lidocaine injected intradermally, 21 gauge microintroducer needle was visualized entering the vein and modified Seldinger technique used. 11 cm catheter inserted with good blood return. Secured at 0 cm marker. Internal positioning stylet removed and verified to be intact. Each lumen flushed without resistance with 10 mL 0.9% normal saline. Midline secured with Biopatch and Tegaderm.     Midline placement is confirmed by nurse using ultrasound and ability to flush and draw blood. Midline is appropriate for use at this time.  Patient tolerated procedure well, without complications.  No X-ray is needed for placement confirmation.  Patient condition relayed to unit RN or ordering physician via this post procedure note in the EMR.     Ultrasound images uploaded to PACS and viewable in the EMR - Yes  Ultrasound imaged printed and placed in paper chart - No     BARD Power Midline ref # [id number], Lot # [id number], Expiration Date 09/30/2024

## 2024-09-24 NOTE — PROGRESS NOTES
Pt pulled out midline IV placed today.  PIV remains intact to right hand.  Will continue to monitor patient.

## 2024-09-24 NOTE — PROGRESS NOTES
NURSING DAILY NOTE    Name: Joe Templeton   Date of Admission: 9/20/2024   Admitting Diagnosis: No Principal Problem: There is no principal problem currently on the Problem List. Please update the Problem List and refresh.  Attending Physician: ELIZABETH HOLM D.O.  Allergies: Patient has no known allergies.    Safety  Patient Assist  min-mod  Patient Precautions  Fall Risk  Precaution Comments     Bed Transfer Status  Minimal Assist  Toilet Transfer Status   Minimal Assist  Assistive Devices  Rails, Wheelchair  Oxygen  None - Room Air  Diet/Therapeutic Dining  Current Diet Order   Procedures    Diet Order Diet: Level 6 - Soft and Bite Sized (Set pt up w/ meal- he can then self-feed independently. Pills whole.); Liquid level: Level 0 - Thin; Tray Modifications (optional): SLP - Deliver to Nursing Station     Pill Administration  whole  Agitated Behavioral Scale  18  ABS Level of Severity  No Agitation    Fall Risk  Has the patient had a fall this admission?   No  Nikole Cesar Fall Risk Scoring  24, HIGH RISK  Fall Risk Safety Measures  chair alarm, seatbelt alarm, and posey bed     Vitals  Temperature: 36.6 °C (97.8 °F)  Temp src: Oral  Pulse: 81  Respiration: 18  Blood Pressure : (!) 148/76  Blood Pressure MAP (Calculated): 100 MM HG  BP Location: Left, Upper Arm  Patient BP Position: Sitting     Oxygen  Pulse Oximetry: 95 %  O2 (LPM): 0  O2 Delivery Device: None - Room Air    Bowel and Bladder  Last Bowel Movement  09/22/24  Stool Type  Type 5: Soft blob with clear cut edges (passed easily)  Bowel Device  Bathroom, Other (Comment) (Bowel Meds)  Continent  Bladder: Did not void   Bowel: No movement  Bladder Function  Urine Void (mL):  (Large)  Number of Times Voided: 1  Urinary Options: Yes  Urine Color: Unable To Evaluate  Straight Catheter: 550 ml  Genitourinary Assessment   Bladder Assessment (WDL):  WDL Except  Silva Catheter: Not Applicable  Urine  Color: Unable To Evaluate  Bladder Device: Bathroom  Time Void: Yes  $ Bladder Scan Results (mL): 21    Skin  Colt Score   16  Sensory Interventions   Bed Types:  (soma)  Skin Preventative Measures: Pillows in Use for Support / Positioning  Moisture Interventions  Moisturizers/Barriers: Barrier Wipes      Pain  Pain Rating Scale  4 - Distracts me, can do usual activities  Pain Location  Ankle  Pain Location Orientation  Left  Pain Interventions   Distraction, Emotional Support, Repositioned, Rest    ADLs    Bathing      Linen Change      Personal Hygiene     Chlorhexidine Bath      Oral Care     Teeth/Dentures     Shave     Nutrition Percentage Eaten  Dinner, Between % Consumed  Environmental Precautions  Treaded Slipper Socks on Patient, Personal Belongings, Wastebasket, Call Bell etc. in Easy Reach, Transferred to Stronger Side, Report Given to Other Health Care Providers Regarding Fall Risk, Bed in Low Position  Patient Turns/Positioning  Patient Turns Self from Side to Side  Patient Turns Assistance/Tolerance  Assistance of One, General Weakness  Bed Positions  Bed Controls On  Head of Bed Elevated  Self regulated      Psychosocial/Neurologic Assessment  Psychosocial Assessment  Psychosocial (WDL):  WDL Except  Patient Behaviors: Confused, Forgetful  Neurologic Assessment  Neuro (WDL): Exceptions to WDL  Level of Consciousness: Alert  Orientation Level: Disoriented to time, Disoriented to situation, Oriented to person, Disoriented to place  Cognition: Poor judgement, Memory Loss, Poor safety awareness  Speech: Clear  Pupil Assesment: No  Muscle Strength Right Arm: Good Strength Against Gravity and Moderate Resistance  Muscle Strength Left Arm: Good Strength Against Gravity and Moderate Resistance  Muscle Strength Right Leg: Fair Strength against Gravity but No Resistance  LLE Motor Response: Responds to commands  Muscle Strength Left Leg: Weak Movement but Not Against Gravity or Resistance  EENT (WDL):   WDL Except    Cardio/Pulmonary Assessment  Edema   LLE Edema: 2+ (Foot)  Respiratory Breath Sounds  RUL Breath Sounds: Clear  RML Breath Sounds: Clear  RLL Breath Sounds: Diminished  YONI Breath Sounds: Clear  LLL Breath Sounds: Diminished  Cardiac Assessment   Cardiac (WDL):  WDL Except (hx cad, cabg, htn)

## 2024-09-24 NOTE — CARE PLAN
Problem: Memory STGs  Goal: STG-Within one week, patient will  Description: 1) Individualized goal:  be oriented to day, month, year, and situation w/ 90% acc and MIN A.   2) Interventions:  SLP Self Care / ADL Training , SLP Cognitive Skill Development, and SLP Group Treatment      Outcome: Not Met  Note: Will continue to target, emphasis of ST will likely be focused on receptive and expressive language.     Problem: Problem Solving STGs  Goal: STG-Within one week, patient will  Description: 1) Individualized goal:  complete SCCAN with further goals developed as appropriate.   2) Interventions:  SLP Self Care / ADL Training  and SLP Cognitive Skill Development      Outcome: Met

## 2024-09-24 NOTE — CARE PLAN
Problem: Pain - Standard  Goal: Alleviation of pain or a reduction in pain to the patient’s comfort goal  Outcome: Progressing  Note: Patient able to verbalize pain level and verbalize an acceptable level of pain.   The patient is Stable - Low risk of patient condition declining or worsening    Shift Goals  Clinical Goals: Safety  Patient Goals: Sleep well    Progress made toward(s) clinical / shift goals:  pt pain controlled, prn meds available    Patient is not progressing towards the following goals:

## 2024-09-24 NOTE — THERAPY
Speech Language Pathology  Daily Treatment     Patient Name: Joe Templeton  Age:  74 y.o., Sex:  male  Medical Record #: [id number]  Today's Date: 9/24/2024     Precautions  Precautions: Fall Risk  Comments: c/o left ankle pain    Subjective    Assumed care of patient from nurse's station. Agreeable to session. Pt able to communicate during session feelings of frustration and needing breaks.      Objective       09/24/24 1304   Treatment Charges   Charges Yes   SLP Treatment - Individual Speech Language Treatment - Individual   SLP Total Time Spent   SLP Individual Total Time Spent (Mins) 60   Outcome Measures   Outcome Measures Utilized WAB-R   WAB-R (Western Aphasia Battery)   Spontaneous Speech:  Information Content 3   Spontaneous Speech:  Fluency 5   Comprehension:  Yes/No Questions 36   Comprehension:  Word Recognition 30   Comprehension:  Sequential Commands 19   Repetition:  Total 66   Naming:  Object Naming 30   Naming:  Word Fluency 2   Naming:  Sentence Completion 3   Naming:  Responsive Speech 4   Spontaneous Speech Score 8   Comprehension Score 4.3   Repetition Score 6.6   Naming Score 3.9   Aphasia Quotient (QA) 45.6   WAB-R Degree of Severity Severe Impairment   Interdisciplinary Plan of Care Collaboration   IDT Collaboration with  Nursing   Patient Position at End of Therapy Seated;Chair Alarm On;Self Releasing Lap Belt Applied;Call Light within Reach;Tray Table within Reach   Collaboration Comments informed nursing of pt location and request to get back into bed         Assessment    Initially attempted matching pictures to simple phrases - pt requiring MAX A. Task abandoned and administration of The Western Aphasia Battery-Revised (WAB-R) which assesses adult patients with acquired neurological disorders (e.g., as a result of stroke, head injury, dementia) for aphasia.  WAB-R assesses the linguistic skills most frequently affected by aphasia, in addition to key nonlinguistic skills, and provides  differential diagnosis information. This assessment was administered to further identify pt's receptive and expressive language strengths and needs and to further identify POC for ST.    Pt performed as follows:     WAB-R (Western Aphasia Battery)  Spontaneous Speech:  Information Content: 3  Spontaneous Speech:  Fluency: 5  Comprehension:  Yes/No Questions: 36  Comprehension:  Word Recognition: 30  Comprehension:  Sequential Commands: 19  Repetition:  Total: 66  Naming:  Object Namin  Naming:  Word Fluency: 2  Naming:  Sentence Completion: 3  Naming:  Responsive Speech: 4  Spontaneous Speech Score: 8  Comprehension Score: 4.3  Repetition Score: 6.6  Naming Score: 3.9  Aphasia Quotient (QA): 45.6  WAB-R Degree of Severity: Severe Impairment    Pt demonstrates relative strengths is answering simple and personal Y/N questions, identifying individual letters, numbers, colors as well as body parts and following simple 1-step verbal commands. Repetition at the single word level also a relative strength.     Pt demonstrates difficulty answering complex Y/N, following 2-step commands and object naming (50% accuracy), phonemic cues minimally helpful       Strengths: Willingly participates in therapeutic activities  Barriers: Confused, Impaired functional cognition, Impulsive, Difficulty following instructions    Plan    Continue to address receptive and expressive language to improve functional communication.    Passport items to be completed:  Express basic needs, understand food/liquid recommendations, consistently follow swallow precautions, manage finances, manage medications, arrive to therapy appointments on time, complete daily memory log entries, solve problems related to safety situations, review education related to hospitalization, complete caregiver training     Speech Therapy Problems (Active)       Problem: Memory STGs       Dates: Start:  24         Goal: STG-Within one week, patient will        Dates: Start:  09/21/24       Description: 1) Individualized goal:  be oriented to day, month, year, and situation w/ 90% acc and MIN A.   2) Interventions:  SLP Self Care / ADL Training , SLP Cognitive Skill Development, and SLP Group Treatment                Problem: Problem Solving STGs       Dates: Start:  09/21/24         Goal: STG-Within one week, patient will       Dates: Start:  09/21/24       Description: 1) Individualized goal:  complete SCCAN with further goals developed as appropriate.   2) Interventions:  SLP Self Care / ADL Training  and SLP Cognitive Skill Development                Problem: Speech/Swallowing LTGs       Dates: Start:  09/21/24         Goal: LTG-By discharge, patient will solve basic problems       Dates: Start:  09/21/24       Description: 1) Individualized goal:  related to health and safety with 80% acc and MOD I for a safe D/C to PLOF.   2) Interventions:  SLP Self Care / ADL Training , SLP Cognitive Skill Development, and SLP Group Treatment

## 2024-09-24 NOTE — PROGRESS NOTES
"  Physical Medicine & Rehabilitation Progress Note    Encounter Date: 9/24/2024    Chief Complaint: Eating lunch    Interval Events (Subjective):  Vital signs: Blood pressure remains elevated  Last bowel movement 9/22  Voiding volitionally    Patient seen and examined in the lunch room.  States that he is doing okay.   Does not know why he is pulling out his midline.    ROS: 14 point ROS negative unless otherwise specified in the HPI    Objective:  VITAL SIGNS: /74   Pulse 82   Temp 36.5 °C (97.7 °F) (Oral)   Resp 18   Ht 1.753 m (5' 9\")   Wt 77.1 kg (170 lb)   SpO2 94%   BMI 25.10 kg/m²     GEN: No apparent distress  HEENT: Head normocephalic, atraumatic.  Sclera nonicteric bilaterally, no ocular discharge appreciated bilaterally.  CV: Extremities warm and well-perfused, no peripheral edema appreciated bilaterally.  PULMONARY: Breathing nonlabored on room air, no respiratory accessory muscle use.  Not requiring supplemental oxygen.  SKIN: Left lateral ankle incision with some erythema around the sutures.  Monitor.  No drainage.  PSYCH: Mood and affect within normal limits.  NEURO: Awake alert.  Pleasantly confused.      Laboratory Values:  Recent Results (from the past 72 hour(s))   BASIC METABOLIC PANEL    Collection Time: 09/22/24  5:53 AM   Result Value Ref Range    Sodium 142 135 - 145 mmol/L    Potassium 3.5 (L) 3.6 - 5.5 mmol/L    Chloride 110 96 - 112 mmol/L    Co2 20 20 - 33 mmol/L    Glucose 136 (H) 65 - 99 mg/dL    Bun 23 (H) 8 - 22 mg/dL    Creatinine 0.80 0.50 - 1.40 mg/dL    Calcium 8.4 (L) 8.5 - 10.5 mg/dL    Anion Gap 12.0 7.0 - 16.0   CBC WITHOUT DIFFERENTIAL    Collection Time: 09/22/24  5:53 AM   Result Value Ref Range    WBC 11.2 (H) 4.8 - 10.8 K/uL    RBC 4.56 (L) 4.70 - 6.10 M/uL    Hemoglobin 14.4 14.0 - 18.0 g/dL    Hematocrit 42.6 42.0 - 52.0 %    MCV 93.4 81.4 - 97.8 fL    MCH 31.6 27.0 - 33.0 pg    MCHC 33.8 32.3 - 36.5 g/dL    RDW 42.9 35.9 - 50.0 fL    Platelet Count 266 164 " - 446 K/uL    MPV 11.1 9.0 - 12.9 fL   ESTIMATED GFR    Collection Time: 09/22/24  5:53 AM   Result Value Ref Range    GFR (CKD-EPI) 93 >60 mL/min/1.73 m 2   CBC WITH DIFFERENTIAL    Collection Time: 09/24/24  6:13 AM   Result Value Ref Range    WBC 10.9 (H) 4.8 - 10.8 K/uL    RBC 4.52 (L) 4.70 - 6.10 M/uL    Hemoglobin 14.4 14.0 - 18.0 g/dL    Hematocrit 42.0 42.0 - 52.0 %    MCV 92.9 81.4 - 97.8 fL    MCH 31.9 27.0 - 33.0 pg    MCHC 34.3 32.3 - 36.5 g/dL    RDW 41.0 35.9 - 50.0 fL    Platelet Count 269 164 - 446 K/uL    MPV 11.4 9.0 - 12.9 fL    Neutrophils-Polys 75.40 (H) 44.00 - 72.00 %    Lymphocytes 11.80 (L) 22.00 - 41.00 %    Monocytes 10.90 0.00 - 13.40 %    Eosinophils 1.10 0.00 - 6.90 %    Basophils 0.40 0.00 - 1.80 %    Immature Granulocytes 0.40 0.00 - 0.90 %    Nucleated RBC 0.00 0.00 - 0.20 /100 WBC    Neutrophils (Absolute) 8.26 (H) 1.82 - 7.42 K/uL    Lymphs (Absolute) 1.29 1.00 - 4.80 K/uL    Monos (Absolute) 1.19 (H) 0.00 - 0.85 K/uL    Eos (Absolute) 0.12 0.00 - 0.51 K/uL    Baso (Absolute) 0.04 0.00 - 0.12 K/uL    Immature Granulocytes (abs) 0.04 0.00 - 0.11 K/uL    NRBC (Absolute) 0.00 K/uL   Basic Metabolic Panel    Collection Time: 09/24/24  6:13 AM   Result Value Ref Range    Sodium 142 135 - 145 mmol/L    Potassium 3.8 3.6 - 5.5 mmol/L    Chloride 108 96 - 112 mmol/L    Co2 21 20 - 33 mmol/L    Glucose 132 (H) 65 - 99 mg/dL    Bun 21 8 - 22 mg/dL    Creatinine 0.89 0.50 - 1.40 mg/dL    Calcium 8.9 8.5 - 10.5 mg/dL    Anion Gap 13.0 7.0 - 16.0   ESTIMATED GFR    Collection Time: 09/24/24  6:13 AM   Result Value Ref Range    GFR (CKD-EPI) 90 >60 mL/min/1.73 m 2       Medications:  Scheduled Medications   Medication Dose Frequency    senna-docusate  2 Tablet BID    And    polyethylene glycol/lytes  1 Packet BID    metoprolol SR  50 mg DAILY    hydrALAZINE  100 mg Q8HRS    vitamin D3  1,000 Units DAILY    potassium chloride SA  20 mEq DAILY    Pharmacy Consult Request  1 Each PHARMACY TO DOSE     lidocaine  1-2 Patch Daily-0800    omeprazole  20 mg DAILY    amLODIPine  10 mg Q DAY    enoxaparin (LOVENOX) injection  40 mg DAILY AT 1800    lisinopril  40 mg DAILY    MD Alert...Vancomycin per Pharmacy   PHARMACY TO DOSE    vancomycin  1,250 mg Q12HR    aspirin  81 mg DAILY    atorvastatin  80 mg Q EVENING    QUEtiapine  50 mg Nightly     PRN medications: hydrALAZINE, lactulose, docusate sodium, bisacodyl EC, magnesium hydroxide, sodium phosphate, carboxymethylcellulose, benzocaine-menthol, mag hydrox-al hydrox-simeth, ondansetron **OR** ondansetron, traZODone, sodium chloride, acetaminophen, ziprasidone, traMADol, formulation r    Diet:  Current Diet Order   Procedures    Diet Order Diet: Level 6 - Soft and Bite Sized (Set pt up w/ meal- he can then self-feed independently. Pills whole.); Liquid level: Level 0 - Thin; Tray Modifications (optional): SLP - Deliver to Nursing Station       Medical Decision Making and Plan:  Multifocal embolic appearing infarcts: Right insula, left parietal lobe, right parietal lobe, left hao  MRI with multiple old infarcts  Dysphagia  Encephalopathy  PT and OT for mobility and ADLs. Per guidelines, 15 hours per week between PT, OT and/or SLP.  Follow-up stroke Bridge clinic  Secondary stroke prophylaxis: Aspirin and statin   Continue Seroquel at night     I certify that the patient currently requires non-pharmacologic restraints. Non-pharmacologic restraints are required to reduce the potential for the patient to harm themselves or others, to limit violent behaviors, and to allow proper assessment and care. I have completed a face to face assessment of this patient on 9/24/2024. Alternative methods including but not limited to de-escalation techniques, redirection, and pharmacologic treatment have been attempted but patient currently remains at risk without restraints. Our staff has been trained on restraints and patient is currently placed in video monitored room.  The need  for these restraints is assessed by a rehabilitation physician every 24 hours and use of restraints is minimized when appropriate.  Current diagnosis which requires restraints: Encephalopathy. Current restraints required: Enclosure bed, bilateral mitts to prevent pulling out midline which is needed for IV antibiotics.       Septic arthritis left ankle s/p left ankle arthrotomy and I&D 2024 Dr. Gautam -weightbearing as tolerated left lower extremity.  ID followed.  Continue vancomycin twice daily through 10/12.  Potentially can switch to Zyvox.   patient pulled out midline will need new access.   pulled out midline again, admits an attempt to help prevent him pulling it out.     Left knee pain -check knee x-ray.   negative.     Chronic systolic heart failure, not in acute exacerbation - preserved ejection fraction-LVEF 60%     CAD s/p CABG -continue aspirin and statin     Hypertension -amlodipine, hydralazine, lisinopril, metoprolol.   uncontrolled.  Hospitalist managing.     Azotemia - improving, monitor.   resolved.    Hypokalemia - mildly low at 3.5.  Hospitalist following.     Leukocytosis - mild at 11.2.   improving at 10.9.     Pain -Tylenol and oxycodone     Bowel -patient unsure when last BM was.  Check KUB.  Moderate stool burden.  Had small bowel movement .  Increase bowel meds .       Upcoming Labs/imagin/26     DVT PROPHYLAXIS: Lovenox 40mg SQ nightly      HOSPITALIST FOLLOWING: Yes - d/w hospitalist      CODE STATUS: FULL CODE     DISPO: Home with family      HEBER: TBD     ADDITIONAL MEDICAL NEEDS ON D/C (IV abx, O2, etc): IV antibiotics      MEDS SENT TO: [facility]     DISCHARGE SPECIALIST FOLLOW UP: ID, Ortho, Stroke Bridge     Patient to scheduled follow up with their PCP within 2 weeks from discharge from the [facility].   ____________________________________    Dr. Khushbu Meza DO, MS  [facility] - Physical Medicine &  Rehabilitation   ____________________________________

## 2024-09-25 ENCOUNTER — APPOINTMENT (OUTPATIENT)
Dept: PHYSICAL THERAPY | Facility: REHABILITATION | Age: 74
DRG: 057 | End: 2024-09-25
Attending: PHYSICAL MEDICINE & REHABILITATION
Payer: MEDICARE

## 2024-09-25 ENCOUNTER — APPOINTMENT (OUTPATIENT)
Dept: OCCUPATIONAL THERAPY | Facility: REHABILITATION | Age: 74
DRG: 057 | End: 2024-09-25
Attending: PHYSICAL MEDICINE & REHABILITATION
Payer: MEDICARE

## 2024-09-25 ENCOUNTER — APPOINTMENT (OUTPATIENT)
Dept: SPEECH THERAPY | Facility: REHABILITATION | Age: 74
DRG: 057 | End: 2024-09-25
Attending: PHYSICAL MEDICINE & REHABILITATION
Payer: MEDICARE

## 2024-09-25 PROCEDURE — A9270 NON-COVERED ITEM OR SERVICE: HCPCS | Performed by: HOSPITALIST

## 2024-09-25 PROCEDURE — 700102 HCHG RX REV CODE 250 W/ 637 OVERRIDE(OP): Performed by: PHYSICAL MEDICINE & REHABILITATION

## 2024-09-25 PROCEDURE — 97116 GAIT TRAINING THERAPY: CPT

## 2024-09-25 PROCEDURE — 700101 HCHG RX REV CODE 250: Performed by: PHYSICAL MEDICINE & REHABILITATION

## 2024-09-25 PROCEDURE — 770005 HCHG ROOM/CARE - REHAB PRIVATE (11*

## 2024-09-25 PROCEDURE — 97530 THERAPEUTIC ACTIVITIES: CPT

## 2024-09-25 PROCEDURE — 97110 THERAPEUTIC EXERCISES: CPT

## 2024-09-25 PROCEDURE — 99231 SBSQ HOSP IP/OBS SF/LOW 25: CPT | Performed by: HOSPITALIST

## 2024-09-25 PROCEDURE — 99233 SBSQ HOSP IP/OBS HIGH 50: CPT | Performed by: PHYSICAL MEDICINE & REHABILITATION

## 2024-09-25 PROCEDURE — 700111 HCHG RX REV CODE 636 W/ 250 OVERRIDE (IP): Mod: JZ | Performed by: PHYSICAL MEDICINE & REHABILITATION

## 2024-09-25 PROCEDURE — A9270 NON-COVERED ITEM OR SERVICE: HCPCS | Performed by: PHYSICAL MEDICINE & REHABILITATION

## 2024-09-25 PROCEDURE — A9270 NON-COVERED ITEM OR SERVICE: HCPCS | Mod: JZ | Performed by: HOSPITALIST

## 2024-09-25 PROCEDURE — 700102 HCHG RX REV CODE 250 W/ 637 OVERRIDE(OP): Performed by: HOSPITALIST

## 2024-09-25 PROCEDURE — 97130 THER IVNTJ EA ADDL 15 MIN: CPT

## 2024-09-25 PROCEDURE — 700105 HCHG RX REV CODE 258: Performed by: PHYSICAL MEDICINE & REHABILITATION

## 2024-09-25 PROCEDURE — 97129 THER IVNTJ 1ST 15 MIN: CPT

## 2024-09-25 PROCEDURE — 97535 SELF CARE MNGMENT TRAINING: CPT

## 2024-09-25 PROCEDURE — 700102 HCHG RX REV CODE 250 W/ 637 OVERRIDE(OP): Mod: JZ | Performed by: HOSPITALIST

## 2024-09-25 RX ADMIN — HYDRALAZINE HYDROCHLORIDE 100 MG: 50 TABLET ORAL at 04:27

## 2024-09-25 RX ADMIN — ACETAMINOPHEN 650 MG: 325 TABLET ORAL at 04:26

## 2024-09-25 RX ADMIN — SENNOSIDES AND DOCUSATE SODIUM 2 TABLET: 50; 8.6 TABLET ORAL at 08:29

## 2024-09-25 RX ADMIN — ENOXAPARIN SODIUM 40 MG: 100 INJECTION SUBCUTANEOUS at 17:03

## 2024-09-25 RX ADMIN — POTASSIUM CHLORIDE 20 MEQ: 1500 TABLET, EXTENDED RELEASE ORAL at 08:28

## 2024-09-25 RX ADMIN — POLYETHYLENE GLYCOL 3350 1 PACKET: 17 POWDER, FOR SOLUTION ORAL at 08:29

## 2024-09-25 RX ADMIN — ATORVASTATIN CALCIUM 80 MG: 40 TABLET, FILM COATED ORAL at 21:21

## 2024-09-25 RX ADMIN — ASPIRIN 81 MG: 81 TABLET, CHEWABLE ORAL at 08:28

## 2024-09-25 RX ADMIN — VANCOMYCIN HYDROCHLORIDE 1250 MG: 5 INJECTION, POWDER, LYOPHILIZED, FOR SOLUTION INTRAVENOUS at 04:23

## 2024-09-25 RX ADMIN — OMEPRAZOLE 20 MG: 20 CAPSULE, DELAYED RELEASE ORAL at 08:28

## 2024-09-25 RX ADMIN — QUETIAPINE FUMARATE 50 MG: 25 TABLET ORAL at 21:22

## 2024-09-25 RX ADMIN — METOPROLOL SUCCINATE 50 MG: 25 TABLET, EXTENDED RELEASE ORAL at 04:26

## 2024-09-25 RX ADMIN — SENNOSIDES AND DOCUSATE SODIUM 2 TABLET: 50; 8.6 TABLET ORAL at 21:21

## 2024-09-25 RX ADMIN — VANCOMYCIN HYDROCHLORIDE 1250 MG: 5 INJECTION, POWDER, LYOPHILIZED, FOR SOLUTION INTRAVENOUS at 17:00

## 2024-09-25 RX ADMIN — Medication 1000 UNITS: at 08:28

## 2024-09-25 RX ADMIN — HYDRALAZINE HYDROCHLORIDE 100 MG: 50 TABLET ORAL at 21:22

## 2024-09-25 RX ADMIN — AMLODIPINE BESYLATE 10 MG: 5 TABLET ORAL at 04:27

## 2024-09-25 RX ADMIN — LISINOPRIL 40 MG: 20 TABLET ORAL at 08:32

## 2024-09-25 RX ADMIN — HYDRALAZINE HYDROCHLORIDE 100 MG: 50 TABLET ORAL at 14:26

## 2024-09-25 ASSESSMENT — ENCOUNTER SYMPTOMS
NAUSEA: 0
BRUISES/BLEEDS EASILY: 0
PALPITATIONS: 0
VOMITING: 0
COUGH: 0
SHORTNESS OF BREATH: 0
EYES NEGATIVE: 1
POLYDIPSIA: 0
FEVER: 0
ABDOMINAL PAIN: 0
CHILLS: 0

## 2024-09-25 ASSESSMENT — GAIT ASSESSMENTS
ASSISTIVE DEVICE: FRONT WHEEL WALKER
DEVIATION: ANTALGIC
GAIT LEVEL OF ASSIST: CONTACT GUARD ASSIST
DISTANCE (FEET): 45

## 2024-09-25 ASSESSMENT — ACTIVITIES OF DAILY LIVING (ADL): BED_CHAIR_WHEELCHAIR_TRANSFER_DESCRIPTION: VERBAL CUEING;SUPERVISION FOR SAFETY;INCREASED TIME;ADAPTIVE EQUIPMENT

## 2024-09-25 NOTE — CARE PLAN
The patient is Stable - Low risk of patient condition declining or worsening    Shift Goals  Clinical Goals: safety  Patient Goals: safety      Problem: Bowel Elimination  Goal: Patient will participate in bowel management program  Outcome: Progressing patient had a large continent bowel movement.       Problem: Fall Risk - Rehab  Goal: Patient will remain free from falls  Outcome: Not Met patient can be impulsive and is in Soma bed. Patient has not attempted to take out Midline today, sitting quietly at nurses station when not in therapy.

## 2024-09-25 NOTE — CARE PLAN
Problem: Memory STGs  Goal: STG-Within one week, patient will  Description: 1) Individualized goal:  be oriented to day, month, year, and situation w/ 90% acc and MIN A.   2) Interventions:  SLP Self Care / ADL Training , SLP Cognitive Skill Development, and SLP Group Treatment      Outcome: Not Met     Problem: Comprehension STGs  Goal: STG-Within one week, patient will point to pictures in FO3 with 80% accuracy.  Outcome: Not Met     Problem: Expression STGs  Goal: STG-Within one week, patient will state antonym given verbal prompt in 7/10 trials  Outcome: Not Met

## 2024-09-25 NOTE — PROGRESS NOTES
Pt back to bed and midline placed to ELIZA by IR. Lightly wrapped coban and placed stockingette over coban. Mitt placed to right hand. Will monitor.

## 2024-09-25 NOTE — PROGRESS NOTES
NURSING DAILY NOTE    Name: Joe Templeton   Date of Admission: 9/20/2024   Admitting Diagnosis: No Principal Problem: There is no principal problem currently on the Problem List. Please update the Problem List and refresh.  Attending Physician: ELIZABETH HOLM D.O.  Allergies: Patient has no known allergies.    Safety  Patient Assist  Mod Assist  Patient Precautions  Fall Risk  Precaution Comments  c/o left ankle pain  Bed Transfer Status  Minimal Assist  Toilet Transfer Status   Minimal Assist  Assistive Devices  Rails, Wheelchair  Oxygen  None - Room Air  Diet/Therapeutic Dining  Current Diet Order   Procedures    Diet Order Diet: Level 6 - Soft and Bite Sized (Set pt up w/ meal- he can then self-feed independently. Pills whole.); Liquid level: Level 0 - Thin; Tray Modifications (optional): SLP - Deliver to Nursing Station     Pill Administration  whole  Agitated Behavioral Scale  18  ABS Level of Severity  No Agitation    Fall Risk  Has the patient had a fall this admission?   No  Agustin Arsenio Fall Risk Scoring  24, HIGH RISK  Fall Risk Safety Measures  Bed strip alarm    Vitals  Temperature: 36.5 °C (97.7 °F)  Temp src: Temporal  Pulse: 85  Respiration: 18  Blood Pressure : (!) 154/73  Blood Pressure MAP (Calculated): 100 MM HG  BP Location: Right, Upper Arm  Patient BP Position: Supine     Oxygen  Pulse Oximetry: 91 %  O2 (LPM): 0  O2 Delivery Device: None - Room Air    Bowel and Bladder  Last Bowel Movement  09/22/24  Stool Type  Type 5: Soft blob with clear cut edges (passed easily)  Bowel Device  Bathroom, Other (Comment) (Bowel Meds)  Continent  Bladder: Did not void   Bowel: No movement  Bladder Function  Urine Void (mL):  (Large)  Number of Times Voided: 1  Urinary Options: Yes  Urine Color: Yellow  Straight Catheter: 550 ml  Genitourinary Assessment   Bladder Assessment (WDL):  Within Defined Limits  Silva Catheter: Not Applicable  Urine  Color: Yellow  Bladder Device: Bathroom  Time Void: Yes  Bladder Scan: Post Void  $ Bladder Scan Results (mL): 123    Skin  Colt Score   16  Sensory Interventions   Bed Types: Other (Comments) ([facility] Bed)  Skin Preventative Measures: Pillows in Use for Support / Positioning  Moisture Interventions  Moisturizers/Barriers: Barrier Wipes      Pain  Pain Rating Scale  0 - No Pain  Pain Location  Ankle  Pain Location Orientation  Left  Pain Interventions   Declines    ADLs    Bathing      Linen Change      Personal Hygiene  Change Dariana Pads  Chlorhexidine Bath      Oral Care     Teeth/Dentures     Shave     Nutrition Percentage Eaten  Dinner, Between 50-75% Consumed  Environmental Precautions  Treaded Slipper Socks on Patient, Bed in Low Position  Patient Turns/Positioning  Patient Turns Self from Side to Side  Patient Turns Assistance/Tolerance  Assistance of One, General Weakness  Bed Positions  Bed Controls On, Bed Locked  Head of Bed Elevated  Self regulated      Psychosocial/Neurologic Assessment  Psychosocial Assessment  Psychosocial (WDL):  WDL Except  Patient Behaviors: Confused, Forgetful  Neurologic Assessment  Neuro (WDL): Exceptions to WDL  Level of Consciousness: Alert  Orientation Level: Disoriented to time, Disoriented to situation, Disoriented to person  Cognition: Poor judgement, Memory Loss, Poor safety awareness  Speech: Clear  Pupil Assesment: No  Muscle Strength Right Arm: Good Strength Against Gravity and Moderate Resistance  Muscle Strength Left Arm: Good Strength Against Gravity and Moderate Resistance  Muscle Strength Right Leg: Fair Strength against Gravity but No Resistance  LLE Motor Response: Responds to commands  Muscle Strength Left Leg: Weak Movement but Not Against Gravity or Resistance  EENT (WDL):  WDL Except    Cardio/Pulmonary Assessment  Edema   LLE Edema: 2+  Respiratory Breath Sounds  RUL Breath Sounds: Clear  RML Breath Sounds: Clear  RLL Breath Sounds: Clear, Diminished  YONI  Breath Sounds: Clear  LLL Breath Sounds: Clear, Diminished  Cardiac Assessment   Cardiac (WDL):  WDL Except (H/O HTN, CAD S/P CABG, NSTEMI.)

## 2024-09-25 NOTE — THERAPY
Occupational Therapy  Daily Treatment     Patient Name: Joe Templeton  Age:  74 y.o., Sex:  male  Medical Record #: [id number]  Today's Date: 9/25/2024     Precautions  Precautions: Fall Risk  Comments: c/o left ankle pain         Subjective  Pt was seated in w/c in dining room with RN.   Pt agreeable for OT tx session.  Objective       09/25/24 0831   OT Charge Group   OT Self Care / ADL (Units) 3   OT Therapeutic Exercise (Units) 1   OT Total Time Spent   OT Individual Total Time Spent (Mins) 60   Pain   Intervention Declines   Non Verbal Descriptors   Non Verbal Scale  Calm   Functional Level of Assist   Grooming Standby Assist;Seated   Grooming Description Set-up of equipment;Supervision for safety;Seated in wheelchair at sink;Initial preparation for task;Increased time  (Shaving, and washing face/hands.)   Upper Body Dressing Stand by Assist   Upper Body Dressing Description Increased time;Initial preparation for task   Sitting Upper Body Exercises   Upper Extremity Bike Level 3 Resistance  (10 minutes x 1; 5 minutes x 1.)   Interdisciplinary Plan of Care Collaboration   IDT Collaboration with  Nursing   Patient Position at End of Therapy Seated;Chair Alarm On;Call Light within Reach;Self Releasing Lap Belt Applied  (Pt left with CNA at nursing station.)         Assessment    Increased time needed with all ADL tasks. Pt able to use razor for shaving face appropriately. However, increased time needed. Pt with no c/o pain/discomfort during session.  Strengths: Independent prior level of function  Barriers: Decreased endurance, Confused, Impaired activity tolerance, Impulsive, Limited mobility, Impaired insight/denial of deficits, Impaired functional cognition (Encouragement for pt to participate in ADL's 7:00 a.m.)    Plan    Will continue with OT POC.    DME  OT DME Recommendations  Bathroom Equipment:  (TBD)          Occupational Therapy Goals (Active)       Problem: Bathing       Dates: Start:  09/21/24          Goal: STG-Within one week, patient will bathe with Min A using Adaptive equipment as needed.       Dates: Start:  09/21/24               Problem: Dressing       Dates: Start:  09/21/24         Goal: STG-Within one week, patient will dress UB with Standby assistance.       Dates: Start:  09/21/24            Goal: STG-Within one week, patient will dress LB with Min A using Adaptive equipment as needed.       Dates: Start:  09/21/24               Problem: Functional Cognition       Dates: Start:  09/21/24         Goal: STG-Within one week, patient will be oriented to year,month, and place.       Dates: Start:  09/21/24               Problem: Functional Transfers       Dates: Start:  09/21/24         Goal: STG-Within one week, patient will transfer to toilet with Contact Guard Assistance using DME as needed.       Dates: Start:  09/21/24            Goal: STG-Within one week, patient will transfer to step in shower with Min A using DME as needed.       Dates: Start:  09/21/24               Problem: OT Long Term Goals       Dates: Start:  09/21/24         Goal: LTG-By discharge, patient will complete basic self care tasks at Mod Independent with UB ADL's and Supervision with LB ADL's.       Dates: Start:  09/21/24            Goal: LTG-By discharge, patient will perform bathroom transfers with Supervision.       Dates: Start:  09/21/24               Problem: Toileting       Dates: Start:  09/21/24         Goal: STG-Within one week, patient will complete toileting tasks with Min A.       Dates: Start:  09/21/24

## 2024-09-25 NOTE — PROGRESS NOTES
"  Physical Medicine & Rehabilitation Progress Note    Encounter Date: 9/25/2024    Chief Complaint: Doing ok     Interval Events (Subjective):  VS: BP elevated  BM 9/22  Voiding     Patient seen and examined in [place] after SLP. Still disoriented but calm and answers questions appropriately. Mild pain in left ankle.     ROS: 14 point ROS negative unless otherwise specified in the HPI    Objective:  VITAL SIGNS: /66   Pulse 77   Temp 36.4 °C (97.6 °F) (Oral)   Resp 20   Ht 1.753 m (5' 9\")   Wt 77.1 kg (170 lb)   SpO2 96%   BMI 25.10 kg/m²     GEN: No apparent distress  HEENT: Head normocephalic, atraumatic.  Sclera nonicteric bilaterally, no ocular discharge appreciated bilaterally.  CV: Extremities warm and well-perfused, no peripheral edema appreciated bilaterally.  PULMONARY: Breathing nonlabored on room air, no respiratory accessory muscle use.  Not requiring supplemental oxygen.  SKIN:     PSYCH: Mood and affect within normal limits.  NEURO: Awake alert.  Pleasantly confused. Disoriented to date, day, month, year. Knows we are in [facility].       Laboratory Values:  Recent Results (from the past 72 hour(s))   CBC WITH DIFFERENTIAL    Collection Time: 09/24/24  6:13 AM   Result Value Ref Range    WBC 10.9 (H) 4.8 - 10.8 K/uL    RBC 4.52 (L) 4.70 - 6.10 M/uL    Hemoglobin 14.4 14.0 - 18.0 g/dL    Hematocrit 42.0 42.0 - 52.0 %    MCV 92.9 81.4 - 97.8 fL    MCH 31.9 27.0 - 33.0 pg    MCHC 34.3 32.3 - 36.5 g/dL    RDW 41.0 35.9 - 50.0 fL    Platelet Count 269 164 - 446 K/uL    MPV 11.4 9.0 - 12.9 fL    Neutrophils-Polys 75.40 (H) 44.00 - 72.00 %    Lymphocytes 11.80 (L) 22.00 - 41.00 %    Monocytes 10.90 0.00 - 13.40 %    Eosinophils 1.10 0.00 - 6.90 %    Basophils 0.40 0.00 - 1.80 %    Immature Granulocytes 0.40 0.00 - 0.90 %    Nucleated RBC 0.00 0.00 - 0.20 /100 WBC    Neutrophils (Absolute) 8.26 (H) 1.82 - 7.42 K/uL    Lymphs (Absolute) 1.29 1.00 - 4.80 K/uL    Monos (Absolute) 1.19 (H) 0.00 - 0.85 K/uL "    Eos (Absolute) 0.12 0.00 - 0.51 K/uL    Baso (Absolute) 0.04 0.00 - 0.12 K/uL    Immature Granulocytes (abs) 0.04 0.00 - 0.11 K/uL    NRBC (Absolute) 0.00 K/uL   Basic Metabolic Panel    Collection Time: 09/24/24  6:13 AM   Result Value Ref Range    Sodium 142 135 - 145 mmol/L    Potassium 3.8 3.6 - 5.5 mmol/L    Chloride 108 96 - 112 mmol/L    Co2 21 20 - 33 mmol/L    Glucose 132 (H) 65 - 99 mg/dL    Bun 21 8 - 22 mg/dL    Creatinine 0.89 0.50 - 1.40 mg/dL    Calcium 8.9 8.5 - 10.5 mg/dL    Anion Gap 13.0 7.0 - 16.0   ESTIMATED GFR    Collection Time: 09/24/24  6:13 AM   Result Value Ref Range    GFR (CKD-EPI) 90 >60 mL/min/1.73 m 2       Medications:  Scheduled Medications   Medication Dose Frequency    senna-docusate  2 Tablet BID    And    polyethylene glycol/lytes  1 Packet BID    metoprolol SR  50 mg DAILY    hydrALAZINE  100 mg Q8HRS    vitamin D3  1,000 Units DAILY    Pharmacy Consult Request  1 Each PHARMACY TO DOSE    lidocaine  1-2 Patch Daily-0800    omeprazole  20 mg DAILY    amLODIPine  10 mg Q DAY    enoxaparin (LOVENOX) injection  40 mg DAILY AT 1800    lisinopril  40 mg DAILY    MD Alert...Vancomycin per Pharmacy   PHARMACY TO DOSE    vancomycin  1,250 mg Q12HR    aspirin  81 mg DAILY    atorvastatin  80 mg Q EVENING    QUEtiapine  50 mg Nightly     PRN medications: hydrALAZINE, lactulose, docusate sodium, bisacodyl EC, magnesium hydroxide, sodium phosphate, carboxymethylcellulose, benzocaine-menthol, mag hydrox-al hydrox-simeth, ondansetron **OR** ondansetron, traZODone, sodium chloride, acetaminophen, ziprasidone, traMADol, formulation r    Diet:  Current Diet Order   Procedures    Diet Order Diet: Level 6 - Soft and Bite Sized (Set pt up w/ meal- he can then self-feed independently. Pills whole.); Liquid level: Level 0 - Thin; Tray Modifications (optional): SLP - Deliver to Nursing Station       Medical Decision Making and Plan:  Multifocal embolic appearing infarcts: Right insula, left  parietal lobe, right parietal lobe, left hao  MRI with multiple old infarcts  Dysphagia  Encephalopathy  PT and OT for mobility and ADLs. Per guidelines, 15 hours per week between PT, OT and/or SLP.  Follow-up stroke Bridge clinic  Secondary stroke prophylaxis: Aspirin and statin   Continue Seroquel at night     I certify that the patient currently requires non-pharmacologic restraints. Non-pharmacologic restraints are required to reduce the potential for the patient to harm themselves or others, to limit violent behaviors, and to allow proper assessment and care. I have completed a face to face assessment of this patient on 9/25/2024. Alternative methods including but not limited to de-escalation techniques, redirection, and pharmacologic treatment have been attempted but patient currently remains at risk without restraints. Our staff has been trained on restraints and patient is currently placed in video monitored room.  The need for these restraints is assessed by a rehabilitation physician every 24 hours and use of restraints is minimized when appropriate.  Current diagnosis which requires restraints: Encephalopathy. Current restraints required: Enclosure bed, bilateral mitts to prevent pulling out midline which is needed for IV antibiotics.       Septic arthritis left ankle s/p left ankle arthrotomy and I&D 9/14/2024 Dr. Gautam -weightbearing as tolerated left lower extremity.  ID followed.  Continue vancomycin twice daily through 10/12.  Potentially can switch to Zyvox.  9/23 patient pulled out midline will need new access.  9/24 pulled out midline again, admits an attempt to help prevent him pulling it out.     Left knee pain -check knee x-ray.  9/23 negative.     Chronic systolic heart failure, not in acute exacerbation - preserved ejection fraction-LVEF 60%     CAD s/p CABG -continue aspirin and statin     Hypertension -amlodipine, hydralazine, lisinopril, metoprolol.  9/25 uncontrolled.  Hospitalist  managing.     Azotemia - improving, monitor.   resolved.    Hypokalemia - mildly low at 3.5.  Hospitalist following.     Leukocytosis - mild at 11.2.   improving at 10.9.     Pain -Tylenol and oxycodone     Bowel -patient unsure when last BM was.  Check KUB.  Moderate stool burden.  Had small bowel movement .  Increase bowel meds .  Give MoM x1       Upcoming Labs/imagin/26     DVT PROPHYLAXIS: Lovenox 40mg SQ nightly      HOSPITALIST FOLLOWING: Yes - d/w hospitalist      CODE STATUS: FULL CODE     DISPO: Home with family      HEBER: 10/4     ADDITIONAL MEDICAL NEEDS ON D/C (IV abx, O2, etc): IV antibiotics      MEDS SENT TO: [facility]     DISCHARGE SPECIALIST FOLLOW UP: ID, Ortho, Stroke Bridge     Patient to scheduled follow up with their PCP within 2 weeks from discharge from the [facility].   ____________________________________    Dr. Khushbu Meza DO, MS  [facility] - Physical Medicine & Rehabilitation   ____________________________________    _____________________________________  Interdisciplinary Team Conference   Most recent IDT on 2024    I, Dr. Khushbu Meza DO, MS, was present and led the interdisciplinary team conference on 2024.  I led the IDT conference and agree with the IDT conference documentation and plan of care as noted below.     Nursing:  Diet Current Diet Order   Procedures    Diet Order Diet: Level 6 - Soft and Bite Sized (Set pt up w/ meal- he can then self-feed independently. Pills whole.); Liquid level: Level 0 - Thin; Tray Modifications (optional): SLP - Deliver to Nursing Station       Eating ADL Modified Independent  Modified diet   % of Last Meal  Oral Nutrition: Lunch, Between % Consumed   Sleep    Bowel Last BM: 24   Bladder        Physical Therapy:  Bed Mobility    Transfers Minimal Assist  Increased time, Initial preparation for task, Set-up of equipment, Squat pivot transfer to wheelchair,  Supervision for safety   Mobility Contact Guard Assist   Stairs        Occupational Therapy:  Grooming Standby Assist, Seated   Bathing Moderate Assist   UB Dressing Stand by Assist   LB Dressing Maximal Assist   Toileting Maximal Assist   Shower & Transfer        Speech-Language Pathology:  Comprehension:  Moderate Assist  Comprehension Description:  Glasses, Verbal cues  Expression:  Moderate Assist  Expression Description:  Verbal cueing  Social Interaction:  Supervision  Social Interaction Description:  Increased time, Enclosure bed, Verbal cues  Problem Solving:  Maximal Assist  Problem Solving Description:  Verbal cueing, Therapy schedule, Bed/chair alarm, Increased time, Seat belt, Enclosure bed  Memory:  Maximal Assist  Memory Description:  Increased time, Enclosure bed, Bed/chair alarm, Verbal cueing, Therapy schedule, Seat belt  Barriers to Discharge Home:    Respiratory Therapy:  O2 (LPM): 0  O2 Delivery Device: None - Room Air    Case Management:  Continues to work on disposition and DME needs.     BARRIERS TO DISCHARGE HOME:  Family training  Equipment  OP vs HH services   Medical Stability     Discharge Date/Disposition:  10/4  _____________________________________    Total time:  50 minutes. Time spent included pre-rounding review of vitals and tests, unit/floor time, face-to-face time with the patient including physical examination, care coordination, counseling of patient and/or family, ordering medications/procedures/tests, discussion with CM, PT, OT, SLP and/or other healthcare providers, and documentation in the electronic medical record.

## 2024-09-25 NOTE — CARE PLAN
Problem: Bathing  Goal: STG-Within one week, patient will bathe with Min A using Adaptive equipment as needed.  Outcome: Not Met     Problem: Dressing  Goal: STG-Within one week, patient will dress LB with Min A using Adaptive equipment as needed.  Outcome: Not Met     Problem: Toileting  Goal: STG-Within one week, patient will complete toileting tasks with Min A.  Outcome: Not Met     Problem: Functional Transfers  Goal: STG-Within one week, patient will transfer to toilet with Contact Guard Assistance using DME as needed.  Outcome: Not Met  Goal: STG-Within one week, patient will transfer to step in shower with Min A using DME as needed.  Outcome: Not Met     Problem: Functional Cognition  Goal: STG-Within one week, patient will be oriented to year,month, and place.  Outcome: Not Met

## 2024-09-25 NOTE — THERAPY
Speech Language Pathology  Daily Treatment     Patient Name: Joe Templeton  Age:  74 y.o., Sex:  male  Medical Record #: [id number]  Today's Date: 9/25/2024     Precautions  Precautions: Fall Risk  Comments: c/o left ankle pain    Subjective    Patient agreeable to therapy.  Has bilateral hand mitt restraints in place.      Objective       09/25/24 1001   Treatment Charges   SLP Cognitive Skill Development First 15 Minutes 1   SLP Cognitive Skill Development Additional 15 Minutes 3   SLP Total Time Spent   SLP Individual Total Time Spent (Mins) 60   Expressive Language   Naming Minimal (4)   Perseverations Moderate (3)   Word Finding Deficits Minimal (4)   Cognition - Detailed   Attention to Task Minimal (4)   Simple Attention Minimal (4)   Orientation  Severe (2)   Interdisciplinary Plan of Care Collaboration   IDT Collaboration with  Nursing   Patient Position at End of Therapy Chair Alarm On;Self Releasing Lap Belt Applied;Seated;Other (Comments)  (bilateral hand mitt restraints in place.)   Collaboration Comments Patient returned to nursing station with nurse present.       Assessment    O-log 15/30.   Patient participated in wordfinding/naming task on [employer] lois.  Patient achieved 35/50 (70%) independently( improved from 50%.  Increased by 3 with verbal description, by 2 with first letter provided, by 7 with written word provided, and by 3 with phrase completion cue provided.  Patient did display perseveration on many of his errors, and often needed extra time to provide responses   Performed complex yes/no responses and comprehension with 60% acc 6/10.      Strengths: Pleasant and cooperative, Supportive family, Motivated for self care and independence, Willingly participates in therapeutic activities  Barriers: Aphasia expressive, Aphasia receptive, Impaired functional cognition, Impaired carryover of learning    Plan    Target orientation goals for provision of antonym and pointing to pics in Fo3, naming      Passport items to be completed:  Express basic needs, understand food/liquid recommendations, consistently follow swallow precautions, manage finances, manage medications, arrive to therapy appointments on time, complete daily memory log entries, solve problems related to safety situations, review education related to hospitalization, complete caregiver training     Speech Therapy Problems (Active)       Problem: Comprehension STGs       Dates: Start:  09/24/24         Goal: STG-Within one week, patient will point to pictures in FO3 with 80% accuracy.       Dates: Start:  09/24/24               Problem: Expression STGs       Dates: Start:  09/24/24         Goal: STG-Within one week, patient will state antonym given verbal prompt in 7/10 trials       Dates: Start:  09/24/24               Problem: Memory STGs       Dates: Start:  09/21/24         Goal: STG-Within one week, patient will       Dates: Start:  09/21/24       Description: 1) Individualized goal:  be oriented to day, month, year, and situation w/ 90% acc and MIN A.   2) Interventions:  SLP Self Care / ADL Training , SLP Cognitive Skill Development, and SLP Group Treatment          Goal Note filed on 09/24/24 1609 by Blanca Altamirano MS,CCC-SLP       Will continue to target, emphasis of ST will likely be focused on receptive and expressive language.                 Problem: Speech/Swallowing LTGs       Dates: Start:  09/21/24         Goal: LTG-By discharge, patient will solve basic problems       Dates: Start:  09/21/24       Description: 1) Individualized goal:  related to health and safety with 80% acc and MOD I for a safe D/C to PLOF.   2) Interventions:  SLP Self Care / ADL Training , SLP Cognitive Skill Development, and SLP Group Treatment

## 2024-09-25 NOTE — CARE PLAN
Problem: Mobility  Goal: STG-Within one week, patient will ambulate 150ft with use of LRAD with CGA   Outcome: Progressing     Problem: Mobility Transfers  Goal: STG-Within one week, patient will transfer bed to chair with CGA and min VC with use of LRAD   Outcome: Progressing  Goal: STG-Within two weeks, patient will transfer in/out of car requiring min VC with proper technique and use of LRAD safely  Outcome: Not Met

## 2024-09-25 NOTE — PROGRESS NOTES
Patient care assumed. Report received from [facility]. Patient is alert and calm, resting in bed. Call light and bedside table within reach. Will continue to monitor.

## 2024-09-25 NOTE — PROGRESS NOTES
NURSING DAILY NOTE    Name: Joe Templeton   Date of Admission: 9/20/2024   Admitting Diagnosis: No Principal Problem: There is no principal problem currently on the Problem List. Please update the Problem List and refresh.  Attending Physician: ELIZABETH HOLM D.O.  Allergies: Patient has no known allergies.    Safety  Patient Assist  min-mod  Patient Precautions  Fall Risk  Precaution Comments  c/o left ankle pain  Bed Transfer Status  Minimal Assist  Toilet Transfer Status   Minimal Assist  Assistive Devices  Rails, Wheelchair  Oxygen  None - Room Air  Diet/Therapeutic Dining  Current Diet Order   Procedures    Diet Order Diet: Level 6 - Soft and Bite Sized (Set pt up w/ meal- he can then self-feed independently. Pills whole.); Liquid level: Level 0 - Thin; Tray Modifications (optional): SLP - Deliver to Nursing Station     Pill Administration  whole  Agitated Behavioral Scale  18  ABS Level of Severity  No Agitation    Fall Risk  Has the patient had a fall this admission?   No  Nikole Cesar Fall Risk Scoring  24, HIGH RISK  Fall Risk Safety Measures  chair alarm and posey bed     Vitals  Temperature: 36.6 °C (97.8 °F)  Temp src: Oral  Pulse: 84  Respiration: 18  Blood Pressure : 132/68  Blood Pressure MAP (Calculated): 89 MM HG  BP Location: Right, Upper Arm  Patient BP Position: Sitting     Oxygen  Pulse Oximetry: 93 %  O2 (LPM): 0  O2 Delivery Device: None - Room Air    Bowel and Bladder  Last Bowel Movement  09/22/24  Stool Type  Type 5: Soft blob with clear cut edges (passed easily)  Bowel Device  Bathroom, Other (Comment) (Bowel Meds)  Continent  Bladder: Did not void   Bowel: No movement  Bladder Function  Urine Void (mL):  (Large)  Number of Times Voided: 1  Urinary Options: Yes  Urine Color: Yellow  Straight Catheter: 550 ml  Genitourinary Assessment   Bladder Assessment (WDL):  WDL Except  Silva Catheter: Not Applicable  Urine Color:  Yellow  Bladder Device: Bathroom  Time Void: Yes  Bladder Scan: Post Void  $ Bladder Scan Results (mL): 81    Skin  Colt Score   16  Sensory Interventions   Bed Types:  (soma)  Skin Preventative Measures: Pillows in Use for Support / Positioning  Moisture Interventions  Moisturizers/Barriers: Barrier Wipes      Pain  Pain Rating Scale  0 - No Pain  Pain Location  Ankle  Pain Location Orientation  Left  Pain Interventions   Declines    ADLs    Bathing      Linen Change      Personal Hygiene  Change Dariana Pads  Chlorhexidine Bath      Oral Care     Teeth/Dentures     Shave     Nutrition Percentage Eaten  Dinner, Between 50-75% Consumed  Environmental Precautions  Treaded Slipper Socks on Patient, Personal Belongings, Wastebasket, Call Bell etc. in Easy Reach, Transferred to Stronger Side, Report Given to Other Health Care Providers Regarding Fall Risk, Bed in Low Position  Patient Turns/Positioning  Patient Turns Self from Side to Side  Patient Turns Assistance/Tolerance  Assistance of One, General Weakness  Bed Positions  Bed Controls On  Head of Bed Elevated  Self regulated      Psychosocial/Neurologic Assessment  Psychosocial Assessment  Psychosocial (WDL):  WDL Except  Patient Behaviors: Confused, Forgetful  Neurologic Assessment  Neuro (WDL): Exceptions to WDL  Level of Consciousness: Alert  Orientation Level: Disoriented to time, Disoriented to situation, Oriented to person, Disoriented to place  Cognition: Poor judgement, Memory Loss, Poor safety awareness  Speech: Clear  Pupil Assesment: No  Muscle Strength Right Arm: Good Strength Against Gravity and Moderate Resistance  Muscle Strength Left Arm: Good Strength Against Gravity and Moderate Resistance  Muscle Strength Right Leg: Fair Strength against Gravity but No Resistance  LLE Motor Response: Responds to commands  Muscle Strength Left Leg: Weak Movement but Not Against Gravity or Resistance  EENT (WDL):  WDL Except    Cardio/Pulmonary Assessment  Edema    LLE Edema: 2+ (Foot)  Respiratory Breath Sounds  RUL Breath Sounds: Clear  RML Breath Sounds: Clear  RLL Breath Sounds: Diminished  YONI Breath Sounds: Clear  LLL Breath Sounds: Diminished  Cardiac Assessment   Cardiac (WDL):  WDL Except (hx cad, cabg, htn)

## 2024-09-25 NOTE — PROGRESS NOTES
Pt has taken off mittens intermittently during the night.  Have had to reapply multiple times.  PIV and Midline remains intact.  Reapplied new stockinette over sites.  Encouraged pt to get up to the toilet to attempt to urinate, states that he doesn't feel like he has to at this time.  Bladder scan done, had bladder scan results of 656 mls.  Encouraged pt to get up to the toilet this morning again and educated on importance of emptying bladder and risk of infection.  Assisted by CHLOE MCMILLAN to the toilet and was able to void.  PVR scan results of 123mls.  Will continue to monitor patient.

## 2024-09-25 NOTE — PROGRESS NOTES
Hospital Medicine Daily Progress Note        Chief Complaint:  Hypertension  Azotemia    Interval History:  No overnight issues.  Labs reviewed.     Review of Systems  Review of Systems   Constitutional:  Negative for chills and fever.   Eyes: Negative.    Respiratory:  Negative for cough and shortness of breath.    Cardiovascular:  Negative for chest pain and palpitations.   Gastrointestinal:  Negative for abdominal pain, nausea and vomiting.   Musculoskeletal:  Positive for joint pain.        Wound pain   Skin:  Negative for itching and rash.   Endo/Heme/Allergies:  Negative for polydipsia. Does not bruise/bleed easily.        Physical Exam  Temp:  [36.4 °C (97.6 °F)-36.5 °C (97.7 °F)] 36.4 °C (97.6 °F)  Pulse:  [77-85] 77  Resp:  [18-20] 20  BP: (130-154)/(60-73) 140/66  SpO2:  [91 %-96 %] 96 %    Physical Exam  Vitals reviewed.   Constitutional:       General: He is not in acute distress.     Appearance: Normal appearance. He is not ill-appearing.   HENT:      Head: Normocephalic and atraumatic.      Right Ear: External ear normal.      Left Ear: External ear normal.      Nose: Nose normal.      Mouth/Throat:      Pharynx: Oropharynx is clear.   Eyes:      General:         Right eye: No discharge.         Left eye: No discharge.      Extraocular Movements: Extraocular movements intact.      Pupils: Pupils are equal, round, and reactive to light.   Cardiovascular:      Rate and Rhythm: Normal rate and regular rhythm.   Pulmonary:      Effort: No respiratory distress.      Breath sounds: No wheezing.      Comments: Observe serum glucose trends  Abdominal:      General: Bowel sounds are normal. There is no distension.      Palpations: Abdomen is soft.      Tenderness: There is no abdominal tenderness.   Musculoskeletal:      Cervical back: Normal range of motion and neck supple.      Right lower leg: No edema.      Left lower leg: No edema.   Skin:     General: Skin is warm and dry.   Neurological:      Mental  Status: He is alert.      Comments: Awake and alert         Fluids    Intake/Output Summary (Last 24 hours) at 9/25/2024 1726  Last data filed at 9/25/2024 1152  Gross per 24 hour   Intake 970 ml   Output --   Net 970 ml        Laboratory  Recent Labs     09/24/24  0613   WBC 10.9*   RBC 4.52*   HEMOGLOBIN 14.4   HEMATOCRIT 42.0   MCV 92.9   MCH 31.9   MCHC 34.3   RDW 41.0   PLATELETCT 269   MPV 11.4     Recent Labs     09/24/24  0613   SODIUM 142   POTASSIUM 3.8   CHLORIDE 108   CO2 21   GLUCOSE 132*   BUN 21   CREATININE 0.89   CALCIUM 8.9                   Assessment/Plan  Vitamin D deficiency  Assessment & Plan  Vit D level 18  On supplementation    Stroke (HCC)- (present on admission)  Assessment & Plan  On ASA and Lipitor  Ongoing management per Physiatry    CAD (coronary artery disease)- (present on admission)  Assessment & Plan  H/O CABG  On ASA, Lipitor, Lisinopril, and Toprol    Septic arthritis of ankle (HCC)- (present on admission)  Assessment & Plan  S/P I+D on 9/14/24 by Dr. Gautam  Wound care and pain control per Physiary  On Vanco through 10/12/24 per ID    Essential (primary) hypertension- (present on admission)  Assessment & Plan  On Norvasc, Lisinopril, Toprol, and Hydralazine  Observe blood pressure trends    Full Code

## 2024-09-26 ENCOUNTER — APPOINTMENT (OUTPATIENT)
Dept: PHYSICAL THERAPY | Facility: REHABILITATION | Age: 74
DRG: 057 | End: 2024-09-26
Attending: PHYSICAL MEDICINE & REHABILITATION
Payer: MEDICARE

## 2024-09-26 ENCOUNTER — APPOINTMENT (OUTPATIENT)
Dept: SPEECH THERAPY | Facility: REHABILITATION | Age: 74
DRG: 057 | End: 2024-09-26
Attending: PHYSICAL MEDICINE & REHABILITATION
Payer: MEDICARE

## 2024-09-26 ENCOUNTER — APPOINTMENT (OUTPATIENT)
Dept: OCCUPATIONAL THERAPY | Facility: REHABILITATION | Age: 74
DRG: 057 | End: 2024-09-26
Attending: PHYSICAL MEDICINE & REHABILITATION
Payer: MEDICARE

## 2024-09-26 LAB
ANION GAP SERPL CALC-SCNC: 12 MMOL/L (ref 7–16)
BASOPHILS # BLD AUTO: 0.4 % (ref 0–1.8)
BASOPHILS # BLD: 0.05 K/UL (ref 0–0.12)
BUN SERPL-MCNC: 19 MG/DL (ref 8–22)
CALCIUM SERPL-MCNC: 9.1 MG/DL (ref 8.5–10.5)
CHLORIDE SERPL-SCNC: 109 MMOL/L (ref 96–112)
CO2 SERPL-SCNC: 21 MMOL/L (ref 20–33)
CREAT SERPL-MCNC: 0.8 MG/DL (ref 0.5–1.4)
EOSINOPHIL # BLD AUTO: 0.27 K/UL (ref 0–0.51)
EOSINOPHIL NFR BLD: 2.4 % (ref 0–6.9)
ERYTHROCYTE [DISTWIDTH] IN BLOOD BY AUTOMATED COUNT: 40.3 FL (ref 35.9–50)
GFR SERPLBLD CREATININE-BSD FMLA CKD-EPI: 93 ML/MIN/1.73 M 2
GLUCOSE SERPL-MCNC: 126 MG/DL (ref 65–99)
HCT VFR BLD AUTO: 38.5 % (ref 42–52)
HGB BLD-MCNC: 12.8 G/DL (ref 14–18)
IMM GRANULOCYTES # BLD AUTO: 0.04 K/UL (ref 0–0.11)
IMM GRANULOCYTES NFR BLD AUTO: 0.4 % (ref 0–0.9)
LYMPHOCYTES # BLD AUTO: 1.64 K/UL (ref 1–4.8)
LYMPHOCYTES NFR BLD: 14.7 % (ref 22–41)
MCH RBC QN AUTO: 30.8 PG (ref 27–33)
MCHC RBC AUTO-ENTMCNC: 33.2 G/DL (ref 32.3–36.5)
MCV RBC AUTO: 92.8 FL (ref 81.4–97.8)
MONOCYTES # BLD AUTO: 0.95 K/UL (ref 0–0.85)
MONOCYTES NFR BLD AUTO: 8.5 % (ref 0–13.4)
NEUTROPHILS # BLD AUTO: 8.19 K/UL (ref 1.82–7.42)
NEUTROPHILS NFR BLD: 73.6 % (ref 44–72)
NRBC # BLD AUTO: 0 K/UL
NRBC BLD-RTO: 0 /100 WBC (ref 0–0.2)
PLATELET # BLD AUTO: 217 K/UL (ref 164–446)
PMV BLD AUTO: 12.3 FL (ref 9–12.9)
POTASSIUM SERPL-SCNC: 4 MMOL/L (ref 3.6–5.5)
RBC # BLD AUTO: 4.15 M/UL (ref 4.7–6.1)
SODIUM SERPL-SCNC: 142 MMOL/L (ref 135–145)
VANCOMYCIN TROUGH SERPL-MCNC: 14.2 UG/ML (ref 10–20)
WBC # BLD AUTO: 11.1 K/UL (ref 4.8–10.8)

## 2024-09-26 PROCEDURE — 700102 HCHG RX REV CODE 250 W/ 637 OVERRIDE(OP): Performed by: HOSPITALIST

## 2024-09-26 PROCEDURE — A9270 NON-COVERED ITEM OR SERVICE: HCPCS | Performed by: PHYSICAL MEDICINE & REHABILITATION

## 2024-09-26 PROCEDURE — 36415 COLL VENOUS BLD VENIPUNCTURE: CPT

## 2024-09-26 PROCEDURE — 770005 HCHG ROOM/CARE - REHAB PRIVATE (11*

## 2024-09-26 PROCEDURE — 97535 SELF CARE MNGMENT TRAINING: CPT

## 2024-09-26 PROCEDURE — A9270 NON-COVERED ITEM OR SERVICE: HCPCS | Performed by: HOSPITALIST

## 2024-09-26 PROCEDURE — 700105 HCHG RX REV CODE 258: Performed by: PHYSICAL MEDICINE & REHABILITATION

## 2024-09-26 PROCEDURE — 80048 BASIC METABOLIC PNL TOTAL CA: CPT

## 2024-09-26 PROCEDURE — 700102 HCHG RX REV CODE 250 W/ 637 OVERRIDE(OP): Performed by: PHYSICAL MEDICINE & REHABILITATION

## 2024-09-26 PROCEDURE — 700111 HCHG RX REV CODE 636 W/ 250 OVERRIDE (IP): Mod: JZ | Performed by: PHYSICAL MEDICINE & REHABILITATION

## 2024-09-26 PROCEDURE — 85025 COMPLETE CBC W/AUTO DIFF WBC: CPT

## 2024-09-26 PROCEDURE — 97140 MANUAL THERAPY 1/> REGIONS: CPT

## 2024-09-26 PROCEDURE — 80202 ASSAY OF VANCOMYCIN: CPT

## 2024-09-26 PROCEDURE — 97116 GAIT TRAINING THERAPY: CPT

## 2024-09-26 PROCEDURE — 97110 THERAPEUTIC EXERCISES: CPT

## 2024-09-26 PROCEDURE — 92507 TX SP LANG VOICE COMM INDIV: CPT

## 2024-09-26 PROCEDURE — 99232 SBSQ HOSP IP/OBS MODERATE 35: CPT | Performed by: PHYSICAL MEDICINE & REHABILITATION

## 2024-09-26 PROCEDURE — 99232 SBSQ HOSP IP/OBS MODERATE 35: CPT | Performed by: HOSPITALIST

## 2024-09-26 RX ORDER — METOPROLOL SUCCINATE 25 MG/1
75 TABLET, EXTENDED RELEASE ORAL DAILY
Status: DISCONTINUED | OUTPATIENT
Start: 2024-09-27 | End: 2024-09-28

## 2024-09-26 RX ADMIN — LISINOPRIL 40 MG: 20 TABLET ORAL at 08:01

## 2024-09-26 RX ADMIN — VANCOMYCIN HYDROCHLORIDE 1250 MG: 5 INJECTION, POWDER, LYOPHILIZED, FOR SOLUTION INTRAVENOUS at 17:45

## 2024-09-26 RX ADMIN — TRAMADOL HYDROCHLORIDE 25 MG: 50 TABLET ORAL at 18:34

## 2024-09-26 RX ADMIN — ENOXAPARIN SODIUM 40 MG: 100 INJECTION SUBCUTANEOUS at 17:37

## 2024-09-26 RX ADMIN — VANCOMYCIN HYDROCHLORIDE 1250 MG: 5 INJECTION, POWDER, LYOPHILIZED, FOR SOLUTION INTRAVENOUS at 05:47

## 2024-09-26 RX ADMIN — HYDRALAZINE HYDROCHLORIDE 100 MG: 50 TABLET ORAL at 14:59

## 2024-09-26 RX ADMIN — OMEPRAZOLE 20 MG: 20 CAPSULE, DELAYED RELEASE ORAL at 07:59

## 2024-09-26 RX ADMIN — TRAMADOL HYDROCHLORIDE 25 MG: 50 TABLET ORAL at 05:49

## 2024-09-26 RX ADMIN — ATORVASTATIN CALCIUM 80 MG: 40 TABLET, FILM COATED ORAL at 20:04

## 2024-09-26 RX ADMIN — ASPIRIN 81 MG: 81 TABLET, CHEWABLE ORAL at 07:59

## 2024-09-26 RX ADMIN — QUETIAPINE FUMARATE 50 MG: 25 TABLET ORAL at 20:03

## 2024-09-26 RX ADMIN — POLYETHYLENE GLYCOL 3350 1 PACKET: 17 POWDER, FOR SOLUTION ORAL at 08:02

## 2024-09-26 RX ADMIN — HYDRALAZINE HYDROCHLORIDE 100 MG: 50 TABLET ORAL at 05:47

## 2024-09-26 RX ADMIN — TRAZODONE HYDROCHLORIDE 50 MG: 50 TABLET ORAL at 20:04

## 2024-09-26 RX ADMIN — HYDRALAZINE HYDROCHLORIDE 100 MG: 50 TABLET ORAL at 20:03

## 2024-09-26 RX ADMIN — SENNOSIDES AND DOCUSATE SODIUM 2 TABLET: 50; 8.6 TABLET ORAL at 07:59

## 2024-09-26 RX ADMIN — METOPROLOL SUCCINATE 50 MG: 25 TABLET, EXTENDED RELEASE ORAL at 05:48

## 2024-09-26 RX ADMIN — SENNOSIDES AND DOCUSATE SODIUM 2 TABLET: 50; 8.6 TABLET ORAL at 20:03

## 2024-09-26 RX ADMIN — Medication 1000 UNITS: at 07:59

## 2024-09-26 RX ADMIN — AMLODIPINE BESYLATE 10 MG: 5 TABLET ORAL at 05:48

## 2024-09-26 ASSESSMENT — ENCOUNTER SYMPTOMS
BRUISES/BLEEDS EASILY: 0
POLYDIPSIA: 0
EYES NEGATIVE: 1
SHORTNESS OF BREATH: 0
PALPITATIONS: 0
VOMITING: 0
NAUSEA: 0
ABDOMINAL PAIN: 0
COUGH: 0
CHILLS: 0
FEVER: 0

## 2024-09-26 ASSESSMENT — ACTIVITIES OF DAILY LIVING (ADL)
BED_CHAIR_WHEELCHAIR_TRANSFER_DESCRIPTION: OTHER (COMMENT)
TOILET_TRANSFER_DESCRIPTION: SUPERVISION FOR SAFETY;VERBAL CUEING
TUB_SHOWER_TRANSFER_DESCRIPTION: GRAB BAR;SHOWER BENCH;INCREASED TIME;SET-UP OF EQUIPMENT;SUPERVISION FOR SAFETY;VERBAL CUEING

## 2024-09-26 ASSESSMENT — GAIT ASSESSMENTS
ASSISTIVE DEVICE: FRONT WHEEL WALKER
DEVIATION: OTHER (COMMENT)
GAIT LEVEL OF ASSIST: STANDBY ASSIST
DISTANCE (FEET): 110

## 2024-09-26 NOTE — CARE PLAN
"The patient is Stable - Low risk of patient condition declining or worsening    Shift Goals  Clinical Goals: safety  Patient Goals: safety    Progress made toward(s) clinical / shift goals:      Problem: Pain - Standard  Goal: Alleviation of pain or a reduction in pain to the patient’s comfort goal  Outcome: Progressing  Note: Pt denies pain or discomfort tonight. Sleeps good. Will continue to monitor.     Problem: Fall Risk - Rehab  Goal: Patient will remain free from falls  Outcome: Progressing  Note: Nikole Cesar Fall risk Assessment Score: 20    High fall risk Interventions   - Alarming seatbelt  - Bed and strip alarm   - Yellow sign by the door   - Yellow wrist band \"Fall risk\"  - Room near to the nurse station  - Do not leave patient unattended in the bathroom  - Fall risk education provided    Pt remains in soma bed for safety.       Patient is not progressing towards the following goals:      "

## 2024-09-26 NOTE — PROGRESS NOTES
Hospital Medicine Daily Progress Note        Chief Complaint:  Hypertension  Azotemia    Interval History:  Blood pressures remain elevated.  Labs reviewed.    Review of Systems  Review of Systems   Constitutional:  Negative for chills and fever.   HENT: Negative.     Eyes: Negative.    Respiratory:  Negative for cough and shortness of breath.    Cardiovascular:  Negative for chest pain and palpitations.   Gastrointestinal:  Negative for abdominal pain, nausea and vomiting.   Musculoskeletal:  Positive for joint pain.        Wound pain   Skin:  Negative for itching and rash.   Endo/Heme/Allergies:  Negative for polydipsia. Does not bruise/bleed easily.        Physical Exam  Temp:  [36.3 °C (97.3 °F)-36.8 °C (98.3 °F)] 36.3 °C (97.3 °F)  Pulse:  [76-84] 84  Resp:  [18] 18  BP: (126-160)/(63-73) 126/72  SpO2:  [90 %-93 %] 90 %    Physical Exam  Vitals reviewed.   Constitutional:       General: He is not in acute distress.     Appearance: Normal appearance. He is not ill-appearing.   HENT:      Head: Normocephalic and atraumatic.      Right Ear: External ear normal.      Left Ear: External ear normal.      Nose: Nose normal.      Mouth/Throat:      Pharynx: Oropharynx is clear.   Eyes:      General:         Right eye: No discharge.         Left eye: No discharge.      Extraocular Movements: Extraocular movements intact.      Pupils: Pupils are equal, round, and reactive to light.   Cardiovascular:      Rate and Rhythm: Normal rate and regular rhythm.   Pulmonary:      Effort: No respiratory distress.      Breath sounds: No wheezing.      Comments: Decreased BS  Abdominal:      General: Bowel sounds are normal. There is no distension.      Palpations: Abdomen is soft.      Tenderness: There is no abdominal tenderness.   Musculoskeletal:      Cervical back: Normal range of motion and neck supple.      Right lower leg: No edema.      Left lower leg: No edema.   Skin:     General: Skin is warm and dry.   Neurological:       Mental Status: He is alert.      Comments: Awake and alert         Fluids    Intake/Output Summary (Last 24 hours) at 9/26/2024 1036  Last data filed at 9/26/2024 0900  Gross per 24 hour   Intake 800 ml   Output --   Net 800 ml        Laboratory  Recent Labs     09/24/24  0613 09/26/24  0540   WBC 10.9* 11.1*   RBC 4.52* 4.15*   HEMOGLOBIN 14.4 12.8*   HEMATOCRIT 42.0 38.5*   MCV 92.9 92.8   MCH 31.9 30.8   MCHC 34.3 33.2   RDW 41.0 40.3   PLATELETCT 269 217   MPV 11.4 12.3     Recent Labs     09/24/24  0613 09/26/24  0540   SODIUM 142 142   POTASSIUM 3.8 4.0   CHLORIDE 108 109   CO2 21 21   GLUCOSE 132* 126*   BUN 21 19   CREATININE 0.89 0.80   CALCIUM 8.9 9.1                   Assessment/Plan  Vitamin D deficiency  Assessment & Plan  Vit D level 18  On supplementation    Stroke (HCC)- (present on admission)  Assessment & Plan  On ASA and Lipitor  Ongoing management per Physiatry    CAD (coronary artery disease)- (present on admission)  Assessment & Plan  H/O CABG  On ASA, Lipitor, Lisinopril, and Toprol    Septic arthritis of ankle (HCC)- (present on admission)  Assessment & Plan  S/P arthrotomy I+D on 9/14/24 by Dr. Gautam  Wound care and pain control per Physiary  On Vanco through 10/12/24 per ID    Essential (primary) hypertension- (present on admission)  Assessment & Plan  On Norvasc, Lisinopril, Toprol, and Hydralazine  Will increase Toprol to optimize blood pressure control    Full Code    Patient seen/examined and chart reviewed; ROS and Assessment/Plan updated.  In review of yesterday's note, there are no changes except as documented above.  Discussed w/ Dr. Meza.

## 2024-09-26 NOTE — PROGRESS NOTES
NURSING DAILY NOTE    Name: Joe Templeton   Date of Admission: 9/20/2024   Admitting Diagnosis: No Principal Problem: There is no principal problem currently on the Problem List. Please update the Problem List and refresh.  Attending Physician: ELIZABETH HOLM D.O.  Allergies: Patient has no known allergies.    Safety  Patient Assist  min a  Patient Precautions  Fall Risk  Precaution Comments  c/o left ankle pain  Bed Transfer Status  Minimal Assist  Toilet Transfer Status   Minimal Assist  Assistive Devices  Wheelchair  Oxygen  None - Room Air  Diet/Therapeutic Dining  Current Diet Order   Procedures    Diet Order Diet: Level 6 - Soft and Bite Sized (Set pt up w/ meal- he can then self-feed independently. Pills whole.); Liquid level: Level 0 - Thin; Tray Modifications (optional): SLP - Deliver to Nursing Station     Pill Administration  whole  Agitated Behavioral Scale  18  ABS Level of Severity  No Agitation    Fall Risk  Has the patient had a fall this admission?   No  Nikole Cesar Fall Risk Scoring  20, HIGH RISK  Fall Risk Safety Measures  seatbelt alarm and posey bed     Vitals  Temperature: 36.4 °C (97.6 °F)  Temp src: Oral  Pulse: 77  Respiration: 20  Blood Pressure : (!) 140/66  Blood Pressure MAP (Calculated): 91 MM HG  BP Location: Right, Upper Arm  Patient BP Position: Sitting     Oxygen  Pulse Oximetry: 96 %  O2 (LPM): 0  O2 Delivery Device: None - Room Air    Bowel and Bladder  Last Bowel Movement  09/25/24  Stool Type  Type 4: Like a sausage or snake, smooth and soft  Bowel Device  Bathroom  Continent  Bladder: Did not void   Bowel: No movement  Bladder Function  Urine Void (mL):  (Large)  Number of Times Voided: 1  Urinary Options: Yes  Urine Color: Yellow  Straight Catheter: 550 ml  Genitourinary Assessment   Bladder Assessment (WDL):  Within Defined Limits  Silva Catheter: Not Applicable  Urine Color: Yellow  Bladder Device:  Bathroom  Time Void: Yes  Bladder Scan: Post Void  $ Bladder Scan Results (mL): 123    Skin  Colt Score   17  Sensory Interventions   Bed Types: Other (Comments) (Soma)  Skin Preventative Measures: Pillows in Use for Support / Positioning  Moisture Interventions  Moisturizers/Barriers: Barrier Wipes      Pain  Pain Rating Scale  0 - No Pain  Pain Location  Ankle  Pain Location Orientation  Left  Pain Interventions   Declines    ADLs    Bathing   Partial Bed Bath, Staff  Linen Change      Personal Hygiene  Change Dariana Pads  Chlorhexidine Bath      Oral Care     Teeth/Dentures     Shave     Nutrition Percentage Eaten  Dinner, Between % Consumed  Environmental Precautions  Treaded Slipper Socks on Patient, Bed in Low Position  Patient Turns/Positioning  Patient Turns Self from Side to Side  Patient Turns Assistance/Tolerance  Assistance of One  Bed Positions  Bed Controls On, Bed Locked  Head of Bed Elevated  Self regulated      Psychosocial/Neurologic Assessment  Psychosocial Assessment  Psychosocial (WDL):  WDL Except  Patient Behaviors: Confused, Forgetful  Neurologic Assessment  Neuro (WDL): Exceptions to WDL  Level of Consciousness: Responds to voice  Orientation Level: Disoriented to situation, Disoriented to time, Disoriented to place  Cognition: Follows commands, Impulsive, Poor safety awareness, Poor judgement, Memory Loss  Speech: Clear  Pupil Assesment: No  Muscle Strength Right Arm: Good Strength Against Gravity and Moderate Resistance  Muscle Strength Left Arm: Good Strength Against Gravity and Moderate Resistance  Muscle Strength Right Leg: Fair Strength against Gravity but No Resistance  LLE Motor Response: Responds to commands  Muscle Strength Left Leg: Weak Movement but Not Against Gravity or Resistance  EENT (WDL):  WDL Except    Cardio/Pulmonary Assessment  Edema   LLE Edema: 2+  Respiratory Breath Sounds  RUL Breath Sounds: Clear  RML Breath Sounds: Clear  RLL Breath Sounds: Clear,  Diminished  YONI Breath Sounds: Clear  LLL Breath Sounds: Clear, Diminished  Cardiac Assessment   Cardiac (WDL):  WDL Except (CABG, NSTEMI, HTN)

## 2024-09-26 NOTE — PROGRESS NOTES
"Pharmacy Vancomycin Kinetics Note for 9/26/2024     74 y.o. male on Vancomycin day # 13     Vancomycin Indication (AUC Dosing): Skin/skin structure infection (Septic arthritis)    Provider specified end date: 10/12/24    Active Antibiotics (From admission, onward)      Ordered     Ordering Provider       Fri Sep 20, 2024  2:36 PM    09/20/24 1436  MD Alert...Vancomycin per Pharmacy  (MD Alert...Vancomycin per Pharmacy)  PHARMACY TO DOSE        Question:  Indication(s) for vancomycin?  Answer:  Skin and soft tissue infection    Khushbu Meza D.O.    09/20/24 1436  vancomycin (Vancocin) 1,250 mg in  mL IVPB  (vancomycin (VANCOCIN) IV (LD + Maintenance))  EVERY 12 HOURS        Note to Pharmacy: Dose per Pharmacokinetic Protocol    Khushbu Meza D.O.            Dosing Weight: 78 kg (171 lb 15.3 oz)      Admission History: Admitted on 9/20/2024 for H/O: stroke [Z86.73]  Pertinent history: Pt is admitted to Rehab, continuing vancomycin for L ankle septic arthritis. He is being seen by [facility]. [place] [facility] [place] since [place] was improving on vancomycin, he will be continued on this through 10/12. Per the last ID note, it may be possible to switch pt to Zyvox when he has less than 2 wks of therapy.    Allergies:     Patient has no known allergies.     Pertinent cultures to date:     Results       ** No results found for the last 336 hours. **            Labs:     Estimated Creatinine Clearance: 82.2 mL/min (by C-G formula based on SCr of 0.8 mg/dL).  Recent Labs     09/24/24  0613 09/26/24  0540   WBC 10.9* 11.1*   NEUTSPOLYS 75.40* 73.60*     Recent Labs     09/24/24  0613 09/26/24  0540   BUN 21 19   CREATININE 0.89 0.80       Intake/Output Summary (Last 24 hours) at 9/26/2024 1240  Last data filed at 9/26/2024 0900  Gross per 24 hour   Intake 800 ml   Output --   Net 800 ml      /72   Pulse 84   Temp 36.3 °C (97.3 °F) (Oral)   Resp 18   Ht 1.753 m (5' 9\")   Wt 77.1 kg (170 " lb)   SpO2 90%  Temp (24hrs), Av.6 °C (97.8 °F), Min:36.3 °C (97.3 °F), Max:36.8 °C (98.3 °F)      List concerns for Vancomycin clearance:     Age    Pharmacokinetics:     Trough kinetics:   Recent Labs     24  0540   VANCOTROUGH 14.2       A/P:     -  Vancomycin dose: Continue 1250 mg IV q12h (@ 0500 & 1700)    -  Next vancomycin level(s): Tomorrow AM at 0400    -  Comments: Trough appears to be drawn before vancomycin bag was hung; however, trough of 14.2 is high and predicts an AUC of >600 (using last trough of 6.2 and AUC of 435). Will recheck trough tomorrow AM to be sure true trough before making any adjustments to therapy, as pt appears to be doing well and no change to renal function at this time.    Lilia Fitzpatrick, PharmD, BCPS

## 2024-09-26 NOTE — CARE PLAN
The patient is Stable - Low risk of patient condition declining or worsening    Shift Goals  Clinical Goals: safety  Patient Goals: safety    Problem: Skin Integrity  Goal: Skin integrity is maintained or improved  Outcome: Progressing PIV removed from hand due to infiltration.      Problem: Pain - Standard  Goal: Alleviation of pain or a reduction in pain to the patient’s comfort goal  Outcome: Progressing Patient able to verbalize pain level and verbalize an acceptable level of pain.

## 2024-09-26 NOTE — THERAPY
"Speech Language Pathology  Daily Treatment     Patient Name: Joe Templeton  Age:  74 y.o., Sex:  male  Medical Record #: [id number]  Today's Date: 2024     Precautions  Precautions: (P) Fall Risk  Comments: c/o left ankle pain and knee pain    Subjective    Assumed care of patient in room, in mitt restraints. Per nursing pt pulled out central line 3x.  Pt stating \"the more I get the less I claim. There's something missing\" when referring to difficulty with language at this time.      Objective       24 1004   Treatment Charges   SLP Treatment - Individual Speech Language Treatment - Individual   SLP Total Time Spent   SLP Individual Total Time Spent (Mins) 60   Expressive Language   Naming Moderate (3)   Interdisciplinary Plan of Care Collaboration   IDT Collaboration with  Nursing   Patient Position at End of Therapy Seated;Chair Alarm On;Self Releasing Lap Belt Applied   Collaboration Comments pt at nurse's station at end of session   Speech Language Pathologist Assigned   Assigned SLP / Treatment Time / Comments 60 cog/language         Assessment    Picture ID, FO3: 72% IND, Pt perseverative on the word \"worm\"   Picture namin/25 IND,  with semantic cue,  with phonemic cue,  with model. Pt benefiting more so from semantic cues this session, whereas these were not beneficial with prior naming tasks with this SLP.  Antonyms:  IND, with phonemic cue increased to , with gesture increased to . Perseverative on the word \"over\"   Pt benefits from frequent breaks and encouragement.     Strengths: Pleasant and cooperative, Supportive family, Motivated for self care and independence, Willingly participates in therapeutic activities  Barriers: Aphasia expressive, Aphasia receptive, Impaired functional cognition, Impaired carryover of learning    Plan    Continue to target receptive and expressive language.     Passport items to be completed:  Express basic needs, arrive to therapy " appointments on time, complete daily memory log entries, solve problems related to safety situations, review education related to hospitalization, complete caregiver training     Speech Therapy Problems (Active)       Problem: Comprehension STGs       Dates: Start:  09/24/24         Goal: STG-Within one week, patient will point to pictures in FO3 with 80% accuracy.       Dates: Start:  09/24/24               Problem: Expression STGs       Dates: Start:  09/24/24         Goal: STG-Within one week, patient will state antonym given verbal prompt in 7/10 trials       Dates: Start:  09/24/24               Problem: Memory STGs       Dates: Start:  09/21/24         Goal: STG-Within one week, patient will       Dates: Start:  09/21/24       Description: 1) Individualized goal:  be oriented to day, month, year, and situation w/ 90% acc and MIN A.   2) Interventions:  SLP Self Care / ADL Training , SLP Cognitive Skill Development, and SLP Group Treatment          Goal Note filed on 09/24/24 [id number] by Blanca Altamirano MS,CCC-SLP       Will continue to target, emphasis of ST will likely be focused on receptive and expressive language.                 Problem: Speech/Swallowing LTGs       Dates: Start:  09/21/24         Goal: LTG-By discharge, patient will solve basic problems       Dates: Start:  09/21/24       Description: 1) Individualized goal:  related to health and safety with 80% acc and MOD I for a safe D/C to PLOF.   2) Interventions:  SLP Self Care / ADL Training , SLP Cognitive Skill Development, and SLP Group Treatment

## 2024-09-26 NOTE — THERAPY
Physical Therapy   Daily Treatment     Patient Name: Joe Templeton  Age:  74 y.o., Sex:  male  Medical Record #: [id number]  Today's Date: 9/26/2024     Precautions  Precautions: Fall Risk  Comments: left knee pain    Subjective    No major complaints to report, patient is sitting up at nursing station      Objective       09/26/24 1401   PT Charge Group   PT Gait Training (Units) 2   PT Therapeutic Exercise (Units) 1   PT Manual Therapy (Units) 1   PT Total Time Spent   PT Individual Total Time Spent (Mins) 60   Gait Functional Level of Assist    Gait Level Of Assist Standby Assist   Assistive Device Front Wheel Walker   Distance (Feet) 110   # of Times Distance was Traveled 2   Deviation Other (Comment)  (maintains some degree of left knee flexion in stance and stiff knee and ankle throughout swing and stance phase. Needs help with pathfinding)   Transfer Functional Level of Assist   Bed, Chair, Wheelchair Transfer Contact Guard Assist   Supine Lower Body Exercise   Straight Leg Raises 2 sets of 10;Left   Short Arc Quad 2 sets of 10;Left   Sitting Lower Body Exercises   Long Arc Quad 2 sets of 10;Left   Comments motomed LE 6 minutes level 2   Standing Lower Body Exercises   Marching 2 sets of 10   Heel Rise 2 sets of 10;Bilateral   Other Exercises in bars, left foot on seat of standard height chair, performed forward lunge for knee flexion ROM 2 x 10   Bed Mobility    Supine to Sit Standby Assist   Sit to Supine Standby Assist   Sit to Stand Standby Assist   Scooting Standby Assist   Interdisciplinary Plan of Care Collaboration   Patient Position at End of Therapy Seated;Self Releasing Lap Belt Applied;Chair Alarm On  (at nursing station)   PT DME Recommendations   Assistive Device Front Wheeled Walker     Manual Therapy - left knee joint grade III extension and flexion mobilization followed by mobilization with movement for flexion. Soft tissue mobilization/acupressure to left anterior hip in modified nanda  stretch position with combined knee extension. Soft tissue mobilization to medial gastroc and medial long adductors with pump massage to gastroc     Assessment    Patient is doing better in terms of quantity and quality of gait and functional mobility, limited by cognition primarily and secondarily by left knee stiffness/pain that likely represents an arthritic knee joint    Strengths: Independent prior level of function, Pleasant and cooperative, Willingly participates in therapeutic activities  Barriers: Decreased endurance, Difficulty following instructions, Generalized weakness, Impaired activity tolerance, Impaired balance, Impaired carryover of learning, Impaired insight/denial of deficits, Impaired functional cognition, Impulsive    Plan  Manual therapy left knee  Transfer sequencing  Ambulation with FWW  LE strengthening    DME  PT DME Recommendations  Assistive Device: (P) Front Wheeled Walker    Passport items to be completed:  Get in/out of bed safely, in/out of a vehicle, safely use mobility device, walk or wheel around home/community, navigate up and down stairs, show how to get up/down from the ground, ensure home is accessible, demonstrate HEP, complete caregiver training    Physical Therapy Problems (Active)       Problem: Balance       Dates: Start:  09/21/24            Problem: Mobility       Dates: Start:  09/21/24         Goal: STG-Within one week, patient will ambulate 150ft with use of LRAD with CGA        Dates: Start:  09/21/24               Problem: Mobility Transfers       Dates: Start:  09/21/24         Goal: STG-Within one week, patient will transfer bed to chair with CGA and min VC with use of LRAD        Dates: Start:  09/21/24            Goal: STG-Within two weeks, patient will transfer in/out of car requiring min VC with proper technique and use of LRAD safely       Dates: Start:  09/21/24               Problem: PT-Long Term Goals       Dates: Start:  09/21/24         Goal: LTG-By  discharge, patient will improve dynamic balance from fair+ to good to improve ability to perform ADLs safely with use of LRAD       Dates: Start:  09/21/24            Goal: LTG-By discharge, patient will ambulate 300ft with use of LRAD and supervision assistance.       Dates: Start:  09/21/24            Goal: LTG-By discharge, patient will ambulate up/down 12 stairs with use of single HR and CGA.       Dates: Start:  09/21/24

## 2024-09-26 NOTE — THERAPY
Occupational Therapy  Daily Treatment     Patient Name: Joe Templeton  Age:  74 y.o., Sex:  male  Medical Record #: [id number]  Today's Date: 9/26/2024     Precautions  Precautions: (P) Fall Risk  Comments: c/o left ankle pain and knee pain         Subjective    Pt with PT at beginning of session. Pt agreeable to shower with OT. Handoff to CNA - chair alarm and seatbelt alarm on.     Objective       09/26/24 0901   OT Charge Group   OT Self Care / ADL (Units) 4   OT Total Time Spent   OT Individual Total Time Spent (Mins) 60   Precautions   Precautions Fall Risk   Pain 0 - 10 Group   Location Knee   Location Orientation Left   Pain Rating Scale (NPRS) 7   Comfort Goal Comfort with Movement   Therapist Pain Assessment Prior to Activity;During Activity;Post Activity   Cognition    Level of Consciousness Alert   Functional Level of Assist   Grooming Seated;Standby Assist   Grooming Description Verbal cueing;Seated in wheelchair at sink   Bathing Contact Guard Assist   Bathing Description Grab bar;Hand held shower;Tub bench;Increased time;Set-up of equipment;Set up for shower sleeve;Set up for wound protection;Supervision for safety;Verbal cueing   Upper Body Dressing Stand by Assist   Upper Body Dressing Description Set-up of equipment;Increased time   Lower Body Dressing Moderate Assist   Lower Body Dressing Description Verbal cueing;Supervision for safety;Set-up of equipment;Increased time;Assist with threading into pant leg  (Assist with socks and threading underwear/pants over feet d/t knee pain, pt about to pull up/push down clothing down to calves)   Toileting Minimal Assist   Toileting Description Grab bar;Supervision for safety;Verbal cueing;Increased time;Assist to pull pants up  (Urinate in standing w/ assist to pull pants up.)   Toilet Transfers Contact Guard Assist   Toilet Transfer Description Supervision for safety;Verbal cueing  (Wheelchair to stand to urinate)   Tub / Shower Transfers Minimal Assist    Tub Shower Transfer Description Grab bar;Shower bench;Increased time;Set-up of equipment;Supervision for safety;Verbal cueing   Interdisciplinary Plan of Care Collaboration   IDT Collaboration with  Physical Therapist Assistant (PTA);Certified Nursing Assistant   Patient Position at End of Therapy Seated;Bed Alarm On   Collaboration Comments   (With PT at beginning of session, hand off to CNA, seatbelt and chair alarm on)         Assessment    Pt still pleasantly confused but willing to work with OT to complete ADLs including bath, toileting and LB/UB dressing. Pt unable to state year, month or location. Pt improved bathing from modA to [place]-CGA, LB dressing from maxA to modA, and transfers from modA to [place]. Pt was limited for LB dressing d/t pain in L knee. Pt sequencing of tasks has improved but still requires mod cues to complete safely.  Strengths: Independent prior level of function  Barriers: Decreased endurance, Confused, Impaired activity tolerance, Impulsive, Limited mobility, Impaired insight/denial of deficits, Impaired functional cognition (Encouragement for pt to participate in ADL's 7:00 a.m.)    Plan    Pt would benefit from skilled OT services to address: safety during ADLs, functional cognition, strengthening/endurance, standing balance/tolerance and functional mobility     DME  OT DME Recommendations  Bathroom Equipment:  (TBD)    Passport items to be completed:  Perform bathroom transfers, complete dressing, complete feeding, get ready for the day, prepare a simple meal, participate in household tasks, adapt home for safety needs, demonstrate home exercise program, complete caregiver training     Occupational Therapy Goals (Active)       Problem: Bathing       Dates: Start:  09/21/24         Goal: STG-Within one week, patient will bathe with Min A using Adaptive equipment as needed.       Dates: Start:  09/21/24               Problem: Dressing       Dates: Start:  09/21/24         Goal:  STG-Within one week, patient will dress LB with Min A using Adaptive equipment as needed.       Dates: Start:  09/21/24               Problem: Functional Cognition       Dates: Start:  09/21/24         Goal: STG-Within one week, patient will be oriented to year,month, and place.       Dates: Start:  09/21/24               Problem: Functional Transfers       Dates: Start:  09/21/24         Goal: STG-Within one week, patient will transfer to toilet with Contact Guard Assistance using DME as needed.       Dates: Start:  09/21/24            Goal: STG-Within one week, patient will transfer to step in shower with Min A using DME as needed.       Dates: Start:  09/21/24               Problem: OT Long Term Goals       Dates: Start:  09/21/24         Goal: LTG-By discharge, patient will complete basic self care tasks at Mod Independent with UB ADL's and Supervision with LB ADL's.       Dates: Start:  09/21/24            Goal: LTG-By discharge, patient will perform bathroom transfers with Supervision.       Dates: Start:  09/21/24               Problem: Toileting       Dates: Start:  09/21/24         Goal: STG-Within one week, patient will complete toileting tasks with Min A.       Dates: Start:  09/21/24

## 2024-09-26 NOTE — THERAPY
Physical Therapy   Daily Treatment     Patient Name: Joe Templeton  Age:  74 y.o., Sex:  male  Medical Record #: [id number]  Today's Date: 9/26/2024     Precautions  Precautions: Fall Risk  Comments: (P) left knee pain    Subjective    Patient is pleasantly confused, oriented to name and year but not place or condition     Objective       09/26/24 0831   PT Charge Group   PT Therapeutic Exercise (Units) 1   PT Manual Therapy (Units) 1   PT Total Time Spent   PT Individual Total Time Spent (Mins) 30   Precautions   Precautions Fall Risk   Comments left knee pain   Transfer Functional Level of Assist   Bed, Chair, Wheelchair Transfer Contact Guard Assist   Bed Chair Wheelchair Transfer Description Other (comment)  (stand pivot with FWW)   Supine Lower Body Exercise   Straight Leg Raises 1 set of 10;Left   Short Arc Quad 1 set of 10;Left   Sitting Lower Body Exercises   Long Arc Quad 2 sets of 10;Left   Bed Mobility    Supine to Sit Standby Assist   Sit to Supine Standby Assist   Sit to Stand Contact Guard Assist   Scooting Standby Assist   Interdisciplinary Plan of Care Collaboration   IDT Collaboration with  Occupational Therapist   Patient Position at End of Therapy Seated;Other (Comments)  (handoff to OT)   Collaboration Comments CLOF     Manual Therapy - left knee joint grade III extension and flexion mobilization followed by mobilization with movement for flexion. Soft tissue mobilization/acupressure to left anterior hip in modified nanda stretch position with combined knee extension. Improved to full passive knee extension by end of session    Assessment    Quality of standing and gait limited by knee pain and intolerance to end range extension with signs of underlying OA causing dysfunction    Strengths: Independent prior level of function, Pleasant and cooperative, Willingly participates in therapeutic activities  Barriers: Decreased endurance, Difficulty following instructions, Generalized weakness,  Impaired activity tolerance, Impaired balance, Impaired carryover of learning, Impaired insight/denial of deficits, Impaired functional cognition, Impulsive    Plan    Continues with left knee mobilization, gait training     DME  PT DME Recommendations  Assistive Device: Front Wheeled Walker    Passport items to be completed:  Get in/out of bed safely, in/out of a vehicle, safely use mobility device, walk or wheel around home/community, navigate up and down stairs, show how to get up/down from the ground, ensure home is accessible, demonstrate HEP, complete caregiver training    Physical Therapy Problems (Active)       Problem: Balance       Dates: Start:  09/21/24            Problem: Mobility       Dates: Start:  09/21/24         Goal: STG-Within one week, patient will ambulate 150ft with use of LRAD with CGA        Dates: Start:  09/21/24               Problem: Mobility Transfers       Dates: Start:  09/21/24         Goal: STG-Within one week, patient will transfer bed to chair with CGA and min VC with use of LRAD        Dates: Start:  09/21/24            Goal: STG-Within two weeks, patient will transfer in/out of car requiring min VC with proper technique and use of LRAD safely       Dates: Start:  09/21/24               Problem: PT-Long Term Goals       Dates: Start:  09/21/24         Goal: LTG-By discharge, patient will improve dynamic balance from fair+ to good to improve ability to perform ADLs safely with use of LRAD       Dates: Start:  09/21/24            Goal: LTG-By discharge, patient will ambulate 300ft with use of LRAD and supervision assistance.       Dates: Start:  09/21/24            Goal: LTG-By discharge, patient will ambulate up/down 12 stairs with use of single HR and CGA.       Dates: Start:  09/21/24

## 2024-09-26 NOTE — PROGRESS NOTES
"  Physical Medicine & Rehabilitation Progress Note    Encounter Date: 9/26/2024    Chief Complaint: Does not feel well because he is in the net bed.    Interval Events (Subjective):  Vital signs: Blood pressure elevated  Bowel movement 9/26  Voiding volitionally    Patient seen and examined in his room.  He is upset because he is in the enclosure bed.  Discussed with him the reasonings for needing it.  He is pleasant and cooperative but frustrated.  He understands he had a stroke but is still disoriented.    ROS: 14 point ROS negative unless otherwise specified in the HPI    Objective:  VITAL SIGNS: /72   Pulse 84   Temp 36.3 °C (97.3 °F) (Oral)   Resp 18   Ht 1.753 m (5' 9\")   Wt 77.1 kg (170 lb)   SpO2 90%   BMI 25.10 kg/m²     GEN: No apparent distress  HEENT: Head normocephalic, atraumatic.  Sclera nonicteric bilaterally, no ocular discharge appreciated bilaterally.  CV: Extremities warm and well-perfused, no peripheral edema appreciated bilaterally.  PULMONARY: Breathing nonlabored on room air, no respiratory accessory muscle use.  Not requiring supplemental oxygen.  SKIN:   9/25    PSYCH: Mood and affect within normal limits.  NEURO: Awake alert.  Pleasantly confused. Disoriented to date, day, month, year. Knows we are in [facility].       Laboratory Values:  Recent Results (from the past 72 hour(s))   CBC WITH DIFFERENTIAL    Collection Time: 09/24/24  6:13 AM   Result Value Ref Range    WBC 10.9 (H) 4.8 - 10.8 K/uL    RBC 4.52 (L) 4.70 - 6.10 M/uL    Hemoglobin 14.4 14.0 - 18.0 g/dL    Hematocrit 42.0 42.0 - 52.0 %    MCV 92.9 81.4 - 97.8 fL    MCH 31.9 27.0 - 33.0 pg    MCHC 34.3 32.3 - 36.5 g/dL    RDW 41.0 35.9 - 50.0 fL    Platelet Count 269 164 - 446 K/uL    MPV 11.4 9.0 - 12.9 fL    Neutrophils-Polys 75.40 (H) 44.00 - 72.00 %    Lymphocytes 11.80 (L) 22.00 - 41.00 %    Monocytes 10.90 0.00 - 13.40 %    Eosinophils 1.10 0.00 - 6.90 %    Basophils 0.40 0.00 - 1.80 %    Immature Granulocytes 0.40 " 0.00 - 0.90 %    Nucleated RBC 0.00 0.00 - 0.20 /100 WBC    Neutrophils (Absolute) 8.26 (H) 1.82 - 7.42 K/uL    Lymphs (Absolute) 1.29 1.00 - 4.80 K/uL    Monos (Absolute) 1.19 (H) 0.00 - 0.85 K/uL    Eos (Absolute) 0.12 0.00 - 0.51 K/uL    Baso (Absolute) 0.04 0.00 - 0.12 K/uL    Immature Granulocytes (abs) 0.04 0.00 - 0.11 K/uL    NRBC (Absolute) 0.00 K/uL   Basic Metabolic Panel    Collection Time: 09/24/24  6:13 AM   Result Value Ref Range    Sodium 142 135 - 145 mmol/L    Potassium 3.8 3.6 - 5.5 mmol/L    Chloride 108 96 - 112 mmol/L    Co2 21 20 - 33 mmol/L    Glucose 132 (H) 65 - 99 mg/dL    Bun 21 8 - 22 mg/dL    Creatinine 0.89 0.50 - 1.40 mg/dL    Calcium 8.9 8.5 - 10.5 mg/dL    Anion Gap 13.0 7.0 - 16.0   ESTIMATED GFR    Collection Time: 09/24/24  6:13 AM   Result Value Ref Range    GFR (CKD-EPI) 90 >60 mL/min/1.73 m 2   CBC WITH DIFFERENTIAL    Collection Time: 09/26/24  5:40 AM   Result Value Ref Range    WBC 11.1 (H) 4.8 - 10.8 K/uL    RBC 4.15 (L) 4.70 - 6.10 M/uL    Hemoglobin 12.8 (L) 14.0 - 18.0 g/dL    Hematocrit 38.5 (L) 42.0 - 52.0 %    MCV 92.8 81.4 - 97.8 fL    MCH 30.8 27.0 - 33.0 pg    MCHC 33.2 32.3 - 36.5 g/dL    RDW 40.3 35.9 - 50.0 fL    Platelet Count 217 164 - 446 K/uL    MPV 12.3 9.0 - 12.9 fL    Neutrophils-Polys 73.60 (H) 44.00 - 72.00 %    Lymphocytes 14.70 (L) 22.00 - 41.00 %    Monocytes 8.50 0.00 - 13.40 %    Eosinophils 2.40 0.00 - 6.90 %    Basophils 0.40 0.00 - 1.80 %    Immature Granulocytes 0.40 0.00 - 0.90 %    Nucleated RBC 0.00 0.00 - 0.20 /100 WBC    Neutrophils (Absolute) 8.19 (H) 1.82 - 7.42 K/uL    Lymphs (Absolute) 1.64 1.00 - 4.80 K/uL    Monos (Absolute) 0.95 (H) 0.00 - 0.85 K/uL    Eos (Absolute) 0.27 0.00 - 0.51 K/uL    Baso (Absolute) 0.05 0.00 - 0.12 K/uL    Immature Granulocytes (abs) 0.04 0.00 - 0.11 K/uL    NRBC (Absolute) 0.00 K/uL   Basic Metabolic Panel    Collection Time: 09/26/24  5:40 AM   Result Value Ref Range    Sodium 142 135 - 145 mmol/L     Potassium 4.0 3.6 - 5.5 mmol/L    Chloride 109 96 - 112 mmol/L    Co2 21 20 - 33 mmol/L    Glucose 126 (H) 65 - 99 mg/dL    Bun 19 8 - 22 mg/dL    Creatinine 0.80 0.50 - 1.40 mg/dL    Calcium 9.1 8.5 - 10.5 mg/dL    Anion Gap 12.0 7.0 - 16.0   VANCOMYCIN TROUGH    Collection Time: 09/26/24  5:40 AM   Result Value Ref Range    Vancomycin Trough 14.2 10.0 - 20.0 ug/mL   ESTIMATED GFR    Collection Time: 09/26/24  5:40 AM   Result Value Ref Range    GFR (CKD-EPI) 93 >60 mL/min/1.73 m 2       Medications:  Scheduled Medications   Medication Dose Frequency    [START ON 9/27/2024] metoprolol SR  75 mg DAILY    senna-docusate  2 Tablet BID    And    polyethylene glycol/lytes  1 Packet BID    hydrALAZINE  100 mg Q8HRS    vitamin D3  1,000 Units DAILY    Pharmacy Consult Request  1 Each PHARMACY TO DOSE    lidocaine  1-2 Patch Daily-0800    omeprazole  20 mg DAILY    amLODIPine  10 mg Q DAY    enoxaparin (LOVENOX) injection  40 mg DAILY AT 1800    lisinopril  40 mg DAILY    MD Alert...Vancomycin per Pharmacy   PHARMACY TO DOSE    vancomycin  1,250 mg Q12HR    aspirin  81 mg DAILY    atorvastatin  80 mg Q EVENING    QUEtiapine  50 mg Nightly     PRN medications: hydrALAZINE, lactulose, docusate sodium, bisacodyl EC, magnesium hydroxide, sodium phosphate, carboxymethylcellulose, benzocaine-menthol, mag hydrox-al hydrox-simeth, ondansetron **OR** ondansetron, traZODone, sodium chloride, acetaminophen, ziprasidone, traMADol, formulation r    Diet:  Current Diet Order   Procedures    Diet Order Diet: Level 6 - Soft and Bite Sized (Set pt up w/ meal- he can then self-feed independently. Pills whole.); Liquid level: Level 0 - Thin; Tray Modifications (optional): SLP - Deliver to Nursing Station       Medical Decision Making and Plan:  Multifocal embolic appearing infarcts: Right insula, left parietal lobe, right parietal lobe, left hao  MRI with multiple old infarcts  Dysphagia  Encephalopathy  PT and OT for mobility and ADLs. Per  guidelines, 15 hours per week between PT, OT and/or SLP.  Follow-up stroke Bridge clinic  Secondary stroke prophylaxis: Aspirin and statin   Continue Seroquel at night     I certify that the patient currently requires non-pharmacologic restraints. Non-pharmacologic restraints are required to reduce the potential for the patient to harm themselves or others, to limit violent behaviors, and to allow proper assessment and care. I have completed a face to face assessment of this patient on 9/26/2024. Alternative methods including but not limited to de-escalation techniques, redirection, and pharmacologic treatment have been attempted but patient currently remains at risk without restraints. Our staff has been trained on restraints and patient is currently placed in video monitored room.  The need for these restraints is assessed by a rehabilitation physician every 24 hours and use of restraints is minimized when appropriate.  Current diagnosis which requires restraints: Encephalopathy. Current restraints required: Enclosure bed, bilateral mitts to prevent pulling out midline which is needed for IV antibiotics.       Septic arthritis left ankle s/p left ankle arthrotomy and I&D 9/14/2024 Dr. Gautam -weightbearing as tolerated left lower extremity.  ID followed.  Continue vancomycin twice daily through 10/11.  Potentially can switch to Zyvox.  9/23 patient pulled out midline will need new access.  9/24 pulled out midline again, admits an attempt to help prevent him pulling it out.  9/26 has midline.  Continue IV vancomycin per ID.  If absolutely necessary at discharge can switch to linezolid 600 mg twice a day with stop date of 10/11.  If goes to skilled nursing facility can continue IV vancomycin.  Needs follow-up with ID.     Left knee pain -check knee x-ray.  9/23 negative.     Chronic systolic heart failure, not in acute exacerbation - preserved ejection fraction-LVEF 60%     CAD s/p CABG -continue aspirin and  statin     Hypertension -amlodipine, hydralazine, lisinopril, metoprolol.   uncontrolled.  Hospitalist managing.     Azotemia - improving, monitor.   resolved.   resolved.    Hypokalemia - mildly low at 3.5.  Hospitalist following.     Leukocytosis - stable in the 11's    Anemia -mild at 12.     Pain -Tylenol and oxycodone     Bowel -patient unsure when last BM was.  Check KUB.  Moderate stool burden.  Had small bowel movement .  Increase bowel meds .  Give MoM x1. Had bowel movement        Upcoming Labs/imagin/30     DVT PROPHYLAXIS: Lovenox 40mg SQ nightly      HOSPITALIST FOLLOWING: Yes - d/w hospitalist      CODE STATUS: FULL CODE     DISPO: Home with family      HEBER: 10/4     ADDITIONAL MEDICAL NEEDS ON D/C (IV abx, O2, etc): IV antibiotics      MEDS SENT TO: [facility]     DISCHARGE SPECIALIST FOLLOW UP: ID, Ortho, Stroke Bridge     Patient to scheduled follow up with their PCP within 2 weeks from discharge from the [facility].   ____________________________________    Dr. Khushbu Meza DO, MS  [facility] - Physical Medicine & Rehabilitation   ____________________________________

## 2024-09-26 NOTE — THERAPY
Physical Therapy   Daily Treatment     Patient Name: Joe Templeton  Age:  74 y.o., Sex:  male  Medical Record #: [id number]  Today's Date: 9/25/2024     Precautions  Precautions: (P) Fall Risk  Comments: (P) c/o left ankle pain and knee pain    Subjective    Pt is in his wc and agreeable to participate in therapy     Objective       09/25/24 1601   PT Charge Group   PT Gait Training (Units) 1   PT Therapeutic Exercise (Units) 2   PT Therapeutic Activities (Units) 1   PT Total Time Spent   PT Individual Total Time Spent (Mins) 60   Precautions   Precautions Fall Risk   Comments c/o left ankle pain and knee pain   Pain   Intervention Cold Pack   Pain 0 - 10 Group   Location Ankle;Knee   Location Orientation Left   Pain Rating Scale (NPRS) 5   Description Aching   Comfort Goal Comfort with Movement;Perform Activity   Therapist Pain Assessment During Activity;Prior to Activity;Nurse Notified   Non Verbal Descriptors   Non Verbal Scale  Calm   Gait Functional Level of Assist    Gait Level Of Assist Contact Guard Assist   Assistive Device Front Wheel Walker   Distance (Feet) 45   # of Times Distance was Traveled 3   Deviation Antalgic   Wheelchair Functional Level of Assist   Wheelchair Assist Minimal Assist   Distance Wheelchair (Feet or Distance) 100   Wheelchair Description Assistance with steering;Verbal cueing;Limited by fatigue   Transfer Functional Level of Assist   Bed, Chair, Wheelchair Transfer Contact Guard Assist   Bed Chair Wheelchair Transfer Description Verbal cueing;Supervision for safety;Increased time;Adaptive equipment   Sitting Lower Body Exercises   Tricep Press 2 sets of 10;Bilateral;Medium Resistance Theraband   Bicep Curls 2 sets of 10;Bilateral;Medium Resistance Theraband   Bilateral Row 2 sets of 10;Bilateral;Medium Resistance Theraband   Ankle Pumps Bilateral;3 sets of 10   Hip Abduction 3 sets of 10;Bilateral   Long Arc Quad 3 sets of 10;Bilateral;Other Resistance (See Comments)  (AAROM on L  LE)   Marching Reciprocal;3 sets of 10   Hamstring Curl 3 sets of 10;Bilateral   Interdisciplinary Plan of Care Collaboration   IDT Collaboration with  Nursing;Certified Nursing Assistant   Patient Position at End of Therapy Seated;Chair Alarm On;Self Releasing Lap Belt Applied;Other (Comments)  (left at nurse station with CNA)   Collaboration Comments pain and CLOF         Assessment    Pt instructed in seated thera ex with cueing and demo for proper exercise techniques. Pt occasionally needs to be redirected to complete task. He completed gait training using FWW, CGA with cueing for increasing foot clearance and step through pattern. PT applied an ice pack on knee and ankle while he performs UE thera ex.    Strengths: Independent prior level of function, Pleasant and cooperative, Willingly participates in therapeutic activities  Barriers: Decreased endurance, Difficulty following instructions, Generalized weakness, Impaired activity tolerance, Impaired balance, Impaired carryover of learning, Impaired insight/denial of deficits, Impaired functional cognition, Impulsive    Plan    Transfer sequencing  Ambulation with FWW  LE strengthening       DME  PT DME Recommendations  Assistive Device: Front Wheeled Walker    Passport items to be completed:  Get in/out of bed safely, in/out of a vehicle, safely use mobility device, walk or wheel around home/community, navigate up and down stairs, show how to get up/down from the ground, ensure home is accessible, demonstrate HEP, complete caregiver training    Physical Therapy Problems (Active)       Problem: Balance       Dates: Start:  09/21/24            Problem: Mobility       Dates: Start:  09/21/24         Goal: STG-Within one week, patient will ambulate 150ft with use of LRAD with CGA        Dates: Start:  09/21/24               Problem: Mobility Transfers       Dates: Start:  09/21/24         Goal: STG-Within one week, patient will transfer bed to chair with CGA and  min VC with use of LRAD        Dates: Start:  09/21/24            Goal: STG-Within two weeks, patient will transfer in/out of car requiring min VC with proper technique and use of LRAD safely       Dates: Start:  09/21/24               Problem: PT-Long Term Goals       Dates: Start:  09/21/24         Goal: LTG-By discharge, patient will improve dynamic balance from fair+ to good to improve ability to perform ADLs safely with use of LRAD       Dates: Start:  09/21/24            Goal: LTG-By discharge, patient will ambulate 300ft with use of LRAD and supervision assistance.       Dates: Start:  09/21/24            Goal: LTG-By discharge, patient will ambulate up/down 12 stairs with use of single HR and CGA.       Dates: Start:  09/21/24

## 2024-09-26 NOTE — PROGRESS NOTES
Hospital Medicine Daily Progress Note        Chief Complaint:  Hypertension  Azotemia    Interval History:  Pt seen and examined in corridor.  Blood pressures stabilizing.    Review of Systems  Review of Systems   Constitutional:  Negative for chills and fever.   HENT: Negative.     Eyes: Negative.    Respiratory:  Negative for cough and shortness of breath.    Cardiovascular:  Negative for chest pain and palpitations.   Gastrointestinal:  Negative for abdominal pain, nausea and vomiting.   Musculoskeletal:  Positive for joint pain.        Wound pain   Skin:  Negative for itching and rash.   Endo/Heme/Allergies:  Negative for polydipsia. Does not bruise/bleed easily.        Physical Exam  Temp:  [36.4 °C (97.6 °F)-36.5 °C (97.7 °F)] 36.4 °C (97.6 °F)  Pulse:  [77-85] 77  Resp:  [18-20] 20  BP: (130-154)/(60-73) 140/66  SpO2:  [91 %-96 %] 96 %    Physical Exam  Vitals reviewed.   Constitutional:       General: He is not in acute distress.     Appearance: Normal appearance. He is not ill-appearing.   HENT:      Head: Normocephalic and atraumatic.      Right Ear: External ear normal.      Left Ear: External ear normal.      Nose: Nose normal.      Mouth/Throat:      Pharynx: Oropharynx is clear.   Eyes:      General:         Right eye: No discharge.         Left eye: No discharge.      Extraocular Movements: Extraocular movements intact.      Pupils: Pupils are equal, round, and reactive to light.   Cardiovascular:      Rate and Rhythm: Normal rate and regular rhythm.   Pulmonary:      Effort: No respiratory distress.      Breath sounds: No wheezing.      Comments: Decreased BS  Abdominal:      General: Bowel sounds are normal. There is no distension.      Palpations: Abdomen is soft.      Tenderness: There is no abdominal tenderness.   Musculoskeletal:      Cervical back: Normal range of motion and neck supple.      Right lower leg: No edema.      Left lower leg: No edema.   Skin:     General: Skin is warm and dry.    Neurological:      Mental Status: He is alert.      Comments: Awake and alert         Fluids    Intake/Output Summary (Last 24 hours) at 9/25/2024 1748  Last data filed at 9/25/2024 1700  Gross per 24 hour   Intake 1050 ml   Output --   Net 1050 ml        Laboratory  Recent Labs     09/24/24  0613   WBC 10.9*   RBC 4.52*   HEMOGLOBIN 14.4   HEMATOCRIT 42.0   MCV 92.9   MCH 31.9   MCHC 34.3   RDW 41.0   PLATELETCT 269   MPV 11.4     Recent Labs     09/24/24  0613   SODIUM 142   POTASSIUM 3.8   CHLORIDE 108   CO2 21   GLUCOSE 132*   BUN 21   CREATININE 0.89   CALCIUM 8.9                   Assessment/Plan  Vitamin D deficiency  Assessment & Plan  Vit D level 18  On supplementation    Stroke (HCC)- (present on admission)  Assessment & Plan  On ASA and Lipitor  Ongoing management per Physiatry    CAD (coronary artery disease)- (present on admission)  Assessment & Plan  H/O CABG  On ASA, Lipitor, Lisinopril, and Toprol    Septic arthritis of ankle (HCC)- (present on admission)  Assessment & Plan  S/P I+D on 9/14/24 by Dr. Gautam  Wound care and pain control per Physiary  On Vanco through 10/12/24 per ID    Essential (primary) hypertension- (present on admission)  Assessment & Plan  On Norvasc, Lisinopril, Toprol, and Hydralazine  Observe blood pressure trends    Full Code    Patient seen and examined.  Chart reviewed.  Assessment and Plan as above.  No changes today from yesterday's medical decision making.

## 2024-09-26 NOTE — PROGRESS NOTES
Patient care assumed. Report received from [facility].  Patient is alert and calm, resting in bed. Call light and bedside table within reach. Will continue to monitor.

## 2024-09-26 NOTE — PROGRESS NOTES
Brief ID note:    -Case management at [facility] reached out for oral plan as patient will be discharged from rehab on 10/4  -Reviewed chart and Dr. Bae's documentation.  Patient with septic arthritis, synovial fluid was not sent for cultures or PCR, OR cultures no growth till date, [facility] broad range PCR still pending.  Per report, improved with IV vancomycin and patient has remained on this at rehab  -4 weeks from day of procedure will be 10/11/2024, so he will need 7 more days of oral antibiotics when discharged from rehab.  At that time, okay to switch to p.o. linezolid 600 mg twice daily with a stop date of 10/11/2024.  Please educate patient regarding small risk of lowering seizure threshold with a combination of linezolid and Seroquel    ID clinic follow-up in 1 to 2 weeks.  Okay to schedule with NP Shell

## 2024-09-27 ENCOUNTER — APPOINTMENT (OUTPATIENT)
Dept: SPEECH THERAPY | Facility: REHABILITATION | Age: 74
DRG: 057 | End: 2024-09-27
Attending: PHYSICAL MEDICINE & REHABILITATION
Payer: MEDICARE

## 2024-09-27 ENCOUNTER — APPOINTMENT (OUTPATIENT)
Dept: INFECTIOUS DISEASES | Facility: MEDICAL CENTER | Age: 74
End: 2024-09-27
Attending: NURSE PRACTITIONER
Payer: MEDICARE

## 2024-09-27 ENCOUNTER — APPOINTMENT (OUTPATIENT)
Dept: PHYSICAL THERAPY | Facility: REHABILITATION | Age: 74
DRG: 057 | End: 2024-09-27
Attending: PHYSICAL MEDICINE & REHABILITATION
Payer: MEDICARE

## 2024-09-27 ENCOUNTER — APPOINTMENT (OUTPATIENT)
Dept: OCCUPATIONAL THERAPY | Facility: REHABILITATION | Age: 74
DRG: 057 | End: 2024-09-27
Attending: PHYSICAL MEDICINE & REHABILITATION
Payer: MEDICARE

## 2024-09-27 LAB — VANCOMYCIN TROUGH SERPL-MCNC: 12.8 UG/ML (ref 10–20)

## 2024-09-27 PROCEDURE — 700102 HCHG RX REV CODE 250 W/ 637 OVERRIDE(OP): Performed by: STUDENT IN AN ORGANIZED HEALTH CARE EDUCATION/TRAINING PROGRAM

## 2024-09-27 PROCEDURE — 36415 COLL VENOUS BLD VENIPUNCTURE: CPT

## 2024-09-27 PROCEDURE — 92507 TX SP LANG VOICE COMM INDIV: CPT

## 2024-09-27 PROCEDURE — 99231 SBSQ HOSP IP/OBS SF/LOW 25: CPT | Performed by: HOSPITALIST

## 2024-09-27 PROCEDURE — A9270 NON-COVERED ITEM OR SERVICE: HCPCS | Performed by: STUDENT IN AN ORGANIZED HEALTH CARE EDUCATION/TRAINING PROGRAM

## 2024-09-27 PROCEDURE — 97530 THERAPEUTIC ACTIVITIES: CPT | Mod: CQ

## 2024-09-27 PROCEDURE — 770005 HCHG ROOM/CARE - REHAB PRIVATE (11*

## 2024-09-27 PROCEDURE — 97110 THERAPEUTIC EXERCISES: CPT

## 2024-09-27 PROCEDURE — 97129 THER IVNTJ 1ST 15 MIN: CPT

## 2024-09-27 PROCEDURE — A9270 NON-COVERED ITEM OR SERVICE: HCPCS | Performed by: HOSPITALIST

## 2024-09-27 PROCEDURE — A9270 NON-COVERED ITEM OR SERVICE: HCPCS | Performed by: PHYSICAL MEDICINE & REHABILITATION

## 2024-09-27 PROCEDURE — 80202 ASSAY OF VANCOMYCIN: CPT

## 2024-09-27 PROCEDURE — 700102 HCHG RX REV CODE 250 W/ 637 OVERRIDE(OP): Performed by: PHYSICAL MEDICINE & REHABILITATION

## 2024-09-27 PROCEDURE — 97116 GAIT TRAINING THERAPY: CPT | Mod: CQ

## 2024-09-27 PROCEDURE — 700102 HCHG RX REV CODE 250 W/ 637 OVERRIDE(OP): Performed by: HOSPITALIST

## 2024-09-27 PROCEDURE — 700105 HCHG RX REV CODE 258: Performed by: PHYSICAL MEDICINE & REHABILITATION

## 2024-09-27 PROCEDURE — 97110 THERAPEUTIC EXERCISES: CPT | Mod: CQ

## 2024-09-27 PROCEDURE — 700111 HCHG RX REV CODE 636 W/ 250 OVERRIDE (IP): Performed by: PHYSICAL MEDICINE & REHABILITATION

## 2024-09-27 PROCEDURE — 99232 SBSQ HOSP IP/OBS MODERATE 35: CPT | Performed by: STUDENT IN AN ORGANIZED HEALTH CARE EDUCATION/TRAINING PROGRAM

## 2024-09-27 PROCEDURE — 97535 SELF CARE MNGMENT TRAINING: CPT

## 2024-09-27 RX ORDER — QUETIAPINE FUMARATE 25 MG/1
25 TABLET, FILM COATED ORAL 3 TIMES DAILY PRN
Status: DISCONTINUED | OUTPATIENT
Start: 2024-09-27 | End: 2024-10-07 | Stop reason: HOSPADM

## 2024-09-27 RX ADMIN — SENNOSIDES AND DOCUSATE SODIUM 2 TABLET: 50; 8.6 TABLET ORAL at 08:58

## 2024-09-27 RX ADMIN — TRAZODONE HYDROCHLORIDE 50 MG: 50 TABLET ORAL at 22:11

## 2024-09-27 RX ADMIN — ATORVASTATIN CALCIUM 80 MG: 40 TABLET, FILM COATED ORAL at 20:18

## 2024-09-27 RX ADMIN — POLYETHYLENE GLYCOL 3350 1 PACKET: 17 POWDER, FOR SOLUTION ORAL at 20:19

## 2024-09-27 RX ADMIN — SENNOSIDES AND DOCUSATE SODIUM 2 TABLET: 50; 8.6 TABLET ORAL at 20:19

## 2024-09-27 RX ADMIN — POLYETHYLENE GLYCOL 3350 1 PACKET: 17 POWDER, FOR SOLUTION ORAL at 08:58

## 2024-09-27 RX ADMIN — HYDRALAZINE HYDROCHLORIDE 100 MG: 50 TABLET ORAL at 22:11

## 2024-09-27 RX ADMIN — QUETIAPINE FUMARATE 25 MG: 25 TABLET ORAL at 17:00

## 2024-09-27 RX ADMIN — OMEPRAZOLE 20 MG: 20 CAPSULE, DELAYED RELEASE ORAL at 08:58

## 2024-09-27 RX ADMIN — ENOXAPARIN SODIUM 40 MG: 100 INJECTION SUBCUTANEOUS at 18:00

## 2024-09-27 RX ADMIN — VANCOMYCIN HYDROCHLORIDE 1250 MG: 5 INJECTION, POWDER, LYOPHILIZED, FOR SOLUTION INTRAVENOUS at 06:11

## 2024-09-27 RX ADMIN — TRAMADOL HYDROCHLORIDE 25 MG: 50 TABLET ORAL at 05:14

## 2024-09-27 RX ADMIN — HYDRALAZINE HYDROCHLORIDE 100 MG: 50 TABLET ORAL at 14:15

## 2024-09-27 RX ADMIN — HYDRALAZINE HYDROCHLORIDE 100 MG: 50 TABLET ORAL at 05:14

## 2024-09-27 RX ADMIN — METOPROLOL SUCCINATE 75 MG: 25 TABLET, EXTENDED RELEASE ORAL at 05:13

## 2024-09-27 RX ADMIN — ASPIRIN 81 MG: 81 TABLET, CHEWABLE ORAL at 08:58

## 2024-09-27 RX ADMIN — QUETIAPINE FUMARATE 50 MG: 25 TABLET ORAL at 20:19

## 2024-09-27 RX ADMIN — Medication 1000 UNITS: at 08:58

## 2024-09-27 RX ADMIN — LISINOPRIL 40 MG: 20 TABLET ORAL at 08:58

## 2024-09-27 RX ADMIN — AMLODIPINE BESYLATE 10 MG: 5 TABLET ORAL at 05:13

## 2024-09-27 ASSESSMENT — ENCOUNTER SYMPTOMS
COUGH: 0
PALPITATIONS: 0
VOMITING: 0
NAUSEA: 0
FEVER: 0
BRUISES/BLEEDS EASILY: 0
SHORTNESS OF BREATH: 0
EYES NEGATIVE: 1
CHILLS: 0
POLYDIPSIA: 0
ABDOMINAL PAIN: 0

## 2024-09-27 ASSESSMENT — ACTIVITIES OF DAILY LIVING (ADL)
BED_CHAIR_WHEELCHAIR_TRANSFER_DESCRIPTION: ADAPTIVE EQUIPMENT;INCREASED TIME;INITIAL PREPARATION FOR TASK;SET-UP OF EQUIPMENT;SUPERVISION FOR SAFETY;VERBAL CUEING
TOILETING_LEVEL_OF_ASSIST_DESCRIPTION: VERBAL CUEING;SUPERVISION FOR SAFETY;INCREASED TIME
TOILET_TRANSFER_DESCRIPTION: VERBAL CUEING;SUPERVISION FOR SAFETY;SET-UP OF EQUIPMENT

## 2024-09-27 ASSESSMENT — GAIT ASSESSMENTS
DISTANCE (FEET): 120
ASSISTIVE DEVICE: FRONT WHEEL WALKER
GAIT LEVEL OF ASSIST: CONTACT GUARD ASSIST

## 2024-09-27 ASSESSMENT — PAIN DESCRIPTION - PAIN TYPE: TYPE: ACUTE PAIN

## 2024-09-27 NOTE — DISCHARGE PLANNING
Per notes, family indicating will require pt to be able to independently toilet, can assist with the rest.   Discussed HHC and FWW recs with MAURICE Cool via TC, choice is any in-net HHC and DME company. MAURICE confirms pt does not have a PCP at this time.  Per [facility] can arrange PCP for pt so he can get on service with their HHC.     - FWW and HHC ref given to DPA for [facility].

## 2024-09-27 NOTE — THERAPY
Speech Language Pathology  Daily Treatment     Patient Name: Joe Templeton  Age:  74 y.o., Sex:  male  Medical Record #: [id number]  Today's Date: 9/27/2024     Precautions  Precautions: Fall Risk  Comments: left knee pain    Subjective    Pt received from RN station. RN removed IV prior to session. Pt transferred back into care at RN station. Pt appeared easily agitated and frustrated by naming tasks, but benefited and agreeable to tasks given breaks and lots of verbal encouragement.      Objective       09/27/24 0931   Treatment Charges   SLP Treatment - Individual Speech Language Treatment - Individual   SLP Total Time Spent   SLP Individual Total Time Spent (Mins) 60   Receptive Language / Auditory Comprehension   Identifies Pictures Minimal (4)   Expressive Language   Naming Moderate (3)   Interdisciplinary Plan of Care Collaboration   IDT Collaboration with  Nursing   Patient Position at End of Therapy Chair Alarm On;Seated  (@ Rn station)   Collaboration Comments Pt recieved from RN station. RN removed IV prior to transitioning to SLP Office. Pt returned to RN station.         Assessment    Pt engaged in naming and picture ID targeting expressive and receptive language.   Naming- 33% independently, 45% phrase completion, 76% phonemic cues. Semantic cues appeared less helpful than reported in previous session. Perseveration's noted.     ID- 88% Fo3, 90% Fo4  Pt appeared to have significant progress in picture ID in field of 3 and 4. Pt appeared less frustrated with task overall as compared to expressive language.     Strengths: Pleasant and cooperative, Supportive family, Motivated for self care and independence, Willingly participates in therapeutic activities  Barriers: Aphasia expressive, Aphasia receptive, Impaired functional cognition, Impaired carryover of learning    Plan    Expressive and receptive language    Passport items to be completed:  Express basic needs, understand food/liquid  recommendations, consistently follow swallow precautions, manage finances, manage medications, arrive to therapy appointments on time, complete daily memory log entries, solve problems related to safety situations, review education related to hospitalization, complete caregiver training     Speech Therapy Problems (Active)       Problem: Comprehension STGs       Dates: Start:  09/24/24         Goal: STG-Within one week, patient will point to pictures in FO3 with 80% accuracy.       Dates: Start:  09/24/24               Problem: Expression STGs       Dates: Start:  09/24/24         Goal: STG-Within one week, patient will state antonym given verbal prompt in 7/10 trials       Dates: Start:  09/24/24               Problem: Memory STGs       Dates: Start:  09/21/24         Goal: STG-Within one week, patient will       Dates: Start:  09/21/24       Description: 1) Individualized goal:  be oriented to day, month, year, and situation w/ 90% acc and MIN A.   2) Interventions:  SLP Self Care / ADL Training , SLP Cognitive Skill Development, and SLP Group Treatment          Goal Note filed on 09/24/24 4405 by Blanca Altamirano MS,CCC-SLP       Will continue to target, emphasis of ST will likely be focused on receptive and expressive language.                 Problem: Speech/Swallowing LTGs       Dates: Start:  09/21/24         Goal: LTG-By discharge, patient will solve basic problems       Dates: Start:  09/21/24       Description: 1) Individualized goal:  related to health and safety with 80% acc and MOD I for a safe D/C to PLOF.   2) Interventions:  SLP Self Care / ADL Training , SLP Cognitive Skill Development, and SLP Group Treatment

## 2024-09-27 NOTE — THERAPY
Physical Therapy   Daily Treatment     Patient Name: Joe Templeton  Age:  74 y.o., Sex:  male  Medical Record #: [id number]  Today's Date: 9/27/2024     Precautions  Precautions: Fall Risk  Comments: left knee pain    Subjective    [facility] agreeable to session.     Objective       09/27/24 1431   PT Charge Group   PT Gait Training (Units) 2   PT Therapeutic Exercise (Units) 1   PT Therapeutic Activities (Units) 1   Supervising Physical Therapist Herb Cronin   PT Total Time Spent   PT Individual Total Time Spent (Mins) 60   Cognition    Level of Consciousness Alert   Gait Functional Level of Assist    Gait Level Of Assist Contact Guard Assist   Assistive Device Front Wheel Walker   Distance (Feet) 120   # of Times Distance was Traveled 2   Deviation Ataxic;Step To;Bradykinetic;Decreased Heel Strike   Transfer Functional Level of Assist   Bed, Chair, Wheelchair Transfer Contact Guard Assist   Bed Chair Wheelchair Transfer Description Adaptive equipment;Increased time;Initial preparation for task;Set-up of equipment;Supervision for safety;Verbal cueing  (SPT w/ FWW)   Sitting Lower Body Exercises   Nustep Resistance Level 1  (15 mins BLE+BUE for general ROM and endurance and multitasking skills)   Bed Mobility    Sit to Stand Contact Guard Assist   Scooting Standby Assist   Interdisciplinary Plan of Care Collaboration   Patient Position at End of Therapy Seated  (by nursing station)     Multitasking skills during nustep while asking pt's birthday, birth year, siblings, current president. Pt required extra time and max cues.  Left knee joint grade III into extension 1 min x2 + passive stretch for L HS and gastroc. Assessed bilateral ankle ROM    Assessment    Pt tolerated session well during mobility however remains pleasantly confused. Bilateral ankle ROM and knee are limited.      Strengths: Independent prior level of function, Pleasant and cooperative, Willingly participates in  therapeutic activities  Barriers: Decreased endurance, Difficulty following instructions, Generalized weakness, Impaired activity tolerance, Impaired balance, Impaired carryover of learning, Impaired insight/denial of deficits, Impaired functional cognition, Impulsive    Plan    Manual therapy left knee  Transfer sequencing  Ambulation with FWW  LE strengthening     DME  PT DME Recommendations  Assistive Device: (P) Front Wheeled Walker     Passport items to be completed:  Get in/out of bed safely, in/out of a vehicle, safely use mobility device, walk or wheel around home/community, navigate up and down stairs, show how to get up/down from the ground, ensure home is accessible, demonstrate HEP, complete caregiver training    Physical Therapy Problems (Active)       Problem: Balance       Dates: Start:  09/21/24            Problem: Mobility       Dates: Start:  09/21/24         Goal: STG-Within one week, patient will ambulate 150ft with use of LRAD with CGA        Dates: Start:  09/21/24               Problem: Mobility Transfers       Dates: Start:  09/21/24         Goal: STG-Within one week, patient will transfer bed to chair with CGA and min VC with use of LRAD        Dates: Start:  09/21/24            Goal: STG-Within two weeks, patient will transfer in/out of car requiring min VC with proper technique and use of LRAD safely       Dates: Start:  09/21/24               Problem: PT-Long Term Goals       Dates: Start:  09/21/24         Goal: LTG-By discharge, patient will improve dynamic balance from fair+ to good to improve ability to perform ADLs safely with use of LRAD       Dates: Start:  09/21/24            Goal: LTG-By discharge, patient will ambulate 300ft with use of LRAD and supervision assistance.       Dates: Start:  09/21/24            Goal: LTG-By discharge, patient will ambulate up/down 12 stairs with use of single HR and CGA.       Dates: Start:  09/21/24

## 2024-09-27 NOTE — PROGRESS NOTES
"  Physical Medicine & Rehabilitation Progress Note    Encounter Date: 9/27/2024    Chief Complaint: Does not feel well because he is in the net bed.    Interval Events (Subjective):  Vital signs: Blood pressure routinely elevated in the 140s to 150s, this morning was 130/70.  Bowel movement 9/26  Voiding volitionally    Patient seen during therapy this afternoon.  Has no complaints of pain currently.  Has been tolerating his diet.  No new complaints today.    ROS: 14 point ROS negative unless otherwise specified in the HPI    Objective:  VITAL SIGNS: /70   Pulse 88   Temp 36.6 °C (97.8 °F) (Oral)   Resp 18   Ht 1.753 m (5' 9\")   Wt 77.1 kg (170 lb)   SpO2 94%   BMI 25.10 kg/m²     GEN: No apparent distress  HEENT: Head normocephalic, atraumatic.  Sclera nonicteric bilaterally, no ocular discharge appreciated bilaterally.  CV: Extremities warm and well-perfused, no peripheral edema appreciated bilaterally.  PULMONARY: Breathing nonlabored on room air, no respiratory accessory muscle use.  Not requiring supplemental oxygen.  SKIN:   9/25    PSYCH: Mood and affect within normal limits.  NEURO: Awake alert.  Pleasantly confused. Disoriented to date, day, month, year. Knows we are in [facility].       Laboratory Values:  Recent Results (from the past 72 hour(s))   CBC WITH DIFFERENTIAL    Collection Time: 09/26/24  5:40 AM   Result Value Ref Range    WBC 11.1 (H) 4.8 - 10.8 K/uL    RBC 4.15 (L) 4.70 - 6.10 M/uL    Hemoglobin 12.8 (L) 14.0 - 18.0 g/dL    Hematocrit 38.5 (L) 42.0 - 52.0 %    MCV 92.8 81.4 - 97.8 fL    MCH 30.8 27.0 - 33.0 pg    MCHC 33.2 32.3 - 36.5 g/dL    RDW 40.3 35.9 - 50.0 fL    Platelet Count 217 164 - 446 K/uL    MPV 12.3 9.0 - 12.9 fL    Neutrophils-Polys 73.60 (H) 44.00 - 72.00 %    Lymphocytes 14.70 (L) 22.00 - 41.00 %    Monocytes 8.50 0.00 - 13.40 %    Eosinophils 2.40 0.00 - 6.90 %    Basophils 0.40 0.00 - 1.80 %    Immature Granulocytes 0.40 0.00 - 0.90 %    Nucleated RBC 0.00 0.00 - " 0.20 /100 WBC    Neutrophils (Absolute) 8.19 (H) 1.82 - 7.42 K/uL    Lymphs (Absolute) 1.64 1.00 - 4.80 K/uL    Monos (Absolute) 0.95 (H) 0.00 - 0.85 K/uL    Eos (Absolute) 0.27 0.00 - 0.51 K/uL    Baso (Absolute) 0.05 0.00 - 0.12 K/uL    Immature Granulocytes (abs) 0.04 0.00 - 0.11 K/uL    NRBC (Absolute) 0.00 K/uL   Basic Metabolic Panel    Collection Time: 09/26/24  5:40 AM   Result Value Ref Range    Sodium 142 135 - 145 mmol/L    Potassium 4.0 3.6 - 5.5 mmol/L    Chloride 109 96 - 112 mmol/L    Co2 21 20 - 33 mmol/L    Glucose 126 (H) 65 - 99 mg/dL    Bun 19 8 - 22 mg/dL    Creatinine 0.80 0.50 - 1.40 mg/dL    Calcium 9.1 8.5 - 10.5 mg/dL    Anion Gap 12.0 7.0 - 16.0   VANCOMYCIN TROUGH    Collection Time: 09/26/24  5:40 AM   Result Value Ref Range    Vancomycin Trough 14.2 10.0 - 20.0 ug/mL   ESTIMATED GFR    Collection Time: 09/26/24  5:40 AM   Result Value Ref Range    GFR (CKD-EPI) 93 >60 mL/min/1.73 m 2   VANCOMYCIN TROUGH    Collection Time: 09/27/24  6:04 AM   Result Value Ref Range    Vancomycin Trough 12.8 10.0 - 20.0 ug/mL       Medications:  Scheduled Medications   Medication Dose Frequency    [START ON 9/28/2024] vancomycin  1,500 mg Q24HR    metoprolol SR  75 mg DAILY    senna-docusate  2 Tablet BID    And    polyethylene glycol/lytes  1 Packet BID    hydrALAZINE  100 mg Q8HRS    vitamin D3  1,000 Units DAILY    Pharmacy Consult Request  1 Each PHARMACY TO DOSE    lidocaine  1-2 Patch Daily-0800    omeprazole  20 mg DAILY    amLODIPine  10 mg Q DAY    enoxaparin (LOVENOX) injection  40 mg DAILY AT 1800    lisinopril  40 mg DAILY    MD Alert...Vancomycin per Pharmacy   PHARMACY TO DOSE    aspirin  81 mg DAILY    atorvastatin  80 mg Q EVENING    QUEtiapine  50 mg Nightly     PRN medications: hydrALAZINE, lactulose, docusate sodium, bisacodyl EC, magnesium hydroxide, sodium phosphate, carboxymethylcellulose, benzocaine-menthol, mag hydrox-al hydrox-simeth, ondansetron **OR** ondansetron, traZODone,  sodium chloride, acetaminophen, ziprasidone, traMADol, formulation r    Diet:  Current Diet Order   Procedures    Diet Order Diet: Level 6 - Soft and Bite Sized (Set pt up w/ meal- he can then self-feed independently. Pills whole.); Liquid level: Level 0 - Thin; Tray Modifications (optional): SLP - Deliver to Nursing Station       Medical Decision Making and Plan:  Multifocal embolic appearing infarcts: Right insula, left parietal lobe, right parietal lobe, left hao  MRI with multiple old infarcts  Dysphagia  Encephalopathy  PT and OT for mobility and ADLs. Per guidelines, 15 hours per week between PT, OT and/or SLP.  Follow-up stroke Bridge clinic  Secondary stroke prophylaxis: Aspirin and statin   Continue Seroquel at night  9/27 added seroquel 25mg TID prn for mild agitation. He already has IM ziprasidone prn.      I certify that the patient currently requires non-pharmacologic restraints. Non-pharmacologic restraints are required to reduce the potential for the patient to harm themselves or others, to limit violent behaviors, and to allow proper assessment and care. I have completed a face to face assessment of this patient on 9/27/2024. Alternative methods including but not limited to de-escalation techniques, redirection, and pharmacologic treatment have been attempted but patient currently remains at risk without restraints. Our staff has been trained on restraints and patient is currently placed in video monitored room.  The need for these restraints is assessed by a rehabilitation physician every 24 hours and use of restraints is minimized when appropriate.  Current diagnosis which requires restraints: Encephalopathy. Current restraints required: Enclosure bed, bilateral mitts to prevent pulling out midline which is needed for IV antibiotics.       Septic arthritis left ankle s/p left ankle arthrotomy and I&D 9/14/2024 Dr. Gautam -weightbearing as tolerated left lower extremity.  ID followed.  Continue  vancomycin twice daily through 10/11.  Potentially can switch to Zyvox.   patient pulled out midline will need new access.   pulled out midline again, admits an attempt to help prevent him pulling it out.   has midline.  Continue IV vancomycin per ID.  If absolutely necessary at discharge can switch to linezolid 600 mg twice a day with stop date of 10/11.  If goes to skilled nursing facility can continue IV vancomycin.  Needs follow-up with ID.     Left knee pain -check knee x-ray.   negative.     Chronic systolic heart failure, not in acute exacerbation - preserved ejection fraction-LVEF 60%     CAD s/p CABG -continue aspirin and statin     Hypertension -amlodipine, hydralazine, lisinopril, metoprolol.   uncontrolled.  Hospitalist managing.     Azotemia - improving, monitor.   resolved.   resolved.    Hypokalemia - mildly low at 3.5.  Hospitalist following.     Leukocytosis - stable in the     Anemia -mild at 12.     Pain -Tylenol and oxycodone     Bowel -patient unsure when last BM was.  Check KUB.  Moderate stool burden.  Had small bowel movement .  Increase bowel meds .  Give MoM x1. Had bowel movement        Upcoming Labs/imagin/30     DVT PROPHYLAXIS: Lovenox 40mg SQ nightly      HOSPITALIST FOLLOWING: Yes - d/w hospitalist      CODE STATUS: FULL CODE     DISPO: Home with family      HEBER: 10/4     ADDITIONAL MEDICAL NEEDS ON D/C (IV abx, O2, etc): IV antibiotics      MEDS SENT TO: [facility]     DISCHARGE SPECIALIST FOLLOW UP: ID, Ortho, Stroke Bridge     Patient to scheduled follow up with their PCP within 2 weeks from discharge from the [facility].   ____________________________________    Tre Diehl D.O.  Physical Medicine & Rehabilitation    ____________________________________  Total time: 40 minutes.

## 2024-09-27 NOTE — THERAPY
Occupational Therapy  Daily Treatment     Patient Name: Joe Templeton  Age:  74 y.o., Sex:  male  Medical Record #: [id number]  Today's Date: 9/27/2024     Precautions  Precautions: (P) Fall Risk  Comments: left knee pain         Subjective    Hand off from SLP for OT session. Pt pleasant and agreeable to OT. Hand off at end of session to CNA in dining room, seatbelt alarm on.      Objective       09/27/24 1031   OT Charge Group   OT Self Care / ADL (Units) 2   OT Cognitive Skill Development First 15 Minutes (Units) 1   OT Therapeutic Exercise (Units) 1   OT Total Time Spent   OT Individual Total Time Spent (Mins) 60   Precautions   Precautions Fall Risk   Pain 0 - 10 Group   Location Foot;Knee   Location Orientation Left   Comfort Goal Comfort with Movement   Therapist Pain Assessment Prior to Activity;During Activity  (Knee pain greater than ankle pain, unable to describe, slow movement helped)   Functional Level of Assist   Grooming Standby Assist;Seated   Grooming Description Supervision for safety;Verbal cueing;Set-up of equipment;Increased time  (Oral care, face wash, hand hygiene seated at sink)   Lower Body Dressing Standby Assist   Lower Body Dressing Description Verbal cueing;Supervision for safety;Set-up of equipment  (don/doff socks)   Toileting Contact Guard Assist   Toileting Description Verbal cueing;Supervision for safety;Increased time  (Void sitting, verbal cues for sequencing)   Toilet Transfers Contact Guard Assist   Toilet Transfer Description Verbal cueing;Supervision for safety;Set-up of equipment   Sitting Upper Body Exercises   Bicep Curls 3 sets of 10;Bilateral  (6#)   Sitting Lower Body Exercises   Comments Knee flexion seated at mat to improve L knee pain/stiffness - 3x10 B   Interdisciplinary Plan of Care Collaboration   IDT Collaboration with  Speech Therapist;Nursing;Certified Nursing Assistant   Patient Position at End of Therapy Seated;Chair Alarm On   Collaboration Comments Hand  off from SLP to OT, notified RN of bloody nose and void/bowel movement, hand off to CNA in dining room in w/c with alarm on     Pt participated in cognitive skill development sitting edge of mat. Pt was instructed in both 1 and 2-step directions following. Pt participated in 3x10 UE coordination and tracking activity - pt finger to therapist finger on B UE. Pt able to perform with mod-max verbal cues.     Assessment    Pt is still pleasantly confused but willing to work in OT session today. Pt improved smooth coordination and movement of UE. Pt also improved sequencing and problem-solving skills during ADL task. Pt initially used toothpaste cap to put on toothbrush, and self-corrected with 0 verbal cues to correctly put on toothpaste tub. Pt also improved LB dressing (don/doff socks) from modA to SBA. RN notified of pt's bloody nose and bladder/bowel movement.  Strengths: Independent prior level of function  Barriers: Decreased endurance, Confused, Impaired activity tolerance, Impulsive, Limited mobility, Impaired insight/denial of deficits, Impaired functional cognition (Encouragement for pt to participate in ADL's 7:00 a.m.)    Plan    Pt would benefit from skilled OT services to address: safety during ADLs, functional cognition, strengthening/endurance, standing balance/tolerance and functional mobility     DME  OT DME Recommendations  Bathroom Equipment:  (TBD)    Passport items to be completed:  Perform bathroom transfers, complete dressing, complete feeding, get ready for the day, prepare a simple meal, participate in household tasks, adapt home for safety needs, demonstrate home exercise program, complete caregiver training     Occupational Therapy Goals (Active)       Problem: Bathing       Dates: Start:  09/21/24         Goal: STG-Within one week, patient will bathe with Min A using Adaptive equipment as needed.       Dates: Start:  09/21/24               Problem: Dressing       Dates: Start:  09/21/24          Goal: STG-Within one week, patient will dress LB with Min A using Adaptive equipment as needed.       Dates: Start:  09/21/24               Problem: Functional Cognition       Dates: Start:  09/21/24         Goal: STG-Within one week, patient will be oriented to year,month, and place.       Dates: Start:  09/21/24               Problem: Functional Transfers       Dates: Start:  09/21/24         Goal: STG-Within one week, patient will transfer to toilet with Contact Guard Assistance using DME as needed.       Dates: Start:  09/21/24            Goal: STG-Within one week, patient will transfer to step in shower with Min A using DME as needed.       Dates: Start:  09/21/24               Problem: OT Long Term Goals       Dates: Start:  09/21/24         Goal: LTG-By discharge, patient will complete basic self care tasks at Mod Independent with UB ADL's and Supervision with LB ADL's.       Dates: Start:  09/21/24            Goal: LTG-By discharge, patient will perform bathroom transfers with Supervision.       Dates: Start:  09/21/24               Problem: Toileting       Dates: Start:  09/21/24         Goal: STG-Within one week, patient will complete toileting tasks with Min A.       Dates: Start:  09/21/24

## 2024-09-27 NOTE — PROGRESS NOTES
"Pharmacy Vancomycin Kinetics Note for 2024     74 y.o. male on Vancomycin day # 14     Vancomycin Indication (AUC Dosing): Skin/skin structure infection (Septic arthritis)    Provider specified end date: 10/04/24    Active Antibiotics (From admission, onward)      Ordered     Ordering Provider       Fri Sep 27, 2024  9:36 AM    24 0936  vancomycin (Vancocin) 1,500 mg in  mL IVPB  (vancomycin (VANCOCIN) IV (LD + Maintenance))  EVERY 24 HOURS         Tre Diehl D.O.       Fri Sep 20, 2024  2:36 PM    24 1436  MD Alert...Vancomycin per Pharmacy  (MD Alert...Vancomycin per Pharmacy)  PHARMACY TO DOSE        Question:  Indication(s) for vancomycin?  Answer:  Skin and soft tissue infection    Khushbu Meza D.O.            Dosing Weight: 78 kg (171 lb 15.3 oz)      Admission History: Admitted on 2024 for H/O: stroke [Z86.73]  Pertinent history: Pt is admitted to Rehab, continuing vancomycin for L ankle septic arthritis. He is being seen by RID. Cultures are still [place] [facility] [place] but since the pt was improving on vancomycin, he will be continued on this. Per the last ID note, pt will be discharging 10/4, and will continue treatment with 1 week of linezolid after vancomycin.    Allergies:     Patient has no known allergies.     Pertinent cultures to date:     Results       ** No results found for the last 336 hours. **            Labs:     Estimated Creatinine Clearance: 82.2 mL/min (by C-G formula based on SCr of 0.8 mg/dL).  Recent Labs     24  0540   WBC 11.1*   NEUTSPOLYS 73.60*     Recent Labs     24  0540   BUN 19   CREATININE 0.80       Intake/Output Summary (Last 24 hours) at 2024 0937  Last data filed at 2024 0619  Gross per 24 hour   Intake 840 ml   Output --   Net 840 ml      BP (!) 153/59   Pulse 80   Temp 36.6 °C (97.9 °F) (Oral)   Resp 20   Ht 1.753 m (5' 9\")   Wt 77.1 kg (170 lb)   SpO2 92%  Temp (24hrs), Av.4 °C (97.6 °F), Min:36.3 °C " (97.3 °F), Max:36.6 °C (97.9 °F)      List concerns for Vancomycin clearance:     Age    Pharmacokinetics:       Trough kinetics:   Recent Labs     09/27/24  0604   VANCOTROUGH 12.8       A/P:     -  Vancomycin dose: Change to 1500 mg IV q24h (first dose of this on 9/28 at 0600)    -  Next vancomycin level(s): none needed if pt status doesn't change before discharge    - Calculated AUC (from trough): 538    -  Comments: Vanco trough still higher than expected - AUC >600 still. Given previous peak/trough at [facility], will change dose/frequency to 1500 mg IV q24h which predicts an AUC of 538. Should not need additional levels before planned discharge on 10/4    Lilia Fitzpatrick, PharmD, BCPS

## 2024-09-27 NOTE — DISCHARGE PLANNING
CM//Discharge :    The following has been ordered:   Home health:            [facility]          Disciplines ordered: RN, PT, SLP, OT, , CNA  Status: Sent

## 2024-09-27 NOTE — DISCHARGE PLANNING
CM//Discharge :      DME:      [facility]          Following equipment has been ordered: [facility]  Status:Sent

## 2024-09-27 NOTE — PROGRESS NOTES
NURSING DAILY NOTE    Name: Joe Templeton   Date of Admission: 9/20/2024   Admitting Diagnosis: No Principal Problem: There is no principal problem currently on the Problem List. Please update the Problem List and refresh.  Attending Physician: ELIZABETH HOLM D.O.  Allergies: Patient has no known allergies.    Safety  Patient Assist  Mod assist  Patient Precautions  Fall Risk  Precaution Comments  left knee pain  Bed Transfer Status  Contact Guard Assist  Toilet Transfer Status   Contact Guard Assist  Assistive Devices  Rails, Wheelchair  Oxygen  None - Room Air  Diet/Therapeutic Dining  Current Diet Order   Procedures    Diet Order Diet: Level 6 - Soft and Bite Sized (Set pt up w/ meal- he can then self-feed independently. Pills whole.); Liquid level: Level 0 - Thin; Tray Modifications (optional): SLP - Deliver to Nursing Station     Pill Administration  whole  Agitated Behavioral Scale  22  ABS Level of Severity  Mild Agitation    Fall Risk  Has the patient had a fall this admission?   No  Nikole Cesar Fall Risk Scoring  23, HIGH RISK  Fall Risk Safety Measures  bed alarm, chair alarm, posey bed , poor balance, and low vision/ hearing    Vitals  Temperature: 36.3 °C (97.3 °F)  Temp src: Oral  Pulse: 83  Respiration: 18  Blood Pressure : (!) 157/75  Blood Pressure MAP (Calculated): 102 MM HG  BP Location: Right, Upper Arm  Patient BP Position: Sitting     Oxygen  Pulse Oximetry: 92 %  O2 (LPM): 0  O2 Delivery Device: None - Room Air    Bowel and Bladder  Last Bowel Movement  09/26/24  Stool Type  Type 4: Like a sausage or snake, smooth and soft  Bowel Device  Bathroom  Continent  Bladder: Did not void   Bowel: No movement  Bladder Function  Urine Void (mL):  (Large)  Number of Times Voided: 1  Urinary Options: Yes  Urine Color: Unable To Evaluate  Straight Catheter: 550 ml  Genitourinary Assessment   Bladder Assessment (WDL):  Within Defined  Limits  Silva Catheter: Not Applicable  Urine Color: Unable To Evaluate  Bladder Device: Bathroom  Time Void: Yes  Bladder Scan: Post Void  $ Bladder Scan Results (mL): 123    Skin  Colt Score   17  Sensory Interventions   Bed Types: Other (Comments) ([facility] bed)  Skin Preventative Measures: Pillows in Use to Float Heels, Pillows in Use for Support / Positioning  Moisture Interventions  Moisturizers/Barriers: Barrier Wipes      Pain  Pain Rating Scale  0 - No Pain  Pain Location  Knee  Pain Location Orientation  Left  Pain Interventions   Declines    ADLs    Bathing   * * With Assistance from, Staff  Linen Change      Personal Hygiene  Change Dariana Pads, Moist Dariana Wipes, Perineal Care  Chlorhexidine Bath      Oral Care     Teeth/Dentures     Shave     Nutrition Percentage Eaten  Dinner, Between 50-75% Consumed  Environmental Precautions  Treaded Slipper Socks on Patient, Bed in Low Position  Patient Turns/Positioning  Patient Turns Self from Side to Side  Patient Turns Assistance/Tolerance  Assistance of One  Bed Positions  Bed Controls On, Bed Locked  Head of Bed Elevated  Self regulated      Psychosocial/Neurologic Assessment  Psychosocial Assessment  Psychosocial (WDL):  WDL Except  Patient Behaviors: Confused, Forgetful  Neurologic Assessment  Neuro (WDL): Exceptions to WDL  Level of Consciousness: Alert  Orientation Level: Disoriented to situation, Disoriented to time, Disoriented to place  Cognition: Follows commands, Impulsive, Poor safety awareness, Poor judgement, Memory Loss  Speech: Clear  Pupil Assesment: No  Muscle Strength Right Arm: Good Strength Against Gravity and Moderate Resistance  Muscle Strength Left Arm: Good Strength Against Gravity and Moderate Resistance  Muscle Strength Right Leg: Fair Strength against Gravity but No Resistance  LLE Motor Response: Responds to commands  Muscle Strength Left Leg: Weak Movement but Not Against Gravity or Resistance  EENT (WDL):  WDL  Except    Cardio/Pulmonary Assessment  Edema   LLE Edema: 2+  Respiratory Breath Sounds  RUL Breath Sounds: Clear  RML Breath Sounds: Clear  RLL Breath Sounds: Diminished  YONI Breath Sounds: Clear  LLL Breath Sounds: Diminished  Cardiac Assessment   Cardiac (WDL):  WDL Except (CABG, NSTEMI, HTN)

## 2024-09-27 NOTE — CARE PLAN
"The patient is Watcher - Medium risk of patient condition declining or worsening    Shift Goals  Clinical Goals: safety  Patient Goals: safety    Progress made toward(s) clinical / shift goals:      Problem: Pain - Standard  Goal: Alleviation of pain or a reduction in pain to the patient’s comfort goal  Outcome: Progressing  Note: Tramadol 1/2 tab given PRN per MAR for c/o left ankle pain with adequate relief. Pt sleeps good. Will continue to monitor.     Problem: Fall Risk - Rehab  Goal: Patient will remain free from falls  Outcome: Progressing  Note: Nikole Cesar Fall risk Assessment Score: 23    High fall risk Interventions   - Alarming seatbelt  - Bed and strip alarm   - Yellow sign by the door   - Yellow wrist band \"Fall risk\"  - Room near to the nurse station  - Do not leave patient unattended in the bathroom  - Fall risk education provided    Pt remains in soma bed for safety. Hourly rounding and room closed to nurses station for more closed monitoring. Will continue to monitor.        Patient is not progressing towards the following goals:      "

## 2024-09-27 NOTE — PROGRESS NOTES
Hospital Medicine Daily Progress Note        Chief Complaint:  Hypertension  Azotemia    Interval History:  No overnight events.    Review of Systems  Review of Systems   Constitutional:  Negative for chills and fever.   HENT: Negative.     Eyes: Negative.    Respiratory:  Negative for cough and shortness of breath.    Cardiovascular:  Negative for chest pain and palpitations.   Gastrointestinal:  Negative for abdominal pain, nausea and vomiting.   Musculoskeletal:  Positive for joint pain.        Wound pain   Skin:  Negative for itching and rash.   Endo/Heme/Allergies:  Negative for polydipsia. Does not bruise/bleed easily.        Physical Exam  Temp:  [36.6 °C (97.8 °F)-36.6 °C (97.9 °F)] 36.6 °C (97.8 °F)  Pulse:  [80-88] 88  Resp:  [18-20] 18  BP: (130-157)/(59-75) 130/70  SpO2:  [92 %-94 %] 94 %    Physical Exam  Vitals reviewed.   Constitutional:       General: He is not in acute distress.     Appearance: Normal appearance. He is not ill-appearing.   HENT:      Head: Normocephalic and atraumatic.      Right Ear: External ear normal.      Left Ear: External ear normal.      Nose: Nose normal.      Mouth/Throat:      Pharynx: Oropharynx is clear.   Eyes:      General:         Right eye: No discharge.         Left eye: No discharge.      Extraocular Movements: Extraocular movements intact.      Pupils: Pupils are equal, round, and reactive to light.   Cardiovascular:      Rate and Rhythm: Normal rate and regular rhythm.   Pulmonary:      Effort: No respiratory distress.      Breath sounds: No wheezing.      Comments: Decreased BS  Abdominal:      General: Bowel sounds are normal. There is no distension.      Palpations: Abdomen is soft.      Tenderness: There is no abdominal tenderness.   Musculoskeletal:      Cervical back: Normal range of motion and neck supple.      Right lower leg: No edema.      Left lower leg: No edema.   Skin:     General: Skin is warm and dry.   Neurological:      Mental Status: He is  alert.      Comments: Awake and alert         Fluids    Intake/Output Summary (Last 24 hours) at 9/27/2024 1203  Last data filed at 9/27/2024 0619  Gross per 24 hour   Intake 840 ml   Output --   Net 840 ml        Laboratory  Recent Labs     09/26/24  0540   WBC 11.1*   RBC 4.15*   HEMOGLOBIN 12.8*   HEMATOCRIT 38.5*   MCV 92.8   MCH 30.8   MCHC 33.2   RDW 40.3   PLATELETCT 217   MPV 12.3     Recent Labs     09/26/24  0540   SODIUM 142   POTASSIUM 4.0   CHLORIDE 109   CO2 21   GLUCOSE 126*   BUN 19   CREATININE 0.80   CALCIUM 9.1                   Assessment/Plan  Vitamin D deficiency  Assessment & Plan  Vit D level 18  On supplementation    Stroke (HCC)- (present on admission)  Assessment & Plan  On ASA and Lipitor  Ongoing management per Physiatry    CAD (coronary artery disease)- (present on admission)  Assessment & Plan  H/O CABG  On ASA, Lipitor, Lisinopril, and Toprol    Septic arthritis of ankle (HCC)- (present on admission)  Assessment & Plan  S/P arthrotomy I+D on 9/14/24 by Dr. Gautam  Wound care and pain control per Physiary  On Vanco through 10/12/24 per ID    Essential (primary) hypertension- (present on admission)  Assessment & Plan  On Norvasc, Lisinopril, Toprol, and Hydralazine  Observe blood pressure trends on increased Toprol    Full Code    Patient seen/examined and chart reviewed; ROS and Assessment/Plan updated.  In review of yesterday's note, there are no changes except as documented above.

## 2024-09-28 LAB
BACTERIA SPEC ANAEROBE CULT: NORMAL
SIGNIFICANT IND 70042: NORMAL
SITE SITE: NORMAL
SOURCE SOURCE: NORMAL

## 2024-09-28 PROCEDURE — 700111 HCHG RX REV CODE 636 W/ 250 OVERRIDE (IP): Performed by: STUDENT IN AN ORGANIZED HEALTH CARE EDUCATION/TRAINING PROGRAM

## 2024-09-28 PROCEDURE — 700102 HCHG RX REV CODE 250 W/ 637 OVERRIDE(OP): Performed by: STUDENT IN AN ORGANIZED HEALTH CARE EDUCATION/TRAINING PROGRAM

## 2024-09-28 PROCEDURE — A9270 NON-COVERED ITEM OR SERVICE: HCPCS | Performed by: HOSPITALIST

## 2024-09-28 PROCEDURE — 700102 HCHG RX REV CODE 250 W/ 637 OVERRIDE(OP): Performed by: HOSPITALIST

## 2024-09-28 PROCEDURE — 99232 SBSQ HOSP IP/OBS MODERATE 35: CPT | Performed by: HOSPITALIST

## 2024-09-28 PROCEDURE — A9270 NON-COVERED ITEM OR SERVICE: HCPCS | Performed by: STUDENT IN AN ORGANIZED HEALTH CARE EDUCATION/TRAINING PROGRAM

## 2024-09-28 PROCEDURE — 700105 HCHG RX REV CODE 258: Performed by: STUDENT IN AN ORGANIZED HEALTH CARE EDUCATION/TRAINING PROGRAM

## 2024-09-28 PROCEDURE — A9270 NON-COVERED ITEM OR SERVICE: HCPCS | Performed by: PHYSICAL MEDICINE & REHABILITATION

## 2024-09-28 PROCEDURE — 700111 HCHG RX REV CODE 636 W/ 250 OVERRIDE (IP): Mod: JZ | Performed by: PHYSICAL MEDICINE & REHABILITATION

## 2024-09-28 PROCEDURE — 770005 HCHG ROOM/CARE - REHAB PRIVATE (11*

## 2024-09-28 PROCEDURE — 700102 HCHG RX REV CODE 250 W/ 637 OVERRIDE(OP): Performed by: PHYSICAL MEDICINE & REHABILITATION

## 2024-09-28 PROCEDURE — 700101 HCHG RX REV CODE 250: Performed by: PHYSICAL MEDICINE & REHABILITATION

## 2024-09-28 PROCEDURE — 99231 SBSQ HOSP IP/OBS SF/LOW 25: CPT | Performed by: STUDENT IN AN ORGANIZED HEALTH CARE EDUCATION/TRAINING PROGRAM

## 2024-09-28 RX ORDER — METOPROLOL SUCCINATE 100 MG/1
100 TABLET, EXTENDED RELEASE ORAL DAILY
Status: DISCONTINUED | OUTPATIENT
Start: 2024-09-29 | End: 2024-10-07 | Stop reason: HOSPADM

## 2024-09-28 RX ADMIN — TRAZODONE HYDROCHLORIDE 50 MG: 50 TABLET ORAL at 21:39

## 2024-09-28 RX ADMIN — SENNOSIDES AND DOCUSATE SODIUM 2 TABLET: 50; 8.6 TABLET ORAL at 20:53

## 2024-09-28 RX ADMIN — AMLODIPINE BESYLATE 10 MG: 5 TABLET ORAL at 05:10

## 2024-09-28 RX ADMIN — METOPROLOL SUCCINATE 75 MG: 25 TABLET, EXTENDED RELEASE ORAL at 05:10

## 2024-09-28 RX ADMIN — LIDOCAINE 2 PATCH: 4 PATCH TOPICAL at 08:48

## 2024-09-28 RX ADMIN — HYDRALAZINE HYDROCHLORIDE 100 MG: 50 TABLET ORAL at 14:00

## 2024-09-28 RX ADMIN — ASPIRIN 81 MG: 81 TABLET, CHEWABLE ORAL at 08:47

## 2024-09-28 RX ADMIN — Medication 1000 UNITS: at 08:47

## 2024-09-28 RX ADMIN — VANCOMYCIN HYDROCHLORIDE 1500 MG: 5 INJECTION, POWDER, LYOPHILIZED, FOR SOLUTION INTRAVENOUS at 05:16

## 2024-09-28 RX ADMIN — QUETIAPINE FUMARATE 50 MG: 25 TABLET ORAL at 21:38

## 2024-09-28 RX ADMIN — HYDRALAZINE HYDROCHLORIDE 100 MG: 50 TABLET ORAL at 21:37

## 2024-09-28 RX ADMIN — OMEPRAZOLE 20 MG: 20 CAPSULE, DELAYED RELEASE ORAL at 08:47

## 2024-09-28 RX ADMIN — LISINOPRIL 40 MG: 20 TABLET ORAL at 08:48

## 2024-09-28 RX ADMIN — ENOXAPARIN SODIUM 40 MG: 100 INJECTION SUBCUTANEOUS at 16:49

## 2024-09-28 RX ADMIN — SENNOSIDES AND DOCUSATE SODIUM 2 TABLET: 50; 8.6 TABLET ORAL at 08:48

## 2024-09-28 RX ADMIN — HYDRALAZINE HYDROCHLORIDE 100 MG: 50 TABLET ORAL at 05:27

## 2024-09-28 RX ADMIN — ATORVASTATIN CALCIUM 80 MG: 40 TABLET, FILM COATED ORAL at 20:53

## 2024-09-28 RX ADMIN — POLYETHYLENE GLYCOL 3350 1 PACKET: 17 POWDER, FOR SOLUTION ORAL at 08:53

## 2024-09-28 RX ADMIN — QUETIAPINE FUMARATE 25 MG: 25 TABLET ORAL at 00:36

## 2024-09-28 ASSESSMENT — ENCOUNTER SYMPTOMS
COUGH: 0
NAUSEA: 0
FEVER: 0
SHORTNESS OF BREATH: 0
PALPITATIONS: 0
EYES NEGATIVE: 1
VOMITING: 0
POLYDIPSIA: 0
ABDOMINAL PAIN: 0
CHILLS: 0
BRUISES/BLEEDS EASILY: 0

## 2024-09-28 NOTE — CARE PLAN
"  Problem: Fall Risk - Rehab  Goal: Patient will remain free from falls  Outcome: Progressing  Note: Agustin Arsenio Fall risk Assessment Score: 23    High fall risk Interventions   - Alarming seatbelt  - Bed and strip alarm   - Yellow sign by the door   - Yellow wrist band \"Fall risk\"  - Room near to the nurse station  - Do not leave patient unattended in the bathroom  - Fall risk education provided    Problem: Safety - Medical Restraint  Goal: Remains free of injury from restraints (Restraint for Interference with Medical Device)  Outcome: Progressing  Note: Pt impulsive and confused, on SOMA bed, pt on seroquel schedule , with no effect , medicated with trazodone for sleep, and seroquel 25 mg at midnight . Cont monitored,      Problem: Pain - Standard  Goal: Alleviation of pain or a reduction in pain to the patient’s comfort goal  Outcome: Progressing  Note: Assessed for pain and discomfort , denies pain , needs attended.       The patient is Stable - Low risk of patient condition declining or worsening    Shift Goals  Clinical Goals: safety  Patient Goals: safety    Progress made toward(s) clinical / shift goals:  Pt free from fall and injury.    "

## 2024-09-28 NOTE — CARE PLAN
The patient is Stable - Low risk of patient condition declining or worsening    Problem: Knowledge Deficit - Standard  Goal: Patient and family/care givers will demonstrate understanding of plan of care, disease process/condition, diagnostic tests and medications  Outcome: Progressing. Reviewed POC, all questions answered.        Problem: Fall Risk - Rehab  Goal: Patient will remain free from falls  Outcome: Progressing. Call light within reach, pt educated to use for assistance for safe transferring.        Shift Goals  Clinical Goals: Safety  Patient Goals: Participate in therapy

## 2024-09-28 NOTE — CARE PLAN
Problem: Knowledge Deficit - Standard  Goal: Patient and family/care givers will demonstrate understanding of plan of care, disease process/condition, diagnostic tests and medications  Outcome: Progressing   Pt education given regarding plan of care with emphasis on adequate hydration, pt shows very poor understanding and poor follow through, will continue to reinforce education and continue to monitor.         Problem: Fall Risk - Rehab  Goal: Patient will remain free from falls  Outcome: Progressing   Pt education given regarding fall precautions AND safety measures, pt shows no understanding, has attempted to self transfer this shift, will continue to reinforce education and continue to monitor.

## 2024-09-28 NOTE — PROGRESS NOTES
"  Physical Medicine & Rehabilitation Progress Note    Encounter Date: 9/28/2024    Chief Complaint: Follow-up pain, sleep    Interval Events (Subjective):  VSS. Doing well this morning. Slept well last night. Denies any pain, shortness of breath, nausea.    Objective:  VITAL SIGNS: BP (!) 144/65   Pulse 98   Temp 36.8 °C (98.2 °F) (Oral)   Resp 18   Ht 1.753 m (5' 9\")   Wt 77.1 kg (170 lb)   SpO2 95%   BMI 25.10 kg/m²   GENERAL: No apparent distress  HEENT: Normocephalic/atraumatic  CARDIAC: Skin appears well-perfused  LUNGS: No increased work of breathing on room air  NEURO:  Mental status:  Alert, pleasantly confused    Laboratory Values:  Recent Results (from the past 72 hour(s))   CBC WITH DIFFERENTIAL    Collection Time: 09/26/24  5:40 AM   Result Value Ref Range    WBC 11.1 (H) 4.8 - 10.8 K/uL    RBC 4.15 (L) 4.70 - 6.10 M/uL    Hemoglobin 12.8 (L) 14.0 - 18.0 g/dL    Hematocrit 38.5 (L) 42.0 - 52.0 %    MCV 92.8 81.4 - 97.8 fL    MCH 30.8 27.0 - 33.0 pg    MCHC 33.2 32.3 - 36.5 g/dL    RDW 40.3 35.9 - 50.0 fL    Platelet Count 217 164 - 446 K/uL    MPV 12.3 9.0 - 12.9 fL    Neutrophils-Polys 73.60 (H) 44.00 - 72.00 %    Lymphocytes 14.70 (L) 22.00 - 41.00 %    Monocytes 8.50 0.00 - 13.40 %    Eosinophils 2.40 0.00 - 6.90 %    Basophils 0.40 0.00 - 1.80 %    Immature Granulocytes 0.40 0.00 - 0.90 %    Nucleated RBC 0.00 0.00 - 0.20 /100 WBC    Neutrophils (Absolute) 8.19 (H) 1.82 - 7.42 K/uL    Lymphs (Absolute) 1.64 1.00 - 4.80 K/uL    Monos (Absolute) 0.95 (H) 0.00 - 0.85 K/uL    Eos (Absolute) 0.27 0.00 - 0.51 K/uL    Baso (Absolute) 0.05 0.00 - 0.12 K/uL    Immature Granulocytes (abs) 0.04 0.00 - 0.11 K/uL    NRBC (Absolute) 0.00 K/uL   Basic Metabolic Panel    Collection Time: 09/26/24  5:40 AM   Result Value Ref Range    Sodium 142 135 - 145 mmol/L    Potassium 4.0 3.6 - 5.5 mmol/L    Chloride 109 96 - 112 mmol/L    Co2 21 20 - 33 mmol/L    Glucose 126 (H) 65 - 99 mg/dL    Bun 19 8 - 22 mg/dL    " Creatinine 0.80 0.50 - 1.40 mg/dL    Calcium 9.1 8.5 - 10.5 mg/dL    Anion Gap 12.0 7.0 - 16.0   VANCOMYCIN TROUGH    Collection Time: 09/26/24  5:40 AM   Result Value Ref Range    Vancomycin Trough 14.2 10.0 - 20.0 ug/mL   ESTIMATED GFR    Collection Time: 09/26/24  5:40 AM   Result Value Ref Range    GFR (CKD-EPI) 93 >60 mL/min/1.73 m 2   VANCOMYCIN TROUGH    Collection Time: 09/27/24  6:04 AM   Result Value Ref Range    Vancomycin Trough 12.8 10.0 - 20.0 ug/mL       Medications:  Scheduled Medications   Medication Dose Frequency    vancomycin  1,500 mg Q24HR    metoprolol SR  75 mg DAILY    senna-docusate  2 Tablet BID    And    polyethylene glycol/lytes  1 Packet BID    hydrALAZINE  100 mg Q8HRS    vitamin D3  1,000 Units DAILY    Pharmacy Consult Request  1 Each PHARMACY TO DOSE    lidocaine  1-2 Patch Daily-0800    omeprazole  20 mg DAILY    amLODIPine  10 mg Q DAY    enoxaparin (LOVENOX) injection  40 mg DAILY AT 1800    lisinopril  40 mg DAILY    MD Alert...Vancomycin per Pharmacy   PHARMACY TO DOSE    aspirin  81 mg DAILY    atorvastatin  80 mg Q EVENING    QUEtiapine  50 mg Nightly     PRN medications: QUEtiapine, hydrALAZINE, lactulose, docusate sodium, bisacodyl EC, magnesium hydroxide, sodium phosphate, carboxymethylcellulose, benzocaine-menthol, mag hydrox-al hydrox-simeth, ondansetron **OR** ondansetron, traZODone, sodium chloride, acetaminophen, ziprasidone, traMADol, formulation r    Diet:  Current Diet Order   Procedures    Diet Order Diet: Level 6 - Soft and Bite Sized (Set pt up w/ meal- he can then self-feed independently. Pills whole.); Liquid level: Level 0 - Thin; Tray Modifications (optional): SLP - Deliver to Nursing Station       Medical Decision Making and Plan:  Multifocal embolic appearing infarcts: Right insula, left parietal lobe, right parietal lobe, left hao  MRI with multiple old infarcts  Dysphagia  Encephalopathy  PT and OT for mobility and ADLs. Per guidelines, 15 hours per  week between PT, OT and/or SLP.  Follow-up stroke [facility]  Secondary stroke prophylaxis: Aspirin and statin   Continue Seroquel at night  9/27 added seroquel 25mg TID prn for mild agitation. He already has IM ziprasidone prn.      I certify that the patient currently requires non-pharmacologic restraints. Non-pharmacologic restraints are required to reduce the potential for the patient to harm themselves or others, to limit violent behaviors, and to allow proper assessment and care. I have completed a face to face assessment of this patient on 9/27/2024. Alternative methods including but not limited to de-escalation techniques, redirection, and pharmacologic treatment have been attempted but patient currently remains at risk without restraints. Our staff has been trained on restraints and patient is currently placed in video monitored room.  The need for these restraints is assessed by a rehabilitation physician every 24 hours and use of restraints is minimized when appropriate.  Current diagnosis which requires restraints: Encephalopathy. Current restraints required: Enclosure bed, bilateral mitts to prevent pulling out midline which is needed for IV antibiotics.      Pain -Tylenol and oxycodone, currently well-controlled    ____________________________________    Carole Hilliard MD  [facility] - Physical Medicine & Rehabilitation   ____________________________________

## 2024-09-28 NOTE — PROGRESS NOTES
NURSING DAILY NOTE    Name: Joe Templeton   Date of Admission: 9/20/2024   Admitting Diagnosis: No Principal Problem: There is no principal problem currently on the Problem List. Please update the Problem List and refresh.  Attending Physician: GIOVANI RICCI D.O.  Allergies: Patient has no known allergies.    Safety  Patient Assist  mod asst  Patient Precautions  Fall Risk  Precaution Comments  left knee pain  Bed Transfer Status  Contact Guard Assist  Toilet Transfer Status   Contact Guard Assist  Assistive Devices  Rails, Wheelchair  Oxygen  None - Room Air  Diet/Therapeutic Dining  Current Diet Order   Procedures    Diet Order Diet: Level 6 - Soft and Bite Sized (Set pt up w/ meal- he can then self-feed independently. Pills whole.); Liquid level: Level 0 - Thin; Tray Modifications (optional): SLP - Deliver to Nursing Station     Pill Administration  whole  Agitated Behavioral Scale  16  ABS Level of Severity  No Agitation    Fall Risk  Has the patient had a fall this admission?   No  Nikole Cesar Fall Risk Scoring  23, HIGH RISK  Fall Risk Safety Measures  chair alarm, seatbelt alarm, posey bed , poor balance, and low vision/ hearing    Vitals  Temperature: 36.2 °C (97.2 °F)  Temp src: Oral  Pulse: 94  Respiration: 18  Blood Pressure : (!) 156/73  Blood Pressure MAP (Calculated): 101 MM HG  BP Location: Right, Upper Arm  Patient BP Position: Sitting     Oxygen  Pulse Oximetry: 93 %  O2 (LPM): 0  O2 Delivery Device: None - Room Air    Bowel and Bladder  Last Bowel Movement  09/27/24  Stool Type  Type 5: Soft blob with clear cut edges (passed easily)  Bowel Device  Bathroom  Continent  Bladder: Did not void   Bowel: No movement  Bladder Function  Urine Void (mL):  (large)  Number of Times Voided: 1  Urinary Options: Yes  Urine Color: Yellow  Straight Catheter: 550 ml  Genitourinary Assessment   Bladder Assessment (WDL):  Within Defined Limits  Silva  Catheter: Not Applicable  Urine Color: Yellow  Bladder Device: Bathroom  Time Void: Yes  Bladder Scan: Post Void  $ Bladder Scan Results (mL): 123    Skin  Colt Score   17  Sensory Interventions   Bed Types:  (posey)  Skin Preventative Measures: Pillows in Use for Support / Positioning  Moisture Interventions  Moisturizers/Barriers: Barrier Wipes      Pain  Pain Rating Scale  0 - No Pain  Pain Location  Foot, Knee  Pain Location Orientation  Left  Pain Interventions   Declines    ADLs    Bathing   * * With Assistance from, Staff  Linen Change      Personal Hygiene  Change Dariana Pads, Moist Dariana Wipes, Perineal Care  Chlorhexidine Bath      Oral Care     Teeth/Dentures     Shave     Nutrition Percentage Eaten  Dinner, Between % Consumed  Environmental Precautions  Treaded Slipper Socks on Patient, Bed in Low Position  Patient Turns/Positioning  Patient Turns Self from Side to Side  Patient Turns Assistance/Tolerance  Assistance of One  Bed Positions  Bed Controls On, Bed Locked  Head of Bed Elevated  Less than 30 degrees      Psychosocial/Neurologic Assessment  Psychosocial Assessment  Psychosocial (WDL):  WDL Except  Patient Behaviors: Confused, Forgetful, Irritable, Restless  Neurologic Assessment  Neuro (WDL): Exceptions to WDL  Level of Consciousness: Alert  Orientation Level: Disoriented to situation, Disoriented to time, Disoriented to place  Cognition: Follows commands, Impulsive, Poor safety awareness, Poor judgement, Memory Loss  Speech: Clear  Pupil Assesment: No  Muscle Strength Right Arm: Good Strength Against Gravity and Moderate Resistance  Muscle Strength Left Arm: Good Strength Against Gravity and Moderate Resistance  Muscle Strength Right Leg: Fair Strength against Gravity but No Resistance  LLE Motor Response: Responds to commands  Muscle Strength Left Leg: Weak Movement but Not Against Gravity or Resistance  EENT (WDL):  WDL Except    Cardio/Pulmonary Assessment  Edema   LLE Edema:  2+  Respiratory Breath Sounds  RUL Breath Sounds: Clear  RML Breath Sounds: Clear  RLL Breath Sounds: Diminished  YONI Breath Sounds: Clear  LLL Breath Sounds: Diminished  Cardiac Assessment   Cardiac (WDL):  WDL Except (CABG, NSTEMI, HTN)

## 2024-09-28 NOTE — PROGRESS NOTES
NURSING DAILY NOTE    Name: Joe Templeton   Date of Admission: 9/20/2024   Admitting Diagnosis: No Principal Problem: There is no principal problem currently on the Problem List. Please update the Problem List and refresh.  Attending Physician: GIOVANI RICCI D.O.  Allergies: Patient has no known allergies.    Safety  Patient Assist  mod asst  Patient Precautions  Fall Risk  Precaution Comments  left knee pain  Bed Transfer Status  Contact Guard Assist  Toilet Transfer Status   Contact Guard Assist  Assistive Devices  Rails, Wheelchair  Oxygen  None - Room Air  Diet/Therapeutic Dining  Current Diet Order   Procedures    Diet Order Diet: Level 6 - Soft and Bite Sized (Set pt up w/ meal- he can then self-feed independently. Pills whole.); Liquid level: Level 0 - Thin; Tray Modifications (optional): SLP - Deliver to Nursing Station     Pill Administration  whole  Agitated Behavioral Scale  23  ABS Level of Severity  Mild Agitation    Fall Risk  Has the patient had a fall this admission?   No  Nikole Cesar Fall Risk Scoring  23, HIGH RISK  Fall Risk Safety Measures  bed alarm, chair alarm, posey bed , poor balance, and low vision/ hearing    Vitals  Temperature: 36.2 °C (97.2 °F)  Temp src: Oral  Pulse: 94  Respiration: 18  Blood Pressure : 132/68  Blood Pressure MAP (Calculated): 89 MM HG  BP Location: Right, Upper Arm  Patient BP Position: Sitting     Oxygen  Pulse Oximetry: 93 %  O2 (LPM): 0  O2 Delivery Device: None - Room Air    Bowel and Bladder  Last Bowel Movement  09/27/24  Stool Type  Type 5: Soft blob with clear cut edges (passed easily)  Bowel Device  Bathroom  Continent  Bladder: Did not void   Bowel: No movement  Bladder Function  Urine Void (mL):  (large)  Number of Times Voided: 1  Urinary Options: Yes  Urine Color: Yellow  Straight Catheter: 550 ml  Genitourinary Assessment   Bladder Assessment (WDL):  Within Defined Limits  Silva Catheter:  Not Applicable  Urine Color: Yellow  Bladder Device: Bathroom  Time Void: Yes  Bladder Scan: Post Void  $ Bladder Scan Results (mL): 123    Skin  Colt Score   17  Sensory Interventions   Bed Types:  (posey)  Skin Preventative Measures: Pillows in Use for Support / Positioning  Moisture Interventions  Moisturizers/Barriers: Barrier Wipes      Pain  Pain Rating Scale  2 - Notice Pain, does not interfere with activities  Pain Location  Foot, Knee  Pain Location Orientation  Left  Pain Interventions   Medication (see MAR), Rest, Repositioned    ADLs    Bathing   * * With Assistance from, Staff  Linen Change      Personal Hygiene  Change Dariana Pads, Moist Dariana Wipes, Perineal Care  Chlorhexidine Bath      Oral Care     Teeth/Dentures     Shave     Nutrition Percentage Eaten  Dinner, Between % Consumed  Environmental Precautions  Treaded Slipper Socks on Patient, Bed in Low Position  Patient Turns/Positioning  Other (Comments) (Up to the BR)  Patient Turns Assistance/Tolerance  Assistance of One  Bed Positions  Bed Controls On, Bed Locked  Head of Bed Elevated  Self regulated      Psychosocial/Neurologic Assessment  Psychosocial Assessment  Psychosocial (WDL):  WDL Except  Patient Behaviors: Confused, Forgetful, Irritable, Restless  Neurologic Assessment  Neuro (WDL): Exceptions to WDL  Level of Consciousness: Alert  Orientation Level: Disoriented to situation, Disoriented to time, Disoriented to place  Cognition: Follows commands, Impulsive, Poor safety awareness, Poor judgement, Memory Loss  Speech: Clear  Pupil Assesment: No  Muscle Strength Right Arm: Good Strength Against Gravity and Moderate Resistance  Muscle Strength Left Arm: Good Strength Against Gravity and Moderate Resistance  Muscle Strength Right Leg: Fair Strength against Gravity but No Resistance  LLE Motor Response: Responds to commands  Muscle Strength Left Leg: Weak Movement but Not Against Gravity or Resistance  EENT (WDL):  WDL  Except    Cardio/Pulmonary Assessment  Edema   LLE Edema: 2+  Respiratory Breath Sounds  RUL Breath Sounds: Clear  RML Breath Sounds: Clear  RLL Breath Sounds: Diminished  YONI Breath Sounds: Clear  LLL Breath Sounds: Diminished  Cardiac Assessment   Cardiac (WDL):  WDL Except (CABG, NSTEMI, HTN)

## 2024-09-29 PROCEDURE — A9270 NON-COVERED ITEM OR SERVICE: HCPCS | Performed by: HOSPITALIST

## 2024-09-29 PROCEDURE — 700111 HCHG RX REV CODE 636 W/ 250 OVERRIDE (IP): Performed by: STUDENT IN AN ORGANIZED HEALTH CARE EDUCATION/TRAINING PROGRAM

## 2024-09-29 PROCEDURE — 99232 SBSQ HOSP IP/OBS MODERATE 35: CPT | Performed by: HOSPITALIST

## 2024-09-29 PROCEDURE — 700102 HCHG RX REV CODE 250 W/ 637 OVERRIDE(OP): Performed by: HOSPITALIST

## 2024-09-29 PROCEDURE — A9270 NON-COVERED ITEM OR SERVICE: HCPCS | Performed by: PHYSICAL MEDICINE & REHABILITATION

## 2024-09-29 PROCEDURE — 94760 N-INVAS EAR/PLS OXIMETRY 1: CPT

## 2024-09-29 PROCEDURE — 700111 HCHG RX REV CODE 636 W/ 250 OVERRIDE (IP): Mod: JZ | Performed by: PHYSICAL MEDICINE & REHABILITATION

## 2024-09-29 PROCEDURE — 700102 HCHG RX REV CODE 250 W/ 637 OVERRIDE(OP): Performed by: PHYSICAL MEDICINE & REHABILITATION

## 2024-09-29 PROCEDURE — 700101 HCHG RX REV CODE 250: Performed by: PHYSICAL MEDICINE & REHABILITATION

## 2024-09-29 PROCEDURE — 770005 HCHG ROOM/CARE - REHAB PRIVATE (11*

## 2024-09-29 PROCEDURE — 700105 HCHG RX REV CODE 258: Performed by: STUDENT IN AN ORGANIZED HEALTH CARE EDUCATION/TRAINING PROGRAM

## 2024-09-29 PROCEDURE — 99231 SBSQ HOSP IP/OBS SF/LOW 25: CPT | Performed by: STUDENT IN AN ORGANIZED HEALTH CARE EDUCATION/TRAINING PROGRAM

## 2024-09-29 RX ADMIN — SENNOSIDES AND DOCUSATE SODIUM 2 TABLET: 50; 8.6 TABLET ORAL at 21:08

## 2024-09-29 RX ADMIN — POLYETHYLENE GLYCOL 3350 1 PACKET: 17 POWDER, FOR SOLUTION ORAL at 08:54

## 2024-09-29 RX ADMIN — OMEPRAZOLE 20 MG: 20 CAPSULE, DELAYED RELEASE ORAL at 08:43

## 2024-09-29 RX ADMIN — AMLODIPINE BESYLATE 10 MG: 5 TABLET ORAL at 05:13

## 2024-09-29 RX ADMIN — QUETIAPINE FUMARATE 50 MG: 25 TABLET ORAL at 21:08

## 2024-09-29 RX ADMIN — ASPIRIN 81 MG: 81 TABLET, CHEWABLE ORAL at 08:43

## 2024-09-29 RX ADMIN — METOPROLOL SUCCINATE 100 MG: 100 TABLET, EXTENDED RELEASE ORAL at 05:22

## 2024-09-29 RX ADMIN — SENNOSIDES AND DOCUSATE SODIUM 2 TABLET: 50; 8.6 TABLET ORAL at 08:43

## 2024-09-29 RX ADMIN — HYDRALAZINE HYDROCHLORIDE 100 MG: 50 TABLET ORAL at 05:13

## 2024-09-29 RX ADMIN — LISINOPRIL 40 MG: 20 TABLET ORAL at 08:43

## 2024-09-29 RX ADMIN — HYDRALAZINE HYDROCHLORIDE 100 MG: 50 TABLET ORAL at 21:08

## 2024-09-29 RX ADMIN — LIDOCAINE 1 PATCH: 4 PATCH TOPICAL at 08:53

## 2024-09-29 RX ADMIN — ENOXAPARIN SODIUM 40 MG: 100 INJECTION SUBCUTANEOUS at 17:07

## 2024-09-29 RX ADMIN — HYDRALAZINE HYDROCHLORIDE 100 MG: 50 TABLET ORAL at 14:35

## 2024-09-29 RX ADMIN — ATORVASTATIN CALCIUM 80 MG: 40 TABLET, FILM COATED ORAL at 21:08

## 2024-09-29 RX ADMIN — VANCOMYCIN HYDROCHLORIDE 1500 MG: 5 INJECTION, POWDER, LYOPHILIZED, FOR SOLUTION INTRAVENOUS at 06:12

## 2024-09-29 RX ADMIN — Medication 1000 UNITS: at 08:43

## 2024-09-29 ASSESSMENT — ENCOUNTER SYMPTOMS
PALPITATIONS: 0
COUGH: 0
BRUISES/BLEEDS EASILY: 0
EYES NEGATIVE: 1
NAUSEA: 0
ABDOMINAL PAIN: 0
CHILLS: 0
POLYDIPSIA: 0
SHORTNESS OF BREATH: 0
FEVER: 0
VOMITING: 0

## 2024-09-29 ASSESSMENT — PAIN DESCRIPTION - PAIN TYPE: TYPE: ACUTE PAIN

## 2024-09-29 NOTE — PROGRESS NOTES
NURSING DAILY NOTE    Name: Joe Templeton   Date of Admission: 9/20/2024   Admitting Diagnosis: No Principal Problem: There is no principal problem currently on the Problem List. Please update the Problem List and refresh.  Attending Physician: GIOVANI RICCI D.O.  Allergies: Patient has no known allergies.    Safety  Patient Assist  mod asst  Patient Precautions  Fall Risk  Precaution Comments  left knee pain  Bed Transfer Status  Contact Guard Assist  Toilet Transfer Status   Contact Guard Assist  Assistive Devices  Rails, Wheelchair  Oxygen  None - Room Air  Diet/Therapeutic Dining  Current Diet Order   Procedures    Diet Order Diet: Level 6 - Soft and Bite Sized (Set pt up w/ meal- he can then self-feed independently. Pills whole.); Liquid level: Level 0 - Thin; Tray Modifications (optional): SLP - Deliver to Nursing Station     Pill Administration  whole  Agitated Behavioral Scale  16  ABS Level of Severity  No Agitation    Fall Risk  Has the patient had a fall this admission?   No  Nikole Cesar Fall Risk Scoring  23, HIGH RISK  Fall Risk Safety Measures  bed alarm, chair alarm, seatbelt alarm, and posey bed     Vitals  Temperature: 36.2 °C (97.1 °F)  Temp src: Temporal  Pulse: 69  Respiration: 18  Blood Pressure : (!) 144/69  Blood Pressure MAP (Calculated): 94 MM HG  BP Location: Right, Upper Arm  Patient BP Position: Sitting     Oxygen  Pulse Oximetry: 94 %  O2 (LPM): 0  O2 Delivery Device: None - Room Air    Bowel and Bladder  Last Bowel Movement  09/27/24  Stool Type  Type 5: Soft blob with clear cut edges (passed easily)  Bowel Device  Bathroom  Continent  Bladder: Did not void   Bowel: No movement  Bladder Function  Urine Void (mL):  (large)  Number of Times Voided: 1  Urinary Options: Yes  Urine Color: Yellow  Straight Catheter: 550 ml  Genitourinary Assessment   Bladder Assessment (WDL):  Within Defined Limits  Silva Catheter: Not  Applicable  Urine Color: Yellow  Bladder Device: Bathroom  Time Void: Yes  Bladder Scan: Post Void  $ Bladder Scan Results (mL): 123    Skin  Colt Score   17  Sensory Interventions   Bed Types:  ([place])  Skin Preventative Measures: Pillows in Use for Support / Positioning  Moisture Interventions  Moisturizers/Barriers: Barrier Wipes      Pain  Pain Rating Scale  0 - No Pain  Pain Location  Foot, Knee  Pain Location Orientation  Left  Pain Interventions   [place]    ADLs    Bathing   * * With Assistance from, Staff  Linen Change   Complete  Personal Hygiene  Change Dariana Pads, Moist Dariana Wipes, Perineal Care  Chlorhexidine Bath      Oral Care     Teeth/Dentures     Shave     Nutrition Percentage Eaten  Dinner, Between % Consumed  Environmental Precautions  Treaded Slipper Socks on Patient, Bed in Low Position  Patient Turns/Positioning  Patient Turns Self from Side to Side  Patient Turns Assistance/Tolerance  Assistance of One  Bed Positions  Bed Controls On, Bed Locked  Head of Bed Elevated  Less than 30 degrees      Psychosocial/Neurologic Assessment  Psychosocial Assessment  Psychosocial (WDL):  WDL Except  Patient Behaviors: Confused, Forgetful  Neurologic Assessment  Neuro (WDL): Exceptions to WDL  Level of Consciousness: Alert  Orientation Level: Disoriented to situation, Disoriented to time, Disoriented to place  Cognition: Follows commands, Impulsive, Poor safety awareness, Poor judgement, Memory Loss  Speech: Clear  Pupil Assesment: No  Muscle Strength Right Arm: Good Strength Against Gravity and Moderate Resistance  Muscle Strength Left Arm: Good Strength Against Gravity and Moderate Resistance  Muscle Strength Right Leg: Fair Strength against Gravity but No Resistance  LLE Motor Response: Responds to commands  Muscle Strength Left Leg: Weak Movement but Not Against Gravity or Resistance  EENT (WDL):  WDL Except    Cardio/Pulmonary Assessment  Edema   LLE Edema: 2+  Respiratory Breath Sounds  RUL  Breath Sounds: Clear  RML Breath Sounds: Clear  RLL Breath Sounds: Diminished  YONI Breath Sounds: Clear  LLL Breath Sounds: Diminished  Cardiac Assessment   Cardiac (WDL):  WDL Except (CABG, NSTEMI, HTN)

## 2024-09-29 NOTE — CARE PLAN
The patient is Stable - Low risk of patient condition declining or worsening    Shift Goals  Clinical Goals: Safety  Patient Goals: Participate in therapy    Progress made toward(s) clinical / shift goals:      Problem: Knowledge Deficit - Standard  Goal: Patient and family/care givers will demonstrate understanding of plan of care, disease process/condition, diagnostic tests and medications  Outcome: Progressing     Problem: Fall Risk - Rehab  Goal: Patient will remain free from falls  Outcome: Progressing       Patient is not progressing towards the following goals:

## 2024-09-29 NOTE — PROGRESS NOTES
"  Physical Medicine & Rehabilitation Progress Note    Encounter Date: 9/29/2024    Chief Complaint: Follow-up pain, sleep    Interval Events (Subjective):  Bps stably elevated, Hospitalist following. Midline removed as patient was chewing on it, has peripheral IV in place. Napping in manual wheelchair by nursing station, wakens to voice. Doing well this morning. Reports some shin pain that resolved this morning. Slept well last night. Denies chest pain, shortness of breath.    Objective:  VITAL SIGNS: BP (!) 158/74   Pulse 70   Temp 36.7 °C (98.1 °F) (Oral)   Resp 17   Ht 1.753 m (5' 9\")   Wt 77.1 kg (170 lb)   SpO2 95%   BMI 25.10 kg/m²   GENERAL: No apparent distress, sitting in manual wheelchair with bilateral mits in place  HEENT: Normocephalic/atraumatic  CARDIAC: Regular rate and rhythm  LUNGS: Clear to auscultation bilaterally  EXTREM: No edema, bruising, lesions on bilateral shins  NEURO:  Mental status:  Sleeping comfortably in manual wheelchair but rouses to voice, then alert and pleasantly confused    Laboratory Values:  Recent Results (from the past 72 hour(s))   VANCOMYCIN TROUGH    Collection Time: 09/27/24  6:04 AM   Result Value Ref Range    Vancomycin Trough 12.8 10.0 - 20.0 ug/mL       Medications:  Scheduled Medications   Medication Dose Frequency    metoprolol SR  100 mg DAILY    vancomycin  1,500 mg Q24HR    senna-docusate  2 Tablet BID    And    polyethylene glycol/lytes  1 Packet BID    hydrALAZINE  100 mg Q8HRS    vitamin D3  1,000 Units DAILY    Pharmacy Consult Request  1 Each PHARMACY TO DOSE    lidocaine  1-2 Patch Daily-0800    omeprazole  20 mg DAILY    amLODIPine  10 mg Q DAY    enoxaparin (LOVENOX) injection  40 mg DAILY AT 1800    lisinopril  40 mg DAILY    MD Alert...Vancomycin per Pharmacy   PHARMACY TO DOSE    aspirin  81 mg DAILY    atorvastatin  80 mg Q EVENING    QUEtiapine  50 mg Nightly     PRN medications: QUEtiapine, hydrALAZINE, lactulose, docusate sodium, bisacodyl " EC, magnesium hydroxide, sodium phosphate, carboxymethylcellulose, benzocaine-menthol, mag hydrox-al hydrox-simeth, ondansetron **OR** ondansetron, traZODone, sodium chloride, acetaminophen, ziprasidone, traMADol, formulation r    Diet:  Current Diet Order   Procedures    Diet Order Diet: Level 6 - Soft and Bite Sized (Set pt up w/ meal- he can then self-feed independently. Pills whole.); Liquid level: Level 0 - Thin; Tray Modifications (optional): SLP - Deliver to Nursing Station       Medical Decision Making and Plan:  Multifocal embolic appearing infarcts: Right insula, left parietal lobe, right parietal lobe, left hao  MRI with multiple old infarcts  Dysphagia  Encephalopathy  PT and OT for mobility and ADLs. Per guidelines, 15 hours per week between PT, OT and/or SLP.  Follow[facility]up stroke [facility]  Secondary stroke prophylaxis: Aspirin and statin   Continue Seroquel at night  9/27 added seroquel 25mg TID prn for mild agitation. He already has IM ziprasidone prn.      I certify that the patient currently requires non-pharmacologic restraints. Non-pharmacologic restraints are required to reduce the potential for the patient to harm themselves or others, to limit violent behaviors, and to allow proper assessment and care. I have completed a face to face assessment of this patient on 9/27/2024. Alternative methods including but not limited to de-escalation techniques, redirection, and pharmacologic treatment have been attempted but patient currently remains at risk without restraints. Our staff has been trained on restraints and patient is currently placed in video monitored room.  The need for these restraints is assessed by a rehabilitation physician every 24 hours and use of restraints is minimized when appropriate.  Current diagnosis which requires restraints: Encephalopathy. Current restraints required: Enclosure bed, bilateral mitts to prevent pulling out midline which is needed for IV antibiotics.       Pain -Tylenol and oxycodone, currently well-controlled    ____________________________________    MD JAY [facility] - Physical Medicine [facility] Rehabilitation   ____________________________________

## 2024-09-29 NOTE — PROGRESS NOTES
NURSING DAILY NOTE    Name: Joe Templeton   Date of Admission: 9/20/2024   Admitting Diagnosis: No Principal Problem: There is no principal problem currently on the Problem List. Please update the Problem List and refresh.  Attending Physician: GIOVANI RICCI D.O.  Allergies: Patient has no known allergies.    Safety  Patient Assist  mod asst  Patient Precautions  Fall Risk  Precaution Comments  left knee pain  Bed Transfer Status  Contact Guard Assist  Toilet Transfer Status   Contact Guard Assist  Assistive Devices  Rails, Wheelchair  Oxygen  None - Room Air  Diet/Therapeutic Dining  Current Diet Order   Procedures    Diet Order Diet: Level 6 - Soft and Bite Sized (Set pt up w/ meal- he can then self-feed independently. Pills whole.); Liquid level: Level 0 - Thin; Tray Modifications (optional): SLP - Deliver to Nursing Station     Pill Administration  whole, floated, and one at a time   Agitated Behavioral Scale  16  ABS Level of Severity  No Agitation    Fall Risk  Has the patient had a fall this admission?   No  Nikole Cesar Fall Risk Scoring  23, HIGH RISK  Fall Risk Safety Measures  bed alarm, chair alarm, posey bed , poor balance, and low vision/ hearing    Vitals  Temperature: 36.3 °C (97.3 °F)  Temp src: Oral  Pulse: 77  Respiration: 16  Blood Pressure : (!) 151/72  Blood Pressure MAP (Calculated): 98 MM HG  BP Location: Right, Upper Arm  Patient BP Position: Sitting     Oxygen  Pulse Oximetry: 94 %  O2 (LPM): 0  O2 Delivery Device: None - Room Air    Bowel and Bladder  Last Bowel Movement  09/27/24  Stool Type  Type 5: Soft blob with clear cut edges (passed easily)  Bowel Device  Bathroom  Continent  Bladder: Did not void   Bowel: No movement  Bladder Function  Urine Void (mL):  (large)  Number of Times Voided: 1  Urinary Options: Yes  Urine Color: Yellow  Straight Catheter: 550 ml  Genitourinary Assessment   Bladder Assessment (WDL):  Within  Defined Limits  Silva Catheter: Not Applicable  Urine Color: Yellow  Bladder Device: Bathroom  Time Void: Yes  Bladder Scan: Post Void  $ Bladder Scan Results (mL): 123    Skin  Colt Score   17  Sensory Interventions   Bed Types:  (Soma)  Skin Preventative Measures: Pillows in Use for Support / Positioning  Moisture Interventions  Moisturizers/Barriers: Barrier Wipes      Pain  Pain Rating Scale  0 - No Pain  Pain Location  Foot, Knee  Pain Location Orientation  Left  Pain Interventions   Declines    ADLs    Bathing   * * With Assistance from, Staff  Linen Change      Personal Hygiene  Change Dariana Pads, Moist Dariana Wipes, Perineal Care  Chlorhexidine Bath      Oral Care     Teeth/Dentures     Shave     Nutrition Percentage Eaten  Lunch, Between % Consumed  Environmental Precautions  Treaded Slipper Socks on Patient, Bed in Low Position  Patient Turns/Positioning  Patient Turns Self from Side to Side  Patient Turns Assistance/Tolerance  Assistance of One  Bed Positions  Bed Controls On, Bed Locked  Head of Bed Elevated  Less than 30 degrees      Psychosocial/Neurologic Assessment  Psychosocial Assessment  Psychosocial (WDL):  WDL Except  Patient Behaviors: Confused, Forgetful  Neurologic Assessment  Neuro (WDL): Exceptions to WDL  Level of Consciousness: Alert  Orientation Level: Disoriented to situation, Disoriented to time, Disoriented to place  Cognition: Follows commands, Impulsive, Poor safety awareness, Poor judgement, Memory Loss  Speech: Clear  Pupil Assesment: No  Muscle Strength Right Arm: Good Strength Against Gravity and Moderate Resistance  Muscle Strength Left Arm: Good Strength Against Gravity and Moderate Resistance  Muscle Strength Right Leg: Fair Strength against Gravity but No Resistance  LLE Motor Response: Responds to commands  Muscle Strength Left Leg: Weak Movement but Not Against Gravity or Resistance  EENT (WDL):  WDL Except    Cardio/Pulmonary Assessment  Edema   LLE Edema:  2+  Respiratory Breath Sounds  RUL Breath Sounds: Clear  RML Breath Sounds: Clear  RLL Breath Sounds: Diminished  YONI Breath Sounds: Clear  LLL Breath Sounds: Diminished  Cardiac Assessment   Cardiac (WDL):  WDL Except (CABG, NSTEMI, HTN)

## 2024-09-29 NOTE — PROGRESS NOTES
NURSING DAILY NOTE    Name: Joe Templeton   Date of Admission: 9/20/2024   Admitting Diagnosis: No Principal Problem: There is no principal problem currently on the Problem List. Please update the Problem List and refresh.  Attending Physician: GIOVANI RICCI D.O.  Allergies: Patient has no known allergies.    Safety  Patient Assist  mod asst  Patient Precautions  Fall Risk  Precaution Comments  left knee pain  Bed Transfer Status  Contact Guard Assist  Toilet Transfer Status   Contact Guard Assist  Assistive Devices  Rails, Wheelchair  Oxygen  None - Room Air  Diet/Therapeutic Dining  Current Diet Order   Procedures    Diet Order Diet: Level 6 - Soft and Bite Sized (Set pt up w/ meal- he can then self-feed independently. Pills whole.); Liquid level: Level 0 - Thin; Tray Modifications (optional): SLP - Deliver to Nursing Station     Pill Administration  whole  Agitated Behavioral Scale  16  ABS Level of Severity  No Agitation    Fall Risk  Has the patient had a fall this admission?   No  Nikole Cesar Fall Risk Scoring  23, HIGH RISK  Fall Risk Safety Measures  bed alarm, chair alarm, seatbelt alarm, posey bed , poor balance, and low vision/ hearing    Vitals  Temperature: 36.3 °C (97.3 °F)  Temp src: Oral  Pulse: 77  Respiration: 16  Blood Pressure : (!) 156/68  Blood Pressure MAP (Calculated): 97 MM HG  BP Location: Right, Upper Arm  Patient BP Position: Sitting     Oxygen  Pulse Oximetry: 94 %  O2 (LPM): 0  O2 Delivery Device: None - Room Air    Bowel and Bladder  Last Bowel Movement  09/27/24  Stool Type  Type 5: Soft blob with clear cut edges (passed easily)  Bowel Device  Bathroom  Continent  Bladder: Did not void   Bowel: No movement  Bladder Function  Urine Void (mL):  (large)  Number of Times Voided: 1  Urinary Options: Yes  Urine Color: Yellow  Straight Catheter: 550 ml  Genitourinary Assessment   Bladder Assessment (WDL):  Within Defined  Limits  Silva Catheter: Not Applicable  Urine Color: Yellow  Bladder Device: Bathroom  Time Void: Yes  Bladder Scan: Post Void  $ Bladder Scan Results (mL): 123    Skin  Colt Score   17  Sensory Interventions   Bed Types:  (Soma)  Skin Preventative Measures: Pillows in Use for Support / Positioning  Moisture Interventions  Moisturizers/Barriers: Barrier Wipes      Pain  Pain Rating Scale  0 - No Pain  Pain Location  Foot, Knee  Pain Location Orientation  Left  Pain Interventions   [place]    ADLs    Bathing   * * With Assistance from, Staff  Linen Change      Personal Hygiene  Change Dariana Pads, Moist Dariana Wipes, Perineal Care  Chlorhexidine Bath      Oral Care     Teeth/Dentures     Shave     Nutrition Percentage Eaten  Dinner, Between % Consumed  Environmental Precautions  Treaded Slipper Socks on Patient, Bed in Low Position  Patient Turns/Positioning  Patient Turns Self from Side to Side  Patient Turns Assistance/Tolerance  Assistance of One  Bed Positions  Bed Controls On, Bed Locked  Head of Bed Elevated  Less than 30 degrees      Psychosocial/Neurologic Assessment  Psychosocial Assessment  Psychosocial (WDL):  WDL Except  Patient Behaviors: Confused, Forgetful  Neurologic Assessment  Neuro (WDL): Exceptions to WDL  Level of Consciousness: Responds to voice  Orientation Level: Disoriented to situation, Disoriented to time, Disoriented to place  Cognition: Follows commands, Impulsive, Poor safety awareness, Poor judgement, Memory Loss  Speech: Clear  Pupil Assesment: No  Muscle Strength Right Arm: Good Strength Against Gravity and Moderate Resistance  Muscle Strength Left Arm: Good Strength Against Gravity and Moderate Resistance  Muscle Strength Right Leg: Fair Strength against Gravity but No Resistance  LLE Motor Response: Responds to commands  Muscle Strength Left Leg: Weak Movement but Not Against Gravity or Resistance  EENT (WDL):  WDL Except    Cardio/Pulmonary Assessment  Edema   LLE Edema:  2+  Respiratory Breath Sounds  RUL Breath Sounds: Clear  RML Breath Sounds: Clear  RLL Breath Sounds: Diminished  YONI Breath Sounds: Clear  LLL Breath Sounds: Diminished  Cardiac Assessment   Cardiac (WDL):  WDL Except (CABG, NSTEMI, HTN)

## 2024-09-29 NOTE — PROGRESS NOTES
Pt midline in place but the hub was  not connected to the midline,mittens still in both hands, New Iv access placed on the left arm, I V Karthikeyano started , midline dc'd.. Mittens tied to the side of the bed to prevent pt from pulling the new Iv access.Endorsed to next shift

## 2024-09-29 NOTE — CARE PLAN
"  Problem: Fall Risk - Rehab  Goal: Patient will remain free from falls  Outcome: Progressing  Note: Nikole Cesar Fall risk Assessment Score: 23    High fall risk Interventions   - Alarming seatbelt  - Bed and strip alarm   - Yellow sign by the door   - Yellow wrist band \"Fall risk\"  - Room near to the nurse station  - Do not leave patient unattended in the bathroom  - Fall risk education provided      Problem: Pain - Standard  Goal: Alleviation of pain or a reduction in pain to the patient’s comfort goal  Outcome: Progressing  Note: Assessed for pain and discomfort , pain under control, medicated with trazodone for sleep.Pt able to sleep. No s/s of agitation noted.   The patient is Stable - Low risk of patient condition declining or worsening    Shift Goals  Clinical Goals: Safety  Patient Goals: Participate in therapy    Progress made toward(s) clinical / shift goals:  Pt free from fall and injury.    "

## 2024-09-29 NOTE — PROGRESS NOTES
Hospital Medicine Daily Progress Note        Chief Complaint:  Hypertension  Azotemia    Interval History:  No 24 hour clinical changes.    Review of Systems  Review of Systems   Constitutional:  Negative for chills and fever.   HENT: Negative.     Eyes: Negative.    Respiratory:  Negative for cough and shortness of breath.    Cardiovascular:  Negative for chest pain and palpitations.   Gastrointestinal:  Negative for abdominal pain, nausea and vomiting.   Musculoskeletal:  Positive for joint pain.        Wound pain   Skin:  Negative for itching and rash.   Endo/Heme/Allergies:  Negative for polydipsia. Does not bruise/bleed easily.        Physical Exam  Temp:  [36.2 °C (97.1 °F)-36.7 °C (98.1 °F)] 36.2 °C (97.1 °F)  Pulse:  [61-83] 61  Resp:  [16-20] 18  BP: (128-158)/(62-74) 128/62  SpO2:  [92 %-95 %] 94 %    Physical Exam  Vitals reviewed.   Constitutional:       General: He is not in acute distress.     Appearance: Normal appearance. He is not ill-appearing.   HENT:      Head: Normocephalic and atraumatic.      Right Ear: External ear normal.      Left Ear: External ear normal.      Nose: Nose normal.      Mouth/Throat:      Pharynx: Oropharynx is clear.   Eyes:      General:         Right eye: No discharge.         Left eye: No discharge.      Extraocular Movements: Extraocular movements intact.      Pupils: Pupils are equal, round, and reactive to light.   Cardiovascular:      Rate and Rhythm: Normal rate and regular rhythm.   Pulmonary:      Effort: No respiratory distress.      Breath sounds: No wheezing.      Comments: Decreased BS  Abdominal:      General: Bowel sounds are normal. There is no distension.      Palpations: Abdomen is soft.      Tenderness: There is no abdominal tenderness.   Musculoskeletal:      Cervical back: Normal range of motion and neck supple.      Right lower leg: No edema.      Left lower leg: No edema.   Skin:     General: Skin is warm and dry.   Neurological:      Mental Status:  He is alert.      Comments: Awake and alert         Fluids    Intake/Output Summary (Last 24 hours) at 9/29/2024 1211  Last data filed at 9/28/2024 1733  Gross per 24 hour   Intake 120 ml   Output --   Net 120 ml        Laboratory                      Assessment/Plan  Vitamin D deficiency  Assessment & Plan  Vit D level 18  On supplementation    Stroke (HCC)- (present on admission)  Assessment & Plan  On ASA and Lipitor  Ongoing management per Physiatry    CAD (coronary artery disease)- (present on admission)  Assessment & Plan  H/O CABG  On ASA, Lipitor, Lisinopril, and Toprol    Septic arthritis of ankle (HCC)- (present on admission)  Assessment & Plan  S/P arthrotomy I+D on 9/14/24 by Dr. Gautam  Wound care and pain control per Physiary  On Vanco through 10/12/24 per ID  Check F/U labs in AM     Essential (primary) hypertension- (present on admission)  Assessment & Plan  On Norvasc, Lisinopril, Toprol, and Hydralazine  Observe blood pressure trends on increased Toprol    Full Code    Patient seen/examined and chart reviewed; ROS and Assessment/Plan updated.  In review of yesterday's note, there are no changes except as documented above.

## 2024-09-29 NOTE — PROGRESS NOTES
Hospital Medicine Daily Progress Note        Chief Complaint:  Hypertension  Azotemia    Interval History:  Pt seen and examined in corridor, denies complaints.  Blood pressures remain elevated.    Review of Systems  Review of Systems   Constitutional:  Negative for chills and fever.   HENT: Negative.     Eyes: Negative.    Respiratory:  Negative for cough and shortness of breath.    Cardiovascular:  Negative for chest pain and palpitations.   Gastrointestinal:  Negative for abdominal pain, nausea and vomiting.   Musculoskeletal:  Positive for joint pain.        Wound pain   Skin:  Negative for itching and rash.   Endo/Heme/Allergies:  Negative for polydipsia. Does not bruise/bleed easily.        Physical Exam  Temp:  [36.3 °C (97.3 °F)-37.2 °C (98.9 °F)] 36.3 °C (97.3 °F)  Pulse:  [64-98] 77  Resp:  [14-18] 16  BP: (139-156)/(54-73) 151/72  SpO2:  [94 %-98 %] 94 %    Physical Exam  Vitals reviewed.   Constitutional:       General: He is not in acute distress.     Appearance: Normal appearance. He is not ill-appearing.   HENT:      Head: Normocephalic and atraumatic.      Right Ear: External ear normal.      Left Ear: External ear normal.      Nose: Nose normal.      Mouth/Throat:      Pharynx: Oropharynx is clear.   Eyes:      General:         Right eye: No discharge.         Left eye: No discharge.      Extraocular Movements: Extraocular movements intact.      Pupils: Pupils are equal, round, and reactive to light.   Cardiovascular:      Rate and Rhythm: Normal rate and regular rhythm.   Pulmonary:      Effort: No respiratory distress.      Breath sounds: No wheezing.      Comments: Decreased BS  Abdominal:      General: Bowel sounds are normal. There is no distension.      Palpations: Abdomen is soft.      Tenderness: There is no abdominal tenderness.   Musculoskeletal:      Cervical back: Normal range of motion and neck supple.      Right lower leg: No edema.      Left lower leg: No edema.   Skin:     General:  Skin is warm and dry.   Neurological:      Mental Status: He is alert.      Comments: Awake and alert         Fluids    Intake/Output Summary (Last 24 hours) at 9/28/2024 1915  Last data filed at 9/28/2024 1733  Gross per 24 hour   Intake 480 ml   Output --   Net 480 ml        Laboratory  Recent Labs     09/26/24  0540   WBC 11.1*   RBC 4.15*   HEMOGLOBIN 12.8*   HEMATOCRIT 38.5*   MCV 92.8   MCH 30.8   MCHC 33.2   RDW 40.3   PLATELETCT 217   MPV 12.3     Recent Labs     09/26/24  0540   SODIUM 142   POTASSIUM 4.0   CHLORIDE 109   CO2 21   GLUCOSE 126*   BUN 19   CREATININE 0.80   CALCIUM 9.1                   Assessment/Plan  Vitamin D deficiency  Assessment & Plan  Vit D level 18  On supplementation    Stroke (HCC)- (present on admission)  Assessment & Plan  On ASA and Lipitor  Ongoing management per Physiatry    CAD (coronary artery disease)- (present on admission)  Assessment & Plan  H/O CABG  On ASA, Lipitor, Lisinopril, and Toprol    Septic arthritis of ankle (HCC)- (present on admission)  Assessment & Plan  S/P arthrotomy I+D on 9/14/24 by Dr. Gautam  Wound care and pain control per Physiary  On Vanco through 10/12/24 per ID    Essential (primary) hypertension- (present on admission)  Assessment & Plan  On Norvasc, Lisinopril, Toprol, and Hydralazine  Continue to increase Toprol    Full Code    Patient seen/examined and chart reviewed; ROS and Assessment/Plan updated.  In review of yesterday's note, there are no changes except as documented above.

## 2024-09-30 ENCOUNTER — APPOINTMENT (OUTPATIENT)
Dept: RADIOLOGY | Facility: REHABILITATION | Age: 74
DRG: 057 | End: 2024-09-30
Attending: PHYSICAL MEDICINE & REHABILITATION
Payer: MEDICARE

## 2024-09-30 ENCOUNTER — APPOINTMENT (OUTPATIENT)
Dept: PHYSICAL THERAPY | Facility: REHABILITATION | Age: 74
DRG: 057 | End: 2024-09-30
Attending: PHYSICAL MEDICINE & REHABILITATION
Payer: MEDICARE

## 2024-09-30 ENCOUNTER — APPOINTMENT (OUTPATIENT)
Dept: OCCUPATIONAL THERAPY | Facility: REHABILITATION | Age: 74
DRG: 057 | End: 2024-09-30
Attending: PHYSICAL MEDICINE & REHABILITATION
Payer: MEDICARE

## 2024-09-30 ENCOUNTER — APPOINTMENT (OUTPATIENT)
Dept: SPEECH THERAPY | Facility: REHABILITATION | Age: 74
DRG: 057 | End: 2024-09-30
Attending: PHYSICAL MEDICINE & REHABILITATION
Payer: MEDICARE

## 2024-09-30 VITALS
HEART RATE: 69 BPM | SYSTOLIC BLOOD PRESSURE: 151 MMHG | WEIGHT: 170 LBS | HEIGHT: 69 IN | OXYGEN SATURATION: 94 % | BODY MASS INDEX: 25.18 KG/M2 | TEMPERATURE: 97.6 F | RESPIRATION RATE: 18 BRPM | DIASTOLIC BLOOD PRESSURE: 67 MMHG

## 2024-09-30 LAB
ALBUMIN SERPL BCP-MCNC: 3.2 G/DL (ref 3.2–4.9)
ALBUMIN/GLOB SERPL: 1 G/DL
ALP SERPL-CCNC: 64 U/L (ref 30–99)
ALT SERPL-CCNC: 35 U/L (ref 2–50)
ANION GAP SERPL CALC-SCNC: 11 MMOL/L (ref 7–16)
AST SERPL-CCNC: 28 U/L (ref 12–45)
BASOPHILS # BLD AUTO: 0.7 % (ref 0–1.8)
BASOPHILS # BLD: 0.07 K/UL (ref 0–0.12)
BILIRUB SERPL-MCNC: 0.4 MG/DL (ref 0.1–1.5)
BUN SERPL-MCNC: 20 MG/DL (ref 8–22)
CALCIUM ALBUM COR SERPL-MCNC: 9.6 MG/DL (ref 8.5–10.5)
CALCIUM SERPL-MCNC: 9 MG/DL (ref 8.5–10.5)
CHLORIDE SERPL-SCNC: 109 MMOL/L (ref 96–112)
CO2 SERPL-SCNC: 22 MMOL/L (ref 20–33)
CREAT SERPL-MCNC: 0.91 MG/DL (ref 0.5–1.4)
EOSINOPHIL # BLD AUTO: 0.18 K/UL (ref 0–0.51)
EOSINOPHIL NFR BLD: 1.9 % (ref 0–6.9)
ERYTHROCYTE [DISTWIDTH] IN BLOOD BY AUTOMATED COUNT: 41.1 FL (ref 35.9–50)
GFR SERPLBLD CREATININE-BSD FMLA CKD-EPI: 89 ML/MIN/1.73 M 2
GLOBULIN SER CALC-MCNC: 3.1 G/DL (ref 1.9–3.5)
GLUCOSE SERPL-MCNC: 123 MG/DL (ref 65–99)
HCT VFR BLD AUTO: 38.9 % (ref 42–52)
HGB BLD-MCNC: 13 G/DL (ref 14–18)
IMM GRANULOCYTES # BLD AUTO: 0.04 K/UL (ref 0–0.11)
IMM GRANULOCYTES NFR BLD AUTO: 0.4 % (ref 0–0.9)
LYMPHOCYTES # BLD AUTO: 1.66 K/UL (ref 1–4.8)
LYMPHOCYTES NFR BLD: 17.2 % (ref 22–41)
MCH RBC QN AUTO: 31.4 PG (ref 27–33)
MCHC RBC AUTO-ENTMCNC: 33.4 G/DL (ref 32.3–36.5)
MCV RBC AUTO: 94 FL (ref 81.4–97.8)
MONOCYTES # BLD AUTO: 0.97 K/UL (ref 0–0.85)
MONOCYTES NFR BLD AUTO: 10.1 % (ref 0–13.4)
NEUTROPHILS # BLD AUTO: 6.71 K/UL (ref 1.82–7.42)
NEUTROPHILS NFR BLD: 69.7 % (ref 44–72)
NRBC # BLD AUTO: 0 K/UL
NRBC BLD-RTO: 0 /100 WBC (ref 0–0.2)
PLATELET # BLD AUTO: 330 K/UL (ref 164–446)
PMV BLD AUTO: 11.4 FL (ref 9–12.9)
POTASSIUM SERPL-SCNC: 3.7 MMOL/L (ref 3.6–5.5)
PROT SERPL-MCNC: 6.3 G/DL (ref 6–8.2)
RBC # BLD AUTO: 4.14 M/UL (ref 4.7–6.1)
SODIUM SERPL-SCNC: 142 MMOL/L (ref 135–145)
WBC # BLD AUTO: 9.6 K/UL (ref 4.8–10.8)

## 2024-09-30 PROCEDURE — 700102 HCHG RX REV CODE 250 W/ 637 OVERRIDE(OP): Performed by: PHYSICAL MEDICINE & REHABILITATION

## 2024-09-30 PROCEDURE — 700111 HCHG RX REV CODE 636 W/ 250 OVERRIDE (IP): Mod: JZ | Performed by: PHYSICAL MEDICINE & REHABILITATION

## 2024-09-30 PROCEDURE — 700102 HCHG RX REV CODE 250 W/ 637 OVERRIDE(OP): Performed by: HOSPITALIST

## 2024-09-30 PROCEDURE — 73610 X-RAY EXAM OF ANKLE: CPT | Mod: LT

## 2024-09-30 PROCEDURE — 97530 THERAPEUTIC ACTIVITIES: CPT | Mod: CQ

## 2024-09-30 PROCEDURE — 97535 SELF CARE MNGMENT TRAINING: CPT

## 2024-09-30 PROCEDURE — 94760 N-INVAS EAR/PLS OXIMETRY 1: CPT

## 2024-09-30 PROCEDURE — 85025 COMPLETE CBC W/AUTO DIFF WBC: CPT

## 2024-09-30 PROCEDURE — 99232 SBSQ HOSP IP/OBS MODERATE 35: CPT | Performed by: PHYSICAL MEDICINE & REHABILITATION

## 2024-09-30 PROCEDURE — 700105 HCHG RX REV CODE 258: Performed by: STUDENT IN AN ORGANIZED HEALTH CARE EDUCATION/TRAINING PROGRAM

## 2024-09-30 PROCEDURE — 80053 COMPREHEN METABOLIC PANEL: CPT

## 2024-09-30 PROCEDURE — A9270 NON-COVERED ITEM OR SERVICE: HCPCS | Performed by: HOSPITALIST

## 2024-09-30 PROCEDURE — A9270 NON-COVERED ITEM OR SERVICE: HCPCS | Performed by: PHYSICAL MEDICINE & REHABILITATION

## 2024-09-30 PROCEDURE — 99232 SBSQ HOSP IP/OBS MODERATE 35: CPT | Performed by: HOSPITALIST

## 2024-09-30 PROCEDURE — 97110 THERAPEUTIC EXERCISES: CPT

## 2024-09-30 PROCEDURE — 92507 TX SP LANG VOICE COMM INDIV: CPT

## 2024-09-30 PROCEDURE — 36415 COLL VENOUS BLD VENIPUNCTURE: CPT

## 2024-09-30 PROCEDURE — 770005 HCHG ROOM/CARE - REHAB PRIVATE (11*

## 2024-09-30 PROCEDURE — 700111 HCHG RX REV CODE 636 W/ 250 OVERRIDE (IP): Performed by: STUDENT IN AN ORGANIZED HEALTH CARE EDUCATION/TRAINING PROGRAM

## 2024-09-30 PROCEDURE — 700101 HCHG RX REV CODE 250: Performed by: PHYSICAL MEDICINE & REHABILITATION

## 2024-09-30 PROCEDURE — 97116 GAIT TRAINING THERAPY: CPT | Mod: CQ

## 2024-09-30 PROCEDURE — 97110 THERAPEUTIC EXERCISES: CPT | Mod: CQ

## 2024-09-30 RX ORDER — LINEZOLID 600 MG/1
600 TABLET, FILM COATED ORAL EVERY 12 HOURS
Status: DISCONTINUED | OUTPATIENT
Start: 2024-10-01 | End: 2024-10-07 | Stop reason: HOSPADM

## 2024-09-30 RX ADMIN — VANCOMYCIN HYDROCHLORIDE 1500 MG: 5 INJECTION, POWDER, LYOPHILIZED, FOR SOLUTION INTRAVENOUS at 04:00

## 2024-09-30 RX ADMIN — Medication 1000 UNITS: at 07:55

## 2024-09-30 RX ADMIN — OMEPRAZOLE 20 MG: 20 CAPSULE, DELAYED RELEASE ORAL at 07:55

## 2024-09-30 RX ADMIN — SENNOSIDES AND DOCUSATE SODIUM 2 TABLET: 50; 8.6 TABLET ORAL at 20:48

## 2024-09-30 RX ADMIN — LIDOCAINE 1 PATCH: 4 PATCH TOPICAL at 07:55

## 2024-09-30 RX ADMIN — ASPIRIN 81 MG: 81 TABLET, CHEWABLE ORAL at 07:55

## 2024-09-30 RX ADMIN — HYDRALAZINE HYDROCHLORIDE 100 MG: 50 TABLET ORAL at 14:41

## 2024-09-30 RX ADMIN — ATORVASTATIN CALCIUM 80 MG: 40 TABLET, FILM COATED ORAL at 20:49

## 2024-09-30 RX ADMIN — HYDRALAZINE HYDROCHLORIDE 100 MG: 50 TABLET ORAL at 06:05

## 2024-09-30 RX ADMIN — QUETIAPINE FUMARATE 50 MG: 25 TABLET ORAL at 20:49

## 2024-09-30 RX ADMIN — METOPROLOL SUCCINATE 100 MG: 100 TABLET, EXTENDED RELEASE ORAL at 06:05

## 2024-09-30 RX ADMIN — TRAMADOL HYDROCHLORIDE 25 MG: 50 TABLET ORAL at 13:27

## 2024-09-30 RX ADMIN — ENOXAPARIN SODIUM 40 MG: 100 INJECTION SUBCUTANEOUS at 17:48

## 2024-09-30 RX ADMIN — LISINOPRIL 40 MG: 20 TABLET ORAL at 07:55

## 2024-09-30 RX ADMIN — AMLODIPINE BESYLATE 10 MG: 5 TABLET ORAL at 06:05

## 2024-09-30 RX ADMIN — HYDRALAZINE HYDROCHLORIDE 100 MG: 50 TABLET ORAL at 20:48

## 2024-09-30 RX ADMIN — POLYETHYLENE GLYCOL 3350 1 PACKET: 17 POWDER, FOR SOLUTION ORAL at 20:48

## 2024-09-30 ASSESSMENT — ENCOUNTER SYMPTOMS
CHILLS: 0
NAUSEA: 0
BRUISES/BLEEDS EASILY: 0
SHORTNESS OF BREATH: 0
EYES NEGATIVE: 1
FEVER: 0
PALPITATIONS: 0
POLYDIPSIA: 0
COUGH: 0
ABDOMINAL PAIN: 0
VOMITING: 0

## 2024-09-30 ASSESSMENT — GAIT ASSESSMENTS
ASSISTIVE DEVICE: PARALLEL BARS
DISTANCE (FEET): 8
GAIT LEVEL OF ASSIST: CONTACT GUARD ASSIST
DEVIATION: ANTALGIC;STEP TO

## 2024-09-30 ASSESSMENT — PAIN SCALES - WONG BAKER: WONGBAKER_NUMERICALRESPONSE: HURTS EVEN MORE

## 2024-09-30 NOTE — PROGRESS NOTES
NURSING DAILY NOTE    Name: Joe Templeton   Date of Admission: 9/20/2024   Admitting Diagnosis: No Principal Problem: There is no principal problem currently on the Problem List. Please update the Problem List and refresh.  Attending Physician: ELIZABETH HOLM D.O.  Allergies: Patient has no known allergies.    Safety  Patient Assist  mod to max  Patient Precautions  Fall Risk  Precaution Comments  left knee pain  Bed Transfer Status  Minimal Assist (stand step(hop ) bed > wc with FWW)  Toilet Transfer Status   Contact Guard Assist  Assistive Devices  Gait Belt, Wheelchair  Oxygen  None - Room Air  Diet/Therapeutic Dining  Current Diet Order   Procedures    Diet Order Diet: Level 6 - Soft and Bite Sized (Set pt up w/ meal- he can then self-feed independently. Pills whole.); Liquid level: Level 0 - Thin; Tray Modifications (optional): SLP - Deliver to Nursing Station     Pill Administration  whole  Agitated Behavioral Scale  16  ABS Level of Severity  No Agitation    Fall Risk  Has the patient had a fall this admission?   No  Nikole Cesar Fall Risk Scoring  23, HIGH RISK  Fall Risk Safety Measures  bed alarm, chair alarm, seatbelt alarm, and posey bed     Vitals  Temperature: 36.4 °C (97.6 °F)  Temp src: Temporal  Pulse: 65  Respiration: 18  Blood Pressure : (!) 141/69  Blood Pressure MAP (Calculated): 93 MM HG  BP Location: Right, Upper Arm  Patient BP Position: Sitting     Oxygen  Pulse Oximetry: 94 %  O2 (LPM): 0 (posey bed)  O2 Delivery Device: None - Room Air    Bowel and Bladder  Last Bowel Movement  09/30/24  Stool Type  Type 5: Soft blob with clear cut edges (passed easily)  Bowel Device  Bathroom  Continent  Bladder: Did not void   Bowel: No movement  Bladder Function  Urine Void (mL):  (moderate)  Number of Times Voided: 1  Urinary Options: Yes  Urine Color: Unable To Evaluate  Straight Catheter: 550 ml  Wet Diaper Count: 1  Genitourinary  Assessment   Bladder Assessment (WDL):  Within Defined Limits  Silva Catheter: Not Applicable  Urine Color: Unable To Evaluate  Bladder Device: Bathroom  Time Void: Yes  Bladder Scan: Post Void  $ Bladder Scan Results (mL): 123    Skin  Colt Score   17  Sensory Interventions   Bed Types: Standard/Trauma Mattress  Skin Preventative Measures: Pillows in Use for Support / Positioning  Moisture Interventions  Moisturizers/Barriers: Barrier Paste, Barrier Wipes      Pain  Pain Rating Scale  7 - Focus of attention, prevents doing daily activities  Pain Location  Foot  Pain Location Orientation  Left  Pain Interventions   Emotional Support, Medication (see MAR), Nurse Notified    ADLs    Bathing   * * With Assistance from, Staff  Linen Change   Complete  Personal Hygiene  Change Dariana Pads, Moist Dariana Wipes, Perineal Care  Chlorhexidine Bath      Oral Care     Teeth/Dentures     Shave     Nutrition Percentage Eaten  Lunch, Between % Consumed  Environmental Precautions  Treaded Slipper Socks on Patient, Personal Belongings, Wastebasket, [place] etc. in Easy Reach, Bed in Low Position  Patient Turns/Positioning  Patient Turns Self from Side to Side  Patient Turns Assistance/Tolerance  Assistance of One  Bed Positions  Bed Controls On, Bed Locked  Head of Bed Elevated  Less than 30 degrees      Psychosocial/Neurologic Assessment  Psychosocial Assessment  Psychosocial (WDL):  WDL Except  Patient Behaviors: Confused, Forgetful  Neurologic Assessment  Neuro (WDL): Exceptions to WDL  Level of Consciousness: Alert  Orientation Level: Disoriented to situation, Disoriented to time, Disoriented to place  Cognition: Follows commands, Impulsive, Poor safety awareness, Poor judgement, Memory Loss  Speech: Clear  Pupil Assesment: No  Muscle Strength Right Arm: Good Strength Against Gravity and Moderate Resistance  Muscle Strength Left Arm: Good Strength Against Gravity and Moderate Resistance  Muscle Strength Right Leg: Fair  Strength against Gravity but No Resistance  LLE Motor Response: Responds to commands  Muscle Strength Left Leg: Weak Movement but Not Against Gravity or Resistance  EENT (WDL):  WDL Except    Cardio/Pulmonary Assessment  Edema   LLE Edema: 1+  Respiratory Breath Sounds  RUL Breath Sounds: Clear  RML Breath Sounds: Clear  RLL Breath Sounds: Diminished  YONI Breath Sounds: Clear  LLL Breath Sounds: Diminished  Cardiac Assessment   Cardiac (WDL):  WDL Except

## 2024-09-30 NOTE — THERAPY
Physical Therapy   Daily Treatment     Patient Name: Joe Templeton  Age:  74 y.o., Sex:  male  Medical Record #: [id number]  Today's Date: 9/30/2024     Precautions  Precautions: Fall Risk  Comments: left knee pain    Subjective    Pt resting quietly in posey bed, he was agreeable to PT intervention     Objective       09/30/24 1301   PT Charge Group   PT Gait Training (Units) 1   PT Therapeutic Exercise (Units) 1   PT Therapeutic Activities (Units) 2   Supervising Physical Therapist Herb Cronin   PT Total Time Spent   PT Individual Total Time Spent (Mins) 60   Pain   Pain Scales Non Verbal Scale   Intervention Emotional Support;Medication (see MAR);Nurse Notified   Pain 0 - 10 Group   Location Foot   Location Orientation Left   Description Unable to Evaluate   Comfort Goal Perform Activity   Therapist Pain Assessment During Activity;Nurse Notified   Non Verbal Descriptors   Non Verbal Scale  Grimacing  (not bearing weight on L LE)   Pain- Walton Baker Scale Group   Walton-Garcia Scale  6    Cognition    Ability To Follow Commands 1 Step   Safety Awareness Impaired;Impulsive   New Learning Impaired   Attention Impaired   Sequencing Impaired   Initiation Impaired   Gait Functional Level of Assist    Gait Level Of Assist Contact Guard Assist   Assistive Device Parallel Bars   Distance (Feet) 8   # of Times Distance was Traveled 1   Deviation Antalgic;Step To  (hop-to)   Wheelchair Functional Level of Assist   Wheelchair Assist Minimal Assist   Distance Wheelchair (Feet or Distance) 50   Wheelchair Description Extra time;Assistance with steering;Leg rest management;Impaired coordination;Verbal cueing   Transfer Functional Level of Assist   Bed, Chair, Wheelchair Transfer Minimal Assist  (stand step(hop ) bed > wc with FWW)   Bed Chair Wheelchair Transfer Description Verbal cueing;Adaptive equipment;Set-up of equipment   Sitting Lower Body Exercises   Marching 1 set of 10   Hamstring Curl Left;Medium Resistance  Theraband  (unable to go against resistance band)   Bed Mobility    Supine to Sit Standby Assist   Sit to Supine Standby Assist   Sit to Stand Minimal Assist  (CGA /[place] for initial standing balance)   Interdisciplinary Plan of Care Collaboration   IDT Collaboration with  Physician;Nursing   Patient Position at End of Therapy Seated;Chair Alarm On  (at nurses station)   Collaboration Comments L LE antalgia and difficulty bearing weight         Assessment    Pt limited by significant L LE antalgia resulting in near inability to wb on his L foot. Upon initial stand from EOB pt immediately kept L foot off of the floor and proceeded to hop on R LE only to transfer. Light palpaption did not aggravate pt however he did pull away and grimace when therapist attempted gentle PROM to ankle. Pt active ROM to ankle is ~ 5 into DF and PF as inspected visually. Notified RN who gave pt medication, MD also rounded with the patient. Also attempted amb in // and asked pt to put L foot on the floor, he lightly touched the ground with the L LE and again hopped in the // with TTWB. Difficulty to ascertain details due to pt's impaired functional cognition and mild agitation noted when therapist asked additional questions. Of note, pt has been amb around 150 feet using FWW and CGA to SBA per previous PT notes from last week.     Strengths: Independent prior level of function, Pleasant and cooperative, Willingly participates in therapeutic activities  Barriers: Decreased endurance, Difficulty following instructions, Generalized weakness, Impaired activity tolerance, Impaired balance, Impaired carryover of learning, Impaired insight/denial of deficits, Impaired functional cognition, Impulsive    Plan    Manual therapy left knee  Transfer sequencing  Ambulation with FWW  LE strengthening    DME  PT DME Recommendations  Assistive Device: Front Wheeled Walker    Passport items to be completed:  Get in/out of bed safely, in/out of a vehicle,  safely use mobility device, walk or wheel around home/community, navigate up and down stairs, show how to get up/down from the ground, ensure home is accessible, demonstrate HEP, complete caregiver training    Physical Therapy Problems (Active)       Problem: Balance       Dates: Start:  09/21/24            Problem: Mobility       Dates: Start:  09/21/24         Goal: STG-Within one week, patient will ambulate 150ft with use of LRAD with CGA        Dates: Start:  09/21/24               Problem: Mobility Transfers       Dates: Start:  09/21/24         Goal: STG-Within one week, patient will transfer bed to chair with CGA and min VC with use of LRAD        Dates: Start:  09/21/24            Goal: STG-Within two weeks, patient will transfer in/out of car requiring min VC with proper technique and use of LRAD safely       Dates: Start:  09/21/24               Problem: PT-Long Term Goals       Dates: Start:  09/21/24         Goal: LTG-By discharge, patient will improve dynamic balance from fair+ to good to improve ability to perform ADLs safely with use of LRAD       Dates: Start:  09/21/24            Goal: LTG-By discharge, patient will ambulate 300ft with use of LRAD and supervision assistance.       Dates: Start:  09/21/24            Goal: LTG-By discharge, patient will ambulate up/down 12 stairs with use of single HR and CGA.       Dates: Start:  09/21/24

## 2024-09-30 NOTE — CARE PLAN
The patient is Watcher - Medium risk of patient condition declining or worsening    Shift Goals  Clinical Goals: safety  Patient Goals: Participate in therapy    Progress made toward(s) clinical / shift goals:      Problem: Knowledge Deficit - Standard  Goal: Patient and family/care givers will demonstrate understanding of plan of care, disease process/condition, diagnostic tests and medications  Outcome: Progressing  Patient c/o increased L ankle pain. Dr Meza notified. PRN pain med given and Xray ordered.      Problem: Fall Risk - Rehab  Goal: Patient will remain free from falls  Outcome: Progressing  Patient is impulsive and does not use call light for assistance. Soma bed in use for safety.        Patient is not progressing towards the following goals:

## 2024-09-30 NOTE — THERAPY
Speech Language Pathology  Daily Treatment     Patient Name: Joe Templeton  Age:  74 y.o., Sex:  male  Medical Record #: [id number]  Today's Date: 9/30/2024     Precautions  Precautions: Fall Risk  Comments: left knee pain    Subjective    Assumed care of patient from nurse's station, agreeable to ST.     Objective       09/30/24 8450   Treatment Charges   SLP Treatment - Individual Speech Language Treatment - Individual   SLP Total Time Spent   SLP Individual Total Time Spent (Mins) 60   Receptive Language / Auditory Comprehension   Identifies Pictures Moderate (3)   Expressive Language   Naming Moderate (3)   Interdisciplinary Plan of Care Collaboration   IDT Collaboration with  Nursing   Patient Position at End of Therapy Seated;Chair Alarm On;Self Releasing Lap Belt Applied  (at TBI nurse's station)   Collaboration Comments nursing assist with toileting         Assessment    FO4 picture ID: 60% IND, with written cue pt increased to 68%.  Picture naming: semantic cue: 5/25, with additional phonemic cue increased to 16/25, pt required model on remaining targets to achieve 23/25. Performance declined as it pertains to semantic cues as compared to prior session.    Strengths: Pleasant and cooperative, Supportive family, Motivated for self care and independence, Willingly participates in therapeutic activities  Barriers: Aphasia expressive, Aphasia receptive, Impaired functional cognition, Impaired carryover of learning    Plan    Continue to target receptive and expressive language, Y/N questions, following single step commands    Passport items to be completed:  Express basic needs, understand food/liquid recommendations, consistently follow swallow precautions, manage finances, manage medications, arrive to therapy appointments on time, complete daily memory log entries, solve problems related to safety situations, review education related to hospitalization, complete caregiver training     Speech Therapy  Problems (Active)       Problem: Comprehension STGs       Dates: Start:  09/24/24         Goal: STG-Within one week, patient will point to pictures in FO3 with 80% accuracy.       Dates: Start:  09/24/24               Problem: Expression STGs       Dates: Start:  09/24/24         Goal: STG-Within one week, patient will state antonym given verbal prompt in 7/10 trials       Dates: Start:  09/24/24               Problem: Memory STGs       Dates: Start:  09/21/24         Goal: STG-Within one week, patient will       Dates: Start:  09/21/24       Description: 1) Individualized goal:  be oriented to day, month, year, and situation w/ 90% acc and MIN A.   2) Interventions:  SLP Self Care / ADL Training , SLP Cognitive Skill Development, and SLP Group Treatment          Goal Note filed on 09/24/24 0893 by Blanca Altamirano MS,CCC-SLP       Will continue to target, emphasis of ST will likely be focused on receptive and expressive language.                 Problem: Speech/Swallowing LTGs       Dates: Start:  09/21/24         Goal: LTG-By discharge, patient will solve basic problems       Dates: Start:  09/21/24       Description: 1) Individualized goal:  related to health and safety with 80% acc and MOD I for a safe D/C to PLOF.   2) Interventions:  SLP Self Care / ADL Training , SLP Cognitive Skill Development, and SLP Group Treatment

## 2024-09-30 NOTE — PROGRESS NOTES
"  Physical Medicine & Rehabilitation Progress Note    Encounter Date: 9/30/2024    Chief Complaint: Doing okay    Interval Events (Subjective):  Blood pressure better controlled  Bowel movement 9/30  Voiding volitionally    Patient seen and examined in the cafeteria.  States that he is doing okay and pain is controlled.  Discussed with nursing that he continues to pull out his IVs and midlines.  Will switch to oral antibiotics per ID instruction.  Later on in the day he had trouble weightbearing.  Checked x-rays and there was no acute abnormality.  Clear what the etiology of his inability to weight-bear on left lower extremity is.  Continue to monitor.    ROS: 14 point ROS negative unless otherwise specified in the HPI    Objective:  VITAL SIGNS: /61   Pulse 60   Temp 36.7 °C (98.1 °F) (Temporal)   Resp 18   Ht 1.753 m (5' 9\")   Wt 77.1 kg (170 lb)   SpO2 95%   BMI 25.10 kg/m²     GEN: No apparent distress  HEENT: Head normocephalic, atraumatic.  Sclera nonicteric bilaterally, no ocular discharge appreciated bilaterally.  CV: Extremities warm and well-perfused, no peripheral edema appreciated bilaterally.  PULMONARY: Breathing nonlabored on room air, no respiratory accessory muscle use.  Not requiring supplemental oxygen.  SKIN:   9/30    PSYCH: Mood and affect within normal limits.  NEURO: Awake alert.  Pleasantly confused. Disoriented to date, day, month, year. Knows we are in [facility].       Laboratory Values:  Recent Results (from the past 72 hour(s))   CBC WITH DIFFERENTIAL    Collection Time: 09/30/24  6:38 AM   Result Value Ref Range    WBC 9.6 4.8 - 10.8 K/uL    RBC 4.14 (L) 4.70 - 6.10 M/uL    Hemoglobin 13.0 (L) 14.0 - 18.0 g/dL    Hematocrit 38.9 (L) 42.0 - 52.0 %    MCV 94.0 81.4 - 97.8 fL    MCH 31.4 27.0 - 33.0 pg    MCHC 33.4 32.3 - 36.5 g/dL    RDW 41.1 35.9 - 50.0 fL    Platelet Count 330 164 - 446 K/uL    MPV 11.4 9.0 - 12.9 fL    Neutrophils-Polys 69.70 44.00 - 72.00 %    Lymphocytes " 17.20 (L) 22.00 - 41.00 %    Monocytes 10.10 0.00 - 13.40 %    Eosinophils 1.90 0.00 - 6.90 %    Basophils 0.70 0.00 - 1.80 %    Immature Granulocytes 0.40 0.00 - 0.90 %    Nucleated RBC 0.00 0.00 - 0.20 /100 WBC    Neutrophils (Absolute) 6.71 1.82 - 7.42 K/uL    Lymphs (Absolute) 1.66 1.00 - 4.80 K/uL    Monos (Absolute) 0.97 (H) 0.00 - 0.85 K/uL    Eos (Absolute) 0.18 0.00 - 0.51 K/uL    Baso (Absolute) 0.07 0.00 - 0.12 K/uL    Immature Granulocytes (abs) 0.04 0.00 - 0.11 K/uL    NRBC (Absolute) 0.00 K/uL   Comp Metabolic Panel    Collection Time: 09/30/24  6:38 AM   Result Value Ref Range    Sodium 142 135 - 145 mmol/L    Potassium 3.7 3.6 - 5.5 mmol/L    Chloride 109 96 - 112 mmol/L    Co2 22 20 - 33 mmol/L    Anion Gap 11.0 7.0 - 16.0    Glucose 123 (H) 65 - 99 mg/dL    Bun 20 8 - 22 mg/dL    Creatinine 0.91 0.50 - 1.40 mg/dL    Calcium 9.0 8.5 - 10.5 mg/dL    Correct Calcium 9.6 8.5 - 10.5 mg/dL    AST(SGOT) 28 12 - 45 U/L    ALT(SGPT) 35 2 - 50 U/L    Alkaline Phosphatase 64 30 - 99 U/L    Total Bilirubin 0.4 0.1 - 1.5 mg/dL    Albumin 3.2 3.2 - 4.9 g/dL    Total Protein 6.3 6.0 - 8.2 g/dL    Globulin 3.1 1.9 - 3.5 g/dL    A-G Ratio 1.0 g/dL   ESTIMATED GFR    Collection Time: 09/30/24  6:38 AM   Result Value Ref Range    GFR (CKD-EPI) 89 >60 mL/min/1.73 m 2       Medications:  Scheduled Medications   Medication Dose Frequency    [START ON 10/1/2024] linezolid  600 mg Q12HRS    metoprolol SR  100 mg DAILY    senna-docusate  2 Tablet BID    And    polyethylene glycol/lytes  1 Packet BID    hydrALAZINE  100 mg Q8HRS    vitamin D3  1,000 Units DAILY    Pharmacy Consult Request  1 Each PHARMACY TO DOSE    lidocaine  1-2 Patch Daily-0800    omeprazole  20 mg DAILY    amLODIPine  10 mg Q DAY    enoxaparin (LOVENOX) injection  40 mg DAILY AT 1800    lisinopril  40 mg DAILY    aspirin  81 mg DAILY    atorvastatin  80 mg Q EVENING    QUEtiapine  50 mg Nightly     PRN medications: QUEtiapine, hydrALAZINE, lactulose,  docusate sodium, bisacodyl EC, magnesium hydroxide, sodium phosphate, carboxymethylcellulose, benzocaine-menthol, mag hydrox-al hydrox-simeth, ondansetron **OR** ondansetron, traZODone, sodium chloride, acetaminophen, ziprasidone, traMADol, formulation r    Diet:  Current Diet Order   Procedures    Diet Order Diet: Level 6 - Soft and Bite Sized (Set pt up w/ meal- he can then self-feed independently. Pills whole.); Liquid level: Level 0 - Thin; Tray Modifications (optional): SLP - Deliver to Nursing Station       Medical Decision Making and Plan:  Multifocal embolic appearing infarcts: Right insula, left parietal lobe, right parietal lobe, left hao  MRI with multiple old infarcts  Dysphagia  Encephalopathy  PT and OT for mobility and ADLs. Per guidelines, 15 hours per week between PT, OT and/or SLP.  Follow-up stroke Bridge clinic  Secondary stroke prophylaxis: Aspirin and statin   Continue Seroquel at night     I certify that the patient currently requires non-pharmacologic restraints. Non-pharmacologic restraints are required to reduce the potential for the patient to harm themselves or others, to limit violent behaviors, and to allow proper assessment and care. I have completed a face to face assessment of this patient on 9/30/2024. Alternative methods including but not limited to de-escalation techniques, redirection, and pharmacologic treatment have been attempted but patient currently remains at risk without restraints. Our staff has been trained on restraints and patient is currently placed in video monitored room.  The need for these restraints is assessed by a rehabilitation physician every 24 hours and use of restraints is minimized when appropriate.  Current diagnosis which requires restraints: Encephalopathy. Current restraints required: Enclosure bed, bilateral mitts to prevent pulling out midline which is needed for IV antibiotics.       Septic arthritis left ankle s/p left ankle arthrotomy and I&D  9/14/2024 Dr. Gautam -weightbearing as tolerated left lower extremity.  ID followed.  Continue vancomycin twice daily through 10/11.  Potentially can switch to Zyvox.  9/23 patient pulled out midline will need new access.  9/24 pulled out midline again, admits an attempt to help prevent him pulling it out.  9/26 has midline.  Continue IV vancomycin per ID.  If absolutely necessary at discharge can switch to linezolid 600 mg twice a day with stop date of 10/11.  If goes to skilled nursing facility can continue IV vancomycin.  Needs follow-up with ID.    9/30/2024  increased pain left lower extremity.  X-ray unremarkable.  Switch IV vancomycin to oral linezolid with end date of 10/11 starting 10/1.  Patient continues to pull out peripheral IVs and midlines.  He has had over 7 different lines placed since last week.  Could help with agitation if he does not have mitts and IV lines.     Left knee pain -check knee x-ray.  9/23 negative.     Chronic systolic heart failure, not in acute exacerbation - preserved ejection fraction-LVEF 60%     CAD s/p CABG -continue aspirin and statin     Hypertension -amlodipine, hydralazine, lisinopril, metoprolol.  9/26 uncontrolled.  Hospitalist managing.     Azotemia -9/22 improving, monitor.  9/24 resolved.  9/26 resolved.    Hypokalemia -9/22 mildly low at 3.5.  Hospitalist following.     Leukocytosis -9/30 resolved    Anemia -9/30 improving at 13     Pain -Tylenol and oxycodone     Bowel -patient unsure when last BM was.  Check KUB.  Moderate stool burden.  Had small bowel movement 9/22.  Increase bowel meds 9/23. 9/25 Give MoM x1. Had bowel movement 9/26       Upcoming Labs/imaging: 10/2     DVT PROPHYLAXIS: Lovenox 40mg SQ nightly      HOSPITALIST FOLLOWING: Yes - d/w hospitalist 9/30     CODE STATUS: FULL CODE     DISPO: Home with family      HEBER: 10/4     MEDS SENT TO: [facility]     DISCHARGE SPECIALIST FOLLOW UP: ID, Ortho, Stroke Bridge     Patient to scheduled follow up with their  PCP within 2 weeks from discharge from the [facility].   ____________________________________    Dr. Khushbu Meza DO, MS  [facility] - Physical Medicine & Rehabilitation   ____________________________________

## 2024-09-30 NOTE — PROGRESS NOTES
Hospital Medicine Daily Progress Note        Chief Complaint:  Hypertension  Azotemia    Interval History:  Pt seen and examined in corridor.  Denies new complaints.  Labs reviewed; leukocytosis finally resolved.    Review of Systems  Review of Systems   Constitutional:  Negative for chills and fever.   HENT: Negative.     Eyes: Negative.    Respiratory:  Negative for cough and shortness of breath.    Cardiovascular:  Negative for chest pain and palpitations.   Gastrointestinal:  Negative for abdominal pain, nausea and vomiting.   Musculoskeletal:  Positive for joint pain.        Wound pain   Skin:  Negative for itching and rash.   Endo/Heme/Allergies:  Negative for polydipsia. Does not bruise/bleed easily.        Physical Exam  Temp:  [36.6 °C (97.9 °F)-36.8 °C (98.2 °F)] 36.7 °C (98.1 °F)  Pulse:  [69-75] 72  Resp:  [18] 18  BP: (126-144)/(49-69) 140/52  SpO2:  [93 %-96 %] 95 %    Physical Exam  Vitals reviewed.   Constitutional:       General: He is not in acute distress.     Appearance: Normal appearance. He is not ill-appearing.   HENT:      Head: Normocephalic and atraumatic.      Right Ear: External ear normal.      Left Ear: External ear normal.      Nose: Nose normal.      Mouth/Throat:      Pharynx: Oropharynx is clear.   Eyes:      General:         Right eye: No discharge.         Left eye: No discharge.      Extraocular Movements: Extraocular movements intact.      Pupils: Pupils are equal, round, and reactive to light.   Cardiovascular:      Rate and Rhythm: Normal rate and regular rhythm.   Pulmonary:      Effort: No respiratory distress.      Breath sounds: No wheezing.      Comments: Decreased BS  Abdominal:      General: Bowel sounds are normal. There is no distension.      Palpations: Abdomen is soft.      Tenderness: There is no abdominal tenderness.   Musculoskeletal:      Cervical back: Normal range of motion and neck supple.      Right lower leg: No edema.      Left lower leg: No edema.    Skin:     General: Skin is warm and dry.   Neurological:      Mental Status: He is alert.      Comments: Awake and alert         Fluids    Intake/Output Summary (Last 24 hours) at 9/30/2024 1200  Last data filed at 9/30/2024 1155  Gross per 24 hour   Intake 720 ml   Output --   Net 720 ml        Laboratory  Recent Labs     09/30/24  0638   WBC 9.6   RBC 4.14*   HEMOGLOBIN 13.0*   HEMATOCRIT 38.9*   MCV 94.0   MCH 31.4   MCHC 33.4   RDW 41.1   PLATELETCT 330   MPV 11.4     Recent Labs     09/30/24  0638   SODIUM 142   POTASSIUM 3.7   CHLORIDE 109   CO2 22   GLUCOSE 123*   BUN 20   CREATININE 0.91   CALCIUM 9.0                 Assessment/Plan  Vitamin D deficiency  Assessment & Plan  Vit D level 18  On supplementation    Stroke (HCC)- (present on admission)  Assessment & Plan  On ASA and Lipitor  Ongoing management per Physiatry    CAD (coronary artery disease)- (present on admission)  Assessment & Plan  H/O CABG  On ASA, Lipitor, Lisinopril, and Toprol    Septic arthritis of ankle (HCC)- (present on admission)  Assessment & Plan  S/P arthrotomy I+D on 9/14/24 by Dr. Gautam  Wound care and pain control per Physiary  Was to be on IV Vanco through 10/12/24 per ID  Now on Zyvox as pt chewing on IV tubing    Essential (primary) hypertension- (present on admission)  Assessment & Plan  On Norvasc, Lisinopril, Toprol, and Hydralazine  Observe blood pressure trends on increased Toprol    Full Code    Patient seen/examined and chart reviewed; ROS and Assessment/Plan updated.  In review of yesterday's note, there are no changes except as documented above.

## 2024-09-30 NOTE — THERAPY
"Occupational Therapy  Daily Treatment     Patient Name: Joe Templeton  Age:  74 y.o., Sex:  male  Medical Record #: [id number]  Today's Date: 9/30/2024     Precautions  Precautions: (P) Fall Risk  Comments: left knee pain         Subjective    Pt at nurses station, seated in wheelchair, agreeable to OT session. Pt stated it \"felt good\" after UE strengthening.     Objective       09/30/24 1031   OT Charge Group   OT Self Care / ADL (Units) 2   OT Therapeutic Exercise (Units) 2   OT Total Time Spent   OT Individual Total Time Spent (Mins) 60   Precautions   Precautions Fall Risk   Pain 0 - 10 Group   Location Knee   Location Orientation Left   Description Unable to Evaluate   Therapist Pain Assessment During Activity  (Pain during movement of L knee)   Functional Level of Assist   Grooming Standby Assist   Grooming Description Verbal cueing;Supervision for safety;Seated in wheelchair at sink;Set-up of equipment  (Pt required extra time and verbal cues for sequencing for oral care and washing face)   Sitting Upper Body Exercises   Shoulder Press 3 sets of 10;Bilateral  (4#)   Bicep Curls 1 set of 10;2 sets of 10  (1x10 at 6#, 2x10 at 9#)   Pronation / Supination 1 set of 10;Bilateral  (6#)   Wrist Flexion / Extension 3 sets of 10;Bilateral  (4#)   Interdisciplinary Plan of Care Collaboration   Patient Position at End of Therapy Seated;Chair Alarm On  (Seatbelt alarm on, in dining room)         Assessment    Pt continued to be pleasantly confused during session, with some noted frustration due to pain in L knee during Sit>Stands. Pt also was notably fatigued and required verbal prompts to stay away and complete UE strengthening exercises. During self-care activity seated at sink, pt required more verbal cues than previous session for sequencing and correct use of items.  Strengths: Independent prior level of function  Barriers: Decreased endurance, Confused, Impaired activity tolerance, Impulsive, Limited mobility, " Impaired insight/denial of deficits, Impaired functional cognition (Encouragement for pt to participate in ADL's 7:00 a.m.)    Plan    Pt would benefit from skilled OT services to address: safety during ADLs,   functional cognition,   strengthening/endurance,   standing balance/tolerance,  functional mobility     DME  OT DME Recommendations  Bathroom Equipment:  (TBD)    Passport items to be completed:  Perform bathroom transfers, complete dressing, complete feeding, get ready for the day, prepare a simple meal, participate in household tasks, adapt home for safety needs, demonstrate home exercise program, complete caregiver training     Occupational Therapy Goals (Active)       Problem: Bathing       Dates: Start:  09/21/24         Goal: STG-Within one week, patient will bathe with Min A using Adaptive equipment as needed.       Dates: Start:  09/21/24               Problem: Dressing       Dates: Start:  09/21/24         Goal: STG-Within one week, patient will dress LB with Min A using Adaptive equipment as needed.       Dates: Start:  09/21/24               Problem: Functional Cognition       Dates: Start:  09/21/24         Goal: STG-Within one week, patient will be oriented to year,month, and place.       Dates: Start:  09/21/24               Problem: Functional Transfers       Dates: Start:  09/21/24         Goal: STG-Within one week, patient will transfer to toilet with Contact Guard Assistance using DME as needed.       Dates: Start:  09/21/24            Goal: STG-Within one week, patient will transfer to step in shower with Min A using DME as needed.       Dates: Start:  09/21/24               Problem: OT Long Term Goals       Dates: Start:  09/21/24         Goal: LTG-By discharge, patient will complete basic self care tasks at [facility] with UB ADL's and Supervision with LB ADL's.       Dates: Start:  09/21/24            Goal: LTG-By discharge, patient will perform bathroom transfers with Supervision.        Dates: Start:  09/21/24               Problem: Toileting       Dates: Start:  09/21/24         Goal: STG-Within one week, patient will complete toileting tasks with Min A.       Dates: Start:  09/21/24

## 2024-10-01 ENCOUNTER — APPOINTMENT (OUTPATIENT)
Dept: PHYSICAL THERAPY | Facility: REHABILITATION | Age: 74
DRG: 057 | End: 2024-10-01
Attending: PHYSICAL MEDICINE & REHABILITATION
Payer: MEDICARE

## 2024-10-01 ENCOUNTER — APPOINTMENT (OUTPATIENT)
Dept: OCCUPATIONAL THERAPY | Facility: REHABILITATION | Age: 74
DRG: 057 | End: 2024-10-01
Attending: PHYSICAL MEDICINE & REHABILITATION
Payer: MEDICARE

## 2024-10-01 ENCOUNTER — APPOINTMENT (OUTPATIENT)
Dept: SPEECH THERAPY | Facility: REHABILITATION | Age: 74
DRG: 057 | End: 2024-10-01
Attending: PHYSICAL MEDICINE & REHABILITATION
Payer: MEDICARE

## 2024-10-01 PROCEDURE — 97535 SELF CARE MNGMENT TRAINING: CPT

## 2024-10-01 PROCEDURE — 92507 TX SP LANG VOICE COMM INDIV: CPT

## 2024-10-01 PROCEDURE — A9270 NON-COVERED ITEM OR SERVICE: HCPCS | Performed by: HOSPITALIST

## 2024-10-01 PROCEDURE — 97129 THER IVNTJ 1ST 15 MIN: CPT

## 2024-10-01 PROCEDURE — 99232 SBSQ HOSP IP/OBS MODERATE 35: CPT | Performed by: PHYSICAL MEDICINE & REHABILITATION

## 2024-10-01 PROCEDURE — 97110 THERAPEUTIC EXERCISES: CPT

## 2024-10-01 PROCEDURE — 700102 HCHG RX REV CODE 250 W/ 637 OVERRIDE(OP): Performed by: PHYSICAL MEDICINE & REHABILITATION

## 2024-10-01 PROCEDURE — A9270 NON-COVERED ITEM OR SERVICE: HCPCS | Performed by: PHYSICAL MEDICINE & REHABILITATION

## 2024-10-01 PROCEDURE — 700101 HCHG RX REV CODE 250: Performed by: PHYSICAL MEDICINE & REHABILITATION

## 2024-10-01 PROCEDURE — 700102 HCHG RX REV CODE 250 W/ 637 OVERRIDE(OP): Performed by: HOSPITALIST

## 2024-10-01 PROCEDURE — 97116 GAIT TRAINING THERAPY: CPT

## 2024-10-01 PROCEDURE — 99232 SBSQ HOSP IP/OBS MODERATE 35: CPT | Performed by: HOSPITALIST

## 2024-10-01 PROCEDURE — 94760 N-INVAS EAR/PLS OXIMETRY 1: CPT

## 2024-10-01 PROCEDURE — 700111 HCHG RX REV CODE 636 W/ 250 OVERRIDE (IP): Mod: JZ | Performed by: PHYSICAL MEDICINE & REHABILITATION

## 2024-10-01 PROCEDURE — 770005 HCHG ROOM/CARE - REHAB PRIVATE (11*

## 2024-10-01 RX ADMIN — METOPROLOL SUCCINATE 100 MG: 100 TABLET, EXTENDED RELEASE ORAL at 05:26

## 2024-10-01 RX ADMIN — HYDRALAZINE HYDROCHLORIDE 100 MG: 50 TABLET ORAL at 05:26

## 2024-10-01 RX ADMIN — QUETIAPINE FUMARATE 50 MG: 25 TABLET ORAL at 21:31

## 2024-10-01 RX ADMIN — POLYETHYLENE GLYCOL 3350 1 PACKET: 17 POWDER, FOR SOLUTION ORAL at 21:33

## 2024-10-01 RX ADMIN — ATORVASTATIN CALCIUM 80 MG: 40 TABLET, FILM COATED ORAL at 21:31

## 2024-10-01 RX ADMIN — LINEZOLID 600 MG: 600 TABLET, FILM COATED ORAL at 08:35

## 2024-10-01 RX ADMIN — POLYETHYLENE GLYCOL 3350 1 PACKET: 17 POWDER, FOR SOLUTION ORAL at 08:35

## 2024-10-01 RX ADMIN — SENNOSIDES AND DOCUSATE SODIUM 2 TABLET: 50; 8.6 TABLET ORAL at 08:35

## 2024-10-01 RX ADMIN — LINEZOLID 600 MG: 600 TABLET, FILM COATED ORAL at 21:32

## 2024-10-01 RX ADMIN — HYDRALAZINE HYDROCHLORIDE 100 MG: 50 TABLET ORAL at 14:34

## 2024-10-01 RX ADMIN — HYDRALAZINE HYDROCHLORIDE 100 MG: 50 TABLET ORAL at 21:31

## 2024-10-01 RX ADMIN — SENNOSIDES AND DOCUSATE SODIUM 2 TABLET: 50; 8.6 TABLET ORAL at 21:32

## 2024-10-01 RX ADMIN — ENOXAPARIN SODIUM 40 MG: 100 INJECTION SUBCUTANEOUS at 17:27

## 2024-10-01 RX ADMIN — ASPIRIN 81 MG: 81 TABLET, CHEWABLE ORAL at 08:34

## 2024-10-01 RX ADMIN — ACETAMINOPHEN 650 MG: 325 TABLET ORAL at 07:41

## 2024-10-01 RX ADMIN — Medication 1000 UNITS: at 08:35

## 2024-10-01 RX ADMIN — AMLODIPINE BESYLATE 10 MG: 5 TABLET ORAL at 05:26

## 2024-10-01 RX ADMIN — LIDOCAINE 2 PATCH: 4 PATCH TOPICAL at 08:34

## 2024-10-01 RX ADMIN — LISINOPRIL 40 MG: 20 TABLET ORAL at 08:35

## 2024-10-01 RX ADMIN — OMEPRAZOLE 20 MG: 20 CAPSULE, DELAYED RELEASE ORAL at 08:35

## 2024-10-01 ASSESSMENT — GAIT ASSESSMENTS
GAIT LEVEL OF ASSIST: STANDBY ASSIST
ASSISTIVE DEVICE: FRONT WHEEL WALKER
GAIT LEVEL OF ASSIST: CONTACT GUARD ASSIST
DISTANCE (FEET): 150
ASSISTIVE DEVICE: FRONT WHEEL WALKER
DEVIATION: STEP TO;BRADYKINETIC;DECREASED HEEL STRIKE
DEVIATION: ANTALGIC;BRADYKINETIC;DECREASED HEEL STRIKE;DECREASED TOE OFF

## 2024-10-01 ASSESSMENT — ENCOUNTER SYMPTOMS
ABDOMINAL PAIN: 0
NAUSEA: 0
FEVER: 0
NERVOUS/ANXIOUS: 0
CHILLS: 0
SHORTNESS OF BREATH: 0
VOMITING: 0
DIARRHEA: 0

## 2024-10-01 ASSESSMENT — ACTIVITIES OF DAILY LIVING (ADL)
BED_CHAIR_WHEELCHAIR_TRANSFER_DESCRIPTION: SUPERVISION FOR SAFETY;VERBAL CUEING;SET-UP OF EQUIPMENT
TUB_SHOWER_TRANSFER_DESCRIPTION: SET-UP OF EQUIPMENT;VERBAL CUEING;SUPERVISION FOR SAFETY
BED_CHAIR_WHEELCHAIR_TRANSFER_DESCRIPTION: ADAPTIVE EQUIPMENT;INCREASED TIME;INITIAL PREPARATION FOR TASK;SET-UP OF EQUIPMENT;SUPERVISION FOR SAFETY;VERBAL CUEING

## 2024-10-01 NOTE — CARE PLAN
"The patient is Stable - Low risk of patient condition declining or worsening    Shift Goals  Clinical Goals: Rest and safety  Patient Goals: Safety    Progress made toward(s) clinical / shift goals:    Problem: Fall Risk - Rehab  Goal: Patient will remain free from falls  Outcome: Progressing  Note: Nikole Cesar Fall risk Assessment Score: 19    High fall risk Interventions   - Bed and strip alarm   - Yellow sign by the door   - Yellow wrist band \"Fall risk\"  - Room near to the nurse station  - Do not leave patient unattended in the bathroom  - Fall risk education provided     Problem: Pain - Standard  Goal: Alleviation of pain or a reduction in pain to the patient’s comfort goal  Outcome: Progressing  Note: Patient states that he is not currently in pain, and shows no signs of distress or discomfort.        Patient is not progressing towards the following goals:      "

## 2024-10-01 NOTE — PROGRESS NOTES
"  Physical Medicine & Rehabilitation Progress Note    Encounter Date: 10/1/2024    Chief Complaint: Does not feel    Interval Events (Subjective):  Blood pressure intermittently elevated  BM 9/30  Voiding volitionally    Patient seen and examined in his room.  Patient states that he does not feel good, on further clarification he states that medically he feels fine but he does not feel good that he has to be here and he is wondering if he ever gets to leave.  Discussed with him that his discharge date is Friday and that his sister-in-law said she is going to come get him.  This makes him feel better.  He is doing well otherwise.  Discussed with him he needs to use the call light and we can get rid of the enclosure bed.    ROS: 14 point ROS negative unless otherwise specified in the HPI    Objective:  VITAL SIGNS: /58   Pulse 78   Temp 36.4 °C (97.6 °F) (Temporal)   Resp 16   Ht 1.753 m (5' 9\")   Wt 77.1 kg (170 lb)   SpO2 94%   BMI 25.10 kg/m²     GEN: No apparent distress  HEENT: Head normocephalic, atraumatic.  Sclera nonicteric bilaterally, no ocular discharge appreciated bilaterally.  CV: Extremities warm and well-perfused, no peripheral edema appreciated bilaterally.  PULMONARY: Breathing nonlabored on room air, no respiratory accessory muscle use.  Not requiring supplemental oxygen.  SKIN:   9/30    PSYCH: Mood and affect within normal limits.  NEURO: Awake alert.  Pleasantly confused. Disoriented to date, day, month, year. Knows we are in [facility].       Laboratory Values:  Recent Results (from the past 72 hour(s))   CBC WITH DIFFERENTIAL    Collection Time: 09/30/24  6:38 AM   Result Value Ref Range    WBC 9.6 4.8 - 10.8 K/uL    RBC 4.14 (L) 4.70 - 6.10 M/uL    Hemoglobin 13.0 (L) 14.0 - 18.0 g/dL    Hematocrit 38.9 (L) 42.0 - 52.0 %    MCV 94.0 81.4 - 97.8 fL    MCH 31.4 27.0 - 33.0 pg    MCHC 33.4 32.3 - 36.5 g/dL    RDW 41.1 35.9 - 50.0 fL    Platelet Count 330 164 - 446 K/uL    MPV 11.4 9.0 - " 12.9 fL    Neutrophils-Polys 69.70 44.00 - 72.00 %    Lymphocytes 17.20 (L) 22.00 - 41.00 %    Monocytes 10.10 0.00 - 13.40 %    Eosinophils 1.90 0.00 - 6.90 %    Basophils 0.70 0.00 - 1.80 %    Immature Granulocytes 0.40 0.00 - 0.90 %    Nucleated RBC 0.00 0.00 - 0.20 /100 WBC    Neutrophils (Absolute) 6.71 1.82 - 7.42 K/uL    Lymphs (Absolute) 1.66 1.00 - 4.80 K/uL    Monos (Absolute) 0.97 (H) 0.00 - 0.85 K/uL    Eos (Absolute) 0.18 0.00 - 0.51 K/uL    Baso (Absolute) 0.07 0.00 - 0.12 K/uL    Immature Granulocytes (abs) 0.04 0.00 - 0.11 K/uL    NRBC (Absolute) 0.00 K/uL   Comp Metabolic Panel    Collection Time: 09/30/24  6:38 AM   Result Value Ref Range    Sodium 142 135 - 145 mmol/L    Potassium 3.7 3.6 - 5.5 mmol/L    Chloride 109 96 - 112 mmol/L    Co2 22 20 - 33 mmol/L    Anion Gap 11.0 7.0 - 16.0    Glucose 123 (H) 65 - 99 mg/dL    Bun 20 8 - 22 mg/dL    Creatinine 0.91 0.50 - 1.40 mg/dL    Calcium 9.0 8.5 - 10.5 mg/dL    Correct Calcium 9.6 8.5 - 10.5 mg/dL    AST(SGOT) 28 12 - 45 U/L    ALT(SGPT) 35 2 - 50 U/L    Alkaline Phosphatase 64 30 - 99 U/L    Total Bilirubin 0.4 0.1 - 1.5 mg/dL    Albumin 3.2 3.2 - 4.9 g/dL    Total Protein 6.3 6.0 - 8.2 g/dL    Globulin 3.1 1.9 - 3.5 g/dL    A-G Ratio 1.0 g/dL   ESTIMATED GFR    Collection Time: 09/30/24  6:38 AM   Result Value Ref Range    GFR (CKD-EPI) 89 >60 mL/min/1.73 m 2       Medications:  Scheduled Medications   Medication Dose Frequency    linezolid  600 mg Q12HRS    metoprolol SR  100 mg DAILY    senna-docusate  2 Tablet BID    And    polyethylene glycol/lytes  1 Packet BID    hydrALAZINE  100 mg Q8HRS    vitamin D3  1,000 Units DAILY    Pharmacy Consult Request  1 Each PHARMACY TO DOSE    lidocaine  1-2 Patch Daily-0800    omeprazole  20 mg DAILY    amLODIPine  10 mg Q DAY    enoxaparin (LOVENOX) injection  40 mg DAILY AT 1800    lisinopril  40 mg DAILY    aspirin  81 mg DAILY    atorvastatin  80 mg Q EVENING    QUEtiapine  50 mg Nightly     PRN  medications: QUEtiapine, hydrALAZINE, lactulose, docusate sodium, bisacodyl EC, magnesium hydroxide, sodium phosphate, carboxymethylcellulose, benzocaine-menthol, mag hydrox-al hydrox-simeth, ondansetron **OR** ondansetron, traZODone, sodium chloride, acetaminophen, ziprasidone, traMADol, formulation r    Diet:  Current Diet Order   Procedures    Diet Order Diet: Level 6 - Soft and Bite Sized (Set pt up w/ meal- he can then self-feed independently. Pills whole.); Liquid level: Level 0 - Thin; Tray Modifications (optional): SLP - Deliver to Nursing Station       Medical Decision Making and Plan:  Multifocal embolic appearing infarcts: Right insula, left parietal lobe, right parietal lobe, left hao  MRI with multiple old infarcts  Dysphagia  Encephalopathy  PT and OT for mobility and ADLs. Per guidelines, 15 hours per week between PT, OT and/or SLP.  Follow-up stroke Bridge clinic  Secondary stroke prophylaxis: Aspirin and statin   Continue Seroquel at night     I certify that the patient currently requires non-pharmacologic restraints. Non-pharmacologic restraints are required to reduce the potential for the patient to harm themselves or others, to limit violent behaviors, and to allow proper assessment and care. I have completed a face to face assessment of this patient on 10/1/2024. Alternative methods including but not limited to de-escalation techniques, redirection, and pharmacologic treatment have been attempted but patient currently remains at risk without restraints. Our staff has been trained on restraints and patient is currently placed in video monitored room.  The need for these restraints is assessed by a rehabilitation physician every 24 hours and use of restraints is minimized when appropriate.  Current diagnosis which requires restraints: Encephalopathy. Current restraints required: Enclosure bed, bilateral mitts to prevent pulling out midline which is needed for IV antibiotics.       Septic  arthritis left ankle s/p left ankle arthrotomy and I&D 9/14/2024 Dr. Gautam -weightbearing as tolerated left lower extremity.  ID followed.  Continue vancomycin twice daily through 10/11.  Potentially can switch to Zyvox.  9/23 patient pulled out midline will need new access.  9/24 pulled out midline again, admits an attempt to help prevent him pulling it out.  9/26 has midline.  Continue IV vancomycin per ID.  If absolutely necessary at discharge can switch to linezolid 600 mg twice a day with stop date of 10/11.  If goes to skilled nursing facility can continue IV vancomycin.  Needs follow-up with ID.    9/30/2024  increased pain left lower extremity.  X-ray unremarkable.  Switch IV vancomycin to oral linezolid with end date of 10/11 starting 10/1.  Patient continues to pull out peripheral IVs and midlines.  He has had over 7 different lines placed since last week.  Could help with agitation if he does not have mitts and IV lines.    10/1 more pain with weightbearing with therapy 9/30.  X-ray unremarkable for acute fracture.     Left knee pain -check knee x-ray.  9/23 negative.     Chronic systolic heart failure, not in acute exacerbation - preserved ejection fraction-LVEF 60%     CAD s/p CABG -continue aspirin and statin     Hypertension -amlodipine, hydralazine, lisinopril, metoprolol.  9/26 uncontrolled.  Hospitalist managing.     Azotemia -9/22 improving, monitor.  9/24 resolved.  9/26 resolved.    Hypokalemia -9/22 mildly low at 3.5.  Hospitalist following.     Leukocytosis -9/30 resolved    Anemia -9/30 improving at 13     Pain -Tylenol and oxycodone     Bowel -patient unsure when last BM was.  Check KUB.  Moderate stool burden.  Had small bowel movement 9/22.  Increase bowel meds 9/23. 9/25 Give MoM x1. Had bowel movement 9/26.  10/1 having regular bowel movements.       Upcoming Labs/imaging: 10/2     DVT PROPHYLAXIS: Lovenox 40mg SQ nightly      HOSPITALIST FOLLOWING: Yes - d/w hospitalist 10/1     CODE  STATUS: FULL CODE     DISPO: Home with family      HEBER: 10/4     MEDS SENT TO: [facility]     DISCHARGE SPECIALIST FOLLOW UP: ID, Ortho, Stroke Bridge     Patient to scheduled follow up with their PCP within 2 weeks from discharge from the [facility].   ____________________________________    Dr. Khushbu Meza DO, MS  [facility] - Physical Medicine & Rehabilitation   ____________________________________

## 2024-10-01 NOTE — PROGRESS NOTES
[facility] Medicine Daily Progress Note    Date of Service  10/1/2024    Chief Complaint:  Hypertension    Interval History:  No complaints.  Doing ok.  Sleeps a lot.    Review of Systems  Review of Systems   Constitutional:  Negative for chills and fever.   Respiratory:  Negative for shortness of breath.    Cardiovascular:  Negative for chest pain.   Gastrointestinal:  Negative for abdominal pain, diarrhea, nausea and vomiting.   Psychiatric/Behavioral:  The patient is not nervous/anxious.         Physical Exam  Temp:  [36.4 °C (97.6 °F)-36.8 °C (98.2 °F)] 36.8 °C (98.2 °F)  Pulse:  [60-81] 71  Resp:  [16-18] 18  BP: (108-166)/(58-76) 124/67  SpO2:  [94 %-96 %] 96 %    Physical Exam  Vitals and nursing note reviewed.   Constitutional:       Appearance: Normal appearance.   HENT:      Head: Atraumatic.   Eyes:      Conjunctiva/sclera: Conjunctivae normal.      Pupils: Pupils are equal, round, and reactive to light.   Cardiovascular:      Rate and Rhythm: Normal rate and regular rhythm.      Heart sounds: No murmur heard.  Pulmonary:      Effort: Pulmonary effort is normal.      Breath sounds: No stridor. No wheezing or rales.   Abdominal:      General: There is no distension.      Palpations: Abdomen is soft.      Tenderness: There is no abdominal tenderness.   Musculoskeletal:      Cervical back: Normal range of motion and neck supple.      Right lower leg: No edema.      Left lower leg: No edema.   Skin:     General: Skin is warm and dry.      Findings: No rash.   Psychiatric:         Mood and Affect: Mood normal.         Behavior: Behavior normal.         Fluids    Intake/Output Summary (Last 24 hours) at 10/1/2024 1128  Last data filed at 10/1/2024 0900  Gross per 24 hour   Intake 960 ml   Output --   Net 960 ml        Laboratory  Recent Labs     09/30/24  0638   WBC 9.6   RBC 4.14*   HEMOGLOBIN 13.0*   HEMATOCRIT 38.9*   MCV 94.0   MCH 31.4   MCHC 33.4   RDW 41.1   PLATELETCT 330   MPV 11.4     Recent Labs      09/30/24  0638   SODIUM 142   POTASSIUM 3.7   CHLORIDE 109   CO2 22   GLUCOSE 123*   BUN 20   CREATININE 0.91   CALCIUM 9.0                   Imaging    Assessment/Plan  Vitamin D deficiency  Assessment & Plan  Vit D level 18  On supplementation    Stroke (HCC)- (present on admission)  Assessment & Plan  Has hx of old CVA's  S/P recent CVA  MRI showed multiple old infarcts and multiple acute infarcts (including the hao)  Cont ASA and Lipitor    CAD (coronary artery disease)- (present on admission)  Assessment & Plan  Hx of CABG  Cont ASA, Lipitor  Cont Lisinopril  Cont Toprol XL    Septic arthritis of ankle (HCC)- (present on admission)  Assessment & Plan  S/P I+D on 9/14/24  Off Vanco  Now on Zyvox -- as pt chewing on IV tubing (thru 10/11)    Essential (primary) hypertension- (present on admission)  Assessment & Plan  BP better recently  Cont Norvasc  Cont Lisinopril  Cont Hydralazine  Cont Toprol XL -- dose recently increased  Will monitor another day since meds were recently adjusted

## 2024-10-01 NOTE — THERAPY
"Occupational Therapy  Daily Treatment     Patient Name: Joe Templeton  Age:  74 y.o., Sex:  male  Medical Record #: [id number]  Today's Date: 10/1/2024     Precautions  Precautions: (P) Fall Risk  Comments:          Subjective    Pt in posey bed at beginning of therapy. Pt agreeable to a shower and reported no pain as the \"pain comes and goes.\"       Objective       10/01/24 0901   OT Charge Group   OT Self Care / ADL (Units) 3   OT Cognitive Skill Development First 15 Minutes (Units) 1   OT Therapeutic Exercise (Units) 1   OT Total Time Spent   OT Individual Total Time Spent (Mins) 60   Precautions   Precautions Fall Risk   Pain   Intervention Declines   Functional Level of Assist   Grooming Standby Assist   Grooming Description Standing at sink;Verbal cueing;Supervision for safety  (Pt able to complete oral care standing at sink, using items appropriately.)   Bathing Contact Guard Assist   Bathing Description Grab bar;Hand held shower;Hand rails;Tub bench;Set-up of equipment;Set up for wound protection;Supervision for safety;Verbal cueing   Upper Body Dressing Supervision   Upper Body Dressing Description Supervision for safety   Lower Body Dressing Standby Assist   Lower Body Dressing Description Verbal cueing;Supervision for safety;Set-up of equipment  (Pt reported improved pain level and able to use Figure4 BLE to don/doff socks, underwear and pants)   Bed, Chair, Wheelchair Transfer Contact Guard Assist   Bed Chair Wheelchair Transfer Description Supervision for safety;Verbal cueing;Set-up of equipment   Tub / Shower Transfers Contact Guard Assist   Tub Shower Transfer Description Set-up of equipment;Verbal cueing;Supervision for safety   Sitting Upper Body Exercises   Bicep Curls 1 set of 10  (9#)   Comments 1x5 forward flexion to 90*, to 180, and horizontal ABD, no weight   Hand Strengthening   Comment 2x10, first flexion and extension   Interdisciplinary Plan of Care Collaboration   IDT Collaboration with  " Certified Nursing Assistant   Patient Position at End of Therapy Seated;Bed Alarm On  (Seatbelt alarm on)         Assessment    Pt participated well in therapy and improved on functional transfers, bathing, and LB dressing due to no reported pain. Pt completed standing grooming activity at sink and used items appropriately, an improvement from yesterday. Pt required less verbal cues for safety and sequencing.   Strengths: Independent prior level of function  Barriers: Decreased endurance, Confused, Impaired activity tolerance, Impulsive, Limited mobility, Impaired insight/denial of deficits, Impaired functional cognition (Encouragement for pt to participate in ADL's 7:00 a.m.)    Plan      Pt would benefit from skilled OT services to address: safety during ADLs,   functional cognition,   strengthening/endurance,   standing balance/tolerance,  functional mobility     DME  OT DME Recommendations  Bathroom Equipment:  (TBD)    Passport items to be completed:  Perform bathroom transfers, complete dressing, complete feeding, get ready for the day, prepare a simple meal, participate in household tasks, adapt home for safety needs, demonstrate home exercise program, complete caregiver training     Occupational Therapy Goals (Active)       Problem: Bathing       Dates: Start:  09/21/24         Goal: STG-Within one week, patient will bathe with Min A using Adaptive equipment as needed.       Dates: Start:  09/21/24               Problem: Dressing       Dates: Start:  09/21/24         Goal: STG-Within one week, patient will dress LB with Min A using Adaptive equipment as needed.       Dates: Start:  09/21/24               Problem: Functional Cognition       Dates: Start:  09/21/24         Goal: STG-Within one week, patient will be oriented to year,month, and place.       Dates: Start:  09/21/24               Problem: Functional Transfers       Dates: Start:  09/21/24         Goal: STG-Within one week, patient will transfer to  toilet with Contact Guard Assistance using DME as needed.       Dates: Start:  09/21/24            Goal: STG-Within one week, patient will transfer to step in shower with Min A using DME as needed.       Dates: Start:  09/21/24               Problem: OT Long Term Goals       Dates: Start:  09/21/24         Goal: LTG-By discharge, patient will complete basic self care tasks at [facility] with UB ADL's and Supervision with LB ADL's.       Dates: Start:  09/21/24            Goal: LTG-By discharge, patient will perform bathroom transfers with Supervision.       Dates: Start:  09/21/24               Problem: Toileting       Dates: Start:  09/21/24         Goal: STG-Within one week, patient will complete toileting tasks with Min A.       Dates: Start:  09/21/24

## 2024-10-01 NOTE — THERAPY
"Speech Language Pathology  Daily Treatment     Patient Name: Joe Templeton  Age:  74 y.o., Sex:  male  Medical Record #: [id number]  Today's Date: 10/1/2024     Precautions  Precautions: Fall Risk  Comments: left knee pain    Subjective    Pt agreeable to therapy, fatigued this session, reports not sleeping well last night.      Objective       10/01/24 1004   Treatment Charges   SLP Treatment - Individual Speech Language Treatment - Individual   SLP Total Time Spent   SLP Individual Total Time Spent (Mins) 60   Receptive Language / Auditory Comprehension   Identifies Pictures Moderate (3)   Interdisciplinary Plan of Care Collaboration   IDT Collaboration with  Nursing   Patient Position at End of Therapy Seated;Chair Alarm On;Self Releasing Lap Belt Applied;Call Light within Reach   Collaboration Comments informed nursing of pt location in room         Assessment    Receptive language, following directions targeted this session: Matching pictures, letters, numbers. Initially tried to match like items across 2 columns, however, pt demonstrates difficulty comprehending, requiring hand over hand assist to complete. When task was switched to materials presented in horizontal single line pt able to match like items with 100% accuracy when visual field narrowed to single line. Pt initially unable to follow command to \"[place]\" items despite multiple verbal cues and demonstrations, instead shading in each like item. As trials progressed, pt was able to eventually [place] items on last trial.    Strengths: Pleasant and cooperative, Supportive family, Motivated for self care and independence, Willingly participates in therapeutic activities  Barriers: Aphasia expressive, Aphasia receptive, Impaired functional cognition, Impaired carryover of learning    Plan    Continue to target receptive and expressive language    Passport items to be completed:  Express basic needs, understand food/liquid recommendations, consistently " follow swallow precautions, manage finances, manage medications, arrive to therapy appointments on time, complete daily memory log entries, solve problems related to safety situations, review education related to hospitalization, complete caregiver training     Speech Therapy Problems (Active)       Problem: Comprehension STGs       Dates: Start:  09/24/24         Goal: STG-Within one week, patient will point to pictures in FO3 with 80% accuracy.       Dates: Start:  09/24/24               Problem: Expression STGs       Dates: Start:  09/24/24         Goal: STG-Within one week, patient will state antonym given verbal prompt in 7/10 trials       Dates: Start:  09/24/24               Problem: Memory STGs       Dates: Start:  09/21/24         Goal: STG-Within one week, patient will       Dates: Start:  09/21/24       Description: 1) Individualized goal:  be oriented to day, month, year, and situation w/ 90% acc and MIN A.   2) Interventions:  SLP Self Care / ADL Training , SLP Cognitive Skill Development, and SLP Group Treatment          Goal Note filed on 09/24/24 1607 by Blanca Altamirano MS,CCC-SLP       Will continue to target, emphasis of ST will likely be focused on receptive and expressive language.                 Problem: Speech/Swallowing LTGs       Dates: Start:  09/21/24         Goal: LTG-By discharge, patient will solve basic problems       Dates: Start:  09/21/24       Description: 1) Individualized goal:  related to health and safety with 80% acc and MOD I for a safe D/C to PLOF.   2) Interventions:  SLP Self Care / ADL Training , SLP Cognitive Skill Development, and SLP Group Treatment

## 2024-10-01 NOTE — PROGRESS NOTES
NURSING DAILY NOTE    Name: Joe Templeton   Date of Admission: 9/20/2024   Admitting Diagnosis: No Principal Problem: There is no principal problem currently on the Problem List. Please update the Problem List and refresh.  Attending Physician: ELIZABETH HOLM D.O.  Allergies: Patient has no known allergies.    Safety  Patient Assist  mod to max  Patient Precautions  Fall Risk  Precaution Comments  left knee pain  Bed Transfer Status  Minimal Assist (stand step(hop ) bed > wc with FWW)  Toilet Transfer Status   Contact Guard Assist  Assistive Devices  Gait Belt, Wheelchair  Oxygen  None - Room Air  Diet/Therapeutic Dining  Current Diet Order   Procedures    Diet Order Diet: Level 6 - Soft and Bite Sized (Set pt up w/ meal- he can then self-feed independently. Pills whole.); Liquid level: Level 0 - Thin; Tray Modifications (optional): SLP - Deliver to Nursing Station     Pill Administration  whole  Agitated Behavioral Scale  16  ABS Level of Severity  No Agitation    Fall Risk  Has the patient had a fall this admission?   No  Nikole Cesar Fall Risk Scoring  19, HIGH RISK  Fall Risk Safety Measures  bed alarm, chair alarm, posey bed , and poor balance    Vitals  Temperature: 36.4 °C (97.6 °F)  Temp src: Temporal  Pulse: 81  Respiration: 18  Blood Pressure : (!) 166/76  Blood Pressure MAP (Calculated): 106 MM HG  BP Location: Right, Upper Arm  Patient BP Position: Andres's Position     Oxygen  Pulse Oximetry: 94 %  O2 (LPM): 0 (posey bed)  O2 Delivery Device: None - Room Air    Bowel and Bladder  Last Bowel Movement  09/30/24  Stool Type  Type 5: Soft blob with clear cut edges (passed easily)  Bowel Device  Bathroom  Continent  Bladder: Did not void   Bowel: No movement  Bladder Function  Urine Void (mL):  (moderate vd)  Number of Times Voided: 1  Urinary Options: Yes  Urine Color: Unable To Evaluate  Straight Catheter: 550 ml  Wet Diaper Count:  1  Genitourinary Assessment   Bladder Assessment (WDL):  Within Defined Limits  Silva Catheter: Not Applicable  Urine Color: Unable To Evaluate  Bladder Device: Bathroom  Time Void: Yes  Bladder Scan: Post Void  $ Bladder Scan Results (mL): 123    Skin  Colt Score   17  Sensory Interventions   Bed Types: Standard/Trauma Mattress  Skin Preventative Measures: Pillows in Use for Support / Positioning  Moisture Interventions  Moisturizers/Barriers: Barrier Paste, Barrier Wipes      Pain  Pain Rating Scale  0 - No Pain  Pain Location  Foot  Pain Location Orientation  Left  Pain Interventions   Declines    ADLs    Bathing   Partial Bed Bath, * * With Assistance from, Staff  Linen Change   Complete  Personal Hygiene  Change Dariana Pads, Moist Dariana Wipes, Perineal Care  Chlorhexidine Bath      Oral Care     Teeth/Dentures     Shave     Nutrition Percentage Eaten  Dinner, Between % Consumed  Environmental Precautions  Treaded Slipper Socks on Patient, Communication Sign for Patients & Families, Mobility Assessed & Appropriate Sign Placed  Patient Turns/Positioning  Supine  Patient Turns Assistance/Tolerance  Assistance of One  Bed Positions  Bed Controls On, Bed Locked  Head of Bed Elevated  Less than 30 degrees      Psychosocial/Neurologic Assessment  Psychosocial Assessment  Psychosocial (WDL):  WDL Except  Patient Behaviors: Confused, Forgetful  Neurologic Assessment  Neuro (WDL): Within Defined Limits  Level of Consciousness: Alert  Orientation Level: Oriented to person  Cognition: Follows commands, Impulsive, Confused in conversation  Speech: Clear  Pupil Assesment: No  Motor Function/Sensation Assessment: Motor strength  Muscle Strength Right Arm: Good Strength Against Gravity and Moderate Resistance  Muscle Strength Left Arm: Good Strength Against Gravity and Moderate Resistance  Muscle Strength Right Leg: Fair Strength against Gravity but No Resistance  LLE Motor Response: Responds to commands  Muscle Strength  Left Leg: Fair Strength against Gravity but No Resistance  EENT (WDL):  WDL Except    Cardio/Pulmonary Assessment  Edema   LLE Edema: 1+  Respiratory Breath Sounds  RUL Breath Sounds: Clear  RML Breath Sounds: Clear  RLL Breath Sounds: Diminished  YONI Breath Sounds: Clear  LLL Breath Sounds: Diminished  Cardiac Assessment   Cardiac (WDL):  Within Defined Limits

## 2024-10-01 NOTE — PROGRESS NOTES
NURSING DAILY NOTE    Name: Joe Templeton   Date of Admission: 9/20/2024   Admitting Diagnosis: No Principal Problem: There is no principal problem currently on the Problem List. Please update the Problem List and refresh.  Attending Physician: ELIZABETH HOLM D.O.  Allergies: Patient has no known allergies.    Safety  Patient Assist  mod to max  Patient Precautions  Fall Risk  Precaution Comments  left knee pain  Bed Transfer Status  Contact Guard Assist  Toilet Transfer Status   Contact Guard Assist  Assistive Devices  Gait Belt, Wheelchair  Oxygen  None - Room Air  Diet/Therapeutic Dining  Current Diet Order   Procedures    Diet Order Diet: Level 6 - Soft and Bite Sized (Set pt up w/ meal- he can then self-feed independently. Pills whole.); Liquid level: Level 0 - Thin; Tray Modifications (optional): SLP - Deliver to Nursing Station     Pill Administration  whole  Agitated Behavioral Scale  16  ABS Level of Severity  No Agitation    Fall Risk  Has the patient had a fall this admission?   No  Nikole Cesar Fall Risk Scoring  19, HIGH RISK  Fall Risk Safety Measures  bed alarm, chair alarm, posey bed , poor balance, and low vision/ hearing    Vitals  Temperature: 36.7 °C (98.1 °F)  Temp src: Oral  Pulse: 64  Respiration: 18  Blood Pressure : 123/60  Blood Pressure MAP (Calculated): 81 MM HG  BP Location: Left, Upper Arm  Patient BP Position: Sitting     Oxygen  Pulse Oximetry: 95 %  O2 (LPM): 0  O2 Delivery Device: None - Room Air    Bowel and Bladder  Last Bowel Movement  09/30/24  Stool Type  Type 5: Soft blob with clear cut edges (passed easily)  Bowel Device  Bathroom  Continent  Bladder: Did not void   Bowel: No movement  Bladder Function  Urine Void (mL):  (moderate vd)  Number of Times Voided: 1  Urinary Options: Yes  Urine Color: Yellow  Straight Catheter: 550 ml  Wet Diaper Count: 1  Genitourinary Assessment   Bladder Assessment (WDL):  Within  Defined Limits  Silva Catheter: Not Applicable  Urine Color: Yellow  Bladder Device: Bathroom  Time Void: Yes  Bladder Scan: Post Void  $ Bladder Scan Results (mL): 123    Skin  Colt Score   17  Sensory Interventions   Bed Types: Standard/Trauma Mattress  Skin Preventative Measures: Pillows in Use for Support / Positioning  Moisture Interventions  Moisturizers/Barriers: Barrier Paste, Barrier Wipes      Pain  Pain Rating Scale  6 - Hard to ignore, avoid usual activities  Pain Location  Foot, Ankle  Pain Location Orientation  Left  Pain Interventions   Declines    ADLs    Bathing   Partial Bed Bath, * * With Assistance from, Staff  Linen Change   Complete  Personal Hygiene  Change Dariana Pads, Moist Dariana Wipes, Perineal Care  Chlorhexidine Bath      Oral Care     Teeth/Dentures     Shave     Nutrition Percentage Eaten  Lunch  Environmental Precautions  Treaded Slipper Socks on Patient, Communication Sign for Patients & Families, Mobility Assessed & Appropriate Sign Placed  Patient Turns/Positioning  Supine  Patient Turns Assistance/Tolerance  Assistance of One  Bed Positions  Bed Controls On, Bed Locked  Head of Bed Elevated  Less than 30 degrees      Psychosocial/Neurologic Assessment  Psychosocial Assessment  Psychosocial (WDL):  WDL Except  Patient Behaviors: Confused, Forgetful  Neurologic Assessment  Neuro (WDL): Within Defined Limits  Level of Consciousness: Alert  Orientation Level: Oriented to person  Cognition: Follows commands, Impulsive, Confused in conversation  Speech: Clear  Pupil Assesment: No  Motor Function/Sensation Assessment: Motor strength  Muscle Strength Right Arm: Good Strength Against Gravity and Moderate Resistance  Muscle Strength Left Arm: Good Strength Against Gravity and Moderate Resistance  Muscle Strength Right Leg: Fair Strength against Gravity but No Resistance  LLE Motor Response: Responds to commands  Muscle Strength Left Leg: Fair Strength against Gravity but No Resistance  EENT  (WDL):  WDL Except    Cardio/Pulmonary Assessment  Edema   LLE Edema: 1+  Respiratory Breath Sounds  RUL Breath Sounds: Clear  RML Breath Sounds: Clear  RLL Breath Sounds: Clear  YONI Breath Sounds: Clear  LLL Breath Sounds: Clear  Cardiac Assessment   Cardiac (WDL):  Within Defined Limits

## 2024-10-01 NOTE — THERAPY
Physical Therapy   Daily Treatment     Patient Name: Joe Templeton  Age:  74 y.o., Sex:  male  Medical Record #: [id number]  Today's Date: 10/1/2024     Precautions  Precautions: Fall Risk  Comments: left knee pain    Subjective    Pt seated in w/c upon arrival, agreeable to session.      Objective       10/01/24 1431   PT Charge Group   PT Gait Training (Units) 2   Supervising Physical Therapist Herb Cronin   PT Total Time Spent   PT Individual Total Time Spent (Mins) 30   Cognition    Level of Consciousness Alert   Gait Functional Level of Assist    Gait Level Of Assist Contact Guard Assist   Assistive Device Front Wheel Walker   Distance (Feet)   (100+180)   # of Times Distance was Traveled 2   Deviation Step To;Bradykinetic;Decreased Heel Strike   Transfer Functional Level of Assist   Bed, Chair, Wheelchair Transfer Contact Guard Assist   Bed Chair Wheelchair Transfer Description Adaptive equipment;Increased time;Initial preparation for task;Set-up of equipment;Supervision for safety;Verbal cueing  (SPT w/ FWW)   Bed Mobility    Sit to Stand Contact Guard Assist   Scooting Standby Assist   Interdisciplinary Plan of Care Collaboration   IDT Collaboration with  Nursing   Patient Position at End of Therapy Seated  (by back nursing station)     Completed car xfer into [facility] w/ FWW ,CGA and verbal cues     Assessment    Pt tolerated session well, initially he was hesitant WB on LLE but then he was back to WBAT during ambulation. Need cues for path finding but overall performing better comparing to yesterday.      Strengths: Independent prior level of function, Pleasant and cooperative, Willingly participates in therapeutic activities  Barriers: Decreased endurance, Difficulty following instructions, Generalized weakness, Impaired activity tolerance, Impaired balance, Impaired carryover of learning, Impaired insight/denial of deficits, Impaired functional cognition, Impulsive    Plan    Manual therapy left  knee  Transfer sequencing  Ambulation with FWW  LE strengthening     DME  PT DME Recommendations  Assistive Device: Front Wheeled Walker     Passport items to be completed:  Get in/out of bed safely, in/out of a vehicle, safely use mobility device, walk or wheel around home/community, navigate up and down stairs, show how to get up/down from the ground, ensure home is accessible, demonstrate HEP, complete caregiver training    Physical Therapy Problems (Active)       Problem: Balance       Dates: Start:  09/21/24            Problem: Mobility       Dates: Start:  09/21/24         Goal: STG-Within one week, patient will ambulate 150ft with use of LRAD with CGA        Dates: Start:  09/21/24               Problem: Mobility Transfers       Dates: Start:  09/21/24         Goal: STG-Within one week, patient will transfer bed to chair with CGA and min VC with use of LRAD        Dates: Start:  09/21/24            Goal: STG-Within two weeks, patient will transfer in/out of car requiring min VC with proper technique and use of LRAD safely       Dates: Start:  09/21/24               Problem: PT-Long Term Goals       Dates: Start:  09/21/24         Goal: LTG-By discharge, patient will improve dynamic balance from fair+ to good to improve ability to perform ADLs safely with use of LRAD       Dates: Start:  09/21/24            Goal: LTG-By discharge, patient will ambulate 300ft with use of LRAD and supervision assistance.       Dates: Start:  09/21/24            Goal: LTG-By discharge, patient will ambulate up/down 12 stairs with use of single HR and CGA.       Dates: Start:  09/21/24

## 2024-10-02 ENCOUNTER — APPOINTMENT (OUTPATIENT)
Dept: OCCUPATIONAL THERAPY | Facility: REHABILITATION | Age: 74
DRG: 057 | End: 2024-10-02
Attending: PHYSICAL MEDICINE & REHABILITATION
Payer: MEDICARE

## 2024-10-02 ENCOUNTER — APPOINTMENT (OUTPATIENT)
Dept: SPEECH THERAPY | Facility: REHABILITATION | Age: 74
DRG: 057 | End: 2024-10-02
Attending: PHYSICAL MEDICINE & REHABILITATION
Payer: MEDICARE

## 2024-10-02 ENCOUNTER — APPOINTMENT (OUTPATIENT)
Dept: PHYSICAL THERAPY | Facility: REHABILITATION | Age: 74
DRG: 057 | End: 2024-10-02
Attending: PHYSICAL MEDICINE & REHABILITATION
Payer: MEDICARE

## 2024-10-02 LAB
ALBUMIN SERPL BCP-MCNC: 3.5 G/DL (ref 3.2–4.9)
ALBUMIN/GLOB SERPL: 1.1 G/DL
ALP SERPL-CCNC: 68 U/L (ref 30–99)
ALT SERPL-CCNC: 34 U/L (ref 2–50)
ANION GAP SERPL CALC-SCNC: 11 MMOL/L (ref 7–16)
AST SERPL-CCNC: 25 U/L (ref 12–45)
BASOPHILS # BLD AUTO: 0.4 % (ref 0–1.8)
BASOPHILS # BLD: 0.05 K/UL (ref 0–0.12)
BILIRUB SERPL-MCNC: 0.4 MG/DL (ref 0.1–1.5)
BUN SERPL-MCNC: 23 MG/DL (ref 8–22)
CALCIUM ALBUM COR SERPL-MCNC: 9.4 MG/DL (ref 8.5–10.5)
CALCIUM SERPL-MCNC: 9 MG/DL (ref 8.5–10.5)
CHLORIDE SERPL-SCNC: 109 MMOL/L (ref 96–112)
CO2 SERPL-SCNC: 22 MMOL/L (ref 20–33)
CREAT SERPL-MCNC: 0.9 MG/DL (ref 0.5–1.4)
EOSINOPHIL # BLD AUTO: 0.29 K/UL (ref 0–0.51)
EOSINOPHIL NFR BLD: 2.5 % (ref 0–6.9)
ERYTHROCYTE [DISTWIDTH] IN BLOOD BY AUTOMATED COUNT: 39.1 FL (ref 35.9–50)
GFR SERPLBLD CREATININE-BSD FMLA CKD-EPI: 90 ML/MIN/1.73 M 2
GLOBULIN SER CALC-MCNC: 3.3 G/DL (ref 1.9–3.5)
GLUCOSE SERPL-MCNC: 139 MG/DL (ref 65–99)
HCT VFR BLD AUTO: 41.9 % (ref 42–52)
HGB BLD-MCNC: 14.2 G/DL (ref 14–18)
IMM GRANULOCYTES # BLD AUTO: 0.05 K/UL (ref 0–0.11)
IMM GRANULOCYTES NFR BLD AUTO: 0.4 % (ref 0–0.9)
LYMPHOCYTES # BLD AUTO: 1.36 K/UL (ref 1–4.8)
LYMPHOCYTES NFR BLD: 11.9 % (ref 22–41)
MCH RBC QN AUTO: 30.9 PG (ref 27–33)
MCHC RBC AUTO-ENTMCNC: 33.9 G/DL (ref 32.3–36.5)
MCV RBC AUTO: 91.1 FL (ref 81.4–97.8)
MONOCYTES # BLD AUTO: 1.11 K/UL (ref 0–0.85)
MONOCYTES NFR BLD AUTO: 9.7 % (ref 0–13.4)
NEUTROPHILS # BLD AUTO: 8.59 K/UL (ref 1.82–7.42)
NEUTROPHILS NFR BLD: 75.1 % (ref 44–72)
NRBC # BLD AUTO: 0 K/UL
NRBC BLD-RTO: 0 /100 WBC (ref 0–0.2)
PLATELET # BLD AUTO: 357 K/UL (ref 164–446)
PMV BLD AUTO: 11.6 FL (ref 9–12.9)
POTASSIUM SERPL-SCNC: 3.7 MMOL/L (ref 3.6–5.5)
PROT SERPL-MCNC: 6.8 G/DL (ref 6–8.2)
RBC # BLD AUTO: 4.6 M/UL (ref 4.7–6.1)
SODIUM SERPL-SCNC: 142 MMOL/L (ref 135–145)
WBC # BLD AUTO: 11.5 K/UL (ref 4.8–10.8)

## 2024-10-02 PROCEDURE — A9270 NON-COVERED ITEM OR SERVICE: HCPCS | Performed by: HOSPITALIST

## 2024-10-02 PROCEDURE — 97110 THERAPEUTIC EXERCISES: CPT

## 2024-10-02 PROCEDURE — 36415 COLL VENOUS BLD VENIPUNCTURE: CPT

## 2024-10-02 PROCEDURE — 700102 HCHG RX REV CODE 250 W/ 637 OVERRIDE(OP): Performed by: STUDENT IN AN ORGANIZED HEALTH CARE EDUCATION/TRAINING PROGRAM

## 2024-10-02 PROCEDURE — A9270 NON-COVERED ITEM OR SERVICE: HCPCS | Performed by: STUDENT IN AN ORGANIZED HEALTH CARE EDUCATION/TRAINING PROGRAM

## 2024-10-02 PROCEDURE — 700102 HCHG RX REV CODE 250 W/ 637 OVERRIDE(OP): Performed by: HOSPITALIST

## 2024-10-02 PROCEDURE — 770005 HCHG ROOM/CARE - REHAB PRIVATE (11*

## 2024-10-02 PROCEDURE — 700101 HCHG RX REV CODE 250: Performed by: PHYSICAL MEDICINE & REHABILITATION

## 2024-10-02 PROCEDURE — 85025 COMPLETE CBC W/AUTO DIFF WBC: CPT

## 2024-10-02 PROCEDURE — 97112 NEUROMUSCULAR REEDUCATION: CPT

## 2024-10-02 PROCEDURE — 700111 HCHG RX REV CODE 636 W/ 250 OVERRIDE (IP): Mod: JZ | Performed by: PHYSICAL MEDICINE & REHABILITATION

## 2024-10-02 PROCEDURE — A9270 NON-COVERED ITEM OR SERVICE: HCPCS | Performed by: PHYSICAL MEDICINE & REHABILITATION

## 2024-10-02 PROCEDURE — 700102 HCHG RX REV CODE 250 W/ 637 OVERRIDE(OP): Performed by: PHYSICAL MEDICINE & REHABILITATION

## 2024-10-02 PROCEDURE — 92507 TX SP LANG VOICE COMM INDIV: CPT

## 2024-10-02 PROCEDURE — 97535 SELF CARE MNGMENT TRAINING: CPT

## 2024-10-02 PROCEDURE — 97530 THERAPEUTIC ACTIVITIES: CPT

## 2024-10-02 PROCEDURE — 80053 COMPREHEN METABOLIC PANEL: CPT

## 2024-10-02 PROCEDURE — 99232 SBSQ HOSP IP/OBS MODERATE 35: CPT | Performed by: PHYSICAL MEDICINE & REHABILITATION

## 2024-10-02 PROCEDURE — 99232 SBSQ HOSP IP/OBS MODERATE 35: CPT | Performed by: HOSPITALIST

## 2024-10-02 RX ORDER — TRAZODONE HYDROCHLORIDE 50 MG/1
50 TABLET, FILM COATED ORAL
Status: DISCONTINUED | OUTPATIENT
Start: 2024-10-02 | End: 2024-10-07 | Stop reason: HOSPADM

## 2024-10-02 RX ORDER — QUETIAPINE FUMARATE 25 MG/1
50 TABLET, FILM COATED ORAL NIGHTLY
Status: DISCONTINUED | OUTPATIENT
Start: 2024-10-02 | End: 2024-10-03

## 2024-10-02 RX ADMIN — QUETIAPINE FUMARATE 50 MG: 25 TABLET ORAL at 17:42

## 2024-10-02 RX ADMIN — TRAZODONE HYDROCHLORIDE 50 MG: 50 TABLET ORAL at 20:13

## 2024-10-02 RX ADMIN — ASPIRIN 81 MG: 81 TABLET, CHEWABLE ORAL at 08:40

## 2024-10-02 RX ADMIN — HYDRALAZINE HYDROCHLORIDE 100 MG: 50 TABLET ORAL at 14:16

## 2024-10-02 RX ADMIN — Medication 1000 UNITS: at 08:47

## 2024-10-02 RX ADMIN — SENNOSIDES AND DOCUSATE SODIUM 2 TABLET: 50; 8.6 TABLET ORAL at 20:13

## 2024-10-02 RX ADMIN — HYDRALAZINE HYDROCHLORIDE 100 MG: 50 TABLET ORAL at 23:32

## 2024-10-02 RX ADMIN — LIDOCAINE 2 PATCH: 4 PATCH TOPICAL at 08:40

## 2024-10-02 RX ADMIN — ATORVASTATIN CALCIUM 80 MG: 40 TABLET, FILM COATED ORAL at 20:13

## 2024-10-02 RX ADMIN — HYDRALAZINE HYDROCHLORIDE 100 MG: 50 TABLET ORAL at 06:01

## 2024-10-02 RX ADMIN — OMEPRAZOLE 20 MG: 20 CAPSULE, DELAYED RELEASE ORAL at 08:40

## 2024-10-02 RX ADMIN — LINEZOLID 600 MG: 600 TABLET, FILM COATED ORAL at 08:40

## 2024-10-02 RX ADMIN — METOPROLOL SUCCINATE 100 MG: 100 TABLET, EXTENDED RELEASE ORAL at 06:01

## 2024-10-02 RX ADMIN — LINEZOLID 600 MG: 600 TABLET, FILM COATED ORAL at 20:13

## 2024-10-02 RX ADMIN — ENOXAPARIN SODIUM 40 MG: 100 INJECTION SUBCUTANEOUS at 17:44

## 2024-10-02 RX ADMIN — AMLODIPINE BESYLATE 10 MG: 5 TABLET ORAL at 06:01

## 2024-10-02 RX ADMIN — POLYETHYLENE GLYCOL 3350 1 PACKET: 17 POWDER, FOR SOLUTION ORAL at 20:13

## 2024-10-02 RX ADMIN — SENNOSIDES AND DOCUSATE SODIUM 2 TABLET: 50; 8.6 TABLET ORAL at 08:40

## 2024-10-02 RX ADMIN — LISINOPRIL 40 MG: 20 TABLET ORAL at 08:40

## 2024-10-02 ASSESSMENT — GAIT ASSESSMENTS
DISTANCE (FEET): 150
GAIT LEVEL OF ASSIST: CONTACT GUARD ASSIST
ASSISTIVE DEVICE: OTHER (COMMENTS);FRONT WHEEL WALKER
DEVIATION: ANTALGIC;BRADYKINETIC;DECREASED HEEL STRIKE;DECREASED TOE OFF;OTHER (COMMENT)

## 2024-10-02 ASSESSMENT — ENCOUNTER SYMPTOMS
HALLUCINATIONS: 0
VOMITING: 0
HEADACHES: 0
DIZZINESS: 0
PALPITATIONS: 0
NAUSEA: 0
SHORTNESS OF BREATH: 0
BLURRED VISION: 0
FEVER: 0

## 2024-10-02 ASSESSMENT — ACTIVITIES OF DAILY LIVING (ADL): BED_CHAIR_WHEELCHAIR_TRANSFER_DESCRIPTION: SQUAT PIVOT TRANSFER TO WHEELCHAIR;SET-UP OF EQUIPMENT

## 2024-10-02 NOTE — DISCHARGE PLANNING
Case Management/IDT follow up.   Projected dc date: TBD  IDT continues to recommend IRF level of care as patient continue to make progress with all therapies.     DC needs  Home health:  PT/OT/SLP/RN/CNA/SW  DME:  Family training:    Follow up:   PCP:  Other:     I spoke with patient's sister-in-law and provided update from IDT and discussed plan of care. She is concerned that his care level is too much for her and his brother at this time. Requested that referrals be sent to SNF's. Will work on getting the patient out of the soma bed and send referrals.   They are in agreement w/ plan.     Plan:  Continue to follow

## 2024-10-02 NOTE — THERAPY
Speech Language Pathology  Daily Treatment     Patient Name: Joe Templeton  Age:  74 y.o., Sex:  male  Medical Record #: [id number]  Today's Date: 10/2/2024     Precautions  Precautions: Fall Risk  Comments: left knee pain    Subjective    Pt very sleepy this session, per nursing did not sleep well last night. Fatigue impacting participation this session.     Objective       10/02/24 0904   Treatment Charges   SLP Treatment - Individual Speech Language Treatment - Individual   SLP Total Time Spent   SLP Individual Total Time Spent (Mins) 60   Receptive Language / Auditory Comprehension   Follows One Unit Commands Severe (2)   Expressive Language   Naming Severe (2)   Interdisciplinary Plan of Care Collaboration   IDT Collaboration with  Nursing   Patient Position at End of Therapy Seated;Chair Alarm On;Self Releasing Lap Belt Applied;Call Light within Reach   Collaboration Comments CLOF and POC with nursing         Assessment    Following 1-step auditory commands: 0% IND, with model pt was able to achieve 30%. Frequent redirections needed to task 2/2 fatigue.   Given definition of word and choice of 3 - pt able to correctly state target with 20% accuracy. Again, MAX cues for participation.     Strengths: Pleasant and cooperative, Supportive family, Motivated for self care and independence, Willingly participates in therapeutic activities  Barriers: Aphasia expressive, Aphasia receptive, Impaired functional cognition, Impaired carryover of learning    Plan    Complete family training, continue to target receptive/expressive language.    Passport items to be completed:  Express basic needs, uarrive to therapy appointments on time, complete daily memory log entries, solve problems related to safety situations, review education related to hospitalization, complete caregiver training     Speech Therapy Problems (Active)       Problem: Comprehension STGs       Dates: Start:  09/24/24         Goal: STG-Within one week,  patient will point to pictures in FO3 with 80% accuracy.       Dates: Start:  09/24/24               Problem: Expression STGs       Dates: Start:  09/24/24         Goal: STG-Within one week, patient will state antonym given verbal prompt in 7/10 trials       Dates: Start:  09/24/24               Problem: Memory STGs       Dates: Start:  09/21/24         Goal: STG-Within one week, patient will       Dates: Start:  09/21/24       Description: 1) Individualized goal:  be oriented to day, month, year, and situation w/ 90% acc and MIN A.   2) Interventions:  SLP Self Care / ADL Training , SLP Cognitive Skill Development, and SLP Group Treatment          Goal Note filed on 09/24/24 5595 by Blanca Altamirano MS,CCC-SLP       Will continue to target, emphasis of ST will likely be focused on receptive and expressive language.                 Problem: Speech/Swallowing LTGs       Dates: Start:  09/21/24         Goal: LTG-By discharge, patient will solve basic problems       Dates: Start:  09/21/24       Description: 1) Individualized goal:  related to health and safety with 80% acc and MOD I for a safe D/C to PLOF.   2) Interventions:  SLP Self Care / ADL Training , SLP Cognitive Skill Development, and SLP Group Treatment

## 2024-10-02 NOTE — CARE PLAN
Problem: Memory STGs  Goal: STG-Within one week, patient will  Description: 1) Individualized goal:  be oriented to day, month, year, and situation w/ 90% acc and MIN A.   2) Interventions:  SLP Self Care / ADL Training , SLP Cognitive Skill Development, and SLP Group Treatment      Outcome: Not Met  Note: Emphasis of therapy on receptive and expressive language, have not targeted orientation at this time.      Problem: Comprehension STGs  Goal: STG-Within one week, patient will point to pictures in FO3 with 80% accuracy.  Outcome: Not Met  Note: Pt has not been able to achieve 80% accuracy consistently. Have increased to FO4 at times, however, performance varies.      Problem: Expression STGs  Goal: STG-Within one week, patient will state antonym given verbal prompt in 7/10 trials  Outcome: Not Met  Note: Performance fluctuates, unable to achieve 70%.

## 2024-10-02 NOTE — CARE PLAN
"The patient is Stable - Low risk of patient condition declining or worsening    Shift Goals  Clinical Goals: safety  Patient Goals: Safety    Progress made toward(s) clinical / shift goals:  2    Problem: Safety - Medical Restraint  Goal: Remains free of injury from restraints (Restraint for Interference with Medical Device)  Outcome: Progressing: Pt OOB most of day with short rest periods in enclosure bed. Remains free from injury from enclosure bed.     Problem: Fall Risk - Rehab  Goal: Patient will remain free from falls  Outcome: Progressing  Note: Nikole Cesar Fall risk Assessment Score: 21    High fall risk Interventions   - Alarming seatbelt  - Bed and strip alarm   - Yellow sign by the door   - Yellow wrist band \"Fall risk\"  - Room near to the nurse station  - Do not leave patient unattended in the bathroom  - Fall risk education provided   Oriented to self. Re-orienting pt with encounters. Remains free from falls.  "

## 2024-10-02 NOTE — THERAPY
Occupational Therapy  Daily Treatment     Patient Name: Joe Templeton  Age:  74 y.o., Sex:  male  Medical Record #: [id number]  Today's Date: 10/2/2024     Precautions  Precautions: Fall Risk  Comments: left knee pain         Subjective    Pt seated at nursing station and agreeable to OT tx.     Objective       10/02/24 1101   OT Charge Group   OT Therapeutic Exercise (Units) 2   OT Total Time Spent   OT Individual Total Time Spent (Mins) 30   Pain   Intervention Declines   Cognition    Level of Consciousness Alert   Ability To Follow Commands 1 Step  (Inconsistent)   New Learning Impaired   Attention Impaired   Sequencing Impaired   Initiation Impaired   Comments Pt required [place] assist with sequencing novel UB therex.   Sitting Upper Body Exercises   Sitting Upper Body Exercises Yes   Chest Press 2 sets of 15;Bilateral;Weight (See Comments for lbs)   Front Arm Raise 2 sets of 15;Bilateral;Weight (See Comments for lbs)   Bicep Curls 2 sets of 15;Bilateral;Weight (See Comments for lbs)   Tricep Press 2 sets of 15;Bilateral;Weight (See Comments for lbs)   Pronation / Supination 2 sets of 15;Bilateral;Weight (See Comments for lbs)   Comments Using a 4lb med ball   Interdisciplinary Plan of Care Collaboration   IDT Collaboration with  Occupational Therapist   Patient Position at End of Therapy Seated;Chair Alarm On;Self Releasing Lap Belt Applied  (Pt left seated in dining room for lunch with CNA staff present.)   Collaboration Comments POC with primary OT         Assessment    Pt required increased time and [place] assist to sequence novel medicine ball therex. Noted small improvement with carryover leland when pt would count aloud. He benefited from modeling movement and counting with therapist. Pt would stop counting or say the wrong number at times as well.  Strengths: Independent prior level of function  Barriers: Decreased endurance, Confused, Impaired activity tolerance, Impulsive, Limited mobility, Impaired  insight/denial of deficits, Impaired functional cognition (Encouragement for pt to participate in ADL's 7:00 a.m.)    Plan    Pt would benefit from skilled OT services to address: safety during ADLs,   functional cognition,   strengthening/endurance,   standing balance/tolerance,  functional mobility      DME  OT DME Recommendations  Bathroom Equipment:  (TBD)     Passport items to be completed:  Perform bathroom transfers, complete dressing, complete feeding, get ready for the day, prepare a simple meal, participate in household tasks, adapt home for safety needs, demonstrate home exercise program, complete caregiver training     Occupational Therapy Goals (Active)       Problem: Functional Cognition       Dates: Start:  09/21/24         Goal: STG-Within one week, patient will be oriented to year,month, and place.       Dates: Start:  09/21/24               Problem: Functional Transfers       Dates: Start:  09/21/24         Goal: STG-Within one week, patient will transfer to toilet with Contact Guard Assistance using DME as needed.       Dates: Start:  09/21/24               Problem: Grooming       Dates: Start:  10/02/24         Goal: STG-Within one week, patient will complete grooming standing at sink w/ zero errors SUP       Dates: Start:  10/02/24               Problem: OT Long Term Goals       Dates: Start:  09/21/24         Goal: LTG-By discharge, patient will complete basic self care tasks at Mod Independent with UB ADL's and Supervision with LB ADL's.       Dates: Start:  09/21/24            Goal: LTG-By discharge, patient will perform bathroom transfers with Supervision.       Dates: Start:  09/21/24               Problem: Toileting       Dates: Start:  10/02/24         Goal: STG-Within one week, patient will complete toileting tasks SUP       Dates: Start:  10/02/24

## 2024-10-02 NOTE — CARE PLAN
The patient is Stable - Low risk of patient condition declining or worsening    Shift Goals  Clinical Goals: rest, safety  Patient Goals: go home    Progress made toward(s) clinical / shift goals:      Problem: Skin Integrity  Goal: Skin integrity is maintained or improved  Outcome: Progressing: CDI approximated surgical incision to left ankle cleansed with ns and sterile gauze. No s/s infection noted.      Problem: VTE Prevention  Goal: Patient will remain free from venous thromboembolism (VTE)  Outcome: Progressing: pt receiving daily lovenox. No s/s VTE noted.

## 2024-10-02 NOTE — DISCHARGE PLANNING
Case management  Reviewed signed copy of IMM and answered all questions.    Dc date /disposition: 10/4/24 Home with family and home health.

## 2024-10-02 NOTE — THERAPY
Physical Therapy   Daily Treatment     Patient Name: Joe Templeton  Age:  74 y.o., Sex:  male  Medical Record #: [id number]  Today's Date: 10/1/2024     Precautions  Precautions: (P) Fall Risk  Comments: (P) left knee pain    Subjective    Pt is his wc and agreeable to participate in therapy.      Objective       10/01/24 1631   PT Charge Group   PT Gait Training (Units) 1   PT Therapeutic Exercise (Units) 1   PT Total Time Spent   PT Individual Total Time Spent (Mins) 30   Precautions   Precautions Fall Risk   Comments left knee pain   Gait Functional Level of Assist    Gait Level Of Assist Standby Assist   Assistive Device Front Wheel Walker   Distance (Feet) 150   # of Times Distance was Traveled 1   Deviation Antalgic;Bradykinetic;Decreased Heel Strike;Decreased Toe Off   Sitting Lower Body Exercises   Ankle Pumps 2 sets of 10;Bilateral   Hip Abduction 2 sets of 10;Bilateral;Medium Resistance Theraband   Long Arc Quad 2 sets of 10;Bilateral;Weight (See Comments for lbs)  (2.5lbs AW)   Marching Reciprocal;2 sets of 10   Hamstring Curl 2 sets of 10;Bilateral;Medium Resistance Theraband   Interdisciplinary Plan of Care Collaboration   IDT Collaboration with  Nursing   Patient Position at End of Therapy Seated;Chair Alarm On;Self Releasing Lap Belt Applied;Other (Comments)  (left at nurse station)   Collaboration Comments left pt with nurses at nursing station         Assessment    Pt instructed in seated thera ex with cueing and demo for proper exercise techniques. Pt noted minimal pain on L LE but tolerable. Gait training using FWW, SBA with cueing for increasing foot clearance. He demonstrated minimal antalgia on L LE but no lob noted.    Strengths: Independent prior level of function, Pleasant and cooperative, Willingly participates in therapeutic activities  Barriers: Decreased endurance, Difficulty following instructions, Generalized weakness, Impaired activity tolerance, Impaired balance, Impaired carryover  of learning, Impaired insight/denial of deficits, Impaired functional cognition, Impulsive    Plan    Manual therapy left knee  Transfer sequencing  Ambulation with FWW  LE strengthening    DME  PT DME Recommendations  Assistive Device: Front Wheeled Walker    Passport items to be completed:  Get in/out of bed safely, in/out of a vehicle, safely use mobility device, walk or wheel around home/community, navigate up and down stairs, show how to get up/down from the ground, ensure home is accessible, demonstrate HEP, complete caregiver training    Physical Therapy Problems (Active)       Problem: Balance       Dates: Start:  09/21/24            Problem: Mobility       Dates: Start:  09/21/24         Goal: STG-Within one week, patient will ambulate 150ft with use of LRAD with CGA        Dates: Start:  09/21/24               Problem: Mobility Transfers       Dates: Start:  09/21/24         Goal: STG-Within one week, patient will transfer bed to chair with CGA and min VC with use of LRAD        Dates: Start:  09/21/24            Goal: STG-Within two weeks, patient will transfer in/out of car requiring min VC with proper technique and use of LRAD safely       Dates: Start:  09/21/24               Problem: PT-Long Term Goals       Dates: Start:  09/21/24         Goal: LTG-By discharge, patient will improve dynamic balance from fair+ to good to improve ability to perform ADLs safely with use of LRAD       Dates: Start:  09/21/24            Goal: LTG-By discharge, patient will ambulate 300ft with use of LRAD and supervision assistance.       Dates: Start:  09/21/24            Goal: LTG-By discharge, patient will ambulate up/down 12 stairs with use of single HR and CGA.       Dates: Start:  09/21/24

## 2024-10-02 NOTE — CARE PLAN
Problem: Functional Transfers  Goal: STG-Within one week, patient will transfer to toilet with Contact Guard Assistance using DME as needed.  Outcome: Not Met     Problem: Functional Cognition  Goal: STG-Within one week, patient will be oriented to year,month, and place.  Outcome: Not Met     Problem: OT Long Term Goals  Goal: LTG-By discharge, patient will complete basic self care tasks at Mod Independent with UB ADL's and Supervision with LB ADL's.  Outcome: Not Met  Goal: LTG-By discharge, patient will perform bathroom transfers with Supervision.  Outcome: Not Met     Problem: Bathing  Goal: STG-Within one week, patient will bathe with Min A using Adaptive equipment as needed.  Outcome: Met     Problem: Dressing  Goal: STG-Within one week, patient will dress LB with Min A using Adaptive equipment as needed.  Outcome: Met     Problem: Toileting  Goal: STG-Within one week, patient will complete toileting tasks with Min A.  Outcome: Met     Problem: Functional Transfers  Goal: STG-Within one week, patient will transfer to step in shower with Min A using DME as needed.  Outcome: Met

## 2024-10-02 NOTE — CARE PLAN
Problem: Mobility Transfers  Goal: STG-Within two weeks, patient will transfer in/out of car requiring min VC with proper technique and use of LRAD safely  Outcome: Not Met  Note: Needs reassessment      Problem: Mobility Transfers  Goal: STG-Within one week, patient will transfer bed to chair with CGA and min VC with use of LRAD   Outcome: Progressing  Note: CGA/min A- inconsistent performance      Problem: Mobility  Goal: STG-Within one week, patient will ambulate 150ft with use of LRAD with CGA   Outcome: Met

## 2024-10-02 NOTE — PROGRESS NOTES
NURSING DAILY NOTE    Name: Joe Templeton   Date of Admission: 9/20/2024   Admitting Diagnosis: No Principal Problem: There is no principal problem currently on the Problem List. Please update the Problem List and refresh.  Attending Physician: ELIZABETH HOLM D.O.  Allergies: Patient has no known allergies.    Safety  Patient Assist  mod assist  Patient Precautions  Fall Risk  Precaution Comments  left knee pain  Bed Transfer Status  Contact Guard Assist  Toilet Transfer Status   Contact Guard Assist  Assistive Devices  Rails, Wheelchair  Oxygen  None - Room Air  Diet/Therapeutic Dining  Current Diet Order   Procedures    Diet Order Diet: Level 6 - Soft and Bite Sized (Set pt up w/ meal- he can then self-feed independently. Pills whole.); Liquid level: Level 0 - Thin; Tray Modifications (optional): SLP - Deliver to Nursing Station     Pill Administration  whole  Agitated Behavioral Scale  14  ABS Level of Severity  No Agitation    Fall Risk  Has the patient had a fall this admission?   No  Nikole Cesar Fall Risk Scoring  21, HIGH RISK  Fall Risk Safety Measures  bed alarm, chair alarm, seatbelt alarm, poor balance, and low vision/ hearing    Vitals  Temperature: 36.7 °C (98.1 °F)  Temp src: Oral  Pulse: 74  Respiration: 18  Blood Pressure : 128/71  Blood Pressure MAP (Calculated): 90 MM HG  BP Location: Left, Upper Arm  Patient BP Position: Sitting     Oxygen  Pulse Oximetry: 95 %  O2 (LPM): 0  O2 Delivery Device: None - Room Air    Bowel and Bladder  Last Bowel Movement  10/02/24  Stool Type  Type 5: Soft blob with clear cut edges (passed easily)  Bowel Device  Bathroom  Continent  Bladder: Did not void   Bowel: No movement  Bladder Function  Urine Void (mL):  (lg)  Number of Times Voided: 1  Urinary Options: Yes  Urine Color: Yellow  Straight Catheter: 550 ml  Wet Diaper Count: 1  Genitourinary Assessment   Bladder Assessment (WDL):  Within Defined  Limits  Silva Catheter: Not Applicable  Urine Color: Yellow  Bladder Device: Bathroom  Time Void: Yes  Bladder Scan: Post Void  $ Bladder Scan Results (mL): 123    Skin  Colt Score   17  Sensory Interventions   Bed Types: Other (Comments) ([facility])  Skin Preventative Measures: Pillows in Use for Support / Positioning  Moisture Interventions  Moisturizers/Barriers: Barrier Wipes      Pain  Pain Rating Scale  0 - No Pain  Pain Location  Foot, Ankle  Pain Location Orientation  Left  Pain Interventions   Declines    ADLs    Bathing   Partial Bed Bath, * * With Assistance from, Staff  Linen Change   Complete  Personal Hygiene  Change Dariana Pads, Moist Dariana Wipes, Perineal Care  Chlorhexidine Bath      Oral Care     Teeth/Dentures     Shave     Nutrition Percentage Eaten  *  * Meal *  *, Lunch, Between % Consumed  Environmental Precautions  Treaded Slipper Socks on Patient  Patient Turns/Positioning  Sitting Up in Wheelchair  Patient Turns Assistance/Tolerance  Assistance of One, Tolerates Well, General Weakness  Bed Positions  Bed Controls On, Bed Locked  Head of Bed Elevated  Less than 30 degrees      Psychosocial/Neurologic Assessment  Psychosocial Assessment  Psychosocial (WDL):  WDL Except  Patient Behaviors: Confused, Forgetful  Neurologic Assessment  Neuro (WDL): Exceptions to WDL  Level of Consciousness: Alert  Orientation Level: Disoriented to situation, Disoriented to time, Disoriented to place  Cognition: Follows commands, Impulsive, Poor safety awareness, Poor judgement, Memory Loss  Speech: Clear  Pupil Assesment: No  Motor Function/Sensation Assessment: Motor strength  Muscle Strength Right Arm: Good Strength Against Gravity and Moderate Resistance  Muscle Strength Left Arm: Good Strength Against Gravity and Moderate Resistance  Muscle Strength Right Leg: Fair Strength against Gravity but No Resistance  LLE Motor Response: Responds to commands  Muscle Strength Left Leg: Fair Strength against Gravity  but No Resistance  EENT (WDL):  WDL Except    Cardio/Pulmonary Assessment  Edema   LLE Edema: 1+  Respiratory Breath Sounds  RUL Breath Sounds: Clear  RML Breath Sounds: Clear  RLL Breath Sounds: Clear  YONI Breath Sounds: Clear  LLL Breath Sounds: Clear  Cardiac Assessment   Cardiac (WDL):  WDL Except

## 2024-10-02 NOTE — THERAPY
"Physical Therapy   Daily Treatment     Patient Name: Joe Templeton  Age:  74 y.o., Sex:  male  Medical Record #: [id number]  Today's Date: 10/2/2024     Precautions  Precautions: Fall Risk  Comments: left knee pain    Subjective    Pt was agreeable to POC, but concerned about Posey bed intermittently.      Objective       10/02/24 1431   PT Charge Group   PT Neuromuscular Re-Education / Balance (Units) 2   PT Therapeutic Activities (Units) 2   PT Total Time Spent   PT Individual Total Time Spent (Mins) 60   Precautions   Precautions Fall Risk   Comments left knee pain   ABS (Agitated Behavior Scale)   Short Attention Span, Easy Distractibility, Inability to Concentrate 1   Impulsive, Impatient, Low Tolerance for Pain or Frustration 1   Uncooperative, Resistant to Care, Demanding 1   Violent and/or Threatening Violence Toward People or Property 1   Explosive and/or Unpredictable Anger 1   Rocking, Rubbing, Moaning, Other Self-Stimulating Behavior 1   Pulling at Tubes, Restraints, etc. 1   Wandering from Treatment Area 1   Restlessness, Pacing, Excessive Movement 1   Repetitive Behaviors, Motor and/or Verbal 1   Rapid, Loud or Excessive Talking 1   Sudden Changes of Mood 1   Easily Initiated - Excessive Crying and/or Laughter 1   Self-Abusiveness, Physical and/or Verbal 1   Agitated Behavior Scale Total Score 14   Level of Severity No Agitation   Gait Functional Level of Assist    Gait Level Of Assist Contact Guard Assist   Assistive Device Other (Comments);Front Wheel Walker  (shopping cart 1x)   Distance (Feet) 150   # of Times Distance was Traveled 2   Deviation Antalgic;Bradykinetic;Decreased Heel Strike;Decreased Toe Off;Other (Comment)  (dec L knee flexion)   Stairs Functional Level of Assist   Level of Assist with Stairs Minimal Assist  (CGA/min A, dec recall of sequencing)   # of Stairs Climbed 2  (4\")   Stairs Description Hand rails;Requires incidental assist   Transfer Functional Level of Assist   Bed, " Chair, Wheelchair Transfer Contact Guard Assist   Bed Chair Wheelchair Transfer Description Squat pivot transfer to wheelchair;Set-up of equipment   Sitting Lower Body Exercises   Comments MotoMed LE cycle, level 2 resistance, 0.52 miles, 40%L, 60%R, 4.5 min, SPV.   Bed Mobility    Supine to Sit Supervised   Scooting Supervised   Rolling Supervised   Neuro-Muscular Treatments   Neuro-Muscular Treatments Verbal Cuing;Weight Shift Right;Weight Shift Left;Tactile Cuing;Sequencing   Comments Standing wt shifting to retreive 6 cones and place them on opposite side, emphasis on LLE WB/ standing tolerance 4x ~3 min. Balloon volley with 1-0 UE support SPV x 4 min, no LOB. Shopping task with cart to retrieve 10 grocery items CGA, and place them in basket. Fwd/ bwd sidestepping in // bars 80 ft total CGA. Stepping over obstacles/ uneven surfaces x 20 ft CGA/min A.   Interdisciplinary Plan of Care Collaboration   IDT Collaboration with  Nursing;Certified Nursing Assistant   Patient Position at End of Therapy Seated;Chair Alarm On;Other (Comments);Self Releasing Lap Belt Applied  (alarming seat belt, handoff to RN post tx)   Collaboration Comments POC         Assessment    Pt completed functional activities with emphasis on balance, LLE WB/ tolerance, and activity tolerance. Pt was pleasant and cooperative. Pt did not c/o pain throughout, but pt continues to demo LLE antalgic movement patterns. No LOB observed during session. Session ended with MotoMed by LLE ROM.      Strengths: Independent prior level of function, Pleasant and cooperative, Willingly participates in therapeutic activities  Barriers: Decreased endurance, Difficulty following instructions, Generalized weakness, Impaired activity tolerance, Impaired balance, Impaired carryover of learning, Impaired insight/denial of deficits, Impaired functional cognition, Impulsive    Plan    Manual therapy left knee  Transfer sequencing  Ambulation with FWW  LE  strengthening    DME  PT DME Recommendations  Assistive Device: Front Wheeled Walker    Passport items to be completed:  Get in/out of bed safely, in/out of a vehicle, safely use mobility device, walk or wheel around home/community, navigate up and down stairs, show how to get up/down from the ground, ensure home is accessible, demonstrate HEP, complete caregiver training    Physical Therapy Problems (Active)       Problem: Balance       Dates: Start:  09/21/24            Problem: Mobility Transfers       Dates: Start:  09/21/24         Goal: STG-Within one week, patient will transfer bed to chair with CGA and min VC with use of LRAD        Dates: Start:  09/21/24         Goal Note filed on 10/02/24 1254 by Nimisha Johnson DPT       CGA/min A- inconsistent performance               Goal: STG-Within two weeks, patient will transfer in/out of car requiring min VC with proper technique and use of LRAD safely       Dates: Start:  09/21/24         Goal Note filed on 10/02/24 1254 by Nimisha Johnson DPT       Needs reassessment                  Problem: PT-Long Term Goals       Dates: Start:  09/21/24         Goal: LTG-By discharge, patient will improve dynamic balance from fair+ to good to improve ability to perform ADLs safely with use of LRAD       Dates: Start:  09/21/24            Goal: LTG-By discharge, patient will ambulate 300ft with use of LRAD and supervision assistance.       Dates: Start:  09/21/24            Goal: LTG-By discharge, patient will ambulate up/down 12 stairs with use of single HR and CGA.       Dates: Start:  09/21/24

## 2024-10-02 NOTE — PROGRESS NOTES
[facility] Medicine Daily Progress Note    Date of Service  10/2/2024    Chief Complaint:  Hypertension    Interval History:  No significant events overnight.    Review of Systems  Review of Systems   Constitutional:  Negative for fever.   Eyes:  Negative for blurred vision.   Respiratory:  Negative for shortness of breath.    Cardiovascular:  Negative for palpitations.   Gastrointestinal:  Negative for nausea and vomiting.   Neurological:  Negative for dizziness and headaches.   Psychiatric/Behavioral:  Negative for hallucinations.         Physical Exam  Temp:  [36.4 °C (97.5 °F)-36.8 °C (98.2 °F)] 36.7 °C (98.1 °F)  Pulse:  [64-84] 72  Resp:  [15-18] 18  BP: (112-153)/(57-68) 112/60  SpO2:  [94 %-96 %] 95 %    Physical Exam  Vitals and nursing note reviewed.   Constitutional:       General: He is not in acute distress.  HENT:      Mouth/Throat:      Mouth: Mucous membranes are moist.      Pharynx: Oropharynx is clear.   Eyes:      General: No scleral icterus.  Cardiovascular:      Rate and Rhythm: Normal rate and regular rhythm.      Heart sounds: No murmur heard.  Pulmonary:      Effort: Pulmonary effort is normal.      Breath sounds: Normal breath sounds. No stridor.   Abdominal:      General: There is no distension.      Palpations: Abdomen is soft.      Tenderness: There is no abdominal tenderness.   Musculoskeletal:      Cervical back: No rigidity.      Right lower leg: No edema.      Left lower leg: No edema.   Skin:     General: Skin is warm and dry.      Findings: No rash.   Psychiatric:         Mood and Affect: Mood normal.         Behavior: Behavior normal.         Fluids    Intake/Output Summary (Last 24 hours) at 10/2/2024 1054  Last data filed at 10/2/2024 1000  Gross per 24 hour   Intake 570 ml   Output --   Net 570 ml        Laboratory  Recent Labs     09/30/24  0638 10/02/24  0636   WBC 9.6 11.5*   RBC 4.14* 4.60*   HEMOGLOBIN 13.0* 14.2   HEMATOCRIT 38.9* 41.9*   MCV 94.0 91.1   MCH 31.4 30.9   MCHC  33.4 33.9   RDW 41.1 39.1   PLATELETCT 330 357   MPV 11.4 11.6     Recent Labs     09/30/24  0638 10/02/24  0636   SODIUM 142 142   POTASSIUM 3.7 3.7   CHLORIDE 109 109   CO2 22 22   GLUCOSE 123* 139*   BUN 20 23*   CREATININE 0.91 0.90   CALCIUM 9.0 9.0                   Imaging    Assessment/Plan  Vitamin D deficiency  Assessment & Plan  Vit D: 18  Cont supplements    Stroke (HCC)- (present on admission)  Assessment & Plan  Has hx of old CVA's  S/P recent CVA  MRI showed multiple old infarcts and multiple acute infarcts (including the hao)  Cont ASA and Lipitor    CAD (coronary artery disease)- (present on admission)  Assessment & Plan  Hx of CABG  Cont ASA, Lipitor  Cont Lisinopril  Cont Toprol XL    Septic arthritis of ankle (HCC)- (present on admission)  Assessment & Plan  S/P I+D on 9/14/24  Has on & off mild leukocytosis  Has been afebrile  Off Vanco  Now on Zyvox -- as pt chewing on IV tubing (thru 10/11)    Essential (primary) hypertension- (present on admission)  Assessment & Plan  BP better recently   Cont Norvasc  Cont Lisinopril  Cont Hydralazine  Cont Toprol XL -- dose recently increased  Cont to monitor

## 2024-10-02 NOTE — PROGRESS NOTES
"  Physical Medicine & Rehabilitation Progress Note    Encounter Date: 10/2/2024    Chief Complaint: Not doing well     Interval Events (Subjective):  Blood pressure intermittently elevated  BM 10/2  Voiding     Patient seen and examined in his room. Not doing well because he wants to go home. Forgot that nothing was wrong with R foot. He doesn't feel sick, just confused why he is here.     ROS: 14 point ROS negative unless otherwise specified in the HPI    Objective:  VITAL SIGNS: /58   Pulse 84   Temp 36.4 °C (97.5 °F) (Temporal)   Resp 15   Ht 1.753 m (5' 9\")   Wt 77.1 kg (170 lb)   SpO2 94%   BMI 25.10 kg/m²     GEN: No apparent distress  HEENT: Head normocephalic, atraumatic.  Sclera nonicteric bilaterally, no ocular discharge appreciated bilaterally.  CV: Extremities warm and well-perfused, no peripheral edema appreciated bilaterally.  PULMONARY: Breathing nonlabored on room air, no respiratory accessory muscle use.  Not requiring supplemental oxygen.  SKIN:   9/30    10/2    PSYCH: Mood and affect within normal limits.  NEURO: Awake alert.  Pleasantly confused. Disoriented to date, day, month, year. Knows we are in [facility].       Laboratory Values:  Recent Results (from the past 72 hour(s))   CBC WITH DIFFERENTIAL    Collection Time: 09/30/24  6:38 AM   Result Value Ref Range    WBC 9.6 4.8 - 10.8 K/uL    RBC 4.14 (L) 4.70 - 6.10 M/uL    Hemoglobin 13.0 (L) 14.0 - 18.0 g/dL    Hematocrit 38.9 (L) 42.0 - 52.0 %    MCV 94.0 81.4 - 97.8 fL    MCH 31.4 27.0 - 33.0 pg    MCHC 33.4 32.3 - 36.5 g/dL    RDW 41.1 35.9 - 50.0 fL    Platelet Count 330 164 - 446 K/uL    MPV 11.4 9.0 - 12.9 fL    Neutrophils-Polys 69.70 44.00 - 72.00 %    Lymphocytes 17.20 (L) 22.00 - 41.00 %    Monocytes 10.10 0.00 - 13.40 %    Eosinophils 1.90 0.00 - 6.90 %    Basophils 0.70 0.00 - 1.80 %    Immature Granulocytes 0.40 0.00 - 0.90 %    Nucleated RBC 0.00 0.00 - 0.20 /100 WBC    Neutrophils (Absolute) 6.71 1.82 - 7.42 K/uL    " Lymphs (Absolute) 1.66 1.00 - 4.80 K/uL    Monos (Absolute) 0.97 (H) 0.00 - 0.85 K/uL    Eos (Absolute) 0.18 0.00 - 0.51 K/uL    Baso (Absolute) 0.07 0.00 - 0.12 K/uL    Immature Granulocytes (abs) 0.04 0.00 - 0.11 K/uL    NRBC (Absolute) 0.00 K/uL   Comp Metabolic Panel    Collection Time: 09/30/24  6:38 AM   Result Value Ref Range    Sodium 142 135 - 145 mmol/L    Potassium 3.7 3.6 - 5.5 mmol/L    Chloride 109 96 - 112 mmol/L    Co2 22 20 - 33 mmol/L    Anion Gap 11.0 7.0 - 16.0    Glucose 123 (H) 65 - 99 mg/dL    Bun 20 8 - 22 mg/dL    Creatinine 0.91 0.50 - 1.40 mg/dL    Calcium 9.0 8.5 - 10.5 mg/dL    Correct Calcium 9.6 8.5 - 10.5 mg/dL    AST(SGOT) 28 12 - 45 U/L    ALT(SGPT) 35 2 - 50 U/L    Alkaline Phosphatase 64 30 - 99 U/L    Total Bilirubin 0.4 0.1 - 1.5 mg/dL    Albumin 3.2 3.2 - 4.9 g/dL    Total Protein 6.3 6.0 - 8.2 g/dL    Globulin 3.1 1.9 - 3.5 g/dL    A-G Ratio 1.0 g/dL   ESTIMATED GFR    Collection Time: 09/30/24  6:38 AM   Result Value Ref Range    GFR (CKD-EPI) 89 >60 mL/min/1.73 m 2   Comp Metabolic Panel    Collection Time: 10/02/24  6:36 AM   Result Value Ref Range    Sodium 142 135 - 145 mmol/L    Potassium 3.7 3.6 - 5.5 mmol/L    Chloride 109 96 - 112 mmol/L    Co2 22 20 - 33 mmol/L    Anion Gap 11.0 7.0 - 16.0    Glucose 139 (H) 65 - 99 mg/dL    Bun 23 (H) 8 - 22 mg/dL    Creatinine 0.90 0.50 - 1.40 mg/dL    Calcium 9.0 8.5 - 10.5 mg/dL    Correct Calcium 9.4 8.5 - 10.5 mg/dL    AST(SGOT) 25 12 - 45 U/L    ALT(SGPT) 34 2 - 50 U/L    Alkaline Phosphatase 68 30 - 99 U/L    Total Bilirubin 0.4 0.1 - 1.5 mg/dL    Albumin 3.5 3.2 - 4.9 g/dL    Total Protein 6.8 6.0 - 8.2 g/dL    Globulin 3.3 1.9 - 3.5 g/dL    A-G Ratio 1.1 g/dL   CBC WITH DIFFERENTIAL    Collection Time: 10/02/24  6:36 AM   Result Value Ref Range    WBC 11.5 (H) 4.8 - 10.8 K/uL    RBC 4.60 (L) 4.70 - 6.10 M/uL    Hemoglobin 14.2 14.0 - 18.0 g/dL    Hematocrit 41.9 (L) 42.0 - 52.0 %    MCV 91.1 81.4 - 97.8 fL    MCH 30.9 27.0  - 33.0 pg    MCHC 33.9 32.3 - 36.5 g/dL    RDW 39.1 35.9 - 50.0 fL    Platelet Count 357 164 - 446 K/uL    MPV 11.6 9.0 - 12.9 fL    Neutrophils-Polys 75.10 (H) 44.00 - 72.00 %    Lymphocytes 11.90 (L) 22.00 - 41.00 %    Monocytes 9.70 0.00 - 13.40 %    Eosinophils 2.50 0.00 - 6.90 %    Basophils 0.40 0.00 - 1.80 %    Immature Granulocytes 0.40 0.00 - 0.90 %    Nucleated RBC 0.00 0.00 - 0.20 /100 WBC    Neutrophils (Absolute) 8.59 (H) 1.82 - 7.42 K/uL    Lymphs (Absolute) 1.36 1.00 - 4.80 K/uL    Monos (Absolute) 1.11 (H) 0.00 - 0.85 K/uL    Eos (Absolute) 0.29 0.00 - 0.51 K/uL    Baso (Absolute) 0.05 0.00 - 0.12 K/uL    Immature Granulocytes (abs) 0.05 0.00 - 0.11 K/uL    NRBC (Absolute) 0.00 K/uL   ESTIMATED GFR    Collection Time: 10/02/24  6:36 AM   Result Value Ref Range    GFR (CKD-EPI) 90 >60 mL/min/1.73 m 2       Medications:  Scheduled Medications   Medication Dose Frequency    linezolid  600 mg Q12HRS    metoprolol SR  100 mg DAILY    senna-docusate  2 Tablet BID    And    polyethylene glycol/lytes  1 Packet BID    hydrALAZINE  100 mg Q8HRS    vitamin D3  1,000 Units DAILY    Pharmacy Consult Request  1 Each PHARMACY TO DOSE    lidocaine  1-2 Patch Daily-0800    omeprazole  20 mg DAILY    amLODIPine  10 mg Q DAY    enoxaparin (LOVENOX) injection  40 mg DAILY AT 1800    lisinopril  40 mg DAILY    aspirin  81 mg DAILY    atorvastatin  80 mg Q EVENING    QUEtiapine  50 mg Nightly     PRN medications: QUEtiapine, hydrALAZINE, lactulose, docusate sodium, bisacodyl EC, magnesium hydroxide, sodium phosphate, carboxymethylcellulose, benzocaine-menthol, mag hydrox-al hydrox-simeth, ondansetron **OR** ondansetron, traZODone, sodium chloride, acetaminophen, ziprasidone, traMADol, formulation r    Diet:  Current Diet Order   Procedures    Diet Order Diet: Level 6 - Soft and Bite Sized (Set pt up w/ meal- he can then self-feed independently. Pills whole.); Liquid level: Level 0 - Thin; Tray Modifications (optional):  SLP - Deliver to Nursing Station       Medical Decision Making and Plan:  Multifocal embolic appearing infarcts: Right insula, left parietal lobe, right parietal lobe, left hao  MRI with multiple old infarcts  Dysphagia  Encephalopathy  PT and OT for mobility and ADLs. Per guidelines, 15 hours per week between PT, OT and/or SLP.  Follow-up stroke Bridge clinic  Secondary stroke prophylaxis: Aspirin and statin   Continue Seroquel at night - 10/2 move to 1800 and schedule Trazodone at 2100     I certify that the patient currently requires non-pharmacologic restraints. Non-pharmacologic restraints are required to reduce the potential for the patient to harm themselves or others, to limit violent behaviors, and to allow proper assessment and care. I have completed a face to face assessment of this patient on 10/2/2024. Alternative methods including but not limited to de-escalation techniques, redirection, and pharmacologic treatment have been attempted but patient currently remains at risk without restraints. Our staff has been trained on restraints and patient is currently placed in video monitored room.  The need for these restraints is assessed by a rehabilitation physician every 24 hours and use of restraints is minimized when appropriate.  Current diagnosis which requires restraints: Encephalopathy. Current restraints required: Enclosure bed, bilateral mitts to prevent pulling out midline which is needed for IV antibiotics.       Septic arthritis left ankle s/p left ankle arthrotomy and I&D 9/14/2024 Dr. Gautam -weightbearing as tolerated left lower extremity.  ID followed.  Continue vancomycin twice daily through 10/11.  Potentially can switch to Zyvox.  9/23 patient pulled out midline will need new access.  9/24 pulled out midline again, admits an attempt to help prevent him pulling it out.  9/26 has midline.  Continue IV vancomycin per ID.  If absolutely necessary at discharge can switch to linezolid 600 mg  twice a day with stop date of 10/11.  If goes to skilled nursing facility can continue IV vancomycin.  Needs follow-up with ID.    9/30/2024  increased pain left lower extremity.  X-ray unremarkable.  Switch IV vancomycin to oral linezolid with end date of 10/11 starting 10/1.  Patient continues to pull out peripheral IVs and midlines.  He has had over 7 different lines placed since last week.  Could help with agitation if he does not have mitts and IV lines.    10/1 more pain with weightbearing with therapy 9/30.  X-ray unremarkable for acute fracture.    10/2 Leukocytosis at 11.5. Is now on Linezolid.     HAS ID APPOINTMENT 10/8     Left knee pain -check knee x-ray.  9/23 negative.     Chronic systolic heart failure, not in acute exacerbation - preserved ejection fraction-LVEF 60%     CAD s/p CABG -continue aspirin and statin     Hypertension -amlodipine, hydralazine, lisinopril, metoprolol.  10/2 uncontrolled still up and down.  Hospitalist managing.     Azotemia -9/22 improving, monitor.  9/24 resolved.  9/26 resolved. 10/2 Returned mild at 23. Encourage PO fluids.     Hypokalemia -9/22 mildly low at 3.5.  Hospitalist following. 10/2 WNL.      Leukocytosis -9/30 resolved. 10/2 Returned.     Anemia -9/30 improving at 13. 10/2 Resolved. Hgb WNL.      Pain -Tylenol and oxycodone     Bowel -patient unsure when last BM was.  Check KUB.  Moderate stool burden.  Had small bowel movement 9/22.  Increase bowel meds 9/23. 9/25 Give MoM x1. Had bowel movement 9/26.  10/2 having regular bowel movements.       Upcoming Labs/imaging: 10/3     DVT PROPHYLAXIS: Lovenox 40mg SQ nightly      HOSPITALIST FOLLOWING: Yes - d/w hospitalist 10/2     CODE STATUS: FULL CODE     DISPO: Home with family      HEBER: 10/4     MEDS SENT TO: [facility]     DISCHARGE SPECIALIST FOLLOW UP: ID, Ortho, Stroke Bridge     Patient to scheduled follow up with their PCP within 2 weeks from discharge from the [facility].    ____________________________________    Dr. Khushbu Meza DO, MS  [facility] - Physical Medicine & Rehabilitation   ____________________________________    _____________________________________  Interdisciplinary Team Conference   Most recent IDT on 10/2/2024    I, Dr. Khushbu Meza DO, MS, was present and led the interdisciplinary team conference on 10/2/2024.  I led the IDT conference and agree with the IDT conference documentation and plan of care as noted below.     Nursing:  Diet Current Diet Order   Procedures    Diet Order Diet: Level 6 - Soft and Bite Sized (Set pt up w/ meal- he can then self-feed independently. Pills whole.); Liquid level: Level 0 - Thin; Tray Modifications (optional): SLP - Deliver to Nursing Station       Eating ADL Modified Independent  Modified diet   % of Last Meal  Oral Nutrition: Dinner, Between % Consumed   Sleep    Bowel Last BM: 10/02/24   Bladder        Physical Therapy:  Bed Mobility    Transfers Contact Guard Assist  Adaptive equipment, Increased time, Initial preparation for task, Set-up of equipment, Supervision for safety, Verbal cueing (SPT w/ FWW)   Mobility Standby Assist   Stairs        Occupational Therapy:  Grooming Standby Assist   Bathing Contact Guard Assist   UB Dressing Supervision   LB Dressing Standby Assist   Toileting Contact Guard Assist   Shower & Transfer        Speech-Language Pathology:  Comprehension:  Moderate Assist  Comprehension Description:  Glasses, Verbal cues  Expression:  Moderate Assist  Expression Description:  Verbal cueing  Social Interaction:  Supervision  Social Interaction Description:  Medication, Verbal cues, Increased time, Enclosure bed  Problem Solving:  Maximal Assist  Problem Solving Description:  Increased time, Enclosure bed, Bed/chair alarm, Verbal cueing, Therapy schedule, Seat belt  Memory:  Maximal Assist  Memory Description:  Seat belt, Increased time, Enclosure bed, Bed/chair alarm, Verbal cueing, Therapy  schedule  Barriers to Discharge Home:    Respiratory Therapy:  O2 (LPM): 0  O2 Delivery Device: None - Room Air    Case Management:  Continues to work on disposition and DME needs.     BARRIERS TO DISCHARGE HOME:  Family training  Equipment  OP vs  services   Medical Stability     Discharge Date/Disposition:  10/4  _____________________________________

## 2024-10-02 NOTE — CARE PLAN
"The patient is Watcher - Medium risk of patient condition declining or worsening    Shift Goals  Clinical Goals: Rest and safety  Patient Goals: Safety    Progress made toward(s) clinical / shift goals:    Problem: Fall Risk - Rehab  Goal: Patient will remain free from falls  Outcome: Progressing  Note: Nikole Cesar Fall risk Assessment Score: 21    High fall risk Interventions   - Bed and strip alarm   - Yellow sign by the door   - Yellow wrist band \"Fall risk\"  - Room near to the nurse station  - Do not leave patient unattended in the bathroom  - Fall risk education provided     Problem: Pain - Standard  Goal: Alleviation of pain or a reduction in pain to the patient’s comfort goal  Outcome: Progressing  Note: Patient denies having any pain at this time, and is showing no signs of distress or discomfort.        Patient is not progressing towards the following goals:      "

## 2024-10-02 NOTE — THERAPY
Occupational Therapy  Daily Treatment     Patient Name: Joe Templeton  Age:  74 y.o., Sex:  male  Medical Record #: [id number]  Today's Date: 10/2/2024     Precautions  Precautions: (P) Fall Risk  Comments: left knee pain         Subjective    Pt with RN in posey bed at beginning of session. Pt was lethargic but agreeable to OT session with encouragement. During session, pt stated correct birthday with zero cues or errors.     Objective       10/02/24 1031   OT Charge Group   OT Self Care / ADL (Units) 1   OT Therapeutic Exercise (Units) 1   OT Total Time Spent   OT Individual Total Time Spent (Mins) 30   Precautions   Precautions Fall Risk   Functional Level of Assist   Grooming Standby Assist   Grooming Description Standing at sink;Supervision for safety  (Completed oral care at sink, did not use item correctly, but able to problem solve with verbal cue)   Sitting Upper Body Exercises   Bicep Curls 3 sets of 10;Weight (See Comments for lbs);Bilateral  (9#)   Sitting Lower Body Exercises   Long Arc Quad 1 set of 10;Bilateral   Marching   (Attempted, stopped due to pain)   Interdisciplinary Plan of Care Collaboration   IDT Collaboration with  Occupational Therapist;Nursing   Patient Position at End of Therapy Seated;Chair Alarm On  (Seatbelt alarm on, in front of nursing)   Collaboration Comments CLOF with OT         Assessment    Pt participated in therapy but is limited by intermittent knee pain during transfers/standing activities. During oral hygiene, pt used tool incorrectly (put toothbrush in cup to apply toothpaste) but able to problem solve with verbal prompt.  Pt able to state correct birthday when prompted.   Strengths: Independent prior level of function  Barriers: Decreased endurance, Confused, Impaired activity tolerance, Impulsive, Limited mobility, Impaired insight/denial of deficits, Impaired functional cognition (Encouragement for pt to participate in ADL's 7:00 a.m.)    Plan      Pt would benefit  from skilled OT services to address: safety during ADLs,   functional cognition,   strengthening/endurance,   standing balance/tolerance,  functional mobility     DME  OT DME Recommendations  Bathroom Equipment:  (TBD)    Passport items to be completed:  Perform bathroom transfers, complete dressing, complete feeding, get ready for the day, prepare a simple meal, participate in household tasks, adapt home for safety needs, demonstrate home exercise program, complete caregiver training     Occupational Therapy Goals (Active)       Problem: Bathing       Dates: Start:  09/21/24         Goal: STG-Within one week, patient will bathe with Min A using Adaptive equipment as needed.       Dates: Start:  09/21/24               Problem: Dressing       Dates: Start:  09/21/24         Goal: STG-Within one week, patient will dress LB with Min A using Adaptive equipment as needed.       Dates: Start:  09/21/24               Problem: Functional Cognition       Dates: Start:  09/21/24         Goal: STG-Within one week, patient will be oriented to year,month, and place.       Dates: Start:  09/21/24               Problem: Functional Transfers       Dates: Start:  09/21/24         Goal: STG-Within one week, patient will transfer to toilet with Contact Guard Assistance using DME as needed.       Dates: Start:  09/21/24            Goal: STG-Within one week, patient will transfer to step in shower with Min A using DME as needed.       Dates: Start:  09/21/24               Problem: OT Long Term Goals       Dates: Start:  09/21/24         Goal: LTG-By discharge, patient will complete basic self care tasks at Mod Independent with UB ADL's and Supervision with LB ADL's.       Dates: Start:  09/21/24            Goal: LTG-By discharge, patient will perform bathroom transfers with Supervision.       Dates: Start:  09/21/24               Problem: Toileting       Dates: Start:  09/21/24         Goal: STG-Within one week, patient will complete  toileting tasks with Min A.       Dates: Start:  09/21/24

## 2024-10-03 ENCOUNTER — APPOINTMENT (OUTPATIENT)
Dept: SPEECH THERAPY | Facility: REHABILITATION | Age: 74
DRG: 057 | End: 2024-10-03
Attending: PHYSICAL MEDICINE & REHABILITATION
Payer: MEDICARE

## 2024-10-03 ENCOUNTER — APPOINTMENT (OUTPATIENT)
Dept: OCCUPATIONAL THERAPY | Facility: REHABILITATION | Age: 74
DRG: 057 | End: 2024-10-03
Attending: PHYSICAL MEDICINE & REHABILITATION
Payer: MEDICARE

## 2024-10-03 ENCOUNTER — APPOINTMENT (OUTPATIENT)
Dept: PHYSICAL THERAPY | Facility: REHABILITATION | Age: 74
DRG: 057 | End: 2024-10-03
Attending: PHYSICAL MEDICINE & REHABILITATION
Payer: MEDICARE

## 2024-10-03 LAB
ANION GAP SERPL CALC-SCNC: 10 MMOL/L (ref 7–16)
BASOPHILS # BLD AUTO: 0.6 % (ref 0–1.8)
BASOPHILS # BLD: 0.05 K/UL (ref 0–0.12)
BUN SERPL-MCNC: 26 MG/DL (ref 8–22)
CALCIUM SERPL-MCNC: 8.8 MG/DL (ref 8.5–10.5)
CHLORIDE SERPL-SCNC: 111 MMOL/L (ref 96–112)
CO2 SERPL-SCNC: 22 MMOL/L (ref 20–33)
CREAT SERPL-MCNC: 1.03 MG/DL (ref 0.5–1.4)
EOSINOPHIL # BLD AUTO: 0.3 K/UL (ref 0–0.51)
EOSINOPHIL NFR BLD: 3.6 % (ref 0–6.9)
ERYTHROCYTE [DISTWIDTH] IN BLOOD BY AUTOMATED COUNT: 40.5 FL (ref 35.9–50)
GFR SERPLBLD CREATININE-BSD FMLA CKD-EPI: 77 ML/MIN/1.73 M 2
GLUCOSE SERPL-MCNC: 144 MG/DL (ref 65–99)
HCT VFR BLD AUTO: 41.4 % (ref 42–52)
HGB BLD-MCNC: 13.7 G/DL (ref 14–18)
IMM GRANULOCYTES # BLD AUTO: 0.05 K/UL (ref 0–0.11)
IMM GRANULOCYTES NFR BLD AUTO: 0.6 % (ref 0–0.9)
LYMPHOCYTES # BLD AUTO: 1.33 K/UL (ref 1–4.8)
LYMPHOCYTES NFR BLD: 15.8 % (ref 22–41)
MCH RBC QN AUTO: 30.8 PG (ref 27–33)
MCHC RBC AUTO-ENTMCNC: 33.1 G/DL (ref 32.3–36.5)
MCV RBC AUTO: 93 FL (ref 81.4–97.8)
MONOCYTES # BLD AUTO: 0.78 K/UL (ref 0–0.85)
MONOCYTES NFR BLD AUTO: 9.3 % (ref 0–13.4)
NEUTROPHILS # BLD AUTO: 5.9 K/UL (ref 1.82–7.42)
NEUTROPHILS NFR BLD: 70.1 % (ref 44–72)
NRBC # BLD AUTO: 0 K/UL
NRBC BLD-RTO: 0 /100 WBC (ref 0–0.2)
PLATELET # BLD AUTO: 334 K/UL (ref 164–446)
PMV BLD AUTO: 11.1 FL (ref 9–12.9)
POTASSIUM SERPL-SCNC: 4 MMOL/L (ref 3.6–5.5)
RBC # BLD AUTO: 4.45 M/UL (ref 4.7–6.1)
SODIUM SERPL-SCNC: 143 MMOL/L (ref 135–145)
WBC # BLD AUTO: 8.4 K/UL (ref 4.8–10.8)

## 2024-10-03 PROCEDURE — 700102 HCHG RX REV CODE 250 W/ 637 OVERRIDE(OP): Performed by: HOSPITALIST

## 2024-10-03 PROCEDURE — A9270 NON-COVERED ITEM OR SERVICE: HCPCS | Performed by: PHYSICAL MEDICINE & REHABILITATION

## 2024-10-03 PROCEDURE — 770005 HCHG ROOM/CARE - REHAB PRIVATE (11*

## 2024-10-03 PROCEDURE — 700102 HCHG RX REV CODE 250 W/ 637 OVERRIDE(OP): Performed by: STUDENT IN AN ORGANIZED HEALTH CARE EDUCATION/TRAINING PROGRAM

## 2024-10-03 PROCEDURE — 99232 SBSQ HOSP IP/OBS MODERATE 35: CPT | Performed by: HOSPITALIST

## 2024-10-03 PROCEDURE — A9270 NON-COVERED ITEM OR SERVICE: HCPCS | Performed by: STUDENT IN AN ORGANIZED HEALTH CARE EDUCATION/TRAINING PROGRAM

## 2024-10-03 PROCEDURE — A9270 NON-COVERED ITEM OR SERVICE: HCPCS | Performed by: HOSPITALIST

## 2024-10-03 PROCEDURE — 97535 SELF CARE MNGMENT TRAINING: CPT

## 2024-10-03 PROCEDURE — 80048 BASIC METABOLIC PNL TOTAL CA: CPT

## 2024-10-03 PROCEDURE — 99232 SBSQ HOSP IP/OBS MODERATE 35: CPT | Performed by: PHYSICAL MEDICINE & REHABILITATION

## 2024-10-03 PROCEDURE — 97110 THERAPEUTIC EXERCISES: CPT | Mod: CQ

## 2024-10-03 PROCEDURE — 97530 THERAPEUTIC ACTIVITIES: CPT | Mod: CQ

## 2024-10-03 PROCEDURE — 700102 HCHG RX REV CODE 250 W/ 637 OVERRIDE(OP): Performed by: PHYSICAL MEDICINE & REHABILITATION

## 2024-10-03 PROCEDURE — 97116 GAIT TRAINING THERAPY: CPT | Mod: CQ

## 2024-10-03 PROCEDURE — 85025 COMPLETE CBC W/AUTO DIFF WBC: CPT

## 2024-10-03 PROCEDURE — 36415 COLL VENOUS BLD VENIPUNCTURE: CPT

## 2024-10-03 PROCEDURE — 97129 THER IVNTJ 1ST 15 MIN: CPT

## 2024-10-03 PROCEDURE — 97110 THERAPEUTIC EXERCISES: CPT

## 2024-10-03 PROCEDURE — 92507 TX SP LANG VOICE COMM INDIV: CPT

## 2024-10-03 PROCEDURE — 700111 HCHG RX REV CODE 636 W/ 250 OVERRIDE (IP): Mod: JZ | Performed by: PHYSICAL MEDICINE & REHABILITATION

## 2024-10-03 RX ORDER — QUETIAPINE FUMARATE 25 MG/1
50 TABLET, FILM COATED ORAL 2 TIMES DAILY
Status: DISCONTINUED | OUTPATIENT
Start: 2024-10-03 | End: 2024-10-07 | Stop reason: HOSPADM

## 2024-10-03 RX ADMIN — LINEZOLID 600 MG: 600 TABLET, FILM COATED ORAL at 08:35

## 2024-10-03 RX ADMIN — QUETIAPINE FUMARATE 50 MG: 25 TABLET ORAL at 16:21

## 2024-10-03 RX ADMIN — TRAZODONE HYDROCHLORIDE 50 MG: 50 TABLET ORAL at 22:10

## 2024-10-03 RX ADMIN — ATORVASTATIN CALCIUM 80 MG: 40 TABLET, FILM COATED ORAL at 22:09

## 2024-10-03 RX ADMIN — SENNOSIDES AND DOCUSATE SODIUM 2 TABLET: 50; 8.6 TABLET ORAL at 22:09

## 2024-10-03 RX ADMIN — ASPIRIN 81 MG: 81 TABLET, CHEWABLE ORAL at 08:35

## 2024-10-03 RX ADMIN — OMEPRAZOLE 20 MG: 20 CAPSULE, DELAYED RELEASE ORAL at 08:35

## 2024-10-03 RX ADMIN — QUETIAPINE FUMARATE 50 MG: 25 TABLET ORAL at 20:30

## 2024-10-03 RX ADMIN — Medication 1000 UNITS: at 08:35

## 2024-10-03 RX ADMIN — METOPROLOL SUCCINATE 100 MG: 100 TABLET, EXTENDED RELEASE ORAL at 06:07

## 2024-10-03 RX ADMIN — SENNOSIDES AND DOCUSATE SODIUM 2 TABLET: 50; 8.6 TABLET ORAL at 08:34

## 2024-10-03 RX ADMIN — POLYETHYLENE GLYCOL 3350 1 PACKET: 17 POWDER, FOR SOLUTION ORAL at 22:11

## 2024-10-03 RX ADMIN — TRAMADOL HYDROCHLORIDE 25 MG: 50 TABLET ORAL at 22:24

## 2024-10-03 RX ADMIN — HYDRALAZINE HYDROCHLORIDE 100 MG: 50 TABLET ORAL at 06:07

## 2024-10-03 RX ADMIN — HYDRALAZINE HYDROCHLORIDE 100 MG: 50 TABLET ORAL at 22:11

## 2024-10-03 RX ADMIN — LISINOPRIL 40 MG: 20 TABLET ORAL at 08:35

## 2024-10-03 RX ADMIN — HYDRALAZINE HYDROCHLORIDE 100 MG: 50 TABLET ORAL at 14:05

## 2024-10-03 RX ADMIN — ENOXAPARIN SODIUM 40 MG: 100 INJECTION SUBCUTANEOUS at 18:04

## 2024-10-03 RX ADMIN — AMLODIPINE BESYLATE 10 MG: 5 TABLET ORAL at 06:07

## 2024-10-03 RX ADMIN — QUETIAPINE FUMARATE 25 MG: 25 TABLET ORAL at 00:49

## 2024-10-03 RX ADMIN — LINEZOLID 600 MG: 600 TABLET, FILM COATED ORAL at 22:09

## 2024-10-03 ASSESSMENT — ENCOUNTER SYMPTOMS
BLURRED VISION: 0
DIZZINESS: 0
NERVOUS/ANXIOUS: 0
COUGH: 0
DIARRHEA: 0
FEVER: 0

## 2024-10-03 ASSESSMENT — GAIT ASSESSMENTS
ASSISTIVE DEVICE: FRONT WHEEL WALKER
DEVIATION: ANTALGIC;BRADYKINETIC;DECREASED HEEL STRIKE;DECREASED TOE OFF
DISTANCE (FEET): 200
GAIT LEVEL OF ASSIST: CONTACT GUARD ASSIST

## 2024-10-03 ASSESSMENT — ACTIVITIES OF DAILY LIVING (ADL)
BED_CHAIR_WHEELCHAIR_TRANSFER_DESCRIPTION: ADAPTIVE EQUIPMENT;INCREASED TIME;INITIAL PREPARATION FOR TASK;SET-UP OF EQUIPMENT;SUPERVISION FOR SAFETY;VERBAL CUEING

## 2024-10-03 NOTE — THERAPY
"Physical Therapy   Daily Treatment     Patient Name: Joe Templeton  Age:  74 y.o., Sex:  male  Medical Record #: [id number]  Today's Date: 10/3/2024     Precautions  Precautions: Fall Risk  Comments: left knee pain    Subjective    Pt seated in w/c by nursing station upon arrival, agreeable to session.  \"What are you guys gonna do with me?\" And expressed concern regarding his social security      Objective       10/03/24 1031   PT Charge Group   PT Gait Training (Units) 2   PT Therapeutic Exercise (Units) 1   PT Therapeutic Activities (Units) 1   Supervising Physical Therapist Blanca Haywood   PT Total Time Spent   PT Individual Total Time Spent (Mins) 60   Pain 0 - 10 Group   Location Knee   Location Orientation Left   Therapist Pain Assessment During Activity   Cognition    Ability To Follow Commands 1 Step   Safety Awareness Impaired   ABS (Agitated Behavior Scale)   Short Attention Span, Easy Distractibility, Inability to Concentrate 2   Impulsive, Impatient, Low Tolerance for Pain or Frustration 3   Uncooperative, Resistant to Care, Demanding 1   Violent and/or Threatening Violence Toward People or Property 1   Explosive and/or Unpredictable Anger 2   Rocking, Rubbing, Moaning, Other Self-Stimulating Behavior 1   Pulling at Tubes, Restraints, etc. 1   Wandering from Treatment Area 1   Restlessness, Pacing, Excessive Movement 1   Repetitive Behaviors, Motor and/or Verbal 2   Rapid, Loud or Excessive Talking 1   Sudden Changes of Mood 2   Easily Initiated - Excessive Crying and/or Laughter 1   Self-Abusiveness, Physical and/or Verbal 1   Agitated Behavior Scale Total Score 20   Level of Severity No Agitation   Gait Functional Level of Assist    Gait Level Of Assist Contact Guard Assist   Assistive Device Front Wheel Walker   Distance (Feet) 200   # of Times Distance was Traveled 2   Deviation Antalgic;Bradykinetic;Decreased Heel Strike;Decreased Toe Off   Transfer Functional Level of Assist   Bed, Chair, " Wheelchair Transfer Contact Guard Assist   Bed Chair Wheelchair Transfer Description Adaptive equipment;Increased time;Initial preparation for task;Set-up of equipment;Supervision for safety;Verbal cueing  (SPT w/ FWW)   Standing Lower Body Exercises   Hip Abduction 1 set of 10;Bilateral   Marching 1 set of 10   Heel Rise 1 set of 10;Bilateral   Toe Rise 1 set of 10;Bilateral   Bed Mobility    Sit to Stand Contact Guard Assist   Scooting Standby Assist   Interdisciplinary Plan of Care Collaboration   IDT Collaboration with  Occupational Therapist;;Physician   Patient Position at End of Therapy Seated;Self Releasing Lap Belt Applied  (in cafeteria for lunch)   Collaboration Comments CLOF   Physical Therapist Assigned   Assigned PT / Treatment Time / Comments EL     Grand 1 joint mob into ext ~ 2 mins    Assessment    Pt is more upset and agitated today due to he have some quesitons regarding his stay/d/c/ social security comparing to the last couple times that PTA treated him. But he was still willing to participate in therapy. Overall CGA during functional mobility w/ FWW, cues for sequencing and hand placement. No impulsive behavior during session.      Strengths: Independent prior level of function, Pleasant and cooperative, Willingly participates in therapeutic activities  Barriers: Decreased endurance, Difficulty following instructions, Generalized weakness, Impaired activity tolerance, Impaired balance, Impaired carryover of learning, Impaired insight/denial of deficits, Impaired functional cognition, Impulsive    Plan    Manual therapy left knee  Transfer sequencing  Ambulation with FWW  LE strengthening     DME  PT DME Recommendations  Assistive Device: Front Wheeled Walker     Passport items to be completed:  Get in/out of bed safely, in/out of a vehicle, safely use mobility device, walk or wheel around home/community, navigate up and down stairs, show how to get up/down from the ground, ensure  home is accessible, demonstrate HEP, complete caregiver training    Physical Therapy Problems (Active)       Problem: Balance       Dates: Start:  09/21/24            Problem: Mobility Transfers       Dates: Start:  09/21/24         Goal: STG-Within one week, patient will transfer bed to chair with CGA and min VC with use of LRAD        Dates: Start:  09/21/24         Goal Note filed on 10/02/24 1254 by Nimisha Johnson DPT       CGA/min A- inconsistent performance               Goal: STG-Within two weeks, patient will transfer in/out of car requiring min VC with proper technique and use of LRAD safely       Dates: Start:  09/21/24         Goal Note filed on 10/02/24 1254 by Nimisha Johnson DPT       Needs reassessment                  Problem: PT-Long Term Goals       Dates: Start:  09/21/24         Goal: LTG-By discharge, patient will improve dynamic balance from fair+ to good to improve ability to perform ADLs safely with use of LRAD       Dates: Start:  09/21/24            Goal: LTG-By discharge, patient will ambulate 300ft with use of LRAD and supervision assistance.       Dates: Start:  09/21/24            Goal: LTG-By discharge, patient will ambulate up/down 12 stairs with use of single HR and CGA.       Dates: Start:  09/21/24

## 2024-10-03 NOTE — THERAPY
Occupational Therapy  Daily Treatment     Patient Name: Joe Templeton  Age:  74 y.o., Sex:  male  Medical Record #: [id number]  Today's Date: 10/3/2024     Precautions  Precautions: (P) Fall Risk  Comments: left knee pain         Subjective    Pt sleeping in posey. With assist from RN, patient awaken and agreeable to therapy.     Objective       10/03/24 1401   OT Charge Group   Charges Yes   OT Self Care / ADL (Units) 1   OT Cognitive Skill Development First 15 Minutes (Units) 1   OT Therapeutic Exercise (Units) 2   OT Total Time Spent   OT Individual Total Time Spent (Mins) 60   Precautions   Precautions Fall Risk   Functional Level of Assist   Grooming Standby Assist;Standing   Grooming Description Verbal cueing;Supervision for safety;Standing at sink  (Completed hand hygiene standing at sink)   Sitting Upper Body Exercises   Side Arm Raise 3 sets of 10;Bilateral   Shoulder Press 3 sets of 10;Bilateral  (4#, [place] for safety)   Bicep Curls Bilateral;Weight (See Comments for lbs);3 sets of 10  (10#)   Pronation / Supination 3 sets of 10;Bilateral;Weight (See Comments for lbs)  (1x10 at 6#, 2x10 at 9#)   Interdisciplinary Plan of Care Collaboration   IDT Collaboration with  Nursing;Physical Therapist Assistant (PTA)   Patient Position at End of Therapy Seated;Bed Alarm On  (Seatbelt alarm, at nurses station)   Collaboration Comments CLOF     Pt participated in functional mobility training and retrieved cones around therapy room, no DME. Pt required minimal cues for sequencing and completed activity x2. Pt able to find all cones during first round. Pt missed one cone second round but self-corrected with verbal cues. Activity was graded and cones were places at different heights/distances. Pt completed activity and dropped one cone. Therapist retrieved for safety.    Assessment    Pt was very pleasant during therapy session and participated well. Pt improved in UE strength. Pt also participated in functional mobility  training with improved activity tolerance and minor errors.  Strengths: Independent prior level of function  Barriers: Decreased endurance, Confused, Impaired activity tolerance, Impulsive, Limited mobility, Impaired insight/denial of deficits, Impaired functional cognition (Encouragement for pt to participate in ADL's 7:00 a.m.)    Plan    Pt would benefit from skilled OT services to address: safety during ADLs,   functional cognition,   strengthening/endurance,   standing balance/tolerance,  functional mobility     DME  OT DME Recommendations  Bathroom Equipment:  (TBD)    Passport items to be completed:  Perform bathroom transfers, complete dressing, complete feeding, get ready for the day, prepare a simple meal, participate in household tasks, adapt home for safety needs, demonstrate home exercise program, complete caregiver training     Occupational Therapy Goals (Active)       Problem: Functional Cognition       Dates: Start:  09/21/24         Goal: STG-Within one week, patient will be oriented to year,month, and place.       Dates: Start:  09/21/24               Problem: Functional Transfers       Dates: Start:  09/21/24         Goal: STG-Within one week, patient will transfer to toilet with Contact Guard Assistance using DME as needed.       Dates: Start:  09/21/24               Problem: Grooming       Dates: Start:  10/02/24         Goal: STG-Within one week, patient will complete grooming standing at sink w/ zero errors SUP       Dates: Start:  10/02/24               Problem: OT Long Term Goals       Dates: Start:  09/21/24         Goal: LTG-By discharge, patient will complete basic self care tasks at Mod Independent with UB ADL's and Supervision with LB ADL's.       Dates: Start:  09/21/24            Goal: LTG-By discharge, patient will perform bathroom transfers with Supervision.       Dates: Start:  09/21/24               Problem: Toileting       Dates: Start:  10/02/24         Goal: STG-Within one  week, patient will complete toileting tasks SUP       Dates: Start:  10/02/24

## 2024-10-03 NOTE — THERAPY
"Speech Language Pathology  Daily Treatment     Patient Name: Joe Templeton  Age:  74 y.o., Sex:  male  Medical Record #: [id number]  Today's Date: 10/3/2024     Precautions  Precautions: Fall Risk  Comments: left knee pain    Subjective    Assumed care of patient from nurse's station. Pt reports not sleeping well again last night, agreeable to therapy. Less fatigue noted this session as compared to yesterday.     Objective       10/03/24 0904   Treatment Charges   SLP Treatment - Individual Speech Language Treatment - Individual   SLP Total Time Spent   SLP Individual Total Time Spent (Mins) 60   Interdisciplinary Plan of Care Collaboration   IDT Collaboration with  Nursing   Patient Position at End of Therapy Seated;Chair Alarm On;Self Releasing Lap Belt Applied   Collaboration Comments Pt at nurse's station with transfer of care to nursing         Assessment    Orientation log administered with pt scoring , Pt often times stating \"I know it, but can't\" when given choice of 3, pt was able to correctly state kind of place, name of place, year, time, etiology and pathology.   Picture ID, FO3: 92%, increased to FO4: 92%, significant improvement from session prior.   Picture namin% IND, with sentence completion cue increased to 44%. Pt required model in 32% of trials to produce.     Strengths: Pleasant and cooperative, Independent prior level of function, Willingly participates in therapeutic activities  Barriers: Aphasia expressive, Aphasia receptive, Impaired carryover of learning, Impaired insight/denial of deficits, Impaired activity tolerance, Impaired functional cognition, Impaired sleep pattern, Fatigue, Decreased endurance    Plan    Continue to target receptive and expressive language.     Passport items to be completed:  Express basic needs, understand food/liquid recommendations, consistently follow swallow precautions, manage finances, manage medications, arrive to therapy appointments on time, " complete daily memory log entries, solve problems related to safety situations, review education related to hospitalization, complete caregiver training     Speech Therapy Problems (Active)       Problem: Comprehension STGs       Dates: Start:  09/24/24         Goal: STG-Within one week, patient will point to pictures in FO3 with 80% accuracy.       Dates: Start:  09/24/24         Goal Note filed on 10/02/24 1014 by Blanca Altamirano MS,CCC-SLP       Pt has not been able to achieve 80% accuracy consistently. Have increased to FO4 at times, however, performance varies.                  Problem: Expression STGs       Dates: Start:  09/24/24         Goal: STG-Within one week, patient will state antonym given verbal prompt in 7/10 trials       Dates: Start:  09/24/24         Goal Note filed on 10/02/24 1014 by Blanca Altamirano MS,CCC-SLP       Performance fluctuates, unable to achieve 70%.                 Problem: Memory STGs       Dates: Start:  09/21/24         Goal: STG-Within one week, patient will       Dates: Start:  09/21/24       Description: 1) Individualized goal:  be oriented to day, month, year, and situation w/ 90% acc and MIN A.   2) Interventions:  SLP Self Care / ADL Training , SLP Cognitive Skill Development, and SLP Group Treatment          Goal Note filed on 10/02/24 1014 by Blanca Altamirano MS,CCC-SLP       Emphasis of therapy on receptive and expressive language, have not targeted orientation at this time.                  Problem: Speech/Swallowing LTGs       Dates: Start:  09/21/24         Goal: LTG-By discharge, patient will solve basic problems       Dates: Start:  09/21/24       Description: 1) Individualized goal:  related to health and safety with 80% acc and MOD I for a safe D/C to PLOF.   2) Interventions:  SLP Self Care / ADL Training , SLP Cognitive Skill Development, and SLP Group Treatment

## 2024-10-03 NOTE — PROGRESS NOTES
NURSING DAILY NOTE    Name: Joe Templeton   Date of Admission: 9/20/2024   Admitting Diagnosis: No Principal Problem: There is no principal problem currently on the Problem List. Please update the Problem List and refresh.  Attending Physician: ELIZABETH HOLM D.O.  Allergies: Patient has no known allergies.    Safety  Patient Assist  mod assist  Patient Precautions  Fall Risk  Precaution Comments  left knee pain  Bed Transfer Status  Contact Guard Assist  Toilet Transfer Status   Contact Guard Assist  Assistive Devices  Rails, Wheelchair  Oxygen  None - Room Air  Diet/Therapeutic Dining  Current Diet Order   Procedures    Diet Order Diet: Level 6 - Soft and Bite Sized (Set pt up w/ meal- he can then self-feed independently. Pills whole.); Liquid level: Level 0 - Thin; Tray Modifications (optional): SLP - Deliver to Nursing Station     Pill Administration  whole  Agitated Behavioral Scale  18  ABS Level of Severity  No Agitation    Fall Risk  Has the patient had a fall this admission?   No  Nikole Cesar Fall Risk Scoring  21, HIGH RISK  Fall Risk Safety Measures  chair alarm, seatbelt alarm, posey bed , poor balance, and low vision/ hearing    Vitals  Temperature: 36.4 °C (97.6 °F)  Temp src: Oral  Pulse: 90  Respiration: 18  Blood Pressure : (!) 155/134 (bp meds given see mar)  Blood Pressure MAP (Calculated): 141 MM HG  BP Location: Upper Arm, Right  Patient BP Position: Lying Left Side     Oxygen  Pulse Oximetry: 95 %  O2 (LPM): 0  O2 Delivery Device: None - Room Air    Bowel and Bladder  Last Bowel Movement  10/02/24  Stool Type  Type 5: Soft blob with clear cut edges (passed easily)  Bowel Device  Bathroom  Continent  Bladder: Did not void   Bowel: No movement  Bladder Function  Urine Void (mL):  (lg)  Number of Times Voided: 1  Urinary Options: Yes  Urine Color: Yellow  Straight Catheter: 550 ml  Wet Diaper Count: 1  Genitourinary Assessment    Bladder Assessment (WDL):  Within Defined Limits  Silva Catheter: Not Applicable  Urine Color: Yellow  Bladder Device: Bathroom  Time Void: Yes  Bladder Scan: Post Void  $ Bladder Scan Results (mL): 206    Skin  Colt Score   17  Sensory Interventions   Bed Types: Other (Comments) ([facility])  Skin Preventative Measures: Pillows in Use for Support / Positioning  Moisture Interventions  Moisturizers/Barriers: Barrier Wipes      Pain  Pain Rating Scale  0 - No Pain  Pain Location  Foot, Ankle  Pain Location Orientation  Left  Pain Interventions   Declines    ADLs    Bathing   Partial Bed Bath, * * With Assistance from, Staff  Linen Change   Complete  Personal Hygiene  Change Dariana Pads, Moist Dariana Wipes, Perineal Care  Chlorhexidine Bath      Oral Care     Teeth/Dentures     Shave     Nutrition Percentage Eaten  *  * Meal *  *, Lunch, Between % Consumed  Environmental Precautions  Treaded Slipper Socks on Patient  Patient Turns/Positioning  Patient Turns Self from Side to Side  Patient Turns Assistance/Tolerance  Assistance of One  Bed Positions  Bed Controls On, Bed Locked  Head of Bed Elevated  Less than 30 degrees      Psychosocial/Neurologic Assessment  Psychosocial Assessment  Psychosocial (WDL):  WDL Except  Patient Behaviors: Confused, Forgetful  Neurologic Assessment  Neuro (WDL): Exceptions to WDL  Level of Consciousness: Alert  Orientation Level: Disoriented to situation, Disoriented to time, Disoriented to place  Cognition: Follows commands, Impulsive, Poor safety awareness, Poor judgement, Memory Loss  Speech: Clear  Pupil Assesment: No  Motor Function/Sensation Assessment: Motor strength  Muscle Strength Right Arm: Good Strength Against Gravity and Moderate Resistance  Muscle Strength Left Arm: Good Strength Against Gravity and Moderate Resistance  Muscle Strength Right Leg: Fair Strength against Gravity but No Resistance  LLE Motor Response: Responds to commands  Muscle Strength Left Leg: Fair  Strength against Gravity but No Resistance  EENT (WDL):  WDL Except    Cardio/Pulmonary Assessment  Edema   LLE Edema: 1+  Respiratory Breath Sounds  RUL Breath Sounds: Clear  RML Breath Sounds: Clear  RLL Breath Sounds: Clear  YONI Breath Sounds: Clear  LLL Breath Sounds: Clear  Cardiac Assessment   Cardiac (WDL):  WDL Except

## 2024-10-03 NOTE — PROGRESS NOTES
[facility] Medicine Daily Progress Note    Date of Service  10/3/2024    Chief Complaint:  Hypertension    Interval History:  No significant events overnight.    Review of Systems  Review of Systems   Constitutional:  Negative for fever.   Eyes:  Negative for blurred vision.   Respiratory:  Negative for cough.    Cardiovascular:  Negative for chest pain.   Gastrointestinal:  Negative for diarrhea.   Musculoskeletal:  Negative for joint pain.   Neurological:  Negative for dizziness.   Psychiatric/Behavioral:  The patient is not nervous/anxious.         Physical Exam  Temp:  [36.4 °C (97.6 °F)] 36.4 °C (97.6 °F)  Pulse:  [74-93] 90  Resp:  [18] 18  BP: (121-158)/() 155/134  SpO2:  [95 %] 95 %    Physical Exam  Vitals and nursing note reviewed.   Constitutional:       Appearance: He is not diaphoretic.   HENT:      Mouth/Throat:      Pharynx: No oropharyngeal exudate or posterior oropharyngeal erythema.   Eyes:      Extraocular Movements: Extraocular movements intact.   Neck:      Vascular: No carotid bruit.   Cardiovascular:      Rate and Rhythm: Normal rate and regular rhythm.      Heart sounds: No murmur heard.  Pulmonary:      Effort: Pulmonary effort is normal.      Breath sounds: Normal breath sounds. No stridor.   Abdominal:      General: Bowel sounds are normal.      Palpations: Abdomen is soft.   Musculoskeletal:      Right lower leg: No edema.      Left lower leg: No edema.   Skin:     General: Skin is warm and dry.      Findings: No rash.   Psychiatric:         Mood and Affect: Mood normal.         Behavior: Behavior normal.         Fluids    Intake/Output Summary (Last 24 hours) at 10/3/2024 1059  Last data filed at 10/3/2024 0818  Gross per 24 hour   Intake 440 ml   Output --   Net 440 ml        Laboratory  Recent Labs     10/02/24  0636 10/03/24  0628   WBC 11.5* 8.4   RBC 4.60* 4.45*   HEMOGLOBIN 14.2 13.7*   HEMATOCRIT 41.9* 41.4*   MCV 91.1 93.0   MCH 30.9 30.8   MCHC 33.9 33.1   RDW 39.1 40.5    PLATELETCT 357 334   MPV 11.6 11.1     Recent Labs     10/02/24  0636 10/03/24  0628   SODIUM 142 143   POTASSIUM 3.7 4.0   CHLORIDE 109 111   CO2 22 22   GLUCOSE 139* 144*   BUN 23* 26*   CREATININE 0.90 1.03   CALCIUM 9.0 8.8                   Imaging    Assessment/Plan  Vitamin D deficiency  Assessment & Plan  Vit D: 18  Cont supplements    Azotemia- (present on admission)  Assessment & Plan  Bun: 23 --> 26  Encouraging fluid intake  Monitor    Stroke (HCC)- (present on admission)  Assessment & Plan  Has hx of old CVA's  S/P recent CVA  MRI showed multiple old infarcts and multiple acute infarcts (including the hao)  Cont ASA and Lipitor    CAD (coronary artery disease)- (present on admission)  Assessment & Plan  Hx of CABG  Cont ASA, Lipitor  Cont Lisinopril  Cont Toprol XL    Septic arthritis of ankle (HCC)- (present on admission)  Assessment & Plan  S/P I+D on 9/14/24  Has on & off mild leukocytosis  Has been afebrile  Off Vanco  Now on Zyvox -- as pt chewing on IV tubing (thru 10/11)    Essential (primary) hypertension- (present on admission)  Assessment & Plan  BP better recently but occ rises up a little  Cont Norvasc  Cont Lisinopril  Cont Hydralazine  Cont Toprol XL -- dose recently increased  Cont to monitor

## 2024-10-03 NOTE — CARE PLAN
"Pt refused Bp to be taken at 2200 hrs, and refused  2200 hrs Hydralazine, as well. Pt called to be assisted to bathroom by CHLOE Cheatham, voiding freely. Then pt allowed bp to be taken  at 2335 hrs- Bp- 158/77, due hydralazine  given , taken by pt with apple sauce per pt's request . Pt anxious around midnight , shouting , needs attended, oriented to time and place, seroquel 25 mg given [ see mar] . Pt able to calm down and sleep. Pt on Soma bed, cont monitored.    Problem: Fall Risk - Rehab  Goal: Patient will remain free from falls  Outcome: Progressing  Note: Nikole Cesar Fall risk Assessment Score: 21    High fall risk Interventions   - Alarming seatbelt  - Bed and strip alarm   - Yellow sign by the door   - Yellow wrist band \"Fall risk\"  - Room near to the nurse station  - Do not leave patient unattended in the bathroom  - Fall risk education provided      Problem: Safety - Medical Restraint  Goal: Remains free of injury from restraints (Restraint for Interference with Medical Device)  Outcome: Progressing  Note: Pt impulsive, confused , pt on SOMA  bed, assisted oob to bathroom voiding freely. Safety measures enforced.     Problem: Pain - Standard  Goal: Alleviation of pain or a reduction in pain to the patient’s comfort goal  Outcome: Progressing  Note: Assessed for pain and discomfort, pain under control.   The patient is Stable - Low risk of patient condition declining or worsening    Shift Goals  Clinical Goals: rest, safety  Patient Goals: go home    Progress made toward(s) clinical / shift goals:  Pt free from fall and injury.    "

## 2024-10-03 NOTE — PROGRESS NOTES
"  Physical Medicine & Rehabilitation Progress Note    Encounter Date: 10/3/2024    Chief Complaint: \"Where is everyone\"    Interval Events (Subjective):  BP intermittently elevated  BM 10/3  Voiding     Patient seen and examined in his room. Case discussed with nursing. Still impulsive. Open net trial not possible right now.     ROS: 14 point ROS negative unless otherwise specified in the HPI    Objective:  VITAL SIGNS: /56   Pulse 70   Temp 36.7 °C (98.1 °F) (Oral)   Resp 18   Ht 1.753 m (5' 9\")   Wt 77.1 kg (170 lb)   SpO2 94%   BMI 25.10 kg/m²     GEN: No apparent distress  HEENT: Head normocephalic, atraumatic.  Sclera nonicteric bilaterally, no ocular discharge appreciated bilaterally.  CV: Extremities warm and well-perfused, no peripheral edema appreciated bilaterally.  PULMONARY: Breathing nonlabored on room air, no respiratory accessory muscle use.  Not requiring supplemental oxygen.  SKIN:   9/30    10/2    PSYCH: Mood and affect within normal limits.  NEURO: Awake alert.  Pleasantly confused. Disoriented to date, day, month, year, sometimes to self.       Laboratory Values:  Recent Results (from the past 72 hour(s))   Comp Metabolic Panel    Collection Time: 10/02/24  6:36 AM   Result Value Ref Range    Sodium 142 135 - 145 mmol/L    Potassium 3.7 3.6 - 5.5 mmol/L    Chloride 109 96 - 112 mmol/L    Co2 22 20 - 33 mmol/L    Anion Gap 11.0 7.0 - 16.0    Glucose 139 (H) 65 - 99 mg/dL    Bun 23 (H) 8 - 22 mg/dL    Creatinine 0.90 0.50 - 1.40 mg/dL    Calcium 9.0 8.5 - 10.5 mg/dL    Correct Calcium 9.4 8.5 - 10.5 mg/dL    AST(SGOT) 25 12 - 45 U/L    ALT(SGPT) 34 2 - 50 U/L    Alkaline Phosphatase 68 30 - 99 U/L    Total Bilirubin 0.4 0.1 - 1.5 mg/dL    Albumin 3.5 3.2 - 4.9 g/dL    Total Protein 6.8 6.0 - 8.2 g/dL    Globulin 3.3 1.9 - 3.5 g/dL    A-G Ratio 1.1 g/dL   CBC WITH DIFFERENTIAL    Collection Time: 10/02/24  6:36 AM   Result Value Ref Range    WBC 11.5 (H) 4.8 - 10.8 K/uL    RBC 4.60 (L) " 4.70 - 6.10 M/uL    Hemoglobin 14.2 14.0 - 18.0 g/dL    Hematocrit 41.9 (L) 42.0 - 52.0 %    MCV 91.1 81.4 - 97.8 fL    MCH 30.9 27.0 - 33.0 pg    MCHC 33.9 32.3 - 36.5 g/dL    RDW 39.1 35.9 - 50.0 fL    Platelet Count 357 164 - 446 K/uL    MPV 11.6 9.0 - 12.9 fL    Neutrophils-Polys 75.10 (H) 44.00 - 72.00 %    Lymphocytes 11.90 (L) 22.00 - 41.00 %    Monocytes 9.70 0.00 - 13.40 %    Eosinophils 2.50 0.00 - 6.90 %    Basophils 0.40 0.00 - 1.80 %    Immature Granulocytes 0.40 0.00 - 0.90 %    Nucleated RBC 0.00 0.00 - 0.20 /100 WBC    Neutrophils (Absolute) 8.59 (H) 1.82 - 7.42 K/uL    Lymphs (Absolute) 1.36 1.00 - 4.80 K/uL    Monos (Absolute) 1.11 (H) 0.00 - 0.85 K/uL    Eos (Absolute) 0.29 0.00 - 0.51 K/uL    Baso (Absolute) 0.05 0.00 - 0.12 K/uL    Immature Granulocytes (abs) 0.05 0.00 - 0.11 K/uL    NRBC (Absolute) 0.00 K/uL   ESTIMATED GFR    Collection Time: 10/02/24  6:36 AM   Result Value Ref Range    GFR (CKD-EPI) 90 >60 mL/min/1.73 m 2   CBC WITH DIFFERENTIAL    Collection Time: 10/03/24  6:28 AM   Result Value Ref Range    WBC 8.4 4.8 - 10.8 K/uL    RBC 4.45 (L) 4.70 - 6.10 M/uL    Hemoglobin 13.7 (L) 14.0 - 18.0 g/dL    Hematocrit 41.4 (L) 42.0 - 52.0 %    MCV 93.0 81.4 - 97.8 fL    MCH 30.8 27.0 - 33.0 pg    MCHC 33.1 32.3 - 36.5 g/dL    RDW 40.5 35.9 - 50.0 fL    Platelet Count 334 164 - 446 K/uL    MPV 11.1 9.0 - 12.9 fL    Neutrophils-Polys 70.10 44.00 - 72.00 %    Lymphocytes 15.80 (L) 22.00 - 41.00 %    Monocytes 9.30 0.00 - 13.40 %    Eosinophils 3.60 0.00 - 6.90 %    Basophils 0.60 0.00 - 1.80 %    Immature Granulocytes 0.60 0.00 - 0.90 %    Nucleated RBC 0.00 0.00 - 0.20 /100 WBC    Neutrophils (Absolute) 5.90 1.82 - 7.42 K/uL    Lymphs (Absolute) 1.33 1.00 - 4.80 K/uL    Monos (Absolute) 0.78 0.00 - 0.85 K/uL    Eos (Absolute) 0.30 0.00 - 0.51 K/uL    Baso (Absolute) 0.05 0.00 - 0.12 K/uL    Immature Granulocytes (abs) 0.05 0.00 - 0.11 K/uL    NRBC (Absolute) 0.00 K/uL   Basic Metabolic Panel     Collection Time: 10/03/24  6:28 AM   Result Value Ref Range    Sodium 143 135 - 145 mmol/L    Potassium 4.0 3.6 - 5.5 mmol/L    Chloride 111 96 - 112 mmol/L    Co2 22 20 - 33 mmol/L    Glucose 144 (H) 65 - 99 mg/dL    Bun 26 (H) 8 - 22 mg/dL    Creatinine 1.03 0.50 - 1.40 mg/dL    Calcium 8.8 8.5 - 10.5 mg/dL    Anion Gap 10.0 7.0 - 16.0   ESTIMATED GFR    Collection Time: 10/03/24  6:28 AM   Result Value Ref Range    GFR (CKD-EPI) 77 >60 mL/min/1.73 m 2       Medications:  Scheduled Medications   Medication Dose Frequency    QUEtiapine  50 mg BID    [START ON 10/4/2024] influenza vaccine High-Dose  0.5 mL Once    traZODone  50 mg QHS    linezolid  600 mg Q12HRS    metoprolol SR  100 mg DAILY    senna-docusate  2 Tablet BID    And    polyethylene glycol/lytes  1 Packet BID    hydrALAZINE  100 mg Q8HRS    vitamin D3  1,000 Units DAILY    Pharmacy Consult Request  1 Each PHARMACY TO DOSE    lidocaine  1-2 Patch Daily-0800    omeprazole  20 mg DAILY    amLODIPine  10 mg Q DAY    enoxaparin (LOVENOX) injection  40 mg DAILY AT 1800    lisinopril  40 mg DAILY    aspirin  81 mg DAILY    atorvastatin  80 mg Q EVENING     PRN medications: QUEtiapine, hydrALAZINE, lactulose, docusate sodium, bisacodyl EC, magnesium hydroxide, sodium phosphate, carboxymethylcellulose, benzocaine-menthol, mag hydrox-al hydrox-simeth, ondansetron **OR** ondansetron, traZODone, sodium chloride, acetaminophen, ziprasidone, traMADol, formulation r    Diet:  Current Diet Order   Procedures    Diet Order Diet: Level 6 - Soft and Bite Sized (Set pt up w/ meal- he can then self-feed independently. Pills whole.); Liquid level: Level 0 - Thin; Tray Modifications (optional): SLP - Deliver to Nursing Station       Medical Decision Making and Plan:  Multifocal embolic appearing infarcts: Right insula, left parietal lobe, right parietal lobe, left hao  MRI with multiple old infarcts  Dysphagia  Encephalopathy  PT and OT for mobility and ADLs. Per  guidelines, 15 hours per week between PT, OT and/or SLP.  Follow-up stroke Bridge clinic  Secondary stroke prophylaxis: Aspirin and statin   Continue Seroquel at night - 10/2 move to 1800 and schedule Trazodone at 2100     I certify that the patient currently requires non-pharmacologic restraints. Non-pharmacologic restraints are required to reduce the potential for the patient to harm themselves or others, to limit violent behaviors, and to allow proper assessment and care. I have completed a face to face assessment of this patient on 10/3/2024. Alternative methods including but not limited to de-escalation techniques, redirection, and pharmacologic treatment have been attempted but patient currently remains at risk without restraints. Our staff has been trained on restraints and patient is currently placed in video monitored room.  The need for these restraints is assessed by a rehabilitation physician every 24 hours and use of restraints is minimized when appropriate.  Current diagnosis which requires restraints: Encephalopathy. Current restraints required: Enclosure bed, bilateral mitts to prevent pulling out midline which is needed for IV antibiotics.       Septic arthritis left ankle s/p left ankle arthrotomy and I&D 9/14/2024 Dr. Gautam -weightbearing as tolerated left lower extremity.  ID followed.  Continue vancomycin twice daily through 10/11.  Potentially can switch to Zyvox.  9/23 patient pulled out midline will need new access.  9/24 pulled out midline again, admits an attempt to help prevent him pulling it out.  9/26 has midline.  Continue IV vancomycin per ID.  If absolutely necessary at discharge can switch to linezolid 600 mg twice a day with stop date of 10/11.  If goes to skilled nursing facility can continue IV vancomycin.  Needs follow-up with ID.    9/30/2024  increased pain left lower extremity.  X-ray unremarkable.  Switch IV vancomycin to oral linezolid with end date of 10/11 starting 10/1.   Patient continues to pull out peripheral IVs and midlines.  He has had over 7 different lines placed since last week.  Could help with agitation if he does not have mitts and IV lines.    10/1 more pain with weightbearing with therapy 9/30.  X-ray unremarkable for acute fracture.    10/2 Leukocytosis at 11.5. Is now on Linezolid. 10/3 WNL.     HAS ID APPOINTMENT 10/8     Left knee pain -check knee x-ray.  9/23 negative.     Chronic systolic heart failure, not in acute exacerbation - preserved ejection fraction-LVEF 60%     CAD s/p CABG -continue aspirin and statin     Hypertension -amlodipine, hydralazine, lisinopril, metoprolol.  10/3 uncontrolled still up and down.  Hospitalist managing.     Azotemia -9/22 improving, monitor.  9/24 resolved.  9/26 resolved. 10/2 Returned mild at 23. Encourage PO fluids. 10/3 mildly elevated at 26. Monitor.     Hypokalemia -9/22 mildly low at 3.5.  Hospitalist following. 10/2 WNL.      Leukocytosis -9/30 resolved. 10/2 Returned. 10/3 Resolved.     Anemia - 10/3 Stable at 13.7     Pain -Tylenol and oxycodone     Bowel -patient unsure when last BM was.  Check KUB.  Moderate stool burden.  Had small bowel movement 9/22.  Increase bowel meds 9/23. 9/25 Give MoM x1. Had bowel movement 9/26.  10/3 having regular bowel movements.       Upcoming Labs/imaging: 10/3     DVT PROPHYLAXIS: Lovenox 40mg SQ nightly      HOSPITALIST FOLLOWING: Yes - d/w hospitalist 10/3     CODE STATUS: FULL CODE     DISPO: Home with family      HEBER: 10/4     MEDS SENT TO: [facility]     DISCHARGE SPECIALIST FOLLOW UP: ID, Ortho, Stroke Bridge     Patient to scheduled follow up with their PCP within 2 weeks from discharge from the [facility].   ____________________________________    Dr. Khushbu Meza DO, MS  [facility] - Physical Medicine & Rehabilitation   ____________________________________

## 2024-10-04 PROCEDURE — 99232 SBSQ HOSP IP/OBS MODERATE 35: CPT | Performed by: PHYSICAL MEDICINE & REHABILITATION

## 2024-10-04 PROCEDURE — A9270 NON-COVERED ITEM OR SERVICE: HCPCS | Performed by: HOSPITALIST

## 2024-10-04 PROCEDURE — 700102 HCHG RX REV CODE 250 W/ 637 OVERRIDE(OP): Performed by: HOSPITALIST

## 2024-10-04 PROCEDURE — 700102 HCHG RX REV CODE 250 W/ 637 OVERRIDE(OP): Performed by: PHYSICAL MEDICINE & REHABILITATION

## 2024-10-04 PROCEDURE — A9270 NON-COVERED ITEM OR SERVICE: HCPCS | Performed by: PHYSICAL MEDICINE & REHABILITATION

## 2024-10-04 PROCEDURE — 90662 IIV NO PRSV INCREASED AG IM: CPT | Performed by: PHYSICAL MEDICINE & REHABILITATION

## 2024-10-04 PROCEDURE — 700111 HCHG RX REV CODE 636 W/ 250 OVERRIDE (IP): Performed by: PHYSICAL MEDICINE & REHABILITATION

## 2024-10-04 PROCEDURE — 90471 IMMUNIZATION ADMIN: CPT

## 2024-10-04 PROCEDURE — 770005 HCHG ROOM/CARE - REHAB PRIVATE (11*

## 2024-10-04 PROCEDURE — 99231 SBSQ HOSP IP/OBS SF/LOW 25: CPT | Performed by: HOSPITALIST

## 2024-10-04 PROCEDURE — 3E02340 INTRODUCTION OF INFLUENZA VACCINE INTO MUSCLE, PERCUTANEOUS APPROACH: ICD-10-PCS | Performed by: PHYSICAL MEDICINE & REHABILITATION

## 2024-10-04 RX ADMIN — LINEZOLID 600 MG: 600 TABLET, FILM COATED ORAL at 08:31

## 2024-10-04 RX ADMIN — AMLODIPINE BESYLATE 10 MG: 5 TABLET ORAL at 05:43

## 2024-10-04 RX ADMIN — OMEPRAZOLE 20 MG: 20 CAPSULE, DELAYED RELEASE ORAL at 08:30

## 2024-10-04 RX ADMIN — QUETIAPINE FUMARATE 50 MG: 25 TABLET ORAL at 20:15

## 2024-10-04 RX ADMIN — QUETIAPINE FUMARATE 50 MG: 25 TABLET ORAL at 17:37

## 2024-10-04 RX ADMIN — HYDRALAZINE HYDROCHLORIDE 100 MG: 50 TABLET ORAL at 05:43

## 2024-10-04 RX ADMIN — INFLUENZA A VIRUS A/VICTORIA/4897/2022 IVR-238 (H1N1) ANTIGEN (FORMALDEHYDE INACTIVATED), INFLUENZA A VIRUS A/CALIFORNIA/122/2022 SAN-022 (H3N2) ANTIGEN (FORMALDEHYDE INACTIVATED), AND INFLUENZA B VIRUS B/MICHIGAN/01/2021 ANTIGEN (FORMALDEHYDE INACTIVATED) 0.5 ML: 60; 60; 60 INJECTION, SUSPENSION INTRAMUSCULAR at 10:33

## 2024-10-04 RX ADMIN — ASPIRIN 81 MG: 81 TABLET, CHEWABLE ORAL at 08:31

## 2024-10-04 RX ADMIN — HYDRALAZINE HYDROCHLORIDE 100 MG: 50 TABLET ORAL at 14:52

## 2024-10-04 RX ADMIN — POLYETHYLENE GLYCOL 3350 1 PACKET: 17 POWDER, FOR SOLUTION ORAL at 08:28

## 2024-10-04 RX ADMIN — LISINOPRIL 40 MG: 20 TABLET ORAL at 08:30

## 2024-10-04 RX ADMIN — SENNOSIDES AND DOCUSATE SODIUM 2 TABLET: 50; 8.6 TABLET ORAL at 21:33

## 2024-10-04 RX ADMIN — HYDRALAZINE HYDROCHLORIDE 100 MG: 50 TABLET ORAL at 21:33

## 2024-10-04 RX ADMIN — POLYETHYLENE GLYCOL 3350 1 PACKET: 17 POWDER, FOR SOLUTION ORAL at 21:32

## 2024-10-04 RX ADMIN — Medication 1000 UNITS: at 08:31

## 2024-10-04 RX ADMIN — TRAZODONE HYDROCHLORIDE 50 MG: 50 TABLET ORAL at 21:33

## 2024-10-04 RX ADMIN — ATORVASTATIN CALCIUM 80 MG: 40 TABLET, FILM COATED ORAL at 21:32

## 2024-10-04 RX ADMIN — SENNOSIDES AND DOCUSATE SODIUM 2 TABLET: 50; 8.6 TABLET ORAL at 08:30

## 2024-10-04 RX ADMIN — ENOXAPARIN SODIUM 40 MG: 100 INJECTION SUBCUTANEOUS at 17:38

## 2024-10-04 RX ADMIN — LINEZOLID 600 MG: 600 TABLET, FILM COATED ORAL at 21:32

## 2024-10-04 RX ADMIN — METOPROLOL SUCCINATE 100 MG: 100 TABLET, EXTENDED RELEASE ORAL at 05:43

## 2024-10-04 ASSESSMENT — ENCOUNTER SYMPTOMS
SHORTNESS OF BREATH: 0
NERVOUS/ANXIOUS: 0
NAUSEA: 0
FEVER: 0
DIARRHEA: 0
VOMITING: 0
ABDOMINAL PAIN: 0
CHILLS: 0

## 2024-10-04 NOTE — CARE PLAN
Problem: Knowledge Deficit - Standard  Goal: Patient and family/care givers will demonstrate understanding of plan of care, disease process/condition, diagnostic tests and medications  Outcome: Progressing   Pt education given regarding plan of care with emphasis on adequate hydration, pt shows limited understanding and follow through, will continue to reinforce education and continue to monitor.            Problem: Fall Risk - Rehab  Goal: Patient will remain free from falls  Outcome: Progressing   Pt education given regarding fall precautions AND safety measures, pt shows poor understanding, has not attempted to self transfer this shift, will continue to reinforce education and continue to monitor.

## 2024-10-04 NOTE — PROGRESS NOTES
[facility] Medicine Daily Progress Note    Date of Service  10/4/2024    Chief Complaint:  Hypertension    Interval History:  BP doing better recently.  Encouraging fluid intake.    Review of Systems  Review of Systems   Constitutional:  Negative for chills and fever.   Respiratory:  Negative for shortness of breath.    Cardiovascular:  Negative for chest pain.   Gastrointestinal:  Negative for abdominal pain, diarrhea, nausea and vomiting.   Psychiatric/Behavioral:  The patient is not nervous/anxious.         Physical Exam  Temp:  [36.4 °C (97.6 °F)-36.7 °C (98.1 °F)] 36.4 °C (97.6 °F)  Pulse:  [62-75] 71  Resp:  [16-18] 16  BP: (125-135)/(62-70) 135/62  SpO2:  [94 %-95 %] 94 %    Physical Exam  Vitals and nursing note reviewed.   Constitutional:       Appearance: Normal appearance.   HENT:      Head: Atraumatic.   Eyes:      Conjunctiva/sclera: Conjunctivae normal.      Pupils: Pupils are equal, round, and reactive to light.   Cardiovascular:      Rate and Rhythm: Normal rate and regular rhythm.   Pulmonary:      Effort: Pulmonary effort is normal.      Breath sounds: Normal breath sounds.   Abdominal:      General: Bowel sounds are normal.      Palpations: Abdomen is soft.   Musculoskeletal:      Cervical back: Normal range of motion and neck supple.      Right lower leg: No edema.      Left lower leg: No edema.   Skin:     General: Skin is warm and dry.   Psychiatric:         Mood and Affect: Mood normal.         Behavior: Behavior normal.         Fluids    Intake/Output Summary (Last 24 hours) at 10/4/2024 1214  Last data filed at 10/4/2024 1149  Gross per 24 hour   Intake 360 ml   Output --   Net 360 ml        Laboratory  Recent Labs     10/02/24  0636 10/03/24  0628   WBC 11.5* 8.4   RBC 4.60* 4.45*   HEMOGLOBIN 14.2 13.7*   HEMATOCRIT 41.9* 41.4*   MCV 91.1 93.0   MCH 30.9 30.8   MCHC 33.9 33.1   RDW 39.1 40.5   PLATELETCT 357 334   MPV 11.6 11.1     Recent Labs     10/02/24  0636 10/03/24  0628   SODIUM 142  143   POTASSIUM 3.7 4.0   CHLORIDE 109 111   CO2 22 22   GLUCOSE 139* 144*   BUN 23* 26*   CREATININE 0.90 1.03   CALCIUM 9.0 8.8                   Imaging    Assessment/Plan  Vitamin D deficiency  Assessment & Plan  Vit D: 18  Cont supplements    Azotemia- (present on admission)  Assessment & Plan  Bun: 23 --> 26  Encouraging fluid intake  Monitor    Stroke (HCC)- (present on admission)  Assessment & Plan  Has hx of old CVA's  S/P recent CVA  MRI showed multiple old infarcts and multiple acute infarcts (including the hao)  Cont ASA and Lipitor    CAD (coronary artery disease)- (present on admission)  Assessment & Plan  Hx of CABG  Cont ASA, Lipitor  Cont Lisinopril  Cont Toprol XL    Septic arthritis of ankle (HCC)- (present on admission)  Assessment & Plan  S/P I+D on 9/14/24  Has on & off mild leukocytosis  Has been afebrile  Off Vanco  Now on Zyvox -- as pt chewing on IV tubing (thru 10/11)    Essential (primary) hypertension- (present on admission)  Assessment & Plan  BP better recently but occ rises up a little  Cont Norvasc  Cont Lisinopril  Cont Hydralazine  Cont Toprol XL   Cont to monitor

## 2024-10-04 NOTE — PROGRESS NOTES
NURSING DAILY NOTE    Name: Joe Templeton   Date of Admission: 9/20/2024   Admitting Diagnosis: No Principal Problem: There is no principal problem currently on the Problem List. Please update the Problem List and refresh.  Attending Physician: ELIZABETH HOLM D.O.  Allergies: Patient has no known allergies.    Safety  Patient Assist  mod asst  Patient Precautions  Fall Risk  Precaution Comments  left knee pain  Bed Transfer Status  Contact Guard Assist  Toilet Transfer Status   Contact Guard Assist  Assistive Devices  Rails, Wheelchair  Oxygen  None - Room Air  Diet/Therapeutic Dining  Current Diet Order   Procedures    Diet Order Diet: Level 6 - Soft and Bite Sized (Set pt up w/ meal- he can then self-feed independently. Pills whole.); Liquid level: Level 0 - Thin; Tray Modifications (optional): SLP - Deliver to Nursing Station     Pill Administration  whole  Agitated Behavioral Scale  20  ABS Level of Severity  No Agitation    Fall Risk  Has the patient had a fall this admission?   No  Nikole Cesar Fall Risk Scoring  21, HIGH RISK  Fall Risk Safety Measures  bed alarm, chair alarm, seatbelt alarm, posey bed , poor balance, and low vision/ hearing    Vitals  Temperature: 36.7 °C (98 °F)  Temp src: Oral  Pulse: 70  Respiration: 18  Blood Pressure : 125/64  Blood Pressure MAP (Calculated): 84 MM HG  BP Location: Left, Upper Arm  Patient BP Position: Sitting     Oxygen  Pulse Oximetry: 95 %  O2 (LPM): 0  O2 Delivery Device: None - Room Air    Bowel and Bladder  Last Bowel Movement  10/03/24  Stool Type  Type 4: Like a sausage or snake, smooth and soft  Bowel Device  Bathroom  Continent  Bladder: Did not void   Bowel: No movement  Bladder Function  Urine Void (mL):  (lg)  Number of Times Voided: 1  Urinary Options: Yes  Urine Color: Yellow  Straight Catheter: 550 ml  Wet Diaper Count: 1  Genitourinary Assessment   Bladder Assessment (WDL):  Within Defined  Limits  Silva Catheter: Not Applicable  Urine Color: Yellow  Bladder Device: Bathroom  Time Void: Yes  Bladder Scan: Post Void  $ Bladder Scan Results (mL): 206    Skin  Colt Score   17  Sensory Interventions   Bed Types:  (posey)  Skin Preventative Measures: Pillows in Use for Support / Positioning  Moisture Interventions  Moisturizers/Barriers: Barrier Wipes      Pain  Pain Rating Scale  0 - No Pain  Pain Location  Knee  Pain Location Orientation  Left  Pain Interventions   Declines    ADLs    Bathing   Patient Refused Bathing (Partial bath)  Linen Change   Complete  Personal Hygiene  Change Dariana Pads, Moist Dariana Wipes, Perineal Care  Chlorhexidine Bath      Oral Care     Teeth/Dentures     Shave     Nutrition Percentage Eaten  Lunch, Between % Consumed  Environmental Precautions  Treaded Slipper Socks on Patient  Patient Turns/Positioning  Patient Turns Self from Side to Side  Patient Turns Assistance/Tolerance  Assistance of One  Bed Positions  Bed Controls On, Bed Locked  Head of Bed Elevated  Less than 30 degrees      Psychosocial/Neurologic Assessment  Psychosocial Assessment  Psychosocial (WDL):  WDL Except  Patient Behaviors: Confused, Forgetful  Neurologic Assessment  Neuro (WDL): Exceptions to WDL  Level of Consciousness: Responds to voice  Orientation Level: Disoriented to situation, Disoriented to time, Disoriented to place  Cognition: Follows commands, Impulsive, Poor safety awareness, Poor judgement, Memory Loss  Speech: Clear  Pupil Assesment: No  Motor Function/Sensation Assessment: Motor strength  Muscle Strength Right Arm: Good Strength Against Gravity and Moderate Resistance  Muscle Strength Left Arm: Good Strength Against Gravity and Moderate Resistance  Muscle Strength Right Leg: Fair Strength against Gravity but No Resistance  LLE Motor Response: Responds to commands  Muscle Strength Left Leg: Fair Strength against Gravity but No Resistance  EENT (WDL):  WDL Except    Cardio/Pulmonary  Assessment  Edema   LLE Edema: 1+  Respiratory Breath Sounds  RUL Breath Sounds: Clear  RML Breath Sounds: Clear  RLL Breath Sounds: Clear  YONI Breath Sounds: Clear  LLL Breath Sounds: Clear  Cardiac Assessment   Cardiac (WDL):  WDL Except

## 2024-10-04 NOTE — CARE PLAN
"  Problem: Fall Risk - Rehab  Goal: Patient will remain free from falls  Outcome: Progressing  Note: Nikole Arsenio Fall risk Assessment Score: 21    High fall risk Interventions   - Alarming seatbelt  - Bed and strip alarm   - Yellow sign by the door   - Yellow wrist band \"Fall risk\"  - Room near to the nurse station  - Do not leave patient unattended in the bathroom  - Fall risk education provided    Problem: Safety - Medical Restraint  Goal: Remains free of injury from restraints (Restraint for Interference with Medical Device)  Outcome: Progressing     Pt confused and forgetful, impulsive, on Soma bed , assessed every 2 hrs per protocol.Pt was able to sleep . Cont monitored.     Problem: Pain - Standard  Goal: Alleviation of pain or a reduction in pain to the patient’s comfort goal  Outcome: Progressing  Note: Assessed for pain and discomfort, medicated with tramadol at 2225 hrs for knee pain with relief. Assisted oob to bathroom with contact guard assist , voiding freely , needs anticipated and attended.      The patient is Stable - Low risk of patient condition declining or worsening    Shift Goals  Clinical Goals: rest, safety  Patient Goals: go home    Progress made toward(s) clinical / shift goals:  Pt free from fall and injury.    "

## 2024-10-04 NOTE — PROGRESS NOTES
NURSING DAILY NOTE    Name: Joe Templeton   Date of Admission: 9/20/2024   Admitting Diagnosis: No Principal Problem: There is no principal problem currently on the Problem List. Please update the Problem List and refresh.  Attending Physician: ELIZABETH HOLM D.O.  Allergies: Patient has no known allergies.    Safety  Patient Assist  Contact guard assist  Patient Precautions  Fall Risk  Precaution Comments  left knee pain  Bed Transfer Status  Contact Guard Assist  Toilet Transfer Status   Contact Guard Assist  Assistive Devices  Rails, Wheelchair  Oxygen  None - Room Air  Diet/Therapeutic Dining  Current Diet Order   Procedures    Diet Order Diet: Level 6 - Soft and Bite Sized (Set pt up w/ meal- he can then self-feed independently. Pills whole.); Liquid level: Level 0 - Thin; Tray Modifications (optional): SLP - Deliver to Nursing Station     Pill Administration  whole  Agitated Behavioral Scale  20  ABS Level of Severity  No Agitation    Fall Risk  Has the patient had a fall this admission?   No  Nikole Cesar Fall Risk Scoring  21, HIGH RISK  Fall Risk Safety Measures  bed alarm, chair alarm, posey bed , poor balance, and low vision/ hearing    Vitals  Temperature: 36.7 °C (98.1 °F)  Temp src: Temporal  Pulse: 62  Respiration: 18  Blood Pressure : 132/70  Blood Pressure MAP (Calculated): 91 MM HG  BP Location: Right, Upper Arm  Patient BP Position: Supine     Oxygen  Pulse Oximetry: 95 %  O2 (LPM): 0  O2 Delivery Device: None - Room Air    Bowel and Bladder  Last Bowel Movement  10/04/24  Stool Type  Type 3: Like a sausage, but with cracks on its surface  Bowel Device  Bathroom, Diaper  Continent  Bladder: Did not void   Bowel: No movement  Bladder Function  Urine Void (mL):  (bathroom)  Number of Times Voided: 1  Urinary Options: Yes  Urine Color: Yellow  Straight Catheter: 550 ml  Wet Diaper Count: 1  Genitourinary Assessment   Bladder Assessment  (WDL):  Within Defined Limits  Silva Catheter: Not Applicable  Urine Color: Yellow  Bladder Device: Bathroom  Time Void: Yes  Bladder Scan: Post Void  $ Bladder Scan Results (mL): 206    Skin  Colt Score   17  Sensory Interventions   Bed Types:  ([facility] bed)  Skin Preventative Measures: Pillows in Use for Support / Positioning  Moisture Interventions  Moisturizers/Barriers: Barrier Wipes      Pain  Pain Rating Scale  0 - No Pain  Pain Location  Knee  Pain Location Orientation  Left  Pain Interventions   Sullivan, Declines    ADLs    Bathing   Patient Refused Bathing (Partial bath)  Linen Change   Complete  Personal Hygiene  Change Dariana Pads, Moist Dariana Wipes  Chlorhexidine Bath      Oral Care     Teeth/Dentures     Shave     Nutrition Percentage Eaten  Dinner, Between % Consumed  Environmental Precautions  Bed in Low Position  Patient Turns/Positioning  Patient Turns Self from Side to Side  Patient Turns Assistance/Tolerance  Assistance of One  Bed Positions  Bed Controls On, Bed Locked  Head of Bed Elevated  Less than 30 degrees      Psychosocial/Neurologic Assessment  Psychosocial Assessment  Psychosocial (WDL):  WDL Except  Patient Behaviors: Confused, Forgetful  Neurologic Assessment  Neuro (WDL): Exceptions to WDL  Level of Consciousness: Responds to voice  Orientation Level: Disoriented to situation, Disoriented to time, Disoriented to place  Cognition: Follows commands, Impulsive, Poor safety awareness, Poor judgement, Memory Loss  Speech: Clear  Pupil Assesment: No  Motor Function/Sensation Assessment: Motor strength  Muscle Strength Right Arm: Good Strength Against Gravity and Moderate Resistance  Muscle Strength Left Arm: Good Strength Against Gravity and Moderate Resistance  Muscle Strength Right Leg: Fair Strength against Gravity but No Resistance  LLE Motor Response: Responds to commands  Muscle Strength Left Leg: Fair Strength against Gravity but No Resistance  EENT (WDL):  WDL  Except    Cardio/Pulmonary Assessment  Edema   LLE Edema: 1+  Respiratory Breath Sounds  RUL Breath Sounds: Clear  RML Breath Sounds: Clear  RLL Breath Sounds: Clear  YONI Breath Sounds: Clear  LLL Breath Sounds: Clear  Cardiac Assessment   Cardiac (WDL):  WDL Except

## 2024-10-04 NOTE — PROGRESS NOTES
"  Physical Medicine & Rehabilitation Progress Note    Encounter Date: 10/4/2024    Chief Complaint: Doing okay    Interval Events (Subjective):  Blood pressure improved, more within normal limits  Bowel movement 10/4  Voiding volitionally    Patient seen and examined in the hallway.  States that he is doing okay.  He still confused but better oriented with multiple-choice than he had been in the past.  Is smiling, pleasant, calm and cooperative.  Discussed with him plan to get him out of the Posey bed if at all possible but he has to use the call light.  Patient amenable with this plan.  Mixed reviews from CNA and nursing overnight whether or not patient did well or not with dosages of Seroquel.    ROS: 14 point ROS negative unless otherwise specified in the HPI    Objective:  VITAL SIGNS: /62   Pulse 71   Temp 36.4 °C (97.6 °F) (Oral)   Resp 16   Ht 1.753 m (5' 9\")   Wt 77.1 kg (170 lb)   SpO2 94%   BMI 25.10 kg/m²     GEN: No apparent distress  HEENT: Head normocephalic, atraumatic.  Sclera nonicteric bilaterally, no ocular discharge appreciated bilaterally.  CV: Extremities warm and well-perfused, no peripheral edema appreciated bilaterally.  PULMONARY: Breathing nonlabored on room air, no respiratory accessory muscle use.  Not requiring supplemental oxygen.  SKIN:   10/4    PSYCH: Mood and affect within normal limits.  NEURO: Awake alert.  Pleasantly confused.  Better oriented today than has been in the past.  Spontaneously oriented to self and birthdate.  Required multiple-choice for city, state, location, name of hospital but did correctly guessed on the first try with multiple-choice.  Oriented to month with clue that is the month of Halloween.  Had more difficulty with the year but did get his correctly with choices.      Laboratory Values:  Recent Results (from the past 72 hour(s))   Comp Metabolic Panel    Collection Time: 10/02/24  6:36 AM   Result Value Ref Range    Sodium 142 135 - 145 " mmol/L    Potassium 3.7 3.6 - 5.5 mmol/L    Chloride 109 96 - 112 mmol/L    Co2 22 20 - 33 mmol/L    Anion Gap 11.0 7.0 - 16.0    Glucose 139 (H) 65 - 99 mg/dL    Bun 23 (H) 8 - 22 mg/dL    Creatinine 0.90 0.50 - 1.40 mg/dL    Calcium 9.0 8.5 - 10.5 mg/dL    Correct Calcium 9.4 8.5 - 10.5 mg/dL    AST(SGOT) 25 12 - 45 U/L    ALT(SGPT) 34 2 - 50 U/L    Alkaline Phosphatase 68 30 - 99 U/L    Total Bilirubin 0.4 0.1 - 1.5 mg/dL    Albumin 3.5 3.2 - 4.9 g/dL    Total Protein 6.8 6.0 - 8.2 g/dL    Globulin 3.3 1.9 - 3.5 g/dL    A-G Ratio 1.1 g/dL   CBC WITH DIFFERENTIAL    Collection Time: 10/02/24  6:36 AM   Result Value Ref Range    WBC 11.5 (H) 4.8 - 10.8 K/uL    RBC 4.60 (L) 4.70 - 6.10 M/uL    Hemoglobin 14.2 14.0 - 18.0 g/dL    Hematocrit 41.9 (L) 42.0 - 52.0 %    MCV 91.1 81.4 - 97.8 fL    MCH 30.9 27.0 - 33.0 pg    MCHC 33.9 32.3 - 36.5 g/dL    RDW 39.1 35.9 - 50.0 fL    Platelet Count 357 164 - 446 K/uL    MPV 11.6 9.0 - 12.9 fL    Neutrophils-Polys 75.10 (H) 44.00 - 72.00 %    Lymphocytes 11.90 (L) 22.00 - 41.00 %    Monocytes 9.70 0.00 - 13.40 %    Eosinophils 2.50 0.00 - 6.90 %    Basophils 0.40 0.00 - 1.80 %    Immature Granulocytes 0.40 0.00 - 0.90 %    Nucleated RBC 0.00 0.00 - 0.20 /100 WBC    Neutrophils (Absolute) 8.59 (H) 1.82 - 7.42 K/uL    Lymphs (Absolute) 1.36 1.00 - 4.80 K/uL    Monos (Absolute) 1.11 (H) 0.00 - 0.85 K/uL    Eos (Absolute) 0.29 0.00 - 0.51 K/uL    Baso (Absolute) 0.05 0.00 - 0.12 K/uL    Immature Granulocytes (abs) 0.05 0.00 - 0.11 K/uL    NRBC (Absolute) 0.00 K/uL   ESTIMATED GFR    Collection Time: 10/02/24  6:36 AM   Result Value Ref Range    GFR (CKD-EPI) 90 >60 mL/min/1.73 m 2   CBC WITH DIFFERENTIAL    Collection Time: 10/03/24  6:28 AM   Result Value Ref Range    WBC 8.4 4.8 - 10.8 K/uL    RBC 4.45 (L) 4.70 - 6.10 M/uL    Hemoglobin 13.7 (L) 14.0 - 18.0 g/dL    Hematocrit 41.4 (L) 42.0 - 52.0 %    MCV 93.0 81.4 - 97.8 fL    MCH 30.8 27.0 - 33.0 pg    MCHC 33.1 32.3 - 36.5  g/dL    RDW 40.5 35.9 - 50.0 fL    Platelet Count 334 164 - 446 K/uL    MPV 11.1 9.0 - 12.9 fL    Neutrophils-Polys 70.10 44.00 - 72.00 %    Lymphocytes 15.80 (L) 22.00 - 41.00 %    Monocytes 9.30 0.00 - 13.40 %    Eosinophils 3.60 0.00 - 6.90 %    Basophils 0.60 0.00 - 1.80 %    Immature Granulocytes 0.60 0.00 - 0.90 %    Nucleated RBC 0.00 0.00 - 0.20 /100 WBC    Neutrophils (Absolute) 5.90 1.82 - 7.42 K/uL    Lymphs (Absolute) 1.33 1.00 - 4.80 K/uL    Monos (Absolute) 0.78 0.00 - 0.85 K/uL    Eos (Absolute) 0.30 0.00 - 0.51 K/uL    Baso (Absolute) 0.05 0.00 - 0.12 K/uL    Immature Granulocytes (abs) 0.05 0.00 - 0.11 K/uL    NRBC (Absolute) 0.00 K/uL   Basic Metabolic Panel    Collection Time: 10/03/24  6:28 AM   Result Value Ref Range    Sodium 143 135 - 145 mmol/L    Potassium 4.0 3.6 - 5.5 mmol/L    Chloride 111 96 - 112 mmol/L    Co2 22 20 - 33 mmol/L    Glucose 144 (H) 65 - 99 mg/dL    Bun 26 (H) 8 - 22 mg/dL    Creatinine 1.03 0.50 - 1.40 mg/dL    Calcium 8.8 8.5 - 10.5 mg/dL    Anion Gap 10.0 7.0 - 16.0   ESTIMATED GFR    Collection Time: 10/03/24  6:28 AM   Result Value Ref Range    GFR (CKD-EPI) 77 >60 mL/min/1.73 m 2       Medications:  Scheduled Medications   Medication Dose Frequency    QUEtiapine  50 mg BID    traZODone  50 mg QHS    linezolid  600 mg Q12HRS    metoprolol SR  100 mg DAILY    senna-docusate  2 Tablet BID    And    polyethylene glycol/lytes  1 Packet BID    hydrALAZINE  100 mg Q8HRS    vitamin D3  1,000 Units DAILY    Pharmacy Consult Request  1 Each PHARMACY TO DOSE    lidocaine  1-2 Patch Daily-0800    omeprazole  20 mg DAILY    amLODIPine  10 mg Q DAY    enoxaparin (LOVENOX) injection  40 mg DAILY AT 1800    lisinopril  40 mg DAILY    aspirin  81 mg DAILY    atorvastatin  80 mg Q EVENING     PRN medications: QUEtiapine, hydrALAZINE, lactulose, docusate sodium, bisacodyl EC, magnesium hydroxide, sodium phosphate, carboxymethylcellulose, benzocaine-menthol, mag hydrox-al hydrox-simeth,  ondansetron **OR** ondansetron, traZODone, sodium chloride, acetaminophen, ziprasidone, traMADol, formulation r    Diet:  Current Diet Order   Procedures    Diet Order Diet: Level 6 - Soft and Bite Sized (Set pt up w/ meal- he can then self-feed independently. Pills whole.); Liquid level: Level 0 - Thin; Tray Modifications (optional): SLP - Deliver to Nursing Station       Medical Decision Making and Plan:  Multifocal embolic appearing infarcts: Right insula, left parietal lobe, right parietal lobe, left hao  MRI with multiple old infarcts  Dysphagia  Encephalopathy  PT and OT for mobility and ADLs. Per guidelines, 15 hours per week between PT, OT and/or SLP.  Follow-up stroke Bridge clinic  Secondary stroke prophylaxis: Aspirin and statin   Continue Seroquel at night - 10/2 move to 1800 and schedule Trazodone at 2100  10/4 mixed messages regarding how patient did with medications overnight.  Continue as is with open at [facility].     I certify that the patient currently requires non-pharmacologic restraints. Non-pharmacologic restraints are required to reduce the potential for the patient to harm themselves or others, to limit violent behaviors, and to allow proper assessment and care. I have completed a face to face assessment of this patient on 10/4/2024. Alternative methods including but not limited to de-escalation techniques, redirection, and pharmacologic treatment have been attempted but patient currently remains at risk without restraints. Our staff has been trained on restraints and patient is currently placed in video monitored room.  The need for these restraints is assessed by a rehabilitation physician every 24 hours and use of restraints is minimized when appropriate.  Current diagnosis which requires restraints: Encephalopathy. Current restraints required: Enclosure bed, bilateral mitts to prevent pulling out midline which is needed for IV antibiotics.       Septic arthritis left ankle s/p left  ankle arthrotomy and I&D 9/14/2024 Dr. Gautam -weightbearing as tolerated left lower extremity.  ID followed.  Continue vancomycin twice daily through 10/11.  Potentially can switch to Zyvox.  9/23 patient pulled out midline will need new access.  9/24 pulled out midline again, admits an attempt to help prevent him pulling it out.  9/26 has midline.  Continue IV vancomycin per ID.  If absolutely necessary at discharge can switch to linezolid 600 mg twice a day with stop date of 10/11.  If goes to skilled nursing facility can continue IV vancomycin.  Needs follow-up with ID.    9/30/2024  increased pain left lower extremity.  X-ray unremarkable.  Switch IV vancomycin to oral linezolid with end date of 10/11 starting 10/1.  Patient continues to pull out peripheral IVs and midlines.  He has had over 7 different lines placed since last week.  Could help with agitation if he does not have mitts and IV lines.    10/1 more pain with weightbearing with therapy 9/30.  X-ray unremarkable for acute fracture.    10/2 Leukocytosis at 11.5. Is now on Linezolid. 10/3 WNL.     HAS ID APPOINTMENT 10/8     Left knee pain -check knee x-ray.  9/23 negative.     Chronic systolic heart failure, not in acute exacerbation - preserved ejection fraction-LVEF 60%     CAD s/p CABG -continue aspirin and statin     Hypertension -amlodipine, hydralazine, lisinopril, metoprolol.  10/4 blood pressure better controlled, hospitalist managing.     Azotemia -9/22 improving, monitor.  9/24 resolved.  9/26 resolved. 10/2 Returned mild at 23. Encourage PO fluids. 10/3 mildly elevated at 26. Monitor.     Hypokalemia -9/22 mildly low at 3.5.  Hospitalist following. 10/2 WNL.      Leukocytosis -9/30 resolved. 10/2 Returned. 10/3 Resolved.     Anemia - 10/3 Stable at 13.7     Pain -Tylenol and oxycodone     Bowel -patient unsure when last BM was.  Check KUB.  Moderate stool burden.  Had small bowel movement 9/22.  Increase bowel meds 9/23. 9/25 Give MoM x1.  Had bowel movement 9/26.  10/3 having regular bowel movements.       Upcoming Labs/imaging: 10/5     DVT PROPHYLAXIS: Lovenox 40mg SQ nightly      HOSPITALIST FOLLOWING: Yes - d/w hospitalist 10/4     CODE STATUS: FULL CODE     DISPO: Home with family      HEBER: TBD hoping for 10/7 needs to be out of [place] bed.      MEDS SENT TO: [facility]     DISCHARGE SPECIALIST FOLLOW UP: ID, Ortho, Stroke Bridge     Patient to scheduled follow up with their PCP within 2 weeks from discharge from the [facility].   ____________________________________    Dr. Khushbu Meza DO, MS  [facility] - Physical Medicine & Rehabilitation   ____________________________________

## 2024-10-05 LAB
ANION GAP SERPL CALC-SCNC: 12 MMOL/L (ref 7–16)
BASOPHILS # BLD AUTO: 0.7 % (ref 0–1.8)
BASOPHILS # BLD: 0.06 K/UL (ref 0–0.12)
BUN SERPL-MCNC: 22 MG/DL (ref 8–22)
CALCIUM SERPL-MCNC: 8.9 MG/DL (ref 8.5–10.5)
CHLORIDE SERPL-SCNC: 107 MMOL/L (ref 96–112)
CO2 SERPL-SCNC: 23 MMOL/L (ref 20–33)
CREAT SERPL-MCNC: 1.08 MG/DL (ref 0.5–1.4)
EOSINOPHIL # BLD AUTO: 0.26 K/UL (ref 0–0.51)
EOSINOPHIL NFR BLD: 2.9 % (ref 0–6.9)
ERYTHROCYTE [DISTWIDTH] IN BLOOD BY AUTOMATED COUNT: 41.5 FL (ref 35.9–50)
GFR SERPLBLD CREATININE-BSD FMLA CKD-EPI: 72 ML/MIN/1.73 M 2
GLUCOSE SERPL-MCNC: 133 MG/DL (ref 65–99)
HCT VFR BLD AUTO: 43.4 % (ref 42–52)
HGB BLD-MCNC: 14.5 G/DL (ref 14–18)
IMM GRANULOCYTES # BLD AUTO: 0.05 K/UL (ref 0–0.11)
IMM GRANULOCYTES NFR BLD AUTO: 0.5 % (ref 0–0.9)
LYMPHOCYTES # BLD AUTO: 1.33 K/UL (ref 1–4.8)
LYMPHOCYTES NFR BLD: 14.6 % (ref 22–41)
MCH RBC QN AUTO: 31.6 PG (ref 27–33)
MCHC RBC AUTO-ENTMCNC: 33.4 G/DL (ref 32.3–36.5)
MCV RBC AUTO: 94.6 FL (ref 81.4–97.8)
MONOCYTES # BLD AUTO: 0.59 K/UL (ref 0–0.85)
MONOCYTES NFR BLD AUTO: 6.5 % (ref 0–13.4)
NEUTROPHILS # BLD AUTO: 6.83 K/UL (ref 1.82–7.42)
NEUTROPHILS NFR BLD: 74.8 % (ref 44–72)
NRBC # BLD AUTO: 0 K/UL
NRBC BLD-RTO: 0 /100 WBC (ref 0–0.2)
PLATELET # BLD AUTO: 322 K/UL (ref 164–446)
PMV BLD AUTO: 11.3 FL (ref 9–12.9)
POTASSIUM SERPL-SCNC: 3.9 MMOL/L (ref 3.6–5.5)
RBC # BLD AUTO: 4.59 M/UL (ref 4.7–6.1)
SODIUM SERPL-SCNC: 142 MMOL/L (ref 135–145)
WBC # BLD AUTO: 9.1 K/UL (ref 4.8–10.8)

## 2024-10-05 PROCEDURE — 700102 HCHG RX REV CODE 250 W/ 637 OVERRIDE(OP): Performed by: HOSPITALIST

## 2024-10-05 PROCEDURE — A9270 NON-COVERED ITEM OR SERVICE: HCPCS | Performed by: PHYSICAL MEDICINE & REHABILITATION

## 2024-10-05 PROCEDURE — A9270 NON-COVERED ITEM OR SERVICE: HCPCS | Performed by: HOSPITALIST

## 2024-10-05 PROCEDURE — 99232 SBSQ HOSP IP/OBS MODERATE 35: CPT | Performed by: STUDENT IN AN ORGANIZED HEALTH CARE EDUCATION/TRAINING PROGRAM

## 2024-10-05 PROCEDURE — 36415 COLL VENOUS BLD VENIPUNCTURE: CPT

## 2024-10-05 PROCEDURE — 700111 HCHG RX REV CODE 636 W/ 250 OVERRIDE (IP): Mod: JZ | Performed by: PHYSICAL MEDICINE & REHABILITATION

## 2024-10-05 PROCEDURE — 770005 HCHG ROOM/CARE - REHAB PRIVATE (11*

## 2024-10-05 PROCEDURE — 80048 BASIC METABOLIC PNL TOTAL CA: CPT

## 2024-10-05 PROCEDURE — 700102 HCHG RX REV CODE 250 W/ 637 OVERRIDE(OP): Performed by: PHYSICAL MEDICINE & REHABILITATION

## 2024-10-05 PROCEDURE — 85025 COMPLETE CBC W/AUTO DIFF WBC: CPT

## 2024-10-05 PROCEDURE — 700101 HCHG RX REV CODE 250: Performed by: PHYSICAL MEDICINE & REHABILITATION

## 2024-10-05 PROCEDURE — 99231 SBSQ HOSP IP/OBS SF/LOW 25: CPT | Performed by: HOSPITALIST

## 2024-10-05 RX ADMIN — POLYETHYLENE GLYCOL 3350 1 PACKET: 17 POWDER, FOR SOLUTION ORAL at 09:31

## 2024-10-05 RX ADMIN — LINEZOLID 600 MG: 600 TABLET, FILM COATED ORAL at 09:30

## 2024-10-05 RX ADMIN — SENNOSIDES AND DOCUSATE SODIUM 2 TABLET: 50; 8.6 TABLET ORAL at 19:35

## 2024-10-05 RX ADMIN — OMEPRAZOLE 20 MG: 20 CAPSULE, DELAYED RELEASE ORAL at 09:30

## 2024-10-05 RX ADMIN — LIDOCAINE 2 PATCH: 4 PATCH TOPICAL at 09:29

## 2024-10-05 RX ADMIN — HYDRALAZINE HYDROCHLORIDE 100 MG: 50 TABLET ORAL at 15:45

## 2024-10-05 RX ADMIN — AMLODIPINE BESYLATE 10 MG: 5 TABLET ORAL at 06:09

## 2024-10-05 RX ADMIN — METOPROLOL SUCCINATE 100 MG: 100 TABLET, EXTENDED RELEASE ORAL at 06:09

## 2024-10-05 RX ADMIN — POLYETHYLENE GLYCOL 3350 1 PACKET: 17 POWDER, FOR SOLUTION ORAL at 19:34

## 2024-10-05 RX ADMIN — TRAMADOL HYDROCHLORIDE 25 MG: 50 TABLET ORAL at 00:49

## 2024-10-05 RX ADMIN — QUETIAPINE FUMARATE 50 MG: 25 TABLET ORAL at 19:36

## 2024-10-05 RX ADMIN — Medication 1000 UNITS: at 09:30

## 2024-10-05 RX ADMIN — ATORVASTATIN CALCIUM 80 MG: 40 TABLET, FILM COATED ORAL at 19:35

## 2024-10-05 RX ADMIN — QUETIAPINE FUMARATE 50 MG: 25 TABLET ORAL at 15:45

## 2024-10-05 RX ADMIN — TRAZODONE HYDROCHLORIDE 50 MG: 50 TABLET ORAL at 19:37

## 2024-10-05 RX ADMIN — HYDRALAZINE HYDROCHLORIDE 100 MG: 50 TABLET ORAL at 21:12

## 2024-10-05 RX ADMIN — ENOXAPARIN SODIUM 40 MG: 100 INJECTION SUBCUTANEOUS at 16:52

## 2024-10-05 RX ADMIN — HYDRALAZINE HYDROCHLORIDE 100 MG: 50 TABLET ORAL at 06:09

## 2024-10-05 RX ADMIN — ASPIRIN 81 MG: 81 TABLET, CHEWABLE ORAL at 09:30

## 2024-10-05 RX ADMIN — LINEZOLID 600 MG: 600 TABLET, FILM COATED ORAL at 19:35

## 2024-10-05 RX ADMIN — LISINOPRIL 40 MG: 20 TABLET ORAL at 09:30

## 2024-10-05 RX ADMIN — TRAMADOL HYDROCHLORIDE 25 MG: 50 TABLET ORAL at 21:14

## 2024-10-05 ASSESSMENT — ENCOUNTER SYMPTOMS
DIZZINESS: 0
HEADACHES: 0
PALPITATIONS: 0
BLURRED VISION: 0
NAUSEA: 0
FEVER: 0
VOMITING: 0
HALLUCINATIONS: 0
SHORTNESS OF BREATH: 0

## 2024-10-05 ASSESSMENT — PAIN DESCRIPTION - PAIN TYPE: TYPE: ACUTE PAIN

## 2024-10-05 NOTE — CARE PLAN
The patient is Stable - Low risk of patient condition declining or worsening    Shift Goals  Clinical Goals: Safety  Patient Goals: Safety    Progress made toward(s) clinical / shift goals:    Problem: Safety - Medical Restraint  Goal: Remains free of injury from restraints (Restraint for Interference with Medical Device)  Note: Soma bed discontinued, regular bed in the room.  Goal: Free from restraint(s) (Restraint for Interference with Medical Device)  Outcome: Progressing     Problem: Hemodynamics  Goal: Patient's hemodynamics, fluid balance and neurologic status will be stable or improve  Note:    Latest Reference Range & Units 10/05/24 06:35   WBC 4.8 - 10.8 K/uL 9.1   RBC 4.70 - 6.10 M/uL 4.59 (L)   Hemoglobin 14.0 - 18.0 g/dL 14.5   Hematocrit 42.0 - 52.0 % 43.4   MCV 81.4 - 97.8 fL 94.6   MCH 27.0 - 33.0 pg 31.6   MCHC 32.3 - 36.5 g/dL 33.4   RDW 35.9 - 50.0 fL 41.5   Platelet Count 164 - 446 K/uL 322   MPV 9.0 - 12.9 fL 11.3   Neutrophils-Polys 44.00 - 72.00 % 74.80 (H)   Neutrophils (Absolute) 1.82 - 7.42 K/uL 6.83   Lymphocytes 22.00 - 41.00 % 14.60 (L)   Lymphs (Absolute) 1.00 - 4.80 K/uL 1.33   Monocytes 0.00 - 13.40 % 6.50   Monos (Absolute) 0.00 - 0.85 K/uL 0.59   Eosinophils 0.00 - 6.90 % 2.90   Eos (Absolute) 0.00 - 0.51 K/uL 0.26   Basophils 0.00 - 1.80 % 0.70   Baso (Absolute) 0.00 - 0.12 K/uL 0.06   Immature Granulocytes 0.00 - 0.90 % 0.50   Immature Granulocytes (abs) 0.00 - 0.11 K/uL 0.05   Nucleated RBC 0.00 - 0.20 /100 WBC 0.00   NRBC (Absolute) K/uL 0.00   Sodium 135 - 145 mmol/L 142   Potassium 3.6 - 5.5 mmol/L 3.9   Chloride 96 - 112 mmol/L 107   Co2 20 - 33 mmol/L 23   Anion Gap 7.0 - 16.0  12.0   Glucose 65 - 99 mg/dL 133 (H)   Bun 8 - 22 mg/dL 22   Creatinine 0.50 - 1.40 mg/dL 1.08   GFR (CKD-EPI) >60 mL/min/1.73 m 2 72   Calcium 8.5 - 10.5 mg/dL 8.9        Patient is not progressing towards the following goals:

## 2024-10-05 NOTE — PROGRESS NOTES
NURSING DAILY NOTE    Name: Joe Templeton   Date of Admission: 9/20/2024   Admitting Diagnosis: No Principal Problem: There is no principal problem currently on the Problem List. Please update the Problem List and refresh.  Attending Physician: ELIZABETH HOLM D.O.  Allergies: Patient has no known allergies.    Safety  Patient Assist  CGA  Patient Precautions  Fall Risk  Precaution Comments  left knee pain  Bed Transfer Status  Contact Guard Assist  Toilet Transfer Status   Contact Guard Assist  Assistive Devices  Rails, Wheelchair  Oxygen  None - Room Air  Diet/Therapeutic Dining  Current Diet Order   Procedures    Diet Order Diet: Level 6 - Soft and Bite Sized (Set pt up w/ meal- he can then self-feed independently. Pills whole.); Liquid level: Level 0 - Thin; Tray Modifications (optional): SLP - Deliver to Nursing Station     Pill Administration  whole  Agitated Behavioral Scale  17  ABS Level of Severity  No Agitation    Fall Risk  Has the patient had a fall this admission?   No  Nikole Cesar Fall Risk Scoring  21, HIGH RISK  Fall Risk Safety Measures  bed alarm, chair alarm, seatbelt alarm, posey bed , poor balance, and low vision/ hearing    Vitals  Temperature: 36.9 °C (98.4 °F)  Temp src: Oral  Pulse: 70  Respiration: 16  Blood Pressure : 125/68  Blood Pressure MAP (Calculated): 87 MM HG  BP Location: Right, Upper Arm  Patient BP Position: Supine     Oxygen  Pulse Oximetry: 95 %  O2 (LPM): 0  O2 Delivery Device: None - Room Air    Bowel and Bladder  Last Bowel Movement  10/04/24  Stool Type  Type 3: Like a sausage, but with cracks on its surface  Bowel Device  Bathroom, Diaper  Continent  Bladder: Did not void   Bowel: No movement  Bladder Function  Urine Void (mL):  (bathroom)  Number of Times Voided: 1  Urinary Options: Yes  Urine Color: Yellow  Straight Catheter: 550 ml  Wet Diaper Count: 1  Genitourinary Assessment   Bladder Assessment  (WDL):  Within Defined Limits  Silva Catheter: Not Applicable  Urine Color: Yellow  Bladder Device: Bathroom  Time Void: Yes  Bladder Scan: Post Void  $ Bladder Scan Results (mL): 206    Skin  Colt Score   17  Sensory Interventions   Bed Types:  (posey)  Skin Preventative Measures: Pillows in Use for Support / Positioning  Moisture Interventions  Moisturizers/Barriers: Barrier Wipes      Pain  Pain Rating Scale  7 - Focus of attention, prevents doing daily activities  Pain Location  Knee  Pain Location Orientation  Left  Pain Interventions   [place], Declines    ADLs    Bathing   Patient Refused Bathing (Partial bath)  Linen Change   Complete  Personal Hygiene  Change Dariana Pads, Moist Dariana Wipes  Chlorhexidine Bath      Oral Care     Teeth/Dentures     Shave     Nutrition Percentage Eaten  Lunch, Between % Consumed  Environmental Precautions  Bed in Low Position  Patient Turns/Positioning  Patient Turns Self from Side to Side  Patient Turns Assistance/Tolerance  Assistance of One  Bed Positions  Bed Controls On, Bed Locked  Head of Bed Elevated  Less than 30 degrees      Psychosocial/Neurologic Assessment  Psychosocial Assessment  Psychosocial (WDL):  WDL Except  Patient Behaviors: Confused, Forgetful  Neurologic Assessment  Neuro (WDL): Exceptions to WDL  Level of Consciousness: Responds to voice  Orientation Level: Disoriented to situation, Disoriented to time, Disoriented to place  Cognition: Follows commands, Impulsive, Poor safety awareness, Poor judgement, Memory Loss  Speech: Clear  Pupil Assesment: No  Motor Function/Sensation Assessment: Motor strength  Muscle Strength Right Arm: Good Strength Against Gravity and Moderate Resistance  Muscle Strength Left Arm: Good Strength Against Gravity and Moderate Resistance  Muscle Strength Right Leg: Fair Strength against Gravity but No Resistance  LLE Motor Response: Responds to commands  Muscle Strength Left Leg: Fair Strength against Gravity but No  Resistance  EENT (WDL):  WDL Except    Cardio/Pulmonary Assessment  Edema   LLE Edema: 1+  Respiratory Breath Sounds  RUL Breath Sounds: Clear  RML Breath Sounds: Clear  RLL Breath Sounds: Clear  YONI Breath Sounds: Clear  LLL Breath Sounds: Clear  Cardiac Assessment   Cardiac (WDL):  WDL Except

## 2024-10-05 NOTE — PROGRESS NOTES
Soma bed removed from room, regular bed put in its place. Soma bed order renewed per physician. Patient sat up safely in wheelchair for meals and throughout day shift.

## 2024-10-05 NOTE — PROGRESS NOTES
Due meds given, tolerated , tramadol prn given for leg pain . Pt asleep on and off , turning from sides to sides Safety measures enforced, frequent rounding round aside from the camera monitoring .   Pt placed on open bed trial, explained the importance of staying in bed and calling for assistance , verbalized understanding.   0110 hrs. Pt legs dangling on the open Soma bed trying to get out of bed, not using the call light  .Soma bed zipped for pt's safety and prevent falls. Supervisor Davy made aware , pt unsafe on open bed trial .

## 2024-10-05 NOTE — PROGRESS NOTES
[facility] Medicine Daily Progress Note    Date of Service  10/5/2024    Chief Complaint:  Hypertension    Interval History:  Note (10/05):  Patient has been doing well for a while.                          No medical issues to address a this time.                          Continue current care.                          Will sign off.                          Re-consult if needed.    Review of Systems  Review of Systems   Constitutional:  Negative for fever.   Eyes:  Negative for blurred vision.   Respiratory:  Negative for shortness of breath.    Cardiovascular:  Negative for palpitations.   Gastrointestinal:  Negative for nausea and vomiting.   Neurological:  Negative for dizziness and headaches.   Psychiatric/Behavioral:  Negative for hallucinations.         Physical Exam  Temp:  [36.3 °C (97.3 °F)-36.9 °C (98.4 °F)] 36.3 °C (97.3 °F)  Pulse:  [61-86] 82  Resp:  [16-18] 18  BP: (125-154)/(61-85) 143/85  SpO2:  [90 %-95 %] 90 %    Physical Exam  Vitals and nursing note reviewed.   Constitutional:       General: He is not in acute distress.  HENT:      Mouth/Throat:      Mouth: Mucous membranes are moist.      Pharynx: Oropharynx is clear.   Eyes:      General: No scleral icterus.  Cardiovascular:      Rate and Rhythm: Normal rate and regular rhythm.   Pulmonary:      Effort: Pulmonary effort is normal.      Breath sounds: No wheezing or rales.   Abdominal:      General: Bowel sounds are normal.      Palpations: Abdomen is soft.   Musculoskeletal:      Right lower leg: No edema.      Left lower leg: No edema.   Skin:     General: Skin is warm and dry.   Psychiatric:         Mood and Affect: Mood normal.         Behavior: Behavior normal.         Fluids    Intake/Output Summary (Last 24 hours) at 10/5/2024 1103  Last data filed at 10/5/2024 0851  Gross per 24 hour   Intake 370 ml   Output --   Net 370 ml        Laboratory  Recent Labs     10/03/24  0628 10/05/24  0635   WBC 8.4 9.1   RBC 4.45* 4.59*   HEMOGLOBIN 13.7*  14.5   HEMATOCRIT 41.4* 43.4   MCV 93.0 94.6   MCH 30.8 31.6   MCHC 33.1 33.4   RDW 40.5 41.5   PLATELETCT 334 322   MPV 11.1 11.3     Recent Labs     10/03/24  0628 10/05/24  0635   SODIUM 143 142   POTASSIUM 4.0 3.9   CHLORIDE 111 107   CO2 22 23   GLUCOSE 144* 133*   BUN 26* 22   CREATININE 1.03 1.08   CALCIUM 8.8 8.9                   Imaging    Assessment/Plan  Vitamin D deficiency  Assessment & Plan  Vit D: 18  Cont supplements    Azotemia- (present on admission)  Assessment & Plan  Currently resolved  Bun: 23 --> 26 --> 22 (10/5)  Encouraging fluid intake  Monitor    Stroke (HCC)- (present on admission)  Assessment & Plan  Has hx of old CVA's  S/P recent CVA  MRI showed multiple old infarcts and multiple acute infarcts (including the hao)  Cont ASA and Lipitor    CAD (coronary artery disease)- (present on admission)  Assessment & Plan  Hx of CABG  Cont ASA, Lipitor  Cont Lisinopril  Cont Toprol XL    Septic arthritis of ankle (HCC)- (present on admission)  Assessment & Plan  S/P I+D on 9/14/24  Has on & off mild leukocytosis  Has been afebrile  Off Vanco  Now on Zyvox -- as pt chewing on IV tubing (thru 10/11)    Essential (primary) hypertension- (present on admission)  Assessment & Plan  BP ok but occ rises up a little  Cont Norvasc  Cont Lisinopril  Cont Hydralazine  Cont Toprol XL   Cont to monitor

## 2024-10-05 NOTE — PROGRESS NOTES
"  Physical Medicine & Rehabilitation Progress Note    Encounter Date: 10/5/2024    Chief Complaint: Doing okay    Interval Events (Subjective):  Patient seen today eating lunch.  Denies chest pain, shortness of breath.  Notes that he is feeling okay.    ROS: 14 point ROS negative unless otherwise specified in the HPI    Objective:  VITAL SIGNS: BP (!) 143/85   Pulse 82   Temp 36.3 °C (97.3 °F) (Temporal)   Resp 18   Ht 1.753 m (5' 9\")   Wt 77.1 kg (170 lb)   SpO2 90%   BMI 25.10 kg/m²     GEN: No apparent distress  HEENT: Head normocephalic, atraumatic.  Sclera nonicteric bilaterally, no ocular discharge appreciated bilaterally.  CV: Extremities warm and well-perfused, no peripheral edema appreciated bilaterally.  PULMONARY: Breathing nonlabored on room air, no respiratory accessory muscle use.  Not requiring supplemental oxygen.  SKIN:   10/4    PSYCH: Mood and affect within normal limits.  NEURO: Awake alert.  Following commands.    Laboratory Values:  Recent Results (from the past 72 hour(s))   CBC WITH DIFFERENTIAL    Collection Time: 10/03/24  6:28 AM   Result Value Ref Range    WBC 8.4 4.8 - 10.8 K/uL    RBC 4.45 (L) 4.70 - 6.10 M/uL    Hemoglobin 13.7 (L) 14.0 - 18.0 g/dL    Hematocrit 41.4 (L) 42.0 - 52.0 %    MCV 93.0 81.4 - 97.8 fL    MCH 30.8 27.0 - 33.0 pg    MCHC 33.1 32.3 - 36.5 g/dL    RDW 40.5 35.9 - 50.0 fL    Platelet Count 334 164 - 446 K/uL    MPV 11.1 9.0 - 12.9 fL    Neutrophils-Polys 70.10 44.00 - 72.00 %    Lymphocytes 15.80 (L) 22.00 - 41.00 %    Monocytes 9.30 0.00 - 13.40 %    Eosinophils 3.60 0.00 - 6.90 %    Basophils 0.60 0.00 - 1.80 %    Immature Granulocytes 0.60 0.00 - 0.90 %    Nucleated RBC 0.00 0.00 - 0.20 /100 WBC    Neutrophils (Absolute) 5.90 1.82 - 7.42 K/uL    Lymphs (Absolute) 1.33 1.00 - 4.80 K/uL    Monos (Absolute) 0.78 0.00 - 0.85 K/uL    Eos (Absolute) 0.30 0.00 - 0.51 K/uL    Baso (Absolute) 0.05 0.00 - 0.12 K/uL    Immature Granulocytes (abs) 0.05 0.00 - 0.11 " K/uL    NRBC (Absolute) 0.00 K/uL   Basic Metabolic Panel    Collection Time: 10/03/24  6:28 AM   Result Value Ref Range    Sodium 143 135 - 145 mmol/L    Potassium 4.0 3.6 - 5.5 mmol/L    Chloride 111 96 - 112 mmol/L    Co2 22 20 - 33 mmol/L    Glucose 144 (H) 65 - 99 mg/dL    Bun 26 (H) 8 - 22 mg/dL    Creatinine 1.03 0.50 - 1.40 mg/dL    Calcium 8.8 8.5 - 10.5 mg/dL    Anion Gap 10.0 7.0 - 16.0   ESTIMATED GFR    Collection Time: 10/03/24  6:28 AM   Result Value Ref Range    GFR (CKD-EPI) 77 >60 mL/min/1.73 m 2   Basic Metabolic Panel    Collection Time: 10/05/24  6:35 AM   Result Value Ref Range    Sodium 142 135 - 145 mmol/L    Potassium 3.9 3.6 - 5.5 mmol/L    Chloride 107 96 - 112 mmol/L    Co2 23 20 - 33 mmol/L    Glucose 133 (H) 65 - 99 mg/dL    Bun 22 8 - 22 mg/dL    Creatinine 1.08 0.50 - 1.40 mg/dL    Calcium 8.9 8.5 - 10.5 mg/dL    Anion Gap 12.0 7.0 - 16.0   CBC WITH DIFFERENTIAL    Collection Time: 10/05/24  6:35 AM   Result Value Ref Range    WBC 9.1 4.8 - 10.8 K/uL    RBC 4.59 (L) 4.70 - 6.10 M/uL    Hemoglobin 14.5 14.0 - 18.0 g/dL    Hematocrit 43.4 42.0 - 52.0 %    MCV 94.6 81.4 - 97.8 fL    MCH 31.6 27.0 - 33.0 pg    MCHC 33.4 32.3 - 36.5 g/dL    RDW 41.5 35.9 - 50.0 fL    Platelet Count 322 164 - 446 K/uL    MPV 11.3 9.0 - 12.9 fL    Neutrophils-Polys 74.80 (H) 44.00 - 72.00 %    Lymphocytes 14.60 (L) 22.00 - 41.00 %    Monocytes 6.50 0.00 - 13.40 %    Eosinophils 2.90 0.00 - 6.90 %    Basophils 0.70 0.00 - 1.80 %    Immature Granulocytes 0.50 0.00 - 0.90 %    Nucleated RBC 0.00 0.00 - 0.20 /100 WBC    Neutrophils (Absolute) 6.83 1.82 - 7.42 K/uL    Lymphs (Absolute) 1.33 1.00 - 4.80 K/uL    Monos (Absolute) 0.59 0.00 - 0.85 K/uL    Eos (Absolute) 0.26 0.00 - 0.51 K/uL    Baso (Absolute) 0.06 0.00 - 0.12 K/uL    Immature Granulocytes (abs) 0.05 0.00 - 0.11 K/uL    NRBC (Absolute) 0.00 K/uL   ESTIMATED GFR    Collection Time: 10/05/24  6:35 AM   Result Value Ref Range    GFR (CKD-EPI) 72 >60  mL/min/1.73 m 2       Medications:  Scheduled Medications   Medication Dose Frequency    QUEtiapine  50 mg BID    traZODone  50 mg QHS    linezolid  600 mg Q12HRS    metoprolol SR  100 mg DAILY    senna-docusate  2 Tablet BID    And    polyethylene glycol/lytes  1 Packet BID    hydrALAZINE  100 mg Q8HRS    vitamin D3  1,000 Units DAILY    Pharmacy Consult Request  1 Each PHARMACY TO DOSE    lidocaine  1-2 Patch Daily-0800    omeprazole  20 mg DAILY    amLODIPine  10 mg Q DAY    enoxaparin (LOVENOX) injection  40 mg DAILY AT 1800    lisinopril  40 mg DAILY    aspirin  81 mg DAILY    atorvastatin  80 mg Q EVENING     PRN medications: QUEtiapine, hydrALAZINE, lactulose, docusate sodium, bisacodyl EC, magnesium hydroxide, sodium phosphate, carboxymethylcellulose, benzocaine-menthol, mag hydrox-al hydrox-simeth, ondansetron **OR** ondansetron, traZODone, sodium chloride, acetaminophen, ziprasidone, traMADol, formulation r    Diet:  Current Diet Order   Procedures    Diet Order Diet: Level 6 - Soft and Bite Sized (Set pt up w/ meal- he can then self-feed independently. Pills whole.); Liquid level: Level 0 - Thin; Tray Modifications (optional): SLP - Deliver to Nursing Station       Medical Decision Making and Plan:  Multifocal embolic appearing infarcts: Right insula, left parietal lobe, right parietal lobe, left hao  MRI with multiple old infarcts  Dysphagia  Encephalopathy  PT and OT for mobility and ADLs. Per guidelines, 15 hours per week between PT, OT and/or SLP.  Follow-up stroke Bridge clinic  Secondary stroke prophylaxis: Aspirin and statin   Continue Seroquel at night - 10/2 move to 1800 and schedule Trazodone at 2100  10/4 mixed messages regarding how patient did with medications overnight.  Continue as is with open at [facility].     I certify that the patient currently requires non-pharmacologic restraints. Non-pharmacologic restraints are required to reduce the potential for the patient to harm themselves or  others, to limit violent behaviors, and to allow proper assessment and care. I have completed a face to face assessment of this patient on 10/5/2024. Alternative methods including but not limited to de-escalation techniques, redirection, and pharmacologic treatment have been attempted but patient currently remains at risk without restraints. Our staff has been trained on restraints and patient is currently placed in video monitored room.  The need for these restraints is assessed by a rehabilitation physician every 24 hours and use of restraints is minimized when appropriate.  Current diagnosis which requires restraints: Encephalopathy. Current restraints required: Enclosure bed, bilateral mitts to prevent pulling out midline which is needed for IV antibiotics.       Septic arthritis left ankle s/p left ankle arthrotomy and I&D 9/14/2024 Dr. Gautam -weightbearing as tolerated left lower extremity.  ID followed.  Continue vancomycin twice daily through 10/11.  Potentially can switch to Zyvox.  9/23 patient pulled out midline will need new access.  9/24 pulled out midline again, admits an attempt to help prevent him pulling it out.  9/26 has midline.  Continue IV vancomycin per ID.  If absolutely necessary at discharge can switch to linezolid 600 mg twice a day with stop date of 10/11.  If goes to skilled nursing facility can continue IV vancomycin.  Needs follow-up with ID.    9/30/2024  increased pain left lower extremity.  X-ray unremarkable.  Switch IV vancomycin to oral linezolid with end date of 10/11 starting 10/1.  Patient continues to pull out peripheral IVs and midlines.  He has had over 7 different lines placed since last week.  Could help with agitation if he does not have mitts and IV lines.    10/1 more pain with weightbearing with therapy 9/30.  X-ray unremarkable for acute fracture.    10/2 Leukocytosis at 11.5. Is now on Linezolid. 10/3 WNL.     HAS ID APPOINTMENT 10/8     Left knee pain -check knee  x-ray.  9/23 negative.     Chronic systolic heart failure, not in acute exacerbation - preserved ejection fraction-LVEF 60%     CAD s/p CABG -continue aspirin and statin     Hypertension -amlodipine, hydralazine, lisinopril, metoprolol.  10/4 blood pressure better controlled, hospitalist managing.     Azotemia -9/22 improving, monitor.  9/24 resolved.  9/26 resolved. 10/2 Returned mild at 23. Encourage PO fluids. 10/3 mildly elevated at 26. Monitor.     Hypokalemia -9/22 mildly low at 3.5.  Hospitalist following. 10/2 WNL.      Leukocytosis -9/30 resolved. 10/2 Returned. 10/3 Resolved.     Anemia - 10/3 Stable at 13.7     Pain -Tylenol and oxycodone     Bowel -patient unsure when last BM was.  Check KUB.  Moderate stool burden.  Had small bowel movement 9/22.  Increase bowel meds 9/23. 9/25 Give MoM x1. Had bowel movement 9/26.  10/3 having regular bowel movements.       Upcoming Labs/imaging: 10/5     DVT PROPHYLAXIS: Lovenox 40mg SQ nightly      HOSPITALIST FOLLOWING: Yes - d/w hospitalist 10/4     CODE STATUS: FULL CODE     DISPO: Home with family      HEBER: TBD hoping for 10/7 needs to be out of [place] bed.      MEDS SENT TO: [facility]     DISCHARGE SPECIALIST FOLLOW UP: ID, Ortho, Stroke Bridge     Patient to scheduled follow up with their PCP within 2 weeks from discharge from the [facility].   ____________________________________  Albania Jordan MD  Physical Medicine and Rehabilitation  [facility]    ____________________________________

## 2024-10-05 NOTE — PROGRESS NOTES
NURSING DAILY NOTE    Name: Joe Templeton   Date of Admission: 9/20/2024   Admitting Diagnosis: No Principal Problem: There is no principal problem currently on the Problem List. Please update the Problem List and refresh.  Attending Physician: ELIZABETH HOLM D.O.  Allergies: Patient has no known allergies.    Safety  Patient Assist  cga  Patient Precautions  Fall Risk  Precaution Comments  left knee pain  Bed Transfer Status  Contact Guard Assist  Toilet Transfer Status   Contact Guard Assist  Assistive Devices  Rails, Wheelchair  Oxygen  None - Room Air  Diet/Therapeutic Dining  Current Diet Order   Procedures    Diet Order Diet: Level 6 - Soft and Bite Sized (Set pt up w/ meal- he can then self-feed independently. Pills whole.); Liquid level: Level 0 - Thin; Tray Modifications (optional): SLP - Deliver to Nursing Station     Pill Administration  whole  Agitated Behavioral Scale  17  ABS Level of Severity  No Agitation    Fall Risk  Has the patient had a fall this admission?   No  Nikole Cesar Fall Risk Scoring  21, HIGH RISK  Fall Risk Safety Measures  bed alarm, chair alarm, seatbelt alarm, posey bed , poor balance, and low vision/ hearing    Vitals  Temperature: 36.9 °C (98.4 °F)  Temp src: Oral  Pulse: 61  Respiration: 16  Blood Pressure : 126/65  Blood Pressure MAP (Calculated): 85 MM HG  BP Location: Left, Upper Arm  Patient BP Position: Sitting     Oxygen  Pulse Oximetry: 95 %  O2 (LPM): 0  O2 Delivery Device: None - Room Air    Bowel and Bladder  Last Bowel Movement  10/04/24  Stool Type  Type 3: Like a sausage, but with cracks on its surface  Bowel Device  Bathroom, Diaper  Continent  Bladder: Did not void   Bowel: No movement  Bladder Function  Urine Void (mL):  (bathroom)  Number of Times Voided: 1  Urinary Options: Yes  Urine Color: Yellow  Straight Catheter: 550 ml  Wet Diaper Count: 1  Genitourinary Assessment   Bladder Assessment  (WDL):  Within Defined Limits  Silva Catheter: Not Applicable  Urine Color: Yellow  Bladder Device: Bathroom  Time Void: Yes  Bladder Scan: Post Void  $ Bladder Scan Results (mL): 206    Skin  Colt Score   17  Sensory Interventions   Bed Types:  (posey)  Skin Preventative Measures: Pillows in Use for Support / Positioning  Moisture Interventions  Moisturizers/Barriers: Barrier Wipes      Pain  Pain Rating Scale  0 - No Pain  Pain Location  Knee  Pain Location Orientation  Left  Pain Interventions   [place], Declines    ADLs    Bathing   Patient Refused Bathing (Partial bath)  Linen Change   Complete  Personal Hygiene  Change Dariana Pads, Moist Dariana Wipes  Chlorhexidine Bath      Oral Care     Teeth/Dentures     Shave     Nutrition Percentage Eaten  Lunch, Between % Consumed  Environmental Precautions  Bed in Low Position  Patient Turns/Positioning  Sitting Up in Wheelchair  Patient Turns Assistance/Tolerance  Assistance of One  Bed Positions  Bed Controls On, Bed Locked  Head of Bed Elevated  Less than 30 degrees      Psychosocial/Neurologic Assessment  Psychosocial Assessment  Psychosocial (WDL):  WDL Except  Patient Behaviors: Confused, Forgetful  Neurologic Assessment  Neuro (WDL): Exceptions to WDL  Level of Consciousness: Responds to voice  Orientation Level: Disoriented to situation, Disoriented to time, Disoriented to place  Cognition: Follows commands, Impulsive, Poor safety awareness, Poor judgement, Memory Loss  Speech: Clear  Pupil Assesment: No  Motor Function/Sensation Assessment: Motor strength  Muscle Strength Right Arm: Good Strength Against Gravity and Moderate Resistance  Muscle Strength Left Arm: Good Strength Against Gravity and Moderate Resistance  Muscle Strength Right Leg: Fair Strength against Gravity but No Resistance  LLE Motor Response: Responds to commands  Muscle Strength Left Leg: Fair Strength against Gravity but No Resistance  EENT (WDL):  WDL Except    Cardio/Pulmonary  Assessment  Edema   LLE Edema: 1+  Respiratory Breath Sounds  RUL Breath Sounds: Clear  RML Breath Sounds: Clear  RLL Breath Sounds: Clear  YONI Breath Sounds: Clear  LLL Breath Sounds: Clear  Cardiac Assessment   Cardiac (WDL):  WDL Except

## 2024-10-05 NOTE — CARE PLAN
"  Problem: Fall Risk - Rehab  Goal: Patient will remain free from falls  Outcome: Progressing  Note: Agustin Arsenio Fall risk Assessment Score: 21    High fall risk Interventions   - Alarming seatbelt  - Bed and strip alarm   - Yellow sign by the door   - Yellow wrist band \"Fall risk\"  - Room near to the nurse station  - Do not leave patient unattended in the bathroom  - Fall risk education provided     Problem: Pain - Standard  Goal: Alleviation of pain or a reduction in pain to the patient’s comfort goal  10/5/2024 0120 by Lisa Gillespie RMckenzieN.  Note: Assessed for pain and discomfort , medicated with tramadol for leg pain, cont monitored for pain control.   10/5/2024 0119 by Lisa Gillespie R.N.  Outcome: Progressing    Problem: Safety - Medical Restraint  Goal: Remains free of injury from restraints (Restraint for Interference with Medical Device)  Outcome: Progressing  Note: Pt placed on open trail , still confused, non compliant to safety measures, frequent rounding done to ensure safety. Pt found both legs dangling on the open bed and attempting to get out of bed.Open trail failed, pt high risk for fall related to deficit in safety awareness.        The patient is Stable - Low risk of patient condition declining or worsening    Shift Goals  Clinical Goals: rest, safety  Patient Goals: go home    Progress made toward(s) clinical / shift goals:  Pt free from fall and injury.    "

## 2024-10-06 ENCOUNTER — APPOINTMENT (OUTPATIENT)
Dept: SPEECH THERAPY | Facility: REHABILITATION | Age: 74
DRG: 057 | End: 2024-10-06
Attending: PHYSICAL MEDICINE & REHABILITATION
Payer: MEDICARE

## 2024-10-06 ENCOUNTER — APPOINTMENT (OUTPATIENT)
Dept: PHYSICAL THERAPY | Facility: REHABILITATION | Age: 74
DRG: 057 | End: 2024-10-06
Attending: PHYSICAL MEDICINE & REHABILITATION
Payer: MEDICARE

## 2024-10-06 PROCEDURE — 97116 GAIT TRAINING THERAPY: CPT

## 2024-10-06 PROCEDURE — 770005 HCHG ROOM/CARE - REHAB PRIVATE (11*

## 2024-10-06 PROCEDURE — A9270 NON-COVERED ITEM OR SERVICE: HCPCS | Performed by: HOSPITALIST

## 2024-10-06 PROCEDURE — 700102 HCHG RX REV CODE 250 W/ 637 OVERRIDE(OP): Performed by: PHYSICAL MEDICINE & REHABILITATION

## 2024-10-06 PROCEDURE — 97110 THERAPEUTIC EXERCISES: CPT

## 2024-10-06 PROCEDURE — A9270 NON-COVERED ITEM OR SERVICE: HCPCS | Performed by: PHYSICAL MEDICINE & REHABILITATION

## 2024-10-06 PROCEDURE — 700102 HCHG RX REV CODE 250 W/ 637 OVERRIDE(OP): Performed by: HOSPITALIST

## 2024-10-06 PROCEDURE — 700111 HCHG RX REV CODE 636 W/ 250 OVERRIDE (IP): Mod: JZ | Performed by: PHYSICAL MEDICINE & REHABILITATION

## 2024-10-06 PROCEDURE — 92507 TX SP LANG VOICE COMM INDIV: CPT

## 2024-10-06 PROCEDURE — 99232 SBSQ HOSP IP/OBS MODERATE 35: CPT | Performed by: STUDENT IN AN ORGANIZED HEALTH CARE EDUCATION/TRAINING PROGRAM

## 2024-10-06 RX ADMIN — SENNOSIDES AND DOCUSATE SODIUM 2 TABLET: 50; 8.6 TABLET ORAL at 20:43

## 2024-10-06 RX ADMIN — HYDRALAZINE HYDROCHLORIDE 100 MG: 50 TABLET ORAL at 21:16

## 2024-10-06 RX ADMIN — OMEPRAZOLE 20 MG: 20 CAPSULE, DELAYED RELEASE ORAL at 08:31

## 2024-10-06 RX ADMIN — LINEZOLID 600 MG: 600 TABLET, FILM COATED ORAL at 08:31

## 2024-10-06 RX ADMIN — HYDRALAZINE HYDROCHLORIDE 100 MG: 50 TABLET ORAL at 05:40

## 2024-10-06 RX ADMIN — ATORVASTATIN CALCIUM 80 MG: 40 TABLET, FILM COATED ORAL at 20:43

## 2024-10-06 RX ADMIN — Medication 1000 UNITS: at 08:31

## 2024-10-06 RX ADMIN — POLYETHYLENE GLYCOL 3350 1 PACKET: 17 POWDER, FOR SOLUTION ORAL at 20:42

## 2024-10-06 RX ADMIN — ASPIRIN 81 MG: 81 TABLET, CHEWABLE ORAL at 08:31

## 2024-10-06 RX ADMIN — TRAMADOL HYDROCHLORIDE 25 MG: 50 TABLET ORAL at 20:43

## 2024-10-06 RX ADMIN — ENOXAPARIN SODIUM 40 MG: 100 INJECTION SUBCUTANEOUS at 18:04

## 2024-10-06 RX ADMIN — TRAZODONE HYDROCHLORIDE 50 MG: 50 TABLET ORAL at 21:17

## 2024-10-06 RX ADMIN — TRAZODONE HYDROCHLORIDE 50 MG: 50 TABLET ORAL at 20:43

## 2024-10-06 RX ADMIN — METOPROLOL SUCCINATE 100 MG: 100 TABLET, EXTENDED RELEASE ORAL at 05:41

## 2024-10-06 RX ADMIN — HYDRALAZINE HYDROCHLORIDE 100 MG: 50 TABLET ORAL at 14:56

## 2024-10-06 RX ADMIN — QUETIAPINE FUMARATE 50 MG: 25 TABLET ORAL at 21:17

## 2024-10-06 RX ADMIN — AMLODIPINE BESYLATE 10 MG: 5 TABLET ORAL at 05:41

## 2024-10-06 RX ADMIN — LISINOPRIL 40 MG: 20 TABLET ORAL at 08:31

## 2024-10-06 RX ADMIN — QUETIAPINE FUMARATE 50 MG: 25 TABLET ORAL at 18:05

## 2024-10-06 RX ADMIN — LINEZOLID 600 MG: 600 TABLET, FILM COATED ORAL at 20:44

## 2024-10-06 ASSESSMENT — PAIN DESCRIPTION - PAIN TYPE
TYPE: ACUTE PAIN
TYPE: ACUTE PAIN

## 2024-10-06 ASSESSMENT — GAIT ASSESSMENTS
DISTANCE (FEET): 150
ASSISTIVE DEVICE: FRONT WHEEL WALKER
DEVIATION: ANTALGIC;BRADYKINETIC;DECREASED HEEL STRIKE;DECREASED TOE OFF
GAIT LEVEL OF ASSIST: CONTACT GUARD ASSIST

## 2024-10-06 NOTE — CARE PLAN
"  Problem: Fall Risk - Rehab  Goal: Patient will remain free from falls  Outcome: Progressing   Nikole Arsenio Fall risk Assessment Score: 21    High fall risk Interventions   - Bed and strip alarm   - Yellow sign by the door   - Yellow wrist band \"Fall risk\"  - Room near to the nurse station  - Do not leave patient unattended in the bathroom  - Fall risk education provided           Problem: Pain - Standard  Goal: Alleviation of pain or a reduction in pain to the patient’s comfort goal  Outcome: Progressing   Patient is able to rate pain on a scale of 1-10.       "

## 2024-10-06 NOTE — PROGRESS NOTES
NURSING DAILY NOTE    Name: Joe Templeton   Date of Admission: 9/20/2024   Admitting Diagnosis: No Principal Problem: There is no principal problem currently on the Problem List. Please update the Problem List and refresh.  Attending Physician: ELIZABETH HOLM D.O.  Allergies: Patient has no known allergies.    Safety  Patient Assist  CGA  Patient Precautions  Fall Risk  Precaution Comments  left knee pain  Bed Transfer Status  Contact Guard Assist  Toilet Transfer Status   Contact Guard Assist  Assistive Devices  Rails, Wheelchair  Oxygen  None - Room Air  Diet/Therapeutic Dining  Current Diet Order   Procedures    Diet Order Diet: Level 6 - Soft and Bite Sized (Set pt up w/ meal- he can then self-feed independently. Pills whole.); Liquid level: Level 0 - Thin; Tray Modifications (optional): SLP - Deliver to Nursing Station     Pill Administration  whole  Agitated Behavioral Scale  17  ABS Level of Severity  No Agitation    Fall Risk  Has the patient had a fall this admission?   No  Nikole Cesar Fall Risk Scoring  21, HIGH RISK  Fall Risk Safety Measures  bed alarm, chair alarm, seatbelt alarm, poor balance, and low vision/ hearing    Vitals  Temperature: 36.7 °C (98 °F)  Temp src: Oral  Pulse: (!) 59  Respiration: 16  Blood Pressure : 138/69  Blood Pressure MAP (Calculated): 92 MM HG  BP Location: Right, Upper Arm  Patient BP Position: Sitting     Oxygen  Pulse Oximetry: 95 %  O2 (LPM): 0  O2 Delivery Device: None - Room Air    Bowel and Bladder  Last Bowel Movement  10/05/24  Stool Type  Type 5: Soft blob with clear cut edges (passed easily)  Bowel Device  Diaper, Bathroom  Continent  Bladder: Did not void   Bowel: No movement  Bladder Function  Urine Void (mL):  (bathroom)  Number of Times Voided: 1  Urinary Options: Yes  Urine Color: Yellow  Urine Clarity: Clear  Straight Catheter: 550 ml  Wet Diaper Count: 1  Genitourinary Assessment   Bladder  Assessment (WDL):  WDL Except  Silva Catheter: Not Applicable  Urine Color: Yellow  Urine Clarity: Clear  Bladder Device: Bathroom  Time Void: Yes  Bladder Scan: Post Void  $ Bladder Scan Results (mL): 206    Skin  Colt Score   17  Sensory Interventions   Bed Types: Standard/Trauma Mattress  Skin Preventative Measures: Pillows in Use for Support / Positioning  Moisture Interventions  Moisturizers/Barriers: Barrier Wipes      Pain  Pain Rating Scale  0 - No Pain  Pain Location  Knee  Pain Location Orientation  Left  Pain Interventions   Anto[place], Declines    ADLs    Bathing   Patient Refused Bathing (Partial bath)  Linen Change   Complete  Personal Hygiene  Moist Dariana Wipes, Perineal Care  Chlorhexidine Bath      Oral Care     Teeth/Dentures     Shave     Nutrition Percentage Eaten  Dinner  Environmental Precautions  Treaded Slipper Socks on Patient, Bed in Low Position, Personal Belongings, Wastebasket, [place] etc. in Easy Reach  Patient Turns/Positioning  Patient Turns Self from Side to Side  Patient Turns Assistance/Tolerance  Assistance of One  Bed Positions  Bed Controls On, Bed Locked  Head of Bed Elevated  Less than 30 degrees      Psychosocial/Neurologic Assessment  Psychosocial Assessment  Psychosocial (WDL):  WDL Except  Patient Behaviors: Confused, Forgetful  Neurologic Assessment  Neuro (WDL): Exceptions to WDL  Level of Consciousness: Responds to voice  Orientation Level: Disoriented to situation, Disoriented to time, Disoriented to place  Cognition: Follows commands, Impulsive, Poor safety awareness, Poor judgement, Memory Loss  Speech: Clear  Pupil Assesment: No  Motor Function/Sensation Assessment: Motor strength  Muscle Strength Right Arm: Good Strength Against Gravity and Moderate Resistance  Muscle Strength Left Arm: Good Strength Against Gravity and Moderate Resistance  Muscle Strength Right Leg: Fair Strength against Gravity but No Resistance  LLE Motor Response: Responds to commands  Muscle  Strength Left Leg: Fair Strength against Gravity but No Resistance  EENT (WDL):  WDL Except    Cardio/Pulmonary Assessment  Edema   LLE Edema: Dependent  Respiratory Breath Sounds  RUL Breath Sounds: Clear  RML Breath Sounds: Clear  RLL Breath Sounds: Clear  YONI Breath Sounds: Clear  LLL Breath Sounds: Clear  Cardiac Assessment   Cardiac (WDL):  WDL Except

## 2024-10-06 NOTE — CARE PLAN
"Pt on regular bed , pt impulsive at the start of the shift, safety measures enforced , due meds given with apple sauce  , tolerated . Medicated with tramadol with relief [ see mar] Cont monitored.  Problem: Fall Risk - Rehab  Goal: Patient will remain free from falls  Outcome: Progressing  Note: Nikole Cesar Fall risk Assessment Score: 21    High fall risk Interventions   - Alarming seatbelt  - Bed and strip alarm   - Yellow sign by the door   - Yellow wrist band \"Fall risk\"  - Room near to the nurse station  - Do not leave patient unattended in the bathroom  - Fall risk education provided      The patient is Stable - Low risk of patient condition declining or worsening    Shift Goals  Clinical Goals: Safety  Patient Goals: Safety    Progress made toward(s) clinical / shift goals:  Pt free from fall and injury.    "

## 2024-10-06 NOTE — PROGRESS NOTES
NURSING DAILY NOTE    Name: Joe Templeton   Date of Admission: 9/20/2024   Admitting Diagnosis: No Principal Problem: There is no principal problem currently on the Problem List. Please update the Problem List and refresh.  Attending Physician: ELIZABETH HOLM D.O.  Allergies: Patient has no known allergies.    Safety  Patient Assist  CGA  Patient Precautions  Fall Risk  Precaution Comments  left knee pain  Bed Transfer Status  Contact Guard Assist  Toilet Transfer Status   Contact Guard Assist  Assistive Devices  Rails, Wheelchair  Oxygen  None - Room Air  Diet/Therapeutic Dining  Current Diet Order   Procedures    Diet Order Diet: Level 6 - Soft and Bite Sized (Set pt up w/ meal- he can then self-feed independently. Pills whole.); Liquid level: Level 0 - Thin; Tray Modifications (optional): SLP - Deliver to Nursing Station     Pill Administration  whole and floated  Agitated Behavioral Scale  16  ABS Level of Severity  No Agitation    Fall Risk  Has the patient had a fall this admission?   No  Nikole Cesar Fall Risk Scoring  21, HIGH RISK  Fall Risk Safety Measures  bed alarm, chair alarm, poor balance, and low vision/ hearing    Vitals  Temperature: 36.7 °C (98 °F)  Temp src: Oral  Pulse: 74  Respiration: 16  Blood Pressure : 136/73  Blood Pressure MAP (Calculated): 94 MM HG  BP Location: Right, Upper Arm  Patient BP Position: Sitting     Oxygen  Pulse Oximetry: 95 %  O2 (LPM): 0  O2 Delivery Device: None - Room Air    Bowel and Bladder  Last Bowel Movement  10/05/24  Stool Type  Type 5: Soft blob with clear cut edges (passed easily)  Bowel Device  Diaper, Bathroom  Continent  Bladder: Did not void   Bowel: No movement  Bladder Function  Urine Void (mL):  (bathroom)  Number of Times Voided: 1  Urinary Options: Yes  Urine Color: Yellow  Urine Clarity: Clear  Straight Catheter: 550 ml  Wet Diaper Count: 1  Genitourinary Assessment   Bladder Assessment  (WDL):  WDL Except  Silva Catheter: Not Applicable  Urine Color: Yellow  Urine Clarity: Clear  Bladder Device: Bathroom  Time Void: Yes  Bladder Scan: Post Void  $ Bladder Scan Results (mL): 206    Skin  Colt Score   17  Sensory Interventions   Bed Types: Standard/Trauma Mattress  Skin Preventative Measures: Pillows in Use for Support / Positioning  Moisture Interventions  Moisturizers/Barriers: Barrier Wipes      Pain  Pain Rating Scale  7 - Focus of attention, prevents doing daily activities  Pain Location  Knee  Pain Location Orientation  Left  Pain Interventions   Declines    ADLs    Bathing   Patient Refused Bathing (Partial bath)  Linen Change   Complete  Personal Hygiene  Moist Dariana Wipes, Perineal Care  Chlorhexidine Bath      Oral Care     Teeth/Dentures     Shave     Nutrition Percentage Eaten  Dinner  Environmental Precautions  Treaded Slipper Socks on Patient, Bed in Low Position, Personal Belongings, Wastebasket, Call Bell etc. in Easy Reach  Patient Turns/Positioning  Patient Turns Self from Side to Side  Patient Turns Assistance/Tolerance  Assistance of One  Bed Positions  Bed Controls On, Bed Locked  Head of Bed Elevated  Self regulated      Psychosocial/Neurologic Assessment  Psychosocial Assessment  Psychosocial (WDL):  WDL Except  Patient Behaviors: Confused, Forgetful  Neurologic Assessment  Neuro (WDL): Exceptions to WDL  Level of Consciousness: Responds to voice  Orientation Level: Disoriented to situation, Disoriented to time, Disoriented to place  Cognition: Follows commands, Impulsive, Poor safety awareness, Poor judgement, Memory Loss  Speech: Clear  Pupil Assesment: No  Motor Function/Sensation Assessment: Motor strength  Muscle Strength Right Arm: Good Strength Against Gravity and Moderate Resistance  Muscle Strength Left Arm: Good Strength Against Gravity and Moderate Resistance  Muscle Strength Right Leg: Fair Strength against Gravity but No Resistance  LLE Motor Response: Responds to  commands  Muscle Strength Left Leg: Fair Strength against Gravity but No Resistance  EENT (WDL):  WDL Except    Cardio/Pulmonary Assessment  Edema   LLE Edema: Dependent  Respiratory Breath Sounds  RUL Breath Sounds: Clear  RML Breath Sounds: Clear  RLL Breath Sounds: Clear  YONI Breath Sounds: Clear  LLL Breath Sounds: Clear  Cardiac Assessment   Cardiac (WDL):  WDL Except

## 2024-10-06 NOTE — PROGRESS NOTES
"  Physical Medicine & Rehabilitation Progress Note    Encounter Date: 10/6/2024    Chief Complaint: Doing okay    Interval Events (Subjective):  Patient seen today in wheelchair at nursing station.  Notes that he is feeling okay.  Did well with regular bed last night.    ROS: 14 point ROS negative unless otherwise specified in the HPI    Objective:  VITAL SIGNS: /66   Pulse (!) 56   Temp 36.2 °C (97.2 °F) (Oral)   Resp 14   Ht 1.753 m (5' 9\")   Wt 77.1 kg (170 lb)   SpO2 96%   BMI 25.10 kg/m²     GEN: No apparent distress  HEENT: Head normocephalic, atraumatic.  Sclera nonicteric bilaterally, no ocular discharge appreciated bilaterally.  CV: Extremities warm and well-perfused, no peripheral edema appreciated bilaterally.  PULMONARY: Breathing nonlabored on room air, no respiratory accessory muscle use.  Not requiring supplemental oxygen.  SKIN:   10/4    PSYCH: Mood and affect within normal limits.  NEURO: Awake alert.  Following commands.    Laboratory Values:  Recent Results (from the past 72 hour(s))   Basic Metabolic Panel    Collection Time: 10/05/24  6:35 AM   Result Value Ref Range    Sodium 142 135 - 145 mmol/L    Potassium 3.9 3.6 - 5.5 mmol/L    Chloride 107 96 - 112 mmol/L    Co2 23 20 - 33 mmol/L    Glucose 133 (H) 65 - 99 mg/dL    Bun 22 8 - 22 mg/dL    Creatinine 1.08 0.50 - 1.40 mg/dL    Calcium 8.9 8.5 - 10.5 mg/dL    Anion Gap 12.0 7.0 - 16.0   CBC WITH DIFFERENTIAL    Collection Time: 10/05/24  6:35 AM   Result Value Ref Range    WBC 9.1 4.8 - 10.8 K/uL    RBC 4.59 (L) 4.70 - 6.10 M/uL    Hemoglobin 14.5 14.0 - 18.0 g/dL    Hematocrit 43.4 42.0 - 52.0 %    MCV 94.6 81.4 - 97.8 fL    MCH 31.6 27.0 - 33.0 pg    MCHC 33.4 32.3 - 36.5 g/dL    RDW 41.5 35.9 - 50.0 fL    Platelet Count 322 164 - 446 K/uL    MPV 11.3 9.0 - 12.9 fL    Neutrophils-Polys 74.80 (H) 44.00 - 72.00 %    Lymphocytes 14.60 (L) 22.00 - 41.00 %    Monocytes 6.50 0.00 - 13.40 %    Eosinophils 2.90 0.00 - 6.90 %    " Basophils 0.70 0.00 - 1.80 %    Immature Granulocytes 0.50 0.00 - 0.90 %    Nucleated RBC 0.00 0.00 - 0.20 /100 WBC    Neutrophils (Absolute) 6.83 1.82 - 7.42 K/uL    Lymphs (Absolute) 1.33 1.00 - 4.80 K/uL    Monos (Absolute) 0.59 0.00 - 0.85 K/uL    Eos (Absolute) 0.26 0.00 - 0.51 K/uL    Baso (Absolute) 0.06 0.00 - 0.12 K/uL    Immature Granulocytes (abs) 0.05 0.00 - 0.11 K/uL    NRBC (Absolute) 0.00 K/uL   ESTIMATED GFR    Collection Time: 10/05/24  6:35 AM   Result Value Ref Range    GFR (CKD-EPI) 72 >60 mL/min/1.73 m 2       Medications:  Scheduled Medications   Medication Dose Frequency    QUEtiapine  50 mg BID    traZODone  50 mg QHS    linezolid  600 mg Q12HRS    metoprolol SR  100 mg DAILY    senna-docusate  2 Tablet BID    And    polyethylene glycol/lytes  1 Packet BID    hydrALAZINE  100 mg Q8HRS    vitamin D3  1,000 Units DAILY    Pharmacy Consult Request  1 Each PHARMACY TO DOSE    lidocaine  1-2 Patch Daily-0800    omeprazole  20 mg DAILY    amLODIPine  10 mg Q DAY    enoxaparin (LOVENOX) injection  40 mg DAILY AT 1800    lisinopril  40 mg DAILY    aspirin  81 mg DAILY    atorvastatin  80 mg Q EVENING     PRN medications: QUEtiapine, hydrALAZINE, lactulose, docusate sodium, bisacodyl EC, magnesium hydroxide, sodium phosphate, carboxymethylcellulose, benzocaine-menthol, mag hydrox-al hydrox-simeth, ondansetron **OR** ondansetron, traZODone, sodium chloride, acetaminophen, ziprasidone, traMADol, formulation r    Diet:  Current Diet Order   Procedures    Diet Order Diet: Level 6 - Soft and Bite Sized (Set pt up w/ meal- he can then self-feed independently. Pills whole.); Liquid level: Level 0 - Thin; Tray Modifications (optional): SLP - Deliver to Nursing Station       Medical Decision Making and Plan:  Multifocal embolic appearing infarcts: Right insula, left parietal lobe, right parietal lobe, left hao  MRI with multiple old infarcts  Dysphagia  Encephalopathy  PT and OT for mobility and ADLs. Per  guidelines, 15 hours per week between PT, OT and/or SLP.  Follow-up stroke Bridge clinic  Secondary stroke prophylaxis: Aspirin and statin   Continue Seroquel at night - 10/2 move to 1800 and schedule Trazodone at 2100  10/4 mixed messages regarding how patient did with medications overnight.  Continue as is with open at [facility].     10/6 patient did well with a regular bed last night.  Discontinue enclosure bed.     Septic arthritis left ankle s/p left ankle arthrotomy and I&D 9/14/2024 Dr. Gautam -weightbearing as tolerated left lower extremity.  ID followed.  Continue vancomycin twice daily through 10/11.  Potentially can switch to Zyvox.  9/23 patient pulled out midline will need new access.  9/24 pulled out midline again, admits an attempt to help prevent him pulling it out.  9/26 has midline.  Continue IV vancomycin per ID.  If absolutely necessary at discharge can switch to linezolid 600 mg twice a day with stop date of 10/11.  If goes to skilled nursing facility can continue IV vancomycin.  Needs follow-up with ID.    9/30/2024  increased pain left lower extremity.  X-ray unremarkable.  Switch IV vancomycin to oral linezolid with end date of 10/11 starting 10/1.  Patient continues to pull out peripheral IVs and midlines.  He has had over 7 different lines placed since last week.  Could help with agitation if he does not have mitts and IV lines.    10/1 more pain with weightbearing with therapy 9/30.  X-ray unremarkable for acute fracture.    10/2 Leukocytosis at 11.5. Is now on Linezolid. 10/3 WNL.     HAS ID APPOINTMENT 10/8     Left knee pain -check knee x-ray.  9/23 negative.     Chronic systolic heart failure, not in acute exacerbation - preserved ejection fraction-LVEF 60%     CAD s/p CABG -continue aspirin and statin     Hypertension -amlodipine, hydralazine, lisinopril, metoprolol.  10/4 blood pressure better controlled, hospitalist managing.     Azotemia -9/22 improving, monitor.  9/24 resolved.   9/26 resolved. 10/2 Returned mild at 23. Encourage PO fluids. 10/3 mildly elevated at 26. Monitor.     Hypokalemia -9/22 mildly low at 3.5.  Hospitalist following. 10/2 WNL.      Leukocytosis -9/30 resolved. 10/2 Returned. 10/3 Resolved.     Anemia - 10/3 Stable at 13.7     Pain -Tylenol and oxycodone     Bowel -patient unsure when last BM was.  Check KUB.  Moderate stool burden.  Had small bowel movement 9/22.  Increase bowel meds 9/23. 9/25 Give MoM x1. Had bowel movement 9/26.  10/3 having regular bowel movements.       Upcoming Labs/imaging: 10/5     DVT PROPHYLAXIS: Lovenox 40mg SQ nightly      HOSPITALIST FOLLOWING: Yes - d/w hospitalist 10/4     CODE STATUS: FULL CODE     DISPO: Home with family      HEBER: TBD hoping for 10/7 needs to be out of [place] bed.      MEDS SENT TO: [facility]     DISCHARGE SPECIALIST FOLLOW UP: ID, Ortho, Stroke Bridge     Patient to scheduled follow up with their PCP within 2 weeks from discharge from the [facility].   ____________________________________  Albania Jordan MD  Physical Medicine and Rehabilitation  [facility]    ____________________________________

## 2024-10-07 ENCOUNTER — APPOINTMENT (OUTPATIENT)
Dept: SPEECH THERAPY | Facility: REHABILITATION | Age: 74
End: 2024-10-07
Attending: PHYSICAL MEDICINE & REHABILITATION
Payer: MEDICARE

## 2024-10-07 ENCOUNTER — APPOINTMENT (OUTPATIENT)
Dept: OCCUPATIONAL THERAPY | Facility: REHABILITATION | Age: 74
End: 2024-10-07
Attending: PHYSICAL MEDICINE & REHABILITATION
Payer: MEDICARE

## 2024-10-07 ENCOUNTER — APPOINTMENT (OUTPATIENT)
Dept: PHYSICAL THERAPY | Facility: REHABILITATION | Age: 74
End: 2024-10-07
Attending: PHYSICAL MEDICINE & REHABILITATION
Payer: MEDICARE

## 2024-10-07 ENCOUNTER — APPOINTMENT (OUTPATIENT)
Dept: INPATIENT REHAB | Facility: REHABILITATION | Age: 74
End: 2024-10-07
Payer: MEDICARE

## 2024-10-07 VITALS
WEIGHT: 170 LBS | TEMPERATURE: 97.7 F | OXYGEN SATURATION: 92 % | BODY MASS INDEX: 25.18 KG/M2 | RESPIRATION RATE: 18 BRPM | DIASTOLIC BLOOD PRESSURE: 53 MMHG | SYSTOLIC BLOOD PRESSURE: 104 MMHG | HEART RATE: 72 BPM | HEIGHT: 69 IN

## 2024-10-07 PROCEDURE — 97129 THER IVNTJ 1ST 15 MIN: CPT

## 2024-10-07 PROCEDURE — 92507 TX SP LANG VOICE COMM INDIV: CPT

## 2024-10-07 PROCEDURE — 97530 THERAPEUTIC ACTIVITIES: CPT

## 2024-10-07 PROCEDURE — 99239 HOSP IP/OBS DSCHRG MGMT >30: CPT | Performed by: PHYSICAL MEDICINE & REHABILITATION

## 2024-10-07 PROCEDURE — 700102 HCHG RX REV CODE 250 W/ 637 OVERRIDE(OP): Performed by: PHYSICAL MEDICINE & REHABILITATION

## 2024-10-07 PROCEDURE — 700102 HCHG RX REV CODE 250 W/ 637 OVERRIDE(OP): Performed by: HOSPITALIST

## 2024-10-07 PROCEDURE — A9270 NON-COVERED ITEM OR SERVICE: HCPCS | Performed by: HOSPITALIST

## 2024-10-07 PROCEDURE — A9270 NON-COVERED ITEM OR SERVICE: HCPCS | Performed by: PHYSICAL MEDICINE & REHABILITATION

## 2024-10-07 PROCEDURE — 97535 SELF CARE MNGMENT TRAINING: CPT

## 2024-10-07 RX ORDER — METOPROLOL SUCCINATE 100 MG/1
100 TABLET, EXTENDED RELEASE ORAL DAILY
Qty: 30 TABLET | Refills: 2
Start: 2024-10-07

## 2024-10-07 RX ORDER — QUETIAPINE FUMARATE 50 MG/1
50 TABLET, FILM COATED ORAL 2 TIMES DAILY
Qty: 60 TABLET | Refills: 2
Start: 2024-10-07

## 2024-10-07 RX ORDER — TRAZODONE HYDROCHLORIDE 50 MG/1
50 TABLET, FILM COATED ORAL
Qty: 30 TABLET | Refills: 2
Start: 2024-10-07

## 2024-10-07 RX ORDER — LINEZOLID 600 MG/1
600 TABLET, FILM COATED ORAL EVERY 12 HOURS
Qty: 8 TABLET | Refills: 0 | Status: ACTIVE
Start: 2024-10-07 | End: 2024-10-11

## 2024-10-07 RX ORDER — HYDRALAZINE HYDROCHLORIDE 100 MG/1
100 TABLET, FILM COATED ORAL EVERY 8 HOURS
Qty: 90 TABLET | Refills: 2
Start: 2024-10-07

## 2024-10-07 RX ORDER — TRAMADOL HYDROCHLORIDE 25 MG/1
25-50 TABLET, COATED ORAL EVERY 6 HOURS PRN
Qty: 30 TABLET | Refills: 0 | Status: SHIPPED | OUTPATIENT
Start: 2024-10-07 | End: 2024-10-14

## 2024-10-07 RX ADMIN — OMEPRAZOLE 20 MG: 20 CAPSULE, DELAYED RELEASE ORAL at 10:00

## 2024-10-07 RX ADMIN — SENNOSIDES AND DOCUSATE SODIUM 2 TABLET: 50; 8.6 TABLET ORAL at 10:00

## 2024-10-07 RX ADMIN — Medication 1000 UNITS: at 10:00

## 2024-10-07 RX ADMIN — POLYETHYLENE GLYCOL 3350 1 PACKET: 17 POWDER, FOR SOLUTION ORAL at 09:59

## 2024-10-07 RX ADMIN — ASPIRIN 81 MG: 81 TABLET, CHEWABLE ORAL at 10:00

## 2024-10-07 RX ADMIN — LINEZOLID 600 MG: 600 TABLET, FILM COATED ORAL at 10:01

## 2024-10-07 RX ADMIN — AMLODIPINE BESYLATE 10 MG: 5 TABLET ORAL at 05:29

## 2024-10-07 RX ADMIN — LISINOPRIL 40 MG: 20 TABLET ORAL at 10:00

## 2024-10-07 RX ADMIN — METOPROLOL SUCCINATE 100 MG: 100 TABLET, EXTENDED RELEASE ORAL at 05:29

## 2024-10-07 RX ADMIN — HYDRALAZINE HYDROCHLORIDE 100 MG: 50 TABLET ORAL at 05:30

## 2024-10-07 ASSESSMENT — BRIEF INTERVIEW FOR MENTAL STATUS (BIMS)
BIMS SUMMARY SCORE: 1
WHAT YEAR IS IT: MISSED BY MORE THAN 5 YEARS
ASKED TO RECALL BED: NO, COULD NOT RECALL
ASKED TO RECALL SOCK: NO, COULD NOT RECALL
ASKED TO RECALL SOCK: NO, COULD NOT RECALL
BIMS SUMMARY SCORE: 1
COGNITIVE PATTERN ASSESSMENT USED: BIMS
ASKED TO RECALL BED: NO, COULD NOT RECALL
ASKED TO RECALL BLUE: NO, COULD NOT RECALL
WHAT MONTH IS IT: MISSED BY MORE THAN 1 MONTH
WHAT DAY OF THE WEEK IS IT: NONSENSICAL
WHAT YEAR IS IT: MISSED BY MORE THAN 5 YEARS
WHAT DAY OF THE WEEK IS IT: NONSENSICAL
INITIAL REPETITION OF BED BLUE SOCK - FIRST ATTEMPT: 1
INITIAL REPETITION OF BED BLUE SOCK - FIRST ATTEMPT: 1
ASKED TO RECALL BLUE: NO, COULD NOT RECALL
WHAT MONTH IS IT: MISSED BY MORE THAN 1 MONTH

## 2024-10-07 ASSESSMENT — PATIENT HEALTH QUESTIONNAIRE - PHQ9
SUM OF ALL RESPONSES TO PHQ9 QUESTIONS 1 AND 2: 2
9. THOUGHTS THAT YOU WOULD BE BETTER OFF DEAD, OR OF HURTING YOURSELF: NOT AT ALL
8. MOVING OR SPEAKING SO SLOWLY THAT OTHER PEOPLE COULD HAVE NOTICED. OR THE OPPOSITE, BEING SO FIGETY OR RESTLESS THAT YOU HAVE BEEN MOVING AROUND A LOT MORE THAN USUAL: NOT AT ALL
SUM OF ALL RESPONSES TO PHQ QUESTIONS 1-9: 3
3. TROUBLE FALLING OR STAYING ASLEEP OR SLEEPING TOO MUCH: NOT AT ALL
2. FEELING DOWN, DEPRESSED, IRRITABLE, OR HOPELESS: SEVERAL DAYS
4. FEELING TIRED OR HAVING LITTLE ENERGY: NOT AT ALL
1. LITTLE INTEREST OR PLEASURE IN DOING THINGS: SEVERAL DAYS
6. FEELING BAD ABOUT YOURSELF - OR THAT YOU ARE A FAILURE OR HAVE LET YOURSELF OR YOUR FAMILY DOWN: NOT AL ALL
7. TROUBLE CONCENTRATING ON THINGS, SUCH AS READING THE NEWSPAPER OR WATCHING TELEVISION: SEVERAL DAYS
5. POOR APPETITE OR OVEREATING: NOT AT ALL

## 2024-10-07 NOTE — PROGRESS NOTES
DATE OF SERVICE:  12/10/2017

 

SUBJECTIVE:  The patient is seen and examined at the bedside.  Her  is in the room during my v
isit.  The patient does not have much complaints to offer.

 

OBJECTIVE:

VITAL SIGNS:  Blood pressure is 132/46, pulse is 71, temperature is 98.2, respiratory rate is 20, and
 pulse oximetry is 96%.

GENERAL:  She is much more awake today.  She is following my commands.

HEENT:  Her head is atraumatic, normocephalic.  Pupils are responding to light properly.  Sclerae is 
nonicteric.  Oral mucosa is moist.

NECK:  Obese.  I do not feel any thyroid.

LUNGS:  Breath sounds somewhat diminished at both bases.

CARDIOVASCULAR:  S1, S2 distant, no S3, no S4.

ABDOMEN:  Obese, soft, nontender, nondistended.  Bowel sounds are present.

EXTREMITIES:  1-1.5 peripheral edema in the upper and lower extremities and the torso.

NEUROLOGIC:  She is able to move her 4 extremities, although there is some clumsiness in her movement
s, but there is no any focal deficits.  This is more or less affecting all 4 limbs, but she follows m
y commands.

 

LABORATORY DATA:  Showed sodium of 145, potassium 4.0, chloride 112, BUN 59, creatinine 1.3, magnesiu
m 1.5 and calcium 9.5.  Microbiology, nothing new.

 

IMPRESSION:

1.  Metabolic encephalopathy.  She seems to be doing better today.  She is able to follow my commands
.  I could not appreciate any asterixis.

2.  Acute kidney injury/chronic kidney disease stage 3, improved.

3.  Enterococcal urinary tract infection, status post treatment.

4.  Hypovolemia, improved.

5.  Hypertensive urgency, resolved.

6.  Swallow dysfunction.

7.  Vitamin B12 deficiency, on replacement.

8.  Elevated CRP, lupus panel was sent out.

9.  Drowsiness, status post discontinuation of primidone.  This seems to be significantly better toda
y.

 

PLAN AND DISCUSSION:  The patient is getting diuresed with Lasix 40 mg IV push.  Her input and output
 is +440 despite of Lasix use.  her urine output was almost 2 liters and her intake was 2420.  Since 
she is asking for more water to drink, I would change her free water and give her a little bit more t
o drink orally.  We are awaiting for LTAC arrangement and transfer.  We will continue her current reg
imen plus DVT prophylaxis. Received bedside shift report from Noreen WARD RN regarding patient and assumed care. Patient awake, calm and stable, currently positioned in bed for comfort and safety; call light within reach. Denies pain or discomfort at this time. Will continue to monitor.

## 2024-10-07 NOTE — THERAPY
Occupational Therapy  Daily Treatment     Patient Name: Joe Templeton  Age:  74 y.o., Sex:  male  Medical Record #: [id number]  Today's Date: 10/7/2024     Precautions  Precautions: (P) Fall Risk  Comments: left knee pain         Subjective    Pt seated in w/c at nurses station, agreeable to OT session.     Objective       10/07/24 0831   OT Charge Group   OT Self Care / ADL (Units) 2   OT Cognitive Skill Development First 15 Minutes (Units) 1   OT Therapy Activity (Units) 1   OT Total Time Spent   OT Individual Total Time Spent (Mins) 60   Precautions   Precautions Fall Risk   Pain   Intervention Declines   Functional Level of Assist   Grooming Standby Assist;Standing   Grooming Description Verbal cueing;Supervision for safety;Standing at sink;Set-up of equipment  (SBA for oral care and hand hygiene at sink, min cues for sequencing)   Bathing Standby Assist   Bathing Description Verbal cueing;Supervision for safety;Set-up of equipment;Increased time;Tub bench;Grab bar;Hand held shower  (Mod verbal cues for sequencing)   Upper Body Dressing Supervision   Upper Body Dressing Description Supervision for safety;Verbal cueing;Set-up of equipment   Lower Body Dressing Supervision   Lower Body Dressing Description Verbal cueing;Supervision for safety;Set-up of equipment   Tub / Shower Transfers Standby Assist   Tub Shower Transfer Description Verbal cueing;Grab bar;Shower bench   Interdisciplinary Plan of Care Collaboration   IDT Collaboration with  Nursing   Patient Position at End of Therapy Seated;Chair Alarm On;Self Releasing Lap Belt Applied  (At nursing station, alarm seat belt)   Collaboration Comments Bandage change following shower     Pt completed cog skill/functional mobility activity w/ FWW. Pt was instructed to find 7 cones throughout low stim therapy gym. Pt able to complete with min verbal cues. Pt then completed activity in larger section with mod verbal cues. No LOB during functional mobility activity  and no report of pain.    Assessment    Pt was pleasant during OT session. Pt improve activity tolerance during functional mobility/cog skill training activity. Pt also improved at grooming/self-care activity standing at sink with appropriate use of objects (oral care).   Strengths: Independent prior level of function  Barriers: Decreased endurance, Confused, Impaired activity tolerance, Impulsive, Limited mobility, Impaired insight/denial of deficits, Impaired functional cognition (Encouragement for pt to participate in ADL's 7:00 a.m.)    Plan      Pt would benefit from skilled OT services to address: safety during ADLs,   functional cognition,   strengthening/endurance,   standing balance/tolerance,  functional mobility     DME  OT DME Recommendations  Bathroom Equipment:  (TBD)    Passport items to be completed:  Perform bathroom transfers, complete dressing, complete feeding, get ready for the day, prepare a simple meal, participate in household tasks, adapt home for safety needs, demonstrate home exercise program, complete caregiver training     Occupational Therapy Goals (Active)       Problem: Functional Cognition       Dates: Start:  09/21/24         Goal: STG-Within one week, patient will be oriented to year,month, and place.       Dates: Start:  09/21/24               Problem: Functional Transfers       Dates: Start:  09/21/24         Goal: STG-Within one week, patient will transfer to toilet with Contact Guard Assistance using DME as needed.       Dates: Start:  09/21/24               Problem: Grooming       Dates: Start:  10/02/24         Goal: STG-Within one week, patient will complete grooming standing at sink w/ zero errors SUP       Dates: Start:  10/02/24               Problem: OT Long Term Goals       Dates: Start:  09/21/24         Goal: LTG-By discharge, patient will complete basic self care tasks at Mod Independent with UB ADL's and Supervision with LB ADL's.       Dates: Start:  09/21/24             Goal: LTG-By discharge, patient will perform bathroom transfers with Supervision.       Dates: Start:  09/21/24               Problem: Toileting       Dates: Start:  10/02/24         Goal: STG-Within one week, patient will complete toileting tasks SUP       Dates: Start:  10/02/24

## 2024-10-07 NOTE — PROGRESS NOTES
NURSING DAILY NOTE    Name: Joe Templeton   Date of Admission: 9/20/2024   Admitting Diagnosis: No Principal Problem: There is no principal problem currently on the Problem List. Please update the Problem List and refresh.  Attending Physician: ELIZABETH HOLM D.O.  Allergies: Patient has no known allergies.    Safety  Patient Assist  CGACGA  Patient Precautions  Fall Risk  Precaution Comments  left knee pain  Bed Transfer Status  Contact Guard Assist  Toilet Transfer Status   Contact Guard Assist  Assistive Devices  Rails, Wheelchair  Oxygen  None - Room Air  Diet/Therapeutic Dining  Current Diet Order   Procedures    Diet Order Diet: Level 6 - Soft and Bite Sized (Set pt up w/ meal- he can then self-feed independently. Pills whole.); Liquid level: Level 0 - Thin; Tray Modifications (optional): SLP - Deliver to Nursing Station     Pill Administration  whole and one at a time   Agitated Behavioral Scale  16  ABS Level of Severity  No Agitation    Fall Risk  Has the patient had a fall this admission?   No  Nikole Cesar Fall Risk Scoring  21, HIGH RISK  Fall Risk Safety Measures  Bed strip alarm    Vitals  Temperature: 36.5 °C (97.7 °F)  Temp src: Temporal  Pulse: 72  Respiration: 18  Blood Pressure : 114/65  Blood Pressure MAP (Calculated): 81 MM HG  BP Location: Left, Upper Arm  Patient BP Position: Orthostatic:     Oxygen  Pulse Oximetry: 92 %  O2 (LPM): 0  O2 Delivery Device: None - Room Air    Bowel and Bladder  Last Bowel Movement  10/07/24  Stool Type  Type 6: Fluffy pieces with ragged edges, a mushy stool  Bowel Device  Bathroom  Continent  Bladder: Did not void   Bowel: No movement  Bladder Function  Urine Void (mL):  (bathroom)  Number of Times Voided: 1  Urinary Options: Yes  Urine Color: Yellow  Urine Clarity: Clear  Straight Catheter: 550 ml  Wet Diaper Count: 1  Genitourinary Assessment   Bladder Assessment (WDL):  WDL Except  Silva  Catheter: Not Applicable  Urine Color: Yellow  Urine Clarity: Clear  Bladder Device: Bathroom  Time Void: Yes  Bladder Scan: Post Void  $ Bladder Scan Results (mL): 206    Skin  Colt Score   17  Sensory Interventions   Bed Types: Standard/Trauma Mattress  Skin Preventative Measures: Pillows in Use for Support / Positioning  Moisture Interventions  Moisturizers/Barriers: Barrier Wipes      Pain  Pain Rating Scale  4 - Distracts me, can do usual activities  Pain Location  Knee  Pain Location Orientation  Left  Pain Interventions   Rest    ADLs    Bathing   * * With Assistance from, Staff (Dariana care + torso/face (washcloth and soap))  Linen Change   Complete  Personal Hygiene  Change Dariana Pads, Moist Dariana Wipes, Perineal Care  Chlorhexidine Bath      Oral Care     Teeth/Dentures     Shave     Nutrition Percentage Eaten  Dinner, Between % Consumed  Environmental Precautions  Treaded Slipper Socks on Patient, Bed in Low Position  Patient Turns/Positioning  Patient Turns Self from Side to Side  Patient Turns Assistance/Tolerance  Assistance of One, General Weakness  Bed Positions  Bed Controls On, Bed Locked  Head of Bed Elevated  Self regulated      Psychosocial/Neurologic Assessment  Psychosocial Assessment  Psychosocial (WDL):  WDL Except  Patient Behaviors: Confused, Forgetful  Neurologic Assessment  Neuro (WDL): Exceptions to WDL  Level of Consciousness: Responds to voice  Orientation Level: Disoriented to situation, Disoriented to time, Disoriented to place  Cognition: Follows commands, Impulsive, Poor safety awareness, Poor judgement, Memory Loss  Speech: Clear  Pupil Assesment: No  Motor Function/Sensation Assessment: Motor strength  Muscle Strength Right Arm: Good Strength Against Gravity and Moderate Resistance  Muscle Strength Left Arm: Good Strength Against Gravity and Moderate Resistance  Muscle Strength Right Leg: Fair Strength against Gravity but No Resistance  LLE Motor Response: Responds to  commands  Muscle Strength Left Leg: Fair Strength against Gravity but No Resistance  EENT (WDL):  WDL Except    Cardio/Pulmonary Assessment  Edema   LLE Edema: Dependent  Respiratory Breath Sounds  RUL Breath Sounds: Clear  RML Breath Sounds: Clear  RLL Breath Sounds: Clear  YONI Breath Sounds: Clear  LLL Breath Sounds: Clear  Cardiac Assessment   Cardiac (WDL):  WDL Except (H/O HTN, CAD s/p CABG, NStemi.)

## 2024-10-07 NOTE — DISCHARGE SUMMARY
"  Physical Medicine & Rehabilitation Discharge Summary    Admission Date: 9/20/2024    Discharge Date: 10/7/2024    Attending Provider: Khushbu Meza DO, MS    Admission Diagnosis:   Active Hospital Problems    Diagnosis     Vitamin D deficiency     Azotemia     Stroke (HCC)     CAD (coronary artery disease)     Septic arthritis of ankle (HCC)     Essential (primary) hypertension        Discharge Diagnosis:  Active Hospital Problems    Diagnosis     Vitamin D deficiency     Azotemia     Stroke (HCC)     CAD (coronary artery disease)     Septic arthritis of ankle (HCC)     Essential (primary) hypertension        HPI per Admission History & Physical:  Adapted from the PM&R Consult by Dr. Gutierrez:   \"HPI: The patient is a 74 y.o. male with a past medical history of CAD s/p CABG and gout; who presented on 9/13/2024 5:10 PM with LE weakness and ankle swelling. Per documentation, patient had worsening symptoms of LLE and ankle swelling x 3 days. Upon eval in the ED, patient was also confused. CTA head and neck obtained for weakness and confusion showed no LVO on CTA head and mid to moderate atherosclerotic plaquing of the common carotid arteries and bilateral ICA stenosis less than 50%. Ankle xray was negative for acute fracture. 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 Echo pending, prior echo from 2016 showed EF of 40-50%. Neuro consulted, patient is now on ASA and statin. In regards to patient's ankle, patient had an arthrocentesis done in the ED, ortho was consulted, and patient was taken the OR on 9/14 for left ankle I and D for left ankle septic arthritis performed by Dr. Gautam. Patient is WBAT LLE. Patient remains on vancomycin, stop date is 9/21. Patient has been able to mobilize CGA level.\"     On admission patient reports knee pain and ankle pain. Doesn't want to straighten out his leg " because his knee hurts. Doesn't think he has had a bowel movement lately. He is disoriented. Doesn't think there are years. Didn't remember he had surgery on his ankle. Pleasantly confused, however.     Patient was admitted to [facility] on 9/20/2024.     Hospital Course by Problem List:  Multifocal embolic appearing infarcts: Right insula, left parietal lobe, right parietal lobe, left hao  MRI with multiple old infarcts  Dysphagia  Encephalopathy  PT and OT for mobility and ADLs. Per guidelines, 15 hours per week between PT, OT and/or SLP.  Follow-up stroke Bridge clinic  Secondary stroke prophylaxis: Aspirin and statin   Continue Seroquel at night - 10/2 move to 1800 and schedule Trazodone at 2100  10/4 mixed messages regarding how patient did with medications overnight.  Continue as is with open at [facility].     I certify that the patient currently requires non-pharmacologic restraints. Non-pharmacologic restraints are required to reduce the potential for the patient to harm themselves or others, to limit violent behaviors, and to allow proper assessment and care. I have completed a face to face assessment of this patient on 10/4/2024. Alternative methods including but not limited to de-escalation techniques, redirection, and pharmacologic treatment have been attempted but patient currently remains at risk without restraints. Our staff has been trained on restraints and patient is currently placed in video monitored room.  The need for these restraints is assessed by a rehabilitation physician every 24 hours and use of restraints is minimized when appropriate.  Current diagnosis which requires restraints: Encephalopathy. Current restraints required: Enclosure bed, bilateral mitts to prevent pulling out midline which is needed for IV antibiotics.       Septic arthritis left ankle s/p left ankle arthrotomy and I&D 9/14/2024 Dr. Gautam -weightbearing as tolerated left lower extremity.  ID  followed.  Continue vancomycin twice daily through 10/11.  Potentially can switch to Zyvox.  9/23 patient pulled out midline will need new access.  9/24 pulled out midline again, admits an attempt to help prevent him pulling it out.  9/26 has midline.  Continue IV vancomycin per ID.  If absolutely necessary at discharge can switch to linezolid 600 mg twice a day with stop date of 10/11.  If goes to skilled nursing facility can continue IV vancomycin.  Needs follow-up with ID.     9/30/2024  increased pain left lower extremity.  X-ray unremarkable.  Switch IV vancomycin to oral linezolid with end date of 10/11 starting 10/1.  Patient continues to pull out peripheral IVs and midlines.  He has had over 7 different lines placed since last week.  Could help with agitation if he does not have mitts and IV lines.     10/1 more pain with weightbearing with therapy 9/30.  X-ray unremarkable for acute fracture.     10/2 Leukocytosis at 11.5. Is now on Linezolid. 10/3 WNL.      HAS ID APPOINTMENT 10/8     Left knee pain -check knee x-ray.  9/23 negative.     Chronic systolic heart failure, not in acute exacerbation - preserved ejection fraction-LVEF 60%     CAD s/p CABG -continue aspirin and statin     Hypertension -amlodipine, hydralazine, lisinopril, metoprolol.  10/4 blood pressure better controlled, hospitalist managing.     Azotemia -9/22 improving, monitor.  9/24 resolved.  9/26 resolved. 10/2 Returned mild at 23. Encourage PO fluids. 10/3 mildly elevated at 26. Monitor.      Hypokalemia -9/22 mildly low at 3.5.  Hospitalist following. 10/2 WNL.      Leukocytosis -9/30 resolved. 10/2 Returned. 10/3 Resolved.      Anemia - 10/3 Stable at 13.7     Pain -Tylenol and oxycodone     Bowel -patient unsure when last BM was.  Check KUB.  Moderate stool burden.  Had small bowel movement 9/22.  Increase bowel meds 9/23. 9/25 Give MoM x1. Had bowel movement 9/26.  10/3 having regular bowel movements.        DVT PROPHYLAXIS: Lovenox  40mg SQ nightly      HOSPITALIST FOLLOWING: Yes     CODE STATUS: FULL CODE     DISPO: Home with family      HEBER: 10/7     MEDS SENT TO: [facility]     DISCHARGE SPECIALIST FOLLOW UP: ID, Ortho, Stroke Bridge     Patient to scheduled follow up with their PCP within 2 weeks from discharge from the [facility].     Functional Status at Discharge  Eating:  Modified Independent  Eating Description:  Modified diet  Grooming:  Standby Assist, Standing  Grooming Description:  Verbal cueing, Supervision for safety, Standing at sink, Set-up of equipment (SBA for oral care and hand hygiene at sink, min cues for sequencing)  Bathing:  Standby Assist  Bathing Description:  Verbal cueing, Supervision for safety, Set-up of equipment, Increased time, Tub bench, Grab bar, Hand held shower (Mod verbal cues for sequencing)  Upper Body Dressing:  Supervision  Upper Body Dressing Description:  Supervision for safety, Verbal cueing, Set-up of equipment  Lower Body Dressing:  Supervision  Lower Body Dressing Description:  Verbal cueing, Supervision for safety, Set-up of equipment     Walk:  Contact Guard Assist  Distance Walked:  150  Number of Times Distance Was Traveled:  1  Assistive Device:  Front Wheel Walker  Gait Deviation:  Antalgic, Bradykinetic, Decreased Heel Strike, Decreased Toe Off  Wheelchair:  Minimal Assist  Distance Propelled:  50   Wheelchair Description:  Extra time, Assistance with steering, Leg rest management, Impaired coordination, Verbal cueing  Stairs Minimal Assist (CGA/min A, dec recall of sequencing)  Stairs Description Hand rails, Requires incidental assist     Comprehension:  Moderate Assist  Comprehension Description:  Glasses, Verbal cues  Expression:  Moderate Assist  Expression Description:  Verbal cueing  Social Interaction:  Supervision  Social Interaction Description:  Medication, Verbal cues, Increased time, Enclosure bed  Problem Solving:  Maximal Assist  Problem Solving Description:   Increased time, Enclosure bed, Bed/chair alarm, Verbal cueing, Therapy schedule, Seat belt  Memory:  Maximal Assist  Memory Description:  Seat belt, Increased time, Enclosure bed, Bed/chair alarm, Verbal cueing, Therapy schedule  Progress since Admit: Pt has made fair progress since admission. pt is tolerating regular textures/thin liquids. Continues to demonstrate moderate receptive and expressive language deficits. Benefits from simplified instructions with high context, extra time for processing, choice of two or Y/N questions. Pt benefits from encouragement and reinforcement in performance in times of frustration. Ongoing ST services via SNF.  Discharge Location : Skilled Nursing Facility  Patient Discharging with Assist of: No one, Patient will be Alone  Level of Supervision Required: 24 Hour Supervision  Recommended Services Upon Discharge: Other (See Comments) (ST at [facility] recommended)  Long Term Goals Met: 0/1  Long Term Goals Not Met: 1/1  Reason(s) for Goals Not Met: Pt's language impacts ability to recall safety precautions and communicate wants/needs  Criteria for Termination of Services: Maximum Function Achieved for Inpatient Rehabilitation    IKhushbu D.O., personally performed a complete drug regimen review and no potential clinically significant medication issues were identified.   Discharge Medication:     Medication List        START taking these medications        Instructions   linezolid 600 MG Tabs  Commonly known as: Zyvox   Take 1 Tablet by mouth every 12 hours for 4 days. Indications: Bacterial Infectious Arthritis, Infection of Bone and Bone Marrow  Dose: 600 mg     traMADol HCl 25 MG Tabs   Take 25-50 mg by mouth every 6 hours as needed for Moderate Pain or Severe Pain for up to 7 days. Indications: Pain  Dose: 25-50 mg     traZODone 50 MG Tabs  Commonly known as: Desyrel   Take 1 Tablet by mouth at bedtime.  Dose: 50 mg     vitamin D 1000 UNIT Tabs  Start taking on: October 8,  2024  Commonly known as: Cholecalciferol   Take 1 Tablet by mouth every day.  Dose: 1,000 Units            CHANGE how you take these medications        Instructions   hydrALAZINE 100 MG tablet  What changed:   medication strength  how much to take  Commonly known as: Apresoline   Take 1 Tablet by mouth every 8 hours.  Dose: 100 mg     metoprolol  MG Tb24  What changed:   medication strength  how much to take  Commonly known as: Toprol XL   Take 1 Tablet by mouth every day.  Dose: 100 mg     QUEtiapine 50 MG tablet  What changed:   when to take this  additional instructions  Another medication with the same name was removed. Continue taking this medication, and follow the directions you see here.  Commonly known as: SEROquel   Take 1 Tablet by mouth 2 times a day. TAKE AT 16:00 and 20:00  Indications: Agitation  Dose: 50 mg            CONTINUE taking these medications        Instructions   amLODIPine 10 MG Tabs  Commonly known as: Norvasc   Take 1 Tablet by mouth every day.  Dose: 10 mg     aspirin 81 MG Chew chewable tablet  Commonly known as: Asa   Chew 1 Tablet every day.  Dose: 81 mg     atorvastatin 80 MG tablet  Commonly known as: Lipitor   Take 1 Tablet by mouth every evening.  Dose: 80 mg     lisinopril 40 MG tablet  Commonly known as: Prinivil   Take 1 Tablet by mouth every day.  Dose: 40 mg            STOP taking these medications      melatonin 5 mg Tabs              Discharge Diet:  Current Diet Order   Procedures    Diet Order Diet: Level 6 - Soft and Bite Sized (Set pt up w/ meal- he can then self-feed independently. Pills whole.); Liquid level: Level 0 - Thin; Tray Modifications (optional): SLP - Deliver to Nursing Station       Discharge Activity:  Per PT/OT    Disposition:  Patient to discharge to SNF for ongoing rehabilitation services.    Equipment:  Per PT/OT    Follow-up & Discharge Instructions:  Follow up with your primary care provider (PCP) within 7-10 days of discharge to review your  medications and take over your care.     If you develop chest pain, fever, chills, change in neurologic function (weakness, sensation changes, vision changes), or other concerning sxs, seek immediate medical attention or call 911.      Future Appointments   Date Time Provider Department Center   10/7/2024  1:00 PM Juany Ordonez PTA RHPT None   10/7/2024  3:00 PM REHAB NON-THERAPY PROVIDER RH1 None   10/8/2024 11:00 AM [facility]   10/9/2024  3:00 PM REHAB NON-THERAPY PROVIDER RH1 None   10/11/2024  3:00 PM REHAB NON-THERAPY PROVIDER 1 None   10/13/2024  3:00 PM REHAB NON-THERAPY PROVIDER 1 None   10/28/2024  9:00 AM Stepan Pickett M.D. 25M [facility] [place] [facility]       Condition on Discharge:  Good    More than 35 minutes was spent on discharging this patient, including face-to-face time, prescription management, and the dictation of this note.    Dr. Khushbu Meza DO, MS  Department of Physical Medicine & Rehabilitation    Date of Service: 10/7/2024

## 2024-10-07 NOTE — CARE PLAN
Problem: Comprehension STGs  Goal: STG-Within one week, patient will point to pictures in FO3 with 80% accuracy.  Outcome: Met     Problem: Speech/Swallowing LTGs  Goal: LTG-By discharge, patient will solve basic problems  Description: 1) Individualized goal:  related to health and safety with 80% acc and MOD I for a safe D/C to PLOF.   2) Interventions:  SLP Self Care / ADL Training , SLP Cognitive Skill Development, and SLP Group Treatment      Outcome: Discharged - Not Met     Problem: Memory STGs  Goal: STG-Within one week, patient will  Description: 1) Individualized goal:  be oriented to day, month, year, and situation w/ 90% acc and MIN A.   2) Interventions:  SLP Self Care / ADL Training , SLP Cognitive Skill Development, and SLP Group Treatment      Outcome: Discharged - Not Met     Problem: Expression STGs  Goal: STG-Within one week, patient will state antonym given verbal prompt in 7/10 trials  Outcome: Discharged - Not Met

## 2024-10-07 NOTE — CARE PLAN
Problem: Functional Transfers  Goal: STG-Within one week, patient will transfer to toilet with Contact Guard Assistance using DME as needed.  Outcome: Met     Problem: OT Long Term Goals  Goal: LTG-By discharge, patient will complete basic self care tasks at Mod Independent with UB ADL's and Supervision with LB ADL's.  Outcome: Met  Goal: LTG-By discharge, patient will perform bathroom transfers with Supervision.  Outcome: Met     Problem: Grooming  Goal: STG-Within one week, patient will complete grooming standing at sink w/ zero errors SUP  Outcome: Met     Problem: Functional Cognition  Goal: STG-Within one week, patient will be oriented to year,month, and place.  Outcome: Discharged - Not Met     Problem: Toileting  Goal: STG-Within one week, patient will complete toileting tasks SUP  Outcome: Discharged - Not Met

## 2024-10-07 NOTE — THERAPY
Physical Therapy   Daily Treatment     Patient Name: Joe Templeton  Age:  74 y.o., Sex:  male  Medical Record #: [id number]  Today's Date: 10/6/2024     Precautions  Precautions: Fall Risk  Comments: left knee pain    Subjective    Pt in hallway at nursing station sitting up in w/c and agreeable to therapy session.     Objective       10/06/24 1531   PT Charge Group   PT Gait Training (Units) 1   PT Therapeutic Exercise (Units) 1   PT Total Time Spent   PT Individual Total Time Spent (Mins) 30   Gait Functional Level of Assist    Gait Level Of Assist Contact Guard Assist   Assistive Device Front Wheel Walker   Distance (Feet) 150   # of Times Distance was Traveled 1   Deviation Antalgic;Bradykinetic;Decreased Heel Strike;Decreased Toe Off   Sitting Lower Body Exercises   Ankle Pumps 1 set of 10;Bilateral   Long Arc Quad 1 set of 10;Bilateral   Comments 1.5lb weight raffy   Standing Lower Body Exercises   Hamstring Curl 1 set of 10;Bilateral    Hip Abduction 1 set of 10;Bilateral   Marching 1 set of 10   Heel Rise 1 set of 10;Bilateral   Comments 1.5lb weight raffy   Interdisciplinary Plan of Care Collaboration   IDT Collaboration with  Nursing   Patient Position at End of Therapy Seated;Chair Alarm On;Self Releasing Lap Belt Applied  (Pt handed off to nursing at nursing station.)         Assessment  Pt cooperative during therapy session and able to perform all activity with CGA during therapy session but requires continuous verbal cues for proper techniques with performing therapeutic exercise, and safety for sequencing and proper hand placement.    Strengths: Independent prior level of function, Pleasant and cooperative, Willingly participates in therapeutic activities  Barriers: Decreased endurance, Difficulty following instructions, Generalized weakness, Impaired activity tolerance, Impaired balance, Impaired carryover of learning, Impaired insight/denial of deficits, Impaired functional cognition,  Impulsive    Plan    Manual therapy left knee  Transfer sequencing  Ambulation with FWW  LE strengthening    DME  PT DME Recommendations  Assistive Device: Front Wheeled Walker    Passport items to be completed:  Get in/out of bed safely, in/out of a vehicle, safely use mobility device, walk or wheel around home/community, navigate up and down stairs, show how to get up/down from the ground, ensure home is accessible, demonstrate HEP, complete caregiver training    Physical Therapy Problems (Active)       Problem: Balance       Dates: Start:  09/21/24            Problem: Mobility Transfers       Dates: Start:  09/21/24         Goal: STG-Within one week, patient will transfer bed to chair with CGA and min VC with use of LRAD        Dates: Start:  09/21/24         Goal Note filed on 10/02/24 1254 by Nimisha Johnson DPT       CGA/min A- inconsistent performance               Goal: STG-Within two weeks, patient will transfer in/out of car requiring min VC with proper technique and use of LRAD safely       Dates: Start:  09/21/24         Goal Note filed on 10/02/24 1254 by Nimisha Johnson DPT       Needs reassessment                  Problem: PT-Long Term Goals       Dates: Start:  09/21/24         Goal: LTG-By discharge, patient will improve dynamic balance from fair+ to good to improve ability to perform ADLs safely with use of LRAD       Dates: Start:  09/21/24            Goal: LTG-By discharge, patient will ambulate 300ft with use of LRAD and supervision assistance.       Dates: Start:  09/21/24            Goal: LTG-By discharge, patient will ambulate up/down 12 stairs with use of single HR and CGA.       Dates: Start:  09/21/24

## 2024-10-07 NOTE — CARE PLAN
"  Problem: Knowledge Deficit - Standard  Goal: Patient and family/care givers will demonstrate understanding of plan of care, disease process/condition, diagnostic tests and medications  Outcome: Progressing  Note: Pt agrees with plan of care tonight regarding medications and safety.  Pt is confused.  Poor short term memory.  Needs multiple cues to complete tasks.  Will continue to monitor patient.     Problem: Fall Risk - Rehab  Goal: Patient will remain free from falls  Outcome: Progressing  Note: Nikole Cesar Fall risk Assessment Score: 21    High fall risk Interventions   - Alarming seatbelt  - Wander guard  - Bed and strip alarm   - Yellow sign by the door   - Yellow wrist band \"Fall risk\"  - Room near to the nurse station  - Do not leave patient unattended in the bathroom  - Fall risk education provided        The patient is Stable - Low risk of patient condition declining or worsening    Shift Goals  Clinical Goals: Safety  Patient Goals: Sleep well    Progress made toward(s) clinical / shift goals:  progressing          "

## 2024-10-07 NOTE — THERAPY
Speech Language Pathology  Daily Treatment     Patient Name: Joe Templeton  Age:  74 y.o., Sex:  male  Medical Record #: [id number]  Today's Date: 10/7/2024     Precautions  Precautions: Fall Risk  Comments: left knee pain    Subjective    Assumed care from nursing who informed this SLP of pt's scheduled d/c from facility to SNF today. Time TBD.      Objective       10/07/24 1004   Treatment Charges   SLP Treatment - Individual Speech Language Treatment - Individual   SLP Total Time Spent   SLP Individual Total Time Spent (Mins) 60   WAB-R (Western Aphasia Battery)   Spontaneous Speech:  Information Content 2   Spontaneous Speech:  Fluency 5   Comprehension:  Yes/No Questions 33   Comprehension:  Word Recognition 14   Comprehension:  Sequential Commands 0   Repetition:  Total 52   Naming:  Word Fluency 0   Naming:  Sentence Completion 2   Naming:  Responsive Speech 0   Spontaneous Speech Score 7   Comprehension Score 2.4   Repetition Score 5.2   Interdisciplinary Plan of Care Collaboration   IDT Collaboration with  Nursing   Patient Position at End of Therapy Seated;Chair Alarm On;Self Releasing Lap Belt Applied   Collaboration Comments CLOF and plans for d/c today to SNF per nursing         Assessment    Follow up outcome assessment initiated, unable to complete the naming portion of the The Western Aphasia Battery-Revised (WAB-R). Of those subtests completed, pt performed as follows:     WAB-R (Western Aphasia Battery)  Spontaneous Speech:  Information Content: 2  Spontaneous Speech:  Fluency: 5  Comprehension:  Yes/No Questions: 33  Comprehension:  Word Recognition: 14  Comprehension:  Sequential Commands: 0  Repetition:  Total: 52  Naming:  Object Naming: NO SCORE  Naming:  Word Fluency: 0  Naming:  Sentence Completion: 2  Naming:  Responsive Speech: 0    Pt with a decline in performance across all areas tested, pt with increased awareness to deficits, frequently attempting to make remarks on difficulty with  "language tasks such as \" This is not good, because I just can't. One, two, I don't know.\" \"I'll tell you I don't know what you're saying.\" \"You know what, I'm greatly. It's too much, you know?\" \"I feel like an idiot for all these things.\"     Encouragement and reinforcement provided throughout session. Education provided on POC for d/c today to SNF with ongoing ST services.      Strengths: Pleasant and cooperative, Independent prior level of function, Willingly participates in therapeutic activities  Barriers: Aphasia expressive, Aphasia receptive, Impaired carryover of learning, Impaired insight/denial of deficits, Impaired activity tolerance, Impaired functional cognition, Impaired sleep pattern, Fatigue, Decreased endurance    Plan    Anticipated d/c today to SNF    Passport items to be completed:  Express basic needs,  complete caregiver training     Speech Therapy Problems (Active)       There are no active problems.             "

## 2024-10-07 NOTE — PROGRESS NOTES
Patient discharged to home per order.  Patient has all belongings.  Patient left facility at 1300 via wheelchair accompanied by GMT.  Have enjoyed working with this pleasant patient.

## 2024-10-07 NOTE — DISCHARGE INSTRUCTIONS
"Speech Therapy Discharge Instructions for Joe Templeton    10/7/2024    Diet: Regular (7), Thin (0)  Swallow Strategies: NA  Other Diet Instructions: NA  Supervision: 24-7 supervision recommended for safety.  Expressive Aphasia: This disorder refers to impairment in expressed speech. With expressive aphasia, the patient knows what he or she wants to say, but cannot find the words to say it.  In mild cases, only an occasional word or two is lost, and the communication can proceed fairly normally. In more severe cases, most or all words can be lost, and the person needs to find an alternative means of communication.  Below are some strategies you can use to help your loved one express information:     What is Circumlocution?    Circumlocution (sir-cum-low-cue-shun) is the act of describing many features of an object, event, or action without saying the exact word for the object, event, or action.     It is a common strategy used by individuals with word finding difficulties. When at a loss for an intended word they will often describe the word to get their message across (and at the same time try to cue the word they're looking for).     For example, if an individual with word finding difficulty couldn't think of the word, axe, they might say;   \"It's the thing that chops wood. You know, lumberjacks use them; it has a long handle and a sharp edge. They chop down trees with it ... what is that thing called?\"    Using \"Cues\" to help Word Retrieval    Cues are simply giving someone (or yourself) a hint or clue as to what the missing word might be. Remember, you usually know the word you're trying to retrieve, so a clue might help you find it more rapidly.   There are two types of useful cues:    1. Phonemic Cues: These cues use sounds to aid in a patient's word retrieval. The first vowel or consonant sound of the missing word is typically used as the phonemic cue.  For example, if the missing word is soup you could " "cue that word by making an extended \"S\" sound. You could say, \"Oh, you're thinking of a word that starts with Sssssss ...\"  Hearing the initial \"S\" sound can sometimes trigger an individual's memory of the full word.   Another strategy linked to phonemic cues is rhyming. For example, if a patient is having difficulty retrieving the word free, you could give them a cue by telling them that the word they are looking for rhymes with tree or three.    2. Semantic Cues: These cues are category or background clues.   A category cue would provide the category or group the missing word belongs to. For example, if the missing word is horse, you could cue that word by saying, \"It's a farm animal.\"   A background cue would state a function of the word. For example, if the missing word is hammer, you could say, \"It is used to pound nails.\"     The Cloze Exercise  Another semantic strategy is using a cloze exercise. This requires using an understanding of all the words in a sentence to give you a good idea of what the missing word might be.     For example, if the target word is door, you could say, \"I unlocked the front _________.\"   You wouldn't unlock the front tree, so the strategy here is to use the related information (unlock and front) to help determine what the target word is (door).    Receptive Aphasia: This disorder involves an impairment in which the patient cannot understand the spoken word. In mild cases, only an occasional word or two sounds garbled or incomprehensible, and communication can proceed because the patient is able to get the essence of the communication based on the context.   In severe cases, the patient may experience most or all of the communication as if nonsense syllables are being spoken.  In moderate cases the patient is able to understand part, but not all of the communication. Sometimes, when patients appear to be noncompliant, it is because they have misunderstood the communication in the " first place.  Below are some strategies you can use to help your loved one understand information:    Allow the patient ample time to respond to questions and instructions. Most aphasic patients require more time to process incoming and outgoing messages. Let the patient try to speak before you supply the word for him or ask further questions to determine the message.     Keep instructions and explanations simple. If the comprehension problem is severe, single-word instructions may help the patient to recall words and make understanding easier. Try to keep your conversations geared to the patient’s immediate needs and surroundings.     As direct questions requiring “yes” or “no” answers. Encourage gestures and pointing whenever possible. Frequently this will enable the patient to say the word, and can help reduce frustration.     Write out key words and phrases while you talk. For example, “What would you like for lunch? Soup or a sandwich?” while writing “soup” and “sandwich”. Point to each word while you say it. This will help with comprehension, while also activating reading and speaking centers in the brain.    You may feel that your family member’s understanding is good, when it may be more impaired than you realize. You may become upset with your spouse because they don’t seem to respond to what you are saying, or that they are “forgetting” what was just discussed. People with aphasia may react appropriately during social situations, which gives the impression that they understand the conversation. This may be a successful communication situation on the surface, but they may not understand some of the message. It can be a problem when the conversation is fast and there are many people talking.    Use gestures a lot when you are talking, like an informal sign language. Your gesture represents the idea or object. Examples of this would be using your hand in a cutting motion for “cut”, your finger to your temple  when saying “think”, or pointing to the cake when you talk about it “Do you want some cake?”    What is memory?   Memory is the ability to learn, store, and retrieve information. New or increasing problems with any or all of these 3 stages of memory often occur after a traumatic brain injury, stroke, brain tumor, multiple sclerosis, or other kind of injury or illness that affects your nervous system.   Some kinds of memory problems may also occur as part of normal aging, when many people have more trouble retrieving new information.     Types of Memory:    Long-term (remote): memory for old, well-learned information that has been “rehearsed” (used) over time, such as the name of a childhood pet, memories of past vacations, or where you went to high school. Long-term memory tends to be retained after injury or illness.   Short-term (recent): memory for new experiences that took place a few minutes, hours, or days ago, such as what you had for breakfast or what you did yesterday. Short-term memory tends to be the most severely affected after injury. People who have had brain injuries may have problems with their attention span, how much memory they can store, how quickly they can think, and how efficiently they learn. These memory problems will make it hard to understand and save short-term memories so that they can be correctly rehearsed and stored in long-term memory.   Immediate (working): memory for information that is current, that you usually keep track of mentally, such as a phone number you look up, directions someone just gave you, or keeping track of numbers in your head when you add or subtract.   Prospective: the ability to remember to do something in the future, such as remembering to take a medicine, go to an appointment, or follow through on an assignment or project.       Strategies to Help Improve Your Memory   Your speech therapist can work with you to develop strategies to help you remember new  information. There are 2 main types of strategies to help your memory: internal reminders and external reminders.     Internal Reminders     Rehearsal: retelling yourself information you just learned, or restating it out loud in your own words.   Repetition: saying the same information over and over, either silently or out loud.   Clarification: asking someone else to repeat or rephrase information.   Chunking: grouping items together to reduce the number of items to remember, such as grouping 7-digit phone numbers into 2 chunks, one with 3 numbers and the other with 4 numbers.   Rhyming: making a rhyme out of important information.   Acronyms or alphabet cueing: creating a letter for each word you want to remember, or vice versa. One example is using the sentence “Every Good Boy Does Fine” to remember that the lines of a treble staff in music are the notes E, G, B, D, and F.   Imagery (also called visualization): creating pictures of the information in your mind.   Association: linking old information or habits with the new, such as remembering to take your medicine every night at the same time that you brush your teeth.   Personal meaning: making the new information meaningful or emotionally important to you in some way.     External Reminders     Using a paper or electronic calendar or day planner.   Setting timers or alarms to remind you to do something.   Writing down reminders such as to-do lists, shopping lists, and project outlines.   Recording new information with a voice recorder.   Using a medicine organizing tool.   Creating specific, permanent places for important items. One example is always putting your keys, wallet, and cell phone in the same place every time you get home.    To increase your ability to pay attention and concentrate it is recommended you follow these strategies:     Monitor your fatigue: Monitoring our fatigue is probably one of the most important strategies we can use. Many people  suffer from higher levels of fatigue than normal and must be aware that fatigue will have a major impact on attention capabilities. This means scheduling activities that require your attention at times when you feel at your best.  Make a schedule: Choose a time that’s quiet and unhurried -- maybe at night before you go to bed -- and plan out the next day, down to the task. Use a reminder lois, timer, or alarm to help you stick to that schedule. Alternate things you want to do with ones you don’t to help your mind stay engaged.  Allow plenty of time to complete tasks.  Be realistic about time: Your brain is wired differently than other people’s, and it may take you longer to get things done. That’s OK. Figure out a realistic time frame for your daily tasks -- and don’t forget to build in time for breaks if you think you’ll need them.  Quiet your mind by quieting your space: When it’s time to buckle down and get something done, take away the distractions. Use noise-canceling headphones to drown out sounds. Put your phone on silent. Work in a room with a door you can close. If you can do your job from home, set up the space in a way that helps you focus.  Control clutter: Another way to quiet your brain is to clear your space of things you don’t need. It can prevent distractions, and it can help you stay organized because you’ll have fewer things to tidy up.  Get some organizational helpers like under-the-bed containers or over-the-door holders. Ask a friend to help if it seems like you’re swimming in a sea of debris and you don’t know where to start.    Best of luck in your continued recovery, Joe! Keep up the good work, take frequent breaks when you get tired. ~ [facility] - Speech Therapy

## 2024-10-08 ENCOUNTER — TELEPHONE (OUTPATIENT)
Dept: INFECTIOUS DISEASES | Facility: MEDICAL CENTER | Age: 74
End: 2024-10-08
Payer: MEDICARE

## 2024-10-09 LAB
TEST NAME 95000: NORMAL

## 2024-10-11 ENCOUNTER — APPOINTMENT (OUTPATIENT)
Dept: INPATIENT REHAB | Facility: REHABILITATION | Age: 74
End: 2024-10-11
Payer: MEDICARE

## 2024-10-13 ENCOUNTER — APPOINTMENT (OUTPATIENT)
Dept: INPATIENT REHAB | Facility: REHABILITATION | Age: 74
End: 2024-10-13
Payer: MEDICARE

## 2024-10-28 ENCOUNTER — APPOINTMENT (OUTPATIENT)
Dept: MEDICAL GROUP | Age: 74
End: 2024-10-28
Payer: MEDICARE

## 2024-11-08 ENCOUNTER — TELEPHONE (OUTPATIENT)
Dept: NEUROLOGY | Facility: MEDICAL CENTER | Age: 74
End: 2024-11-08
Payer: MEDICARE

## 2024-11-13 ENCOUNTER — TELEPHONE (OUTPATIENT)
Dept: HEALTH INFORMATION MANAGEMENT | Facility: OTHER | Age: 74
End: 2024-11-13
Payer: MEDICARE

## 2024-11-29 ENCOUNTER — HOSPITAL ENCOUNTER (EMERGENCY)
Facility: MEDICAL CENTER | Age: 74
End: 2024-11-30
Attending: STUDENT IN AN ORGANIZED HEALTH CARE EDUCATION/TRAINING PROGRAM
Payer: MEDICARE

## 2024-11-29 DIAGNOSIS — R45.1 AGITATION: ICD-10-CM

## 2024-11-29 DIAGNOSIS — I63.9 CEREBROVASCULAR ACCIDENT (CVA), UNSPECIFIED MECHANISM (HCC): ICD-10-CM

## 2024-11-29 LAB
BASOPHILS # BLD AUTO: 0.5 % (ref 0–1.8)
BASOPHILS # BLD: 0.04 K/UL (ref 0–0.12)
EOSINOPHIL # BLD AUTO: 0.4 K/UL (ref 0–0.51)
EOSINOPHIL NFR BLD: 4.7 % (ref 0–6.9)
ERYTHROCYTE [DISTWIDTH] IN BLOOD BY AUTOMATED COUNT: 48.6 FL (ref 35.9–50)
HCT VFR BLD AUTO: 35 % (ref 42–52)
HGB BLD-MCNC: 11.8 G/DL (ref 14–18)
HOLDING TUBE BB 8507: NORMAL
IMM GRANULOCYTES # BLD AUTO: 0.04 K/UL (ref 0–0.11)
IMM GRANULOCYTES NFR BLD AUTO: 0.5 % (ref 0–0.9)
LYMPHOCYTES # BLD AUTO: 1.79 K/UL (ref 1–4.8)
LYMPHOCYTES NFR BLD: 20.8 % (ref 22–41)
MCH RBC QN AUTO: 31.6 PG (ref 27–33)
MCHC RBC AUTO-ENTMCNC: 33.7 G/DL (ref 32.3–36.5)
MCV RBC AUTO: 93.6 FL (ref 81.4–97.8)
MONOCYTES # BLD AUTO: 0.8 K/UL (ref 0–0.85)
MONOCYTES NFR BLD AUTO: 9.3 % (ref 0–13.4)
NEUTROPHILS # BLD AUTO: 5.52 K/UL (ref 1.82–7.42)
NEUTROPHILS NFR BLD: 64.2 % (ref 44–72)
NRBC # BLD AUTO: 0 K/UL
NRBC BLD-RTO: 0 /100 WBC (ref 0–0.2)
PLATELET # BLD AUTO: 231 K/UL (ref 164–446)
PMV BLD AUTO: 11.2 FL (ref 9–12.9)
RBC # BLD AUTO: 3.74 M/UL (ref 4.7–6.1)
WBC # BLD AUTO: 8.6 K/UL (ref 4.8–10.8)

## 2024-11-29 PROCEDURE — 99285 EMERGENCY DEPT VISIT HI MDM: CPT

## 2024-11-29 PROCEDURE — 80053 COMPREHEN METABOLIC PANEL: CPT

## 2024-11-29 PROCEDURE — 36415 COLL VENOUS BLD VENIPUNCTURE: CPT

## 2024-11-29 PROCEDURE — 85025 COMPLETE CBC W/AUTO DIFF WBC: CPT

## 2024-11-29 ASSESSMENT — FIBROSIS 4 INDEX: FIB4 SCORE: 0.97

## 2024-11-30 VITALS
OXYGEN SATURATION: 94 % | BODY MASS INDEX: 25.14 KG/M2 | SYSTOLIC BLOOD PRESSURE: 151 MMHG | RESPIRATION RATE: 16 BRPM | TEMPERATURE: 97.1 F | DIASTOLIC BLOOD PRESSURE: 70 MMHG | HEART RATE: 68 BPM | HEIGHT: 69 IN | WEIGHT: 169.75 LBS

## 2024-11-30 PROBLEM — R45.1 AGITATION: Status: ACTIVE | Noted: 2024-11-30

## 2024-11-30 PROBLEM — F03.90 DEMENTIA (HCC): Status: ACTIVE | Noted: 2024-11-30

## 2024-11-30 LAB
ALBUMIN SERPL BCP-MCNC: 3.7 G/DL (ref 3.2–4.9)
ALBUMIN/GLOB SERPL: 1.3 G/DL
ALP SERPL-CCNC: 104 U/L (ref 30–99)
ALT SERPL-CCNC: 12 U/L (ref 2–50)
ANION GAP SERPL CALC-SCNC: 9 MMOL/L (ref 7–16)
APPEARANCE UR: CLEAR
AST SERPL-CCNC: 17 U/L (ref 12–45)
BILIRUB SERPL-MCNC: 0.2 MG/DL (ref 0.1–1.5)
BILIRUB UR QL STRIP.AUTO: NEGATIVE
BUN SERPL-MCNC: 16 MG/DL (ref 8–22)
CALCIUM ALBUM COR SERPL-MCNC: 9.4 MG/DL (ref 8.5–10.5)
CALCIUM SERPL-MCNC: 9.2 MG/DL (ref 8.5–10.5)
CHLORIDE SERPL-SCNC: 108 MMOL/L (ref 96–112)
CO2 SERPL-SCNC: 25 MMOL/L (ref 20–33)
COLOR UR: YELLOW
CREAT SERPL-MCNC: 0.9 MG/DL (ref 0.5–1.4)
GFR SERPLBLD CREATININE-BSD FMLA CKD-EPI: 90 ML/MIN/1.73 M 2
GLOBULIN SER CALC-MCNC: 2.8 G/DL (ref 1.9–3.5)
GLUCOSE SERPL-MCNC: 138 MG/DL (ref 65–99)
GLUCOSE UR STRIP.AUTO-MCNC: NEGATIVE MG/DL
KETONES UR STRIP.AUTO-MCNC: NEGATIVE MG/DL
LEUKOCYTE ESTERASE UR QL STRIP.AUTO: NEGATIVE
MICRO URNS: NORMAL
NITRITE UR QL STRIP.AUTO: NEGATIVE
PH UR STRIP.AUTO: 6 [PH] (ref 5–8)
POTASSIUM SERPL-SCNC: 3.7 MMOL/L (ref 3.6–5.5)
PROT SERPL-MCNC: 6.5 G/DL (ref 6–8.2)
PROT UR QL STRIP: NEGATIVE MG/DL
RBC UR QL AUTO: NEGATIVE
SODIUM SERPL-SCNC: 142 MMOL/L (ref 135–145)
SP GR UR STRIP.AUTO: 1.02
UROBILINOGEN UR STRIP.AUTO-MCNC: 1 EU/DL

## 2024-11-30 PROCEDURE — 81003 URINALYSIS AUTO W/O SCOPE: CPT

## 2024-11-30 PROCEDURE — 700102 HCHG RX REV CODE 250 W/ 637 OVERRIDE(OP): Mod: UD | Performed by: STUDENT IN AN ORGANIZED HEALTH CARE EDUCATION/TRAINING PROGRAM

## 2024-11-30 PROCEDURE — 99284 EMERGENCY DEPT VISIT MOD MDM: CPT | Performed by: STUDENT IN AN ORGANIZED HEALTH CARE EDUCATION/TRAINING PROGRAM

## 2024-11-30 PROCEDURE — A9270 NON-COVERED ITEM OR SERVICE: HCPCS | Mod: UD | Performed by: STUDENT IN AN ORGANIZED HEALTH CARE EDUCATION/TRAINING PROGRAM

## 2024-11-30 PROCEDURE — 700102 HCHG RX REV CODE 250 W/ 637 OVERRIDE(OP): Performed by: STUDENT IN AN ORGANIZED HEALTH CARE EDUCATION/TRAINING PROGRAM

## 2024-11-30 PROCEDURE — A9270 NON-COVERED ITEM OR SERVICE: HCPCS | Performed by: STUDENT IN AN ORGANIZED HEALTH CARE EDUCATION/TRAINING PROGRAM

## 2024-11-30 RX ORDER — TRAZODONE HYDROCHLORIDE 50 MG/1
50 TABLET, FILM COATED ORAL
Status: DISCONTINUED | OUTPATIENT
Start: 2024-11-30 | End: 2024-11-30 | Stop reason: HOSPADM

## 2024-11-30 RX ORDER — QUETIAPINE FUMARATE 50 MG/1
100 TABLET, FILM COATED ORAL 2 TIMES DAILY
Qty: 60 TABLET | Refills: 2 | Status: SHIPPED | OUTPATIENT
Start: 2024-11-30 | End: 2024-11-30

## 2024-11-30 RX ORDER — HYDROXYZINE HYDROCHLORIDE 25 MG/1
25 TABLET, FILM COATED ORAL 3 TIMES DAILY PRN
Status: SHIPPED | COMMUNITY
End: 2024-12-23

## 2024-11-30 RX ORDER — ATORVASTATIN CALCIUM 80 MG/1
80 TABLET, FILM COATED ORAL EVERY EVENING
Status: DISCONTINUED | OUTPATIENT
Start: 2024-11-30 | End: 2024-11-30 | Stop reason: HOSPADM

## 2024-11-30 RX ORDER — HALOPERIDOL 5 MG/1
5 TABLET ORAL ONCE
Status: COMPLETED | OUTPATIENT
Start: 2024-11-30 | End: 2024-11-30

## 2024-11-30 RX ORDER — METOPROLOL SUCCINATE 50 MG/1
100 TABLET, EXTENDED RELEASE ORAL DAILY
Status: DISCONTINUED | OUTPATIENT
Start: 2024-11-30 | End: 2024-11-30 | Stop reason: HOSPADM

## 2024-11-30 RX ORDER — ONDANSETRON 4 MG/1
4 TABLET, ORALLY DISINTEGRATING ORAL EVERY 4 HOURS PRN
Status: DISCONTINUED | OUTPATIENT
Start: 2024-11-30 | End: 2024-11-30 | Stop reason: HOSPADM

## 2024-11-30 RX ORDER — QUETIAPINE FUMARATE 50 MG/1
50 TABLET, FILM COATED ORAL 2 TIMES DAILY
Qty: 60 TABLET | Refills: 2 | Status: SHIPPED | OUTPATIENT
Start: 2024-11-30 | End: 2024-12-23

## 2024-11-30 RX ORDER — ASPIRIN 81 MG/1
81 TABLET, CHEWABLE ORAL DAILY
Status: DISCONTINUED | OUTPATIENT
Start: 2024-11-30 | End: 2024-11-30 | Stop reason: HOSPADM

## 2024-11-30 RX ORDER — ACETAMINOPHEN 325 MG/1
650 TABLET ORAL EVERY 4 HOURS PRN
Status: DISCONTINUED | OUTPATIENT
Start: 2024-11-30 | End: 2024-11-30 | Stop reason: HOSPADM

## 2024-11-30 RX ORDER — IPRATROPIUM BROMIDE AND ALBUTEROL SULFATE 2.5; .5 MG/3ML; MG/3ML
3 SOLUTION RESPIRATORY (INHALATION)
Status: DISCONTINUED | OUTPATIENT
Start: 2024-11-30 | End: 2024-11-30 | Stop reason: HOSPADM

## 2024-11-30 RX ORDER — VITAMIN B COMPLEX
1000 TABLET ORAL DAILY
Status: DISCONTINUED | OUTPATIENT
Start: 2024-11-30 | End: 2024-11-30 | Stop reason: HOSPADM

## 2024-11-30 RX ORDER — AMLODIPINE BESYLATE 5 MG/1
10 TABLET ORAL DAILY
Status: DISCONTINUED | OUTPATIENT
Start: 2024-11-30 | End: 2024-11-30 | Stop reason: HOSPADM

## 2024-11-30 RX ORDER — QUETIAPINE FUMARATE 25 MG/1
50 TABLET, FILM COATED ORAL 2 TIMES DAILY
Status: DISCONTINUED | OUTPATIENT
Start: 2024-11-30 | End: 2024-11-30 | Stop reason: HOSPADM

## 2024-11-30 RX ORDER — LISINOPRIL 20 MG/1
40 TABLET ORAL DAILY
Status: DISCONTINUED | OUTPATIENT
Start: 2024-11-30 | End: 2024-11-30 | Stop reason: HOSPADM

## 2024-11-30 RX ORDER — HYDRALAZINE HYDROCHLORIDE 25 MG/1
100 TABLET, FILM COATED ORAL EVERY 8 HOURS
Status: DISCONTINUED | OUTPATIENT
Start: 2024-11-30 | End: 2024-11-30 | Stop reason: HOSPADM

## 2024-11-30 RX ADMIN — HALOPERIDOL 5 MG: 5 TABLET ORAL at 00:30

## 2024-11-30 RX ADMIN — LISINOPRIL 40 MG: 20 TABLET ORAL at 05:54

## 2024-11-30 RX ADMIN — HYDRALAZINE HYDROCHLORIDE 100 MG: 25 TABLET ORAL at 05:54

## 2024-11-30 RX ADMIN — METOPROLOL SUCCINATE 100 MG: 50 TABLET, EXTENDED RELEASE ORAL at 05:54

## 2024-11-30 RX ADMIN — Medication 1000 UNITS: at 05:54

## 2024-11-30 RX ADMIN — ASPIRIN 81 MG: 81 TABLET, CHEWABLE ORAL at 05:54

## 2024-11-30 RX ADMIN — AMLODIPINE BESYLATE 10 MG: 5 TABLET ORAL at 05:54

## 2024-11-30 ASSESSMENT — ENCOUNTER SYMPTOMS
DEPRESSION: 0
PALPITATIONS: 0
MYALGIAS: 0
FEVER: 0
COUGH: 0
NERVOUS/ANXIOUS: 0
HEADACHES: 0
BLURRED VISION: 0
HEARTBURN: 0
FOCAL WEAKNESS: 0
CHILLS: 0
NAUSEA: 0
SHORTNESS OF BREATH: 0
DOUBLE VISION: 0
BRUISES/BLEEDS EASILY: 0
DIZZINESS: 0

## 2024-11-30 ASSESSMENT — LIFESTYLE VARIABLES: SUBSTANCE_ABUSE: 0

## 2024-11-30 NOTE — ED NOTES
Rounded with pt, pt states he is unable to provide urine sample. [facility], pt educated that he is currently at [facility] and that he is medically being cared for. Pt verbalized understanding. Provided with additional water, ERP notified. Safety sitter remains at bedside.

## 2024-11-30 NOTE — ED NOTES
Patient's home medications have been reviewed by the pharmacy team.     Past Medical History:   Diagnosis Date    Arthritis     Gout     Hemorrhoids 11/25/2014    Reportedly Pt has had hemorrhoids for some time. He denies any bloody stools or bleeding hemorrhoids.     Hypertension     Ingrown right big toenail 05/20/2015    Low back pain 05/20/2015    NSTEMI (non-ST elevated myocardial infarction) (HCC) 06/28/2016    Vitamin D deficiency 01/06/2015 12/1/14 lab result show slightly low Vitamin D level= 26. He states he walks outside a lot in the sun until the past month.       Patient's Medications   New Prescriptions    No medications on file   Previous Medications    AMLODIPINE (NORVASC) 10 MG TAB    Take 1 Tablet by mouth every day.    ASPIRIN (ASA) 81 MG CHEW TAB CHEWABLE TABLET    Chew 1 Tablet every day.    ATORVASTATIN (LIPITOR) 80 MG TABLET    Take 1 Tablet by mouth every evening.    HYDRALAZINE (APRESOLINE) 100 MG TABLET    Take 1 Tablet by mouth every 8 hours.    LISINOPRIL (PRINIVIL) 40 MG TABLET    Take 1 Tablet by mouth every day.    METOPROLOL SR (TOPROL XL) 100 MG TABLET SR 24 HR    Take 1 Tablet by mouth every day.    QUETIAPINE (SEROQUEL) 50 MG TABLET    Take 1 Tablet by mouth 2 times a day. TAKE AT 16:00 and 20:00  Indications: Agitation    TRAZODONE (DESYREL) 50 MG TAB    Take 1 Tablet by mouth at bedtime.    VITAMIN D3 (CHOLECALCIFEROL) 1000 UNIT TAB    Take 1 Tablet by mouth every day.   Modified Medications    No medications on file   Discontinued Medications    No medications on file          A:  Medications do not appear to be contributing to current complaints.     P:    No recommendations at this time. Home medications have been reordered as appropriate.    Chris Cunningham, PharmD

## 2024-11-30 NOTE — PROGRESS NOTES
"ED Observation Progress Note    Date of Service: 11/30/24    Interval History and Interventions  Patient with history of large CVA, and dementia.  Patient was in a skilled nursing facility but having behavioral issues.  Patient had medical workup by my partner which was reassuring.  Case management working on getting patient back to his facility or alternative acceptable placement otherwise.  Hospital consult was placed by my preceding partner    Physical Exam  BP (!) 163/79   Pulse 72   Temp 36.7 °C (98 °F) (Temporal)   Resp 16   Ht 1.753 m (5' 9\")   Wt 77 kg (169 lb 12.1 oz)   SpO2 98%   BMI 25.07 kg/m² .    Constitutional: Awake and alert. Nontoxic  HENT:  Grossly normal  Eyes: Grossly normal  Neck: Normal range of motion  Cardiovascular: Normal heart rate   Thorax & Lungs: No respiratory distress  Skin:  No pathologic rash.   Extremities: Well perfused  Psychiatric: Affect normal    Labs  Results for orders placed or performed during the hospital encounter of 11/29/24   CBC WITH DIFFERENTIAL    Collection Time: 11/29/24 11:03 PM   Result Value Ref Range    WBC 8.6 4.8 - 10.8 K/uL    RBC 3.74 (L) 4.70 - 6.10 M/uL    Hemoglobin 11.8 (L) 14.0 - 18.0 g/dL    Hematocrit 35.0 (L) 42.0 - 52.0 %    MCV 93.6 81.4 - 97.8 fL    MCH 31.6 27.0 - 33.0 pg    MCHC 33.7 32.3 - 36.5 g/dL    RDW 48.6 35.9 - 50.0 fL    Platelet Count 231 164 - 446 K/uL    MPV 11.2 9.0 - 12.9 fL    Neutrophils-Polys 64.20 44.00 - 72.00 %    Lymphocytes 20.80 (L) 22.00 - 41.00 %    Monocytes 9.30 0.00 - 13.40 %    Eosinophils 4.70 0.00 - 6.90 %    Basophils 0.50 0.00 - 1.80 %    Immature Granulocytes 0.50 0.00 - 0.90 %    Nucleated RBC 0.00 0.00 - 0.20 /100 WBC    Neutrophils (Absolute) 5.52 1.82 - 7.42 K/uL    Lymphs (Absolute) 1.79 1.00 - 4.80 K/uL    Monos (Absolute) 0.80 0.00 - 0.85 K/uL    Eos (Absolute) 0.40 0.00 - 0.51 K/uL    Baso (Absolute) 0.04 0.00 - 0.12 K/uL    Immature Granulocytes (abs) 0.04 0.00 - 0.11 K/uL    NRBC (Absolute) " 0.00 K/uL   Comp Metabolic Panel    Collection Time: 11/29/24 11:03 PM   Result Value Ref Range    Sodium 142 135 - 145 mmol/L    Potassium 3.7 3.6 - 5.5 mmol/L    Chloride 108 96 - 112 mmol/L    Co2 25 20 - 33 mmol/L    Anion Gap 9.0 7.0 - 16.0    Glucose 138 (H) 65 - 99 mg/dL    Bun 16 8 - 22 mg/dL    Creatinine 0.90 0.50 - 1.40 mg/dL    Calcium 9.2 8.5 - 10.5 mg/dL    Correct Calcium 9.4 8.5 - 10.5 mg/dL    AST(SGOT) 17 12 - 45 U/L    ALT(SGPT) 12 2 - 50 U/L    Alkaline Phosphatase 104 (H) 30 - 99 U/L    Total Bilirubin 0.2 0.1 - 1.5 mg/dL    Albumin 3.7 3.2 - 4.9 g/dL    Total Protein 6.5 6.0 - 8.2 g/dL    Globulin 2.8 1.9 - 3.5 g/dL    A-G Ratio 1.3 g/dL   HOLD BLOOD BANK SPECIMEN (NOT TESTED)    Collection Time: 11/29/24 11:03 PM   Result Value Ref Range    Holding Tube - Bb DONE    ESTIMATED GFR    Collection Time: 11/29/24 11:03 PM   Result Value Ref Range    GFR (CKD-EPI) 90 >60 mL/min/1.73 m 2   URINALYSIS    Collection Time: 11/30/24  1:16 AM    Specimen: Urine   Result Value Ref Range    Color Yellow     Character Clear     Specific Gravity 1.017 <1.035    Ph 6.0 5.0 - 8.0    Glucose Negative Negative mg/dL    Ketones Negative Negative mg/dL    Protein Negative Negative mg/dL    Bilirubin Negative Negative    Urobilinogen, Urine 1.0 <=1.0 EU/dL    Nitrite Negative Negative    Leukocyte Esterase Negative Negative    Occult Blood Negative Negative    Micro Urine Req see below        Radiology  No orders to display       Problem List    1. Agitation

## 2024-11-30 NOTE — ED NOTES
Report given to Linda RN; with sitter at bedside; 1;1 for safety. Awake with even and unlabored respirations noted; on bed alrm; on monitor

## 2024-11-30 NOTE — ED NOTES
[facility] contacted, spoke with RN, RN states that due to pt's behavior they are unable to take back tonight. RN states they are

## 2024-11-30 NOTE — ED NOTES
Rounded on pt. Pt sleeping in bed. Bed locked and in lowest position. Call light within reach. Respirations equal and unlabored on room air.Bed alarm in place. No further needs at this time. Safety sitter remains at bedside.

## 2024-11-30 NOTE — ED NOTES
Rounded on pt. Pt resting comfortably in bed. Bed locked and in lowest position. Call light within reach. Respirations equal and unlabored on room air. Bed alarm and safety sitter in place. No further needs at this time.

## 2024-11-30 NOTE — ED NOTES
Attempts x 2 made to call report to [facility].  Message left with Gary, .  Awaiting call back.

## 2024-11-30 NOTE — ED NOTES
Break RN: patient able to provide urine sample after being asked. Urine collected and sent to lab.

## 2024-11-30 NOTE — ED NOTES
Report given to receiving RN Oksana. Respirations equal and unlabored on room air. Bed alarm in place, call light within reach. Safety sitter remains in line of sight for pt safety.

## 2024-11-30 NOTE — DISCHARGE PLANNING
MSW spoke to Shae at [facility]. They will accept pt back. MSW updated ERP to write something for pt's agitation. ERP wrote to increase pt's Seroquel. MSW arranged [facility] transport for 1115. MSW updated pt's sister Ivonne that pt is returning to [facility]. COBRA and packet given to bedside RN.
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
## 2024-11-30 NOTE — ED NOTES
Patient trying to get out from his bed multiple times; informed CN about patient needing a human sitter;

## 2024-11-30 NOTE — ED NOTES
Pt incontinent to urine. Pt changed into clean scrubs and linens changed. Bed alarm in place. Provided with additional warm blankets. Report given to safety sitter.

## 2024-11-30 NOTE — ED NOTES
Rounded on pt. Pt resting comfortably in bed. Bed locked and in lowest position. Call light within reach. Respirations equal and unlabored on room air. Bed alarm in place, safety sitter remains in line of sight for pt safety. No further needs at this time.

## 2024-11-30 NOTE — ED PROVIDER NOTES
ED Provider Note    CHIEF COMPLAINT  Chief Complaint   Patient presents with    Agitation       EXTERNAL RECORDS REVIEWED  Inpatient Notes discharge summary on 10/7/2024 for a patient with past medical history of coronary artery disease, status post CABG, brain send infarct in the left side of the hao with evidence of old multifocal embolic appearing infarcts in multiple vascular territories, patient now has baseline disorientation, pleasantly confused    HPI/ROS  LIMITATION TO HISTORY   Select: Altered mental status / Confusion  OUTSIDE HISTORIAN(S):  EMS transfer the patient to this facility after agitation    Joe Templeton is a 74 y.o. male who presents with agitation per EMS.  Patient is in a skilled nursing facility following a severe stroke.  Patient was typically pleasantly confused but has recently become more agitated.  Patient takes quetiapine for his behavioral issues.  Patient denies pain at this time.  No reported medical issues except for the agitation per EMS.    PAST MEDICAL HISTORY   has a past medical history of Arthritis, Gout, Hemorrhoids (2014), Hypertension, Ingrown right big toenail (2015), Low back pain (2015), NSTEMI (non-ST elevated myocardial infarction) (HCC) (2016), and Vitamin D deficiency (2015).    SURGICAL HISTORY   has a past surgical history that includes multiple coronary artery bypass endo vein harvest (N/A, 2016) and irrigation & debridement general (Left, 2024).    FAMILY HISTORY  Family History   Problem Relation Age of Onset    Cancer Mother     Heart Disease Father     Hyperlipidemia Father     Hyperlipidemia Brother     Lung Disease Brother        SOCIAL HISTORY  Social History     Tobacco Use    Smoking status: Former     Current packs/day: 0.00     Average packs/day: 0.3 packs/day for 0.5 years (0.1 ttl pk-yrs)     Types: Cigarettes     Start date: 2015     Quit date: 2016     Years since quittin.4    Smokeless  "tobacco: Never   Substance and Sexual Activity    Alcohol use: No    Drug use: No     Comment: Hx of drug abuse-pot,meth, cocaine (None for 15 yrs) never injected drugs    Sexual activity: Not Currently       CURRENT MEDICATIONS  Home Medications       Reviewed by Ana Buenrostro R.N. (Registered Nurse) on 11/29/24 at 2158  Med List Status: Partial     Medication Last Dose Status   amLODIPine (NORVASC) 10 MG Tab  Active   aspirin (ASA) 81 MG Chew Tab chewable tablet  Active   atorvastatin (LIPITOR) 80 MG tablet  Active   hydrALAZINE (APRESOLINE) 100 MG tablet  Active   lisinopril (PRINIVIL) 40 MG tablet  Active   metoprolol SR (TOPROL XL) 100 MG TABLET SR 24 HR  Active   QUEtiapine (SEROQUEL) 50 MG tablet  Active   traZODone (DESYREL) 50 MG Tab  Active   vitamin D3 (CHOLECALCIFEROL) 1000 UNIT Tab  Active                    ALLERGIES  No Known Allergies    PHYSICAL EXAM  VITAL SIGNS: /56   Pulse 67   Temp 36.7 °C (98 °F) (Temporal)   Resp 18   Ht 1.753 m (5' 9\")   Wt 77 kg (169 lb 12.1 oz)   SpO2 92%   BMI 25.07 kg/m²    Vitals and nursing note reviewed.   Constitutional:       Comments: Patient is lying in bed supine, pleasant, conversant, speaking in complete sentences, oriented to person only, does not know why he is here  HENT:      Head: Normocephalic and atraumatic.   Eyes:      Extraocular Movements: Extraocular movements intact.      Conjunctiva/sclera: Conjunctivae normal.      Pupils: Pupils are equal, round, and reactive to light.   Cardiovascular:      Pulses: Normal pulses.      Comments: HR 67  Pulmonary:      Effort: Pulmonary effort is normal. No respiratory distress.   Musculoskeletal:         General: No swelling. Normal range of motion.      Cervical back: Normal range of motion. No rigidity.   Skin:     General: Skin is warm and dry.      Capillary Refill: Capillary refill takes less than 2 seconds.   Neurological:      Mental Status: Alert.  Oriented to person only      COURSE & " MEDICAL DECISION MAKING    ASSESSMENT, COURSE AND PLAN  Care Narrative: CBC to evaluate for acute anemia and leukocytosis.  CMP to evaluate for acute electrolyte abnormality, acute kidney injury, acute liver failure or dysfunction.  Analysis to evaluate for UTI.  No evidence of head trauma, repeat CT imaging not indicated at this time.  Unclear whether patient's agitation is secondary to progression of his vascular dementia, new mental status baseline following his CVA or if it is secondary to an underlying organic disease process.  Disposition pending workup.    Electronically signed by: Asaf Hebert M.D., 11/29/2024 10:37 PM    CMP demonstrates no evidence of acute kidney injury, acute electrolyte abnormality, acute liver failure, CBC demonstrates no evidence of severe acute anemia or leukocytosis.  Urinalysis without evidence of UTI.  Unclear etiology of agitation, could be progression of patient's cognitive decline from his severe stroke.  Patient given oral Haldol and is not interactive but less agitated.  Social work consulted and internal medicine consulted for ED observation patient pending transfer to skilled nursing facility.    This dictation has been created using voice recognition software. I am continuously working with the software to minimize the number of voice recognition errors and I have made every attempt to manually correct the errors within my dictation. However errors  related to this voice recognition software may still exist and should be interpreted within the appropriate context.     Electronically signed by: Asaf Hebert M.D., 11/30/2024 1:50 AM        ED OBS: Yes; I am placing the patient in to an observation status due to a diagnostic uncertainty as well as therapeutic intensity. Patient placed in observation status at 11/29/2024 10:37 PM    Observation plan is as follows: Pending skilled nursing facility transfer          FINAL DIAGNOSIS  1. Agitation          Electronically signed by: Asaf Hebert M.D., 11/29/2024 10:34 PM

## 2024-11-30 NOTE — ED NOTES
Report from Ana SWEENEY. Respirations equal and unlabored on room air. Awaiting on UA. Pt on bed alarm, safety sitter in place.

## 2024-11-30 NOTE — DISCHARGE INSTRUCTIONS
Continue the Seroquel at 50 mg twice daily.  You can give an extra dose as needed per day to help with agitation   negative...

## 2024-11-30 NOTE — ED NOTES
Rounded on pt. Pt sleeping in bed. Bed locked and in lowest position. Call light within reach. Respirations equal and unlabored on room air. Bed alarm and safety sitter in place for pt safety. Urinal at bedside. No further needs at this time.

## 2024-11-30 NOTE — ED NOTES
Pt medicated per MAR with morning medications. VSS on room air. Bed alarm in place, safety sitter remains in line of sight for pt safety. Call light within reach.

## 2024-11-30 NOTE — ED NOTES
Pt medicated per MAR for agitation. Pt educated that UA is needed. Pt attempted to use urinal. Pt unable to provide UA at this time. Pt provded with water and juice. Bed alarm remains in place.

## 2024-11-30 NOTE — ED NOTES
Report to Shae at [facility] and [facility] medics, to [facility] via ambulance.

## 2024-11-30 NOTE — ED NOTES
Med rec is complete per MAR from [facility]  Outpatient antibiotics within the last 30 days: NONE  Anticoagulants: NONE  [facility] - ([phone])    Rhianna Orosco, PhT

## 2024-11-30 NOTE — ED TRIAGE NOTES
"Chief Complaint   Patient presents with    Agitation     BIB EMS; patient from [facility];per EMS; staff noticed increase agitation since yesterday and gotten worse today; baseline dementia; AOX1,     /56   Pulse 67   Temp 36.7 °C (98 °F) (Temporal)   Resp 18   Ht 1.753 m (5' 9\")   Wt 77 kg (169 lb 12.1 oz)   SpO2 92%   BMI 25.07 kg/m²     "

## 2024-11-30 NOTE — DISCHARGE SUMMARY
"  ED Observation Discharge Summary    Patient:Joe Templeton  Patient : 1950  Patient MRN: [id number]  Patient PCP: Pcp Pt States None    Admit Date: 2024  Discharge Date and Time: 24 9:56 AM  Discharge Diagnosis:   1. Agitation        2. Cerebrovascular accident (CVA), unspecified mechanism (HCC)  QUEtiapine (SEROQUEL) 50 MG tablet    DISCONTINUED: QUEtiapine (SEROQUEL) 50 MG tablet          Discharge Attending: Jeremy Cade M.D.  Discharge Service: ED Observation    ED Course  Joe is a 74 y.o. male who was evaluated at [facility] for agitation.  Patient with history of CVA and dementia.  Patient was having behavioral issues at his facility.  He was medically cleared by my preceding partners.  Patient behavior was managed well here with as needed Haldol, he received 1 dose and did not have any further behavioral disturbances.  Patient will continue the Seroquel.  I discussed the case with clinical pharmacy who recommended having a as needed dose as needed of Seroquel as well for behavioral issues at his facility.  Instructions written to facility for 1 extra dose as needed per day for agitation.  Patient has remained very cooperative and pleasant throughout my shift    Discharge Exam:  BP (!) 155/72   Pulse 76   Temp 36.7 °C (98 °F) (Temporal)   Resp 18   Ht 1.753 m (5' 9\")   Wt 77 kg (169 lb 12.1 oz)   SpO2 95%   BMI 25.07 kg/m² .    Constitutional: Awake and alert. Nontoxic  HENT:  Grossly normal  Eyes: Grossly normal  Neck: Normal range of motion  Cardiovascular: Normal heart rate   Thorax & Lungs: No respiratory distress  Abdomen: Nontender  Skin:  No pathologic rash.   Extremities: Well perfused  Psychiatric: Affect normal    Labs  Results for orders placed or performed during the hospital encounter of 24   CBC WITH DIFFERENTIAL    Collection Time: 24 11:03 PM   Result Value Ref Range    WBC 8.6 4.8 - 10.8 K/uL    RBC 3.74 (L) 4.70 - 6.10 M/uL    Hemoglobin 11.8 (L) 14.0 " - 18.0 g/dL    Hematocrit 35.0 (L) 42.0 - 52.0 %    MCV 93.6 81.4 - 97.8 fL    MCH 31.6 27.0 - 33.0 pg    MCHC 33.7 32.3 - 36.5 g/dL    RDW 48.6 35.9 - 50.0 fL    Platelet Count 231 164 - 446 K/uL    MPV 11.2 9.0 - 12.9 fL    Neutrophils-Polys 64.20 44.00 - 72.00 %    Lymphocytes 20.80 (L) 22.00 - 41.00 %    Monocytes 9.30 0.00 - 13.40 %    Eosinophils 4.70 0.00 - 6.90 %    Basophils 0.50 0.00 - 1.80 %    Immature Granulocytes 0.50 0.00 - 0.90 %    Nucleated RBC 0.00 0.00 - 0.20 /100 WBC    Neutrophils (Absolute) 5.52 1.82 - 7.42 K/uL    Lymphs (Absolute) 1.79 1.00 - 4.80 K/uL    Monos (Absolute) 0.80 0.00 - 0.85 K/uL    Eos (Absolute) 0.40 0.00 - 0.51 K/uL    Baso (Absolute) 0.04 0.00 - 0.12 K/uL    Immature Granulocytes (abs) 0.04 0.00 - 0.11 K/uL    NRBC (Absolute) 0.00 K/uL   Comp Metabolic Panel    Collection Time: 11/29/24 11:03 PM   Result Value Ref Range    Sodium 142 135 - 145 mmol/L    Potassium 3.7 3.6 - 5.5 mmol/L    Chloride 108 96 - 112 mmol/L    Co2 25 20 - 33 mmol/L    Anion Gap 9.0 7.0 - 16.0    Glucose 138 (H) 65 - 99 mg/dL    Bun 16 8 - 22 mg/dL    Creatinine 0.90 0.50 - 1.40 mg/dL    Calcium 9.2 8.5 - 10.5 mg/dL    Correct Calcium 9.4 8.5 - 10.5 mg/dL    AST(SGOT) 17 12 - 45 U/L    ALT(SGPT) 12 2 - 50 U/L    Alkaline Phosphatase 104 (H) 30 - 99 U/L    Total Bilirubin 0.2 0.1 - 1.5 mg/dL    Albumin 3.7 3.2 - 4.9 g/dL    Total Protein 6.5 6.0 - 8.2 g/dL    Globulin 2.8 1.9 - 3.5 g/dL    A-G Ratio 1.3 g/dL   HOLD BLOOD BANK SPECIMEN (NOT TESTED)    Collection Time: 11/29/24 11:03 PM   Result Value Ref Range    Holding Tube - Bb DONE    ESTIMATED GFR    Collection Time: 11/29/24 11:03 PM   Result Value Ref Range    GFR (CKD-EPI) 90 >60 mL/min/1.73 m 2   URINALYSIS    Collection Time: 11/30/24  1:16 AM    Specimen: Urine   Result Value Ref Range    Color Yellow     Character Clear     Specific Gravity 1.017 <1.035    Ph 6.0 5.0 - 8.0    Glucose Negative Negative mg/dL    Ketones Negative Negative  mg/dL    Protein Negative Negative mg/dL    Bilirubin Negative Negative    Urobilinogen, Urine 1.0 <=1.0 EU/dL    Nitrite Negative Negative    Leukocyte Esterase Negative Negative    Occult Blood Negative Negative    Micro Urine Req see below        Radiology  No orders to display       Medications:   New Prescriptions    No medications on file       My final assessment includes   1. Agitation        2. Cerebrovascular accident (CVA), unspecified mechanism (HCC)  QUEtiapine (SEROQUEL) 50 MG tablet    DISCONTINUED: QUEtiapine (SEROQUEL) 50 MG tablet          Upon Reevaluation, the patient's condition has: Improved; and will be discharged.    Patient discharged from ED Observation status at 10:09 AM (Time) 11/30/24 (Date).     Total time spent on this ED Observation discharge encounter is < 30 Minutes    Electronically signed by: Jeremy Cade M.D., 11/30/2024 9:56 AM

## 2024-11-30 NOTE — ASSESSMENT & PLAN NOTE
Reported to have been agitated at SNF  -Currently in [facility] ER no evidence of agitation or aggression    He does have underlying dementia  Frequent reorientation  Continue home Seroquel 50 mg twice daily

## 2024-12-17 ENCOUNTER — HOSPITAL ENCOUNTER (EMERGENCY)
Facility: MEDICAL CENTER | Age: 74
End: 2024-12-17
Attending: STUDENT IN AN ORGANIZED HEALTH CARE EDUCATION/TRAINING PROGRAM
Payer: MEDICARE

## 2024-12-17 VITALS
HEIGHT: 69 IN | OXYGEN SATURATION: 93 % | WEIGHT: 169.75 LBS | HEART RATE: 73 BPM | DIASTOLIC BLOOD PRESSURE: 70 MMHG | TEMPERATURE: 98.4 F | SYSTOLIC BLOOD PRESSURE: 139 MMHG | BODY MASS INDEX: 25.14 KG/M2 | RESPIRATION RATE: 19 BRPM

## 2024-12-17 DIAGNOSIS — F03.911 AGITATION DUE TO DEMENTIA (HCC): ICD-10-CM

## 2024-12-17 LAB
ALBUMIN SERPL BCP-MCNC: 3.7 G/DL (ref 3.2–4.9)
ALBUMIN/GLOB SERPL: 1.4 G/DL
ALP SERPL-CCNC: 83 U/L (ref 30–99)
ALT SERPL-CCNC: 15 U/L (ref 2–50)
ANION GAP SERPL CALC-SCNC: 13 MMOL/L (ref 7–16)
APPEARANCE UR: CLEAR
AST SERPL-CCNC: 15 U/L (ref 12–45)
BASOPHILS # BLD AUTO: 0.3 % (ref 0–1.8)
BASOPHILS # BLD: 0.02 K/UL (ref 0–0.12)
BILIRUB SERPL-MCNC: 0.2 MG/DL (ref 0.1–1.5)
BILIRUB UR QL STRIP.AUTO: NEGATIVE
BUN SERPL-MCNC: 18 MG/DL (ref 8–22)
CALCIUM ALBUM COR SERPL-MCNC: 8.9 MG/DL (ref 8.5–10.5)
CALCIUM SERPL-MCNC: 8.7 MG/DL (ref 8.5–10.5)
CHLORIDE SERPL-SCNC: 106 MMOL/L (ref 96–112)
CO2 SERPL-SCNC: 22 MMOL/L (ref 20–33)
COLOR UR: YELLOW
CREAT SERPL-MCNC: 0.86 MG/DL (ref 0.5–1.4)
EOSINOPHIL # BLD AUTO: 0.36 K/UL (ref 0–0.51)
EOSINOPHIL NFR BLD: 5.6 % (ref 0–6.9)
ERYTHROCYTE [DISTWIDTH] IN BLOOD BY AUTOMATED COUNT: 45.2 FL (ref 35.9–50)
GFR SERPLBLD CREATININE-BSD FMLA CKD-EPI: 91 ML/MIN/1.73 M 2
GLOBULIN SER CALC-MCNC: 2.6 G/DL (ref 1.9–3.5)
GLUCOSE SERPL-MCNC: 116 MG/DL (ref 65–99)
GLUCOSE UR STRIP.AUTO-MCNC: NEGATIVE MG/DL
HCT VFR BLD AUTO: 32.3 % (ref 42–52)
HGB BLD-MCNC: 10.8 G/DL (ref 14–18)
IMM GRANULOCYTES # BLD AUTO: 0.02 K/UL (ref 0–0.11)
IMM GRANULOCYTES NFR BLD AUTO: 0.3 % (ref 0–0.9)
KETONES UR STRIP.AUTO-MCNC: NEGATIVE MG/DL
LEUKOCYTE ESTERASE UR QL STRIP.AUTO: NEGATIVE
LYMPHOCYTES # BLD AUTO: 1.58 K/UL (ref 1–4.8)
LYMPHOCYTES NFR BLD: 24.4 % (ref 22–41)
MCH RBC QN AUTO: 30.2 PG (ref 27–33)
MCHC RBC AUTO-ENTMCNC: 33.4 G/DL (ref 32.3–36.5)
MCV RBC AUTO: 90.2 FL (ref 81.4–97.8)
MICRO URNS: NORMAL
MONOCYTES # BLD AUTO: 0.7 K/UL (ref 0–0.85)
MONOCYTES NFR BLD AUTO: 10.8 % (ref 0–13.4)
NEUTROPHILS # BLD AUTO: 3.79 K/UL (ref 1.82–7.42)
NEUTROPHILS NFR BLD: 58.6 % (ref 44–72)
NITRITE UR QL STRIP.AUTO: NEGATIVE
NRBC # BLD AUTO: 0 K/UL
NRBC BLD-RTO: 0 /100 WBC (ref 0–0.2)
PH UR STRIP.AUTO: 6.5 [PH] (ref 5–8)
PLATELET # BLD AUTO: 229 K/UL (ref 164–446)
PMV BLD AUTO: 10.5 FL (ref 9–12.9)
POTASSIUM SERPL-SCNC: 4 MMOL/L (ref 3.6–5.5)
PROT SERPL-MCNC: 6.3 G/DL (ref 6–8.2)
PROT UR QL STRIP: NEGATIVE MG/DL
RBC # BLD AUTO: 3.58 M/UL (ref 4.7–6.1)
RBC UR QL AUTO: NEGATIVE
SODIUM SERPL-SCNC: 141 MMOL/L (ref 135–145)
SP GR UR STRIP.AUTO: 1.01
UROBILINOGEN UR STRIP.AUTO-MCNC: 1 EU/DL
WBC # BLD AUTO: 6.5 K/UL (ref 4.8–10.8)

## 2024-12-17 PROCEDURE — 36415 COLL VENOUS BLD VENIPUNCTURE: CPT

## 2024-12-17 PROCEDURE — 80053 COMPREHEN METABOLIC PANEL: CPT

## 2024-12-17 PROCEDURE — 99284 EMERGENCY DEPT VISIT MOD MDM: CPT

## 2024-12-17 PROCEDURE — 81003 URINALYSIS AUTO W/O SCOPE: CPT

## 2024-12-17 PROCEDURE — 85025 COMPLETE CBC W/AUTO DIFF WBC: CPT

## 2024-12-17 ASSESSMENT — FIBROSIS 4 INDEX: FIB4 SCORE: 1.55

## 2024-12-18 NOTE — ED NOTES
Assumed care of patient, patient bedside report received from PATEL Méndez . Pt AAO X 1, respirations even and unlabored, on room air . Introduced self as pt RN, POC discussed, call light in reach, Fall risk interventions in place. Patient is in direct view of 1:1 sitter for fall risk, will continue to observe.

## 2024-12-18 NOTE — ED TRIAGE NOTES
Chief Complaint   Patient presents with    ALOC     BIB EMS from [facility], pt has dementia and is AO1 baseline, currently still AO1 but per staff at [facility] pt was very agitated and aggressive. Pt calm and cooperative on arrival.

## 2024-12-18 NOTE — ED NOTES
Patient report to [facility], Pt discharged back to facility. Pt in possession of belongings. [facility] received copy of discharge packet.

## 2024-12-18 NOTE — DISCHARGE PLANNING
Medical Social Work    MSW received a voalte message from bedside RN that pt is ready to return to [facility].  RN has already called report and [facility] is accepting pt back.  MSW contacted pt's sister-in-law Ivonne ([phone]) who was advised of ER visit and pt returning to [facility]; she was appreciative of the update as they were not aware of [facility] sending pt tonight.  MSW faxed PCS and facesheet to [facility] and made follow up phone call to [facility] to arrange transport for 2115.  COBRA and transfer packet complete and provided to RN.  RN made aware of transport time.

## 2024-12-18 NOTE — ED NOTES
Patient sundowning, confused, ripped of his IV, patient redirected to room, and reoriented to environment.

## 2024-12-21 ENCOUNTER — HOSPITAL ENCOUNTER (EMERGENCY)
Facility: MEDICAL CENTER | Age: 74
End: 2024-12-21
Attending: EMERGENCY MEDICINE
Payer: MEDICARE

## 2024-12-21 VITALS
HEIGHT: 69 IN | BODY MASS INDEX: 25.03 KG/M2 | SYSTOLIC BLOOD PRESSURE: 161 MMHG | WEIGHT: 169 LBS | DIASTOLIC BLOOD PRESSURE: 83 MMHG | HEART RATE: 70 BPM | OXYGEN SATURATION: 93 % | TEMPERATURE: 97 F | RESPIRATION RATE: 20 BRPM

## 2024-12-21 DIAGNOSIS — F03.918 DEMENTIA WITH BEHAVIORAL DISTURBANCE (HCC): ICD-10-CM

## 2024-12-21 LAB
ALBUMIN SERPL BCP-MCNC: 3.5 G/DL (ref 3.2–4.9)
ALBUMIN/GLOB SERPL: 1.3 G/DL
ALP SERPL-CCNC: 98 U/L (ref 30–99)
ALT SERPL-CCNC: 15 U/L (ref 2–50)
ANION GAP SERPL CALC-SCNC: 10 MMOL/L (ref 7–16)
APPEARANCE UR: CLEAR
AST SERPL-CCNC: 19 U/L (ref 12–45)
BASOPHILS # BLD AUTO: 0.4 % (ref 0–1.8)
BASOPHILS # BLD: 0.03 K/UL (ref 0–0.12)
BILIRUB SERPL-MCNC: 0.3 MG/DL (ref 0.1–1.5)
BILIRUB UR QL STRIP.AUTO: NEGATIVE
BUN SERPL-MCNC: 21 MG/DL (ref 8–22)
CALCIUM ALBUM COR SERPL-MCNC: 9.4 MG/DL (ref 8.5–10.5)
CALCIUM SERPL-MCNC: 9 MG/DL (ref 8.5–10.5)
CHLORIDE SERPL-SCNC: 108 MMOL/L (ref 96–112)
CO2 SERPL-SCNC: 25 MMOL/L (ref 20–33)
COLOR UR: YELLOW
CREAT SERPL-MCNC: 1 MG/DL (ref 0.5–1.4)
EOSINOPHIL # BLD AUTO: 0.46 K/UL (ref 0–0.51)
EOSINOPHIL NFR BLD: 5.9 % (ref 0–6.9)
ERYTHROCYTE [DISTWIDTH] IN BLOOD BY AUTOMATED COUNT: 46.5 FL (ref 35.9–50)
GFR SERPLBLD CREATININE-BSD FMLA CKD-EPI: 79 ML/MIN/1.73 M 2
GLOBULIN SER CALC-MCNC: 2.8 G/DL (ref 1.9–3.5)
GLUCOSE SERPL-MCNC: 105 MG/DL (ref 65–99)
GLUCOSE UR STRIP.AUTO-MCNC: NEGATIVE MG/DL
HCT VFR BLD AUTO: 32.6 % (ref 42–52)
HGB BLD-MCNC: 11.3 G/DL (ref 14–18)
IMM GRANULOCYTES # BLD AUTO: 0.04 K/UL (ref 0–0.11)
IMM GRANULOCYTES NFR BLD AUTO: 0.5 % (ref 0–0.9)
KETONES UR STRIP.AUTO-MCNC: NEGATIVE MG/DL
LEUKOCYTE ESTERASE UR QL STRIP.AUTO: NEGATIVE
LYMPHOCYTES # BLD AUTO: 1.79 K/UL (ref 1–4.8)
LYMPHOCYTES NFR BLD: 23 % (ref 22–41)
MCH RBC QN AUTO: 31.9 PG (ref 27–33)
MCHC RBC AUTO-ENTMCNC: 34.7 G/DL (ref 32.3–36.5)
MCV RBC AUTO: 92.1 FL (ref 81.4–97.8)
MICRO URNS: NORMAL
MONOCYTES # BLD AUTO: 0.79 K/UL (ref 0–0.85)
MONOCYTES NFR BLD AUTO: 10.2 % (ref 0–13.4)
NEUTROPHILS # BLD AUTO: 4.66 K/UL (ref 1.82–7.42)
NEUTROPHILS NFR BLD: 60 % (ref 44–72)
NITRITE UR QL STRIP.AUTO: NEGATIVE
NRBC # BLD AUTO: 0 K/UL
NRBC BLD-RTO: 0 /100 WBC (ref 0–0.2)
PH UR STRIP.AUTO: 6.5 [PH] (ref 5–8)
PLATELET # BLD AUTO: 263 K/UL (ref 164–446)
PMV BLD AUTO: 10.3 FL (ref 9–12.9)
POTASSIUM SERPL-SCNC: 4.1 MMOL/L (ref 3.6–5.5)
PROT SERPL-MCNC: 6.3 G/DL (ref 6–8.2)
PROT UR QL STRIP: NEGATIVE MG/DL
RBC # BLD AUTO: 3.54 M/UL (ref 4.7–6.1)
RBC UR QL AUTO: NEGATIVE
SODIUM SERPL-SCNC: 143 MMOL/L (ref 135–145)
SP GR UR STRIP.AUTO: 1.02
UROBILINOGEN UR STRIP.AUTO-MCNC: 1 EU/DL
WBC # BLD AUTO: 7.8 K/UL (ref 4.8–10.8)

## 2024-12-21 PROCEDURE — 36415 COLL VENOUS BLD VENIPUNCTURE: CPT

## 2024-12-21 PROCEDURE — 99285 EMERGENCY DEPT VISIT HI MDM: CPT

## 2024-12-21 PROCEDURE — 80053 COMPREHEN METABOLIC PANEL: CPT

## 2024-12-21 PROCEDURE — 85025 COMPLETE CBC W/AUTO DIFF WBC: CPT

## 2024-12-21 PROCEDURE — 81003 URINALYSIS AUTO W/O SCOPE: CPT

## 2024-12-21 ASSESSMENT — FIBROSIS 4 INDEX: FIB4 SCORE: 1.23

## 2024-12-21 ASSESSMENT — LIFESTYLE VARIABLES
HAVE YOU EVER FELT YOU SHOULD CUT DOWN ON YOUR DRINKING: NO
EVER FELT BAD OR GUILTY ABOUT YOUR DRINKING: NO
TOTAL SCORE: 0
EVER HAD A DRINK FIRST THING IN THE MORNING TO STEADY YOUR NERVES TO GET RID OF A HANGOVER: NO
CONSUMPTION TOTAL: INCOMPLETE
HAVE PEOPLE ANNOYED YOU BY CRITICIZING YOUR DRINKING: NO
TOTAL SCORE: 0
DO YOU DRINK ALCOHOL: NO
TOTAL SCORE: 0
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
## 2024-12-22 NOTE — DISCHARGE PLANNING
VELVET was asked by ERP to call [facility] and see if pt is welcome back to facility. Per Marcie at [facility] pt is able to come back. VELVET let [facility] know pt would bot be able to receive psych eval until after the weekend and facility agreed to take pt back. VELVET updated ERP and bedside RN. Pt currently has lab work pending. Bedside RN will reach out to case management when pt is ready for transport.

## 2024-12-22 NOTE — ED PROVIDER NOTES
"ER Provider Note    Scribed for Sam Murray M.d. by Chloé Nunez. 12/21/2024  5:32 PM    Primary Care Provider: Pcp Pt States None    CHIEF COMPLAINT   Chief Complaint   Patient presents with    Other     Pt BIBA from [facility]. Per EMS the MD at facility wanted a psych eval on pt. Pt is A&Ox1 at baseline, denies SI/HI.   .      EXTERNAL RECORDS REVIEWED  Outpatient Notes The patient was seen 11/29 for agitation and had a negative workup. He was also seen on the 17 th for the same.    HPI/ROS  LIMITATION TO HISTORY   Select: Altered mental status / Confusion  OUTSIDE HISTORIAN(S):  None    Joe Templeton is a 74 y.o. male who presents to the ED via EMS from [place] for a psych evaluation. Per triage note, the patient is A&Ox1 at baseline and denies ay SI or HI. His blood glycose level was 158. At beside, the patient reports that him and someone else at the facility were agitated with each other, but denies any altercation occurring. He notes that he has pain in his \"right left leg\" and notes that he feels nauseous. There were no alleviating or exacerbating factors reported. RN reports that the patient has a history of mood disorder. She also notes that the patient was reported to have behavioral issues where his mood will change quickly. He was also noted to be aggressive. The patient has no known allergies.     PAST MEDICAL HISTORY  Past Medical History:   Diagnosis Date    Arthritis     Gout     Hemorrhoids 11/25/2014    Reportedly Pt has had hemorrhoids for some time. He denies any bloody stools or bleeding hemorrhoids.     Hypertension     Ingrown right big toenail 05/20/2015    Low back pain 05/20/2015    NSTEMI (non-ST elevated myocardial infarction) (HCC) 06/28/2016    Vitamin D deficiency 01/06/2015 12/1/14 lab result show slightly low Vitamin D level= 26. He states he walks outside a lot in the sun until the past month.     SURGICAL HISTORY  Past Surgical History:   Procedure " "Laterality Date    IRRIGATION & DEBRIDEMENT GENERAL Left 9/14/2024    Procedure: IRRIGATION AND DEBRIDEMENT, ANKLE;  Surgeon: Alex Gautam M.D.;  Location: SURGERY [facility];  Service: Orthopedics    MULTIPLE CORONARY ARTERY BYPASS ENDO VEIN HARVEST N/A 6/28/2016    Procedure: MULTIPLE CORONARY ARTERY BYPASS ENDO VEIN HARVEST- With DELMI ;  Surgeon: Deejay Duong M.D.;  Location: SURGERY [facility];  Service:      FAMILY HISTORY  Family History   Problem Relation Age of Onset    Cancer Mother     Heart Disease Father     Hyperlipidemia Father     Hyperlipidemia Brother     Lung Disease Brother      SOCIAL HISTORY   reports that he quit smoking about 8 years ago. His smoking use included cigarettes. He started smoking about 8 years ago. He has a 0.1 pack-year smoking history. He has never used smokeless tobacco. He reports that he does not drink alcohol and does not use drugs.    CURRENT MEDICATIONS  Previous Medications    AMLODIPINE (NORVASC) 10 MG TAB    Take 1 Tablet by mouth every day.    ASPIRIN (ASA) 81 MG CHEW TAB CHEWABLE TABLET    Chew 1 Tablet every day.    ATORVASTATIN (LIPITOR) 80 MG TABLET    Take 1 Tablet by mouth every evening.    HYDRALAZINE (APRESOLINE) 100 MG TABLET    Take 1 Tablet by mouth every 8 hours.    HYDROXYZINE HCL (ATARAX) 25 MG TAB    Take 25 mg by mouth 3 times a day as needed for Anxiety.    LISINOPRIL (PRINIVIL) 40 MG TABLET    Take 1 Tablet by mouth every day.    METOPROLOL SR (TOPROL XL) 100 MG TABLET SR 24 HR    Take 1 Tablet by mouth every day.    QUETIAPINE (SEROQUEL) 50 MG TABLET    Take 1 Tablet by mouth 2 times a day. TAKE AT 16:00 and 20:00    TRAZODONE (DESYREL) 50 MG TAB    Take 1 Tablet by mouth at bedtime.    VITAMIN D3 (CHOLECALCIFEROL) 1000 UNIT TAB    Take 1 Tablet by mouth every day.       ALLERGIES  Patient has no known allergies.    PHYSICAL EXAM  /60   Pulse (!) 55   Temp 36.1 °C (97 °F) (Temporal)   Resp 16   Ht 1.753 m (5' 9\")   Wt 76.7 " kg (169 lb)   SpO2 92%   BMI 24.96 kg/m²     Constitutional: Well developed, Well nourished, Mild distress. Calm, Cooperative.   HENT: Normocephalic, Atraumatic  Eyes: Conjunctiva normal, No discharge.   Neck: Supple, No stridor  Cardiovascular: Normal heart rate, Normal rhythm, No murmurs, equal pulses.   Pulmonary: Normal breath sounds, No respiratory distress, No wheezing, No rales, No rhonchi.  Chest: No chest wall tenderness or deformity.   Abdomen:Soft, No tenderness, No masses, no rebound, no guarding.   Back: No CVA tenderness.   Musculoskeletal: No major deformities noted, No tenderness.   Skin: Warm, Dry, No erythema, No rash.   Neurologic: Oriented to self only, cannot tell me what happened, Normal motor function  Psychiatric: Affect normal, Judgment normal, Mood normal. Denies SI or HI.     DIAGNOSTIC STUDIES    LABS  Results for orders placed or performed during the hospital encounter of 12/21/24   CBC WITH DIFFERENTIAL    Collection Time: 12/21/24  5:54 PM   Result Value Ref Range    WBC 7.8 4.8 - 10.8 K/uL    RBC 3.54 (L) 4.70 - 6.10 M/uL    Hemoglobin 11.3 (L) 14.0 - 18.0 g/dL    Hematocrit 32.6 (L) 42.0 - 52.0 %    MCV 92.1 81.4 - 97.8 fL    MCH 31.9 27.0 - 33.0 pg    MCHC 34.7 32.3 - 36.5 g/dL    RDW 46.5 35.9 - 50.0 fL    Platelet Count 263 164 - 446 K/uL    MPV 10.3 9.0 - 12.9 fL    Neutrophils-Polys 60.00 44.00 - 72.00 %    Lymphocytes 23.00 22.00 - 41.00 %    Monocytes 10.20 0.00 - 13.40 %    Eosinophils 5.90 0.00 - 6.90 %    Basophils 0.40 0.00 - 1.80 %    Immature Granulocytes 0.50 0.00 - 0.90 %    Nucleated RBC 0.00 0.00 - 0.20 /100 WBC    Neutrophils (Absolute) 4.66 1.82 - 7.42 K/uL    Lymphs (Absolute) 1.79 1.00 - 4.80 K/uL    Monos (Absolute) 0.79 0.00 - 0.85 K/uL    Eos (Absolute) 0.46 0.00 - 0.51 K/uL    Baso (Absolute) 0.03 0.00 - 0.12 K/uL    Immature Granulocytes (abs) 0.04 0.00 - 0.11 K/uL    NRBC (Absolute) 0.00 K/uL   COMP METABOLIC PANEL    Collection Time: 12/21/24  5:54 PM    Result Value Ref Range    Sodium 143 135 - 145 mmol/L    Potassium 4.1 3.6 - 5.5 mmol/L    Chloride 108 96 - 112 mmol/L    Co2 25 20 - 33 mmol/L    Anion Gap 10.0 7.0 - 16.0    Glucose 105 (H) 65 - 99 mg/dL    Bun 21 8 - 22 mg/dL    Creatinine 1.00 0.50 - 1.40 mg/dL    Calcium 9.0 8.5 - 10.5 mg/dL    Correct Calcium 9.4 8.5 - 10.5 mg/dL    AST(SGOT) 19 12 - 45 U/L    ALT(SGPT) 15 2 - 50 U/L    Alkaline Phosphatase 98 30 - 99 U/L    Total Bilirubin 0.3 0.1 - 1.5 mg/dL    Albumin 3.5 3.2 - 4.9 g/dL    Total Protein 6.3 6.0 - 8.2 g/dL    Globulin 2.8 1.9 - 3.5 g/dL    A-G Ratio 1.3 g/dL   ESTIMATED GFR    Collection Time: 12/21/24  5:54 PM   Result Value Ref Range    GFR (CKD-EPI) 79 >60 mL/min/1.73 m 2   URINALYSIS (UA)    Collection Time: 12/21/24  6:43 PM    Specimen: Urine   Result Value Ref Range    Color Yellow     Character Clear     Specific Gravity 1.017 <1.035    Ph 6.5 5.0 - 8.0    Glucose Negative Negative mg/dL    Ketones Negative Negative mg/dL    Protein Negative Negative mg/dL    Bilirubin Negative Negative    Urobilinogen, Urine 1.0 <=1.0 EU/dL    Nitrite Negative Negative    Leukocyte Esterase Negative Negative    Occult Blood Negative Negative    Micro Urine Req see below    I have independently interpreted the above labs.      COURSE & MEDICAL DECISION MAKING     ASSESSMENT, COURSE AND PLAN  Care Narrative:         5:32 PM - Patient seen and evaluated at bedside. Discussed the plan of care, including ordering labs to further evaluate. Ordered for UA, CBC with diff, CMP to evaluate. Differential diagnoses include, but are not limited to: Dementia with behavioral disturbance, UTI, electrolyte abnormality, dehydration.     6:14 PM - I discussed the patient's case and the above findings with [facility] who says that [facility] will not take the patient till Monday if he has the right insurance. They are going to see if they will take the patient back.     6:22 PM - Called Social Work.      6:34  PM - I discussed the patient's case and the above findings with Social Work who said that [facility] will take the patient back.    6:52 PM - The patient was updated at this time. I discussed plan for discharge and follow up as outlined below. The patient is stable for discharge at this time and will return for any new or worsening symptoms. Patient verbalizes understanding and support with my plan for discharge.             PROBLEM LIST  Patient presents with dementia with apparent aggression towards staff he has been calm and cooperative here.  No signs of electrolyte abnormality or infection.  At this point time since he is back to his baseline I think he can be discharged back to his SNF.  He would be unable to see be seen by psychiatry here.    DISPOSITION AND DISCUSSIONS  I have discussed management of the patient with the following physicians and LAXMI's:  None    Discussion of management with other QHP or appropriate source(s): Social Work about returning to his SNF and Behavioral Health [facility]      Escalation of care considered, and ultimately not performed: diagnostic imaging.    Barriers to care at this time, including but not limited to: Patient does not have established PCP.         The patient will return for new or worsening symptoms and is stable at the time of discharge.    The patient is referred to a primary physician for blood pressure management, diabetic screening, and for all other preventative health concerns.    DISPOSITION:  Patient will be discharged home in stable condition.    FOLLOW UP:  Your doctor          FINAL DIAGNOSIS  1. Dementia with behavioral disturbance (HCC)       Chloé ARSHAD (Vielka), am scribing for, and in the presence of, JEFF Davis*.    Electronically signed by: Chloé Nunez (Vielka), 12/21/2024    Sam ARSHAD M.* personally performed the services described in this documentation, as scribed by Chloé Nunez in my presence, and it is  both accurate and complete.      The note accurately reflects work and decisions made by me.  Sam Murray M.D.  12/21/2024  7:47 PM

## 2024-12-22 NOTE — ED NOTES
Gave bedside report to Sarah lang  Pt continues to get out of bed, frequent reminder . Provided with food to eat

## 2024-12-22 NOTE — ED TRIAGE NOTES
Chief Complaint   Patient presents with    Other     Pt BIBA from [facility]. Per EMS the MD at facility wanted a psych eval on pt. Pt is A&Ox1 at baseline, denies SI/HI.   .      Pt states he is not sure why they sent him here.

## 2024-12-22 NOTE — ED NOTES
"Pt is awake, keeps getting out off bed, and saying \" I need to go back to [facility]\"  Pt reoriented to time and placed and reassured that his ride is on the way. Food and water offered, pt refused, urinal offerred pt refused as well.    Charge nurse made aware and requested for sitter.  "

## 2024-12-22 NOTE — DISCHARGE PLANNING
[facility] set up [facility] transport for pt back to [place]. PCS scanned into chart. COBRA packet left with bedside RN.

## 2024-12-22 NOTE — ED NOTES
Bedside report received from off going RN/tech: Zenia SWEENEY, assumed care of patient.  POC discussed with patient. Call light within reach, all needs addressed at this time.   Pt is alert and oriented x 1, pt is seen trying to get out off bed,  assisted back to bed, safety rails up.    Fall risk interventions in place: Patient's personal possessions are with in their safe reach, Place fall risk sign on patient's door, Keep floor surfaces clean and dry, Accompanied to restroom, and Bed Alarm in use (all applicable per [place] Fall risk assessment)   Continuous monitoring: Pulse Ox or Blood Pressure  IVF/IV medications: Not Applicable   Oxygen: Room Air  Bedside sitter: Not Applicable   Isolation: Not Applicable

## 2024-12-23 ENCOUNTER — APPOINTMENT (OUTPATIENT)
Dept: RADIOLOGY | Facility: MEDICAL CENTER | Age: 74
End: 2024-12-23
Attending: STUDENT IN AN ORGANIZED HEALTH CARE EDUCATION/TRAINING PROGRAM
Payer: MEDICARE

## 2024-12-23 ENCOUNTER — HOSPITAL ENCOUNTER (EMERGENCY)
Facility: MEDICAL CENTER | Age: 74
End: 2024-12-30
Attending: STUDENT IN AN ORGANIZED HEALTH CARE EDUCATION/TRAINING PROGRAM | Admitting: STUDENT IN AN ORGANIZED HEALTH CARE EDUCATION/TRAINING PROGRAM
Payer: MEDICARE

## 2024-12-23 DIAGNOSIS — R46.89 AGGRESSIVE BEHAVIOR: ICD-10-CM

## 2024-12-23 LAB
ALBUMIN SERPL BCP-MCNC: 3.7 G/DL (ref 3.2–4.9)
ALBUMIN/GLOB SERPL: 1.3 G/DL
ALP SERPL-CCNC: 103 U/L (ref 30–99)
ALT SERPL-CCNC: 14 U/L (ref 2–50)
ANION GAP SERPL CALC-SCNC: 11 MMOL/L (ref 7–16)
AST SERPL-CCNC: 21 U/L (ref 12–45)
BASOPHILS # BLD AUTO: 0.6 % (ref 0–1.8)
BASOPHILS # BLD: 0.05 K/UL (ref 0–0.12)
BILIRUB SERPL-MCNC: 0.2 MG/DL (ref 0.1–1.5)
BUN SERPL-MCNC: 21 MG/DL (ref 8–22)
CALCIUM ALBUM COR SERPL-MCNC: 9.4 MG/DL (ref 8.5–10.5)
CALCIUM SERPL-MCNC: 9.2 MG/DL (ref 8.5–10.5)
CHLORIDE SERPL-SCNC: 108 MMOL/L (ref 96–112)
CO2 SERPL-SCNC: 24 MMOL/L (ref 20–33)
CREAT SERPL-MCNC: 1.02 MG/DL (ref 0.5–1.4)
EOSINOPHIL # BLD AUTO: 0.53 K/UL (ref 0–0.51)
EOSINOPHIL NFR BLD: 6.1 % (ref 0–6.9)
ERYTHROCYTE [DISTWIDTH] IN BLOOD BY AUTOMATED COUNT: 46.1 FL (ref 35.9–50)
GFR SERPLBLD CREATININE-BSD FMLA CKD-EPI: 77 ML/MIN/1.73 M 2
GLOBULIN SER CALC-MCNC: 2.9 G/DL (ref 1.9–3.5)
GLUCOSE SERPL-MCNC: 169 MG/DL (ref 65–99)
HCT VFR BLD AUTO: 35.6 % (ref 42–52)
HGB BLD-MCNC: 11.7 G/DL (ref 14–18)
IMM GRANULOCYTES # BLD AUTO: 0.05 K/UL (ref 0–0.11)
IMM GRANULOCYTES NFR BLD AUTO: 0.6 % (ref 0–0.9)
LIPASE SERPL-CCNC: 40 U/L (ref 11–82)
LYMPHOCYTES # BLD AUTO: 1.71 K/UL (ref 1–4.8)
LYMPHOCYTES NFR BLD: 19.8 % (ref 22–41)
MCH RBC QN AUTO: 30.5 PG (ref 27–33)
MCHC RBC AUTO-ENTMCNC: 32.9 G/DL (ref 32.3–36.5)
MCV RBC AUTO: 92.7 FL (ref 81.4–97.8)
MONOCYTES # BLD AUTO: 0.95 K/UL (ref 0–0.85)
MONOCYTES NFR BLD AUTO: 11 % (ref 0–13.4)
NEUTROPHILS # BLD AUTO: 5.36 K/UL (ref 1.82–7.42)
NEUTROPHILS NFR BLD: 61.9 % (ref 44–72)
NRBC # BLD AUTO: 0 K/UL
NRBC BLD-RTO: 0 /100 WBC (ref 0–0.2)
PLATELET # BLD AUTO: 276 K/UL (ref 164–446)
PMV BLD AUTO: 10.4 FL (ref 9–12.9)
POTASSIUM SERPL-SCNC: 4.2 MMOL/L (ref 3.6–5.5)
PROT SERPL-MCNC: 6.6 G/DL (ref 6–8.2)
RBC # BLD AUTO: 3.84 M/UL (ref 4.7–6.1)
SODIUM SERPL-SCNC: 143 MMOL/L (ref 135–145)
WBC # BLD AUTO: 8.7 K/UL (ref 4.8–10.8)

## 2024-12-23 PROCEDURE — 96374 THER/PROPH/DIAG INJ IV PUSH: CPT

## 2024-12-23 PROCEDURE — 99285 EMERGENCY DEPT VISIT HI MDM: CPT

## 2024-12-23 PROCEDURE — 96375 TX/PRO/DX INJ NEW DRUG ADDON: CPT

## 2024-12-23 PROCEDURE — 83690 ASSAY OF LIPASE: CPT

## 2024-12-23 PROCEDURE — 80053 COMPREHEN METABOLIC PANEL: CPT

## 2024-12-23 PROCEDURE — 85025 COMPLETE CBC W/AUTO DIFF WBC: CPT

## 2024-12-23 PROCEDURE — 700111 HCHG RX REV CODE 636 W/ 250 OVERRIDE (IP): Mod: UD | Performed by: STUDENT IN AN ORGANIZED HEALTH CARE EDUCATION/TRAINING PROGRAM

## 2024-12-23 PROCEDURE — 96376 TX/PRO/DX INJ SAME DRUG ADON: CPT

## 2024-12-23 PROCEDURE — 70450 CT HEAD/BRAIN W/O DYE: CPT

## 2024-12-23 PROCEDURE — 36415 COLL VENOUS BLD VENIPUNCTURE: CPT

## 2024-12-23 RX ORDER — HYDROXYZINE HYDROCHLORIDE 50 MG/1
50 TABLET, FILM COATED ORAL 3 TIMES DAILY
Status: SHIPPED | COMMUNITY
End: 2025-01-14

## 2024-12-23 RX ORDER — TRAZODONE HYDROCHLORIDE 150 MG/1
150 TABLET ORAL NIGHTLY
Status: SHIPPED | COMMUNITY
End: 2025-01-14

## 2024-12-23 RX ORDER — LORAZEPAM 2 MG/ML
1 INJECTION INTRAMUSCULAR ONCE
Status: COMPLETED | OUTPATIENT
Start: 2024-12-23 | End: 2024-12-23

## 2024-12-23 RX ORDER — OLANZAPINE 10 MG/1
10 TABLET ORAL 2 TIMES DAILY
Status: SHIPPED | COMMUNITY
End: 2025-01-14

## 2024-12-23 RX ORDER — METOPROLOL SUCCINATE 50 MG/1
50 TABLET, EXTENDED RELEASE ORAL DAILY
Status: SHIPPED | COMMUNITY
End: 2025-01-14

## 2024-12-23 RX ORDER — HALOPERIDOL 5 MG/ML
1 INJECTION INTRAMUSCULAR ONCE
Status: COMPLETED | OUTPATIENT
Start: 2024-12-23 | End: 2024-12-23

## 2024-12-23 RX ORDER — DIPHENHYDRAMINE HYDROCHLORIDE 50 MG/ML
50 INJECTION INTRAMUSCULAR; INTRAVENOUS ONCE
Status: COMPLETED | OUTPATIENT
Start: 2024-12-23 | End: 2024-12-23

## 2024-12-23 RX ORDER — DIVALPROEX SODIUM 500 MG/1
500 TABLET, DELAYED RELEASE ORAL 2 TIMES DAILY
Status: SHIPPED | COMMUNITY
End: 2025-01-14

## 2024-12-23 RX ADMIN — LORAZEPAM 1 MG: 2 INJECTION INTRAMUSCULAR; INTRAVENOUS at 18:58

## 2024-12-23 RX ADMIN — LORAZEPAM 1 MG: 2 INJECTION INTRAMUSCULAR; INTRAVENOUS at 20:51

## 2024-12-23 RX ADMIN — HALOPERIDOL LACTATE 1 MG: 5 INJECTION, SOLUTION INTRAMUSCULAR at 19:30

## 2024-12-23 RX ADMIN — HALOPERIDOL LACTATE 1 MG: 5 INJECTION, SOLUTION INTRAMUSCULAR at 18:58

## 2024-12-23 RX ADMIN — DIPHENHYDRAMINE HYDROCHLORIDE 50 MG: 50 INJECTION, SOLUTION INTRAMUSCULAR; INTRAVENOUS at 19:30

## 2024-12-23 ASSESSMENT — FIBROSIS 4 INDEX: FIB4 SCORE: 1.36

## 2024-12-24 LAB
APPEARANCE UR: CLEAR
BILIRUB UR QL STRIP.AUTO: NEGATIVE
COLOR UR: YELLOW
GLUCOSE UR STRIP.AUTO-MCNC: NEGATIVE MG/DL
KETONES UR STRIP.AUTO-MCNC: NEGATIVE MG/DL
LEUKOCYTE ESTERASE UR QL STRIP.AUTO: NEGATIVE
MICRO URNS: NORMAL
NITRITE UR QL STRIP.AUTO: NEGATIVE
PH UR STRIP.AUTO: 7 [PH] (ref 5–8)
PROT UR QL STRIP: NEGATIVE MG/DL
RBC UR QL AUTO: NEGATIVE
SP GR UR STRIP.AUTO: 1.01
UROBILINOGEN UR STRIP.AUTO-MCNC: 1 EU/DL

## 2024-12-24 PROCEDURE — 96376 TX/PRO/DX INJ SAME DRUG ADON: CPT

## 2024-12-24 PROCEDURE — A9270 NON-COVERED ITEM OR SERVICE: HCPCS | Performed by: STUDENT IN AN ORGANIZED HEALTH CARE EDUCATION/TRAINING PROGRAM

## 2024-12-24 PROCEDURE — 700111 HCHG RX REV CODE 636 W/ 250 OVERRIDE (IP): Mod: JZ,UD | Performed by: EMERGENCY MEDICINE

## 2024-12-24 PROCEDURE — 700102 HCHG RX REV CODE 250 W/ 637 OVERRIDE(OP): Performed by: STUDENT IN AN ORGANIZED HEALTH CARE EDUCATION/TRAINING PROGRAM

## 2024-12-24 PROCEDURE — 81003 URINALYSIS AUTO W/O SCOPE: CPT

## 2024-12-24 RX ORDER — OLANZAPINE 5 MG/1
10 TABLET ORAL 2 TIMES DAILY
Status: DISCONTINUED | OUTPATIENT
Start: 2024-12-24 | End: 2024-12-25

## 2024-12-24 RX ORDER — LORAZEPAM 2 MG/ML
1 INJECTION INTRAMUSCULAR ONCE
Status: COMPLETED | OUTPATIENT
Start: 2024-12-24 | End: 2024-12-24

## 2024-12-24 RX ORDER — DIPHENHYDRAMINE HYDROCHLORIDE 50 MG/ML
50 INJECTION INTRAMUSCULAR; INTRAVENOUS ONCE
Status: COMPLETED | OUTPATIENT
Start: 2024-12-24 | End: 2024-12-24

## 2024-12-24 RX ORDER — METOPROLOL SUCCINATE 50 MG/1
50 TABLET, EXTENDED RELEASE ORAL DAILY
Status: DISCONTINUED | OUTPATIENT
Start: 2024-12-24 | End: 2024-12-30 | Stop reason: HOSPADM

## 2024-12-24 RX ORDER — ASPIRIN 81 MG/1
81 TABLET, CHEWABLE ORAL DAILY
Status: DISCONTINUED | OUTPATIENT
Start: 2024-12-24 | End: 2024-12-30 | Stop reason: HOSPADM

## 2024-12-24 RX ORDER — AMLODIPINE BESYLATE 5 MG/1
10 TABLET ORAL DAILY
Status: DISCONTINUED | OUTPATIENT
Start: 2024-12-24 | End: 2024-12-30 | Stop reason: HOSPADM

## 2024-12-24 RX ORDER — LISINOPRIL 20 MG/1
40 TABLET ORAL DAILY
Status: DISCONTINUED | OUTPATIENT
Start: 2024-12-24 | End: 2024-12-30 | Stop reason: HOSPADM

## 2024-12-24 RX ORDER — ATORVASTATIN CALCIUM 80 MG/1
80 TABLET, FILM COATED ORAL EVERY EVENING
Status: DISCONTINUED | OUTPATIENT
Start: 2024-12-24 | End: 2024-12-30 | Stop reason: HOSPADM

## 2024-12-24 RX ORDER — HALOPERIDOL 5 MG/ML
1 INJECTION INTRAMUSCULAR ONCE
Status: COMPLETED | OUTPATIENT
Start: 2024-12-24 | End: 2024-12-24

## 2024-12-24 RX ORDER — DIVALPROEX SODIUM 500 MG/1
500 TABLET, DELAYED RELEASE ORAL 2 TIMES DAILY
Status: DISCONTINUED | OUTPATIENT
Start: 2024-12-24 | End: 2024-12-25

## 2024-12-24 RX ORDER — TRAZODONE HYDROCHLORIDE 50 MG/1
150 TABLET, FILM COATED ORAL NIGHTLY
Status: DISCONTINUED | OUTPATIENT
Start: 2024-12-24 | End: 2024-12-30 | Stop reason: HOSPADM

## 2024-12-24 RX ORDER — VITAMIN B COMPLEX
1000 TABLET ORAL DAILY
Status: DISCONTINUED | OUTPATIENT
Start: 2024-12-24 | End: 2024-12-30 | Stop reason: HOSPADM

## 2024-12-24 RX ADMIN — TRAZODONE HYDROCHLORIDE 150 MG: 50 TABLET ORAL at 21:37

## 2024-12-24 RX ADMIN — LISINOPRIL 40 MG: 20 TABLET ORAL at 17:17

## 2024-12-24 RX ADMIN — Medication 1000 UNITS: at 16:21

## 2024-12-24 RX ADMIN — DIVALPROEX SODIUM 500 MG: 500 TABLET, DELAYED RELEASE ORAL at 17:11

## 2024-12-24 RX ADMIN — LORAZEPAM 1 MG: 2 INJECTION INTRAMUSCULAR; INTRAVENOUS at 18:22

## 2024-12-24 RX ADMIN — OLANZAPINE 10 MG: 5 TABLET, FILM COATED ORAL at 17:10

## 2024-12-24 RX ADMIN — ATORVASTATIN CALCIUM 80 MG: 80 TABLET, FILM COATED ORAL at 17:14

## 2024-12-24 RX ADMIN — AMLODIPINE BESYLATE 10 MG: 5 TABLET ORAL at 16:19

## 2024-12-24 RX ADMIN — DIPHENHYDRAMINE HYDROCHLORIDE 50 MG: 50 INJECTION, SOLUTION INTRAMUSCULAR; INTRAVENOUS at 18:20

## 2024-12-24 RX ADMIN — ASPIRIN 81 MG: 81 TABLET, CHEWABLE ORAL at 16:17

## 2024-12-24 RX ADMIN — HALOPERIDOL LACTATE 1 MG: 5 INJECTION, SOLUTION INTRAMUSCULAR at 18:20

## 2024-12-24 NOTE — CONSULTS
"PSYCHIATRIC INTAKE EVALUATION: (new)  Reason for Admission: No admission diagnoses are documented for this encounter.  Reason for Consult: Legal Hold Evaluation  Requesting Provider: Oksana Henry M.D.   Legal Hold Status: discontinued  Chart reviewed.         CC:  Chief Complaint   Patient presents with    ALOC     Pt BIB EMS from [facility] with Legal hold in place. Per report pt has been more confused/aggressive with staff/refusing to take medication.         Medications:  Scheduled Medications   Medication Dose Frequency    amLODIPine  10 mg DAILY    aspirin  81 mg DAILY    atorvastatin  80 mg Q EVENING    divalproex  500 mg BID    lisinopril  40 mg DAILY    metoprolol SR  50 mg DAILY    OLANZapine  10 mg BID    traZODone  150 mg Nightly    vitamin D3  1,000 Units DAILY       Allergies:   No Known Allergies     MSE:  BP (!) 173/94   Pulse 64   Temp 36.1 °C (97 °F) (Temporal)   Resp 17   Ht 1.753 m (5' 9\")   Wt 76.7 kg (169 lb)   SpO2 95%     Past Medical Hx:  Past Medical History:   Diagnosis Date    Arthritis     Gout     Hemorrhoids 11/25/2014    Reportedly Pt has had hemorrhoids for some time. He denies any bloody stools or bleeding hemorrhoids.     Hypertension     Ingrown right big toenail 05/20/2015    Low back pain 05/20/2015    NSTEMI (non-ST elevated myocardial infarction) (HCC) 06/28/2016    Vitamin D deficiency 01/06/2015 12/1/14 lab result show slightly low Vitamin D level= 26. He states he walks outside a lot in the sun until the past month.      Patient Active Problem List    Diagnosis Date Noted    Agitation 11/30/2024    Dementia (HCC) 11/30/2024    Vitamin D deficiency 09/22/2024    Hypokalemia 09/22/2024    Azotemia 09/21/2024    History of CVA (cerebrovascular accident) 09/20/2024    Stroke (HCC) 09/16/2024    Septic arthritis of ankle (HCC) 09/13/2024    CAD (coronary artery disease) 09/13/2024    Left-sided weakness 09/13/2024    Murmur 09/13/2024    Systolic heart " failure (HCC) 2024    Benign prostatic hyperplasia (BPH) with straining on urination 2024    Hyperlipidemia 2016    3-vessel CAD 2016    Right knee pain 2015    Essential (primary) hypertension 2014    Gout 2014       Labs:  Reviewed:   Lab Results   Component Value Date/Time    AMPHUR Negative 2024    BARBSURINE Negative 2024    BENZODIAZU Negative 2024    COCAINEMET Negative 2024    METHADONE Negative 2024    OPIATES Positive (A) 2024    OXYCODN Negative 2024    PCPURINE Negative 2024    PROPOXY Negative 2024    CANNABINOID Negative 2024 222     Recent Labs     24  1754 24  1714   WBC 7.8 8.7   RBC 3.54* 3.84*   HEMOGLOBIN 11.3* 11.7*   HEMATOCRIT 32.6* 35.6*   MCV 92.1 92.7   MCH 31.9 30.5   RDW 46.5 46.1   PLATELETCT 263 276   MPV 10.3 10.4   NEUTSPOLYS 60.00 61.90   LYMPHOCYTES 23.00 19.80*   MONOCYTES 10.20 11.00   EOSINOPHILS 5.90 6.10   BASOPHILS 0.40 0.60     Recent Labs     24  1754 24  1714   SODIUM 143 143   POTASSIUM 4.1 4.2   CHLORIDE 108 108   CO2 25 24   GLUCOSE 105* 169*   BUN 21 21     Recent Labs     24  1754 24  1714   ASTSGOT 19 21   ALTSGPT 15 14   TBILIRUBIN 0.3 0.2   ALKPHOSPHAT 98 103*   GLOBULIN 2.8 2.9       EKG:   Results for orders placed or performed during the hospital encounter of 24   EKG (NOW)   Result Value Ref Range    Report       [facility]    Test Date:  2024  Pt Name:    LISA LÓPEZ                 Department: ER  MRN:        [id number]                      Room:       [facility]  Gender:     Male                         Technician: [id number]  :        1950                   Requested By:TYE HERRERA  Order #:    [id number]                    Barrie MD:    Measurements  Intervals                                Axis  Rate:       94                            P:          57  TN:         142                          QRS:        9  QRSD:       144                          T:          5  QT:         400  QTc:        501    Interpretive Statements  Sinus rhythm  Left atrial enlargement  Right bundle branch block  Left ventricular hypertrophy  Inferior infarct, old  Prolonged QT interval  Compared to ECG 2017 14:52:45  Atrial abnormality now present  Right bundle-branch block now present  Left ventricular hypertrophy now present  Myocardial infarct finding now present   Prolonged QT interval now present  Sinus bradycardia no longer present     EKG   Result Value Ref Range    Report       [facility]    Test Date:  2024  Pt Name:    LISA LÓPEZ                 Department: [facility]  MRN:        [id number]                      Room:       S193  Gender:     Male                         Technician: [facility]  :        1950                   Requested By:QING AGUAYO  Order #:    [id number]                    Reading MD: Asaf Rush MD    Measurements  Intervals                                Axis  Rate:       107                          P:          12  TN:         146                          QRS:        25  QRSD:       141                          T:          -4  QT:         378  QTc:        505    Interpretive Statements  Sinus tachycardia  Probable left atrial enlargement  Right bundle branch block  Left ventricular hypertrophy  Inferior infarct, age indeterminate  Prolonged QT interval  Compared to ECG 2024 16:42:26  NO SIGNIFICANT CHANGES  Electronically Signed On 2024 11:09:09 PDT by Asaf Rush MD            Assessment:   FULL NOTE TO FOLLOW.    Patient is a 74 year old male with PMH of CVA, CAD s/p CABG, HTN, hyperlipidemia; without documented psychiatric Hx, currently being evaluated for reports of increased agitation while at placement facility, initially placed on legal hold, which was later decertified. Patient  oriented to self, appear at baseline orientation per documentation. Able to respond to name, however unable to provide reliable Hx often repeating similar answers for subsequent questions. Appears able to make needs know, expressing a desire to go to the bathroom, able to stand by assist and follow directions to complete the task without incident. Patient presents with concerns for neurocognitive disorder; however, unable to fully engage in MMSE/MOCA testing to determine level of deficits.     Patient unable to vocalize protective factors despite prompting. Patient does NOT present an acute danger to self 2/2 mental health condition, workup appears to support major neurocognitive disorder with behavioral disturbances, has medications in place for psychosis, insomnia, and mood stabilization, appear at adequate dosages to manage behaviors, patient was without incident during the encounter despite some elevation in mood 2/2 confusion, and disorientation. Documentation supports outburst requiring emergent medication administration while in ED, appears related to change in setting. Documentation support O2 desaturation during HS, supplemental O2 provided, patient could benefit from Op sleep study to determine need for supplemental O2 during HS to prevent increased termination insomnia, and increased confusion upon waking. Documentation support techniques including addressing identified needs, and milieu management have been effective while in the ED. Will consider adjusting Depakote to 750mg twice daily for mood if patient becomes dysregulated and unable to redirect while observed. Patient does not meet criteria for legal hold, could benefit from SW to determine additional options for placement if previous facility is concerns about ability to provide adequate care.     Dx:  Major neurocognitive disorder    Medical: as noted by the medical treatment team.   Active Problems:    * No active hospital problems. *  Resolved  Problems:    * No resolved hospital problems. *      Plan:  Legal hold: No   Psychotropic medications:   No indication for change to current medication regimen  Continue Olanzapine 10mg BID, Trazodone 150mg QHS, Depakote 500mg BID  Labs reviewed  EKG: None recent, consider obtaining EKG  Old records ordered/reviewed/summarized  Unable to obtain collateral from facility: [facility]  Discussed the case with: SHABBIR Nayak DO; MIRELLA Rush DO  Psychiatry will sing off.     Thank you for the consult.     This note was created using voice recognition software (Dragon). The accuracy of the dictation is limited by the abilities of the software. I have reviewed the note prior to signing. However, error related to voice recognition software and /or scribes may still exist and should be interpreted within the appropriate context.

## 2024-12-24 NOTE — ED NOTES
Bedside report received from off going RN/tech: Linda, assumed care of patient.  POC discussed with patient. Call light within reach, all needs addressed at this time.       Fall risk interventions in place: Move the patient closer to the nurse's station, Patient's personal possessions are with in their safe reach, Place socks on patient, Place fall risk sign on patient's door, Give patient urinal if applicable, and Keep floor surfaces clean and dry (all applicable per [place] Fall risk assessment)   Continuous monitoring: Pulse Ox or Blood Pressure  IVF/IV medications: Not Applicable   Oxygen: Room Air  Bedside sitter: Not Applicable  Sirisha sitter at bedside for capacity.   Isolation: Not Applicable    Patient able to stand at bedside to use urinal. Patient only alert to self only which is baseline. Urine sent.

## 2024-12-24 NOTE — DISCHARGE SUMMARY
ED Observation    Pt has been seen by Psychiatry, they have made medication adjustment. SW called and tried to talk to Admissions or DON and was told they are all on Vacation . SW unable to discuss with a staff member if they can accept Pt. Back.     Pt will be monitored in the ED for behaviors and we will attempt to contact Admission on Thursday 12- to arrange for DC if there have been no behaviors.

## 2024-12-24 NOTE — ED NOTES
Bedside report received from off going RN: Ada, assumed care of patient.  POC discussed with patient. Call light within reach, all needs addressed at this time.       Fall risk interventions in place: Move the patient closer to the nurse's station, Place socks on patient, Place fall risk sign on patient's door, Give patient urinal if applicable, Keep floor surfaces clean and dry, and Human-sitter if tele-sitter fails (all applicable per [place] Fall risk assessment)   Continuous monitoring: Pulse Ox  IVF/IV medications: Not Applicable   Oxygen: How many liters 3L and Traced the line to wall oxygen  Bedside sitter: report given to bedside Ignacio guillory  Isolation: Not Applicable

## 2024-12-24 NOTE — PROGRESS NOTES
"ED Observation Progress Note    Date of Service: 12/24/24    Interval History and Interventions  Patient presenting altered and agitated now calm.  Working on medication modification will be seen by psychiatry.  Will be transferred back to facility once medications are managed    Physical Exam  /70   Pulse 63   Temp 36.1 °C (97 °F) (Temporal)   Resp 18   Ht 1.753 m (5' 9\")   Wt 76.7 kg (169 lb)   SpO2 95%   BMI 24.96 kg/m² .    Constitutional: Awakens to voice and appears relaxed  HENT:  Grossly normal  Eyes: Grossly normal  Neck: Normal range of motion  Cardiovascular: Normal heart rate   Thorax & Lungs: No respiratory distress  Abdomen: Nontender  Skin:  No pathologic rash.   Extremities: Well perfused  Psychiatric: Calm and subdued    Labs  Results for orders placed or performed during the hospital encounter of 12/23/24   CBC WITH DIFFERENTIAL    Collection Time: 12/23/24  5:14 PM   Result Value Ref Range    WBC 8.7 4.8 - 10.8 K/uL    RBC 3.84 (L) 4.70 - 6.10 M/uL    Hemoglobin 11.7 (L) 14.0 - 18.0 g/dL    Hematocrit 35.6 (L) 42.0 - 52.0 %    MCV 92.7 81.4 - 97.8 fL    MCH 30.5 27.0 - 33.0 pg    MCHC 32.9 32.3 - 36.5 g/dL    RDW 46.1 35.9 - 50.0 fL    Platelet Count 276 164 - 446 K/uL    MPV 10.4 9.0 - 12.9 fL    Neutrophils-Polys 61.90 44.00 - 72.00 %    Lymphocytes 19.80 (L) 22.00 - 41.00 %    Monocytes 11.00 0.00 - 13.40 %    Eosinophils 6.10 0.00 - 6.90 %    Basophils 0.60 0.00 - 1.80 %    Immature Granulocytes 0.60 0.00 - 0.90 %    Nucleated RBC 0.00 0.00 - 0.20 /100 WBC    Neutrophils (Absolute) 5.36 1.82 - 7.42 K/uL    Lymphs (Absolute) 1.71 1.00 - 4.80 K/uL    Monos (Absolute) 0.95 (H) 0.00 - 0.85 K/uL    Eos (Absolute) 0.53 (H) 0.00 - 0.51 K/uL    Baso (Absolute) 0.05 0.00 - 0.12 K/uL    Immature Granulocytes (abs) 0.05 0.00 - 0.11 K/uL    NRBC (Absolute) 0.00 K/uL   COMP METABOLIC PANEL    Collection Time: 12/23/24  5:14 PM   Result Value Ref Range    Sodium 143 135 - 145 mmol/L    " Potassium 4.2 3.6 - 5.5 mmol/L    Chloride 108 96 - 112 mmol/L    Co2 24 20 - 33 mmol/L    Anion Gap 11.0 7.0 - 16.0    Glucose 169 (H) 65 - 99 mg/dL    Bun 21 8 - 22 mg/dL    Creatinine 1.02 0.50 - 1.40 mg/dL    Calcium 9.2 8.5 - 10.5 mg/dL    Correct Calcium 9.4 8.5 - 10.5 mg/dL    AST(SGOT) 21 12 - 45 U/L    ALT(SGPT) 14 2 - 50 U/L    Alkaline Phosphatase 103 (H) 30 - 99 U/L    Total Bilirubin 0.2 0.1 - 1.5 mg/dL    Albumin 3.7 3.2 - 4.9 g/dL    Total Protein 6.6 6.0 - 8.2 g/dL    Globulin 2.9 1.9 - 3.5 g/dL    A-G Ratio 1.3 g/dL   LIPASE    Collection Time: 12/23/24  5:14 PM   Result Value Ref Range    Lipase 40 11 - 82 U/L   ESTIMATED GFR    Collection Time: 12/23/24  5:14 PM   Result Value Ref Range    GFR (CKD-EPI) 77 >60 mL/min/1.73 m 2       Radiology  CT-HEAD W/O   Final Result      1.  Cerebral atrophy.      2.  White matter lucencies most consistent with small vessel ischemic change versus demyelination or gliosis.      3. Chronic areas of lacunar type infarction in noted in the left frontal periventricular white matter, right thalamus, and the left side of the hao..                   Problem List  1.  Aggressive behavior    Electronically signed by: Joe Ibrahim M.D., 12/24/2024 10:32 AM

## 2024-12-24 NOTE — ED NOTES
Rounded on pt. Pt sleeping in bed. Bed locked and in lowest position. Safety sitter remains in line of sight for pt safety. Respirations equal and unlabored on room air. No further needs at this time.

## 2024-12-24 NOTE — ED NOTES
Patient's home medications have been reviewed by the pharmacy team.     Past Medical History:   Diagnosis Date    Arthritis     Gout     Hemorrhoids 11/25/2014    Reportedly Pt has had hemorrhoids for some time. He denies any bloody stools or bleeding hemorrhoids.     Hypertension     Ingrown right big toenail 05/20/2015    Low back pain 05/20/2015    NSTEMI (non-ST elevated myocardial infarction) (HCC) 06/28/2016    Vitamin D deficiency 01/06/2015 12/1/14 lab result show slightly low Vitamin D level= 26. He states he walks outside a lot in the sun until the past month.       Patient's Medications   New Prescriptions    No medications on file   Previous Medications    AMLODIPINE (NORVASC) 10 MG TAB    Take 1 Tablet by mouth every day.    ASPIRIN (ASA) 81 MG CHEW TAB CHEWABLE TABLET    Chew 1 Tablet every day.    ATORVASTATIN (LIPITOR) 80 MG TABLET    Take 1 Tablet by mouth every evening.    DIVALPROEX (DEPAKOTE) 500 MG TABLET DELAYED RESPONSE    Take 500 mg by mouth 2 times a day.    HYDROXYZINE HCL (ATARAX) 50 MG TAB    Take 50 mg by mouth 3 times a day. Scheduled   0800  1400  2000    LISINOPRIL (PRINIVIL) 40 MG TABLET    Take 1 Tablet by mouth every day.    METOPROLOL SR (TOPROL XL) 50 MG TABLET SR 24 HR    Take 50 mg by mouth every day.    OLANZAPINE (ZYPREXA) 10 MG TABLET    Take 10 mg by mouth 2 times a day.    TRAZODONE (DESYREL) 150 MG TAB    Take 150 mg by mouth every evening.    VITAMIN D3 (CHOLECALCIFEROL) 1000 UNIT TAB    Take 1 Tablet by mouth every day.   Modified Medications    No medications on file   Discontinued Medications    HYDRALAZINE (APRESOLINE) 100 MG TABLET    Take 1 Tablet by mouth every 8 hours.    HYDROXYZINE HCL (ATARAX) 25 MG TAB    Take 25 mg by mouth 3 times a day as needed for Anxiety.    METOPROLOL SR (TOPROL XL) 100 MG TABLET SR 24 HR    Take 1 Tablet by mouth every day.    QUETIAPINE (SEROQUEL) 50 MG TABLET    Take 1 Tablet by mouth 2 times a day. TAKE AT 16:00 and 20:00     TRAZODONE (DESYREL) 50 MG TAB    Take 1 Tablet by mouth at bedtime.          A:  Medications do not appear to be contributing to current complaints.         P:    No recommendations at this time. Home medications have been reordered as appropriate. Defer to psychiatry for medication adjustments and optimization     Chris Cunningham, SarahD

## 2024-12-24 NOTE — ED TRIAGE NOTES
Chief Complaint   Patient presents with    ALOC     Pt BIB EMS from [facility] with Legal hold in place. Per report pt has been more confused/aggressive with staff/refusing to take medication.

## 2024-12-24 NOTE — ED PROVIDER NOTES
"ED Observation Progress Note    Date of Service: 12/24/24    Interval History  Pending transport     Physical Exam  BP (!) 151/65   Pulse (!) 58   Temp 36.1 °C (97 °F) (Temporal)   Resp 17   Ht 1.753 m (5' 9\")   Wt 76.7 kg (169 lb)   SpO2 90%   BMI 24.96 kg/m² .    Constitutional: Awake and alert. Nontoxic  HENT:  Grossly normal  Eyes: Grossly normal  Neck: Normal range of motion  Cardiovascular: Normal heart rate   Thorax & Lungs: No respiratory distress  Abdomen: Nontender  Skin:  No pathologic rash.   Extremities: Well perfused  Psychiatric: Affect normal    Labs  Results for orders placed or performed during the hospital encounter of 12/23/24   CBC WITH DIFFERENTIAL    Collection Time: 12/23/24  5:14 PM   Result Value Ref Range    WBC 8.7 4.8 - 10.8 K/uL    RBC 3.84 (L) 4.70 - 6.10 M/uL    Hemoglobin 11.7 (L) 14.0 - 18.0 g/dL    Hematocrit 35.6 (L) 42.0 - 52.0 %    MCV 92.7 81.4 - 97.8 fL    MCH 30.5 27.0 - 33.0 pg    MCHC 32.9 32.3 - 36.5 g/dL    RDW 46.1 35.9 - 50.0 fL    Platelet Count 276 164 - 446 K/uL    MPV 10.4 9.0 - 12.9 fL    Neutrophils-Polys 61.90 44.00 - 72.00 %    Lymphocytes 19.80 (L) 22.00 - 41.00 %    Monocytes 11.00 0.00 - 13.40 %    Eosinophils 6.10 0.00 - 6.90 %    Basophils 0.60 0.00 - 1.80 %    Immature Granulocytes 0.60 0.00 - 0.90 %    Nucleated RBC 0.00 0.00 - 0.20 /100 WBC    Neutrophils (Absolute) 5.36 1.82 - 7.42 K/uL    Lymphs (Absolute) 1.71 1.00 - 4.80 K/uL    Monos (Absolute) 0.95 (H) 0.00 - 0.85 K/uL    Eos (Absolute) 0.53 (H) 0.00 - 0.51 K/uL    Baso (Absolute) 0.05 0.00 - 0.12 K/uL    Immature Granulocytes (abs) 0.05 0.00 - 0.11 K/uL    NRBC (Absolute) 0.00 K/uL   COMP METABOLIC PANEL    Collection Time: 12/23/24  5:14 PM   Result Value Ref Range    Sodium 143 135 - 145 mmol/L    Potassium 4.2 3.6 - 5.5 mmol/L    Chloride 108 96 - 112 mmol/L    Co2 24 20 - 33 mmol/L    Anion Gap 11.0 7.0 - 16.0    Glucose 169 (H) 65 - 99 mg/dL    Bun 21 8 - 22 mg/dL    Creatinine 1.02 " 0.50 - 1.40 mg/dL    Calcium 9.2 8.5 - 10.5 mg/dL    Correct Calcium 9.4 8.5 - 10.5 mg/dL    AST(SGOT) 21 12 - 45 U/L    ALT(SGPT) 14 2 - 50 U/L    Alkaline Phosphatase 103 (H) 30 - 99 U/L    Total Bilirubin 0.2 0.1 - 1.5 mg/dL    Albumin 3.7 3.2 - 4.9 g/dL    Total Protein 6.6 6.0 - 8.2 g/dL    Globulin 2.9 1.9 - 3.5 g/dL    A-G Ratio 1.3 g/dL   LIPASE    Collection Time: 12/23/24  5:14 PM   Result Value Ref Range    Lipase 40 11 - 82 U/L   ESTIMATED GFR    Collection Time: 12/23/24  5:14 PM   Result Value Ref Range    GFR (CKD-EPI) 77 >60 mL/min/1.73 m 2   URINALYSIS CULTURE, IF INDICATED    Collection Time: 12/24/24 12:31 PM    Specimen: Urine, Clean Catch   Result Value Ref Range    Color Yellow     Character Clear     Specific Gravity 1.014 <1.035    Ph 7.0 5.0 - 8.0    Glucose Negative Negative mg/dL    Ketones Negative Negative mg/dL    Protein Negative Negative mg/dL    Bilirubin Negative Negative    Urobilinogen, Urine 1.0 <=1.0 EU/dL    Nitrite Negative Negative    Leukocyte Esterase Negative Negative    Occult Blood Negative Negative    Micro Urine Req see below        Radiology  CT-HEAD W/O   Final Result      1.  Cerebral atrophy.      2.  White matter lucencies most consistent with small vessel ischemic change versus demyelination or gliosis.      3. Chronic areas of lacunar type infarction in noted in the left frontal periventricular white matter, right thalamus, and the left side of the hao..                 1:44 PM  Patient seen by [facility] psychiatry.  States they will attempt to call [facility] to see what the needs are for transport back to the facility.    3:09 PM  Pt will be likely sent for placement on Thursday.     Problem List    1. Aggressive behavior Acute               Electronically signed by: Parker Nayak D.O., 12/24/2024 1:43 PM

## 2024-12-24 NOTE — ED NOTES
Report received from PATEL Biggs. Respirations equal and unlabored on room air, pt sleeping on gurney. Safety sitter remains in line of sight for pt safety.

## 2024-12-24 NOTE — ED NOTES
Unable to obtain med rec at this time. Patient from [facility]. No MAR in chart. Requested MAR to be faxed

## 2024-12-24 NOTE — ED NOTES
Rounded on pt. Respirations equal and unlabored, remains connected to wall O2. No acute distress at this time. 1:1 in line of site of site of patient. Safety measures in place.

## 2024-12-24 NOTE — ED PROVIDER NOTES
ED Provider Note    CHIEF COMPLAINT  Chief Complaint   Patient presents with    ALOC     Pt BIB EMS from [facility] with Legal hold in place. Per report pt has been more confused/aggressive with staff/refusing to take medication.        EXTERNAL RECORDS REVIEWED  Patient seen in the ER on  for dementia with behavioral disturbance.  He was also seen  for agitation with a normal workup in the  for the same.    HPI/ROS  LIMITATION TO HISTORY   Select: Dementia  OUTSIDE HISTORIAN(S):  None    Joe Templeton is a 74 y.o. male who presents with agitation and confusion.  He lives at the [facility] after stroke.  His baseline mentation is alert and oriented to person however he has become increasingly agitated.  Patient has very little insight into exactly why he is here.  He denies any medical complaints to me.  He denies any head injury, headache, fevers or recent illness.  He is calm and cooperative.    PAST MEDICAL HISTORY   has a past medical history of Arthritis, Gout, Hemorrhoids (2014), Hypertension, Ingrown right big toenail (2015), Low back pain (2015), NSTEMI (non-ST elevated myocardial infarction) (HCC) (2016), and Vitamin D deficiency (2015).    SURGICAL HISTORY   has a past surgical history that includes multiple coronary artery bypass endo vein harvest (N/A, 2016) and irrigation & debridement general (Left, 2024).    FAMILY HISTORY  Family History   Problem Relation Age of Onset    Cancer Mother     Heart Disease Father     Hyperlipidemia Father     Hyperlipidemia Brother     Lung Disease Brother        SOCIAL HISTORY  Social History     Tobacco Use    Smoking status: Former     Current packs/day: 0.00     Average packs/day: 0.3 packs/day for 0.5 years (0.1 ttl pk-yrs)     Types: Cigarettes     Start date: 2015     Quit date: 2016     Years since quittin.4    Smokeless tobacco: Never  "  Substance and Sexual Activity    Alcohol use: No    Drug use: No     Comment: Hx of drug abuse-pot,meth, cocaine (None for 15 yrs) never injected drugs    Sexual activity: Not Currently       CURRENT MEDICATIONS  Home Medications       Reviewed by Gabriel Meek (Pharmacy Tech) on 12/23/24 at 2021  Med List Status: Unable to Obtain     Medication Last Dose Status   amLODIPine (NORVASC) 10 MG Tab  Active   aspirin (ASA) 81 MG Chew Tab chewable tablet  Active   atorvastatin (LIPITOR) 80 MG tablet  Active   hydrALAZINE (APRESOLINE) 100 MG tablet  Active   hydrOXYzine HCl (ATARAX) 25 MG Tab  Active   lisinopril (PRINIVIL) 40 MG tablet  Active   metoprolol SR (TOPROL XL) 100 MG TABLET SR 24 HR  Active   QUEtiapine (SEROQUEL) 50 MG tablet  Active   traZODone (DESYREL) 50 MG Tab  Active   vitamin D3 (CHOLECALCIFEROL) 1000 UNIT Tab  Active                  Audit from Redirected Encounters    **Home medications have not yet been reviewed for this encounter**         ALLERGIES  No Known Allergies    PHYSICAL EXAM  VITAL SIGNS: /58   Pulse 62   Temp 36.1 °C (97 °F) (Temporal)   Resp 16   Ht 1.753 m (5' 9\")   Wt 76.7 kg (169 lb)   SpO2 98%   BMI 24.96 kg/m²    Constitutional: Awake and alert. Non toxic  HENT: Normal inspection  Moist mucous membranes  Eyes: Normal inspection  Neck: Grossly normal range of motion.  Cardiovascular: Normal heart rate, Normal rhythm.   Thorax & Lungs: No respiratory distress  Abdomen: Soft, non-distended, nontender  Skin: No obvious rash.  Extremities: Warm, well perfused. No clubbing, cyanosis, edema   Neurologic: A&Ox 1.  No focal deficit   Psychiatric: He is calm and cooperative      EKG/LABS  Results for orders placed or performed during the hospital encounter of 12/23/24   CBC WITH DIFFERENTIAL    Collection Time: 12/23/24  5:14 PM   Result Value Ref Range    WBC 8.7 4.8 - 10.8 K/uL    RBC 3.84 (L) 4.70 - 6.10 M/uL    Hemoglobin 11.7 (L) 14.0 - 18.0 g/dL    Hematocrit 35.6 " (L) 42.0 - 52.0 %    MCV 92.7 81.4 - 97.8 fL    MCH 30.5 27.0 - 33.0 pg    MCHC 32.9 32.3 - 36.5 g/dL    RDW 46.1 35.9 - 50.0 fL    Platelet Count 276 164 - 446 K/uL    MPV 10.4 9.0 - 12.9 fL    Neutrophils-Polys 61.90 44.00 - 72.00 %    Lymphocytes 19.80 (L) 22.00 - 41.00 %    Monocytes 11.00 0.00 - 13.40 %    Eosinophils 6.10 0.00 - 6.90 %    Basophils 0.60 0.00 - 1.80 %    Immature Granulocytes 0.60 0.00 - 0.90 %    Nucleated RBC 0.00 0.00 - 0.20 /100 WBC    Neutrophils (Absolute) 5.36 1.82 - 7.42 K/uL    Lymphs (Absolute) 1.71 1.00 - 4.80 K/uL    Monos (Absolute) 0.95 (H) 0.00 - 0.85 K/uL    Eos (Absolute) 0.53 (H) 0.00 - 0.51 K/uL    Baso (Absolute) 0.05 0.00 - 0.12 K/uL    Immature Granulocytes (abs) 0.05 0.00 - 0.11 K/uL    NRBC (Absolute) 0.00 K/uL   COMP METABOLIC PANEL    Collection Time: 12/23/24  5:14 PM   Result Value Ref Range    Sodium 143 135 - 145 mmol/L    Potassium 4.2 3.6 - 5.5 mmol/L    Chloride 108 96 - 112 mmol/L    Co2 24 20 - 33 mmol/L    Anion Gap 11.0 7.0 - 16.0    Glucose 169 (H) 65 - 99 mg/dL    Bun 21 8 - 22 mg/dL    Creatinine 1.02 0.50 - 1.40 mg/dL    Calcium 9.2 8.5 - 10.5 mg/dL    Correct Calcium 9.4 8.5 - 10.5 mg/dL    AST(SGOT) 21 12 - 45 U/L    ALT(SGPT) 14 2 - 50 U/L    Alkaline Phosphatase 103 (H) 30 - 99 U/L    Total Bilirubin 0.2 0.1 - 1.5 mg/dL    Albumin 3.7 3.2 - 4.9 g/dL    Total Protein 6.6 6.0 - 8.2 g/dL    Globulin 2.9 1.9 - 3.5 g/dL    A-G Ratio 1.3 g/dL   LIPASE    Collection Time: 12/23/24  5:14 PM   Result Value Ref Range    Lipase 40 11 - 82 U/L   ESTIMATED GFR    Collection Time: 12/23/24  5:14 PM   Result Value Ref Range    GFR (CKD-EPI) 77 >60 mL/min/1.73 m 2           RADIOLOGY/PROCEDURES   I have independently interpreted the diagnostic imaging associated with this visit and am waiting the final reading from the radiologist.   My preliminary interpretation is as follows:  No large bleed    Radiologist interpretation:  CT-HEAD W/O   Final Result      1.   Cerebral atrophy.      2.  White matter lucencies most consistent with small vessel ischemic change versus demyelination or gliosis.      3. Chronic areas of lacunar type infarction in noted in the left frontal periventricular white matter, right thalamus, and the left side of the hao..                   COURSE & MEDICAL DECISION MAKING    ASSESSMENT, COURSE AND PLAN  Care Narrative: This is a 74-year-old who is here on a legal hold for agitation, confusion at his nursing facility.  On arrival he has normal vital signs and is calm and cooperative.  He has no focal neurologic deficit and appears to be at his neurologic baseline.  I did obtain a broad workup including labs and CT imaging.  His labs are unremarkable, no significant electrolyte derangements.  CT head without evidence of acute bleed or infarct.  Currently pending UA.  His agitation and confusion is more likely in the setting of underlying dementia and natural progression of disease rather than an acute process.     I did discuss with [facility].  She agreed that the patient does not meet criteria for psychiatric legal hold and therefore this was discontinued.  We were able to get in touch with his nursing facility and they will not accept him back until he is seen by psychiatry for medication readjustment.  Patient will therefore be placed into ED observation, pending psychiatry evaluation.    Patient did become quite agitated, restless in the ER.  He was given multiple doses of Ativan, Haldol Benadryl, patient reevaluated, sleeping comfortably.    ED OBS: Yes; I am placing the patient in to an observation status due to a diagnostic uncertainty as well as therapeutic intensity. Patient placed in observation status at 6:48 PM, 12/23/2024.     Observation plan is as follows: psych eval in the am              DISPOSITION AND DISCUSSIONS  I have discussed management of the patient with the following physicians and LAXMI's:  None    Discussion  of management with other [facility] or appropriate source(s): [facility]        FINAL DIAGNOSIS  1. Aggressive behavior Acute        Electronically signed by: Oksana Henry M.D., 12/23/2024 4:48 PM

## 2024-12-24 NOTE — ED NOTES
Report given to receiving Rn Sona. Respirations equal and unlabored on room air. Bed alarm in place. Safety sitter in line of sight for pt safety. Psychiatry at bedside.

## 2024-12-24 NOTE — ED NOTES
Rounded on pt. Respirations equal and unlabored. No acute distress at this time. 1:1 in line of site of site of patient. Safety measures in place.
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
## 2024-12-24 NOTE — ED NOTES
ERP at bedside. Pt alert, reports continued feelings of feeling tired. Pt oriented to self only. VSS on room air.

## 2024-12-24 NOTE — ED NOTES
Bedside report to Ada SWEENEY.  POC discussed with patient. Call light within reach, all needs addressed at this time.         Fall risk interventions in place: Not Applicable (all applicable per [facility] Fall risk assessment)   Continuous monitoring: Cardiac Leads, Pulse Ox, or Blood Pressure  IVF/IV medications: Not Applicable   Oxygen: Room Air  Bedside sitter: Not Applicable   Isolation: Not Applicable

## 2024-12-24 NOTE — ED NOTES
Med rec updated and complete. Allergies reviewed. Per SANDRA from [facility].    No anticoagulant noted. Last ASA dose 12/23/24    No antibiotics noted.    Home pharmacy   [facility] [phone]

## 2024-12-24 NOTE — ED NOTES
Pt still intermittently restless and agitated. Lights dimmed. Given warm blanket. Encouraging pt to rest

## 2024-12-24 NOTE — ED NOTES
Bedside report from Ana Delgadillo     Bedside report received from off going RN/tech: dat rn, assumed care of patient.  POC discussed with patient. Call light within reach, all needs addressed at this time.       Fall risk interventions in place: Move the patient closer to the nurse's station, Place socks on patient, Give patient urinal if applicable, Keep floor surfaces clean and dry, and Other (comment required) (all applicable per [facility] Fall risk assessment)   Continuous monitoring: Not Applicable   IVF/IV medications: Not Applicable   Oxygen: Room Air  Bedside sitter: 1:1 sitter L2k D/C  Isolation: Not Applicable

## 2024-12-25 PROBLEM — Z86.73 HISTORY OF STROKE: Status: ACTIVE | Noted: 2024-09-16

## 2024-12-25 LAB
EKG IMPRESSION: NORMAL
HIV 1+2 AB+HIV1 P24 AG SERPL QL IA: NORMAL
T PALLIDUM AB SER QL IA: NORMAL
T4 FREE SERPL-MCNC: 0.85 NG/DL (ref 0.93–1.7)
TSH SERPL-ACNC: 2.18 UIU/ML (ref 0.35–5.5)
VALPROATE SERPL-MCNC: 49.1 UG/ML (ref 50–100)

## 2024-12-25 PROCEDURE — 700102 HCHG RX REV CODE 250 W/ 637 OVERRIDE(OP): Mod: UD | Performed by: STUDENT IN AN ORGANIZED HEALTH CARE EDUCATION/TRAINING PROGRAM

## 2024-12-25 PROCEDURE — A9270 NON-COVERED ITEM OR SERVICE: HCPCS | Mod: UD | Performed by: STUDENT IN AN ORGANIZED HEALTH CARE EDUCATION/TRAINING PROGRAM

## 2024-12-25 PROCEDURE — 84439 ASSAY OF FREE THYROXINE: CPT

## 2024-12-25 PROCEDURE — 700102 HCHG RX REV CODE 250 W/ 637 OVERRIDE(OP): Performed by: STUDENT IN AN ORGANIZED HEALTH CARE EDUCATION/TRAINING PROGRAM

## 2024-12-25 PROCEDURE — 84443 ASSAY THYROID STIM HORMONE: CPT

## 2024-12-25 PROCEDURE — 700111 HCHG RX REV CODE 636 W/ 250 OVERRIDE (IP): Mod: JZ,UD | Performed by: STUDENT IN AN ORGANIZED HEALTH CARE EDUCATION/TRAINING PROGRAM

## 2024-12-25 PROCEDURE — 99231 SBSQ HOSP IP/OBS SF/LOW 25: CPT | Performed by: STUDENT IN AN ORGANIZED HEALTH CARE EDUCATION/TRAINING PROGRAM

## 2024-12-25 PROCEDURE — 93005 ELECTROCARDIOGRAM TRACING: CPT | Mod: TC | Performed by: STUDENT IN AN ORGANIZED HEALTH CARE EDUCATION/TRAINING PROGRAM

## 2024-12-25 PROCEDURE — 700111 HCHG RX REV CODE 636 W/ 250 OVERRIDE (IP): Mod: UD

## 2024-12-25 PROCEDURE — A9270 NON-COVERED ITEM OR SERVICE: HCPCS | Performed by: STUDENT IN AN ORGANIZED HEALTH CARE EDUCATION/TRAINING PROGRAM

## 2024-12-25 PROCEDURE — 96372 THER/PROPH/DIAG INJ SC/IM: CPT

## 2024-12-25 PROCEDURE — 86780 TREPONEMA PALLIDUM: CPT

## 2024-12-25 PROCEDURE — 36415 COLL VENOUS BLD VENIPUNCTURE: CPT

## 2024-12-25 PROCEDURE — G0475 HIV COMBINATION ASSAY: HCPCS

## 2024-12-25 PROCEDURE — 99284 EMERGENCY DEPT VISIT MOD MDM: CPT | Performed by: STUDENT IN AN ORGANIZED HEALTH CARE EDUCATION/TRAINING PROGRAM

## 2024-12-25 PROCEDURE — 80164 ASSAY DIPROPYLACETIC ACD TOT: CPT

## 2024-12-25 PROCEDURE — 96376 TX/PRO/DX INJ SAME DRUG ADON: CPT

## 2024-12-25 RX ORDER — ENOXAPARIN SODIUM 100 MG/ML
40 INJECTION SUBCUTANEOUS DAILY
Status: DISCONTINUED | OUTPATIENT
Start: 2024-12-25 | End: 2024-12-30 | Stop reason: HOSPADM

## 2024-12-25 RX ORDER — TRAZODONE HYDROCHLORIDE 50 MG/1
50 TABLET, FILM COATED ORAL
Status: DISCONTINUED | OUTPATIENT
Start: 2024-12-25 | End: 2024-12-30

## 2024-12-25 RX ORDER — DIPHENHYDRAMINE HYDROCHLORIDE 50 MG/ML
INJECTION INTRAMUSCULAR; INTRAVENOUS
Status: COMPLETED
Start: 2024-12-25 | End: 2024-12-25

## 2024-12-25 RX ORDER — DIVALPROEX SODIUM 500 MG/1
500 TABLET, DELAYED RELEASE ORAL 2 TIMES DAILY
Status: DISCONTINUED | OUTPATIENT
Start: 2024-12-25 | End: 2024-12-25

## 2024-12-25 RX ORDER — LORAZEPAM 2 MG/ML
0.5 INJECTION INTRAMUSCULAR EVERY 6 HOURS PRN
Status: DISCONTINUED | OUTPATIENT
Start: 2024-12-25 | End: 2024-12-25

## 2024-12-25 RX ORDER — HALOPERIDOL 5 MG/ML
1 INJECTION INTRAMUSCULAR ONCE
Status: COMPLETED | OUTPATIENT
Start: 2024-12-25 | End: 2024-12-25

## 2024-12-25 RX ORDER — HYDROXYZINE HYDROCHLORIDE 25 MG/1
25 TABLET, FILM COATED ORAL EVERY 6 HOURS PRN
Status: DISCONTINUED | OUTPATIENT
Start: 2024-12-25 | End: 2024-12-25

## 2024-12-25 RX ORDER — LORAZEPAM 2 MG/ML
0.5 INJECTION INTRAMUSCULAR ONCE
Status: COMPLETED | OUTPATIENT
Start: 2024-12-25 | End: 2024-12-25

## 2024-12-25 RX ORDER — DIVALPROEX SODIUM 500 MG/1
500 TABLET, DELAYED RELEASE ORAL DAILY
Status: DISCONTINUED | OUTPATIENT
Start: 2024-12-26 | End: 2024-12-26

## 2024-12-25 RX ORDER — LORAZEPAM 2 MG/ML
INJECTION INTRAMUSCULAR
Status: COMPLETED
Start: 2024-12-25 | End: 2024-12-25

## 2024-12-25 RX ORDER — HALOPERIDOL 5 MG/ML
1 INJECTION INTRAMUSCULAR EVERY 6 HOURS PRN
Status: DISCONTINUED | OUTPATIENT
Start: 2024-12-25 | End: 2024-12-30 | Stop reason: HOSPADM

## 2024-12-25 RX ORDER — LABETALOL HYDROCHLORIDE 5 MG/ML
10 INJECTION, SOLUTION INTRAVENOUS EVERY 4 HOURS PRN
Status: DISCONTINUED | OUTPATIENT
Start: 2024-12-25 | End: 2024-12-30 | Stop reason: HOSPADM

## 2024-12-25 RX ORDER — ACETAMINOPHEN 325 MG/1
650 TABLET ORAL EVERY 6 HOURS PRN
Status: DISCONTINUED | OUTPATIENT
Start: 2024-12-25 | End: 2024-12-30 | Stop reason: HOSPADM

## 2024-12-25 RX ORDER — HALOPERIDOL 5 MG/ML
INJECTION INTRAMUSCULAR
Status: COMPLETED
Start: 2024-12-25 | End: 2024-12-25

## 2024-12-25 RX ORDER — DIPHENHYDRAMINE HYDROCHLORIDE 50 MG/ML
25 INJECTION INTRAMUSCULAR; INTRAVENOUS ONCE
Status: COMPLETED | OUTPATIENT
Start: 2024-12-25 | End: 2024-12-25

## 2024-12-25 RX ORDER — HYDROXYZINE HYDROCHLORIDE 25 MG/1
25 TABLET, FILM COATED ORAL EVERY 6 HOURS PRN
Status: DISCONTINUED | OUTPATIENT
Start: 2024-12-25 | End: 2024-12-30 | Stop reason: HOSPADM

## 2024-12-25 RX ORDER — ARIPIPRAZOLE 2 MG/1
2 TABLET ORAL EVERY EVENING
Status: DISCONTINUED | OUTPATIENT
Start: 2024-12-25 | End: 2024-12-30 | Stop reason: HOSPADM

## 2024-12-25 RX ADMIN — TRAZODONE HYDROCHLORIDE 150 MG: 50 TABLET ORAL at 20:28

## 2024-12-25 RX ADMIN — METOPROLOL SUCCINATE 50 MG: 50 TABLET, EXTENDED RELEASE ORAL at 06:49

## 2024-12-25 RX ADMIN — ATORVASTATIN CALCIUM 80 MG: 80 TABLET, FILM COATED ORAL at 18:00

## 2024-12-25 RX ADMIN — HALOPERIDOL 1 MG: 5 INJECTION INTRAMUSCULAR at 18:45

## 2024-12-25 RX ADMIN — OLANZAPINE 10 MG: 5 TABLET, FILM COATED ORAL at 06:47

## 2024-12-25 RX ADMIN — Medication 1000 UNITS: at 06:49

## 2024-12-25 RX ADMIN — Medication 5 MG: at 20:28

## 2024-12-25 RX ADMIN — LORAZEPAM 0.5 MG: 2 INJECTION INTRAMUSCULAR at 18:45

## 2024-12-25 RX ADMIN — ASPIRIN 81 MG: 81 TABLET, CHEWABLE ORAL at 06:47

## 2024-12-25 RX ADMIN — DIVALPROEX SODIUM 500 MG: 500 TABLET, DELAYED RELEASE ORAL at 06:49

## 2024-12-25 RX ADMIN — ENOXAPARIN SODIUM 40 MG: 100 INJECTION SUBCUTANEOUS at 18:00

## 2024-12-25 RX ADMIN — LORAZEPAM 0.5 MG: 2 INJECTION INTRAMUSCULAR; INTRAVENOUS at 18:45

## 2024-12-25 RX ADMIN — OLANZAPINE 10 MG: 5 TABLET, FILM COATED ORAL at 17:30

## 2024-12-25 RX ADMIN — DIVALPROEX SODIUM 500 MG: 500 TABLET, DELAYED RELEASE ORAL at 20:28

## 2024-12-25 RX ADMIN — DIPHENHYDRAMINE HYDROCHLORIDE 25 MG: 50 INJECTION INTRAMUSCULAR; INTRAVENOUS at 18:45

## 2024-12-25 RX ADMIN — LISINOPRIL 40 MG: 20 TABLET ORAL at 06:48

## 2024-12-25 RX ADMIN — DIPHENHYDRAMINE HYDROCHLORIDE 25 MG: 50 INJECTION, SOLUTION INTRAMUSCULAR; INTRAVENOUS at 18:45

## 2024-12-25 RX ADMIN — AMLODIPINE BESYLATE 10 MG: 5 TABLET ORAL at 06:48

## 2024-12-25 RX ADMIN — ARIPIPRAZOLE 2 MG: 2 TABLET ORAL at 20:27

## 2024-12-25 RX ADMIN — HALOPERIDOL LACTATE 1 MG: 5 INJECTION, SOLUTION INTRAMUSCULAR at 18:45

## 2024-12-25 ASSESSMENT — COGNITIVE AND FUNCTIONAL STATUS - GENERAL
SUGGESTED CMS G CODE MODIFIER DAILY ACTIVITY: CK
EATING MEALS: A LITTLE
PERSONAL GROOMING: A LOT
DRESSING REGULAR UPPER BODY CLOTHING: A LITTLE
TOILETING: A LITTLE
WALKING IN HOSPITAL ROOM: A LITTLE
MOVING TO AND FROM BED TO CHAIR: A LITTLE
CLIMB 3 TO 5 STEPS WITH RAILING: A LOT
HELP NEEDED FOR BATHING: A LITTLE
DAILY ACTIVITIY SCORE: 18

## 2024-12-25 NOTE — ED NOTES
Bedside report received from prior RN. Pt resting. Resp normal/unlabored on RA. Bed side rails up/in low position.     All items removed from room for safety and to minimize risk and for safety. Verified that patient monitoring appropriate.        1:1 Observation. Continuous visual monitoring by Trained Personnel. Sitter has unobstructed view of patient at all times. Discussion with sitter about patient care, safety, and support.

## 2024-12-25 NOTE — ASSESSMENT & PLAN NOTE
Major neurocognitive disorder with agitation   Per chart review first noted to have encephalopathy 09/2024 which was attributed to septic knee. PCP note from 01/2024 didn't note any neurologic/ dementia disorders  Mental status changes have persisted during the 09/2024 hospitalization and at [facility] as well as SNF  During that admission found to have multiple old strokes as well as an cute brainstem stroke, which is likely the cause of his neurocognitive issues. B12 and thyroid were normal at the time  Will complete the work up and check RPR and HIV labs  He was started on Depakote, levels pending  Evaluated by psych and started on olanzapine   Remains agitated despite this, will need placement.  in ED following

## 2024-12-25 NOTE — PROGRESS NOTES
"ED Observation Progress Note    Date of Service: 12/25/24    Interval History and Interventions        Social work note below yesterday shows Psychiatri's main medical H just meant to try get the patient back.    Pt has been seen by Psychiatry, they have made medication adjustment. SW called and tried to talk to Admissions or DON and was told they are all on Vacation . SW unable to discuss with a staff member if they can accept Pt. Back.     Psychiatric note noted below      Plan:  Legal hold: No   Psychotropic medications:   No indication for change to current medication regimen  Continue Olanzapine 10mg BID, Trazodone 150mg QHS, Depakote 500mg BID  Labs reviewed  EKG: None recent, consider obtaining EKG  Old records ordered/reviewed/summarized  Unable to obtain collateral from facility: [facility]  Discussed the case with: SHABBIR Nayak DO; MIRELLA Rush DO  Psychiatry will sign off.        Physical Exam  /56   Pulse (!) 54   Temp 36.1 °C (97 °F) (Temporal)   Resp 18   Ht 1.753 m (5' 9\")   Wt 76.7 kg (169 lb)   SpO2 92%   BMI 24.96 kg/m² .    Constitutional: Awake and alert. Nontoxic  HENT:  Grossly normal  Eyes: Grossly normal  Neck: Normal range of motion  Cardiovascular: Normal heart rate   Thorax & Lungs: No respiratory distress  Abdomen: Nontender  Skin:  No pathologic rash.   Extremities: Well perfused  Psychiatric: Affect normal    Labs  Results for orders placed or performed during the hospital encounter of 12/23/24   CBC WITH DIFFERENTIAL    Collection Time: 12/23/24  5:14 PM   Result Value Ref Range    WBC 8.7 4.8 - 10.8 K/uL    RBC 3.84 (L) 4.70 - 6.10 M/uL    Hemoglobin 11.7 (L) 14.0 - 18.0 g/dL    Hematocrit 35.6 (L) 42.0 - 52.0 %    MCV 92.7 81.4 - 97.8 fL    MCH 30.5 27.0 - 33.0 pg    MCHC 32.9 32.3 - 36.5 g/dL    RDW 46.1 35.9 - 50.0 fL    Platelet Count 276 164 - 446 K/uL    MPV 10.4 9.0 - 12.9 fL    Neutrophils-Polys 61.90 44.00 - 72.00 %    Lymphocytes 19.80 (L) 22.00 - 41.00 %    Monocytes " 11.00 0.00 - 13.40 %    Eosinophils 6.10 0.00 - 6.90 %    Basophils 0.60 0.00 - 1.80 %    Immature Granulocytes 0.60 0.00 - 0.90 %    Nucleated RBC 0.00 0.00 - 0.20 /100 WBC    Neutrophils (Absolute) 5.36 1.82 - 7.42 K/uL    Lymphs (Absolute) 1.71 1.00 - 4.80 K/uL    Monos (Absolute) 0.95 (H) 0.00 - 0.85 K/uL    Eos (Absolute) 0.53 (H) 0.00 - 0.51 K/uL    Baso (Absolute) 0.05 0.00 - 0.12 K/uL    Immature Granulocytes (abs) 0.05 0.00 - 0.11 K/uL    NRBC (Absolute) 0.00 K/uL   COMP METABOLIC PANEL    Collection Time: 12/23/24  5:14 PM   Result Value Ref Range    Sodium 143 135 - 145 mmol/L    Potassium 4.2 3.6 - 5.5 mmol/L    Chloride 108 96 - 112 mmol/L    Co2 24 20 - 33 mmol/L    Anion Gap 11.0 7.0 - 16.0    Glucose 169 (H) 65 - 99 mg/dL    Bun 21 8 - 22 mg/dL    Creatinine 1.02 0.50 - 1.40 mg/dL    Calcium 9.2 8.5 - 10.5 mg/dL    Correct Calcium 9.4 8.5 - 10.5 mg/dL    AST(SGOT) 21 12 - 45 U/L    ALT(SGPT) 14 2 - 50 U/L    Alkaline Phosphatase 103 (H) 30 - 99 U/L    Total Bilirubin 0.2 0.1 - 1.5 mg/dL    Albumin 3.7 3.2 - 4.9 g/dL    Total Protein 6.6 6.0 - 8.2 g/dL    Globulin 2.9 1.9 - 3.5 g/dL    A-G Ratio 1.3 g/dL   LIPASE    Collection Time: 12/23/24  5:14 PM   Result Value Ref Range    Lipase 40 11 - 82 U/L   ESTIMATED GFR    Collection Time: 12/23/24  5:14 PM   Result Value Ref Range    GFR (CKD-EPI) 77 >60 mL/min/1.73 m 2   URINALYSIS CULTURE, IF INDICATED    Collection Time: 12/24/24 12:31 PM    Specimen: Urine, Clean Catch   Result Value Ref Range    Color Yellow     Character Clear     Specific Gravity 1.014 <1.035    Ph 7.0 5.0 - 8.0    Glucose Negative Negative mg/dL    Ketones Negative Negative mg/dL    Protein Negative Negative mg/dL    Bilirubin Negative Negative    Urobilinogen, Urine 1.0 <=1.0 EU/dL    Nitrite Negative Negative    Leukocyte Esterase Negative Negative    Occult Blood Negative Negative    Micro Urine Req see below        Radiology  CT-HEAD W/O   Final Result      1.  Cerebral  atrophy.      2.  White matter lucencies most consistent with small vessel ischemic change versus demyelination or gliosis.      3. Chronic areas of lacunar type infarction in noted in the left frontal periventricular white matter, right thalamus, and the left side of the hao..                   Problem List  1.  Agitation.  Patient has history of agitation.  Sent here.  Looks like a consult for legal hold was initiated.  There is possible need for increased Depakote.  Patient at this point required Benadryl Ativan Haldol for sedation.  At this morning the patient is sleeping but and to be off restraints got off restraints and according to staff now need to be redirected but is sleepy.  I also spoke to  to states that less likely that they will take about the patient who was sent here for primarily her agitation.      Plan:  Speak to psychiatry again regarding need to see if beneficial to admit the patient for medical management.    11:16 AM  We texted via Voalte.  At this point, they noted that the patient became agitated and we will consider Depakote level, change in medical management.  At this point, because patient has been here for several days and continues to not improve my recommendation is to have the patient admitted.  I will be admitting the patient for medical management for severe agitation    11:48 AM  Hospitalist and I texted back-and-forth.  They will not admit today but reconsider admission tomorrow.      Electronically signed by: Rush Hernandez M.D., 12/25/2024 9:58 AM

## 2024-12-25 NOTE — ED NOTES
Vitals obtained, pt comfortable, assessed and no incontinence, pt states does not need to use restroom

## 2024-12-25 NOTE — ED NOTES
Patient calm/cooperative, taking pills without difficulty, restraints d/c'd, given breakfast tray, sitter remains in place.

## 2024-12-25 NOTE — DISCHARGE PLANNING
ED Observation    Pt remains on ED Observation to monitor for continued behaviors. Pt has been seen by Psychiatry with medication adjustments. VELVET will follow up with [place] 12- to discus Pt returning to [place].

## 2024-12-25 NOTE — ED NOTES
Patient placed on hospital bed. Medicated per Mar for dialy medications that were previously held. Patient passed bedside swallow eval.

## 2024-12-25 NOTE — CONSULTS
"PSYCHIATRIC INTAKE EVALUATION: (new)  Reason for Admission: No admission diagnoses are documented for this encounter.  Reason for Consult: Legal Hold Evaluation  Requesting Provider: Oksana Henry M.D.   Legal Hold Status: discontinued  Chart reviewed.      CC:  Chief Complaint   Patient presents with    ALOC     Pt BIB EMS from [facility] with Legal hold in place. Per report pt has been more confused/aggressive with staff/refusing to take medication.       HPI:       Patient is a 74 year old male with no reported psychiatric Hx, PMH of CVA, cardiac condition, BPH, hyperlipidemia, HTN, gout; being assessed for behavioral disturbances with documented Dx of dementia. Documentation supports patient was at SNF and started striking out at staff, has documented Hx of similar occurrence. Staff requesting medication adjustment. Patient unable to provide any historical information based on current condition, majority of information obtained from chart review.     Patient observed resting, easy to way, appears restless with interaction, attempting to make needs known requiring multiple attempts at conformation of vocalized need.. Was able to determine the need to void urine, assisted by staff without incident, patient remains somewhat impulsive, requiring multiple prompts. Patient did not respond appropriately to most questions including known information like names of siblings, or residence. Remains oriented to self only.     Psychiatric ROS: MARQUES    Medical ROS (as pertinent): MARQUES      Psychiatric Examination:  Vitals: /66   Pulse 99   Temp 36.1 °C (97 °F) (Temporal)   Resp 16   Ht 1.753 m (5' 9\")   Wt 76.7 kg (169 lb)   SpO2 92%     General Appearance: observed in paper scrubs, disheveled, maintains poor eye contact with not able to participate meaningfully behavior  Abnormal Movements: akathesia  Gait/Posture: not observed  Speech: very little dialogue  Thought Process: MARQUES, did vocalize the need " "for toileting   Associations: None  Abnormal/Psychotic Thoughts: No e/o delusions, paranoia, or internal preoccupation  Judgement/Insight: unable to assess/unable to assess  Orientation: inattentive, only oriented to:, person  Recent/Remote Memory: Poor memory for chronology of events  Attention/Concentration: inattentive  Language: spontaneous   Fund of Knowledge: Unable to assess  Mood and Affect: \"ready\", Flat incongruent to stated mood  SI/HI: No overt Sx of SI/HI noted during encounter    Past Medical History:   Past Medical History:   Diagnosis Date    Arthritis     Gout     Hemorrhoids 11/25/2014    Reportedly Pt has had hemorrhoids for some time. He denies any bloody stools or bleeding hemorrhoids.     Hypertension     Ingrown right big toenail 05/20/2015    Low back pain 05/20/2015    NSTEMI (non-ST elevated myocardial infarction) (HCC) 06/28/2016    Vitamin D deficiency 01/06/2015 12/1/14 lab result show slightly low Vitamin D level= 26. He states he walks outside a lot in the sun until the past month.       Past Psychiatric History:  Previous Dx: MARQUES  Current medications: Zyprexa, Trazodone, Depakote  Previous medication trials: Seroquel, hydroxyzine  IP Hospitalizations: MARQUES  Suicide attempts/SH Bx: MARQUES   OP: MARQUES  Access to guns: NO access  Abuse/Trauma Hx: MARQUES    Family Psych Hx: MARQUES    Social Hx: Limited based on current condition  Education: MARQUES  Support: MARQUES  Has brother listed on file  Housing: [facility]  Financial: MARQUES    Substance: [facility]    Allergies:  Patient has no known allergies.     Labs: Reviewed  Recent Results (from the past 72 hours)   CBC WITH DIFFERENTIAL    Collection Time: 12/23/24  5:14 PM   Result Value Ref Range    WBC 8.7 4.8 - 10.8 K/uL    RBC 3.84 (L) 4.70 - 6.10 M/uL    Hemoglobin 11.7 (L) 14.0 - 18.0 g/dL    Hematocrit 35.6 (L) 42.0 - 52.0 %    MCV 92.7 81.4 - 97.8 fL    MCH 30.5 27.0 - 33.0 pg    MCHC 32.9 32.3 - 36.5 g/dL    RDW 46.1 " 35.9 - 50.0 fL    Platelet Count 276 164 - 446 K/uL    MPV 10.4 9.0 - 12.9 fL    Neutrophils-Polys 61.90 44.00 - 72.00 %    Lymphocytes 19.80 (L) 22.00 - 41.00 %    Monocytes 11.00 0.00 - 13.40 %    Eosinophils 6.10 0.00 - 6.90 %    Basophils 0.60 0.00 - 1.80 %    Immature Granulocytes 0.60 0.00 - 0.90 %    Nucleated RBC 0.00 0.00 - 0.20 /100 WBC    Neutrophils (Absolute) 5.36 1.82 - 7.42 K/uL    Lymphs (Absolute) 1.71 1.00 - 4.80 K/uL    Monos (Absolute) 0.95 (H) 0.00 - 0.85 K/uL    Eos (Absolute) 0.53 (H) 0.00 - 0.51 K/uL    Baso (Absolute) 0.05 0.00 - 0.12 K/uL    Immature Granulocytes (abs) 0.05 0.00 - 0.11 K/uL    NRBC (Absolute) 0.00 K/uL   COMP METABOLIC PANEL    Collection Time: 12/23/24  5:14 PM   Result Value Ref Range    Sodium 143 135 - 145 mmol/L    Potassium 4.2 3.6 - 5.5 mmol/L    Chloride 108 96 - 112 mmol/L    Co2 24 20 - 33 mmol/L    Anion Gap 11.0 7.0 - 16.0    Glucose 169 (H) 65 - 99 mg/dL    Bun 21 8 - 22 mg/dL    Creatinine 1.02 0.50 - 1.40 mg/dL    Calcium 9.2 8.5 - 10.5 mg/dL    Correct Calcium 9.4 8.5 - 10.5 mg/dL    AST(SGOT) 21 12 - 45 U/L    ALT(SGPT) 14 2 - 50 U/L    Alkaline Phosphatase 103 (H) 30 - 99 U/L    Total Bilirubin 0.2 0.1 - 1.5 mg/dL    Albumin 3.7 3.2 - 4.9 g/dL    Total Protein 6.6 6.0 - 8.2 g/dL    Globulin 2.9 1.9 - 3.5 g/dL    A-G Ratio 1.3 g/dL   LIPASE    Collection Time: 12/23/24  5:14 PM   Result Value Ref Range    Lipase 40 11 - 82 U/L   ESTIMATED GFR    Collection Time: 12/23/24  5:14 PM   Result Value Ref Range    GFR (CKD-EPI) 77 >60 mL/min/1.73 m 2   URINALYSIS CULTURE, IF INDICATED    Collection Time: 12/24/24 12:31 PM    Specimen: Urine, Clean Catch   Result Value Ref Range    Color Yellow     Character Clear     Specific Gravity 1.014 <1.035    Ph 7.0 5.0 - 8.0    Glucose Negative Negative mg/dL    Ketones Negative Negative mg/dL    Protein Negative Negative mg/dL    Bilirubin Negative Negative    Urobilinogen, Urine 1.0 <=1.0 EU/dL    Nitrite Negative  Negative    Leukocyte Esterase Negative Negative    Occult Blood Negative Negative    Micro Urine Req see below        Brain Imaging: Reviewed   CT-HEAD W/O   Final Result      1.  Cerebral atrophy.      2.  White matter lucencies most consistent with small vessel ischemic change versus demyelination or gliosis.      3. Chronic areas of lacunar type infarction in noted in the left frontal periventricular white matter, right thalamus, and the left side of the hao..                   Current Medications:  Scheduled Medications   Medication Dose Frequency    amLODIPine  10 mg DAILY    aspirin  81 mg DAILY    atorvastatin  80 mg Q EVENING    divalproex  500 mg BID    lisinopril  40 mg DAILY    metoprolol SR  50 mg DAILY    OLANZapine  10 mg BID    traZODone  150 mg Nightly    vitamin D3  1,000 Units DAILY       Assessment:   Patient is a 74 year old male with PMH of CVA, CAD s/p CABG, HTN, hyperlipidemia; without documented psychiatric Hx, currently being evaluated for reports of increased agitation while at placement facility, initially placed on legal hold, which was later decertified. Patient oriented to self, appear at baseline orientation per documentation. Able to respond to name, however unable to provide reliable Hx often repeating similar answers for subsequent questions. Appears able to make needs known. Patient presents with concerns for neurocognitive disorder; however, unable to fully engage in MMSE/MOCA testing to determine level of deficits. Documented Hx of dementia based on chart review.      Patient unable to vocalize protective factors despite prompting. Patient does NOT present an acute danger to self 2/2 mental health condition, workup appears to support major neurocognitive disorder with behavioral disturbances, has medications in place for psychosis, insomnia, and mood stabilization, appear at adequate dosages to manage behaviors, patient was without incident during the encounter despite some  elevation in mood 2/2 confusion, and disorientation. Documentation supports outburst requiring emergent medication administration while in ED, appears related to change in setting. Documentation support O2 desaturation during HS, supplemental O2 provided, patient could benefit from OP sleep study to determine need for supplemental O2 during HS to prevent increased termination insomnia, and increased confusion upon waking. Documentation support techniques including addressing identified needs, and milieu management have been effective while in the ED. Will consider adjusting Depakote to 750mg twice daily for mood if patient becomes dysregulated and unable to redirect while observed. Patient does not meet criteria for legal hold, could benefit from SW to determine additional options for placement if previous facility is concerns about ability to provide adequate care.     Dx:  Major neurocognitive disorder with behavioral disturbance    Medical: as noted by the medical treatment team.   Active Problems:    * No active hospital problems. *  Resolved Problems:    * No resolved hospital problems. *      Plan:  Legal hold: No   Psychotropic medications:   No indication for change to current medication regimen  Continue Olanzapine 10mg BID, Trazodone 150mg QHS, Depakote 500mg BID  Labs reviewed  EKG: None recent, consider obtaining EKG  Old records ordered/reviewed/summarized  Unable to obtain collateral from facility: [facility]  Discussed the case with: SHABBIR Nayak DO; MIRELLA Rush DO  Psychiatry will sign off.      Thank you for the consult.     This note was created using voice recognition software (Dragon). The accuracy of the dictation is limited by the abilities of the software. I have reviewed the note prior to signing. However, error related to voice recognition software and /or scribes may still exist and should be interpreted within the appropriate context.

## 2024-12-25 NOTE — ED PROVIDER NOTES
1710: Patient's nurse called me to the bedside because he is becoming increasingly agitated and aggressive.  He continues to get out of bed and cannot be verbally redirected.  He required IV Ativan, Haldol, and Benadryl 24 hours ago due to agitation and aggression.  I recommended giving his scheduled olanzapine early and reassessing.    1815: On my reassessment, he is still agitated despite olanzapine.  He required 2 point soft restraints.  He received Ativan 1 mg, Haldol 1 mg, Benadryl 50 mg.    1900: Patient is resting comfortably in bed.    0000: He was signed out to the oncoming ED physician Dr. Luis in stable condition pending placement.

## 2024-12-25 NOTE — ED NOTES
Pt ambulated with staff to bathroom, attempted to urinate, ambulated back to room with staff, ERP at bedside

## 2024-12-25 NOTE — ED NOTES
Report from PATEL Wylie.  Patient given HS medications crushed in applesauce, repositioned for comfort, 1:1 sitter in place.

## 2024-12-25 NOTE — ED NOTES
B52 given, patient continues to be agitated and aggressive. Patient confused, trying to hit staff, unable to be deescalate, staff assist called, bilateral wrist restraints placed.

## 2024-12-25 NOTE — ED NOTES
Bedside report given to PATEL Wylie. Sitter outside room. Patient still in 2 point restraints.   Patient on monitor and VSS

## 2024-12-25 NOTE — ED NOTES
Px incontinent of urine; cleaned and changed. Restraints still needed. Px aggressive, confused, agitated, verbally abusive.

## 2024-12-25 NOTE — CONSULTS
Hospital Medicine Consultation    Date of Service  12/25/2024    Referring Physician  Rush Hernandez, *    Consulting Physician  Hanane Curry M.D.    Reason for Consultation  Agitation     History of Presenting Illness  74 y.o. male who presented 12/23/2024 with agitation.  PMH of multifocal CVA, hypertension, dyslipidemia, CAD s/p CABG.  He was sent from SNF for agitation on 12/23.  He was admitted to this facility 09/2024 and was treated for septic knee and was also found to have multifocal acute and chronic strokes.  He was then discharged to [facility] and from there to SNF.  Per chart review he has been confused since initial presentation which was initially thought to be due a septic knee but that persisted in rehab and at SNF.  It appears he has been on valproic acid.  Per staff from SNF he is on baseline mentation which is oriented to self only but he has been a lot more agitated lately.    He was evaluated by psychiatry in the ED who diagnosed him with major neurocognitive disorder with behavioral disturbances and started him on olanzapine.    Review of Systems  Review of Systems   Unable to perform ROS: Mental acuity       Past Medical History   has a past medical history of Arthritis, Gout, Hemorrhoids (11/25/2014), Hypertension, Ingrown right big toenail (05/20/2015), Low back pain (05/20/2015), NSTEMI (non-ST elevated myocardial infarction) (HCC) (06/28/2016), and Vitamin D deficiency (01/06/2015).    Surgical History   has a past surgical history that includes multiple coronary artery bypass endo vein harvest (N/A, 6/28/2016) and irrigation & debridement general (Left, 9/14/2024).    Family History  family history includes Cancer in his mother; Heart Disease in his father; Hyperlipidemia in his brother and father; Lung Disease in his brother.    Social History   reports that he quit smoking about 8 years ago. His smoking use included cigarettes. He started smoking about 9 years ago. He has  a 0.1 pack-year smoking history. He has never used smokeless tobacco. He reports that he does not drink alcohol and does not use drugs.    Medications  Prior to Admission Medications   Prescriptions Last Dose Informant Patient Reported? Taking?   amLODIPine (NORVASC) 10 MG Tab 12/23/2024 Morning MAR from Other Facility No Yes   Sig: Take 1 Tablet by mouth every day.   aspirin (ASA) 81 MG Chew Tab chewable tablet 12/23/2024 Morning MAR from Other Facility No Yes   Sig: Chew 1 Tablet every day.   atorvastatin (LIPITOR) 80 MG tablet 12/22/2024 Bedtime MAR from Other Facility No Yes   Sig: Take 1 Tablet by mouth every evening.   divalproex (DEPAKOTE) 500 MG Tablet Delayed Response 12/23/2024 at  8:00 AM MAR from Other Facility Yes Yes   Sig: Take 500 mg by mouth 2 times a day.   hydrOXYzine HCl (ATARAX) 50 MG Tab 12/23/2024 at  2:00 PM MAR from Other Facility Yes Yes   Sig: Take 50 mg by mouth 3 times a day. Scheduled   0800  1400  2000   lisinopril (PRINIVIL) 40 MG tablet 12/23/2024 Morning MAR from Other Facility No Yes   Sig: Take 1 Tablet by mouth every day.   metoprolol SR (TOPROL XL) 50 MG TABLET SR 24 HR 12/23/2024 Morning MAR from Other Facility Yes Yes   Sig: Take 50 mg by mouth every day.   olanzapine (ZYPREXA) 10 MG tablet 12/23/2024 at  8:00 AM MAR from Other Facility Yes Yes   Sig: Take 10 mg by mouth 2 times a day.   traZODone (DESYREL) 150 MG Tab 12/22/2024 at  8:00 PM MAR from Other Facility Yes Yes   Sig: Take 150 mg by mouth every evening.   vitamin D3 (CHOLECALCIFEROL) 1000 UNIT Tab 12/23/2024 Morning MAR from Other Facility No Yes   Sig: Take 1 Tablet by mouth every day.      Facility-Administered Medications: None       Allergies  No Known Allergies    Physical Exam  Pulse:  [] 66  Resp:  [16-18] 16  BP: (105-172)/(50-93) 115/50  SpO2:  [88 %-100 %] 95 %    Physical Exam  Constitutional:       Appearance: Normal appearance.   HENT:      Head: Normocephalic and atraumatic.      Mouth/Throat:       Mouth: Mucous membranes are moist.      Pharynx: Oropharynx is clear. No oropharyngeal exudate or posterior oropharyngeal erythema.   Eyes:      General: No scleral icterus.  Cardiovascular:      Rate and Rhythm: Normal rate and regular rhythm.      Pulses: Normal pulses.      Heart sounds: Normal heart sounds. No murmur heard.  Pulmonary:      Effort: Pulmonary effort is normal. No respiratory distress.      Breath sounds: Normal breath sounds. No wheezing.   Abdominal:      Palpations: Abdomen is soft.      Tenderness: There is no abdominal tenderness.   Musculoskeletal:         General: No swelling or tenderness. Normal range of motion.      Cervical back: Normal range of motion.   Skin:     General: Skin is warm and dry.   Neurological:      Mental Status: He is alert.      Sensory: No sensory deficit.      Motor: No weakness.      Comments: AO x 1         Fluids  Date 12/25/24 0700 - 12/26/24 0659   Shift 3549-4754 0117-8720 3511-0635 24 Hour Total   INTAKE   Shift Total       OUTPUT   Urine 400   400   Shift Total 400   400   Weight (kg) 76.7 76.7 76.7 76.7       Laboratory  Recent Labs     12/23/24  1714   WBC 8.7   RBC 3.84*   HEMOGLOBIN 11.7*   HEMATOCRIT 35.6*   MCV 92.7   MCH 30.5   MCHC 32.9   RDW 46.1   PLATELETCT 276   MPV 10.4     Recent Labs     12/23/24  1714   SODIUM 143   POTASSIUM 4.2   CHLORIDE 108   CO2 24   GLUCOSE 169*   BUN 21   CREATININE 1.02   CALCIUM 9.2                     Imaging  CT-HEAD W/O   Final Result      1.  Cerebral atrophy.      2.  White matter lucencies most consistent with small vessel ischemic change versus demyelination or gliosis.      3. Chronic areas of lacunar type infarction in noted in the left frontal periventricular white matter, right thalamus, and the left side of the hao..                   Assessment/Plan  * Major neurocognitive disorder (HCC)- (present on admission)  Assessment & Plan  Major neurocognitive disorder with agitation   Per chart review first  noted to have encephalopathy 09/2024 which was attributed to septic knee. PCP note from 01/2024 didn't note any neurologic/ dementia disorders  Mental status changes have persisted during the 09/2024 hospitalization and at [facility] as well as SNF  During that admission found to have multiple old strokes as well as an cute brainstem stroke, which is likely the cause of his neurocognitive issues. B12 and thyroid were normal at the time  Will complete the work up and check RPR and HIV labs  He was started on Depakote, levels pending  Evaluated by psych and started on olanzapine   Remains agitated despite this, will need placement.  in ED following     History of CVA (cerebrovascular accident)- (present on admission)  Assessment & Plan  Hx of multifocal CVAs based on MRI 09/2024    Coronary artery disease involving native coronary artery of native heart without angina pectoris- (present on admission)  Assessment & Plan  Continue home meds     Mixed hyperlipidemia- (present on admission)  Assessment & Plan  Continue statin     Essential (primary) hypertension- (present on admission)  Assessment & Plan  Continue metoprolol, lisinopril, amlodipine

## 2024-12-25 NOTE — ED NOTES
Pt attempting to get out of bed, minor aggression, pt able to be redirected, assistance with staff to use urinal, given breakfast assistance with staff

## 2024-12-25 NOTE — ED NOTES
Patient getting anxious. Patient trying to get out of bed. Patient is redirectable at this time. Patient medicated per MAR.

## 2024-12-26 LAB — GLUCOSE BLD STRIP.AUTO-MCNC: 110 MG/DL (ref 65–99)

## 2024-12-26 PROCEDURE — 700102 HCHG RX REV CODE 250 W/ 637 OVERRIDE(OP): Mod: UD | Performed by: STUDENT IN AN ORGANIZED HEALTH CARE EDUCATION/TRAINING PROGRAM

## 2024-12-26 PROCEDURE — 99283 EMERGENCY DEPT VISIT LOW MDM: CPT | Mod: GC | Performed by: STUDENT IN AN ORGANIZED HEALTH CARE EDUCATION/TRAINING PROGRAM

## 2024-12-26 PROCEDURE — 700111 HCHG RX REV CODE 636 W/ 250 OVERRIDE (IP): Mod: JZ,UD | Performed by: STUDENT IN AN ORGANIZED HEALTH CARE EDUCATION/TRAINING PROGRAM

## 2024-12-26 PROCEDURE — 700111 HCHG RX REV CODE 636 W/ 250 OVERRIDE (IP): Mod: UD | Performed by: EMERGENCY MEDICINE

## 2024-12-26 PROCEDURE — A9270 NON-COVERED ITEM OR SERVICE: HCPCS | Mod: UD | Performed by: STUDENT IN AN ORGANIZED HEALTH CARE EDUCATION/TRAINING PROGRAM

## 2024-12-26 PROCEDURE — 82962 GLUCOSE BLOOD TEST: CPT

## 2024-12-26 PROCEDURE — 96376 TX/PRO/DX INJ SAME DRUG ADON: CPT

## 2024-12-26 PROCEDURE — 96372 THER/PROPH/DIAG INJ SC/IM: CPT

## 2024-12-26 PROCEDURE — A9270 NON-COVERED ITEM OR SERVICE: HCPCS | Mod: UD | Performed by: EMERGENCY MEDICINE

## 2024-12-26 PROCEDURE — 700102 HCHG RX REV CODE 250 W/ 637 OVERRIDE(OP): Mod: UD | Performed by: EMERGENCY MEDICINE

## 2024-12-26 RX ORDER — VALPROIC ACID 250 MG/5ML
500 SOLUTION ORAL DAILY
Status: DISCONTINUED | OUTPATIENT
Start: 2024-12-26 | End: 2024-12-30 | Stop reason: HOSPADM

## 2024-12-26 RX ORDER — LORAZEPAM 2 MG/ML
2 INJECTION INTRAMUSCULAR ONCE
Status: COMPLETED | OUTPATIENT
Start: 2024-12-26 | End: 2024-12-26

## 2024-12-26 RX ORDER — HALOPERIDOL 5 MG/ML
2 INJECTION INTRAMUSCULAR ONCE
Status: COMPLETED | OUTPATIENT
Start: 2024-12-26 | End: 2024-12-26

## 2024-12-26 RX ORDER — VALPROIC ACID 250 MG/5ML
750 SOLUTION ORAL EVERY EVENING
Status: DISCONTINUED | OUTPATIENT
Start: 2024-12-26 | End: 2024-12-30 | Stop reason: HOSPADM

## 2024-12-26 RX ADMIN — HALOPERIDOL LACTATE 2 MG: 5 INJECTION, SOLUTION INTRAMUSCULAR at 15:50

## 2024-12-26 RX ADMIN — AMLODIPINE BESYLATE 10 MG: 5 TABLET ORAL at 06:34

## 2024-12-26 RX ADMIN — ENOXAPARIN SODIUM 40 MG: 100 INJECTION SUBCUTANEOUS at 18:28

## 2024-12-26 RX ADMIN — Medication 1000 UNITS: at 06:34

## 2024-12-26 RX ADMIN — HYDROXYZINE HYDROCHLORIDE 25 MG: 25 TABLET ORAL at 13:08

## 2024-12-26 RX ADMIN — TRAZODONE HYDROCHLORIDE 150 MG: 50 TABLET ORAL at 20:23

## 2024-12-26 RX ADMIN — LISINOPRIL 40 MG: 20 TABLET ORAL at 06:34

## 2024-12-26 RX ADMIN — ATORVASTATIN CALCIUM 80 MG: 80 TABLET, FILM COATED ORAL at 18:27

## 2024-12-26 RX ADMIN — ASPIRIN 81 MG: 81 TABLET, CHEWABLE ORAL at 06:34

## 2024-12-26 RX ADMIN — Medication 5 MG: at 20:23

## 2024-12-26 RX ADMIN — HALOPERIDOL LACTATE 1 MG: 5 INJECTION, SOLUTION INTRAMUSCULAR at 15:27

## 2024-12-26 RX ADMIN — VALPROIC ACID 750 MG: 500 SOLUTION ORAL at 22:02

## 2024-12-26 RX ADMIN — METOPROLOL SUCCINATE 50 MG: 50 TABLET, EXTENDED RELEASE ORAL at 06:34

## 2024-12-26 RX ADMIN — VALPROIC ACID 500 MG: 500 SOLUTION ORAL at 09:15

## 2024-12-26 RX ADMIN — LORAZEPAM 2 MG: 2 INJECTION INTRAMUSCULAR; INTRAVENOUS at 16:44

## 2024-12-26 RX ADMIN — ARIPIPRAZOLE 2 MG: 2 TABLET ORAL at 18:27

## 2024-12-26 NOTE — ED NOTES
Bedside report received from off going RN/tech: JULIET RN, assumed care of patient.  Pt is asleep, respirations even and unlabored. Call light within reach, in  low position,     Fall risk interventions in place: Move the patient closer to the nurse's station, Patient's personal possessions are with in their safe reach, Place socks on patient, Place fall risk sign on patient's door, Give patient urinal if applicable, Keep floor surfaces clean and dry, Accompanied to restroom, and Human-sitter if tele-sitter fails (all applicable per [facility] Fall risk assessment)   Continuous monitoring: Pulse Ox or Blood Pressure  IVF/IV medications: Not Applicable   Oxygen: Room Air  Bedside sitter: Pt on L2k SI with 1:1 sitter JULEE (name)  Isolation: Not Applicable

## 2024-12-26 NOTE — ED NOTES
Pt. Remains agitated and attempting to swing at staff, not redirectable at this time despite IM med admin. Discussed with ERP.

## 2024-12-26 NOTE — ED NOTES
Pt. Medicated per MAR.  Repositioned on bed and spo2 and b/p monitors placed.  Pt. Continued to attempt to swing at staff prior to med admin.  Pt. Has calmed down at this time.

## 2024-12-26 NOTE — ED NOTES
Pt. Was medicated for agitation, after eating pt. Was given warm blanket and lights were turned off.  Pt. Has been resting on bed with no distress since.

## 2024-12-26 NOTE — ED NOTES
Patient is on bed,asleep,  Respirations even and unlabored. Bed in Low position. sitter at bedside in direct view of the patient, safety needs met.

## 2024-12-26 NOTE — ED NOTES
Pt. Was attempting to get out of bed and stating that he needed to leave.  RN assisted pt. Back to bed and positioned for comfort.  Pt. Given sandwich and water ans is now eating sitting up in bed.  Able to re-direct pt. And get him to cooperate.

## 2024-12-26 NOTE — ED NOTES
Received bedside report from PATEL Chaves to assume care of pt.  Pt. Resting on hospital bed with eyes closed, respirations even, non-labored.  1:1 sitter in direct line of sight.  Safety measures observed.
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
## 2024-12-26 NOTE — ED NOTES
Pt is asleep at this time, wake up as approached. POC bgl 110 mg/dl, morning meds offered to pt, but pt is inattentive and went back to sleep.

## 2024-12-26 NOTE — ED NOTES
Bedside report from Sarah SWEENEY. Pt able to finish taking morning meds with eggs during breakfast. Pt ate ~25% of food on plate. Lights turned on for day cycle to help with delirium. 1:1 sitter in place.

## 2024-12-26 NOTE — DISCHARGE PLANNING
MSW spoke to Johanna at [facility]. MSW updated her that pt was seen by psych and they are working on medication adjustments. They would like to see a few days of calm behaviors from pt prior to coming back. Will continue the next few days to monitor and update [place] on progress.

## 2024-12-26 NOTE — PROGRESS NOTES
"ED Observation Progress Note    Date of Service: 12/26/24    Interval History and Interventions  74-year-old male who is pending placement.  He came in initially agitated required Benadryl Ativan Haldol for sedation.  He lives at [facility] after stroke however became too agitated there.  He had initial workup with diagnostic labs and CT of his head that were unrevealing.  He is not on a legal hold as he did not meet criteria but he has been requiring medications for sedation and psychiatry consultation for medication adjustment    Physical Exam  BP (!) 141/63   Pulse 82   Temp 36.1 °C (97 °F) (Temporal)   Resp 16   Ht 1.753 m (5' 9\")   Wt 76.7 kg (169 lb)   SpO2 92%   BMI 24.96 kg/m² .    Constitutional: Awake and alert. Nontoxic  HENT:  Grossly normal  Eyes: Grossly normal  Neck: Normal range of motion  Cardiovascular: Normal heart rate   Thorax & Lungs: No res  Skin:  No pathologic rash.   Extremities: Well perfused  Psychiatric: Affect normal    Labs  Results for orders placed or performed during the hospital encounter of 12/23/24   CBC WITH DIFFERENTIAL    Collection Time: 12/23/24  5:14 PM   Result Value Ref Range    WBC 8.7 4.8 - 10.8 K/uL    RBC 3.84 (L) 4.70 - 6.10 M/uL    Hemoglobin 11.7 (L) 14.0 - 18.0 g/dL    Hematocrit 35.6 (L) 42.0 - 52.0 %    MCV 92.7 81.4 - 97.8 fL    MCH 30.5 27.0 - 33.0 pg    MCHC 32.9 32.3 - 36.5 g/dL    RDW 46.1 35.9 - 50.0 fL    Platelet Count 276 164 - 446 K/uL    MPV 10.4 9.0 - 12.9 fL    Neutrophils-Polys 61.90 44.00 - 72.00 %    Lymphocytes 19.80 (L) 22.00 - 41.00 %    Monocytes 11.00 0.00 - 13.40 %    Eosinophils 6.10 0.00 - 6.90 %    Basophils 0.60 0.00 - 1.80 %    Immature Granulocytes 0.60 0.00 - 0.90 %    Nucleated RBC 0.00 0.00 - 0.20 /100 WBC    Neutrophils (Absolute) 5.36 1.82 - 7.42 K/uL    Lymphs (Absolute) 1.71 1.00 - 4.80 K/uL    Monos (Absolute) 0.95 (H) 0.00 - 0.85 K/uL    Eos (Absolute) 0.53 (H) 0.00 - 0.51 K/uL    Baso (Absolute) " 0.05 0.00 - 0.12 K/uL    Immature Granulocytes (abs) 0.05 0.00 - 0.11 K/uL    NRBC (Absolute) 0.00 K/uL   COMP METABOLIC PANEL    Collection Time: 12/23/24  5:14 PM   Result Value Ref Range    Sodium 143 135 - 145 mmol/L    Potassium 4.2 3.6 - 5.5 mmol/L    Chloride 108 96 - 112 mmol/L    Co2 24 20 - 33 mmol/L    Anion Gap 11.0 7.0 - 16.0    Glucose 169 (H) 65 - 99 mg/dL    Bun 21 8 - 22 mg/dL    Creatinine 1.02 0.50 - 1.40 mg/dL    Calcium 9.2 8.5 - 10.5 mg/dL    Correct Calcium 9.4 8.5 - 10.5 mg/dL    AST(SGOT) 21 12 - 45 U/L    ALT(SGPT) 14 2 - 50 U/L    Alkaline Phosphatase 103 (H) 30 - 99 U/L    Total Bilirubin 0.2 0.1 - 1.5 mg/dL    Albumin 3.7 3.2 - 4.9 g/dL    Total Protein 6.6 6.0 - 8.2 g/dL    Globulin 2.9 1.9 - 3.5 g/dL    A-G Ratio 1.3 g/dL   LIPASE    Collection Time: 12/23/24  5:14 PM   Result Value Ref Range    Lipase 40 11 - 82 U/L   ESTIMATED GFR    Collection Time: 12/23/24  5:14 PM   Result Value Ref Range    GFR (CKD-EPI) 77 >60 mL/min/1.73 m 2   URINALYSIS CULTURE, IF INDICATED    Collection Time: 12/24/24 12:31 PM    Specimen: Urine, Clean Catch   Result Value Ref Range    Color Yellow     Character Clear     Specific Gravity 1.014 <1.035    Ph 7.0 5.0 - 8.0    Glucose Negative Negative mg/dL    Ketones Negative Negative mg/dL    Protein Negative Negative mg/dL    Bilirubin Negative Negative    Urobilinogen, Urine 1.0 <=1.0 EU/dL    Nitrite Negative Negative    Leukocyte Esterase Negative Negative    Occult Blood Negative Negative    Micro Urine Req see below    T.PALLIDUM AB VIKKI (SCREENING)    Collection Time: 12/25/24  5:14 PM   Result Value Ref Range    Syphilis, Treponemal Qual Non-Reactive Non-Reactive   HIV AG/AB COMBO ASSAY SCREENING    Collection Time: 12/25/24  5:14 PM   Result Value Ref Range    HIV Ag/Ab Combo Assay Non-Reactive Non Reactive   TSH    Collection Time: 12/25/24  5:14 PM   Result Value Ref Range    TSH 2.180 0.350 - 5.500 uIU/mL   FREE THYROXINE    Collection Time:  24  5:14 PM   Result Value Ref Range    Free T-4 0.85 (L) 0.93 - 1.70 ng/dL   VALPROIC ACID    Collection Time: 24  5:19 PM   Result Value Ref Range    Valproic Acid 49.1 (L) 50.0 - 100.0 ug/mL   EKG    Collection Time: 24  6:26 PM   Result Value Ref Range    Report       [facility] Emergency Dept.    Test Date:  2024  Pt Name:    LISA LÓPEZ                 Department: ER  MRN:        [id number]                      Room:       [facility]  Gender:     Male                         Technician: [id number]  :        1950                   Requested By:ALYSSA JUDGE  Order #:    [id number]                    Reading MD:    Measurements  Intervals                                Axis  Rate:       81                           P:          2  IN:         138                          QRS:        -16  QRSD:       129                          T:          -30  QT:         391  QTc:        454    Interpretive Statements  Sinus rhythm  Atrial premature complex  Biatrial enlargement  IVCD, consider atypical RBBB  Inferior infarct, age indeterminate  Anterior infarct, age indeterminate  Artifact in lead(s) I,II,III,aVR,aVF,V1,V2,V5 and baseline wander in lead(s)  V4,V5  Compared to ECG 2024 10:01:38  Atrial premature complex(es) now  present  Sinus tachycardia no longer present  Left ventricular hypertrophy no longer present  Prolonged QT interval no longer present  Myocardial infarct finding still present         Radiology  CT-HEAD W/O   Final Result      1.  Cerebral atrophy.      2.  White matter lucencies most consistent with small vessel ischemic change versus demyelination or gliosis.      3. Chronic areas of lacunar type infarction in noted in the left frontal periventricular white matter, right thalamus, and the left side of the hao..                   Problem List  1.  Agitation  Per psychiatry recommendations    *Medication Managment:   Increase Depakote to 500 mg QD  and 750 mg QHS for major NCD with behavioral disturbance  STOP zyprexa, has been ineffective at high doses, start Abilify 2 mg daily for major NCD with behavioral disturbance  Start melatonin 5 mg at bedtime for insomnia,   Continue trazodone 150 mg QHS for insomnia, may use and additional trazodone 50 mg QHS PRN for insomnia  Start vistaril 25 mg q6H PRN for agitation/aggression. May use IM Haldol 1 mg q6H as needed for severe agitation or aggression (second line to hydroxyzine, haldol has been effective so far per nursing staff)    2.  History of stroke    Electronically signed by: Jeremy Dumont M.D., 12/26/2024 6:17 AM

## 2024-12-26 NOTE — ED NOTES
When attempting to obtain outstanding routine labwork through IV the tubing was noted to be damaged. IV tested for patency and new extension tubing placed.

## 2024-12-26 NOTE — CONSULTS
PSYCHIATRIC FOLLOW-UP:(established)  *Reason for admission:     No admission diagnoses are documented for this encounter.  *Legal Hold Status on Admission:     No and  N/A (does not have capacity)  Chart reviewed.         *HPI:        Pt  with PMHx of CVA, HTN, HLD, CAD s/p CABG, presented 12/23, brought in from [facility] (with legal hold in place) d/t being more confused/aggressive with staff and refusing to take medication.  Oriented only to self which appeared to be his baseline.     Pt was seen in ER on 11/29, 12/17 and 12/21 prior to this presentation, for agitation, AMS and psych eval.    Pt was Dc'd from [facility] 10/7/24 after CVA, then sent to SNF.      CT head w/o showed cerebral atrophy, white matter lucencies and chronic areas of lacunar infarction in L frontal periventricular white matter, R thalamus and L hao.   Labs were unremarkable.     deemed pt to not meet criteria for legal hold.  [place] would not accept him back until he had psych eval and med adjustment.    Pt required multiple doses of Ativan, haldol and Benadryl in ED d/t agitation.      Home meds on presentation: amlodipine, aspirin, atorvastatin, hydralazine, hydroxyzine, lisinopril, metoprolol, quetiapine 50mg, trazodone 50mg, Depakote 500mg BID.      Psych first saw pt 12/24 - pt responded to name but did not supply reliable history.  Did make needs known (bathroom).  Unable to be properly assessed for neurocognitive disorder with MMSE/MOCA d/t mental state and noncompliance, but workup supported major neurocognitive disorder.  Deemed to not present acute danger to self d/t mental condition.     Interval/Subjective:  Dr. Loaiza saw pt yesterday and spoke to his nephew who noted he had a hx of substance abuse and irritability/aggression prior to onset of dementia.    Yesterday, Dr. Loaiza increased Depakote to 500mg Qday and 750 mg QHS. Stopped zyprexa d/t being ineffective at high doses.  Started Abilify daily 2mg. Will continue  "trazodone 150mg QHS for insomnia and PRN 50mg additonal.  Start vistaril 25mg Q6H PRN for agitation.  PRN IM Haldol 1mg PRN.    Depakote level returned slightly low at 49.1.  EKG showed Qtc of 454    Pt has been sleeping this am per sitter and has not been agitated.    Pt awoke to light stimulation, was both unable and unwilling to answer my questions.  Refused to fully open eyes for me.  Knows he is in [place], unable to tell me anything further about location, unsure why he is here, does not respond to year.    Medical ROS (as pertinent):     ROS    Vasculopathies - s/p CVA and CABG    *Psychiatric Examination:  Vitals:    12/26/24 0800   BP: (!) 145/92   Pulse: 80   Resp: 18   Temp: 36.3 °C (97.4 °F)   SpO2: 94%       General:   - Grooming and hygiene: Appropriate hygiene and grooming for medical situation  - Apparent distress: NAD   - Behavior: Calm but noncompliant  - Eye Contact: poor: half open or closed  - no psychomotor agitation or retardation  - Participation: minimal  Orientation: oriented to person, not fully oriented to place (\"christian\"), not oriented to situation, not oriented to year/noncompliant  Mood: \"apathetic\"  Affect: Restricted  Thought Process: Unable to fully assess d/t pts non-compliance and lack of capacity, expresses minimal thought process  Thought Content: Does not express suicidal or homicidal ideations, intent or plan (but does not fully answer questions).  No obvious evidence of paranoia  Perception: Does not express or demonstrate auditory or visual hallucinations. No delusions noted   Attention span and concentration: Not-intact   Speech: whispers short or incomplete answers  Language: fluent and coherent but with poverty of speech  Insight: Unable to assess, would deduce poor  Judgment: Unable to fully assess, would deduce poor  Recent and remote memory: Appears not intact (pt not able to express why he is here or who brought him here)      Current Medications:  Current " Facility-Administered Medications   Medication Dose Route Frequency Provider Last Rate Last Admin    valproic acid (Depakene) IR oral solution 500 mg  500 mg Oral DAILY Jeremy Dumont M.D.        And    valproic acid (Depakene) IR oral solution 750 mg  750 mg Oral Q EVENING Jeremy Dumont M.D.        enoxaparin (Lovenox) inj 40 mg  40 mg Subcutaneous DAILY AT 1800 Hanane Curry M.D.   40 mg at 12/25/24 1800    acetaminophen (Tylenol) tablet 650 mg  650 mg Oral Q6HRS PRN Hanane Curry M.D.        labetalol (Normodyne/Trandate) injection 10 mg  10 mg Intravenous Q4HRS PRN Hanane Curry M.D.        melatonin tablet 5 mg  5 mg Oral Nightly Johanne Camejo D.O.   5 mg at 12/25/24 2028    ARIPiprazole (Abilify) tablet 2 mg  2 mg Oral Q EVENING Johanne Camejo D.O.   2 mg at 12/25/24 2027    hydrOXYzine HCl (Atarax) tablet 25 mg  25 mg Oral Q6HRS PRN Johanne Camejo D.O.        haloperidol lactate (Haldol) injection 1 mg  1 mg Intramuscular Q6HRS PRN Johanne Camejo D.O.        traZODone (Desyrel) tablet 50 mg  50 mg Oral QHS PRN Johanne Camejo D.O.        amLODIPine (Norvasc) tablet 10 mg  10 mg Oral DAILY Adolph Mercado M.D.   10 mg at 12/26/24 0634    aspirin (Asa) chewable tab 81 mg  81 mg Oral DAILY Adolph Mercado M.D.   81 mg at 12/26/24 0634    atorvastatin (Lipitor) tablet 80 mg  80 mg Oral Q EVENING Adolph Mercado M.D.   80 mg at 12/25/24 1800    lisinopril (Prinivil) tablet 40 mg  40 mg Oral DAILY Adolph Mercado M.D.   40 mg at 12/26/24 0634    metoprolol SR (Toprol XL) tablet 50 mg  50 mg Oral DAILY Adolph Mercado M.D.   50 mg at 12/26/24 0634    traZODone (Desyrel) tablet 150 mg  150 mg Oral Nightly Johanne Camejo D.O.   150 mg at 12/25/24 2028    vitamin D3 (Cholecalciferol) tablet 1,000 Units  1,000 Units Oral DAILY Adolph Mercado M.D.   1,000 Units at 12/26/24 0634     Current Outpatient Medications   Medication Sig Dispense Refill     metoprolol SR (TOPROL XL) 50 MG TABLET SR 24 HR Take 50 mg by mouth every day.      hydrOXYzine HCl (ATARAX) 50 MG Tab Take 50 mg by mouth 3 times a day. Scheduled   0800  1400  2000      olanzapine (ZYPREXA) 10 MG tablet Take 10 mg by mouth 2 times a day.      divalproex (DEPAKOTE) 500 MG Tablet Delayed Response Take 500 mg by mouth 2 times a day.      traZODone (DESYREL) 150 MG Tab Take 150 mg by mouth every evening.      vitamin D3 (CHOLECALCIFEROL) 1000 UNIT Tab Take 1 Tablet by mouth every day. 30 Tablet 2    atorvastatin (LIPITOR) 80 MG tablet Take 1 Tablet by mouth every evening.      lisinopril (PRINIVIL) 40 MG tablet Take 1 Tablet by mouth every day.      amLODIPine (NORVASC) 10 MG Tab Take 1 Tablet by mouth every day.      aspirin (ASA) 81 MG Chew Tab chewable tablet Chew 1 Tablet every day. 100 Tablet 1        Allergies:  Patient has no known allergies.       Labs personally reviewed:   Recent Results (from the past 24 hours)   T.PALLIDUM AB VIKKI (SCREENING)    Collection Time: 24  5:14 PM   Result Value Ref Range    Syphilis, Treponemal Qual Non-Reactive Non-Reactive   HIV AG/AB COMBO ASSAY SCREENING    Collection Time: 24  5:14 PM   Result Value Ref Range    HIV Ag/Ab Combo Assay Non-Reactive Non Reactive   TSH    Collection Time: 24  5:14 PM   Result Value Ref Range    TSH 2.180 0.350 - 5.500 uIU/mL   FREE THYROXINE    Collection Time: 24  5:14 PM   Result Value Ref Range    Free T-4 0.85 (L) 0.93 - 1.70 ng/dL   VALPROIC ACID    Collection Time: 24  5:19 PM   Result Value Ref Range    Valproic Acid 49.1 (L) 50.0 - 100.0 ug/mL   EKG    Collection Time: 24  6:26 PM   Result Value Ref Range    Report       [facility] Emergency Dept.    Test Date:  2024  Pt Name:    LISA LÓPEZ                 Department: ER  MRN:        [id number]                      Room:       [facility]  Gender:     Male                         Technician: [id number]  :         1950                   Requested By:ALYSSA JUDGE  Order #:    [id number]                    Reading MD:    Measurements  Intervals                                Axis  Rate:       81                           P:          2  VA:         138                          QRS:        -16  QRSD:       129                          T:          -30  QT:         391  QTc:        454    Interpretive Statements  Sinus rhythm  Atrial premature complex  Biatrial enlargement  IVCD, consider atypical RBBB  Inferior infarct, age indeterminate  Anterior infarct, age indeterminate  Artifact in lead(s) I,II,III,aVR,aVF,V1,V2,V5 and baseline wander in lead(s)  V4,V5  Compared to ECG 2024 10:01:38  Atrial premature complex(es) now  present  Sinus tachycardia no longer present  Left ventricular hypertrophy no longer present  Prolonged QT interval no longer present  Myocardial infarct finding still present     POCT glucose device results    Collection Time: 24  6:39 AM   Result Value Ref Range    POC Glucose, Blood 110 (H) 65 - 99 mg/dL          *EKG:   Results for orders placed or performed during the hospital encounter of 24   EKG   Result Value Ref Range    Report       [facility] Emergency Dept.    Test Date:  2024  Pt Name:    LISA LÓPEZ                 Department: ER  MRN:        [id number]                      Room:       [facility]  Gender:     Male                         Technician: [id number]  :        1950                   Requested By:ALYSSA JUDGE  Order #:    [id number]                    Reading MD:    Measurements  Intervals                                Axis  Rate:       81                           P:          2  VA:         138                          QRS:        -16  QRSD:       129                          T:          -30  QT:         391  QTc:        454    Interpretive Statements  Sinus rhythm  Atrial premature complex  Biatrial enlargement  IVCD,  consider atypical RBBB  Inferior infarct, age indeterminate  Anterior infarct, age indeterminate  Artifact in lead(s) I,II,III,aVR,aVF,V1,V2,V5 and baseline wander in lead(s)  V4,V5  Compared to ECG 09/18/2024 10:01:38  Atrial premature complex(es) now  present  Sinus tachycardia no longer present  Left ventricular hypertrophy no longer present  Prolonged QT interval no longer present  Myocardial infarct finding still present         *Imaging:   CT-HEAD W/O   Final Result      1.  Cerebral atrophy.      2.  White matter lucencies most consistent with small vessel ischemic change versus demyelination or gliosis.      3. Chronic areas of lacunar type infarction in noted in the left frontal periventricular white matter, right thalamus, and the left side of the hao..                    EEG:  not done     *ASSESSMENT:   Mr. Templeton is a 75 yo male seen for followup of major NCD with Behavioral disturbance, admitted to ED observation 12/23.     Pt is oriented to self only at baseline, does not provide reliable history.  Pt has been aggressive in distant and recent past prior to this med adjustment started pm of 12/25.          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    Continue Abilify 2 mg daily (started 12/25) for major NCD with behavioral disturbance - monitor for akithisia   Continue melatonin 5 mg at bedtime for insomnia,   Continue trazodone 150 mg QHS for insomnia, may use and additional trazodone 50 mg QHS PRN for insomnia  Continue vistaril 25 mg q6H PRN for agitation/aggression. May use IM Haldol 1 mg q6H as needed for severe agitation or aggression (second line to hydroxyzine, haldol has been effective so far per nursing staff)  Pt has not shown signs of agitation in the last 12 hours on new medication regimen.  CTM for agitation and  orthostatic hypotension.  Plan to return to [facility] when stable and agreed upon.      *Labs Reviewed  *Imaging Reviewed  *Medical Tests Reviewed  *Imaging:Review  *Discussed with: Dr. Rush     Yesterday on 12/25, Dr. Loaiza spoke with patient's nephew (POA) who agreed with above plan        Will follow  Thank you for the consult.

## 2024-12-26 NOTE — ED NOTES
PT becoming agitated and aggressive, attempted to stand up and then attempting to punch and bite staff when they attempt to re-direct PT back into bed. ERP contacted and medications ordered.

## 2024-12-26 NOTE — ED NOTES
Pt is asleep at this time, BP cuff rearranged, pt moves and changed position , went back to sleep.

## 2024-12-26 NOTE — ED NOTES
Pt. Became very agitated and was attempting to get out of bed.  Pt. Began swinging fists at staff attempting to get him back to bed.  Pt. Very unsteady on his feet.  4 staff required to get pt. Back to bed.  Medicated per MAR for agitation.  Given peanut butter and jelly sandwich and juice.  Pt. Eating now.

## 2024-12-26 NOTE — ED NOTES
PT. Got out of bed and was assisted back into bed and repositioned for comfort.  1:1 sitter remains in direct line of sight.  Pt. Is redirectable at this time.  Denies needs.

## 2024-12-26 NOTE — ED NOTES
Patient is on bed, Respirations even and unlabored. Bed in Low position. sitter at bedside in direct view of the patient, safety needs met.

## 2024-12-27 LAB
ANION GAP SERPL CALC-SCNC: 14 MMOL/L (ref 7–16)
BUN SERPL-MCNC: 30 MG/DL (ref 8–22)
CALCIUM SERPL-MCNC: 9.4 MG/DL (ref 8.5–10.5)
CHLORIDE SERPL-SCNC: 106 MMOL/L (ref 96–112)
CO2 SERPL-SCNC: 22 MMOL/L (ref 20–33)
CREAT SERPL-MCNC: 0.7 MG/DL (ref 0.5–1.4)
ERYTHROCYTE [DISTWIDTH] IN BLOOD BY AUTOMATED COUNT: 45.5 FL (ref 35.9–50)
GFR SERPLBLD CREATININE-BSD FMLA CKD-EPI: 97 ML/MIN/1.73 M 2
GLUCOSE SERPL-MCNC: 119 MG/DL (ref 65–99)
HCT VFR BLD AUTO: 42.5 % (ref 42–52)
HGB BLD-MCNC: 14.6 G/DL (ref 14–18)
MCH RBC QN AUTO: 31.1 PG (ref 27–33)
MCHC RBC AUTO-ENTMCNC: 34.4 G/DL (ref 32.3–36.5)
MCV RBC AUTO: 90.4 FL (ref 81.4–97.8)
PLATELET # BLD AUTO: 307 K/UL (ref 164–446)
PMV BLD AUTO: 10.6 FL (ref 9–12.9)
POTASSIUM SERPL-SCNC: 4 MMOL/L (ref 3.6–5.5)
RBC # BLD AUTO: 4.7 M/UL (ref 4.7–6.1)
SODIUM SERPL-SCNC: 142 MMOL/L (ref 135–145)
WBC # BLD AUTO: 11.3 K/UL (ref 4.8–10.8)

## 2024-12-27 PROCEDURE — A9270 NON-COVERED ITEM OR SERVICE: HCPCS | Mod: UD | Performed by: STUDENT IN AN ORGANIZED HEALTH CARE EDUCATION/TRAINING PROGRAM

## 2024-12-27 PROCEDURE — 700102 HCHG RX REV CODE 250 W/ 637 OVERRIDE(OP): Mod: UD | Performed by: STUDENT IN AN ORGANIZED HEALTH CARE EDUCATION/TRAINING PROGRAM

## 2024-12-27 PROCEDURE — 36415 COLL VENOUS BLD VENIPUNCTURE: CPT

## 2024-12-27 PROCEDURE — 700111 HCHG RX REV CODE 636 W/ 250 OVERRIDE (IP): Mod: JZ,UD | Performed by: EMERGENCY MEDICINE

## 2024-12-27 PROCEDURE — 96376 TX/PRO/DX INJ SAME DRUG ADON: CPT

## 2024-12-27 PROCEDURE — 96372 THER/PROPH/DIAG INJ SC/IM: CPT

## 2024-12-27 PROCEDURE — 700102 HCHG RX REV CODE 250 W/ 637 OVERRIDE(OP): Mod: UD | Performed by: EMERGENCY MEDICINE

## 2024-12-27 PROCEDURE — 700111 HCHG RX REV CODE 636 W/ 250 OVERRIDE (IP): Mod: JZ,UD | Performed by: STUDENT IN AN ORGANIZED HEALTH CARE EDUCATION/TRAINING PROGRAM

## 2024-12-27 PROCEDURE — A9270 NON-COVERED ITEM OR SERVICE: HCPCS | Mod: UD | Performed by: EMERGENCY MEDICINE

## 2024-12-27 PROCEDURE — 85027 COMPLETE CBC AUTOMATED: CPT

## 2024-12-27 PROCEDURE — 80048 BASIC METABOLIC PNL TOTAL CA: CPT

## 2024-12-27 RX ORDER — HALOPERIDOL 5 MG/ML
5 INJECTION INTRAMUSCULAR ONCE
Status: DISCONTINUED | OUTPATIENT
Start: 2024-12-27 | End: 2024-12-27

## 2024-12-27 RX ORDER — HALOPERIDOL 5 MG/ML
2.5 INJECTION INTRAMUSCULAR
Status: COMPLETED | OUTPATIENT
Start: 2024-12-27 | End: 2024-12-28

## 2024-12-27 RX ADMIN — HALOPERIDOL LACTATE 2.5 MG: 5 INJECTION, SOLUTION INTRAMUSCULAR at 21:22

## 2024-12-27 RX ADMIN — VALPROIC ACID 750 MG: 500 SOLUTION ORAL at 21:23

## 2024-12-27 RX ADMIN — HALOPERIDOL LACTATE 1 MG: 5 INJECTION, SOLUTION INTRAMUSCULAR at 19:35

## 2024-12-27 RX ADMIN — AMLODIPINE BESYLATE 10 MG: 5 TABLET ORAL at 10:12

## 2024-12-27 RX ADMIN — ASPIRIN 81 MG: 81 TABLET, CHEWABLE ORAL at 10:11

## 2024-12-27 RX ADMIN — METOPROLOL SUCCINATE 50 MG: 50 TABLET, EXTENDED RELEASE ORAL at 10:10

## 2024-12-27 RX ADMIN — VALPROIC ACID 500 MG: 500 SOLUTION ORAL at 10:09

## 2024-12-27 RX ADMIN — Medication 1000 UNITS: at 10:12

## 2024-12-27 RX ADMIN — HYDROXYZINE HYDROCHLORIDE 25 MG: 25 TABLET ORAL at 22:38

## 2024-12-27 RX ADMIN — TRAZODONE HYDROCHLORIDE 150 MG: 50 TABLET ORAL at 22:36

## 2024-12-27 RX ADMIN — ARIPIPRAZOLE 2 MG: 2 TABLET ORAL at 22:50

## 2024-12-27 RX ADMIN — Medication 5 MG: at 21:23

## 2024-12-27 RX ADMIN — LISINOPRIL 40 MG: 20 TABLET ORAL at 10:12

## 2024-12-27 NOTE — DISCHARGE PLANNING
MSW faxed pt's referral to [facility]. Will f/u with admissions on status.     MSW spoke to Denae at [facility]. If Johnna will sign a take back agreement they will consider pt. MSW spoke to Johanna with [facility]. She is agreeable to sign a take back agreement with [facility]. MSW updated Denae. Will await call back if they will accept pt.

## 2024-12-27 NOTE — ED NOTES
Patient medicated per MAR, room darkened, blankets placed on patient, door closed to encourage calm enviornment

## 2024-12-27 NOTE — ED NOTES
Attempted to medicated patient; refused and starts to swing to this RN; went back to sleep; refused oxygen

## 2024-12-27 NOTE — PROGRESS NOTES
"ED Observation Progress Note    Date of Service: 12/27/24    Interval History and Interventions  No events overnight.  Patient awaiting admission to psych    Physical Exam  /83   Pulse 66   Temp 36.8 °C (98.2 °F) (Temporal)   Resp 16   Ht 1.753 m (5' 9\")   Wt 76.7 kg (169 lb)   SpO2 91%   BMI 24.96 kg/m² .    Constitutional: Awake and alert. Nontoxic  HENT:  Grossly normal  Eyes: Grossly normal  Neck: Normal range of motion  Cardiovascular: Normal heart rate   Thorax & Lungs: No respiratory distress  Abdomen: Nontender  Skin:  No pathologic rash.   Extremities: Well perfused  Psychiatric: Affect normal    Labs  Results for orders placed or performed during the hospital encounter of 12/23/24   CBC WITH DIFFERENTIAL    Collection Time: 12/23/24  5:14 PM   Result Value Ref Range    WBC 8.7 4.8 - 10.8 K/uL    RBC 3.84 (L) 4.70 - 6.10 M/uL    Hemoglobin 11.7 (L) 14.0 - 18.0 g/dL    Hematocrit 35.6 (L) 42.0 - 52.0 %    MCV 92.7 81.4 - 97.8 fL    MCH 30.5 27.0 - 33.0 pg    MCHC 32.9 32.3 - 36.5 g/dL    RDW 46.1 35.9 - 50.0 fL    Platelet Count 276 164 - 446 K/uL    MPV 10.4 9.0 - 12.9 fL    Neutrophils-Polys 61.90 44.00 - 72.00 %    Lymphocytes 19.80 (L) 22.00 - 41.00 %    Monocytes 11.00 0.00 - 13.40 %    Eosinophils 6.10 0.00 - 6.90 %    Basophils 0.60 0.00 - 1.80 %    Immature Granulocytes 0.60 0.00 - 0.90 %    Nucleated RBC 0.00 0.00 - 0.20 /100 WBC    Neutrophils (Absolute) 5.36 1.82 - 7.42 K/uL    Lymphs (Absolute) 1.71 1.00 - 4.80 K/uL    Monos (Absolute) 0.95 (H) 0.00 - 0.85 K/uL    Eos (Absolute) 0.53 (H) 0.00 - 0.51 K/uL    Baso (Absolute) 0.05 0.00 - 0.12 K/uL    Immature Granulocytes (abs) 0.05 0.00 - 0.11 K/uL    NRBC (Absolute) 0.00 K/uL   COMP METABOLIC PANEL    Collection Time: 12/23/24  5:14 PM   Result Value Ref Range    Sodium 143 135 - 145 mmol/L    Potassium 4.2 3.6 - 5.5 mmol/L    Chloride 108 96 - 112 mmol/L    Co2 24 20 - 33 mmol/L    Anion Gap 11.0 7.0 - 16.0    Glucose 169 (H) 65 - 99 " mg/dL    Bun 21 8 - 22 mg/dL    Creatinine 1.02 0.50 - 1.40 mg/dL    Calcium 9.2 8.5 - 10.5 mg/dL    Correct Calcium 9.4 8.5 - 10.5 mg/dL    AST(SGOT) 21 12 - 45 U/L    ALT(SGPT) 14 2 - 50 U/L    Alkaline Phosphatase 103 (H) 30 - 99 U/L    Total Bilirubin 0.2 0.1 - 1.5 mg/dL    Albumin 3.7 3.2 - 4.9 g/dL    Total Protein 6.6 6.0 - 8.2 g/dL    Globulin 2.9 1.9 - 3.5 g/dL    A-G Ratio 1.3 g/dL   LIPASE    Collection Time: 12/23/24  5:14 PM   Result Value Ref Range    Lipase 40 11 - 82 U/L   ESTIMATED GFR    Collection Time: 12/23/24  5:14 PM   Result Value Ref Range    GFR (CKD-EPI) 77 >60 mL/min/1.73 m 2   URINALYSIS CULTURE, IF INDICATED    Collection Time: 12/24/24 12:31 PM    Specimen: Urine, Clean Catch   Result Value Ref Range    Color Yellow     Character Clear     Specific Gravity 1.014 <1.035    Ph 7.0 5.0 - 8.0    Glucose Negative Negative mg/dL    Ketones Negative Negative mg/dL    Protein Negative Negative mg/dL    Bilirubin Negative Negative    Urobilinogen, Urine 1.0 <=1.0 EU/dL    Nitrite Negative Negative    Leukocyte Esterase Negative Negative    Occult Blood Negative Negative    Micro Urine Req see below    T.PALLIDUM AB VIKKI (SCREENING)    Collection Time: 12/25/24  5:14 PM   Result Value Ref Range    Syphilis, Treponemal Qual Non-Reactive Non-Reactive   HIV AG/AB COMBO ASSAY SCREENING    Collection Time: 12/25/24  5:14 PM   Result Value Ref Range    HIV Ag/Ab Combo Assay Non-Reactive Non Reactive   TSH    Collection Time: 12/25/24  5:14 PM   Result Value Ref Range    TSH 2.180 0.350 - 5.500 uIU/mL   FREE THYROXINE    Collection Time: 12/25/24  5:14 PM   Result Value Ref Range    Free T-4 0.85 (L) 0.93 - 1.70 ng/dL   VALPROIC ACID    Collection Time: 12/25/24  5:19 PM   Result Value Ref Range    Valproic Acid 49.1 (L) 50.0 - 100.0 ug/mL   EKG    Collection Time: 12/25/24  6:26 PM   Result Value Ref Range    Report       [facility]    Test Date:  2024-12-25  Pt  Name:    LISA LÓPZE                 Department: ER  MRN:        [id number]                      Room:       [facility]  Gender:     Male                         Technician: [id number]  :        1950                   Requested By:ALYSSA JUDGE  Order #:    [id number]                    Reading MD:    Measurements  Intervals                                Axis  Rate:       81                           P:          2  CO:         138                          QRS:        -16  QRSD:       129                          T:          -30  QT:         391  QTc:        454    Interpretive Statements  Sinus rhythm  Atrial premature complex  Biatrial enlargement  IVCD, consider atypical RBBB  Inferior infarct, age indeterminate  Anterior infarct, age indeterminate  Artifact in lead(s) I,II,III,aVR,aVF,V1,V2,V5 and baseline wander in lead(s)  V4,V5  Compared to ECG 2024 10:01:38  Atrial premature complex(es) now  present  Sinus tachycardia no longer present  Left ventricular hypertrophy no longer present  Prolonged QT interval no longer present  Myocardial infarct finding still present     POCT glucose device results    Collection Time: 24  6:39 AM   Result Value Ref Range    POC Glucose, Blood 110 (H) 65 - 99 mg/dL       Radiology  CT-HEAD W/O   Final Result      1.  Cerebral atrophy.      2.  White matter lucencies most consistent with small vessel ischemic change versus demyelination or gliosis.      3. Chronic areas of lacunar type infarction in noted in the left frontal periventricular white matter, right thalamus, and the left side of the hao..                   Problem List    1. Aggressive behavior Acute         Electronically signed by: Monica Leroy M.D., 2024 8:09 AM

## 2024-12-27 NOTE — ED NOTES
Pt turned sideways on hospital bed, pt aggressive toward staff when attempting to reposition him.

## 2024-12-27 NOTE — ED NOTES
Report received from Eneida SWEENEY. MD called and patient placed in non-violent restraints due to aggressive behavior and attempting to punch and hit staff as well as pulling at lines and monitoring. Medication orders placed by MD. Patient remains on VS monitoring. 1:1 safety sitter remains outside of room.

## 2024-12-27 NOTE — ED NOTES
Break RN:     Patient resting in bed with eyes closed, unlabored respirations. Safety sitter in direct view of patient. Will continue to monitor.

## 2024-12-27 NOTE — ED NOTES
Checked on bed, asleep with unlabored respirations. No safety risk noted  Sleeping  Sitter - 1:1 with continuous visual monitoring by Trained Personnel  Continued safety precaution  Danilorkaren in low position, side rail up for pt safety.   No needs identified at the moment

## 2024-12-27 NOTE — ED NOTES
Pt awake and took all of his meds, offered breakfast several times, pt look a few bites of applesauce, but did not want anything from his breakfast tray.

## 2024-12-27 NOTE — ED NOTES
Patient soiled with urine; noted feces on the sheets; patient cleaned; new gown provided; on diaper, positioned comfortably; patient tolerated well

## 2024-12-27 NOTE — ED NOTES
Checked on bed, with unlabored respirations. No safety risk noted  Sleeping  Sitter - 1:1 with continuous visual monitoring by Trained Personnel  Continued safety precaution  Danilorkaren in low position, side rail up for pt safety.   No needs identified at the moment

## 2024-12-28 LAB — GLUCOSE BLD STRIP.AUTO-MCNC: 107 MG/DL (ref 65–99)

## 2024-12-28 PROCEDURE — A9270 NON-COVERED ITEM OR SERVICE: HCPCS | Mod: UD | Performed by: EMERGENCY MEDICINE

## 2024-12-28 PROCEDURE — 700102 HCHG RX REV CODE 250 W/ 637 OVERRIDE(OP): Mod: UD | Performed by: STUDENT IN AN ORGANIZED HEALTH CARE EDUCATION/TRAINING PROGRAM

## 2024-12-28 PROCEDURE — A9270 NON-COVERED ITEM OR SERVICE: HCPCS | Mod: UD | Performed by: STUDENT IN AN ORGANIZED HEALTH CARE EDUCATION/TRAINING PROGRAM

## 2024-12-28 PROCEDURE — 700111 HCHG RX REV CODE 636 W/ 250 OVERRIDE (IP): Mod: JZ,UD | Performed by: STUDENT IN AN ORGANIZED HEALTH CARE EDUCATION/TRAINING PROGRAM

## 2024-12-28 PROCEDURE — 700111 HCHG RX REV CODE 636 W/ 250 OVERRIDE (IP): Mod: JZ,UD | Performed by: EMERGENCY MEDICINE

## 2024-12-28 PROCEDURE — 96372 THER/PROPH/DIAG INJ SC/IM: CPT

## 2024-12-28 PROCEDURE — 700102 HCHG RX REV CODE 250 W/ 637 OVERRIDE(OP): Mod: UD | Performed by: EMERGENCY MEDICINE

## 2024-12-28 PROCEDURE — 96376 TX/PRO/DX INJ SAME DRUG ADON: CPT

## 2024-12-28 PROCEDURE — 82962 GLUCOSE BLOOD TEST: CPT

## 2024-12-28 RX ADMIN — HALOPERIDOL LACTATE 2.5 MG: 5 INJECTION, SOLUTION INTRAMUSCULAR at 15:33

## 2024-12-28 RX ADMIN — Medication 1000 UNITS: at 11:40

## 2024-12-28 RX ADMIN — ATORVASTATIN CALCIUM 80 MG: 80 TABLET, FILM COATED ORAL at 18:34

## 2024-12-28 RX ADMIN — ENOXAPARIN SODIUM 40 MG: 100 INJECTION SUBCUTANEOUS at 18:39

## 2024-12-28 RX ADMIN — ARIPIPRAZOLE 2 MG: 2 TABLET ORAL at 18:34

## 2024-12-28 RX ADMIN — VALPROIC ACID 750 MG: 500 SOLUTION ORAL at 18:35

## 2024-12-28 RX ADMIN — VALPROIC ACID 500 MG: 500 SOLUTION ORAL at 11:40

## 2024-12-28 RX ADMIN — TRAZODONE HYDROCHLORIDE 150 MG: 50 TABLET ORAL at 20:01

## 2024-12-28 RX ADMIN — HYDROXYZINE HYDROCHLORIDE 25 MG: 25 TABLET ORAL at 19:01

## 2024-12-28 RX ADMIN — Medication 5 MG: at 20:02

## 2024-12-28 RX ADMIN — HALOPERIDOL LACTATE 1 MG: 5 INJECTION, SOLUTION INTRAMUSCULAR at 19:55

## 2024-12-28 NOTE — ED NOTES
Pt still restless and very anxious, sitting up and having to be redirected for safety  Pt did comply w/ taking some PM meds at this time

## 2024-12-28 NOTE — ED NOTES
Pt resting on gurney, no acute signs of distress present. Even, unlabored breathing noted. 1:1 sitter with direct line of sight remains in place.

## 2024-12-28 NOTE — ED NOTES
Pt is asleep at this time, wakes up as approached, repositioned for comfort, diapers changed, pt is uncooperative. Warm blankets given.

## 2024-12-28 NOTE — ED NOTES
Attempted to medicated pt with evening medication. Pt mumbles that he will take his pills but refuses to fully sit up or take the medication. Will re-attempt at a later time

## 2024-12-28 NOTE — ED NOTES
Report received from PATEL Smith. POC discussed. Tele sitter in place in room for safety . Patient remains on monitoring. Resting comfortably. Respirations even and unlabored.

## 2024-12-28 NOTE — ED NOTES
Pt had to be medicated per MAR again upon returning for ongoing agitation, pt veena took two PM meds but then continued to refuse the rest  Sitter remains outside of room in direct view for safety  Fall risk precautions in place, rails up, bed alarm on and armed  Frequent rounds being made  Pt still trying to get out of bed and yelling at staff

## 2024-12-28 NOTE — ED NOTES
Patient is on bed, Respirations even and unlabored. Bed in Low position.Care companion at bedside in direct view of the patient, safety needs met.

## 2024-12-28 NOTE — ED NOTES
Pt medicated per MAR for PO anxiety orders to manage s/s that continue to progress  Pt is not combative at this time not warranting security or IM med mgmt

## 2024-12-28 NOTE — ED NOTES
Attempted to clean up pt more from BM earlier and fix sheets/brief  Pt seldomly complied but is not hitting or biting

## 2024-12-28 NOTE — ED NOTES
Pt repositioned in bed, no needs reported at this time. In direct view of care companion. Respirations even and unlabored.

## 2024-12-28 NOTE — ED NOTES
Overdue repeat labs collected and sent to lab, pt tolerated and allowed this RN to collect labs off of PIV  Pt still restless and anxious, sitting up, laying down, pulling at things  But is not as aggressive and is not yelling  Sitter remains outside of room in direct view for safety

## 2024-12-28 NOTE — ED NOTES
Safety vest removed  Sitter remains in direct view and attempting to redirect  Pt refusing dinner and VS and meds still    Pt continues to sit up and sit down  Fall risk precautions remain in place  Pt being monitored closely for safety  Still remains agitated and restless

## 2024-12-28 NOTE — ED NOTES
Patient is on bed, Respirations even and unlabored. Bed in Low position. sitter Val at bedside in direct view of the patient, safety needs met.

## 2024-12-28 NOTE — ED NOTES
Bedside report received from off going RN/tech: Diann, assumed care of patient.  POC discussed with patient. Call light within reach, all needs addressed at this time.       Fall risk interventions in place: Move the patient closer to the nurse's station, Patient's personal possessions are with in their safe reach, Place socks on patient, Place fall risk sign on patient's door, Keep floor surfaces clean and dry, Accompanied to restroom, Bed Alarm in use, and Tele-sitter (safety sitter) (all applicable per [place] Fall risk assessment)   Continuous monitoring: Pulse Ox  IVF/IV medications: Not Applicable   Oxygen: Room Air  Bedside sitter: checklist completed, stop sign in doorway,  at bedside  Isolation: Not Applicable

## 2024-12-28 NOTE — PROGRESS NOTES
"ED Observation Progress Note    Date of Service: 12/27/24    Interval History and Interventions  Patient agitated fighting against restraints despite 1 mg of Haldol earlier this evening.  I assessed the patient he could not explain why he was restless.  He was struggling against the restraints.  He did respond to verbal calming measures while I was present with him but the benefit of verbal calming measures does not persist.  Since he received very low-dose Haldol I will increase his dose to 2.5 mg.    Physical Exam  BP (!) 144/82   Pulse 70   Temp 36.1 °C (96.9 °F) (Temporal)   Resp 20   Ht 1.753 m (5' 9\")   Wt 76.7 kg (169 lb)   SpO2 98%   BMI 24.96 kg/m² .    Constitutional: Awake and alert. Nontoxic  HENT:  Grossly normal  Eyes: Grossly normal  Neck: Normal range of motion  Cardiovascular: Normal heart rate   Thorax & Lungs: No respiratory distress  Abdomen: Nontender  Skin:  No pathologic rash.   Extremities: Well perfused  Psychiatric: Struggling against restraints but not agitated at this time    Labs  Results for orders placed or performed during the hospital encounter of 12/23/24   CBC WITH DIFFERENTIAL    Collection Time: 12/23/24  5:14 PM   Result Value Ref Range    WBC 8.7 4.8 - 10.8 K/uL    RBC 3.84 (L) 4.70 - 6.10 M/uL    Hemoglobin 11.7 (L) 14.0 - 18.0 g/dL    Hematocrit 35.6 (L) 42.0 - 52.0 %    MCV 92.7 81.4 - 97.8 fL    MCH 30.5 27.0 - 33.0 pg    MCHC 32.9 32.3 - 36.5 g/dL    RDW 46.1 35.9 - 50.0 fL    Platelet Count 276 164 - 446 K/uL    MPV 10.4 9.0 - 12.9 fL    Neutrophils-Polys 61.90 44.00 - 72.00 %    Lymphocytes 19.80 (L) 22.00 - 41.00 %    Monocytes 11.00 0.00 - 13.40 %    Eosinophils 6.10 0.00 - 6.90 %    Basophils 0.60 0.00 - 1.80 %    Immature Granulocytes 0.60 0.00 - 0.90 %    Nucleated RBC 0.00 0.00 - 0.20 /100 WBC    Neutrophils (Absolute) 5.36 1.82 - 7.42 K/uL    Lymphs (Absolute) 1.71 1.00 - 4.80 K/uL    Monos (Absolute) 0.95 (H) 0.00 - 0.85 K/uL    Eos (Absolute) 0.53 (H) " 0.00 - 0.51 K/uL    Baso (Absolute) 0.05 0.00 - 0.12 K/uL    Immature Granulocytes (abs) 0.05 0.00 - 0.11 K/uL    NRBC (Absolute) 0.00 K/uL   COMP METABOLIC PANEL    Collection Time: 12/23/24  5:14 PM   Result Value Ref Range    Sodium 143 135 - 145 mmol/L    Potassium 4.2 3.6 - 5.5 mmol/L    Chloride 108 96 - 112 mmol/L    Co2 24 20 - 33 mmol/L    Anion Gap 11.0 7.0 - 16.0    Glucose 169 (H) 65 - 99 mg/dL    Bun 21 8 - 22 mg/dL    Creatinine 1.02 0.50 - 1.40 mg/dL    Calcium 9.2 8.5 - 10.5 mg/dL    Correct Calcium 9.4 8.5 - 10.5 mg/dL    AST(SGOT) 21 12 - 45 U/L    ALT(SGPT) 14 2 - 50 U/L    Alkaline Phosphatase 103 (H) 30 - 99 U/L    Total Bilirubin 0.2 0.1 - 1.5 mg/dL    Albumin 3.7 3.2 - 4.9 g/dL    Total Protein 6.6 6.0 - 8.2 g/dL    Globulin 2.9 1.9 - 3.5 g/dL    A-G Ratio 1.3 g/dL   LIPASE    Collection Time: 12/23/24  5:14 PM   Result Value Ref Range    Lipase 40 11 - 82 U/L   ESTIMATED GFR    Collection Time: 12/23/24  5:14 PM   Result Value Ref Range    GFR (CKD-EPI) 77 >60 mL/min/1.73 m 2   URINALYSIS CULTURE, IF INDICATED    Collection Time: 12/24/24 12:31 PM    Specimen: Urine, Clean Catch   Result Value Ref Range    Color Yellow     Character Clear     Specific Gravity 1.014 <1.035    Ph 7.0 5.0 - 8.0    Glucose Negative Negative mg/dL    Ketones Negative Negative mg/dL    Protein Negative Negative mg/dL    Bilirubin Negative Negative    Urobilinogen, Urine 1.0 <=1.0 EU/dL    Nitrite Negative Negative    Leukocyte Esterase Negative Negative    Occult Blood Negative Negative    Micro Urine Req see below    T.PALLIDUM AB VIKKI (SCREENING)    Collection Time: 12/25/24  5:14 PM   Result Value Ref Range    Syphilis, Treponemal Qual Non-Reactive Non-Reactive   HIV AG/AB COMBO ASSAY SCREENING    Collection Time: 12/25/24  5:14 PM   Result Value Ref Range    HIV Ag/Ab Combo Assay Non-Reactive Non Reactive   TSH    Collection Time: 12/25/24  5:14 PM   Result Value Ref Range    TSH 2.180 0.350 - 5.500 uIU/mL    FREE THYROXINE    Collection Time: 24  5:14 PM   Result Value Ref Range    Free T-4 0.85 (L) 0.93 - 1.70 ng/dL   VALPROIC ACID    Collection Time: 24  5:19 PM   Result Value Ref Range    Valproic Acid 49.1 (L) 50.0 - 100.0 ug/mL   EKG    Collection Time: 24  6:26 PM   Result Value Ref Range    Report       [facility]    Test Date:  2024  Pt Name:    LISA LÓPEZ                 Department: ER  MRN:        [id number]                      Room:       [facility]  Gender:     Male                         Technician: [id number]  :        1950                   Requested By:ALYSSA JUDGE  Order #:    [id number]                    Reading MD:    Measurements  Intervals                                Axis  Rate:       81                           P:          2  TX:         138                          QRS:        -16  QRSD:       129                          T:          -30  QT:         391  QTc:        454    Interpretive Statements  Sinus rhythm  Atrial premature complex  Biatrial enlargement  IVCD, consider atypical RBBB  Inferior infarct, age indeterminate  Anterior infarct, age indeterminate  Artifact in lead(s) I,II,III,aVR,aVF,V1,V2,V5 and baseline wander in lead(s)  V4,V5  Compared to ECG 2024 10:01:38  Atrial premature complex(es) now  present  Sinus tachycardia no longer present  Left ventricular hypertrophy no longer present  Prolonged QT interval no longer present  Myocardial infarct finding still present     POCT glucose device results    Collection Time: 24  6:39 AM   Result Value Ref Range    POC Glucose, Blood 110 (H) 65 - 99 mg/dL       Radiology  CT-HEAD W/O   Final Result      1.  Cerebral atrophy.      2.  White matter lucencies most consistent with small vessel ischemic change versus demyelination or gliosis.      3. Chronic areas of lacunar type infarction in noted in the left frontal periventricular white matter, right  thalamus, and the left side of the hao..                   Problem List  1.   1. Aggressive behavior Acute         Haldol 2.5 mg IV every hour as needed agitation to maximum of 2 doses    Electronically signed by: Zachery Mills M.D., 12/27/2024 9:07 PM

## 2024-12-28 NOTE — ED NOTES
Pt is much more calm but continues to try to get up and is redirected back to resting  Sitter remains outside of room in direct view, CMS intact and no injuries  PIV intact to RFA  Fall risk precautions in place, safety precautions in place

## 2024-12-28 NOTE — ED NOTES
Patient is asleep on bed, Respirations even and unlabored. Bed in Low position. Care companion at bedside in direct view of the patient, safety needs met.

## 2024-12-28 NOTE — ED NOTES
Pt in direct view of care companion. Respirations even and unlabored. Refusing meal tray at this time.

## 2024-12-28 NOTE — ED NOTES
BS report from Parker SWEENEY at shift change  Pt here and pending placement/transfer after being sent here from [facility] for increased AMS and agitation/aggression  Pt currently on obs and has had intermittent episodes of aggression and combativeness towards staff along w/ refusing meds and VS/assessments  PIV still in place  Pt A&O status varies from 1-2, all labs and scans were negative    Upon BS report pt getting out of bed and unsteady on his feet. Unable to be redirected back to bed or chair and pt started swearing and yelling at staff, this RN and RN SHABBIR reg. Security came to BS for assistance  Pt had to be placed in bed to keep from falling and started to strike staff while continuing to yell even after multiple measures attempted to redirect and calm pt. Pt continued to swing at staff requiring a safety vest.  Assist RN required to give IM haldol for aggression management and safety    Pt being monitored, sitter outside of room in direct view for safety/obs at this time  Safety precautions in place, fall risk precautions in place, rails up, bed in locked position and bed alarm on and armed   VSS on RA, has been refusing oxygen if it was needed as well  Restraints have been warranted off and on during stay for safety     Pt currently refusing PM meds and PM BP at this time  Pt generally incontinent and requires cleaning and assistance

## 2024-12-28 NOTE — ED NOTES
Valarie alerted to a Pt event. This tech entered the room to find the pt attempting to get on all fours and roll over the side rail of the bed. Attempted to educate pt on hazards of falling out of bed, Pt became agitated and threatened to fight this tech. Pt then took there gown off any attempted interventions resulted in Pt attempting to bite this tech. Pt attempted to leave the bed several more times, Pt eventually tired themselves out and is now sleeping in bed.

## 2024-12-28 NOTE — PROGRESS NOTES
"ED Observation Progress Note    Date of Service: 12/28/24    Interval History and Interventions  Briefly, this patient was sent here from [facility] on 12/23/2024 for psychiatric evaluation.  The patient is alert and oriented x 1 at baseline.  He has been aggressive throughout his stay in the emergency department intermittently requiring Haldol, Ativan, and Benadryl for his safety and that of staff.  He has been seen by psychiatry and medications have been adjusted.  We continue to await placement.    Physical Exam  /60   Pulse 78   Temp 36.1 °C (96.9 °F) (Temporal)   Resp 16   Ht 1.753 m (5' 9\")   Wt 76.7 kg (169 lb)   SpO2 91%   BMI 24.96 kg/m² .    Constitutional: Awake and alert. Nontoxic  HENT:  Grossly normal  Eyes: Grossly normal  Neck: Normal range of motion  Cardiovascular: Normal heart rate   Thorax & Lungs: No respiratory distress  Skin:  No pathologic rash.   Extremities: Well perfused  Psychiatric: Cooperative.    Labs  Results for orders placed or performed during the hospital encounter of 12/23/24   CBC WITH DIFFERENTIAL    Collection Time: 12/23/24  5:14 PM   Result Value Ref Range    WBC 8.7 4.8 - 10.8 K/uL    RBC 3.84 (L) 4.70 - 6.10 M/uL    Hemoglobin 11.7 (L) 14.0 - 18.0 g/dL    Hematocrit 35.6 (L) 42.0 - 52.0 %    MCV 92.7 81.4 - 97.8 fL    MCH 30.5 27.0 - 33.0 pg    MCHC 32.9 32.3 - 36.5 g/dL    RDW 46.1 35.9 - 50.0 fL    Platelet Count 276 164 - 446 K/uL    MPV 10.4 9.0 - 12.9 fL    Neutrophils-Polys 61.90 44.00 - 72.00 %    Lymphocytes 19.80 (L) 22.00 - 41.00 %    Monocytes 11.00 0.00 - 13.40 %    Eosinophils 6.10 0.00 - 6.90 %    Basophils 0.60 0.00 - 1.80 %    Immature Granulocytes 0.60 0.00 - 0.90 %    Nucleated RBC 0.00 0.00 - 0.20 /100 WBC    Neutrophils (Absolute) 5.36 1.82 - 7.42 K/uL    Lymphs (Absolute) 1.71 1.00 - 4.80 K/uL    Monos (Absolute) 0.95 (H) 0.00 - 0.85 K/uL    Eos (Absolute) 0.53 (H) 0.00 - 0.51 K/uL    Baso (Absolute) 0.05 0.00 - 0.12 " K/uL    Immature Granulocytes (abs) 0.05 0.00 - 0.11 K/uL    NRBC (Absolute) 0.00 K/uL   COMP METABOLIC PANEL    Collection Time: 12/23/24  5:14 PM   Result Value Ref Range    Sodium 143 135 - 145 mmol/L    Potassium 4.2 3.6 - 5.5 mmol/L    Chloride 108 96 - 112 mmol/L    Co2 24 20 - 33 mmol/L    Anion Gap 11.0 7.0 - 16.0    Glucose 169 (H) 65 - 99 mg/dL    Bun 21 8 - 22 mg/dL    Creatinine 1.02 0.50 - 1.40 mg/dL    Calcium 9.2 8.5 - 10.5 mg/dL    Correct Calcium 9.4 8.5 - 10.5 mg/dL    AST(SGOT) 21 12 - 45 U/L    ALT(SGPT) 14 2 - 50 U/L    Alkaline Phosphatase 103 (H) 30 - 99 U/L    Total Bilirubin 0.2 0.1 - 1.5 mg/dL    Albumin 3.7 3.2 - 4.9 g/dL    Total Protein 6.6 6.0 - 8.2 g/dL    Globulin 2.9 1.9 - 3.5 g/dL    A-G Ratio 1.3 g/dL   LIPASE    Collection Time: 12/23/24  5:14 PM   Result Value Ref Range    Lipase 40 11 - 82 U/L   ESTIMATED GFR    Collection Time: 12/23/24  5:14 PM   Result Value Ref Range    GFR (CKD-EPI) 77 >60 mL/min/1.73 m 2   URINALYSIS CULTURE, IF INDICATED    Collection Time: 12/24/24 12:31 PM    Specimen: Urine, Clean Catch   Result Value Ref Range    Color Yellow     Character Clear     Specific Gravity 1.014 <1.035    Ph 7.0 5.0 - 8.0    Glucose Negative Negative mg/dL    Ketones Negative Negative mg/dL    Protein Negative Negative mg/dL    Bilirubin Negative Negative    Urobilinogen, Urine 1.0 <=1.0 EU/dL    Nitrite Negative Negative    Leukocyte Esterase Negative Negative    Occult Blood Negative Negative    Micro Urine Req see below    T.PALLIDUM AB VIKKI (SCREENING)    Collection Time: 12/25/24  5:14 PM   Result Value Ref Range    Syphilis, Treponemal Qual Non-Reactive Non-Reactive   HIV AG/AB COMBO ASSAY SCREENING    Collection Time: 12/25/24  5:14 PM   Result Value Ref Range    HIV Ag/Ab Combo Assay Non-Reactive Non Reactive   TSH    Collection Time: 12/25/24  5:14 PM   Result Value Ref Range    TSH 2.180 0.350 - 5.500 uIU/mL   FREE THYROXINE    Collection Time: 12/25/24  5:14 PM    Result Value Ref Range    Free T-4 0.85 (L) 0.93 - 1.70 ng/dL   VALPROIC ACID    Collection Time: 24  5:19 PM   Result Value Ref Range    Valproic Acid 49.1 (L) 50.0 - 100.0 ug/mL   EKG    Collection Time: 24  6:26 PM   Result Value Ref Range    Report       [facility] Emergency Dept.    Test Date:  2024  Pt Name:    LISA LÓPEZ                 Department: ER  MRN:        [id number]                      Room:        29  Gender:     Male                         Technician: [id number]  :        1950                   Requested By:ALYSSA JUDGE  Order #:    [id number]                    Reading MD:    Measurements  Intervals                                Axis  Rate:       81                           P:          2  NH:         138                          QRS:        -16  QRSD:       129                          T:          -30  QT:         391  QTc:        454    Interpretive Statements  Sinus rhythm  Atrial premature complex  Biatrial enlargement  IVCD, consider atypical RBBB  Inferior infarct, age indeterminate  Anterior infarct, age indeterminate  Artifact in lead(s) I,II,III,aVR,aVF,V1,V2,V5 and baseline wander in lead(s)  V4,V5  Compared to ECG 2024 10:01:38  Atrial premature complex(es) now  present  Sinus tachycardia no longer present  Left ventricular hypertrophy no longer present  Prolonged QT interval no longer present  Myocardial infarct finding still present     POCT glucose device results    Collection Time: 24  6:39 AM   Result Value Ref Range    POC Glucose, Blood 110 (H) 65 - 99 mg/dL   Basic Metabolic Panel (BMP)    Collection Time: 24 11:09 PM   Result Value Ref Range    Sodium 142 135 - 145 mmol/L    Potassium 4.0 3.6 - 5.5 mmol/L    Chloride 106 96 - 112 mmol/L    Co2 22 20 - 33 mmol/L    Glucose 119 (H) 65 - 99 mg/dL    Bun 30 (H) 8 - 22 mg/dL    Creatinine 0.70 0.50 - 1.40 mg/dL    Calcium 9.4 8.5 - 10.5 mg/dL    Anion Gap  14.0 7.0 - 16.0   CBC without Differential    Collection Time: 12/27/24 11:09 PM   Result Value Ref Range    WBC 11.3 (H) 4.8 - 10.8 K/uL    RBC 4.70 4.70 - 6.10 M/uL    Hemoglobin 14.6 14.0 - 18.0 g/dL    Hematocrit 42.5 42.0 - 52.0 %    MCV 90.4 81.4 - 97.8 fL    MCH 31.1 27.0 - 33.0 pg    MCHC 34.4 32.3 - 36.5 g/dL    RDW 45.5 35.9 - 50.0 fL    Platelet Count 307 164 - 446 K/uL    MPV 10.6 9.0 - 12.9 fL   ESTIMATED GFR    Collection Time: 12/27/24 11:09 PM   Result Value Ref Range    GFR (CKD-EPI) 97 >60 mL/min/1.73 m 2   POCT glucose device results    Collection Time: 12/28/24  6:38 AM   Result Value Ref Range    POC Glucose, Blood 107 (H) 65 - 99 mg/dL       Radiology  CT-HEAD W/O   Final Result      1.  Cerebral atrophy.      2.  White matter lucencies most consistent with small vessel ischemic change versus demyelination or gliosis.      3. Chronic areas of lacunar type infarction in noted in the left frontal periventricular white matter, right thalamus, and the left side of the hao..                   Problem List  1. Aggressive behavior Acute         Electronically signed by: Abdiel Christianson M.D., 12/28/2024 2:59 PM
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
## 2024-12-28 NOTE — ED NOTES
Pt continues to try to get up, sitter redirecting pt at this time  Pt not as aggressive or yelling   Offered food and meds again but refused

## 2024-12-28 NOTE — ED NOTES
Incontinence episode x 1. New linens and new brief placed. New gown placed on patient. 1:1 tele-sitter remains in place. Patient placed back on monitoring.

## 2024-12-28 NOTE — ED NOTES
Pt is asleep at this time, wakes up when called, pt is attached to vitals signs monitor.    Care companion requested at this time.

## 2024-12-28 NOTE — ED NOTES
Patient resting comfortably. Respirations even and unlabored. Patient remains on monitoring. VSS. Tele-sitter remains in room.
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
## 2024-12-28 NOTE — ED NOTES
Patient resting comfortably. Respirations even and unlabored. Tele-sitter remains inside of room. Patient remains on monitoring.

## 2024-12-28 NOTE — ED NOTES
Bedside report received from off going RN/tech: Cyndee SWEENEY, assumed care of patient.  Pt is on bed, respirations unlabored, seen moving, sitting. Pt is currently on bilateral upper extremity soft restraints.    Fall risk interventions in place: Place socks on patient, Place fall risk sign on patient's door, Keep floor surfaces clean and dry, and Human-sitter if tele-sitter fails (all applicable per [place] Fall risk assessment)   Continuous monitoring: Pulse Ox  IVF/IV medications: Not Applicable   Oxygen: Room Air  Bedside sitter: Val  Isolation: Not Applicable

## 2024-12-28 NOTE — ED NOTES
Report given to PATEL Ojeda.pt standing at EOB, refusing to get back into bed, pt placed in posie vest

## 2024-12-29 PROCEDURE — 700102 HCHG RX REV CODE 250 W/ 637 OVERRIDE(OP): Mod: UD | Performed by: EMERGENCY MEDICINE

## 2024-12-29 PROCEDURE — 700102 HCHG RX REV CODE 250 W/ 637 OVERRIDE(OP): Mod: UD | Performed by: STUDENT IN AN ORGANIZED HEALTH CARE EDUCATION/TRAINING PROGRAM

## 2024-12-29 PROCEDURE — A9270 NON-COVERED ITEM OR SERVICE: HCPCS | Mod: UD | Performed by: STUDENT IN AN ORGANIZED HEALTH CARE EDUCATION/TRAINING PROGRAM

## 2024-12-29 PROCEDURE — 700111 HCHG RX REV CODE 636 W/ 250 OVERRIDE (IP): Mod: JZ,UD | Performed by: STUDENT IN AN ORGANIZED HEALTH CARE EDUCATION/TRAINING PROGRAM

## 2024-12-29 PROCEDURE — 96372 THER/PROPH/DIAG INJ SC/IM: CPT

## 2024-12-29 PROCEDURE — A9270 NON-COVERED ITEM OR SERVICE: HCPCS | Mod: UD | Performed by: EMERGENCY MEDICINE

## 2024-12-29 RX ADMIN — Medication 5 MG: at 20:26

## 2024-12-29 RX ADMIN — TRAZODONE HYDROCHLORIDE 150 MG: 50 TABLET ORAL at 20:26

## 2024-12-29 RX ADMIN — ENOXAPARIN SODIUM 40 MG: 100 INJECTION SUBCUTANEOUS at 18:40

## 2024-12-29 RX ADMIN — ARIPIPRAZOLE 2 MG: 2 TABLET ORAL at 19:27

## 2024-12-29 RX ADMIN — VALPROIC ACID 750 MG: 500 SOLUTION ORAL at 19:23

## 2024-12-29 RX ADMIN — ATORVASTATIN CALCIUM 80 MG: 80 TABLET, FILM COATED ORAL at 18:39

## 2024-12-29 RX ADMIN — HYDROXYZINE HYDROCHLORIDE 25 MG: 25 TABLET ORAL at 18:40

## 2024-12-29 NOTE — ED NOTES
Pt provided with additional pillow for comfort, pt on RA breathing even and unlabored. Tele sitter in place and bed alarm on.

## 2024-12-29 NOTE — ED NOTES
Pt still resting in bed with even and unlabored breaths in view of Telesitter. Breaths are still even and unlabored, no distress noted.

## 2024-12-29 NOTE — ED NOTES
Pt resting in bed, breathing even and unlabored on o2 monitoring. Tele sitter and bed alarm in place

## 2024-12-29 NOTE — ED NOTES
Pt resting in bed, appears to be sleeping with even rise and fall of chest noted. No obvious signs of pain or distress, tele-sitter in room performing direct observation, reposition self occasionally, bed in low position, safety rooms features in place.

## 2024-12-29 NOTE — ED NOTES
Bedside report received from prior RN. Pt A&Ox1. Resp normal/unlabored on 2L NC. Bed side rails up/in low position. Pt updated to POC and all questions answered.     Fall precautions in place, bed alarm in place/strip alarm in place

## 2024-12-29 NOTE — PROGRESS NOTES
"ED Observation Progress Note    Date of Service: 12/29/24    Interval History and Interventions  Mr. Templeton was brought to the emergency department on 12/21/2024, 8 days ago, due to agitation.  Patient was reported to have a history of mood disorder, as well as behavioral issues where his mood would change quickly.  Screening blood work was obtained with no acute findings identified.     He was evaluated by psychiatry, diagnosed with major neurocognitive disorder.  Medication regimen includes olanzapine 10 mg twice daily, trazodone 150 mg nightly, Depakote 500 mg twice daily.  Psychiatry signed off as this is not an acute psychiatric condition.     Psychiatry was reconsulted 12/25/2024 who was able to speak with the patient's nephew.  Nephew reported changes in behavior starting this summer.  Nephew reported the patient had a history of substance abuse and irritability and aggression prior to onset of dementia.  Again thought to be major neurocognitive disorder, likely vascular type, with behavioral disturbance.  Depakote was increased to 500 mg in the morning, and 750 mg in the evening.  Zyprexa was discontinued.  Abilify started 2 mg daily.  Melatonin 5 mg at bedtime started.  150 mg nightly trazodone continued for insomnia with additional 50 mg as needed for insomnia.  Vistaril 25 mg every 6 hours as needed for agitation/aggression started.  May use IM Haldol 1 mg every 6 hours as needed for severe agitation or aggression.     On my evaluation this morning he is resting comfortably.  No acute events reported overnight.    Physical Exam  BP (!) 153/94   Pulse 68   Temp 36.5 °C (97.7 °F) (Temporal)   Resp 18   Ht 1.753 m (5' 9\")   Wt 76.7 kg (169 lb)   SpO2 90%   BMI 24.96 kg/m² .    Constitutional: Resting comfortably, afebrile  HENT:  Atraumatic, normocephalic  Eyes: Normal conjunctiva, no scleral icterus, no periorbital edema  Neck: Normal and painless range of motion, supple  Cardiovascular: No " tachycardia  Thorax & Lungs: Normal work of breathing, no tachypnea  Abdomen: Nondistended  Skin:  Warm, dry, intact  Extremities: No abnormal swelling nor deformity  Psychiatric: No abnormal movements, resting comfortably at this time    Labs  No new lab work obtained today. Most recent CBC and BMP from 12/27/2024 were unremarkable. Urinalysis from 12/24/2024 was negative.     Radiology  CT-HEAD W/O   Final Result      1.  Cerebral atrophy.      2.  White matter lucencies most consistent with small vessel ischemic change versus demyelination or gliosis.      3. Chronic areas of lacunar type infarction in noted in the left frontal periventricular white matter, right thalamus, and the left side of the hao..                   Problem List  1. Aggressive behavior Acute

## 2024-12-29 NOTE — ED NOTES
Pt resting in bed with eyes closed and even and unlabored breaths. No distress is noted and pt is in view of Telesitter. Will continue to monitor.

## 2024-12-29 NOTE — ED NOTES
Bedside report received from Honey KUMAR RN. Upon shift change pt is incontinent of stool so brief and linens were changed with RN x2. Pt now laying in bed with warm blankets in view of Telesitter. Breaths are even and unlabored at this time and no acute distress is noted. Will continue to monitor.

## 2024-12-29 NOTE — ED NOTES
Pt found attempting to get out of bed, RN and tech assisted pt to be. Pt requiring redirection. Pt unsteady on feet and disoriented to situation. Tele sitter in place and bed alarm on

## 2024-12-29 NOTE — ED NOTES
Discharge Instructions            Visit Discharge/Physician Orders     Discharge condition: Stable     Assessment of pain at discharge:     Anesthetic used: 4% topical lidocaine     Discharge to: Home     Left via:Private automobile     Accompanied by: accompanied by spouse     [facility]F/[facility]: [facility]     Dressing Orders: clean left leg with saline, apply hydrofera blue when available until then continue alginate ag and foam , coban 2, CHANGE 2X PER WEEK (FRIDAY AND MONDAY)         Treatment Orders: elevate legs above level of heart  RX start antibiotics as directed minocycline and bactrim   If no improvement with wound with next wound care appt update dr. Liz Wyatt and she will start iv therapy     [facility] followup visit _____________1 week_WITH DR. Greg Shore [facility]__in december_________  (Please note your next appointment above and if you are unable to keep, kindly give a 24 hour notice. Thank you.)     Physician signature:__________________________        If you experience any of the following, please call the [facility] during business hours:     * Increase in Pain  * Temperature over 101  * Increase in drainage from your wound  * Drainage with a foul odor  * Bleeding  * Increase in swelling  * Need for compression bandage changes due to slippage, breakthrough drainage. If you need medical attention outside of the business hours of the [facility] please contact your PCP or go to the nearest emergency room. Pt moving around in bed, with even rise and fall of chest noted. No obvious signs of pain or distress, tele-sitter in room performing direct observation, reposition self occasionally, hospital bed in low position.

## 2024-12-29 NOTE — ED NOTES
Pt still resting in bed in view of Telesitter in no apparent distress. Breaths are even and unlabored. Will continue to monitor.

## 2024-12-29 NOTE — ED NOTES
Pt resting in bed, breathing even and unlabored on o2 monitoring. Tele sitter in place and bed alarm on

## 2024-12-29 NOTE — ED NOTES
Pt keep trying to get out of bed, redirectable at this time. Charge RN notified of pt needs to have safety sitter. Pt restless in bed.
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
## 2024-12-29 NOTE — ED NOTES
RN alerted to pt movement on bed, one leg was over bottom rail. PT assisted with 2 RN into new position, pt requiring reorientation. Pt briefs changed, Small BM noted.

## 2024-12-29 NOTE — ED NOTES
Pt  requiring re-orientation to return to bed x 2 with RN and tech. Bed alarm and tele sitter in place

## 2024-12-29 NOTE — ED NOTES
Pt keeps getting up on his hands and knees in bed. Pt given verbal redirection but is agitated and yelling at staff. Pt medicated with PRN Haldol and PM meds per MAR, tolerated well. Pt now laying down in bed with blankets on in view view of Telesitter. Will continue to monitor.

## 2024-12-29 NOTE — ED NOTES
Report given to Elyse BAER RN. At this time pt is resting in bed with even and unlabored breaths, in no apparent distress. Pt is connected to monitoring devices and is on room air, tolerating well. Pt in view of Telesitter. This RN removed from care.

## 2024-12-30 VITALS
SYSTOLIC BLOOD PRESSURE: 109 MMHG | HEIGHT: 69 IN | DIASTOLIC BLOOD PRESSURE: 62 MMHG | TEMPERATURE: 97 F | BODY MASS INDEX: 25.03 KG/M2 | HEART RATE: 58 BPM | OXYGEN SATURATION: 94 % | WEIGHT: 169 LBS | RESPIRATION RATE: 17 BRPM

## 2024-12-30 LAB
ALBUMIN SERPL BCP-MCNC: 3.7 G/DL (ref 3.2–4.9)
ALBUMIN/GLOB SERPL: 1.2 G/DL
ALP SERPL-CCNC: 99 U/L (ref 30–99)
ALT SERPL-CCNC: 20 U/L (ref 2–50)
ANION GAP SERPL CALC-SCNC: 12 MMOL/L (ref 7–16)
AST SERPL-CCNC: 36 U/L (ref 12–45)
BASOPHILS # BLD AUTO: 0.6 % (ref 0–1.8)
BASOPHILS # BLD: 0.05 K/UL (ref 0–0.12)
BILIRUB SERPL-MCNC: 0.5 MG/DL (ref 0.1–1.5)
BUN SERPL-MCNC: 40 MG/DL (ref 8–22)
CALCIUM ALBUM COR SERPL-MCNC: 9.6 MG/DL (ref 8.5–10.5)
CALCIUM SERPL-MCNC: 9.4 MG/DL (ref 8.5–10.5)
CHLORIDE SERPL-SCNC: 106 MMOL/L (ref 96–112)
CO2 SERPL-SCNC: 22 MMOL/L (ref 20–33)
CREAT SERPL-MCNC: 1.1 MG/DL (ref 0.5–1.4)
EOSINOPHIL # BLD AUTO: 0.28 K/UL (ref 0–0.51)
EOSINOPHIL NFR BLD: 3.1 % (ref 0–6.9)
ERYTHROCYTE [DISTWIDTH] IN BLOOD BY AUTOMATED COUNT: 46 FL (ref 35.9–50)
FLUAV RNA SPEC QL NAA+PROBE: NEGATIVE
FLUBV RNA SPEC QL NAA+PROBE: NEGATIVE
GFR SERPLBLD CREATININE-BSD FMLA CKD-EPI: 71 ML/MIN/1.73 M 2
GLOBULIN SER CALC-MCNC: 3 G/DL (ref 1.9–3.5)
GLUCOSE SERPL-MCNC: 111 MG/DL (ref 65–99)
HCT VFR BLD AUTO: 41.3 % (ref 42–52)
HGB BLD-MCNC: 13.9 G/DL (ref 14–18)
IMM GRANULOCYTES # BLD AUTO: 0.05 K/UL (ref 0–0.11)
IMM GRANULOCYTES NFR BLD AUTO: 0.6 % (ref 0–0.9)
LYMPHOCYTES # BLD AUTO: 1.76 K/UL (ref 1–4.8)
LYMPHOCYTES NFR BLD: 19.6 % (ref 22–41)
MCH RBC QN AUTO: 30.8 PG (ref 27–33)
MCHC RBC AUTO-ENTMCNC: 33.7 G/DL (ref 32.3–36.5)
MCV RBC AUTO: 91.4 FL (ref 81.4–97.8)
MONOCYTES # BLD AUTO: 0.96 K/UL (ref 0–0.85)
MONOCYTES NFR BLD AUTO: 10.7 % (ref 0–13.4)
NEUTROPHILS # BLD AUTO: 5.86 K/UL (ref 1.82–7.42)
NEUTROPHILS NFR BLD: 65.4 % (ref 44–72)
NRBC # BLD AUTO: 0 K/UL
NRBC BLD-RTO: 0 /100 WBC (ref 0–0.2)
PLATELET # BLD AUTO: 280 K/UL (ref 164–446)
PMV BLD AUTO: 10.7 FL (ref 9–12.9)
POTASSIUM SERPL-SCNC: 4.2 MMOL/L (ref 3.6–5.5)
PROT SERPL-MCNC: 6.7 G/DL (ref 6–8.2)
RBC # BLD AUTO: 4.52 M/UL (ref 4.7–6.1)
RSV RNA SPEC QL NAA+PROBE: NEGATIVE
SARS-COV-2 RNA RESP QL NAA+PROBE: NOTDETECTED
SODIUM SERPL-SCNC: 140 MMOL/L (ref 135–145)
VALPROATE SERPL-MCNC: 64.5 UG/ML (ref 50–100)
WBC # BLD AUTO: 9 K/UL (ref 4.8–10.8)

## 2024-12-30 PROCEDURE — 36415 COLL VENOUS BLD VENIPUNCTURE: CPT

## 2024-12-30 PROCEDURE — 99283 EMERGENCY DEPT VISIT LOW MDM: CPT | Performed by: STUDENT IN AN ORGANIZED HEALTH CARE EDUCATION/TRAINING PROGRAM

## 2024-12-30 PROCEDURE — 700111 HCHG RX REV CODE 636 W/ 250 OVERRIDE (IP): Mod: JZ,UD | Performed by: STUDENT IN AN ORGANIZED HEALTH CARE EDUCATION/TRAINING PROGRAM

## 2024-12-30 PROCEDURE — 85025 COMPLETE CBC W/AUTO DIFF WBC: CPT

## 2024-12-30 PROCEDURE — 700102 HCHG RX REV CODE 250 W/ 637 OVERRIDE(OP): Mod: UD | Performed by: STUDENT IN AN ORGANIZED HEALTH CARE EDUCATION/TRAINING PROGRAM

## 2024-12-30 PROCEDURE — A9270 NON-COVERED ITEM OR SERVICE: HCPCS | Mod: UD | Performed by: EMERGENCY MEDICINE

## 2024-12-30 PROCEDURE — 80164 ASSAY DIPROPYLACETIC ACD TOT: CPT

## 2024-12-30 PROCEDURE — 96372 THER/PROPH/DIAG INJ SC/IM: CPT

## 2024-12-30 PROCEDURE — A9270 NON-COVERED ITEM OR SERVICE: HCPCS | Mod: UD | Performed by: STUDENT IN AN ORGANIZED HEALTH CARE EDUCATION/TRAINING PROGRAM

## 2024-12-30 PROCEDURE — 0241U HCHG SARS-COV-2 COVID-19 NFCT DS RESP RNA 4 TRGT ED POC: CPT

## 2024-12-30 PROCEDURE — 700102 HCHG RX REV CODE 250 W/ 637 OVERRIDE(OP): Mod: UD | Performed by: EMERGENCY MEDICINE

## 2024-12-30 PROCEDURE — 80053 COMPREHEN METABOLIC PANEL: CPT

## 2024-12-30 RX ORDER — TRAZODONE HYDROCHLORIDE 50 MG/1
25 TABLET, FILM COATED ORAL
Status: DISCONTINUED | OUTPATIENT
Start: 2024-12-30 | End: 2024-12-30 | Stop reason: HOSPADM

## 2024-12-30 RX ADMIN — VALPROIC ACID 500 MG: 500 SOLUTION ORAL at 05:48

## 2024-12-30 RX ADMIN — HALOPERIDOL LACTATE 1 MG: 5 INJECTION, SOLUTION INTRAMUSCULAR at 19:42

## 2024-12-30 RX ADMIN — Medication 1000 UNITS: at 05:46

## 2024-12-30 RX ADMIN — AMLODIPINE BESYLATE 10 MG: 5 TABLET ORAL at 05:46

## 2024-12-30 RX ADMIN — HYDROXYZINE HYDROCHLORIDE 25 MG: 25 TABLET ORAL at 14:21

## 2024-12-30 RX ADMIN — LISINOPRIL 40 MG: 20 TABLET ORAL at 05:46

## 2024-12-30 RX ADMIN — ARIPIPRAZOLE 2 MG: 2 TABLET ORAL at 18:08

## 2024-12-30 RX ADMIN — METOPROLOL SUCCINATE 50 MG: 50 TABLET, EXTENDED RELEASE ORAL at 05:47

## 2024-12-30 RX ADMIN — ATORVASTATIN CALCIUM 80 MG: 80 TABLET, FILM COATED ORAL at 17:47

## 2024-12-30 RX ADMIN — TRAZODONE HYDROCHLORIDE 50 MG: 50 TABLET ORAL at 03:35

## 2024-12-30 RX ADMIN — ENOXAPARIN SODIUM 40 MG: 100 INJECTION SUBCUTANEOUS at 17:48

## 2024-12-30 RX ADMIN — ASPIRIN 81 MG: 81 TABLET, CHEWABLE ORAL at 05:46

## 2024-12-30 NOTE — ED NOTES
Bedside report received from off going RN/tech: Vero RN, assumed care of patient.  POC discussed with patient. Call light within reach, all needs addressed at this time.       Fall risk interventions in place: Move the patient closer to the nurse's station, Patient's personal possessions are with in their safe reach, Place socks on patient, Place fall risk sign on patient's door, Give patient urinal if applicable, Keep floor surfaces clean and dry, Accompanied to restroom, and Human-sitter if tele-sitter fails (all applicable per [place] Fall risk assessment)   Continuous monitoring: Not Applicable   IVF/IV medications: Not Applicable   Oxygen: Room Air  Bedside sitter: Report given to sitter  Isolation: Not Applicable

## 2024-12-30 NOTE — ED NOTES
Bedside report received by PATEL Altamirano    Oxygen:RA  1:1 sitter for safety  Fall Risk status: bed in lowest position/locked, bed alarm in use  Patient Mentation:A+O x 1  1:1 sitter at bedside, pending placement

## 2024-12-30 NOTE — DISCHARGE PLANNING
[facility] can accept Pt at 2000 tonight. VELVET met with Pt. And showed him paperwork for admission. VELVET and RN talked with Pt and he signed papers to the best of his ability.   [facility]ET faxed admission papers and Med Rec to [facility]. [facility] accept Pt tonight at [facility] as long as COVID/FLU/RSV results are negative. VELVET asked RN to complete COVID swab.

## 2024-12-30 NOTE — CONSULTS
PSYCHIATRIC Followup (established):  *Reason for admission:   agitation/aggression  *Reason for consult:agitation      Legal status:  not applicable     *Interval hx:  Patient seen today at bedside by this provider.  He was calm and cooperative.  He allowed physical examination.    He was seen sleeping when I approached him this morning but was easily arousable.  Sitter attempted to feed him some breakfast as the patient only ate a few bites of food.  He was unable to tell me where he is or the date or time.  He is unable to answer any other review of system questions    Reviewed nursing noted and spoke with nursing staff. The patient is compliant with medication. The patient has not been aggressive or agitated in last 24 hours. No meals recorded on flow sheet yesterday. The patient has not required psychiatric PRN medications for agitation in the last 24 hours, only trazodone PRN for sleep.  Nursing staff reports that the patient tends to eat better in the evening time and in the daytime because he still has days and nights reversed.  He will tend to be more active in the evening time and more drowsy/sleepy in the daytime.  Patient unable to answer       *Medical Review of Systems: as reported by pt. All systems reviewed. Only those found to be + are noted below. All others are negative.   Patient unable to answer        *Psychiatric (Physical) Examination: observed phenomenon:  Vitals:    12/30/24 0554   BP: (!) 171/73   Pulse: 84   Resp: 16   Temp:    SpO2: 93%       General: Awake, alert in no acute distress  Patient Appearance: Appropriate, fairly groomed, dressed in a hospital gown and lying in bed  Behavior: irritable, no cogwheeling or rigidity.  Speech.: Spontaneous, soft and mumbling  Mood Comments: unable to state  Affect: appears restricted  Thought Content: inappropriate to conversation  Thought Process: disorganized  Psychosis: does not appear to have hallucinations, does not express  delusions  Insight: poor insight secondary to major NCD  Judgment: poor, secondary to major NCD  Cognition: impairments in recent memory noted, Concentration is impaired,oriented to person only,   Language: Is not able to repeat phrases. and Is not able to name objects.  Fund of Knowledge: below expected for age and level of education  Neuro: no tics, tremors, dyskinesias. no dystonias.  No cogwheeling or rigidity.  Gait not observed      Allergies:   No Known Allergies        Medications (currently prescribed at [facility]):    Current Facility-Administered Medications:     valproic acid (Depakene) IR oral solution 500 mg, 500 mg, Oral, DAILY, 500 mg at 12/30/24 0548 **AND** valproic acid (Depakene) IR oral solution 750 mg, 750 mg, Oral, Q EVENING, Jeremy Dumont M.D., 750 mg at 12/29/24 1923    enoxaparin (Lovenox) inj 40 mg, 40 mg, Subcutaneous, DAILY AT 1800, Hanane Curry M.D., 40 mg at 12/29/24 1840    acetaminophen (Tylenol) tablet 650 mg, 650 mg, Oral, Q6HRS PRN, Hanane Curry M.D.    labetalol (Normodyne/Trandate) injection 10 mg, 10 mg, Intravenous, Q4HRS PRN, Hanane Curry M.D.    melatonin tablet 5 mg, 5 mg, Oral, Nightly, Johanne Camejo D.O., 5 mg at 12/29/24 2026    ARIPiprazole (Abilify) tablet 2 mg, 2 mg, Oral, Q EVENING, Johanne Camejo D.O., 2 mg at 12/29/24 1927    hydrOXYzine HCl (Atarax) tablet 25 mg, 25 mg, Oral, Q6HRS PRN, Johanne Camejo D.O., 25 mg at 12/29/24 1840    haloperidol lactate (Haldol) injection 1 mg, 1 mg, Intramuscular, Q6HRS PRN, Johanne Camejo D.O., 1 mg at 12/28/24 1955    traZODone (Desyrel) tablet 50 mg, 50 mg, Oral, QHS PRN, Johanne Camejo D.O., 50 mg at 12/30/24 0335    amLODIPine (Norvasc) tablet 10 mg, 10 mg, Oral, DAILY, Adolph Mercado M.D., 10 mg at 12/30/24 0546    aspirin (Asa) chewable tab 81 mg, 81 mg, Oral, DAILY, Adolph Mercado M.D., 81 mg at 12/30/24 0546    atorvastatin (Lipitor) tablet 80 mg, 80 mg, Oral, Q  EVENING, Adolph Mercado M.D., 80 mg at 12/29/24 1839    lisinopril (Prinivil) tablet 40 mg, 40 mg, Oral, DAILY, Adolph Mercado M.D., 40 mg at 12/30/24 0546    metoprolol SR (Toprol XL) tablet 50 mg, 50 mg, Oral, DAILY, Adolph Mercado M.D., 50 mg at 12/30/24 0547    traZODone (Desyrel) tablet 150 mg, 150 mg, Oral, Nightly, Johanne Camejo D.OMckenzie, 150 mg at 12/29/24 2026    vitamin D3 (Cholecalciferol) tablet 1,000 Units, 1,000 Units, Oral, DAILY, Adolph Mercado M.D., 1,000 Units at 12/30/24 0546    Current Outpatient Medications:     metoprolol SR (TOPROL XL) 50 MG TABLET SR 24 HR, Take 50 mg by mouth every day., Disp: , Rfl:     hydrOXYzine HCl (ATARAX) 50 MG Tab, Take 50 mg by mouth 3 times a day. Scheduled  0800 1400 2000, Disp: , Rfl:     olanzapine (ZYPREXA) 10 MG tablet, Take 10 mg by mouth 2 times a day., Disp: , Rfl:     divalproex (DEPAKOTE) 500 MG Tablet Delayed Response, Take 500 mg by mouth 2 times a day., Disp: , Rfl:     traZODone (DESYREL) 150 MG Tab, Take 150 mg by mouth every evening., Disp: , Rfl:     vitamin D3 (CHOLECALCIFEROL) 1000 UNIT Tab, Take 1 Tablet by mouth every day., Disp: 30 Tablet, Rfl: 2    atorvastatin (LIPITOR) 80 MG tablet, Take 1 Tablet by mouth every evening., Disp: , Rfl:     lisinopril (PRINIVIL) 40 MG tablet, Take 1 Tablet by mouth every day., Disp: , Rfl:     amLODIPine (NORVASC) 10 MG Tab, Take 1 Tablet by mouth every day., Disp: , Rfl:     aspirin (ASA) 81 MG Chew Tab chewable tablet, Chew 1 Tablet every day., Disp: 100 Tablet, Rfl: 1         *Labs:  Depakote level (12/30/2024)-64    Lab Results   Component Value Date/Time    SODIUM 140 12/30/2024 05:31 PM    POTASSIUM 4.2 12/30/2024 05:31 PM    CHLORIDE 106 12/30/2024 05:31 PM    CO2 22 12/30/2024 05:31 PM    GLUCOSE 111 (H) 12/30/2024 05:31 PM    BUN 40 (H) 12/30/2024 05:31 PM    CREATININE 1.10 12/30/2024 05:31 PM      Recent Labs     12/30/24  1731   ASTSGOT 36   ALTSGPT 20   TBILIRUBIN 0.5   GLOBULIN  3.0     Lab Results   Component Value Date/Time    WBC 9.0 2024 05:31 PM    RBC 4.52 (L) 2024 05:31 PM    HEMOGLOBIN 13.9 (L) 2024 05:31 PM    HEMATOCRIT 41.3 (L) 2024 05:31 PM    MCV 91.4 2024 05:31 PM    MCH 30.8 2024 05:31 PM    MCHC 33.7 2024 05:31 PM    MPV 10.7 2024 05:31 PM    NEUTSPOLYS 65.40 2024 05:31 PM    LYMPHOCYTES 19.60 (L) 2024 05:31 PM    MONOCYTES 10.70 2024 05:31 PM    EOSINOPHILS 3.10 2024 05:31 PM    BASOPHILS 0.60 2024 05:31 PM   Plt-280,000      VPA (24)-49            Recent Labs     24  1754 24  1714   ASTSGOT 19 21   ALTSGPT 15 14   TBILIRUBIN 0.3 0.2   GLOBULIN 2.8 2.9         CT Head 24     IMPRESSION:     1.  Cerebral atrophy.     2.  White matter lucencies most consistent with small vessel ischemic change versus demyelination or gliosis.     3. Chronic areas of lacunar type infarction in noted in the left frontal periventricular white matter, right thalamus, and the left side of the hao..     UA 24 neg esterase and neg nitrite        TSH (24)-2.180                       B12 (24)-341                          *EC24  Intervals                                Axis   Rate:       81                           P:          2   WV:         138                          QRS:        -16   QRSD:       129                          T:          -30   QT:         391   QTc:        454     Interpretive Statements   Sinus rhythm   Atrial premature complex   Biatrial enlargement   IVCD, consider atypical RBBB   Inferior infarct, age indeterminate   Anterior infarct, age indeterminate            *ASSESSMENT:   Mr. Templeton is a 73 yo male seen for followup of major NCD with Behavioral disturbance     The patient has been less agitated the last several days.  In the last 48 hours he is only required Haldol once on .  He still does tend to sleep during the day and is more active at  night.  As needed trazodone was administered yesterday evening.  Unclear if this dose of trazodone caused sedation this morning so will decrease as needed dose.  Patient has been compliant with oral medications.  He does need assistance with meals and nursing staff comments he does better in the evening times and eating and also eats softer foods better than solid foods.  No EPS noted from Abilify so far.  Depakote level is within therapeutic range.      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 May use IM Haldol 1 mg q6H as needed for severe agitation or aggression (second line to hydroxyzine, haldol has been effective so far per nursing staff)  *Labs Reviewed  *Imaging Reviewed  *Medical Tests Reviewed  *Imaging:Review          Will  Sign off as the patient is transferring to [facility] today  Thank you for the consult.         *Time: (can be used instead of E/M)  -Face to Face:10 min  -Content of Counseling/Coordination of Care:20  -More than 50% spent in Counseling/Coordination

## 2024-12-30 NOTE — ED NOTES
Bedside report received from off going RN/tech: Cosmo, assumed care of patient.  POC discussed with patient. Call light within reach, all needs addressed at this time.       Fall risk interventions in place: Move the patient closer to the nurse's station, Patient's personal possessions are with in their safe reach, Place socks on patient, Place fall risk sign on patient's door, Give patient urinal if applicable, Keep floor surfaces clean and dry, Accompanied to restroom, and Human-sitter if tele-sitter fails (all applicable per [place] Fall risk assessment)   Continuous monitoring: Pulse Ox or Blood Pressure  IVF/IV medications: Not Applicable   Oxygen: Room Air and No oxygen tank in room  Bedside sitter: Report given to sitter, checklist completed, and checklist completed, stop sign in doorway, Pt on L2K for capacity, care aide bedside  Isolation: Not Applicable

## 2024-12-30 NOTE — ED NOTES
Pt incont. Of stool/urine. Linens changed. Pt cooperative, does not follow commands. Pt refused to eat.

## 2024-12-30 NOTE — ED NOTES
"PT became increasingly restless in bed, removing blankets and clothing and trying to exit his bed. PT stated they \"want to sleep\". PT medicated as per mar with PRN medication for sleep. Care aide at bedside.  "

## 2024-12-30 NOTE — DISCHARGE PLANNING
Psychiatry has updated SW that Pt has not had behaviors and has not needed Haldol since 13-. VELVET called and spoke to Lulu in Admissions with update and she stated they really want him to go to [facility] as they do not have any behavioral health program. VELVET will continue to work with Denae at [facility] in obtaining the take back agreement from [place].

## 2024-12-30 NOTE — ED NOTES
PT medicated as per mar. PT tolerated PO medication and was able to follow commands.  1:1 care aide at bedside.

## 2024-12-30 NOTE — DISCHARGE PLANNING
VELVET spoke to Denae at [facility], they are waiting for Take Back agreement to be signed by [facility]. [facility] called [facility] and spoke to Lulu in admissions. Per Lulu take back agreement needs to be sent to  to be signed. VELVET contacted Denae and asked that she resend agreement to  Edwige ALVAREZ at [facility].

## 2024-12-30 NOTE — ED NOTES
Pt medicated according to MAR for agitation/restlessness, pt tolerated a few spoonfuls of applesauce for staff

## 2024-12-30 NOTE — PROGRESS NOTES
"ED Observation Progress Note    Date of Service: 12/30/24    Interval History and Interventions  Patient here, awaiting placement.  Patient was recently sent here for agitation and behavioral issues.  Patient followed by psychiatry and case management.  He is on multiple different medications here for agitation.  He did not have any issues throughout my shift.  Patient signed out to my partner    Physical Exam  /58   Pulse 69   Temp 37.1 °C (98.7 °F)   Resp 16   Ht 1.753 m (5' 9\")   Wt 76.7 kg (169 lb)   SpO2 93%   BMI 24.96 kg/m² .    Constitutional: Awake and alert. Nontoxic  HENT:  Grossly normal  Eyes: Grossly normal  Neck: Normal range of motion  Cardiovascular: Normal heart rate   Thorax & Lungs: No respiratory distress  Abdomen: Nontender  Skin:  No pathologic rash.   Extremities: Well perfused  Psychiatric: Affect normal    Labs  Results for orders placed or performed during the hospital encounter of 12/23/24   CBC WITH DIFFERENTIAL    Collection Time: 12/23/24  5:14 PM   Result Value Ref Range    WBC 8.7 4.8 - 10.8 K/uL    RBC 3.84 (L) 4.70 - 6.10 M/uL    Hemoglobin 11.7 (L) 14.0 - 18.0 g/dL    Hematocrit 35.6 (L) 42.0 - 52.0 %    MCV 92.7 81.4 - 97.8 fL    MCH 30.5 27.0 - 33.0 pg    MCHC 32.9 32.3 - 36.5 g/dL    RDW 46.1 35.9 - 50.0 fL    Platelet Count 276 164 - 446 K/uL    MPV 10.4 9.0 - 12.9 fL    Neutrophils-Polys 61.90 44.00 - 72.00 %    Lymphocytes 19.80 (L) 22.00 - 41.00 %    Monocytes 11.00 0.00 - 13.40 %    Eosinophils 6.10 0.00 - 6.90 %    Basophils 0.60 0.00 - 1.80 %    Immature Granulocytes 0.60 0.00 - 0.90 %    Nucleated RBC 0.00 0.00 - 0.20 /100 WBC    Neutrophils (Absolute) 5.36 1.82 - 7.42 K/uL    Lymphs (Absolute) 1.71 1.00 - 4.80 K/uL    Monos (Absolute) 0.95 (H) 0.00 - 0.85 K/uL    Eos (Absolute) 0.53 (H) 0.00 - 0.51 K/uL    Baso (Absolute) 0.05 0.00 - 0.12 K/uL    Immature Granulocytes (abs) 0.05 0.00 - 0.11 K/uL    NRBC (Absolute) 0.00 K/uL   COMP METABOLIC PANEL    " Collection Time: 12/23/24  5:14 PM   Result Value Ref Range    Sodium 143 135 - 145 mmol/L    Potassium 4.2 3.6 - 5.5 mmol/L    Chloride 108 96 - 112 mmol/L    Co2 24 20 - 33 mmol/L    Anion Gap 11.0 7.0 - 16.0    Glucose 169 (H) 65 - 99 mg/dL    Bun 21 8 - 22 mg/dL    Creatinine 1.02 0.50 - 1.40 mg/dL    Calcium 9.2 8.5 - 10.5 mg/dL    Correct Calcium 9.4 8.5 - 10.5 mg/dL    AST(SGOT) 21 12 - 45 U/L    ALT(SGPT) 14 2 - 50 U/L    Alkaline Phosphatase 103 (H) 30 - 99 U/L    Total Bilirubin 0.2 0.1 - 1.5 mg/dL    Albumin 3.7 3.2 - 4.9 g/dL    Total Protein 6.6 6.0 - 8.2 g/dL    Globulin 2.9 1.9 - 3.5 g/dL    A-G Ratio 1.3 g/dL   LIPASE    Collection Time: 12/23/24  5:14 PM   Result Value Ref Range    Lipase 40 11 - 82 U/L   ESTIMATED GFR    Collection Time: 12/23/24  5:14 PM   Result Value Ref Range    GFR (CKD-EPI) 77 >60 mL/min/1.73 m 2   URINALYSIS CULTURE, IF INDICATED    Collection Time: 12/24/24 12:31 PM    Specimen: Urine, Clean Catch   Result Value Ref Range    Color Yellow     Character Clear     Specific Gravity 1.014 <1.035    Ph 7.0 5.0 - 8.0    Glucose Negative Negative mg/dL    Ketones Negative Negative mg/dL    Protein Negative Negative mg/dL    Bilirubin Negative Negative    Urobilinogen, Urine 1.0 <=1.0 EU/dL    Nitrite Negative Negative    Leukocyte Esterase Negative Negative    Occult Blood Negative Negative    Micro Urine Req see below    T.PALLIDUM AB VIKKI (SCREENING)    Collection Time: 12/25/24  5:14 PM   Result Value Ref Range    Syphilis, Treponemal Qual Non-Reactive Non-Reactive   HIV AG/AB COMBO ASSAY SCREENING    Collection Time: 12/25/24  5:14 PM   Result Value Ref Range    HIV Ag/Ab Combo Assay Non-Reactive Non Reactive   TSH    Collection Time: 12/25/24  5:14 PM   Result Value Ref Range    TSH 2.180 0.350 - 5.500 uIU/mL   FREE THYROXINE    Collection Time: 12/25/24  5:14 PM   Result Value Ref Range    Free T-4 0.85 (L) 0.93 - 1.70 ng/dL   VALPROIC ACID    Collection Time: 12/25/24  5:19  PM   Result Value Ref Range    Valproic Acid 49.1 (L) 50.0 - 100.0 ug/mL   EKG    Collection Time: 24  6:26 PM   Result Value Ref Range    Report       [facility] Emergency Dept.    Test Date:  2024  Pt Name:    LISA LÓPEZ                 Department: ER  MRN:        [id number]                      Room:       [facility]  Gender:     Male                         Technician: [id number]  :        1950                   Requested By:ALYSSA JUDGE  Order #:    [id number]                    Reading MD:    Measurements  Intervals                                Axis  Rate:       81                           P:          2  CT:         138                          QRS:        -16  QRSD:       129                          T:          -30  QT:         391  QTc:        454    Interpretive Statements  Sinus rhythm  Atrial premature complex  Biatrial enlargement  IVCD, consider atypical RBBB  Inferior infarct, age indeterminate  Anterior infarct, age indeterminate  Artifact in lead(s) I,II,III,aVR,aVF,V1,V2,V5 and baseline wander in lead(s)  V4,V5  Compared to ECG 2024 10:01:38  Atrial premature complex(es) now  present  Sinus tachycardia no longer present  Left ventricular hypertrophy no longer present  Prolonged QT interval no longer present  Myocardial infarct finding still present     POCT glucose device results    Collection Time: 24  6:39 AM   Result Value Ref Range    POC Glucose, Blood 110 (H) 65 - 99 mg/dL   Basic Metabolic Panel (BMP)    Collection Time: 24 11:09 PM   Result Value Ref Range    Sodium 142 135 - 145 mmol/L    Potassium 4.0 3.6 - 5.5 mmol/L    Chloride 106 96 - 112 mmol/L    Co2 22 20 - 33 mmol/L    Glucose 119 (H) 65 - 99 mg/dL    Bun 30 (H) 8 - 22 mg/dL    Creatinine 0.70 0.50 - 1.40 mg/dL    Calcium 9.4 8.5 - 10.5 mg/dL    Anion Gap 14.0 7.0 - 16.0   CBC without Differential    Collection Time: 24 11:09 PM   Result Value Ref Range    WBC 11.3  (H) 4.8 - 10.8 K/uL    RBC 4.70 4.70 - 6.10 M/uL    Hemoglobin 14.6 14.0 - 18.0 g/dL    Hematocrit 42.5 42.0 - 52.0 %    MCV 90.4 81.4 - 97.8 fL    MCH 31.1 27.0 - 33.0 pg    MCHC 34.4 32.3 - 36.5 g/dL    RDW 45.5 35.9 - 50.0 fL    Platelet Count 307 164 - 446 K/uL    MPV 10.6 9.0 - 12.9 fL   ESTIMATED GFR    Collection Time: 12/27/24 11:09 PM   Result Value Ref Range    GFR (CKD-EPI) 97 >60 mL/min/1.73 m 2   POCT glucose device results    Collection Time: 12/28/24  6:38 AM   Result Value Ref Range    POC Glucose, Blood 107 (H) 65 - 99 mg/dL       Radiology  CT-HEAD W/O   Final Result      1.  Cerebral atrophy.      2.  White matter lucencies most consistent with small vessel ischemic change versus demyelination or gliosis.      3. Chronic areas of lacunar type infarction in noted in the left frontal periventricular white matter, right thalamus, and the left side of the hao..                   Problem List    1. Aggressive behavior Acute

## 2024-12-30 NOTE — ED NOTES
Pt sitting up in hospital bed. Pt in direct view of 1:1 safety sitter. Respirations equal and unlabored.

## 2024-12-30 NOTE — ED NOTES
Bedside report received from off going RN/tech: Liset, assumed care of patient.      Fall risk interventions in place: Move the patient closer to the nurse's station, Place socks on patient, Place fall risk sign on patient's door, and Keep floor surfaces clean and dry (all applicable per [place] Fall risk assessment)   Continuous monitoring: Pulse Ox or Blood Pressure  IVF/IV medications: Not Applicable   Oxygen: Room Air  Bedside sitter: 1:1 sitter at bedside for legal hold for capacity, Report given to pastora Fabian stop sign complete  Isolation: Not Applicable

## 2024-12-31 NOTE — DISCHARGE PLANNING
COVID/RSV/FLU negative, results faxed to [facility].     PCS completed and faxed to [facility]-transportation time arranged for 1930.     Transfer Packet completed with admission paperwork, med rec and ED Encounter summary.   COBRA completed and placed with transfer packet.     RN updated on transfer and transfer time.

## 2024-12-31 NOTE — ED NOTES
[facility] arrived for pt transfer.  Pt became agitated during transfer to stretcher from bed, prn haldol given per MAR.  PIV removed, ppw and report given to [facility] crew.  All belongings provided and left with pt.  Pt left in stable condition.

## 2024-12-31 NOTE — ED NOTES
This RN called report to [facility] and spoke with RN (Francesca Dorsey), RN is aware that transport is set up for approx 2000, DC paperwork placed in transport packet

## 2025-01-14 ENCOUNTER — APPOINTMENT (OUTPATIENT)
Dept: RADIOLOGY | Facility: MEDICAL CENTER | Age: 75
End: 2025-01-14
Payer: MEDICARE

## 2025-01-14 ENCOUNTER — APPOINTMENT (OUTPATIENT)
Dept: RADIOLOGY | Facility: MEDICAL CENTER | Age: 75
End: 2025-01-14
Attending: STUDENT IN AN ORGANIZED HEALTH CARE EDUCATION/TRAINING PROGRAM
Payer: MEDICARE

## 2025-01-14 ENCOUNTER — HOSPITAL ENCOUNTER (EMERGENCY)
Facility: MEDICAL CENTER | Age: 75
End: 2025-01-15
Attending: STUDENT IN AN ORGANIZED HEALTH CARE EDUCATION/TRAINING PROGRAM
Payer: MEDICARE

## 2025-01-14 ENCOUNTER — PHARMACY VISIT (OUTPATIENT)
Dept: PHARMACY | Facility: MEDICAL CENTER | Age: 75
End: 2025-01-14
Payer: COMMERCIAL

## 2025-01-14 DIAGNOSIS — M79.601 RIGHT ARM PAIN: ICD-10-CM

## 2025-01-14 DIAGNOSIS — E87.0 HYPERNATREMIA: Primary | ICD-10-CM

## 2025-01-14 DIAGNOSIS — G89.29 OTHER CHRONIC PAIN: ICD-10-CM

## 2025-01-14 LAB
ALBUMIN SERPL BCP-MCNC: 3.1 G/DL (ref 3.2–4.9)
ALBUMIN/GLOB SERPL: 0.7 G/DL
ALP SERPL-CCNC: 77 U/L (ref 30–99)
ALT SERPL-CCNC: 26 U/L (ref 2–50)
AMPHET UR QL SCN: NEGATIVE
ANION GAP SERPL CALC-SCNC: 10 MMOL/L (ref 7–16)
APPEARANCE UR: CLEAR
AST SERPL-CCNC: 30 U/L (ref 12–45)
BARBITURATES UR QL SCN: NEGATIVE
BASOPHILS # BLD AUTO: 0.4 % (ref 0–1.8)
BASOPHILS # BLD: 0.06 K/UL (ref 0–0.12)
BENZODIAZ UR QL SCN: NEGATIVE
BILIRUB SERPL-MCNC: 0.3 MG/DL (ref 0.1–1.5)
BILIRUB UR QL STRIP.AUTO: NEGATIVE
BUN SERPL-MCNC: 37 MG/DL (ref 8–22)
BZE UR QL SCN: NEGATIVE
CALCIUM ALBUM COR SERPL-MCNC: 10.6 MG/DL (ref 8.5–10.5)
CALCIUM SERPL-MCNC: 9.9 MG/DL (ref 8.5–10.5)
CANNABINOIDS UR QL SCN: NEGATIVE
CHLORIDE SERPL-SCNC: 128 MMOL/L (ref 96–112)
CO2 SERPL-SCNC: 25 MMOL/L (ref 20–33)
COLOR UR: YELLOW
CREAT SERPL-MCNC: 1.04 MG/DL (ref 0.5–1.4)
EKG IMPRESSION: NORMAL
EOSINOPHIL # BLD AUTO: 0.16 K/UL (ref 0–0.51)
EOSINOPHIL NFR BLD: 1 % (ref 0–6.9)
ERYTHROCYTE [DISTWIDTH] IN BLOOD BY AUTOMATED COUNT: 52.1 FL (ref 35.9–50)
ETHANOL BLD-MCNC: <10.1 MG/DL
FENTANYL UR QL: NEGATIVE
GFR SERPLBLD CREATININE-BSD FMLA CKD-EPI: 76 ML/MIN/1.73 M 2
GLOBULIN SER CALC-MCNC: 4.2 G/DL (ref 1.9–3.5)
GLUCOSE SERPL-MCNC: 106 MG/DL (ref 65–99)
GLUCOSE UR STRIP.AUTO-MCNC: NEGATIVE MG/DL
HCT VFR BLD AUTO: 42.9 % (ref 42–52)
HGB BLD-MCNC: 13.3 G/DL (ref 14–18)
IMM GRANULOCYTES # BLD AUTO: 0.1 K/UL (ref 0–0.11)
IMM GRANULOCYTES NFR BLD AUTO: 0.6 % (ref 0–0.9)
KETONES UR STRIP.AUTO-MCNC: NEGATIVE MG/DL
LACTATE SERPL-SCNC: 1.3 MMOL/L (ref 0.5–2)
LEUKOCYTE ESTERASE UR QL STRIP.AUTO: NEGATIVE
LYMPHOCYTES # BLD AUTO: 1.86 K/UL (ref 1–4.8)
LYMPHOCYTES NFR BLD: 11.4 % (ref 22–41)
MCH RBC QN AUTO: 30.1 PG (ref 27–33)
MCHC RBC AUTO-ENTMCNC: 31 G/DL (ref 32.3–36.5)
MCV RBC AUTO: 97.1 FL (ref 81.4–97.8)
METHADONE UR QL SCN: NEGATIVE
MICRO URNS: NORMAL
MONOCYTES # BLD AUTO: 1.27 K/UL (ref 0–0.85)
MONOCYTES NFR BLD AUTO: 7.8 % (ref 0–13.4)
NEUTROPHILS # BLD AUTO: 12.93 K/UL (ref 1.82–7.42)
NEUTROPHILS NFR BLD: 78.8 % (ref 44–72)
NITRITE UR QL STRIP.AUTO: NEGATIVE
NRBC # BLD AUTO: 0 K/UL
NRBC BLD-RTO: 0 /100 WBC (ref 0–0.2)
OPIATES UR QL SCN: POSITIVE
OXYCODONE UR QL SCN: NEGATIVE
PCP UR QL SCN: NEGATIVE
PH UR STRIP.AUTO: 6 [PH] (ref 5–8)
PLATELET # BLD AUTO: 278 K/UL (ref 164–446)
PMV BLD AUTO: 11.4 FL (ref 9–12.9)
POTASSIUM SERPL-SCNC: 4.1 MMOL/L (ref 3.6–5.5)
PROPOXYPH UR QL SCN: NEGATIVE
PROT SERPL-MCNC: 7.3 G/DL (ref 6–8.2)
PROT UR QL STRIP: NEGATIVE MG/DL
RBC # BLD AUTO: 4.42 M/UL (ref 4.7–6.1)
RBC UR QL AUTO: NEGATIVE
SODIUM SERPL-SCNC: 163 MMOL/L (ref 135–145)
SP GR UR STRIP.AUTO: 1.02
UROBILINOGEN UR STRIP.AUTO-MCNC: 1 EU/DL
WBC # BLD AUTO: 16.4 K/UL (ref 4.8–10.8)

## 2025-01-14 PROCEDURE — 73100 X-RAY EXAM OF WRIST: CPT | Mod: RT

## 2025-01-14 PROCEDURE — 80053 COMPREHEN METABOLIC PANEL: CPT

## 2025-01-14 PROCEDURE — 87186 SC STD MICRODIL/AGAR DIL: CPT

## 2025-01-14 PROCEDURE — 80307 DRUG TEST PRSMV CHEM ANLYZR: CPT

## 2025-01-14 PROCEDURE — 71045 X-RAY EXAM CHEST 1 VIEW: CPT

## 2025-01-14 PROCEDURE — 51701 INSERT BLADDER CATHETER: CPT

## 2025-01-14 PROCEDURE — 70450 CT HEAD/BRAIN W/O DYE: CPT

## 2025-01-14 PROCEDURE — 83605 ASSAY OF LACTIC ACID: CPT

## 2025-01-14 PROCEDURE — 93005 ELECTROCARDIOGRAM TRACING: CPT | Mod: TC

## 2025-01-14 PROCEDURE — 85025 COMPLETE CBC W/AUTO DIFF WBC: CPT

## 2025-01-14 PROCEDURE — 36415 COLL VENOUS BLD VENIPUNCTURE: CPT

## 2025-01-14 PROCEDURE — 87040 BLOOD CULTURE FOR BACTERIA: CPT

## 2025-01-14 PROCEDURE — 82077 ASSAY SPEC XCP UR&BREATH IA: CPT

## 2025-01-14 PROCEDURE — 99285 EMERGENCY DEPT VISIT HI MDM: CPT

## 2025-01-14 PROCEDURE — 81003 URINALYSIS AUTO W/O SCOPE: CPT

## 2025-01-14 PROCEDURE — RXMED WILLOW AMBULATORY MEDICATION CHARGE: Performed by: STUDENT IN AN ORGANIZED HEALTH CARE EDUCATION/TRAINING PROGRAM

## 2025-01-14 PROCEDURE — 87077 CULTURE AEROBIC IDENTIFY: CPT

## 2025-01-14 PROCEDURE — 87086 URINE CULTURE/COLONY COUNT: CPT

## 2025-01-14 PROCEDURE — 93005 ELECTROCARDIOGRAM TRACING: CPT | Mod: TC | Performed by: STUDENT IN AN ORGANIZED HEALTH CARE EDUCATION/TRAINING PROGRAM

## 2025-01-14 RX ORDER — LORAZEPAM 2 MG/ML
1 INJECTION INTRAMUSCULAR
Status: DISCONTINUED | OUTPATIENT
Start: 2025-01-14 | End: 2025-01-15 | Stop reason: HOSPADM

## 2025-01-14 RX ORDER — OXYCODONE HYDROCHLORIDE 5 MG/1
5 TABLET ORAL EVERY 6 HOURS PRN
Qty: 8 TABLET | Refills: 0 | Status: SHIPPED | OUTPATIENT
Start: 2025-01-14 | End: 2025-01-16

## 2025-01-14 ASSESSMENT — FIBROSIS 4 INDEX: FIB4 SCORE: 2.1

## 2025-01-15 VITALS
OXYGEN SATURATION: 94 % | HEART RATE: 88 BPM | DIASTOLIC BLOOD PRESSURE: 66 MMHG | SYSTOLIC BLOOD PRESSURE: 114 MMHG | WEIGHT: 169 LBS | HEIGHT: 69 IN | TEMPERATURE: 98 F | RESPIRATION RATE: 13 BRPM | BODY MASS INDEX: 25.03 KG/M2

## 2025-01-15 NOTE — ED NOTES
Ness from Lab called with critical result of Na 163 at 1700. Critical lab result read back to Ness.   Dr. Mercado notified of critical lab result at 1705.  Critical lab result read back by Dr. Mercado.

## 2025-01-15 NOTE — ED NOTES
Report called to [facility]. Stated they would except him if he was sent with pain meds or with prescription for meds they have there. ERP notified. Prescription placed, called pharmacy for meds to beds.

## 2025-01-15 NOTE — DISCHARGE PLANNING
Medical Social Work    MSW received a voalte message from bedside RN that [facility] has accepted pt back to their facility.  MSW faxed PCS and facesheet to [facility] and made follow up phone call to arrange transport for 0000.  MSW attempted to contacted  with [facility] ([phone]) and left a message to inform her that pt did transfer back to [facility].  COBRA and transfer packet complete and provided to RN for transport.  RN made aware of transport time.

## 2025-01-15 NOTE — DISCHARGE INSTRUCTIONS
You are seen and evaluated in the emergency department for your symptoms.  We touch base with your primary decision maker, your brother.  Decision was made recently to make sure you are care is oriented toward hospice and comfort care.  Your labs were reviewed and showed that your sodium was elevated.  We had shared decision making with your brother who is your primary decision-maker who does not want to seek further care at this time, recommends that you get comfort care and hospice evaluate.  There is no evidence of any traumatic injury of your right wrist.  Your CT head did not show any evidence of intracranial bleeding.  It looks like you may have bitten your tongue.  Based on patient and family wishes patient will be discharged back to living facility for evaluation by hospice and comfort care measures.

## 2025-01-15 NOTE — ED TRIAGE NOTES
Joe Templeton  74 y.o. male  Chief Complaint   Patient presents with    ALOC     Pt lives at [facility], pt stayed at [facility] for 1 month, pt sent back [facility] today, had been there for approx 1 hour when they called EMS for ALOC. Per [facility], pt had been more alert prior to transfer. GCS of 9, pt unable to answer questions.     Arm Pain     Right arm pain, pt guards when touched.    Dried blood/missing teeth around mouth. Per EMS report, no noted seizures.     Per Chart Review, Pt is A&O 0-1. At this time pt only responds to pain. No trauma noted other than the dried blood to mouth.     BIBA for above complaint from [facility]. Protocol ordered. FSBG by      Vitals:    01/14/25 1705   BP: (!) 143/64   Pulse: (!) 103   Resp: 18   Temp:    SpO2: 91%

## 2025-01-15 NOTE — ED NOTES
[facility] Pt taken back to [facility] in stable condition, dc papers with pt, pain meds in bag and sent with [facility] staff. IV removed and tolerated well.

## 2025-01-15 NOTE — ED NOTES
Med Rec complete per  discharge summary   Allergies reviewed  Antibiotics in the past 30 days:no  Anticoagulant in past 14 days:no    Per discharge summary report patient was to begin comfort care measures on 1/14/25    Following medications were prescribed, not yet administered to patient (not on med rec):    Ativan 0.5 mg Q4H PRN AX  Ativan 2 mg/mL 1 mL Q2H PRN   Morphine 2mg/mL 1 mL now and every 10 minutes PRN for severe pain  Morphine oral solution 2.5 mL now and every 30 minutes PRN severe pain  Ocular Lubricant ophthalmic gel 1 application OU Q12H for comfort measures only  Scopolamine Q 3 days PRN  Trazodone 50 mg @ HS PRN

## 2025-01-15 NOTE — DISCHARGE PLANNING
Rusty from admissions called [facility] to notify SW that Pt has been a resident at [facility]. He went to [facility] for treatment and was readmitted to [facility] today. When Pt arrived to [facility] today he appeared more confused, not answering questions, had teeth missing and dried blood around mouth. Family requested for Pt to come to the ED for evaluation.

## 2025-01-15 NOTE — ED NOTES
Called [facility] to give report, rn stated she was unaware they were going to be excepting him back, stated she would talk to admin and then call back.

## 2025-01-15 NOTE — ED PROVIDER NOTES
ER Provider Note    Scribed for Adolph Mercado M.d. by Lisa Ann. 1/14/2025  5:02 PM    Primary Care Provider: Pcp Pt States None    CHIEF COMPLAINT   Chief Complaint   Patient presents with    ALOC     Pt lives at [facility], pt stayed at [facility] for 1 month, pt sent back [facility] today, had been there for approx 1 hour when they called EMS for ALOC. Per [facility], pt had been more alert prior to transfer. GCS of 9, pt unable to answer questions.     Arm Pain     Right arm pain, pt guards when touched.      EXTERNAL RECORDS REVIEWED  Outpatient Notes Patient [facility] to [facility] on comfort care. His family was notified about this. History of high sodium. [facility] was [facility] for agressiveness which he has received multiple doses of antipsychotics.     HPI/ROS  LIMITATION TO HISTORY   Select: : None  OUTSIDE HISTORIAN(S):  None    Joe Templeton is a 74 y.o. male who presents to the ED for evaluation of altered level of consciousness onset prior to arrival. Upon evaluation, patient unable to answer questions. Patient was picked up at [facility] today for altered level of consciousness. Per [facility], patient was alert and oriented at their facility with a GCS of 9. Patient is also experiencing right arm pain.     PAST MEDICAL HISTORY  Past Medical History:   Diagnosis Date    Arthritis     Gout     Hemorrhoids 11/25/2014    Reportedly Pt has had hemorrhoids for some time. He denies any bloody stools or bleeding hemorrhoids.     Hypertension     Ingrown right big toenail 05/20/2015    Low back pain 05/20/2015    NSTEMI (non-ST elevated myocardial infarction) (HCC) 06/28/2016    Vitamin D deficiency 01/06/2015 12/1/14 lab result show slightly low Vitamin D level= 26. He states he walks outside a lot in the sun until the past month.       SURGICAL HISTORY  Past Surgical History:   Procedure Laterality Date    IRRIGATION & DEBRIDEMENT GENERAL Left 9/14/2024  "   Procedure: IRRIGATION AND DEBRIDEMENT, ANKLE;  Surgeon: Alex Gautam M.D.;  Location: SURGERY [facility];  Service: Orthopedics    MULTIPLE CORONARY ARTERY BYPASS ENDO VEIN HARVEST N/A 6/28/2016    Procedure: MULTIPLE CORONARY ARTERY BYPASS ENDO VEIN HARVEST- With DELMI ;  Surgeon: Deejay Duong M.D.;  Location: SURGERY [facility];  Service:        FAMILY HISTORY  Family History   Problem Relation Age of Onset    Cancer Mother     Heart Disease Father     Hyperlipidemia Father     Hyperlipidemia Brother     Lung Disease Brother        SOCIAL HISTORY   reports that he quit smoking about 8 years ago. His smoking use included cigarettes. He started smoking about 9 years ago. He has a 0.1 pack-year smoking history. He has never used smokeless tobacco. He reports that he does not drink alcohol and does not use drugs.    CURRENT MEDICATIONS  Previous Medications    No medications on file       ALLERGIES  Patient has no known allergies.    PHYSICAL EXAM  BP (!) 151/107   Pulse (!) 101   Temp 36.7 °C (98 °F) (Temporal)   Resp 18   Ht 1.753 m (5' 9\")   Wt 76.7 kg (169 lb)   SpO2 96%   BMI 24.96 kg/m²   Constitutional: Well developed, Well nourished, No acute distress, Non-toxic appearance.   HENT: Normocephalic, Atraumatic, Bilateral external ears normal, Oropharynx is clear mucous membranes are moist. No oral exudates or nasal discharge.   Eyes: Pupils are equal round and reactive, EOMI, Conjunctiva normal, No discharge.   Neck: Normal range of motion, No tenderness, Supple, No stridor. No meningismus.  Lymphatic: No lymphadenopathy noted.   Cardiovascular: Regular rate and rhythm without murmur rub or gallop.  Thorax & Lungs: Clear breath sounds bilaterally without wheezes, rhonchi or rales. There is no chest wall tenderness.   Abdomen: Soft non-tender non-distended. There is no rebound or guarding. No organomegaly is appreciated. Bowel sounds are normal.  Skin: Normal without rash.   Back: No CVA or " spinal tenderness.   Extremities: Intact distal pulses, No edema, Tenderness to the right wrist, No cyanosis, No clubbing. Capillary refill is less than 2 seconds.  Musculoskeletal: Good range of motion in all major joints. No tenderness to palpation or major deformities noted.   Neurologic: Alert & oriented x 3, Normal motor function, Normal sensory function, No focal deficits noted. Reflexes are normal.  Psychiatric: Affect normal, Judgment normal, Mood normal. There is no suicidal ideation or patient reported hallucinations.     DIAGNOSTIC STUDIES    EKG/LABS  Labs Reviewed   CBC WITH DIFFERENTIAL - Abnormal; Notable for the following components:       Result Value    WBC 16.4 (*)     RBC 4.42 (*)     Hemoglobin 13.3 (*)     MCHC 31.0 (*)     RDW 52.1 (*)     Neutrophils-Polys 78.80 (*)     Lymphocytes 11.40 (*)     Neutrophils (Absolute) 12.93 (*)     Monos (Absolute) 1.27 (*)     All other components within normal limits   COMP METABOLIC PANEL - Abnormal; Notable for the following components:    Sodium 163 (*)     Chloride 128 (*)     Glucose 106 (*)     Bun 37 (*)     Correct Calcium 10.6 (*)     Albumin 3.1 (*)     Globulin 4.2 (*)     All other components within normal limits   URINE DRUG SCREEN - Abnormal; Notable for the following components:    Opiates Positive (*)     All other components within normal limits   DIAGNOSTIC ALCOHOL   URINALYSIS   LACTIC ACID   BLOOD CULTURE   BLOOD CULTURE   ESTIMATED GFR   URINE CULTURE(NEW)   POCT GLUCOSE     I have independently interpreted this EKG    RADIOLOGY/PROCEDURES   The attending emergency physician has independently interpreted the diagnostic imaging associated with this visit and am waiting the final reading from the radiologist.   My preliminary interpretation is a follows: No intracranial hemorrhage, no evidence of acute osseous abnormality of the right wrist    Radiologist interpretation:  CT-HEAD W/O   Final Result         1.  No acute intracranial  abnormality is identified, there are nonspecific white matter changes, commonly associated with small vessel ischemic disease.  Associated mild cerebral atrophy is noted.   2.  Atherosclerosis.      Note: Diagnostic sensitivity of this examination is diminished due to motion artifacts. 3 acquisition attempts were made.         DX-WRIST-LIMITED 2- RIGHT   Final Result         No acute osseous abnormality.      DX-CHEST-PORTABLE (1 VIEW)   Final Result      No acute cardiopulmonary abnormality.          COURSE & MEDICAL DECISION MAKING     ASSESSMENT, COURSE AND PLAN  Care Narrative:   5:02 PM - Patient seen and examined at bedside.  Patient was reportedly complaining of some right arm pain, was recently transitioned from full CODE STATUS to comfort care measures by family, has a history of agitation, previous CVA with some residual deficits.  I reviewed his previous labs from several days ago that showed hyponatremia.  Family was apparently made aware and elected to go to comfort care.  Patient does not have a signed POLST here.  On exam patient is alert and oriented to person, intermittently follows peripheral commands.  He does have some erythema of his right wrist with some tenderness to palpation.  He also had some dried blood in his mouth, appears that he may have bit his tongue.  Discussed plan of care, including lab work and diagnostic imaging. Patient agrees to the plan of care. Ordered for Lactic acid, blood culture, diagnostic alcohol, CMP, CBC with diff, Urine drug screen, UA, Urine culture, DX-chest, EKG to evaluate his symptoms.     Labs notable for hyponatremia of 163, leukocytosis, mild anemia.  Urinalysis was negative.  X-ray was negative for any acute traumatic abnormalities, CT head was negative.    8:07 PM - I spoke with patient's brother and decision maker.  Due to lack of filled out forms at the bedside I wish to discuss CODE STATUS.  They confirmed that patient is supposed be on hospice and  comfort care, they do not want to pursue her further escalation of care.  They wish for him to be discharged back to his living facility with comfort care measures.  I discussed the fact that patient's labs are very abnormal, he may have an infection in his right wrist and right upper extremity.  They again said that in accordance with patient wishes he looks to be comfortable at this time and be discharged home.  I agree that this is reasonable and they are primary care decision makers.  Using shared decision making, they want him to have hospice and comfort care. They do not want him to be admitted to the hospital or have any other escalation of treatment.  I will honor patient and family wishes.  Patient will be discharged back to living facility, follow-up with hospice care and comfort measures.    ED Course as of 01/14/25 2143  ------------------------------------------------------------  Time: 01/14 1702  Value: Sodium(!!): 163  Comment: (Reviewed)  By: CT      ADDITIONAL PROBLEM LIST  None    DISPOSITION AND DISCUSSIONS  I have discussed management of the patient with the following physicians and LAXMI's:  None    Discussion of management with other [facility] or appropriate source(s): None     Escalation of care considered, and ultimately not performed: after discussion with the patient / family, they have elected to decline an escalation in care.    Barriers to care at this time, including but not limited to: None      DISPOSITION:  Patient will be discharged home in stable condition.    FOLLOW UP:  Hospice provider            OUTPATIENT MEDICATIONS:  Discharge Medication List as of 1/14/2025 10:40 PM        START taking these medications    Details   oxyCODONE immediate-release (ROXICODONE) 5 MG Tab Take 1 Tablet by mouth every 6 hours as needed for Severe Pain for up to 2 days., Disp-8 Tablet, R-0, Normal             FINAL DIAGNOSIS  1. Hypernatremia Acute   2. Right arm pain Acute   3. Other chronic pain             ILisa (Vielka), am scribing for, and in the presence of, Adolph Mercado M.D..    Electronically signed by: Lisa Ann (Vielka), 1/14/2025    IAdolph M.D. personally performed the services described in this documentation, as scribed by Lisa Ann in my presence, and it is both accurate and complete.    The note accurately reflects work and decisions made by me.  Adolph Mercado M.D.  1/15/2025  1:23 AM

## 2025-01-15 NOTE — DISCHARGE PLANNING
Post Discharge call:   ED RN CM received call from RUBEN with [facility]. She confirmed she received Shaniqua, ED [facility], message regarding pt's discharge back to [facility]. RUBEN will follow up with family and pt to admit to hospice today.   RUBEN contact: [phone]

## 2025-01-15 NOTE — DISCHARGE PLANNING
AJ  called from [facility] @ [phone], pt has been accepted to their service and they can  when medically cleared and returns to [facility].  Updated RN.

## 2025-01-16 LAB
BACTERIA UR CULT: ABNORMAL
BACTERIA UR CULT: ABNORMAL
SIGNIFICANT IND 70042: ABNORMAL
SITE SITE: ABNORMAL
SOURCE SOURCE: ABNORMAL

## 2025-01-17 NOTE — ED NOTES
ED Positive Culture Follow-up/Notification Note:    Date: 1/16/2025     Patient seen in the ED on 1/14/2025 for altered mental status, right arm pain and hypernatremia. Patient's family confirmed that patient was supposed to be on hospice and comfort care, and they did not want escalation of care. Patient was discharged back to living facility for follow up with hospice care and comfort measures.   1. Hypernatremia Acute   2. Right arm pain Acute   3. Other chronic pain       Discharge Medication List as of 1/14/2025 10:40 PM        START taking these medications    Details   oxyCODONE immediate-release (ROXICODONE) 5 MG Tab Take 1 Tablet by mouth every 6 hours as needed for Severe Pain for up to 2 days., Disp-8 Tablet, R-0, Normal           Allergies: Patient has no known allergies.     Vitals:    01/14/25 2244 01/14/25 2344 01/15/25 0003 01/15/25 0004   BP: (!) 158/93 128/60 114/66    Pulse: (!) 103 (!) 102  88   Resp:       Temp:       TempSrc:       SpO2: 93% 94%  94%   Weight:       Height:         Final cultures:   Results       Procedure Component Value Units Date/Time    Urine Culture (New) [[id number]]  (Abnormal)  (Susceptibility) Collected: 01/14/25 1130    Order Status: Completed Specimen: Urine Updated: 01/16/25 1102     Significant Indicator POS     Source UR     Site -     Culture Result -      Enterococcus faecalis  >100,000 cfu/mL      Susceptibility       Enterococcus faecalis (1)       Antibiotic Interpretation Microscan   Method Status    Ampicillin Sensitive <=2 mcg/mL LORENZO Final    Ciprofloxacin  [*]  Sensitive <=1 mcg/mL LORENZO Final    Daptomycin Sensitive 1 mcg/mL LORENZO Final    Nitrofurantoin Sensitive <=32 mcg/mL LORENZO Final    Gent Synergy  [*]  Sensitive <=500 mcg/mL LORENZO Final    Levofloxacin  [*]  Sensitive <=1 mcg/mL LORENZO Final    Linezolid  [*]  Sensitive 2 mcg/mL LORENZO Final    Penicillin  [*]  Sensitive 2 mcg/mL LORENZO Final    Rifampin  [*]  Sensitive <=1 mcg/mL LORENZO Final    Strep Synergy  [*]   Sensitive <=1000 mcg/mL LORENZO Final    Tetracycline Resistant >8 mcg/mL LORENZO Final    Tigecycline  [*]  Sensitive <=0.25 mcg/mL LORENZO Final    Vancomycin Sensitive 1 mcg/mL LORENZO Final               [*]  Suppressed Antibiotic                   Blood Culture - Draw one from central line and one from peripheral site [[id number]] Collected: 01/14/25 1840    Order Status: Completed Specimen: Blood from Line Updated: 01/14/25 2008     Significant Indicator NEG     Source BLD     Site Peripheral     Culture Result No Growth  Note: Blood cultures are incubated for 5 days and  are monitored continuously.Positive blood cultures  are called to the RN and reported as soon as  they are identified.      Blood Culture - Draw one from central line and one from peripheral site [[id number]] Collected: 01/14/25 1614    Order Status: Completed Specimen: Blood from Peripheral Updated: 01/14/25 1808     Significant Indicator NEG     Source BLD     Site PERIPHERAL     Culture Result No Growth  Note: Blood cultures are incubated for 5 days and  are monitored continuously.Positive blood cultures  are called to the RN and reported as soon as  they are identified.      Urinalysis [[id number]] Collected: 01/14/25 1130    Order Status: Completed Specimen: Urine Updated: 01/14/25 1703     Color Yellow     Character Clear     Specific Gravity 1.023     Ph 6.0     Glucose Negative mg/dL      Ketones Negative mg/dL      Protein Negative mg/dL      Bilirubin Negative     Urobilinogen, Urine 1.0 EU/dL      Nitrite Negative     Leukocyte Esterase Negative     Occult Blood Negative     Micro Urine Req see below     Comment: Microscopic examination not performed when specimen is clear  and chemically negative for protein, blood, leukocyte esterase  and nitrite.                 Plan:   E. faecalis isolated in the urine culture without specific UTI symptoms reflect asymptomatic bacteruria and treatment is not indicated. Patient was discharged on hospice and  comfort care. No additional follow up from pharmacy is needed at this time.     Krystin Tong, PharmD

## 2025-01-19 LAB
BACTERIA BLD CULT: NORMAL
BACTERIA BLD CULT: NORMAL
SIGNIFICANT IND 70042: NORMAL
SIGNIFICANT IND 70042: NORMAL
SITE SITE: NORMAL
SITE SITE: NORMAL
SOURCE SOURCE: NORMAL
SOURCE SOURCE: NORMAL

## 2025-02-25 NOTE — THERAPY
"Speech Language Pathology  Daily Treatment     Patient Name: Joe Templeton  Age:  74 y.o., Sex:  male  Medical Record #: [id number]  Today's Date: 10/6/2024     Precautions  Precautions: Fall Risk  Comments: left knee pain    Subjective    Pt seen for 2 different sessions for a total of 90 minutes. Pt seen from 10:00-11:00 and 13:30-14:00.     10:00-11:00- pt received for session from RN station. Pleasant and agreeable. Pt frustrated throughout session at himself with self deprecating comments. Pt requested to use the restroom in the middle of session. Targeted safety sequences of transfer given assistance in transferring. Returned to RN station at end of session.     13:31-14:00-Pt received from RN station. Rn reported pt to be expressive frustration with his expressive aphasia when attempting to engage in more complex thoughts/conversation. Pt appeared fatigued throughout session with increased level of frustration \"Im telling  you I don't know!\"     Objective       10/06/24 1001   Treatment Charges   SLP Treatment - Individual Speech Language Treatment - Individual   SLP Total Time Spent   SLP Individual Total Time Spent (Mins) 90   Receptive Language / Auditory Comprehension   Identifies Pictures Minimal (4)   Expressive Language   Naming Severe (2)   Interdisciplinary Plan of Care Collaboration   IDT Collaboration with  Nursing   Patient Position at End of Therapy Seated;Chair Alarm On;Other (Comments)  (@ RN station)         Assessment    10:00-11:00- pt targeted object naming for expressive language with 30% IND increasing to 50% given phrase completion and 69% given phonemic cue. Pt required direct verbal model for 39% of words in order to repeat them.  Pt targeted receptive language with picture ID in VFo6 with 92% accuracy. Pt appeared pleased with himself yelling \"FINALLY.\"    13:30-14:00- Pt with 20% and required max A to improve in picture ID in Vfo6. Reduced to Vfo2 due to level of difficulty. Pt with " max A in picture naming and d/c due to level of frustration. Verbal encouragement to continue trying at end of session.       Strengths: Pleasant and cooperative, Independent prior level of function, Willingly participates in therapeutic activities  Barriers: Aphasia expressive, Aphasia receptive, Impaired carryover of learning, Impaired insight/denial of deficits, Impaired activity tolerance, Impaired functional cognition, Impaired sleep pattern, Fatigue, Decreased endurance    Plan    Expressive and receptive language    Passport items to be completed:  Express basic needs, understand food/liquid recommendations, consistently follow swallow precautions, manage finances, manage medications, arrive to therapy appointments on time, complete daily memory log entries, solve problems related to safety situations, review education related to hospitalization, complete caregiver training     Speech Therapy Problems (Active)       Problem: Comprehension STGs       Dates: Start:  09/24/24         Goal: STG-Within one week, patient will point to pictures in FO3 with 80% accuracy.       Dates: Start:  09/24/24         Goal Note filed on 10/02/24 1014 by Blanca Altamirano MS,CCC-SLP       Pt has not been able to achieve 80% accuracy consistently. Have increased to FO4 at times, however, performance varies.                  Problem: Expression STGs       Dates: Start:  09/24/24         Goal: STG-Within one week, patient will state antonym given verbal prompt in 7/10 trials       Dates: Start:  09/24/24         Goal Note filed on 10/02/24 1014 by Blanca Altamirano MS,CCC-SLP       Performance fluctuates, unable to achieve 70%.                 Problem: Memory STGs       Dates: Start:  09/21/24         Goal: STG-Within one week, patient will       Dates: Start:  09/21/24       Description: 1) Individualized goal:  be oriented to day, month, year, and situation w/ 90% acc and MIN A.   2) Interventions:  SLP Self Care / ADL Training , SLP  Cognitive Skill Development, and SLP Group Treatment          Goal Note filed on 10/02/24 1014 by Blanca lAtamirano MS,CCC-SLP       Emphasis of therapy on receptive and expressive language, have not targeted orientation at this time.                  Problem: Speech/Swallowing LTGs       Dates: Start:  09/21/24         Goal: LTG-By discharge, patient will solve basic problems       Dates: Start:  09/21/24       Description: 1) Individualized goal:  related to health and safety with 80% acc and MOD I for a safe D/C to PLOF.   2) Interventions:  SLP Self Care / ADL Training , SLP Cognitive Skill Development, and SLP Group Treatment                 yes

## 2025-04-08 NOTE — DISCHARGE PLANNING
Case Management/IDT follow up.   Projected dc date:  IDT continues to recommend IRF level of care as patient continue to make progress with all therapies.     DC needs  Home health vs OT   DME: front wheel walker   Family training:     Follow up:   PCP:  Other: ID, Ortho, Stroke Bridge     I spoke with family providing update from IDT and discussed plan of care.  They are in agreement w/ plan.     Plan:  Continue to follow   NOTIFICATION OF ADMISSION DISCHARGE   This is a Notification of Discharge from [facility]. Please be advised that this patient has been discharge from our facility. Below you will find the admission and discharge date and time including the patient’s disposition.   UTILIZATION REVIEW CONTACT:  Utilization Review Assistants  Network Utilization Review Department  Phone: [phone] x carefully listen to the prompts. All voicemails are confidential.  Email: [email or web address]     ADMISSION INFORMATION  PRESENTATION DATE: 4/3/2025  5:55 PM  OBERVATION ADMISSION DATE: N/A  INPATIENT ADMISSION DATE: 4/3/25 10:21 PM   DISCHARGE DATE: 4/7/2025  4:51 PM   DISPOSITION:Home with Home Health Care    [facility] Utilization Review Department  ATTENTION: Please call with any questions or concerns to [phone] and carefully listen to the prompts so that you are directed to the right person. All voicemails are confidential.   For Discharge needs, contact Care Management DC Support Team at [phone] opt. 2  Send all requests for admission clinical reviews, approved or denied determinations and any other requests to dedicated fax number below belonging to the campus where the patient is receiving treatment. List of dedicated fax numbers for the Facilities:  FACILITY NAME UR FAX NUMBER   ADMISSION DENIALS (Administrative/Medical Necessity) [phone]   DISCHARGE SUPPORT TEAM ([facility]) [phone]   PARENT CHILD HEALTH (Maternity/NICU/Pediatrics) [phone]   [facility] [phone]   [facility] [phone]   [facility] [phone]   [facility] [phone]   [facility] [phone]   [facility] [phone]   [facility] [phone]   [facility] [phone]   [facility]  [facility] [phone]   formerly [facility] [phone]   General acute hospital [phone]   [facility] [phone]

## 2025-05-03 NOTE — ED PROVIDER NOTES
CHIEF COMPLAINT  Chief Complaint   Patient presents with    ALOC     BIB EMS from [facility], pt has dementia and is AO1 baseline, currently still AO1 but per staff at [facility] pt was very agitated and aggressive. Pt calm and cooperative on arrival.        LIMITATION TO HISTORY   Select: Altered mental status    HPI    Joe Templeton is a 74 y.o. male who presents to the Emergency Department with sent in from nursing home over concerns of agitation.  [place] [facility] [place] [facility] [place].  He is normally only alert to person though is usually calm cooperative and pleasant today he did become more agitated, and thus was transferred here.  He is at his baseline mentation otherwise    OUTSIDE HISTORIAN(S):  Select: EMS    EXTERNAL RECORDS REVIEWED  Select: Other most recent ED visit 11/29/2024 was seen for a similar presentation discharged after reassuring workup      PAST MEDICAL HISTORY  Past Medical History:   Diagnosis Date    Arthritis     Gout     Hemorrhoids 11/25/2014    Reportedly Pt has had hemorrhoids for some time. He denies any bloody stools or bleeding hemorrhoids.     Hypertension     Ingrown right big toenail 05/20/2015    Low back pain 05/20/2015    NSTEMI (non-ST elevated myocardial infarction) (HCC) 06/28/2016    Vitamin D deficiency 01/06/2015 12/1/14 lab result show slightly low Vitamin D level= 26. He states he walks outside a lot in the sun until the past month.     .    SURGICAL HISTORY  Past Surgical History:   Procedure Laterality Date    IRRIGATION & DEBRIDEMENT GENERAL Left 9/14/2024    Procedure: IRRIGATION AND DEBRIDEMENT, ANKLE;  Surgeon: Alex Gautam M.D.;  Location: SURGERY [facility];  Service: Orthopedics    MULTIPLE CORONARY ARTERY BYPASS ENDO VEIN HARVEST N/A 6/28/2016    Procedure: MULTIPLE CORONARY ARTERY BYPASS ENDO VEIN HARVEST- With DELMI ;  Surgeon: Deejay Duong M.D.;  Location: SURGERY [facility];  Service:          FAMILY  HISTORY  Family History   Problem Relation Age of Onset    Cancer Mother     Heart Disease Father     Hyperlipidemia Father     Hyperlipidemia Brother     Lung Disease Brother           SOCIAL HISTORY  Social History     Socioeconomic History    Marital status: Single     Spouse name: Not on file    Number of children: 0    Years of education: Not on file    Highest education level: Not on file   Occupational History    Not on file   Tobacco Use    Smoking status: Former     Current packs/day: 0.00     Average packs/day: 0.3 packs/day for 0.5 years (0.1 ttl pk-yrs)     Types: Cigarettes     Start date: 2015     Quit date: 2016     Years since quittin.4    Smokeless tobacco: Never   Substance and Sexual Activity    Alcohol use: No    Drug use: No     Comment: Hx of drug abuse-pot,meth, cocaine (None for 15 yrs) never injected drugs    Sexual activity: Not Currently   Other Topics Concern    Not on file   Social History Narrative    Not on file     Social Drivers of Health     Financial Resource Strain: Not on file   Food Insecurity: No Food Insecurity (2024)    Hunger Vital Sign     Worried About Running Out of Food in the Last Year: Never true     Ran Out of Food in the Last Year: Never true   Transportation Needs: No Transportation Needs (10/7/2024)    PRAPARE - Transportation     Lack of Transportation (Medical): No     Lack of Transportation (Non-Medical): No   Physical Activity: Not on file   Stress: Not on file   Social Connections: Not on file   Intimate Partner Violence: Not At Risk (2024)    Humiliation, Afraid, Rape, and Kick questionnaire     Fear of Current or Ex-Partner: No     Emotionally Abused: No     Physically Abused: No     Sexually Abused: No   Housing Stability: Low Risk  (2024)    Housing Stability Vital Sign     Unable to Pay for Housing in the Last Year: No     Number of Times Moved in the Last Year: 0     Homeless in the Last Year: No         CURRENT  "MEDICATIONS  No current facility-administered medications on file prior to encounter.     Current Outpatient Medications on File Prior to Encounter   Medication Sig Dispense Refill    hydrOXYzine HCl (ATARAX) 25 MG Tab Take 25 mg by mouth 3 times a day as needed for Anxiety.      QUEtiapine (SEROQUEL) 50 MG tablet Take 1 Tablet by mouth 2 times a day. TAKE AT 16:00 and 20:00 60 Tablet 2    hydrALAZINE (APRESOLINE) 100 MG tablet Take 1 Tablet by mouth every 8 hours. (Patient taking differently: Take 50 mg by mouth every 8 hours.) 90 Tablet 2    metoprolol SR (TOPROL XL) 100 MG TABLET SR 24 HR Take 1 Tablet by mouth every day. 30 Tablet 2    traZODone (DESYREL) 50 MG Tab Take 1 Tablet by mouth at bedtime. 30 Tablet 2    vitamin D3 (CHOLECALCIFEROL) 1000 UNIT Tab Take 1 Tablet by mouth every day. 30 Tablet 2    atorvastatin (LIPITOR) 80 MG tablet Take 1 Tablet by mouth every evening.      lisinopril (PRINIVIL) 40 MG tablet Take 1 Tablet by mouth every day.      amLODIPine (NORVASC) 10 MG Tab Take 1 Tablet by mouth every day.      aspirin (ASA) 81 MG Chew Tab chewable tablet Chew 1 Tablet every day. 100 Tablet 1           ALLERGIES  No Known Allergies    PHYSICAL EXAM  VITAL SIGNS:/70   Pulse 73   Temp 36.9 °C (98.4 °F) (Temporal)   Resp 19   Ht 1.753 m (5' 9\")   Wt 77 kg (169 lb 12.1 oz)   SpO2 93%   BMI 25.07 kg/m²       VITALS - vital signs documented prior to this note have been reviewed and noted,  GENERAL - awake, alert, oriented, GCS 15, no apparent distress, non-toxic  appearing  HEENT - normocephalic, atraumatic, pupils equal, sclera anicteric, mucus  membranes moist  NECK - supple, no meningismus, full active range of motion, trachea midline  CARDIOVASCULAR - regular rate/rhythm, no murmurs/gallops/rubs  PULMONARY - no respiratory distress, speaking in full sentences, clear to  auscultation bilaterally, no wheezing/ronchi/rales, no accessory muscle use  GASTROINTESTINAL - soft, non-tender, " non-distended, no rebound, guarding,  or peritonitis  GENITOURINARY - Deferred  NEUROLOGIC -awake moving all extremities alert to person  MUSCULOSKELETAL - no obvious asymmetry or deformities present  EXTREMITIES - warm, well-perfused, no cyanosis or significant edema  DERMATOLOGIC - warm, dry, no rashes, no jaundice  PSYCHIATRIC - normal affect, normal insight, normal concentration    DIAGNOSTIC STUDIES / PROCEDURES      LABS  Labs Reviewed   CBC WITH DIFFERENTIAL - Abnormal; Notable for the following components:       Result Value    RBC 3.58 (*)     Hemoglobin 10.8 (*)     Hematocrit 32.3 (*)     All other components within normal limits   COMP METABOLIC PANEL - Abnormal; Notable for the following components:    Glucose 116 (*)     All other components within normal limits   URINALYSIS   ESTIMATED GFR     No evidence of UTI  Radiologist interpretation:   No orders to display        COURSE & MEDICAL DECISION MAKING    ED COURSE:      INTERVENTIONS BY ME:  Medications - No data to display        INITIAL ASSESSMENT, COURSE AND PLAN  Care Narrative: Patient presented for evaluation of agitation.  Upon arrival in the emergency department patient is resting comfortably no acute distress he is not agitated and is pleasant during my examination.  Does have a history of dementia and is at his baseline mentation.  Labs were obtained did not demonstrate any significant metabolic derangements no evidence of UTI and were otherwise nonactionable.  He was observed in the emergency department for 3 hours with no evidence of aggressive behavior, and is at his baseline mentation thus I considered discharge reasonable.  He will be discharged back to [facility] facility.           ADDITIONAL PROBLEM LIST    DISPOSITION AND DISCUSSIONS      Escalation of care considered, and ultimately not performed:diagnostic imaging considered obtaining a CT head of the patient's head no recent trauma falls does have a nonfocal neurologic  exam    FINAL DIAGNOSIS  1. Agitation due to dementia (HCC)             Electronically signed by: Adriel Gunn DO ,11:11 PM 12/17/24   Normal vision: sees adequately in most situations; can see medication labels, newsprint

## (undated) DEVICE — PACK LOWER EXTREMITY - (2/CA)

## (undated) DEVICE — SUTURE 2-0 PDSII VIOLET SH 18 IN (12EA/BX)

## (undated) DEVICE — GLOVE SZ 7.5 BIOGEL PI MICRO - PF LF (50PR/BX)

## (undated) DEVICE — SODIUM CHL IRRIGATION 0.9% 1000ML (12EA/CA)

## (undated) DEVICE — GOWN WARMING STANDARD FLEX - (30/CA)

## (undated) DEVICE — SENSOR OXIMETER ADULT SPO2 RD SET (20EA/BX)

## (undated) DEVICE — CANISTER SUCTION 3000ML MECHANICAL FILTER AUTO SHUTOFF MEDI-VAC NONSTERILE LF DISP (40EA/CA)

## (undated) DEVICE — SUTURE 3-0 ETHILON FS-1 - (36/BX) 30 INCH

## (undated) DEVICE — SUTURE GENERAL

## (undated) DEVICE — SUCTION INSTRUMENT YANKAUER BULBOUS TIP W/O VENT (50EA/CA)

## (undated) DEVICE — ELECTRODE DUAL RETURN W/ CORD - (50/PK)

## (undated) DEVICE — CHLORAPREP 26 ML APPLICATOR - ORANGE TINT(25/CA)

## (undated) DEVICE — COVER LIGHT HANDLE ALC PLUS DISP (18EA/BX)

## (undated) DEVICE — WRAP COBAN SELF-ADHERENT 6 IN X 5YDS STERILE TAN (12/CA)

## (undated) DEVICE — GLOVE SIZE 8.0 SURGEON ACCELERATOR FREE GREEN (50PR/BX)

## (undated) DEVICE — SET EXTENSION WITH 2 PORTS (48EA/CA) ***PART #2C8610 IS A SUBSTITUTE*****

## (undated) DEVICE — LACTATED RINGERS INJ 1000 ML - (14EA/CA 60CA/PF)

## (undated) DEVICE — SET LEADWIRE 5 LEAD BEDSIDE DISPOSABLE ECG (1SET OF 5/EA)

## (undated) DEVICE — TUBING CLEARLINK DUO-VENT - C-FLO (48EA/CA)

## (undated) DEVICE — CONTAINER SPECIMEN BAG OR - STERILE 4 OZ W/LID (100EA/CA)

## (undated) DEVICE — GOWN SURGEONS X-LARGE - DISP. (30/CA)

## (undated) DEVICE — DRESSING PETROLEUM GAUZE 5 X 9" (50EA/BX 4BX/CA)"